# Patient Record
Sex: MALE | Race: WHITE | NOT HISPANIC OR LATINO | Employment: FULL TIME | URBAN - METROPOLITAN AREA
[De-identification: names, ages, dates, MRNs, and addresses within clinical notes are randomized per-mention and may not be internally consistent; named-entity substitution may affect disease eponyms.]

---

## 2017-01-20 ENCOUNTER — HOSPITAL ENCOUNTER (EMERGENCY)
Facility: HOSPITAL | Age: 52
Discharge: HOME | End: 2017-01-20
Attending: EMERGENCY MEDICINE
Payer: COMMERCIAL

## 2017-01-20 ENCOUNTER — NURSE TRIAGE (OUTPATIENT)
Dept: ADMINISTRATIVE | Facility: CLINIC | Age: 52
End: 2017-01-20

## 2017-01-20 VITALS
OXYGEN SATURATION: 98 % | BODY MASS INDEX: 48.78 KG/M2 | RESPIRATION RATE: 16 BRPM | SYSTOLIC BLOOD PRESSURE: 177 MMHG | HEART RATE: 88 BPM | WEIGHT: 315 LBS | TEMPERATURE: 97 F | DIASTOLIC BLOOD PRESSURE: 88 MMHG

## 2017-01-20 DIAGNOSIS — R22.0 SWELLING OF UPPER LIP: Primary | ICD-10-CM

## 2017-01-20 PROCEDURE — 99283 EMERGENCY DEPT VISIT LOW MDM: CPT | Mod: ,,, | Performed by: PHYSICIAN ASSISTANT

## 2017-01-20 PROCEDURE — 99283 EMERGENCY DEPT VISIT LOW MDM: CPT

## 2017-01-20 RX ORDER — AMLODIPINE BESYLATE 5 MG/1
5 TABLET ORAL DAILY
Qty: 30 TABLET | Refills: 0 | Status: SHIPPED | OUTPATIENT
Start: 2017-01-20 | End: 2017-02-08 | Stop reason: SDUPTHER

## 2017-01-20 NOTE — TELEPHONE ENCOUNTER
Spoke to pt and he had not gone to er yet .  I told him that Dr Weinstein said it is best he go to er

## 2017-01-20 NOTE — ED AVS SNAPSHOT
[facility]  [address] [address]               García Tang   2017 10:52 AM   ED    Description:  Male : 1965   Department:  [facility]           Your Care was Coordinated By:     Provider Role From To    John Urban MD Attending Provider 17 1053 --    Divine Bryson PA-C Physician Assistant 17 0469 --      Reason for Visit     Oral Swelling           Diagnoses this Visit        Comments    Swelling of upper lip    -  Primary       ED Disposition     ED Disposition Condition Comment    Discharge             To Do List           Follow-up Information     Follow up with Zaina Weinstein MD. Schedule an appointment as soon as possible for a visit in 2 weeks.    Specialty:  Internal Medicine    Contact information:     [facility] [place] [address]  [phone]         These Medications        Disp Refills Start End    amlodipine (NORVASC) 5 MG tablet 30 tablet 0 2017    Take 1 tablet (5 mg total) by mouth once daily. - Oral    Pharmacy: [id number] [place] [facility] [address] [facility] #: [phone]         [facility]     [facility] -  Assistance  Registered nurses in the [facility] provide clinical advisement, health education, appointment booking, and other advisory services.  Call for this free service at [phone].             Medications           Message regarding Medications     Verify the changes and/or additions to your medication regime listed below are the same as discussed with your clinician today.  If any of these changes or additions are incorrect, please notify your healthcare provider.        START taking these NEW medications        Refills    amlodipine (NORVASC) 5 MG tablet 0    Sig: Take 1 tablet (5 mg total) by mouth once daily.    Class: Print    Route: Oral           Verify that the below list of  medications is an accurate representation of the medications you are currently taking.  If none reported, the list may be blank. If incorrect, please contact your healthcare provider. Carry this list with you in case of emergency.           Current Medications     amlodipine (NORVASC) 5 MG tablet Take 1 tablet (5 mg total) by mouth once daily.    aspirin (ECOTRIN) 81 MG EC tablet Take 81 mg by mouth once daily.    DOCOSAHEXANOIC ACID/EPA (FISH OIL ORAL) Take by mouth.    duloxetine (CYMBALTA) 30 MG capsule Take 1 capsule (30 mg total) by mouth once daily.    ERGOCALCIFEROL, VITAMIN D2, (VITAMIN D ORAL) Take by mouth.    hydrochlorothiazide (MICROZIDE) 12.5 mg capsule Take 2 capsules (25 mg total) by mouth once daily.    insulin NPH-insulin regular, 70/30, (NOVOLIN 70/30) 100 unit/mL (70-30) injection Inject into the skin 2 (two) times daily.    lisinopril (PRINIVIL,ZESTRIL) 40 MG tablet Take 1 tablet (40 mg total) by mouth once daily.    metoprolol succinate (TOPROL XL) 50 MG 24 hr tablet Take 1 tablet (50 mg total) by mouth once daily. 1 Tablet Sustained Release 24 hr Oral Every day    multivitamin capsule Take by mouth. 1 Capsule Oral Every day    omeprazole (PRILOSEC) 20 MG capsule Take 1 capsule (20 mg total) by mouth once daily.           Clinical Reference Information           Your Vitals Were     BP Pulse Temp Resp Weight SpO2    177/88 (BP Location: Right arm, Patient Position: Sitting) 88 97.3 °F (36.3 °C) (Oral) 16 154.2 kg (340 lb) 98%    BMI                48.78 kg/m2          Allergies as of 1/20/2017        Reactions    Bactrim  [Sulfamethoxazole-trimethoprim]     Other reaction(s): Unknown    Hydroxyzine Hcl     Other reaction(s): elevated bp    Kenalog [Triamcinolone Acetonide] Other (See Comments)    Heart flutters all day    Phenergan [Promethazine] Anxiety    Zofran [Ondansetron Hcl (Pf)] Anxiety      Immunizations Administered on Date of Encounter - 1/20/2017     None      ED Micro, Lab, POCT      None      ED Imaging Orders     None        Discharge Instructions       Rest.  Drink plenty of fluids.  You can continue using Benadryl 50 mg every 6 hours as needed for itching or swelling.  Stop taking the lisinopril.  Tylenol or ibuprofen as directed as needed for fever/pain.  Return to the ER for any new or worsening symptoms.    Discharge References/Attachments     ANGIOEDEMA (ENGLISH)      Smoking Cessation     If you would like to quit smoking:   You may be eligible for free services if you are a [place] resident and started smoking cigarettes before September 1, 1988.  Call the Smoking Cessation Trust (SCT) toll free at (122) 528-3231 or (259) 372-0272.   Call 7-193-QUIT-NOW if you do not meet the above criteria.             [facility] complies with applicable Federal civil rights laws and does not discriminate on the basis of race, color, national origin, age, disability, or sex.        Language Assistance Services     ATTENTION: Language assistance services are available, free of charge. Please call [phone].      ATENCIÓN: Si habla topher, tiene a olivera disposición servicios gratuitos de asistencia lingüística. Llame al [phone].     WATSON Ý: N?u b?n nói Ti?ng Vi?t, có các d?ch v? h? tr? ngôn ng? mi?n phí dành cho b?n. G?i s? [phone].

## 2017-01-20 NOTE — DISCHARGE INSTRUCTIONS
Rest.  Drink plenty of fluids.  You can continue using Benadryl 50 mg every 6 hours as needed for itching or swelling.  Stop taking the lisinopril.  Tylenol or ibuprofen as directed as needed for fever/pain.  Return to the ER for any new or worsening symptoms.

## 2017-01-20 NOTE — ED PROVIDER NOTES
"Encounter Date: 1/20/2017    SCRIBE #1 NOTE: I, Carmen Ellison, am scribing for, and in the presence of,  Dr. Urban. I have scribed the following portions of the note - the APC attestation.       History     Chief Complaint   Patient presents with    Oral Swelling     facial and oral tingling and swelling; took Benadryl today 0700. denies difficulty swallowing; dyspnea with exertion. no new medication.  on lisinopril     Review of patient's allergies indicates:   Allergen Reactions    Bactrim  [sulfamethoxazole-trimethoprim]      Other reaction(s): Unknown    Hydroxyzine hcl      Other reaction(s): elevated bp    Kenalog [triamcinolone acetonide] Other (See Comments)     Heart flutters all day    Phenergan [promethazine] Anxiety    Zofran [ondansetron hcl (pf)] Anxiety     HPI Comments: Time seen by provider: 10:54 AM    Disclaimer: This note has been generated using voice-recognition software. There may be typographical errors that have been missed during proof-reading.    This is a 51-year-old white male with past medical history of hypertension and diabetes who presents to the ER with a chief complaint of upper lip swelling.  Patient states when he awoke this morning he felt as if his upper lip was swollen.  He took Benadryl at approximately 7:00 this morning and feels as if the swelling has slightly improved.  Patient states he is suffering with sinus congestion for the past 3 days.  He says yesterday he felt "spasm" in his upper lip.  He denies fever, chills, tongue swelling, difficulty swallowing or breathing.  He does currently take lisinopril for his blood pressure.    The history is provided by the patient.     Past Medical History   Diagnosis Date    Allergy     Arthritis     Diabetes mellitus     Hypertension     Inflammatory bowel disease      diverticulitis    Kidney stone     Prostatitis     Rectal bleeding      diverticulitis    Renal disorder     Urinary tract infection      Past " Medical History Pertinent Negatives   Diagnosis Date Noted    Anticoagulant long-term use 5/29/2014    Asthma 5/28/2014    Cancer 5/28/2014    CHF (congestive heart failure) 5/29/2014    COPD (chronic obstructive pulmonary disease) 5/29/2014    Coronary artery disease 5/29/2014    Encounter for blood transfusion 5/29/2014    Seizures 5/29/2014    Stroke 5/29/2014    Thyroid disease 5/29/2014     Past Surgical History   Procedure Laterality Date    Kidney surgery      Nose surgery      Kidney stent      Kidney surgery  1996     Family History   Problem Relation Age of Onset    Cancer Father     Liver disease Mother      d.     Social History   Substance Use Topics    Smoking status: Former Smoker     Types: Cigarettes    Smokeless tobacco: Never Used      Comment: social    Alcohol use Yes      Comment: social     Review of Systems   Constitutional: Negative for chills and fever.   HENT: Positive for congestion, facial swelling (upper lip) and sinus pressure. Negative for sore throat, trouble swallowing and voice change.    Respiratory: Negative for shortness of breath.    Cardiovascular: Negative for chest pain.   Musculoskeletal: Negative for back pain, neck pain and neck stiffness.   Skin: Negative for rash and wound.   Neurological: Negative for weakness and numbness.   Psychiatric/Behavioral: Negative for confusion.       Physical Exam   Initial Vitals   BP Pulse Resp Temp SpO2   01/20/17 0932 01/20/17 0932 01/20/17 0932 01/20/17 0932 01/20/17 0932   177/88 88 16 97.3 °F (36.3 °C) 98 %     Physical Exam    Nursing note and vitals reviewed.  Constitutional: He appears well-developed and well-nourished. No distress.   HENT:   Head: Normocephalic and atraumatic.   Right Ear: Tympanic membrane, external ear and ear canal normal.   Left Ear: Tympanic membrane, external ear and ear canal normal.   Nose: Mucosal edema and rhinorrhea present. Right sinus exhibits no maxillary sinus tenderness and no  frontal sinus tenderness. Left sinus exhibits no maxillary sinus tenderness and no frontal sinus tenderness.   Mouth/Throat: Posterior oropharyngeal erythema present. No oropharyngeal exudate.   There may be some slight swelling of the upper lip.     Eyes: Conjunctivae are normal.   Neck: Normal range of motion. Neck supple.   Cardiovascular: Normal rate and regular rhythm. Exam reveals no gallop and no friction rub.    No murmur heard.  Pulmonary/Chest: Breath sounds normal. He has no wheezes. He has no rhonchi. He has no rales.   Musculoskeletal: Normal range of motion.   Lymphadenopathy:     He has no cervical adenopathy.   Neurological: He is alert and oriented to person, place, and time.   Skin: Skin is warm and dry. No rash noted. No erythema.   Psychiatric: He has a normal mood and affect. His behavior is normal. Judgment and thought content normal.         ED Course   Procedures  Labs Reviewed - No data to display          Medical Decision Making:   History:   Old Medical Records: I decided to obtain old medical records.  Differential Diagnosis:   Angioedema, ALLERGIC reaction, sinusitis, URI, cellulitis, dental abscess  ED Management:  Patient presented to the ER with complaint of upper lip swelling that he noticed this morning.  He took some Benadryl and states swelling may have improved.  Patient does take lisinopril.  He is also suffering with sinus congestion and rhinorrhea.  On exam, there is minimal swelling noted to the upper lip.  There is no erythema or tenderness.  There is no other oral swelling.  This may be early or mild angioedema.  The patient has been instructed to stop taking his lisinopril.  He can continue to take Benadryl every 6 hours as needed for itching or swelling.  He was given a prescription for Norvasc.  He should follow-up with his PCP within the next week for reevaluation of blood pressure.  He was given strict return precautions.  He has been instructed to followup with his  PCP within the next week if symptoms not improving.  He should return to the ER for any new or worsening symptoms.  This case was discussed with my supervising physician.            Scribe Attestation:   Scribe #1: I performed the above scribed service and the documentation accurately describes the services I performed. I attest to the accuracy of the note.    Attending Attestation:     Physician Attestation Statement for NP/PA:   I have conducted a face to face encounter with this patient in addition to the NP/PA, due to Medical Complexity    Other NP/PA Attestation Additions:    History of Present Illness: Pt complains of mild swelling to the upper lip.   Physical Exam: Pt with subtle swelling to the right upper lip. No sign of dental infection, airway obstruction, or cold sore.   Medical Decision Making: This may represent a subtle angioedema reaction. Will discontinue lisinopril and add norvasc.       Physician Attestation for Scribe:  Physician Attestation Statement for Scribe #1: I, Dr. Urban, reviewed documentation, as scribed by Carmen Ellison in my presence, and it is both accurate and complete.                 ED Course     Clinical Impression:   The encounter diagnosis was Swelling of upper lip.          Divine Bryson PA-C  01/20/17 6363

## 2017-01-20 NOTE — TELEPHONE ENCOUNTER
Reason for Disposition   Lip swelling   Taking an ACE Inhibitor medication  (e.g., benazepril/LOTENSIN, captopril/CAPOTEN, enalapril/VASOTEC, lisinopril/ZESTRIL)    Protocols used: ST MOUTH SYMPTOMS-A-AH, ST LIP SWELLING-A-AH    Patient reporting swelling to his bottom lip and reports no changes to diet or otherwise that he is aware of. He denies tongue swelling, facial swelling, difficulty swallowing or difficulty breathing. Recommended according to protocol to be seen in ED. He seems to be unsure if he will go to ED. If MD feels it would be appropriate for him to come into office would you please contact patient. He has already taken a dosage of benadryl today. Please contact caller with any further care advice.

## 2017-01-23 ENCOUNTER — OFFICE VISIT (OUTPATIENT)
Dept: INTERNAL MEDICINE | Facility: CLINIC | Age: 52
End: 2017-01-23
Payer: COMMERCIAL

## 2017-01-23 DIAGNOSIS — J06.9 UPPER RESPIRATORY TRACT INFECTION, UNSPECIFIED TYPE: Primary | ICD-10-CM

## 2017-01-23 PROCEDURE — 3074F SYST BP LT 130 MM HG: CPT | Mod: S$GLB,,, | Performed by: INTERNAL MEDICINE

## 2017-01-23 PROCEDURE — 3079F DIAST BP 80-89 MM HG: CPT | Mod: S$GLB,,, | Performed by: INTERNAL MEDICINE

## 2017-01-23 PROCEDURE — 99999 PR PBB SHADOW E&M-EST. PATIENT-LVL III: CPT | Mod: PBBFAC,,, | Performed by: INTERNAL MEDICINE

## 2017-01-23 PROCEDURE — 1159F MED LIST DOCD IN RCRD: CPT | Mod: S$GLB,,, | Performed by: INTERNAL MEDICINE

## 2017-01-23 PROCEDURE — 99213 OFFICE O/P EST LOW 20 MIN: CPT | Mod: S$GLB,,, | Performed by: INTERNAL MEDICINE

## 2017-01-23 RX ORDER — AMOXICILLIN 875 MG/1
875 TABLET, FILM COATED ORAL 2 TIMES DAILY
Qty: 14 TABLET | Refills: 0 | Status: SHIPPED | OUTPATIENT
Start: 2017-01-23 | End: 2017-01-30

## 2017-01-24 ENCOUNTER — PATIENT MESSAGE (OUTPATIENT)
Dept: INTERNAL MEDICINE | Facility: CLINIC | Age: 52
End: 2017-01-24

## 2017-01-28 VITALS
HEIGHT: 70 IN | HEART RATE: 69 BPM | TEMPERATURE: 99 F | DIASTOLIC BLOOD PRESSURE: 88 MMHG | SYSTOLIC BLOOD PRESSURE: 118 MMHG | WEIGHT: 315 LBS | BODY MASS INDEX: 45.1 KG/M2 | OXYGEN SATURATION: 97 %

## 2017-01-29 NOTE — PROGRESS NOTES
Subjective:       Patient ID: García Tang is a 51 y.o. male.    Chief Complaint: URI    HPI: He complains of sinus congestion and green nasal discharge.  No fever, chills, night sweats  Review of Systems   Constitutional: Negative for chills, fatigue, fever and unexpected weight change.   Respiratory: Negative for chest tightness and shortness of breath.    Cardiovascular: Negative for chest pain and palpitations.   Gastrointestinal: Negative for abdominal pain and blood in stool.   Neurological: Negative for dizziness, syncope, numbness and headaches.       Objective:      Physical Exam   HENT:   Right Ear: External ear normal.   Left Ear: External ear normal.   Nose: Nose normal.   Mouth/Throat: Oropharynx is clear and moist.   Eyes: Pupils are equal, round, and reactive to light.   Neck: Normal range of motion.   Cardiovascular: Normal rate and regular rhythm.    No murmur heard.  Pulmonary/Chest: Breath sounds normal.   Abdominal: He exhibits no distension. There is no hepatosplenomegaly. There is no tenderness.   Lymphadenopathy:     He has no cervical adenopathy.     He has no axillary adenopathy.   Neurological: He has normal strength and normal reflexes. No cranial nerve deficit or sensory deficit.     no lip swelling  Assessment:     assessment and plan: Upper respiratory infection: Amoxil 875 mg by mouth twice a day.  Call if no relief.  He was here for an urgent care only appointment.  He will follow-up with his primary care physician for a physical      Plan:       As above

## 2017-02-08 ENCOUNTER — HOSPITAL ENCOUNTER (OUTPATIENT)
Dept: RADIOLOGY | Facility: HOSPITAL | Age: 52
Discharge: HOME | End: 2017-02-08
Attending: INTERNAL MEDICINE
Payer: COMMERCIAL

## 2017-02-08 ENCOUNTER — OFFICE VISIT (OUTPATIENT)
Dept: INTERNAL MEDICINE | Facility: CLINIC | Age: 52
End: 2017-02-08
Payer: COMMERCIAL

## 2017-02-08 DIAGNOSIS — R06.2 WHEEZING: Primary | ICD-10-CM

## 2017-02-08 DIAGNOSIS — R06.2 WHEEZING: ICD-10-CM

## 2017-02-08 PROCEDURE — 99999 PR PBB SHADOW E&M-EST. PATIENT-LVL III: CPT | Mod: PBBFAC,,, | Performed by: INTERNAL MEDICINE

## 2017-02-08 PROCEDURE — 99213 OFFICE O/P EST LOW 20 MIN: CPT | Mod: S$GLB,,, | Performed by: INTERNAL MEDICINE

## 2017-02-08 PROCEDURE — 71020 XR CHEST PA AND LATERAL: CPT | Mod: TC,PO

## 2017-02-08 PROCEDURE — 71020 XR CHEST PA AND LATERAL: CPT | Mod: 26,,, | Performed by: RADIOLOGY

## 2017-02-08 RX ORDER — METOPROLOL SUCCINATE 50 MG/1
50 TABLET, EXTENDED RELEASE ORAL DAILY
Qty: 90 TABLET | Refills: 3 | Status: SHIPPED | OUTPATIENT
Start: 2017-02-08 | End: 2017-02-23 | Stop reason: SDUPTHER

## 2017-02-08 RX ORDER — AMLODIPINE BESYLATE 5 MG/1
5 TABLET ORAL DAILY
Qty: 90 TABLET | Refills: 3 | Status: SHIPPED | OUTPATIENT
Start: 2017-02-08 | End: 2017-10-09 | Stop reason: SDUPTHER

## 2017-02-08 RX ORDER — AMLODIPINE BESYLATE 5 MG/1
5 TABLET ORAL DAILY
Qty: 90 TABLET | Refills: 3 | Status: SHIPPED | OUTPATIENT
Start: 2017-02-08 | End: 2017-02-08 | Stop reason: SDUPTHER

## 2017-02-08 RX ORDER — HYDROCHLOROTHIAZIDE 12.5 MG/1
25 CAPSULE ORAL DAILY
Qty: 90 CAPSULE | Refills: 3 | Status: SHIPPED | OUTPATIENT
Start: 2017-02-08 | End: 2017-02-23 | Stop reason: DRUGHIGH

## 2017-02-09 VITALS
RESPIRATION RATE: 12 BRPM | DIASTOLIC BLOOD PRESSURE: 100 MMHG | HEIGHT: 70 IN | SYSTOLIC BLOOD PRESSURE: 140 MMHG | HEART RATE: 76 BPM | WEIGHT: 315 LBS | BODY MASS INDEX: 45.1 KG/M2

## 2017-02-09 NOTE — PROGRESS NOTES
Subjective:       Patient ID: García Tang is a 51 y.o. male.    Chief Complaint: Wheezing    HPI     Patient is a 51 year old male here today with wheezing. He says this is intermittent. He says he has gained a lot of weight since our last visit, he says this is from life stressors/dietary indescretion. He says he feels SOB with walking up flight of stairs but he tries to stay active and push himself but he feels winded. He says his wife has commented that she hears him wheezing intermittently and was concerned. He has no orthopnea, no new LE edema. Normal stress echo 2/2016, normal EF. No history of reactive airway disease. He was a former smoker.    Review of Systems   Constitutional: Positive for fatigue. Negative for chills and fever.   HENT: Negative for congestion, ear pain, postnasal drip, rhinorrhea, sinus pressure and sore throat.    Eyes: Negative for itching and visual disturbance.   Respiratory: Positive for wheezing. Negative for cough and shortness of breath.    Cardiovascular: Negative for chest pain, palpitations and leg swelling.   Gastrointestinal: Negative for abdominal pain and nausea.   Genitourinary: Negative for dysuria.   Musculoskeletal: Negative for arthralgias and myalgias.   Skin: Negative for rash.   Neurological: Negative for weakness, light-headedness and headaches.       Objective:      Physical Exam   Constitutional: He is oriented to person, place, and time. He appears well-developed and well-nourished. No distress.   HENT:   Head: Normocephalic and atraumatic.   Mouth/Throat: Oropharynx is clear and moist. No oropharyngeal exudate.   Eyes: Conjunctivae and EOM are normal. Pupils are equal, round, and reactive to light. Right eye exhibits no discharge. Left eye exhibits no discharge.   Neck: Normal range of motion. Neck supple. No thyromegaly present.   Cardiovascular: Normal rate, regular rhythm and normal heart sounds.    No murmur heard.  Pulmonary/Chest: Effort normal and  breath sounds normal. No respiratory distress. He has no wheezes. He has no rales.   Musculoskeletal: He exhibits no edema.   Lymphadenopathy:     He has no cervical adenopathy.   Neurological: He is alert and oriented to person, place, and time.   Skin: Skin is warm and dry. He is not diaphoretic.   Nursing note and vitals reviewed.      Assessment:       1. Wheezing        Plan:       Lung exam is CTAB  No orthopnea, he has had normal stress test last year, echo without any evidence of heart failure  Suspect this is secondary to deconditioning and weight gain however will examine for any evidence of reactive airway disease.  CXR   PFT with bronchodilator  Work on weight loss with diet + exercise

## 2017-02-10 ENCOUNTER — HOSPITAL ENCOUNTER (OUTPATIENT)
Dept: PULMONOLOGY | Facility: CLINIC | Age: 52
Discharge: HOME | End: 2017-02-10
Payer: COMMERCIAL

## 2017-02-10 DIAGNOSIS — R06.2 WHEEZING: ICD-10-CM

## 2017-02-10 LAB
PRE FEV1 FVC: 78
PRE FEV1: 2.93
PRE FVC: 3.74
PREDICTED FEV1 FVC: 81
PREDICTED FEV1: 3.69
PREDICTED FVC: 4.51

## 2017-02-10 PROCEDURE — 94729 DIFFUSING CAPACITY: CPT | Mod: S$GLB,,, | Performed by: INTERNAL MEDICINE

## 2017-02-10 PROCEDURE — 94010 BREATHING CAPACITY TEST: CPT | Mod: S$GLB,,, | Performed by: INTERNAL MEDICINE

## 2017-02-11 ENCOUNTER — PATIENT MESSAGE (OUTPATIENT)
Dept: INTERNAL MEDICINE | Facility: CLINIC | Age: 52
End: 2017-02-11

## 2017-02-14 ENCOUNTER — PATIENT MESSAGE (OUTPATIENT)
Dept: INTERNAL MEDICINE | Facility: CLINIC | Age: 52
End: 2017-02-14

## 2017-02-15 ENCOUNTER — PATIENT MESSAGE (OUTPATIENT)
Dept: INTERNAL MEDICINE | Facility: CLINIC | Age: 52
End: 2017-02-15

## 2017-02-22 ENCOUNTER — NURSE TRIAGE (OUTPATIENT)
Dept: ADMINISTRATIVE | Facility: CLINIC | Age: 52
End: 2017-02-22

## 2017-02-23 ENCOUNTER — OFFICE VISIT (OUTPATIENT)
Dept: INTERNAL MEDICINE | Facility: CLINIC | Age: 52
End: 2017-02-23
Payer: COMMERCIAL

## 2017-02-23 ENCOUNTER — LAB VISIT (OUTPATIENT)
Dept: LAB | Facility: HOSPITAL | Age: 52
End: 2017-02-23
Attending: INTERNAL MEDICINE
Payer: COMMERCIAL

## 2017-02-23 VITALS
BODY MASS INDEX: 45.1 KG/M2 | WEIGHT: 315 LBS | TEMPERATURE: 99 F | HEIGHT: 70 IN | SYSTOLIC BLOOD PRESSURE: 148 MMHG | DIASTOLIC BLOOD PRESSURE: 96 MMHG | HEART RATE: 90 BPM

## 2017-02-23 DIAGNOSIS — R21 RASH: ICD-10-CM

## 2017-02-23 DIAGNOSIS — E66.01 MORBID OBESITY DUE TO EXCESS CALORIES: Chronic | ICD-10-CM

## 2017-02-23 DIAGNOSIS — I10 ESSENTIAL HYPERTENSION: ICD-10-CM

## 2017-02-23 LAB
CREAT UR-MCNC: 235 MG/DL
MICROALBUMIN UR DL<=1MG/L-MCNC: 15 UG/ML
MICROALBUMIN/CREATININE RATIO: 6.4 UG/MG

## 2017-02-23 PROCEDURE — 82570 ASSAY OF URINE CREATININE: CPT

## 2017-02-23 PROCEDURE — 99213 OFFICE O/P EST LOW 20 MIN: CPT | Mod: S$GLB,,, | Performed by: INTERNAL MEDICINE

## 2017-02-23 PROCEDURE — 2022F DILAT RTA XM EVC RTNOPTHY: CPT | Mod: S$GLB,,, | Performed by: INTERNAL MEDICINE

## 2017-02-23 PROCEDURE — 99999 PR PBB SHADOW E&M-EST. PATIENT-LVL III: CPT | Mod: PBBFAC,,, | Performed by: INTERNAL MEDICINE

## 2017-02-23 PROCEDURE — 1160F RVW MEDS BY RX/DR IN RCRD: CPT | Mod: S$GLB,,, | Performed by: INTERNAL MEDICINE

## 2017-02-23 PROCEDURE — 3080F DIAST BP >= 90 MM HG: CPT | Mod: S$GLB,,, | Performed by: INTERNAL MEDICINE

## 2017-02-23 PROCEDURE — 3060F POS MICROALBUMINURIA REV: CPT | Mod: S$GLB,,, | Performed by: INTERNAL MEDICINE

## 2017-02-23 PROCEDURE — 3045F PR MOST RECENT HEMOGLOBIN A1C LEVEL 7.0-9.0%: CPT | Mod: S$GLB,,, | Performed by: INTERNAL MEDICINE

## 2017-02-23 PROCEDURE — 3077F SYST BP >= 140 MM HG: CPT | Mod: S$GLB,,, | Performed by: INTERNAL MEDICINE

## 2017-02-23 RX ORDER — HYDROCHLOROTHIAZIDE 25 MG/1
25 TABLET ORAL DAILY
Qty: 30 TABLET | Refills: 11
Start: 2017-02-23 | End: 2017-05-18

## 2017-02-23 RX ORDER — METOPROLOL SUCCINATE 50 MG/1
50 TABLET, EXTENDED RELEASE ORAL 2 TIMES DAILY
Qty: 60 TABLET | Refills: 6 | Status: SHIPPED | OUTPATIENT
Start: 2017-02-23 | End: 2017-10-09 | Stop reason: SDUPTHER

## 2017-02-23 NOTE — TELEPHONE ENCOUNTER
Reason for Disposition   Caller has NON-URGENT medication question about med that PCP prescribed and triager unable to answer question    Answer Assessment - Initial Assessment Questions  Pt reports concerns about his recent feeling of internal shakiness. Has been ongoing for a few days but was worse last pm. He checked his cbg at 2300 and noted reading of 335. He takes novolin 70/30 27 units am 24 hs but his dr told him he could use it with SS. He took 30 units last pm with 335. FBS this am was 159 which is about average for him. Has continued to have these internal shaky feelings today- at 1820 was 227. He is getting ready to eat dinner about 1730. Denied any recent illnesses but did report onset of rash last week for which he took benadryl which helped some. Noted the rash again Sunday - concentrated around neck and upper chest. Red, raised small bumps in clusters. No itching. Unsure of cause.    Protocols used:  DIABETES - HIGH BLOOD SUGAR-A-    Advised on low carbs tonight. Increased water intake. Insulin as directed by his dr. Call if any other changes.

## 2017-02-23 NOTE — PROGRESS NOTES
Subjective:       Patient ID: García Tang is a 51 y.o. male.    Chief Complaint: Diabetes (DM II follow up) and Rash    HPI     Patient is a 51-year-old male here today for a follow-up appointment.  He did call nurse on call for high blood sugar last night. Patient is not compliant with medication regimen, diet. He says that he was concerned because he felt shaky, checked BG which he normally does not do and numbers were in the 300s. He says he was concerned and called on call nurse. Instructed to drink water, take insulin as prescribed. Rash also located to anterior chest x 2 weeks off/on, not itchy, red, raised.     Review of Systems   Constitutional: Positive for fatigue. Negative for chills and fever.   HENT: Negative for congestion, ear pain, postnasal drip, rhinorrhea, sinus pressure and sore throat.    Eyes: Negative for itching and visual disturbance.   Respiratory: Negative for cough, shortness of breath and wheezing.    Cardiovascular: Negative for chest pain, palpitations and leg swelling.   Gastrointestinal: Negative for abdominal pain and nausea.   Genitourinary: Negative for dysuria.   Musculoskeletal: Negative for arthralgias and myalgias.   Skin: Positive for rash.   Neurological: Positive for tremors and weakness. Negative for light-headedness and headaches.       Objective:      Physical Exam   Constitutional: He is oriented to person, place, and time. He appears well-developed and well-nourished. No distress.   HENT:   Head: Normocephalic and atraumatic.   Neurological: He is alert and oriented to person, place, and time.   Skin: Skin is warm and dry. He is not diaphoretic.   Erythematous raised papular lesion on anterior chest wall  Not itchy  No scaling  No swelling noted   Nursing note and vitals reviewed.      Assessment:       1. Uncontrolled type 2 diabetes mellitus with peripheral neuropathy    2. Essential hypertension    3. Morbid obesity due to excess calories    4. Rash         Plan:       Patient very noncompliant with BG monitor and medication. This has been a recurring problem making it very difficult to have any control. At some point there was another endocrine MD he was seeing however he says he does not follow up with him now?     BP uncontrolled still:  Continue HCTZ 25 mg daily  Continue Norvasc 5 mg daily  Increase Toprol XL 50 mg to BID now (patient prefers BID dosing from a 100 mg XL daily) - Rx sent in    BG uncontrolled:  NEED BG LOG  He is now on Novolin 70/30 as 30 units BID  No sliding scale insulin given  Fasting BG this Am was 156  Diabetic Education  Provide log after one week and we can adjust insulin  Endocrine referral needed ASAP  Labs today including CMP, Lipid, A1c, urine microalbumin levels    Contact Dermatitis:  Trial of steroid cream BID to affected area  If no improvement, dermatology evaluation    8 week follow up

## 2017-02-24 ENCOUNTER — DOCUMENTATION ONLY (OUTPATIENT)
Dept: TRANSPLANT | Facility: CLINIC | Age: 52
End: 2017-02-24

## 2017-02-24 DIAGNOSIS — R79.89 ELEVATED LFTS: Primary | ICD-10-CM

## 2017-02-24 DIAGNOSIS — E78.5 HYPERLIPIDEMIA, UNSPECIFIED HYPERLIPIDEMIA TYPE: Primary | ICD-10-CM

## 2017-02-24 RX ORDER — FENOFIBRATE 54 MG/1
54 TABLET ORAL DAILY
Qty: 90 TABLET | Refills: 3 | Status: SHIPPED | OUTPATIENT
Start: 2017-02-24 | End: 2017-05-01

## 2017-02-24 NOTE — PROGRESS NOTES
Labs reviewed with patient today.  Elevated liver function testing, and the past this was also present with positive anti-smooth muscle antibody.  I do think this is likely fatty liver disease secondary to his poor diet and lifestyle choices.  I will check an ultrasound of the abdomen now, also will refer to hepatology given the positive anti-smooth muscle antibody testing done previously.    Regarding his lipid panel, elevation of the triglycerides are significant.  I do recommend starting fenofibrate 54 mg daily.  Despite his liver function at this time, this medication is still not contraindicated.

## 2017-02-24 NOTE — LETTER
February 24, 2017    Brad Tang  [address] [address]      Dear Brad Tang:    Your doctor has referred you to the [facility]. You will be contacted by our office and an initial appointment will then be scheduled for you.    We look forward to seeing you soon. If you have any further questions, please contact us at [phone].       Sincerely,        [facility]   [address] [place] [address]  [phone]

## 2017-02-24 NOTE — NURSING
Pt records reviewed.   Pt will be referred to Hepatology clinic  Initial referral received  from the [facility]que.   Referring Provider/diagnosis  Zaina Weinstein MD    Diagnosis: Elevated LFTs         Referral letter sent to provider and patient.

## 2017-02-24 NOTE — LETTER
February 24, 2017    Zaina Weinstein MD  [address] [facility] [address]      Dear Dr. Weinstein    Patient: García Tang   MR Number: [id number]   YOB: 1965     Thank you for the referral of García Tang to the [facility]. An initial appointment will be scheduled for your patient with one of our Hepatologists.      Thank you again for your trust in our program.  If there is anything we can do for you or your staff, please feel free to contact us.        Sincerely,        [facility]  [address] [place] [address]  [phone]

## 2017-03-01 ENCOUNTER — HOSPITAL ENCOUNTER (OUTPATIENT)
Dept: RADIOLOGY | Facility: HOSPITAL | Age: 52
Discharge: HOME | End: 2017-03-01
Attending: INTERNAL MEDICINE
Payer: COMMERCIAL

## 2017-03-01 ENCOUNTER — TELEPHONE (OUTPATIENT)
Dept: INTERNAL MEDICINE | Facility: CLINIC | Age: 52
End: 2017-03-01

## 2017-03-01 DIAGNOSIS — R79.89 ELEVATED LFTS: ICD-10-CM

## 2017-03-01 PROCEDURE — 76705 ECHO EXAM OF ABDOMEN: CPT | Mod: TC

## 2017-03-01 PROCEDURE — 76705 ECHO EXAM OF ABDOMEN: CPT | Mod: 26,,, | Performed by: RADIOLOGY

## 2017-03-01 NOTE — TELEPHONE ENCOUNTER
----- Message from Jazlyn Rosales sent at 3/1/2017 12:14 PM CST -----  Contact: self- [phone]  Patient would like to speak with  Only No nurse at all about ultrasound and the question's he has.

## 2017-03-01 NOTE — TELEPHONE ENCOUNTER
Pt said that you just sent him his results of his US like 20 mins ago,and all due respect I'm not a professional,nor a Dr,and he only wants to speak with his Dr. When I asked if I could relay certain questions so she could know what he wanted he said no- he wanted only to discuss with his Dr.

## 2017-03-02 ENCOUNTER — PATIENT MESSAGE (OUTPATIENT)
Dept: INTERNAL MEDICINE | Facility: CLINIC | Age: 52
End: 2017-03-02

## 2017-03-07 ENCOUNTER — OFFICE VISIT (OUTPATIENT)
Dept: HEPATOLOGY | Facility: CLINIC | Age: 52
End: 2017-03-07
Payer: COMMERCIAL

## 2017-03-07 ENCOUNTER — LAB VISIT (OUTPATIENT)
Dept: LAB | Facility: HOSPITAL | Age: 52
End: 2017-03-07
Payer: COMMERCIAL

## 2017-03-07 VITALS
RESPIRATION RATE: 18 BRPM | WEIGHT: 315 LBS | TEMPERATURE: 99 F | OXYGEN SATURATION: 95 % | HEART RATE: 79 BPM | BODY MASS INDEX: 45.1 KG/M2 | DIASTOLIC BLOOD PRESSURE: 59 MMHG | SYSTOLIC BLOOD PRESSURE: 112 MMHG | HEIGHT: 70 IN

## 2017-03-07 DIAGNOSIS — R74.8 ELEVATED LIVER ENZYMES: ICD-10-CM

## 2017-03-07 DIAGNOSIS — K76.0 FATTY LIVER: ICD-10-CM

## 2017-03-07 DIAGNOSIS — E66.01 MORBID OBESITY DUE TO EXCESS CALORIES: Chronic | ICD-10-CM

## 2017-03-07 DIAGNOSIS — E78.1 HYPERTRIGLYCERIDEMIA: ICD-10-CM

## 2017-03-07 DIAGNOSIS — R76.8 AUTOANTIBODY TITER ELEVATED: ICD-10-CM

## 2017-03-07 DIAGNOSIS — K76.0 FATTY LIVER: Primary | ICD-10-CM

## 2017-03-07 DIAGNOSIS — I10 ESSENTIAL HYPERTENSION: ICD-10-CM

## 2017-03-07 LAB
ALBUMIN SERPL BCP-MCNC: 3.6 G/DL
ALP SERPL-CCNC: 103 U/L
ALT SERPL W/O P-5'-P-CCNC: 45 U/L
ANION GAP SERPL CALC-SCNC: 10 MMOL/L
AST SERPL-CCNC: 31 U/L
BASOPHILS # BLD AUTO: 0.03 K/UL
BASOPHILS NFR BLD: 0.6 %
BILIRUB SERPL-MCNC: 0.4 MG/DL
BUN SERPL-MCNC: 12 MG/DL
CALCIUM SERPL-MCNC: 9.2 MG/DL
CHLORIDE SERPL-SCNC: 99 MMOL/L
CO2 SERPL-SCNC: 29 MMOL/L
CREAT SERPL-MCNC: 1.2 MG/DL
DIFFERENTIAL METHOD: NORMAL
EOSINOPHIL # BLD AUTO: 0.1 K/UL
EOSINOPHIL NFR BLD: 2.4 %
ERYTHROCYTE [DISTWIDTH] IN BLOOD BY AUTOMATED COUNT: 12.5 %
EST. GFR  (AFRICAN AMERICAN): >60 ML/MIN/1.73 M^2
EST. GFR  (NON AFRICAN AMERICAN): >60 ML/MIN/1.73 M^2
FERRITIN SERPL-MCNC: 179 NG/ML
GLUCOSE SERPL-MCNC: 255 MG/DL
HCT VFR BLD AUTO: 40 %
HGB BLD-MCNC: 14 G/DL
IGG SERPL-MCNC: 1256 MG/DL
INR PPP: 1
IRON SERPL-MCNC: 72 UG/DL
LYMPHOCYTES # BLD AUTO: 1.8 K/UL
LYMPHOCYTES NFR BLD: 32.1 %
MCH RBC QN AUTO: 29.6 PG
MCHC RBC AUTO-ENTMCNC: 35 %
MCV RBC AUTO: 85 FL
MONOCYTES # BLD AUTO: 0.6 K/UL
MONOCYTES NFR BLD: 11.4 %
NEUTROPHILS # BLD AUTO: 2.9 K/UL
NEUTROPHILS NFR BLD: 53.3 %
PLATELET # BLD AUTO: 232 K/UL
PMV BLD AUTO: 9.9 FL
POTASSIUM SERPL-SCNC: 4 MMOL/L
PROT SERPL-MCNC: 7.5 G/DL
PROTHROMBIN TIME: 10.3 SEC
RBC # BLD AUTO: 4.73 M/UL
SATURATED IRON: 23 %
SODIUM SERPL-SCNC: 138 MMOL/L
TOTAL IRON BINDING CAPACITY: 318 UG/DL
TRANSFERRIN SERPL-MCNC: 215 MG/DL
WBC # BLD AUTO: 5.45 K/UL

## 2017-03-07 PROCEDURE — 99999 PR PBB SHADOW E&M-EST. PATIENT-LVL IV: CPT | Mod: PBBFAC,,, | Performed by: NURSE PRACTITIONER

## 2017-03-07 PROCEDURE — 86704 HEP B CORE ANTIBODY TOTAL: CPT

## 2017-03-07 PROCEDURE — 85025 COMPLETE CBC W/AUTO DIFF WBC: CPT

## 2017-03-07 PROCEDURE — 3045F PR MOST RECENT HEMOGLOBIN A1C LEVEL 7.0-9.0%: CPT | Mod: S$GLB,,, | Performed by: NURSE PRACTITIONER

## 2017-03-07 PROCEDURE — 80053 COMPREHEN METABOLIC PANEL: CPT

## 2017-03-07 PROCEDURE — 3074F SYST BP LT 130 MM HG: CPT | Mod: S$GLB,,, | Performed by: NURSE PRACTITIONER

## 2017-03-07 PROCEDURE — 82247 BILIRUBIN TOTAL: CPT

## 2017-03-07 PROCEDURE — 83540 ASSAY OF IRON: CPT

## 2017-03-07 PROCEDURE — 3078F DIAST BP <80 MM HG: CPT | Mod: S$GLB,,, | Performed by: NURSE PRACTITIONER

## 2017-03-07 PROCEDURE — 3060F POS MICROALBUMINURIA REV: CPT | Mod: S$GLB,,, | Performed by: NURSE PRACTITIONER

## 2017-03-07 PROCEDURE — 1160F RVW MEDS BY RX/DR IN RCRD: CPT | Mod: S$GLB,,, | Performed by: NURSE PRACTITIONER

## 2017-03-07 PROCEDURE — 84466 ASSAY OF TRANSFERRIN: CPT

## 2017-03-07 PROCEDURE — 82728 ASSAY OF FERRITIN: CPT

## 2017-03-07 PROCEDURE — 99244 OFF/OP CNSLTJ NEW/EST MOD 40: CPT | Mod: S$GLB,,, | Performed by: NURSE PRACTITIONER

## 2017-03-07 PROCEDURE — 86790 VIRUS ANTIBODY NOS: CPT

## 2017-03-07 PROCEDURE — 2022F DILAT RTA XM EVC RTNOPTHY: CPT | Mod: S$GLB,,, | Performed by: NURSE PRACTITIONER

## 2017-03-07 PROCEDURE — 82784 ASSAY IGA/IGD/IGG/IGM EACH: CPT

## 2017-03-07 PROCEDURE — 85610 PROTHROMBIN TIME: CPT

## 2017-03-07 PROCEDURE — 36415 COLL VENOUS BLD VENIPUNCTURE: CPT

## 2017-03-07 NOTE — LETTER
March 7, 2017      Zaina Weinstein MD  [address]  [place] [address]           [facility] - Hepatology  [address]  [place] [address]  Phone: [phone]  Fax: [phone]          Patient: García Tang   MR Number: [id number]   YOB: 1965   Date of Visit: 3/7/2017       Dear Dr. Zaina Weinstein:    Thank you for referring García Tang to me for evaluation. Attached you will find relevant portions of my assessment and plan of care.    If you have questions, please do not hesitate to call me. I look forward to following García Tang along with you.    Sincerely,    Ema Johnson, NP    Enclosure  CC:  No Recipients    If you would like to receive this communication electronically, please contact [email or web address] or [phone] to request more information on [employer] Link access.    For providers and/or their staff who would like to refer a patient to [facility], please contact us through our one-stop-shop provider referral line, [facility], at [phone].    If you feel you have received this communication in error or would no longer like to receive these types of communications, please e-mail [email or web address]

## 2017-03-07 NOTE — MR AVS SNAPSHOT
[facility] - Hepatology  [address]  [facility] [address]  Phone: [phone]  Fax: [phone]                  García Tang   3/7/2017 2:20 PM   Office Visit    Description:  Male : 1965   Provider:  Ema Johnson NP   Department:  [facility]at[facility]           Reason for Visit     Elevated Hepatic Enzymes     Fatty Liver           Diagnoses this Visit        Comments    Fatty liver    -  Primary            To Do List           Future Appointments        Provider Department Dept Phone    2017 11:00 AM [facility] - Diabetes Mgmt [phone]    2017 2:40 PM MD Miguelina Jonese - Internal Medicine [phone]    2017 7:30 AM [facility] - Laboratory [phone]    5/10/2017 8:40 AM [facility] [phone]      Goals (5 Years of Data)     None      [facility]     [facility] Care Line -  Assistance  Registered nurses in the [facility] Center provide clinical advisement, health education, appointment booking, and other advisory services.  Call for this free service at [phone].             Medications           Message regarding Medications     Verify the changes and/or additions to your medication regime listed below are the same as discussed with your clinician today.  If any of these changes or additions are incorrect, please notify your healthcare provider.             Verify that the below list of medications is an accurate representation of the medications you are currently taking.  If none reported, the list may be blank. If incorrect, please contact your healthcare provider. Carry this list with you in case of emergency.           Current Medications     amlodipine (NORVASC) 5 MG tablet Take 1 tablet (5 mg total) by mouth once daily.    aspirin (ECOTRIN) 81 MG EC tablet Take 81 mg by mouth once daily.    DOCOSAHEXANOIC ACID/EPA (FISH OIL ORAL) Take by mouth.  "   ERGOCALCIFEROL, VITAMIN D2, (VITAMIN D ORAL) Take by mouth.    fenofibrate (TRICOR) 54 MG tablet Take 1 tablet (54 mg total) by mouth once daily.    hydrochlorothiazide (HYDRODIURIL) 25 MG tablet Take 1 tablet (25 mg total) by mouth once daily.    insulin NPH-insulin regular, 70/30, (NOVOLIN 70/30) 100 unit/mL (70-30) injection Inject into the skin 2 (two) times daily.    metoprolol succinate (TOPROL XL) 50 MG 24 hr tablet Take 1 tablet (50 mg total) by mouth 2 (two) times daily.    multivitamin capsule Take by mouth. 1 Capsule Oral Every day           Clinical Reference Information           Your Vitals Were     BP Pulse Temp Resp Height Weight    112/59 (BP Location: Left arm, Patient Position: Sitting) 79 98.9 °F (37.2 °C) (Oral) 18 5' 10" (1.778 m) 159.9 kg (352 lb 8.3 oz)    SpO2 BMI             95% 50.58 kg/m2         Blood Pressure          Most Recent Value    BP  (!)  112/59      Allergies as of 3/7/2017     Bactrim  [Sulfamethoxazole-trimethoprim]    Hydroxyzine Hcl    Kenalog [Triamcinolone Acetonide]    Lisinopril    Phenergan [Promethazine]    Zofran [Ondansetron Hcl (Pf)]      Immunizations Administered on Date of Encounter - 3/7/2017     None      Orders Placed During Today's Visit     Future Labs/Procedures Expected by Expires    Hepatitis A antibody, IgG  3/7/2017 5/6/2018    MRI Abdomen Without Contrast  3/7/2017 3/7/2018    CBC auto differential  As directed 3/7/2018    Comprehensive metabolic panel  As directed 3/7/2018    Ferritin  As directed 3/7/2018    HCV FibroSURE  As directed 3/7/2018    Hepatitis B core antibody, total  As directed 3/7/2018    IgG  As directed 3/7/2018    Iron and TIBC  As directed 3/7/2018    Protime-INR  As directed 3/7/2018      Language Assistance Services     ATTENTION: Language assistance services are available, free of charge. Please call [phone].      ATENCIÓN: Si habla español, tiene a olivera disposición servicios gratuitos de asistencia lingüística. Llame " al [phone].     [facility] Ý: N?u b?n nói Ti?ng Vi?t, có các d?ch v? h? tr? ngôn ng? mi?n phí dành cho b?n. G?i s? [phone].         [facility] complies with applicable Federal civil rights laws and does not discriminate on the basis of race, color, national origin, age, disability, or sex.

## 2017-03-07 NOTE — PROGRESS NOTES
[facility] VISIT NEW PT NOTE    REFERRING PROVIDER: Zaina Weinstein MD     CHIEF COMPLAINT:  Elevated liver enzymes, (+) ASMA    HPI: This is a 51 y.o. White male with PMH as below referred for elevated liver enzymes. He has had some workup in the past for this with serological workup that was negative for Augie's, autoimmune etiology, and viral hepatitis. His RANI is negative and ASMA was only mildly (+) at 1:40 which is not significant. He has not been tested for HH or A1AT. His u/s shows fatty liver with hepatomegaly at 20.2 cm. Spleen nl at 12.4 cm. He has multiple risk factors for this with morbid obesity, HTN, HLD, and DM. His weight has been increasing recently. He reports he has lost a significant amount of weight in the past and was down to 230 lbs but regained it all back. He has mildly elevated transaminases, ALT > AST for the most part. Alk phos and Tbili nl. He has normal plts at 251. No recent INR. He denies any significant alcohol use currently, used to drink some in the past but no longer drinks. His mother had cirrhosis likely from hep C from a blood transfusion with child birth. He denies any symptoms of advanced chronic liver disease including jaundice, dark urine, abdominal distention, hematemesis, melena, slowed mentation.         Review of patient's allergies indicates:   Allergen Reactions    Bactrim  [sulfamethoxazole-trimethoprim]      Other reaction(s): Unknown    Hydroxyzine hcl      Other reaction(s): elevated bp    Kenalog [triamcinolone acetonide] Other (See Comments)     Heart flutters all day    Lisinopril Swelling    Phenergan [promethazine] Anxiety    Zofran [ondansetron hcl (pf)] Anxiety       Medications reviewed in Epic    PMHX:  has a past medical history of Allergy; Arthritis; Diabetes mellitus; HLD (hyperlipidemia); Hypertension; Inflammatory bowel disease; Kidney stone; NAFLD (nonalcoholic fatty liver disease); Prostatitis; Rectal bleeding; Renal disorder;  "and Urinary tract infection.    PSHX:  has a past surgical history that includes Kidney surgery; Nose surgery; kidney stent; Kidney surgery (1996); and Colonoscopy.    FAMILY HISTORY: Negative for liver disease, reviewed in EPIC    SOCIAL HISTORY:   History   Smoking Status    Former Smoker    Types: Cigarettes   Smokeless Tobacco    Never Used     Comment: social       History   Alcohol Use    Yes     Comment: social       History   Drug Use No     He is . Works in real estate.     ROS:   GENERAL: Denies fever, chills, (+) weight gain, (+) fatigue  HEENT: Denies headaches, dizziness, vision/hearing changes  CARDIOVASCULAR: Denies chest pain, palpitations, (+) edema  RESPIRATORY: (+) dyspnea, no cough  GI: (+) abdominal pain, no rectal bleeding, nausea, vomiting. No change in bowel pattern or color  : Denies dysuria, hematuria   SKIN: Denies rash, itching   NEURO: Denies confusion, memory loss, or mood changes, (+) leg pains  PSYCH: Denies depression or anxiety  HEME/LYMPH: Denies easy bruising or bleeding        PHYSICAL EXAM:   Friendly White male, in no acute distress; alert and oriented to person, place and time  VITALS: BP (!) 112/59 (BP Location: Left arm, Patient Position: Sitting)  Pulse 79  Temp 98.9 °F (37.2 °C) (Oral)   Resp 18  Ht 5' 10" (1.778 m)  Wt (!) 159.9 kg (352 lb 8.3 oz)  SpO2 95%  BMI 50.58 kg/m2  HENT: Normocephalic, without obvious abnormality. Oral mucosa pink and moist. Dentition good.  EYES: Sclerae anicteric. No conjunctival pallor.   NECK: Supple. No masses or cervical adenopathy.  CARDIOVASCULAR: Regular rate and rhythm. No murmurs.  RESPIRATORY: Normal respiratory effort. BBS CTA. No wheezes or crackles.  GI: Obese, soft, non-tender, non-distended. No palpable hepatosplenomegaly but exam limited by body habitus. No masses palpable. No ascites.  EXTREMITIES:  No clubbing, cyanosis, (+) BLE 1+ edema.  SKIN: Warm and dry. No jaundice. No rashes noted to exposed skin. " No telangectasias noted. No palmar erythema.  NEURO:  Normal gate. No asterixis.  PSYCH:  Memory intact. Thought and speech pattern appropriate. Behavior normal. No depression or anxiety noted.      RECENT LABS:    Labs:  Lab Results   Component Value Date    WBC 6.59 07/18/2016    HGB 13.8 (L) 07/18/2016    HCT 39.7 (L) 07/18/2016     07/18/2016    CHOL 182 02/23/2017    TRIG 326 (H) 02/23/2017    HDL 26 (L) 02/23/2017     02/23/2017    K 3.9 02/23/2017    CREATININE 1.3 02/23/2017    ALT 48 (H) 02/23/2017    AST 55 (H) 02/23/2017    ALKPHOS 111 02/23/2017    BILITOT 0.2 02/23/2017    ALBUMIN 3.7 02/23/2017    INR 1.1 07/25/2010       DIAGNOSTIC STUDIES:  ABD. U/S- 3/1/17  Findings: The liver measures 20.2 cm and is diffusely hyperechoic suggesting steatosis.  No focal hepatic lesions.  There is no intra or extrahepatic bile duct dilatation.  The common duct measures 5 mm.    The gallbladder is unremarkable with no evidence of wall thickening, pericholecystic fluid, sonographic Almazan's sign, or cholelithiasis.    The visualized portions of the pancreas are unremarkable.      The spleen measures 12.4 cm and is unremarkable.    There is no free fluid within the visualized abdomen.   Impression     #1.  Enlarged liver with steatosis.         ASSESSMENT:  51 y.o. White male with:  1.  Elevated liver enzymes, likely due to NAFLD  -- u/s shows hepatomegaly with steatosis and has multiple risk factors for this  -- (+) ASMA at 1:40 is not significant. Will check IgG but RANI is negative and with low titer do not suspect autoimmune liver disease  -- serological workup was negative for Augie's, autoimmune etiology, and viral hepatitis  -- will check ferritin since this has not been tested in past  2. NAFLD  -- transaminases mildly elevated  -- US shows hepatomegaly with steatosis  -- synthetic liver function nl, no recent INR  -- risk factors for fatty liver include morbid obesity, BMI 51, DM, HLD, HTN  -- no  liver staging yet  3. Elevated TGL  -- PCP Rx'ed fenofibrate but pt has not started this. Encouraged him to start taking it      EDUCATION:   We discussed the manifestations of NAFLD. At this time there is no FDA approved therapy for NAFLD.   The patient and I discussed the importance of diet, exercise, and weight loss for management of NAFLD. We discussed a low fat, low carb/low sugar, high fiber diet, and a goal of 30 minutes of exercise 5 times per week.   Pt is aware that fatty liver can progress to steatohepatitis and possibly to cirrhosis, putting one at increased risk for liver cancer, liver failure, and death.        PLAN:  1. Labs today. Fibrosure done to assess fibrosis  2. Can try to do MR elastography to assess fibrosis but unsure if this will be possible. Called and spoke with MRI and weight limit is 350 lbs so will have to defer for now. If he is able to lose some weight, may be able to do this  3. Will check immunity markers for HBV/HAV and arrange for vaccination if needed.   4. Weight loss goal of 15-20 lbs over next 3-6 months  5. Low fat, low cholesterol, low carb, high fiber diet  6. Exercise, 5 days a week, 30 min a day  7. Recommend good control of cholesterol, blood pressure, blood sugar levels  8. Discussed referral to bariatric medicine but pt declines at this time  9. Follow up in 6 months if all results ok         Thank you for allowing me to participate in the care of García ALISTAIR Hernandez

## 2017-03-08 LAB
HBV CORE AB SERPL QL IA: NEGATIVE
HEPATITIS A ANTIBODY, IGG: NEGATIVE

## 2017-03-10 ENCOUNTER — NURSE TRIAGE (OUTPATIENT)
Dept: ADMINISTRATIVE | Facility: CLINIC | Age: 52
End: 2017-03-10

## 2017-03-10 ENCOUNTER — OFFICE VISIT (OUTPATIENT)
Dept: INTERNAL MEDICINE | Facility: CLINIC | Age: 52
End: 2017-03-10
Payer: COMMERCIAL

## 2017-03-10 VITALS
OXYGEN SATURATION: 97 % | TEMPERATURE: 98 F | BODY MASS INDEX: 45.1 KG/M2 | WEIGHT: 315 LBS | HEIGHT: 70 IN | DIASTOLIC BLOOD PRESSURE: 84 MMHG | HEART RATE: 94 BPM | SYSTOLIC BLOOD PRESSURE: 128 MMHG

## 2017-03-10 DIAGNOSIS — E66.01 MORBID OBESITY WITH BMI OF 50.0-59.9, ADULT: ICD-10-CM

## 2017-03-10 DIAGNOSIS — K12.0 APHTHOUS ULCER OF MOUTH: Primary | ICD-10-CM

## 2017-03-10 DIAGNOSIS — B34.9 VIRAL ILLNESS: ICD-10-CM

## 2017-03-10 LAB
A2 MACROGLOB SERPL-MCNC: 117 MG/DL (ref 106–279)
ALT SERPL W P-5'-P-CCNC: 40 U/L (ref 9–46)
APO A-I SERPL-MCNC: 89 MG/DL (ref 94–176)
BILIRUB SERPL-MCNC: 0.3 MG/DL (ref 0.2–1.2)
FIBROSIS STAGE SERPL QL: ABNORMAL
FIBROTEST INTERPRETATION: ABNORMAL
FOOTNOTE: ABNORMAL
GGT SERPL-CCNC: 31 U/L (ref 3–95)
HAPTOGLOB SERPL-MCNC: 185 MG/DL (ref 43–212)
LIVER FIBR SCORE SERPL CALC.FIBROSURE: 0.1
NECROINFLAMMAT INTERP: ABNORMAL
NECROINFLAMMATORY ACT GRADE SERPL QL: ABNORMAL
NECROINFLAMMATORY ACT SCORE SERPL: 0.17
REFERENCE ID: ABNORMAL

## 2017-03-10 PROCEDURE — 99999 PR PBB SHADOW E&M-EST. PATIENT-LVL III: CPT | Mod: PBBFAC,,, | Performed by: INTERNAL MEDICINE

## 2017-03-10 PROCEDURE — 1160F RVW MEDS BY RX/DR IN RCRD: CPT | Mod: S$GLB,,, | Performed by: INTERNAL MEDICINE

## 2017-03-10 PROCEDURE — 99213 OFFICE O/P EST LOW 20 MIN: CPT | Mod: S$GLB,,, | Performed by: INTERNAL MEDICINE

## 2017-03-10 PROCEDURE — 3074F SYST BP LT 130 MM HG: CPT | Mod: S$GLB,,, | Performed by: INTERNAL MEDICINE

## 2017-03-10 PROCEDURE — 3079F DIAST BP 80-89 MM HG: CPT | Mod: S$GLB,,, | Performed by: INTERNAL MEDICINE

## 2017-03-10 NOTE — MR AVS SNAPSHOT
[facility] - Internal Medicine  [address]  [facility] [address]  Phone: [phone]  Fax: [phone]                  García Tang   3/10/2017 7:20 PM   Office Visit    Description:  Male : 1965   Provider:  Tracy Negrete MD   Department:  [facility] - Internal Medicine           Reason for Visit     Sore Throat           Diagnoses this Visit        Comments    Aphthous ulcer of mouth    -  Primary     Viral illness                To Do List           Future Appointments        Provider Department Dept Phone    3/10/2017 7:20 PM Tracy Negrete MD [facility] Internal Medicine [phone]    3/22/2017 8:15 AM [facility] [phone]    2017 11:00 AM [facility] - Diabetes Mgmt [phone]    2017 2:40 PM Zaina Weinstein MD [facility] Internal Medicine [phone]    2017 7:30 AM [facility] - Laboratory [phone]      Goals (5 Years of Data)     None      [facility]     [facility] Nurse Care Line -  Assistance  Registered nurses in the [facility] provide clinical advisement, health education, appointment booking, and other advisory services.  Call for this free service at [phone].             Medications           Message regarding Medications     Verify the changes and/or additions to your medication regime listed below are the same as discussed with your clinician today.  If any of these changes or additions are incorrect, please notify your healthcare provider.             Verify that the below list of medications is an accurate representation of the medications you are currently taking.  If none reported, the list may be blank. If incorrect, please contact your healthcare provider. Carry this list with you in case of emergency.           Current Medications     amlodipine (NORVASC) 5 MG tablet Take 1 tablet (5 mg total) by mouth once daily.    aspirin (ECOTRIN) 81  "MG EC tablet Take 81 mg by mouth once daily.    DOCOSAHEXANOIC ACID/EPA (FISH OIL ORAL) Take by mouth.    ERGOCALCIFEROL, VITAMIN D2, (VITAMIN D ORAL) Take by mouth.    fenofibrate (TRICOR) 54 MG tablet Take 1 tablet (54 mg total) by mouth once daily.    hydrochlorothiazide (HYDRODIURIL) 25 MG tablet Take 1 tablet (25 mg total) by mouth once daily.    insulin NPH-insulin regular, 70/30, (NOVOLIN 70/30) 100 unit/mL (70-30) injection Inject into the skin 2 (two) times daily.    metoprolol succinate (TOPROL XL) 50 MG 24 hr tablet Take 1 tablet (50 mg total) by mouth 2 (two) times daily.    multivitamin capsule Take by mouth. 1 Capsule Oral Every day           Clinical Reference Information           Your Vitals Were     BP Pulse Temp Height Weight SpO2    128/84 (BP Location: Left arm, Patient Position: Sitting, BP Method: Manual) 94 98 °F (36.7 °C) 5' 9.5" (1.765 m) 159 kg (350 lb 8.5 oz) 97%    BMI                51.02 kg/m2          Blood Pressure          Most Recent Value    BP  128/84      Allergies as of 3/10/2017     Bactrim  [Sulfamethoxazole-trimethoprim]    Hydroxyzine Hcl    Kenalog [Triamcinolone Acetonide]    Lisinopril    Phenergan [Promethazine]    Zofran [Ondansetron Hcl (Pf)]      Immunizations Administered on Date of Encounter - 3/10/2017     None      Language Assistance Services     ATTENTION: Language assistance services are available, free of charge. Please call [phone].      ATENCIÓN: Si alexysla topher, tiene a olivera disposición servicios gratuitos de asistencia lingüística. Llame al [phone].     [facility] Ý: N?u b?n nói Ti?ng Vi?t, có các d?ch v? h? tr? ngôn ng? mi?n phí dành cho b?n. G?i s? [phone].         [facility] complies with applicable Federal civil rights laws and does not discriminate on the basis of race, color, national origin, age, disability, or sex.        "

## 2017-03-10 NOTE — TELEPHONE ENCOUNTER
Reason for Disposition   Pus on tonsils (back of throat) and swollen neck lymph nodes ('glands')    Protocols used: ST SORE THROAT-A-OH    Patient states the glands in his neck have been swollen for several days and his tonsils are enlarged. appt made with urgent care this evening. Patient verbalized understanding.

## 2017-03-11 NOTE — PROGRESS NOTES
Subjective:       Patient ID: García Tang is a 51 y.o. male.    Chief Complaint: Sore Throat    Sore Throat    This is a new problem. The current episode started yesterday. The problem has been unchanged. The pain is worse on the right side. There has been no fever. The pain is at a severity of 3/10. The pain is mild. Associated symptoms include congestion. Pertinent negatives include no abdominal pain, coughing, diarrhea, drooling, ear discharge, ear pain, headaches, hoarse voice, plugged ear sensation, neck pain, shortness of breath, swollen glands, trouble swallowing or vomiting. He has had no exposure to strep or mono. He has tried nothing for the symptoms. The treatment provided no relief.     Review of Systems   Constitutional: Negative for activity change, appetite change and fever.   HENT: Positive for congestion and sore throat. Negative for drooling, ear discharge, ear pain, hoarse voice, postnasal drip and trouble swallowing.    Respiratory: Negative for cough, shortness of breath and wheezing.    Cardiovascular: Negative for chest pain and palpitations.   Gastrointestinal: Negative for abdominal pain, blood in stool, constipation, diarrhea, nausea and vomiting.   Genitourinary: Negative for decreased urine volume, difficulty urinating, flank pain and frequency.   Musculoskeletal: Negative for arthralgias and neck pain.   Neurological: Negative for dizziness, weakness and headaches.       Objective:      Physical Exam   Constitutional: He is oriented to person, place, and time. He appears well-developed and well-nourished. No distress.   HENT:   Head: Normocephalic and atraumatic.   Right Ear: External ear normal.   Left Ear: External ear normal.   Mouth/Throat: Oropharynx is clear and moist and mucous membranes are normal.       Eyes: Conjunctivae and EOM are normal. Pupils are equal, round, and reactive to light.   Neck: Normal range of motion. Neck supple. No thyromegaly present.    Cardiovascular: Normal rate and regular rhythm.    Pulmonary/Chest: Effort normal and breath sounds normal.   Abdominal: Soft. Bowel sounds are normal. He exhibits no mass. There is no tenderness. There is no rebound and no guarding.   Musculoskeletal: Normal range of motion.   Lymphadenopathy:     He has no cervical adenopathy.   Neurological: He is alert and oriented to person, place, and time. He has normal reflexes. He displays normal reflexes. No cranial nerve deficit. He exhibits normal muscle tone. Coordination normal.   Skin: Skin is warm and dry.       Assessment:       1. Aphthous ulcer of mouth    2. Viral illness    3. Morbid obesity with BMI of 50.0-59.9, adult        Plan:   García was seen today for sore throat.    Diagnoses and all orders for this visit:    Aphthous ulcer of mouth    Viral illness    Morbid obesity with BMI of 50.0-59.9, adult

## 2017-03-13 ENCOUNTER — PATIENT MESSAGE (OUTPATIENT)
Dept: HEPATOLOGY | Facility: CLINIC | Age: 52
End: 2017-03-13

## 2017-03-13 ENCOUNTER — TELEPHONE (OUTPATIENT)
Dept: HEPATOLOGY | Facility: CLINIC | Age: 52
End: 2017-03-13

## 2017-03-13 DIAGNOSIS — K76.0 FATTY LIVER: Primary | ICD-10-CM

## 2017-03-13 NOTE — TELEPHONE ENCOUNTER
----- Message from Cara Glasgow, Den sent at 3/13/2017 12:59 PM CDT -----  Regarding: twinrix  Patient's immunization is covered at no charge in the pharmacy. The patient did not answer the phone, and we will continue to try to contact the patient.

## 2017-03-29 ENCOUNTER — DOCUMENTATION ONLY (OUTPATIENT)
Dept: INTERNAL MEDICINE | Facility: CLINIC | Age: 52
End: 2017-03-29

## 2017-04-06 ENCOUNTER — PATIENT MESSAGE (OUTPATIENT)
Dept: INTERNAL MEDICINE | Facility: CLINIC | Age: 52
End: 2017-04-06

## 2017-04-11 ENCOUNTER — CLINICAL SUPPORT (OUTPATIENT)
Dept: DIABETES | Facility: CLINIC | Age: 52
End: 2017-04-11
Payer: COMMERCIAL

## 2017-04-11 PROCEDURE — 99999 PR PBB SHADOW E&M-EST. PATIENT-LVL II: CPT | Mod: PBBFAC,,,

## 2017-04-11 PROCEDURE — G0108 DIAB MANAGE TRN  PER INDIV: HCPCS | Mod: S$GLB,,, | Performed by: INTERNAL MEDICINE

## 2017-04-11 NOTE — PROGRESS NOTES
Diabetes Education  Author: Caro Osman RD, CDE  Date: 4/11/2017    Diabetes Education Visit  Diabetes Education Record Assessment/Progress: Initial (Had Appt with ENDO 3/9, but cancelled due to financial issues, would like to reschedule for May)    Diabetes Type  Diabetes Type : Type II    Diabetes History  Diabetes Diagnosis: 3-5 years    Nutrition  Meal Planning: 3 meals per day, water, drinks regular soda, eats out seldom (Iced tea w/ lemon - sweet and unsweet; may eat out 1-2 times a week)    Monitoring   Monitoring: Other ([employer])  Self Monitoring :  (Checks 1-2 times a day, fasting and 2-3 hours after dinner)  Blood Glucose Logs: No    Exercise   Exercise Type: walking (10-15 miles a week)    Current Diabetes Treatment   Current Treatment: Insulin (70/30 - 24-30 units BID - sometimes takes 2-3 hours after meals - told he was prescribed 24 units AM and 27 units PM)    Social History  Preferred Learning Method: Face to Face, Demonstration, Hands On, Reading Materials  Primary Support: Self  Smoking Status: Ex Smoker  Alcohol Use: Never                             Barriers to Change  Barriers to Change: Financial  Learning Challenges : None    Readiness to Learn   Readiness to Learn : Acceptance    Cultural Influences  Cultural Influences: No    Diabetes Education Assessment/Progress  Acute Complications (preventing, detecting, and treating acute complications): Instructed, Discussion, Individual Session, Written Materials Provided (Reviewed s/s and treatment of hypoglycemia)  Chronic Complications (preventing, detecting, and treating chronic complications): Instructed, Discussion, Individual Session, Written Materials Provided (Reviewed standards of care)  Diabetes Disease Process (diabetes disease process and treatment options): Instructed, Discussion, Individual Session, Written Materials Provided  Nutrition (Incorporating nutritional management into one's lifestyle): Instructed, Discussion, Individual  Session, Written Materials Provided (Instructed patient on CHO counting, label reading and addt'l resources to assist w/ CHO counting)  Physical Activity (incorporating physical activity into one's lifestyle): Instructed, Discussion, Individual Session, Written Materials Provided (Reviewed goals and benefits)  Medications (states correct name, dose, onset, peak, duration, side effects & timing of meds): Instructed, Discussion, Individual Session, Written Materials Provided (Reviewed medication regimen - patient instructed to take 70/30 30 minutes before bfast (24 units) and dinner (27 units))  Monitoring (monitoring blood glucose/other parameters & using results): Instructed, Discussion, Individual Session, Written Materials Provided (Reviewed SMBG schedule and BG goals)  Goal Setting and Problem Solving (verbalizes behavior change strategies & sets realistic goals): Instructed, Discussion, Individual Session  Behavior Change (developing personal strategies to health & behavior change): Instructed, Discussion, Individual Session  Psychosocial Issues (developing personal srategies to address psychosocial concerns): Instructed, Discussion, Individual Session    Goals  Healthy Eating: Set (Eliminate sugary beverages)  Start Date: 04/11/17  Target Date: 05/18/17  Medications: Set (Take 70/30 before meals)  Start Date: 04/11/17  Target Date: 05/18/17         Diabetes Care Plan/Intervention  Education Plan/Intervention: Diabetes Empowerment Program (Labs, PCP and Empowerment in May)    Diabetes Meal Plan  Carbohydrate Per Meal: 45-60g  Carbohydrate Per Snack : 15-20g    Education Units of Time   Time Spent: 60 min      Health Maintenance Topics with due status: Not Due       Topic Last Completion Date    TETANUS VACCINE 05/15/2009    Lipid Panel 02/23/2017    Hemoglobin A1c 02/23/2017    Urine Microalbumin 02/23/2017    Eye Exam 03/28/2017     Health Maintenance Due   Topic Date Due    Pneumococcal PPSV23 (Medium Risk)  (1) 10/11/1984    Colonoscopy  10/11/2016    Foot Exam  04/04/2017

## 2017-04-19 ENCOUNTER — LAB VISIT (OUTPATIENT)
Dept: LAB | Facility: HOSPITAL | Age: 52
End: 2017-04-19
Attending: INTERNAL MEDICINE
Payer: COMMERCIAL

## 2017-04-19 ENCOUNTER — OFFICE VISIT (OUTPATIENT)
Dept: INTERNAL MEDICINE | Facility: CLINIC | Age: 52
End: 2017-04-19
Payer: COMMERCIAL

## 2017-04-19 VITALS
HEIGHT: 70 IN | SYSTOLIC BLOOD PRESSURE: 102 MMHG | HEART RATE: 74 BPM | RESPIRATION RATE: 16 BRPM | BODY MASS INDEX: 45.1 KG/M2 | DIASTOLIC BLOOD PRESSURE: 70 MMHG | WEIGHT: 315 LBS | TEMPERATURE: 98 F

## 2017-04-19 DIAGNOSIS — R35.0 URINARY FREQUENCY: Primary | ICD-10-CM

## 2017-04-19 DIAGNOSIS — R00.2 HEART PALPITATIONS: ICD-10-CM

## 2017-04-19 DIAGNOSIS — R35.0 URINARY FREQUENCY: ICD-10-CM

## 2017-04-19 LAB
BILIRUB UR QL STRIP: NEGATIVE
CLARITY UR REFRACT.AUTO: CLEAR
COLOR UR AUTO: YELLOW
GLUCOSE UR QL STRIP: ABNORMAL
HGB UR QL STRIP: NEGATIVE
KETONES UR QL STRIP: NEGATIVE
LEUKOCYTE ESTERASE UR QL STRIP: NEGATIVE
NITRITE UR QL STRIP: NEGATIVE
PH UR STRIP: 5 [PH] (ref 5–8)
PROT UR QL STRIP: NEGATIVE
SP GR UR STRIP: 1.01 (ref 1–1.03)
URN SPEC COLLECT METH UR: ABNORMAL
UROBILINOGEN UR STRIP-ACNC: NEGATIVE EU/DL

## 2017-04-19 PROCEDURE — 87086 URINE CULTURE/COLONY COUNT: CPT

## 2017-04-19 PROCEDURE — 3078F DIAST BP <80 MM HG: CPT | Mod: S$GLB,,, | Performed by: INTERNAL MEDICINE

## 2017-04-19 PROCEDURE — 81003 URINALYSIS AUTO W/O SCOPE: CPT

## 2017-04-19 PROCEDURE — 1160F RVW MEDS BY RX/DR IN RCRD: CPT | Mod: S$GLB,,, | Performed by: INTERNAL MEDICINE

## 2017-04-19 PROCEDURE — 99999 PR PBB SHADOW E&M-EST. PATIENT-LVL III: CPT | Mod: PBBFAC,,, | Performed by: INTERNAL MEDICINE

## 2017-04-19 PROCEDURE — 3074F SYST BP LT 130 MM HG: CPT | Mod: S$GLB,,, | Performed by: INTERNAL MEDICINE

## 2017-04-19 PROCEDURE — 99214 OFFICE O/P EST MOD 30 MIN: CPT | Mod: S$GLB,,, | Performed by: INTERNAL MEDICINE

## 2017-04-19 RX ORDER — HYDROCHLOROTHIAZIDE 12.5 MG/1
12.5 CAPSULE ORAL 2 TIMES DAILY
COMMUNITY
Start: 2017-04-18 | End: 2018-02-05 | Stop reason: SDUPTHER

## 2017-04-19 NOTE — LETTER
April 19, 2017    García Tang  [address] [address]             [place] - Internal [facility]  [address] [facility] [address]  Phone: [phone]  Fax: [phone] To Whom It May Concern,    Please excuse Mr. García Tang from his work duties from 04/20/2017-04/22/2017 as he was seen in my clinic for an acute health issue and is undergoing testing. I appreciate your help with this important matter.    If you have any questions or concerns, please don't hesitate to call.    Sincerely,        Zaina Weinstein MD

## 2017-04-20 ENCOUNTER — CLINICAL SUPPORT (OUTPATIENT)
Dept: CARDIOLOGY | Facility: CLINIC | Age: 52
End: 2017-04-20
Payer: COMMERCIAL

## 2017-04-20 DIAGNOSIS — R00.2 HEART PALPITATIONS: ICD-10-CM

## 2017-04-20 LAB — BACTERIA UR CULT: NORMAL

## 2017-04-20 PROCEDURE — 93224 XTRNL ECG REC UP TO 48 HRS: CPT | Mod: S$GLB,,, | Performed by: INTERNAL MEDICINE

## 2017-04-20 NOTE — PROGRESS NOTES
Subjective:       Patient ID: García Tang is a 51 y.o. male.    Chief Complaint: Urinary Frequency    HPI     Patient is a 51 year old male here today to discuss several issues.  1. Urinary frequency:  Says that this occurs more frequently, also associated with urgency, incomplete bladder emptying. Wants to know if something is wrong. Odor is worse than normal for him.   2. Fluttering in his chest, daily regardless of activity.      Review of Systems   Constitutional: Negative for chills, fatigue and fever.   HENT: Negative for congestion, ear pain, postnasal drip, rhinorrhea, sinus pressure and sore throat.    Eyes: Negative for itching and visual disturbance.   Respiratory: Negative for cough, shortness of breath and wheezing.    Cardiovascular: Negative for chest pain, palpitations and leg swelling.   Gastrointestinal: Negative for abdominal pain and nausea.   Genitourinary: Positive for difficulty urinating, frequency and urgency. Negative for dysuria.   Musculoskeletal: Negative for arthralgias and myalgias.   Skin: Negative for rash.   Neurological: Negative for weakness, light-headedness and headaches.       Objective:      Physical Exam   Constitutional: He is oriented to person, place, and time. He appears well-developed and well-nourished. No distress.   HENT:   Head: Normocephalic and atraumatic.   Mouth/Throat: Oropharynx is clear and moist. No oropharyngeal exudate.   Eyes: Conjunctivae and EOM are normal. Pupils are equal, round, and reactive to light. Right eye exhibits no discharge. Left eye exhibits no discharge.   Neck: Normal range of motion. Neck supple. No thyromegaly present.   Cardiovascular: Normal rate, regular rhythm and normal heart sounds.    No murmur heard.  Pulmonary/Chest: Effort normal and breath sounds normal. No respiratory distress. He has no wheezes. He has no rales.   Abdominal: Soft. He exhibits no distension. There is no tenderness.   Musculoskeletal: He exhibits no  edema.   Lymphadenopathy:     He has no cervical adenopathy.   Neurological: He is alert and oriented to person, place, and time.   Skin: Skin is warm and dry. He is not diaphoretic.   Nursing note and vitals reviewed.      Assessment:       1. Urinary frequency    2. Heart palpitations        Plan:       Check UA and Urine culture now  PSA check  If unrevealing, referral to urology for additional testing    He describes fluttering in his chest daily associated with SOB at times  Check holter monitor

## 2017-04-21 ENCOUNTER — PATIENT MESSAGE (OUTPATIENT)
Dept: INTERNAL MEDICINE | Facility: CLINIC | Age: 52
End: 2017-04-21

## 2017-04-23 ENCOUNTER — NURSE TRIAGE (OUTPATIENT)
Dept: ADMINISTRATIVE | Facility: CLINIC | Age: 52
End: 2017-04-23

## 2017-04-23 ENCOUNTER — OFFICE VISIT (OUTPATIENT)
Dept: INTERNAL MEDICINE | Facility: CLINIC | Age: 52
End: 2017-04-23
Payer: COMMERCIAL

## 2017-04-23 VITALS
SYSTOLIC BLOOD PRESSURE: 114 MMHG | BODY MASS INDEX: 45.1 KG/M2 | HEIGHT: 70 IN | HEART RATE: 77 BPM | WEIGHT: 315 LBS | OXYGEN SATURATION: 97 % | DIASTOLIC BLOOD PRESSURE: 82 MMHG

## 2017-04-23 DIAGNOSIS — M75.52 BURSITIS OF LEFT SHOULDER: ICD-10-CM

## 2017-04-23 DIAGNOSIS — N40.0 BENIGN PROSTATIC HYPERPLASIA, PRESENCE OF LOWER URINARY TRACT SYMPTOMS UNSPECIFIED, UNSPECIFIED MORPHOLOGY: Primary | ICD-10-CM

## 2017-04-23 LAB
BILIRUB SERPL-MCNC: NORMAL MG/DL
BLOOD URINE, POC: NORMAL
COLOR, POC UA: YELLOW
GLUCOSE UR QL STRIP: 250
KETONES UR QL STRIP: NORMAL
LEUKOCYTE ESTERASE URINE, POC: NORMAL
NITRITE, POC UA: NORMAL
PH, POC UA: 5
PROTEIN, POC: NORMAL
SPECIFIC GRAVITY, POC UA: 1.01
UROBILINOGEN, POC UA: NORMAL

## 2017-04-23 PROCEDURE — 81002 URINALYSIS NONAUTO W/O SCOPE: CPT | Mod: S$GLB,,, | Performed by: INTERNAL MEDICINE

## 2017-04-23 PROCEDURE — 3079F DIAST BP 80-89 MM HG: CPT | Mod: S$GLB,,, | Performed by: INTERNAL MEDICINE

## 2017-04-23 PROCEDURE — 99213 OFFICE O/P EST LOW 20 MIN: CPT | Mod: 25,S$GLB,, | Performed by: INTERNAL MEDICINE

## 2017-04-23 PROCEDURE — 99999 PR PBB SHADOW E&M-EST. PATIENT-LVL III: CPT | Mod: PBBFAC,,, | Performed by: INTERNAL MEDICINE

## 2017-04-23 PROCEDURE — 1160F RVW MEDS BY RX/DR IN RCRD: CPT | Mod: S$GLB,,, | Performed by: INTERNAL MEDICINE

## 2017-04-23 PROCEDURE — 3074F SYST BP LT 130 MM HG: CPT | Mod: S$GLB,,, | Performed by: INTERNAL MEDICINE

## 2017-04-23 RX ORDER — TAMSULOSIN HYDROCHLORIDE 0.4 MG/1
0.4 CAPSULE ORAL DAILY
Qty: 90 CAPSULE | Refills: 3 | Status: SHIPPED | OUTPATIENT
Start: 2017-04-23 | End: 2017-05-01

## 2017-04-23 RX ORDER — DICLOFENAC SODIUM 75 MG/1
75 TABLET, DELAYED RELEASE ORAL 2 TIMES DAILY
Qty: 60 TABLET | Refills: 3 | Status: SHIPPED | OUTPATIENT
Start: 2017-04-23 | End: 2017-05-01

## 2017-04-23 NOTE — PROGRESS NOTES
Subjective:       Patient ID: García Tang is a 51 y.o. male.    Chief Complaint: Dysuria and Shoulder Pain    HPI  Pt with 4 weeks of worsening frequency and dysuria, nor response to antibiotics in past.  Had urgency almost all night last night.  No F/C, + nausea.  FBSs 130-150.  Also c/o 2 weeks of shoulder pain with movement.    Review of Systems    Objective:      Physical Exam   Constitutional: He appears well-developed. No distress.   obese   HENT:   Head: Normocephalic.   Eyes: EOM are normal. No scleral icterus.   Neck: Normal range of motion. No tracheal deviation present.   Cardiovascular: Normal rate, regular rhythm and intact distal pulses.    Pulmonary/Chest: Effort normal. No respiratory distress.   Abdominal: He exhibits no distension.   Genitourinary: Rectum normal and prostate normal. Rectal exam shows guaiac negative stool.   Musculoskeletal: He exhibits no edema.   Painful elevation, ext rotation L shoulder with + impingement sign   Neurological: He is alert.   Skin: Skin is warm and dry. No rash noted. He is not diaphoretic. No erythema.   Psychiatric: He has a normal mood and affect. His behavior is normal.   Vitals reviewed.      Assessment:       1. Benign prostatic hyperplasia, presence of lower urinary tract symptoms unspecified, unspecified morphology    2. Bursitis of left shoulder        Plan:       García was seen today for dysuria and shoulder pain.    Diagnoses and all orders for this visit:    Benign prostatic hyperplasia, presence of lower urinary tract symptoms unspecified, unspecified morphology  -     POCT urine dipstick without microscope  -     tamsulosin (FLOMAX) 0.4 mg Cp24; Take 1 capsule (0.4 mg total) by mouth once daily.    Bursitis of left shoulder  -     diclofenac (VOLTAREN) 75 MG EC tablet; Take 1 tablet (75 mg total) by mouth 2 (two) times daily.      No Follow-up on file.

## 2017-04-23 NOTE — MR AVS SNAPSHOT
[facility]  [address]  [facility] [address]  Phone: [phone]  Fax: [phone]                  García Tang   2017 2:45 PM   Office Visit    Description:  Male : 1965   Provider:  Shaquille Galvez MD   [facility]           Reason for Visit     Dysuria     Shoulder Pain           Diagnoses this Visit        Comments    Benign prostatic hyperplasia, presence of lower urinary tract symptoms unspecified, unspecified morphology    -  Primary     Bursitis of left shoulder                To Do List           Future Appointments        Provider Department Dept Phone    2017 7:30 AM LAB, [facility] [phone]    5/10/2017 8:40 AM Zaina Weinstein MD [facility] Internal Medicine [phone]    2017 1:30 PM DIABETES EMPOWERMENT PROGRAM [facility] [phone]      Goals (5 Years of Data)     None       These Medications        Disp Refills Start End    tamsulosin (FLOMAX) 0.4 mg Cp24 90 capsule 3 2017    Take 1 capsule (0.4 mg total) by mouth once daily. - Oral    Ph[facility]: [id number] [place] - [address] Ph #: [phone]       diclofenac (VOLTAREN) 75 MG EC tablet 60 tablet 3 2017    Take 1 tablet (75 mg total) by mouth 2 (two) times daily. - Oral    Ph[facility]: [id number] - [place] - [address] Ph #: [phone]         [facility]     [facility] -  Assistance  Unless otherwise directed by your provider, please contact [facility], our nurse care line that is available for  assistance.     Registered nurses in the [facility] provide: appointment scheduling, clinical advisement, health education, and other advisory services.  Call: [phone] (toll free)               Medications           Message regarding Medications     Verify the changes and/or additions to  your medication regime listed below are the same as discussed with your clinician today.  If any of these changes or additions are incorrect, please notify your healthcare provider.        START taking these NEW medications        Refills    tamsulosin (FLOMAX) 0.4 mg Cp24 3    Sig: Take 1 capsule (0.4 mg total) by mouth once daily.    Class: Normal    Route: Oral    diclofenac (VOLTAREN) 75 MG EC tablet 3    Sig: Take 1 tablet (75 mg total) by mouth 2 (two) times daily.    Class: Normal    Route: Oral           Verify that the below list of medications is an accurate representation of the medications you are currently taking.  If none reported, the list may be blank. If incorrect, please contact your healthcare provider. Carry this list with you in case of emergency.           Current Medications     amlodipine (NORVASC) 5 MG tablet Take 1 tablet (5 mg total) by mouth once daily.    aspirin (ECOTRIN) 81 MG EC tablet Take 81 mg by mouth once daily.    diclofenac (VOLTAREN) 75 MG EC tablet Take 1 tablet (75 mg total) by mouth 2 (two) times daily.    DOCOSAHEXANOIC ACID/EPA (FISH OIL ORAL) Take by mouth.    ERGOCALCIFEROL, VITAMIN D2, (VITAMIN D ORAL) Take by mouth.    fenofibrate (TRICOR) 54 MG tablet Take 1 tablet (54 mg total) by mouth once daily.    hydrochlorothiazide (HYDRODIURIL) 25 MG tablet Take 1 tablet (25 mg total) by mouth once daily.    hydrochlorothiazide (MICROZIDE) 12.5 mg capsule 12.5 mg 2 (two) times daily.     insulin NPH-insulin regular, 70/30, (NOVOLIN 70/30) 100 unit/mL (70-30) injection Inject into the skin 2 (two) times daily.    metoprolol succinate (TOPROL XL) 50 MG 24 hr tablet Take 1 tablet (50 mg total) by mouth 2 (two) times daily.    multivitamin capsule Take by mouth. 1 Capsule Oral Every day    tamsulosin (FLOMAX) 0.4 mg Cp24 Take 1 capsule (0.4 mg total) by mouth once daily.           Clinical Reference Information           Your Vitals Were     BP Pulse Height Weight SpO2 BMI     "114/82 (BP Location: Left arm, Patient Position: Sitting, BP Method: Manual) 77 5' 10" (1.778 m) 158.2 kg (348 lb 12.3 oz) 97% 50.04 kg/m2      Blood Pressure          Most Recent Value    BP  114/82      Allergies as of 4/23/2017     Bactrim  [Sulfamethoxazole-trimethoprim]    Hydroxyzine Hcl    Kenalog [Triamcinolone Acetonide]    Lisinopril    Phenergan [Promethazine]    Zofran [Ondansetron Hcl (Pf)]      Immunizations Administered on Date of Encounter - 4/23/2017     None      Orders Placed During Today's Visit      Normal Orders This Visit    POCT urine dipstick without microscope       Language Assistance Services     ATTENTION: Language assistance services are available, free of charge. Please call [phone].      ATENCIÓN: Si habla topher, tiene a olivera disposición servicios gratuitos de asistencia lingüística. Llame al [phone].     CHÚ Ý: N?u b?n nói Ti?ng Vi?t, có các d?ch v? h? tr? ngôn ng? mi?n phí dành cho b?n. G?i s? [phone].         [facility] complies with applicable Federal civil rights laws and does not discriminate on the basis of race, color, national origin, age, disability, or sex.        "

## 2017-04-23 NOTE — TELEPHONE ENCOUNTER
Reason for Disposition   Requesting regular office appointment    Protocols used: ST INFORMATION ONLY CALL-A-AH    Patient saw Dr. Weinstein last week with regard to urinary symptoms. Today patient is requesting an appointment for shoulder pain and burning AFTER urination. appt sched with [facility].

## 2017-04-25 ENCOUNTER — PATIENT MESSAGE (OUTPATIENT)
Dept: HEPATOLOGY | Facility: CLINIC | Age: 52
End: 2017-04-25

## 2017-04-29 ENCOUNTER — HOSPITAL ENCOUNTER (EMERGENCY)
Facility: HOSPITAL | Age: 52
Discharge: HOME | End: 2017-04-29
Attending: FAMILY MEDICINE | Admitting: EMERGENCY MEDICINE
Payer: COMMERCIAL

## 2017-04-29 VITALS
HEIGHT: 70 IN | WEIGHT: 315 LBS | RESPIRATION RATE: 18 BRPM | DIASTOLIC BLOOD PRESSURE: 89 MMHG | BODY MASS INDEX: 45.1 KG/M2 | SYSTOLIC BLOOD PRESSURE: 139 MMHG | HEART RATE: 79 BPM | OXYGEN SATURATION: 97 % | TEMPERATURE: 98 F

## 2017-04-29 DIAGNOSIS — R00.2 PALPITATIONS: ICD-10-CM

## 2017-04-29 DIAGNOSIS — R07.9 CHEST PAIN: ICD-10-CM

## 2017-04-29 DIAGNOSIS — R00.2 HEART PALPITATIONS: Primary | ICD-10-CM

## 2017-04-29 LAB
ALBUMIN SERPL BCP-MCNC: 3.7 G/DL
ALP SERPL-CCNC: 100 U/L
ALT SERPL W/O P-5'-P-CCNC: 50 U/L
ANION GAP SERPL CALC-SCNC: 11 MMOL/L
AST SERPL-CCNC: 38 U/L
BASOPHILS # BLD AUTO: 0.03 K/UL
BASOPHILS NFR BLD: 0.4 %
BILIRUB SERPL-MCNC: 0.5 MG/DL
BNP SERPL-MCNC: <10 PG/ML
BUN SERPL-MCNC: 13 MG/DL
CALCIUM SERPL-MCNC: 9.9 MG/DL
CHLORIDE SERPL-SCNC: 101 MMOL/L
CO2 SERPL-SCNC: 25 MMOL/L
CREAT SERPL-MCNC: 1.3 MG/DL
DIFFERENTIAL METHOD: NORMAL
EOSINOPHIL # BLD AUTO: 0.1 K/UL
EOSINOPHIL NFR BLD: 0.8 %
ERYTHROCYTE [DISTWIDTH] IN BLOOD BY AUTOMATED COUNT: 12.4 %
EST. GFR  (AFRICAN AMERICAN): >60 ML/MIN/1.73 M^2
EST. GFR  (NON AFRICAN AMERICAN): >60 ML/MIN/1.73 M^2
GLUCOSE SERPL-MCNC: 215 MG/DL
HCT VFR BLD AUTO: 40.6 %
HGB BLD-MCNC: 14.6 G/DL
LYMPHOCYTES # BLD AUTO: 1.8 K/UL
LYMPHOCYTES NFR BLD: 23.4 %
MCH RBC QN AUTO: 29.9 PG
MCHC RBC AUTO-ENTMCNC: 36 %
MCV RBC AUTO: 83 FL
MONOCYTES # BLD AUTO: 0.7 K/UL
MONOCYTES NFR BLD: 9.3 %
NEUTROPHILS # BLD AUTO: 5 K/UL
NEUTROPHILS NFR BLD: 65.8 %
PLATELET # BLD AUTO: 258 K/UL
PMV BLD AUTO: 9.9 FL
POTASSIUM SERPL-SCNC: 3.6 MMOL/L
PROT SERPL-MCNC: 7.7 G/DL
RBC # BLD AUTO: 4.89 M/UL
SODIUM SERPL-SCNC: 137 MMOL/L
TROPONIN I SERPL DL<=0.01 NG/ML-MCNC: <0.006 NG/ML
WBC # BLD AUTO: 7.64 K/UL

## 2017-04-29 PROCEDURE — 80053 COMPREHEN METABOLIC PANEL: CPT

## 2017-04-29 PROCEDURE — 93010 ELECTROCARDIOGRAM REPORT: CPT | Mod: ,,, | Performed by: INTERNAL MEDICINE

## 2017-04-29 PROCEDURE — 83880 ASSAY OF NATRIURETIC PEPTIDE: CPT

## 2017-04-29 PROCEDURE — 84484 ASSAY OF TROPONIN QUANT: CPT

## 2017-04-29 PROCEDURE — 99284 EMERGENCY DEPT VISIT MOD MDM: CPT

## 2017-04-29 PROCEDURE — 99284 EMERGENCY DEPT VISIT MOD MDM: CPT | Mod: ,,, | Performed by: PHYSICIAN ASSISTANT

## 2017-04-29 PROCEDURE — 85025 COMPLETE CBC W/AUTO DIFF WBC: CPT

## 2017-04-29 RX ORDER — METOPROLOL TARTRATE 50 MG/1
50 TABLET ORAL
Status: DISCONTINUED | OUTPATIENT
Start: 2017-04-29 | End: 2017-04-29

## 2017-04-29 NOTE — ED TRIAGE NOTES
Onset palpitations suddenly within the last hour.  [place] flushed. HR was 126 . Usually 89. Having abdominal pain . Denies n/v/d/constipation/dysuria.  Feels like he is having hot flashes. Pt was driving when episode began.  Having , regular in room. States it is normally in the 60's.

## 2017-04-29 NOTE — PROVIDER PROGRESS NOTES - EMERGENCY DEPT.
Encounter Date: 4/29/2017    ED Physician Progress Notes       SCRIBE NOTE: I, Jeffry Son, am scribing for, and in the presence of,  Alex Beck MD.  Physician Statement: I, Alex Beck MD, personally performed the services described in this documentation as scribed by Jeffry Son in my presence, and it is both accurate and complete.     Physician Note:   Time seen by provider: 2:52 PM    This is a 51 y.o. Male with PMH of HLD, HTN and DM, On Metoprolol who presents for evaluation of palpitations. Reports HR increased up to 126 about 1 hour PTA while casually walking at the [facility] associated with some chest tightness at the time, none currently. Currently can feel his heart racing but no pain. Denies drinking any caffeine today. Denies Hx of MI. HR usually runs between 60 and 70. Has had an irregular heart beat in the past. Saw doctor about 1 week ago for fluttering, but not as severe as today and had Holter monitor test which showed nothing acute. Pt states this is the first time he recorded his heart beat to be fast. When he had the fluttering he never recorded his rate. Also complaining of mild epigastric abdominal discomfort since yesterday which he states feels like GERD. Denies any diaphoresis, fever, chills, leg swelling. Denies Hx of Tobacco use. On Aspirin qd, took extra baby Aspirin PTA.     MDM: Pt not tachycardic on exam currently. No evidence of Cardiac decompensation or peripheral vascular insufficiency, nevertheless pt did have a vague exertional sx while [place] earlier today coupled with documented episode of regular tachycardia with HR in 120s early this afternoon. Has multiple risk factors including HTN, HLD, DM and obesity. I believe a cardiac workup is indicated. Will start this, put him on monitor and final disposition per ED pods.      I initially evaluated this patient and ordered workup while in intake.  The patient will receive a full evaluation in an ED pod  when space is available.  All results from tests ordered in intake will not be followed by the intake team, including myself. All results will be followed by the ED Pod team.

## 2017-04-29 NOTE — ED NOTES
LOC: The patient is awake and alert; oriented x 3 and speaking appropriately.  APPEARANCE: Patient resting comfortably, patient is clean and well groomed, anxious  SKIN: warm and dry, normal skin turgor & moist mucus membranes, skin intact, no breakdown noted.  MUSCULOSKELETAL: Patient moving all extremities well, no obvious swelling or deformities noted  RESPIRATORY: Airway is open and patent, breath sounds clear throughout all lung fields; respirations are spontaneous, normal effort and rate  CARDIAC: Patient has a normal rate, no peripheral edema noted, capillary refill < 3 seconds; No complaints of chest pain , c/o palpitations  And increased heart rate  ABDOMEN: Soft and non tender to palpation, no distention noted. Bowel sounds present x 4, c/o abdominal pain .

## 2017-04-29 NOTE — ED PROVIDER NOTES
Encounter Date: 4/29/2017    SCRIBE #1 NOTE: I, Carly Langley, am scribing for, and in the presence of,  Dr. Urban. I have scribed the following portions of the note - the APC attestation and the EKG reading. Other sections scribed: CXR interpretation.       History     Chief Complaint   Patient presents with    Palpitations     Review of patient's allergies indicates:   Allergen Reactions    Bactrim  [sulfamethoxazole-trimethoprim]      Other reaction(s): Unknown    Hydroxyzine hcl      Other reaction(s): elevated bp    Kenalog [triamcinolone acetonide] Other (See Comments)     Heart flutters all day    Lisinopril Swelling    Phenergan [promethazine] Anxiety    Zofran [ondansetron hcl (pf)] Anxiety     HPI Comments: 52 y/o male with history of HTN, DM, IBD, NAFLD presents to the ER with chief complaint of palpitations.  He reports heart racing sensation beginning at 2 pm today. He says he felt hot but denies associated chest pain or SOB.  He says his heart rate increased from 85 to 126 this afternoon.  He is currently taking metoprolol 50 mg BID (dose increased to twice daily in February due to heart palpitations).  He was placed on beta blocker in 2006 due to symptomatic resting tachycardia.  The patient had a 48 holter monitor on 4/20 due to heart fluttering ongoing for weeks.  Results showed sinus rhythm.  The patient reports soreness and tenderness in the left upper chest near his shoulder lasting for 3 hours at 9 am this morning.  He reports that he has bursitis in his shoulder and has pain here intermittently.    He also reports epigastric abdominal pain since last night.  This pain is also intermittent and has occurred many times in the past.  He has cramping, intermittent, moderate pain.  He denies vomiting, fever, chills, diarrhea, changes in BM, dysuria or additional complaints at this time.          Past Medical History:   Diagnosis Date    Allergy     Arthritis     Diabetes mellitus     HLD  (hyperlipidemia)     Hypertension     Inflammatory bowel disease     diverticulitis    Kidney stone     NAFLD (nonalcoholic fatty liver disease)     Prostatitis     Rectal bleeding     diverticulitis    Renal disorder     Urinary tract infection      Past Surgical History:   Procedure Laterality Date    COLONOSCOPY      kidney stent      KIDNEY SURGERY      KIDNEY SURGERY  1996    NOSE SURGERY       Family History   Problem Relation Age of Onset    Cancer Father     Liver disease Mother      d.    Cirrhosis Mother      Social History   Substance Use Topics    Smoking status: Former Smoker     Types: Cigarettes    Smokeless tobacco: Never Used      Comment: social    Alcohol use No      Comment: social     Review of Systems   Constitutional: Negative for chills and fever.   HENT: Negative for sore throat.    Respiratory: Negative for shortness of breath.    Cardiovascular: Positive for palpitations. Negative for chest pain.   Gastrointestinal: Positive for abdominal pain and nausea. Negative for vomiting.   Genitourinary: Negative for dysuria.   Musculoskeletal: Negative for back pain.   Skin: Negative for rash and wound.   Neurological: Negative for dizziness, syncope, weakness and light-headedness.   Hematological: Does not bruise/bleed easily.       Physical Exam   Initial Vitals   BP Pulse Resp Temp SpO2   04/29/17 1423 04/29/17 1423 04/29/17 1423 04/29/17 1423 04/29/17 1423   158/83 106 20 98.1 °F (36.7 °C) 96 %     Physical Exam    Constitutional: He appears well-developed and well-nourished.   HENT:   Head: Atraumatic.   Mouth/Throat: Oropharynx is clear and moist.   Eyes: Conjunctivae and EOM are normal. Pupils are equal, round, and reactive to light.   Neck: Normal range of motion. Neck supple.   Cardiovascular: Normal rate, regular rhythm and intact distal pulses.   Pulmonary/Chest: Breath sounds normal. No respiratory distress. He has no wheezes. He has no rhonchi. He has no rales. He  "exhibits tenderness.   Abdominal: Soft. Bowel sounds are normal. He exhibits no distension. There is no tenderness. There is no rebound and no guarding.   Neurological: He is alert and oriented to person, place, and time. He has normal strength. No cranial nerve deficit or sensory deficit.   Skin: No rash noted.   Psychiatric: He has a normal mood and affect.         ED Course   Procedures  Labs Reviewed   COMPREHENSIVE METABOLIC PANEL - Abnormal; Notable for the following:        Result Value    Glucose 215 (*)     ALT 50 (*)     All other components within normal limits   B-TYPE NATRIURETIC PEPTIDE   CBC W/ AUTO DIFFERENTIAL   TROPONIN I     EKG Readings: (Independently Interpreted)   Sinus tachycardia       X-Rays:   Independently Interpreted Readings:   Chest X-Ray: Normal heart. Normal lungs.      Medical Decision Making:   History:   Old Medical Records: I decided to obtain old medical records.  Independently Interpreted Test(s):   I have ordered and independently interpreted X-rays - see prior notes.  I have ordered and independently interpreted EKG Reading(s) - see prior notes  Clinical Tests:   Lab Tests: Ordered and Reviewed  Radiological Study: Ordered and Reviewed  Medical Tests: Ordered and Reviewed       APC / Resident Notes:   Patient presents to the ER with chief complaint of palpitations.    EKG shows sinus tachycardia (rate 102) , no evidence of ischemia, arrhythmia, or change from previous EKG.    The patient was initially seen in the intake area and labs were ordered due to reported palptations.    His BNP and Troponin are negative and basic labs are also WNL.  Chest xray is negative for acute cardiopulmonary process.  No electrolyte abnormalities to explain palpitations.     Results from recent cardiac holter monitor from 4/20/17 showed "Sinus rhythm with heart rates varying between 54 and 105 bpm" occasional PVC and PAC.  The patient is on metoprolol since 2006 due to symptomatic resting " tachycardia. His PCP recently increased dosing to 50 mg BID.    We will advise that he take 100 mg am and 50 mg in the evening over the weekend until he can f/u with his PCP and cardiology clinic next week for further management.    The patient is comfortable with this plan.  He declines dose of metoprolol in the ER because his palpitations have resolved, also because he is driving and does not want to feel drowsy.   Patient is given specific return precautions.I discussed the care of this patient with my supervising MD, and the patient was also seen by him.                    Scribe Attestation:   Scribe #1: I performed the above scribed service and the documentation accurately describes the services I performed. I attest to the accuracy of the note.    Attending Attestation:     Physician Attestation Statement for NP/PA:   I have conducted a face to face encounter with this patient in addition to the NP/PA, due to Medical Complexity    Other NP/PA Attestation Additions:    History of Present Illness: Pt presents with feeling his heart racing. This is an ongoing problem.   Physical Exam: On exam appears comfortable. Heart: regular rhythm. Lungs: CTAB. No sign of DVT.    Medical Decision Making: Reviewed EKG: sinus tachycardia. Heart rate trended down to 79. Doubt ACS and PE. Will discharge home.        Physician Attestation for Scribe:  Physician Attestation Statement for Scribe #1: I, Dr. Urban, reviewed documentation, as scribed by Carly Langley in my presence, and it is both accurate and complete.                 ED Course     Clinical Impression:   The primary encounter diagnosis was Heart palpitations. Diagnoses of Palpitations and Chest pain were also pertinent to this visit.    Disposition:   Disposition: Discharged  Condition: Stable       ANNABEL Cabrales  04/30/17 0125

## 2017-04-29 NOTE — DISCHARGE INSTRUCTIONS
"Return to the ER for any change or worsening of symptoms, including those listed below.          Heart Palpitations    Palpitations are the feeling that your heart is beating hard, fast, or irregular. Some describe it as "pounding" or "skipped beats." Palpitations may occur in someone with heart disease, but can also occur in a healthy person.  Heart-related causes:  · Arrhythmia (a change from the heart's normal rhythm)  · Heart valve disease  · Disease of the heart muscle  · Coronary artery disease  · High blood pressure  Non-heart-related causes:  · Certain medicines (such as asthma inhalers and decongestants)  · Some herbal supplements, energy drinks and pills, and weight loss pills  · Illegal stimulant drugs (such as cocaine, crank, methamphetamine, PCP, bath salts, ecstasy)  · Caffeine, alcohol, and tobacco  · Medical conditions such as thyroid disease, anemia, anxiety, and panic disorder  Sometimes the cause can't be found.  Home care  Follow these home care tips:  · Avoid excess caffeine, alcohol, tobacco, and any stimulant drugs.  · Tell your doctor about any prescription or over-the-counter or herbal medicines you take.  Follow-up care  · Follow up with your doctor, or as advised.  Call 911  This is the fastest and safest way to get to the emergency department. The paramedics can also begin treatment on the way to the hospital, if needed.  Don't wait until your symptoms are severe to call 911. These are reasons to call 911:  · Chest pain  · Shortness of breath  · Feeling lightheaded, faint, or dizzy  · Fainting or loss of consciousness  · Very irregular heartbeat  · Rapid heartbeat that makes you uncomfortable  · Slower than usual heart rate associated with symptoms  · Slower than usual heart rate  · Chest pain with weakness, dizziness, heavy sweating, nausea, or vomiting  · Extreme drowsiness or confusion  · Weakness of an arm or leg, or on 1 side of the face  · Difficulty with speech or vision  When to " seek medical advice  Get prompt medical attention if you have palpitations and any of the following:  · Weakness  · Dizziness  · Lightheadedness  · Fainting  Date Last Reviewed: 4/27/2016 © 2000-2016 [employer]. [address] [place] [address], [place] [address]. All rights reserved. This information is not intended as a substitute for professional medical care. Always follow your healthcare professional's instructions.

## 2017-04-29 NOTE — PROVIDER PROGRESS NOTES - EMERGENCY DEPT.
Encounter Date: 4/29/2017    ED Physician Progress Notes       SCRIBE NOTE: I, Sincere Meléndez, am scribing for, and in the presence of,  Dr. Reno.  Physician Statement: I, Dr. Reno, personally performed the services described in this documentation as scribed by Sincere Meléndez in my presence, and it is both accurate and complete.      EKG - STEMI Decision  Initial Reading: No STEMI present.

## 2017-04-29 NOTE — ED AVS SNAPSHOT
[facility]  [address] [facility] [address]               García Tang   2017  2:38 PM   ED    Description:  Male : 1965   Department:  [facility]           Your Care was Coordinated By:     Provider Role From To    Alex Beck MD Attending Provider 17 1444 17 1624    John Urban MD Attending Provider 17 1624 --    ANNABEL Cabrales Physician Assistant 17 1624 --      Reason for Visit     Palpitations           Diagnoses this Visit        Comments    Heart palpitations    -  Primary     Palpitations         Chest pain           ED Disposition     None           To Do List           Follow-up Information     Follow up with Zaina Weinstein MD. Call in 1 day.    Specialty:  Internal Medicine    Why:  To discuss ER visit and schedule follow up appointment within 1 week    Contact information:     [facility] [address]  [phone]          Follow up with [facility] - Cardiology. Call in 2 days.    Specialty:  Cardiology    Why:  To discuss ER visit and schedule follow up appointment within 1 week    Contact information:    [address] [facility] [address] [phone]    Additional information:    3rd floor      [facility] On Call     [facility] On Call Nurse Care Line - 24/ Assistance  Unless otherwise directed by your provider, please contact [facility] On-Call, our nurse care line that is available for 24/7 assistance.     Registered nurses in the [facility] provide: appointment scheduling, clinical advisement, health education, and other advisory services.  Call: [phone] (toll free)               Medications           Message regarding Medications     Verify the changes and/or additions to your medication regime listed below are the same as discussed with your clinician today.  If any of these changes or additions are incorrect, please notify  "your healthcare provider.        These medications were administered today        Dose Freq    metoprolol tartrate (LOPRESSOR) tablet 50 mg 50 mg ED 1 Time    Sig: Take 1 tablet (50 mg total) by mouth ED 1 Time.    Class: Normal    Route: Oral    Cosign for Ordering: Required by John Urban MD           Verify that the below list of medications is an accurate representation of the medications you are currently taking.  If none reported, the list may be blank. If incorrect, please contact your healthcare provider. Carry this list with you in case of emergency.           Current Medications     amlodipine (NORVASC) 5 MG tablet Take 1 tablet (5 mg total) by mouth once daily.    aspirin (ECOTRIN) 81 MG EC tablet Take 81 mg by mouth once daily.    diclofenac (VOLTAREN) 75 MG EC tablet Take 1 tablet (75 mg total) by mouth 2 (two) times daily.    DOCOSAHEXANOIC ACID/EPA (FISH OIL ORAL) Take by mouth.    ERGOCALCIFEROL, VITAMIN D2, (VITAMIN D ORAL) Take by mouth.    fenofibrate (TRICOR) 54 MG tablet Take 1 tablet (54 mg total) by mouth once daily.    hydrochlorothiazide (HYDRODIURIL) 25 MG tablet Take 1 tablet (25 mg total) by mouth once daily.    hydrochlorothiazide (MICROZIDE) 12.5 mg capsule 12.5 mg 2 (two) times daily.     insulin NPH-insulin regular, 70/30, (NOVOLIN 70/30) 100 unit/mL (70-30) injection Inject into the skin 2 (two) times daily.    metoprolol succinate (TOPROL XL) 50 MG 24 hr tablet Take 1 tablet (50 mg total) by mouth 2 (two) times daily.    metoprolol tartrate (LOPRESSOR) tablet 50 mg Take 1 tablet (50 mg total) by mouth ED 1 Time.    multivitamin capsule Take by mouth. 1 Capsule Oral Every day    tamsulosin (FLOMAX) 0.4 mg Cp24 Take 1 capsule (0.4 mg total) by mouth once daily.           Clinical Reference Information           Your Vitals Were     BP Pulse Temp Resp Height Weight    135/82 91 98.4 °F (36.9 °C) 20 5' 10" (1.778 m) 154.2 kg (340 lb)    SpO2 BMI             98% 48.78 kg/m2       " "  Allergies as of 4/29/2017        Reactions    Bactrim  [Sulfamethoxazole-trimethoprim]     Other reaction(s): Unknown    Hydroxyzine Hcl     Other reaction(s): elevated bp    Kenalog [Triamcinolone Acetonide] Other (See Comments)    Heart flutters all day    Lisinopril Swelling    Phenergan [Promethazine] Anxiety    Zofran [Ondansetron Hcl (Pf)] Anxiety      Immunizations Administered on Date of Encounter - 4/29/2017     None      ED Micro, Lab, POCT     Start Ordered       Status Ordering Provider    04/29/17 1521 04/29/17 1521  Brain natriuretic peptide  STAT      Final result     04/29/17 1521 04/29/17 1521  CBC auto differential  STAT      Final result     04/29/17 1521 04/29/17 1521  Troponin I  STAT      Final result     04/29/17 1521 04/29/17 1521  Comprehensive metabolic panel  STAT      Final result       ED Imaging Orders     Start Ordered       Status Ordering Provider    04/29/17 1522 04/29/17 1521  X-Ray Chest PA And Lateral  1 time imaging      Final result         Discharge Instructions       Return to the ER for any change or worsening of symptoms, including those listed below.          Heart Palpitations    Palpitations are the feeling that your heart is beating hard, fast, or irregular. Some describe it as "pounding" or "skipped beats." Palpitations may occur in someone with heart disease, but can also occur in a healthy person.  Heart-related causes:  · Arrhythmia (a change from the heart's normal rhythm)  · Heart valve disease  · Disease of the heart muscle  · Coronary artery disease  · High blood pressure  Non-heart-related causes:  · Certain medicines (such as asthma inhalers and decongestants)  · Some herbal supplements, energy drinks and pills, and weight loss pills  · Illegal stimulant drugs (such as cocaine, crank, methamphetamine, PCP, bath salts, ecstasy)  · Caffeine, alcohol, and tobacco  · Medical conditions such as thyroid disease, anemia, anxiety, and panic disorder  Sometimes the " cause can't be found.  Home care  Follow these home care tips:  · Avoid excess caffeine, alcohol, tobacco, and any stimulant drugs.  · Tell your doctor about any prescription or over-the-counter or herbal medicines you take.  Follow-up care  · Follow up with your doctor, or as advised.  Call 911  This is the fastest and safest way to get to the emergency department. The paramedics can also begin treatment on the way to the hospital, if needed.  Don't wait until your symptoms are severe to call 911. These are reasons to call 911:  · Chest pain  · Shortness of breath  · Feeling lightheaded, faint, or dizzy  · Fainting or loss of consciousness  · Very irregular heartbeat  · Rapid heartbeat that makes you uncomfortable  · Slower than usual heart rate associated with symptoms  · Slower than usual heart rate  · Chest pain with weakness, dizziness, heavy sweating, nausea, or vomiting  · Extreme drowsiness or confusion  · Weakness of an arm or leg, or on 1 side of the face  · Difficulty with speech or vision  When to seek medical advice  Get prompt medical attention if you have palpitations and any of the following:  · Weakness  · Dizziness  · Lightheadedness  · Fainting  Date Last Reviewed: 4/27/2016  © 8458-4992 [employer]. [address] [place] [address], [place] [address]. All rights reserved. This information is not intended as a substitute for professional medical care. Always follow your healthcare professional's instructions.          Your Scheduled Appointments     May 08, 2017  7:30 AM CDT   Non-Fasting Lab with LAB, [facility] - Laboratory ([facility])    [address] [facility] [place] [address]   [phone]            May 10, 2017  8:40 AM CDT   Established Patient Visit with MD Tone Jones - Internal Medicine ([facility])    [address] [facility] [address]   [phone]            May 18, 2017  1:30 PM CDT   Consult with DIABETES EMPOWERMENT  MARYELLEN Weiss -  Diabetes Program ([facility])    [address]  [facility] [address] [phone]              Smoking Cessation     If you would like to quit smoking:   You may be eligible for free services if you are a [place] resident and started smoking cigarettes before September 1, 1988.  Call the Smoking Cessation Trust (SCT) toll free at (441) 748-2086 or (925) 681-8250.   Call 1-800-QUIT-NOW if you do not meet the above criteria.   Contact us via email: [email or web address]   View our website for more information: [email or web address]         [facility] complies with applicable Federal civil rights laws and does not discriminate on the basis of race, color, national origin, age, disability, or sex.        Language Assistance Services     ATTENTION: Language assistance services are available, free of charge. Please call [phone].      ATENCIÓN: Si habla español, tiene a olivera disposición servicios gratuitos de asistencia lingüística. Llame al [phone].     CHÚ Ý: N?u b?n nói Ti?ng Vi?t, có các d?ch v? h? tr? ngôn ng? mi?n phí dành cho b?n. G?i s? [phone].

## 2017-05-01 ENCOUNTER — PATIENT MESSAGE (OUTPATIENT)
Dept: INTERNAL MEDICINE | Facility: CLINIC | Age: 52
End: 2017-05-01

## 2017-05-05 ENCOUNTER — LAB VISIT (OUTPATIENT)
Dept: LAB | Facility: HOSPITAL | Age: 52
End: 2017-05-05
Attending: INTERNAL MEDICINE
Payer: COMMERCIAL

## 2017-05-05 DIAGNOSIS — E78.5 HYPERLIPIDEMIA, UNSPECIFIED HYPERLIPIDEMIA TYPE: ICD-10-CM

## 2017-05-05 LAB
ALBUMIN SERPL BCP-MCNC: 3.7 G/DL
ALP SERPL-CCNC: 90 U/L
ALT SERPL W/O P-5'-P-CCNC: 41 U/L
ANION GAP SERPL CALC-SCNC: 8 MMOL/L
AST SERPL-CCNC: 33 U/L
BILIRUB SERPL-MCNC: 0.4 MG/DL
BUN SERPL-MCNC: 14 MG/DL
CALCIUM SERPL-MCNC: 9.8 MG/DL
CHLORIDE SERPL-SCNC: 101 MMOL/L
CHOLEST/HDLC SERPL: 6.4 {RATIO}
CO2 SERPL-SCNC: 29 MMOL/L
CREAT SERPL-MCNC: 1.2 MG/DL
EST. GFR  (AFRICAN AMERICAN): >60 ML/MIN/1.73 M^2
EST. GFR  (NON AFRICAN AMERICAN): >60 ML/MIN/1.73 M^2
GLUCOSE SERPL-MCNC: 127 MG/DL
HDL/CHOLESTEROL RATIO: 15.6 %
HDLC SERPL-MCNC: 180 MG/DL
HDLC SERPL-MCNC: 28 MG/DL
LDLC SERPL CALC-MCNC: 101.8 MG/DL
NONHDLC SERPL-MCNC: 152 MG/DL
POTASSIUM SERPL-SCNC: 4.1 MMOL/L
PROT SERPL-MCNC: 7.8 G/DL
SODIUM SERPL-SCNC: 138 MMOL/L
TRIGL SERPL-MCNC: 251 MG/DL

## 2017-05-05 PROCEDURE — 83036 HEMOGLOBIN GLYCOSYLATED A1C: CPT

## 2017-05-05 PROCEDURE — 80061 LIPID PANEL: CPT

## 2017-05-05 PROCEDURE — 36415 COLL VENOUS BLD VENIPUNCTURE: CPT | Mod: PO

## 2017-05-05 PROCEDURE — 80053 COMPREHEN METABOLIC PANEL: CPT

## 2017-05-06 LAB
ESTIMATED AVG GLUCOSE: 180 MG/DL
HBA1C MFR BLD HPLC: 7.9 %

## 2017-05-09 ENCOUNTER — PATIENT MESSAGE (OUTPATIENT)
Dept: DIABETES | Facility: CLINIC | Age: 52
End: 2017-05-09

## 2017-05-18 ENCOUNTER — CLINICAL SUPPORT (OUTPATIENT)
Dept: DIABETES | Facility: CLINIC | Age: 52
End: 2017-05-18
Payer: COMMERCIAL

## 2017-05-18 VITALS
BODY MASS INDEX: 45.1 KG/M2 | SYSTOLIC BLOOD PRESSURE: 124 MMHG | HEART RATE: 59 BPM | HEIGHT: 70 IN | DIASTOLIC BLOOD PRESSURE: 82 MMHG | WEIGHT: 315 LBS

## 2017-05-18 DIAGNOSIS — K76.0 FATTY LIVER DISEASE, NONALCOHOLIC: ICD-10-CM

## 2017-05-18 DIAGNOSIS — Z79.4 TYPE 2 DIABETES MELLITUS WITH HYPERGLYCEMIA, WITH LONG-TERM CURRENT USE OF INSULIN: Chronic | ICD-10-CM

## 2017-05-18 DIAGNOSIS — E11.65 TYPE 2 DIABETES MELLITUS WITH HYPERGLYCEMIA, WITH LONG-TERM CURRENT USE OF INSULIN: Chronic | ICD-10-CM

## 2017-05-18 DIAGNOSIS — R42 DIZZINESS: ICD-10-CM

## 2017-05-18 DIAGNOSIS — E11.22 CKD STAGE 2 DUE TO TYPE 2 DIABETES MELLITUS: Chronic | ICD-10-CM

## 2017-05-18 DIAGNOSIS — E66.01 MORBID OBESITY WITH BMI OF 50.0-59.9, ADULT: Chronic | ICD-10-CM

## 2017-05-18 DIAGNOSIS — I10 ESSENTIAL HYPERTENSION: Chronic | ICD-10-CM

## 2017-05-18 DIAGNOSIS — N18.2 CKD STAGE 2 DUE TO TYPE 2 DIABETES MELLITUS: Chronic | ICD-10-CM

## 2017-05-18 PROCEDURE — 99999 PR PBB SHADOW E&M-EST. PATIENT-LVL III: CPT | Mod: PBBFAC,,,

## 2017-05-18 PROCEDURE — 99214 OFFICE O/P EST MOD 30 MIN: CPT | Mod: S$GLB,,, | Performed by: NURSE PRACTITIONER

## 2017-05-18 RX ORDER — METFORMIN HYDROCHLORIDE 500 MG/1
500 TABLET, EXTENDED RELEASE ORAL 2 TIMES DAILY WITH MEALS
Qty: 60 TABLET | Refills: 3 | Status: SHIPPED | OUTPATIENT
Start: 2017-05-18 | End: 2017-12-07

## 2017-05-18 NOTE — PATIENT INSTRUCTIONS
Start Metformin 500 mg once daily WITH food  x1 week  Then increase to 500 mg twice daily and stay on this dose     If you choose NOT to start Trulicity, stay on your current 70/30 doses and check your blood sugar before every meal and bring your readings to your next visit in 6 weeks    If you decide to start the Trulicity    Start trulicity 0.75 mg weekly.  Discussed MOA, side effects, including n/v/pancreatitis/medullary thyroid cancer. Instructed patient to notify me of any n/v/abdominal pain. Discussed proper dosing and administration.     If start Trulicity, ok to stop 70/30 insulin and check blood sugar once daily and notify me via [facility] if readings >160

## 2017-05-18 NOTE — PROGRESS NOTES
CC:  Diabetes.     HPI: García Tang is a 51 y.o. male presents for visit in [facility]. The patient has had diabetes along with associated comorbidities indicated in the Visit Diagnosis section of this encounter. The patient was diagnosed with Type 2 diabetes in ~2014.     VISIT #: 1    1st visit - Presents alone, not checking BG regularly. Reports vague s/s of hypoglycemia midday regularly if he skips lunch. Not administering generic 70/30 properly (giving AM dose 1 hour before eating, giving PM dose 2 hours after a meal). Also vague s/s of likely hypoglycemia overnight (nightmares, poor sleeping pattern, hot sweats and shakiness overnight). Does not have regimented lifestyle.    Also, recently established care with hepatology for NAFLD    DIABETES COMPLICATIONS: nephropathy and peripheral neuropathy     STANDARDS of CARE:  Statin:  No - not interested at this time   ACEI or ARB:  No - allergy to ace   ASA:  Yes - 81 mg   Last eye exam  - need to assess at next visit   Microalbumin/Creatinine ratio:  Lab Results   Component Value Date    MICALBCREAT 6.4 02/23/2017       CURRENT A1C:    Hemoglobin A1C   Date Value Ref Range Status   05/05/2017 7.9 (H) 4.5 - 6.2 % Final     Comment:     According to ADA guidelines, hemoglobin A1C <7.0% represents  optimal control in non-pregnant diabetic patients.  Different  metrics may apply to specific populations.   Standards of Medical Care in Diabetes - 2016.  For the purpose of screening for the presence of diabetes:  <5.7%     Consistent with the absence of diabetes  5.7-6.4%  Consistent with increasing risk for diabetes   (prediabetes)  >or=6.5%  Consistent with diabetes  Currently no consensus exists for use of hemoglobin A1C  for diagnosis of diabetes for children.     02/23/2017 8.6 (H) 4.5 - 6.2 % Final     Comment:     According to ADA guidelines, hemoglobin A1C <7.0% represents  optimal control in non-pregnant diabetic patients.   Different  metrics may apply to specific populations.   Standards of Medical Care in Diabetes - 2016.  For the purpose of screening for the presence of diabetes:  <5.7%     Consistent with the absence of diabetes  5.7-6.4%  Consistent with increasing risk for diabetes   (prediabetes)  >or=6.5%  Consistent with diabetes  Currently no consensus exists for use of hemoglobin A1C  for diagnosis of diabetes for children.     09/20/2016 8.3 (H) 4.5 - 6.2 % Final     Comment:     According to ADA guidelines, hemoglobin A1C <7.0% represents  optimal control in non-pregnant diabetic patients.  Different  metrics may apply to specific populations.   Standards of Medical Care in Diabetes - 2016.  For the purpose of screening for the presence of diabetes:  <5.7%     Consistent with the absence of diabetes  5.7-6.4%  Consistent with increasing risk for diabetes   (prediabetes)  >or=6.5%  Consistent with diabetes  Currently no consensus exists for use of hemoglobin A1C  for diagnosis of diabetes for children.         GOAL A1C: <6.5-7%    DM MEDICATIONS USED IN THE PAST:  Novolin 70/30    MEDICATIONS UPON START OF PROGRAM: Novalin 70/30  27 before breakfast and 24 units before dinner   6am, eats 1 hour later  Dinner takes 70/30 2 hours eating   Vague about compliance with BID insulin dosing     CURRENT DIABETES MEDICATIONS: Novalin 70/30 27 before breakfast and 24 units before dinner   6am, eats 1 hour later  Dinner takes 70/30 2 hours eating   Vague about compliance with BID insulin dosing     DO YOU USE THE [facility] EMAIL SYSTEM? Yes    BLOOD GLUCOSE MONITORING:  Checking BG 2-3 times per week, 180-250    HYPOGLYCEMIA:  No    MEALS:   Eating 2-3 meals     Denies snacking in between meals    Drinks coke life sometimes, tea with stevia   Occasionally tea wit sugar     CURRENT EXERCISE:  No    OCCUPATION:     MEDICATIONS, ALLERGIES, LABS, VS's, MEDICAL, SURGICAL, SOCIAL, AND FAMILY HISTORY reviewed and updated in the appropriate  "sections during this encounter    ROS:     Constitutional: Negative for weight change  Endocrine:  intermittent polyuria, polydipsia, +1-4 nocturia.  HENT: Denies neck swelling, lumps, horseness or trouble swallowing. Denies any personal or family history of thyroid cancer.    Eyes:+ blurry vision   Respiratory: Negative for cough or shortness or breath.  Cardiovascular: + intermittent chest pain (denies chest pain currently or in past week), no  claudication.  + dizziness, "feeling like he will pass out", none currently   Gastrointestinal: +intermittent nausea, denies diarrhea, vomiting, bloating.  No history of pancreatitis or gastroparesis.  Genitourinary: Negative for urgency, frequency, frequent urinary tract infections.  Skin: Denies prolonged wound healing.  Neurological: Negative for syncope, + numbness/burning of extremities.  Psychiatric/Behavioral: Negative for depression or anxiety      All other systems reviewed and are negative.    PE:  Constitutional: /82 (BP Location: Right arm, Patient Position: Sitting, BP Method: Manual)  Pulse (!) 59  Ht 5' 10" (1.778 m)  Wt (!) 158.4 kg (349 lb 3.3 oz)  BMI 50.11 kg/m2   Well developed, well nourished, NAD.    HENT:   External ears, nose: no masses. No significant findings.   Hearing: normal     Neck:  No trachial deviation present, No neck masses noticed   Thyroid:  No thyromegaly present.  No thyroid tenderness.      Cardiovascular:    Auscultation:  No murmurs or abnormal sounds   Lower extremities:  +1 pedal edema, denies cyanosis.     Respiratory:    Effort:  Normal, no use of accessory muscles.   Auscultation: breath sounds normal, no adventitious sounds.  Abdomen:     Exam:  Soft, non-tender, no masses.      No hernia noted.  Skin:    Inspection: no rashes, lesion or ulcers, no acanthosis nigracans.   Palpation: no induration or nodules.    Insulin injection sites: no lipoatrophy or lipohypertrophy.  Psychiatric:  Judgement and insight good with " normal mood and affect.  Musculoskeletal:  Gait steady. No gross abnormalities.        FOOT EXAMINATION:   Protective Sensation (w/ 10 gram monofilament):  Right: Intact  Left: Intact    Visual Inspection:  Callus -  Bilateral    Pedal Pulses:   Right: Present  Left: Present    Posterior tibialis:   Right:Present  Left: Present    Decreased vibratory response to 128 Hz tuning fork bilaterally.       ______________________________________________________________________     ASSESSMENT and PLAN:    1- Type 2 diabetes with neuropathy, CKD 2 and hyperglycemia - A1c elevated, do not think that 70/30 is best option for patient. Reviewed A1c and BG goals.  Discussed diagnosis of DM, progression of disease, long term complications and tx options, including Metformin, GLP-1 medications, analog insulins.  VERY long conversation with patient, patient resistant to change, although I do not recommend 70/30 as good regimen for him (lack of consistency with daily regimen, significant weight gain, need for weight loss, back of BG monitoring, frequent vague s/s of hypoglycemia midday and overnight).     Recommend starting low dose Metformin 500 mg BID and Trulicity 0.75 mg weekly. If change to this regimen, can d/c Novolin 70/30 (unsure of current compliance with BID dosing) and notify me of BG >150 so that basal analog insulin can be started     Trulicity injection training completed today per myself, pt returned demonstration     Instructed to monitor BG 2-4 times daily if remains on 70/30, 1-2 time daily if changes to GLP-1 + Metformin, document on BG logs, and bring complete BG logs to all visits.     RTC in 6 weeks for further medication discussion   Need to assess eye exam at next visit     2- CKD stage 2 - discussed with patient,  GFR 70 on labs, needs improved BG control     3 - Peripheral neuropathy - Educated patient to check feet daily for any foreign objects and/or wounds. Discussed with patient the importance of wearing  appropriate footwear at all times, not to walk barefoot ever, and to check shoes before putting them on feet. Instructed patient to keep feet dry by regularly changing shoes and socks and drying feet after baths and exercises. Also, instructed patient to report any new lesions, discolorations, or swelling to a healthcare professional.    4- Hypertension - goal < 140/90 mmHg -  At goal, allergy to ACE-i  BP Readings from Last 3 Encounters:   05/18/17 124/82   04/29/17 139/89   04/23/17 114/82     5- Hyperlipidemia -  LFT's WNL. Recommend Lipitor 20 mg daily based on ADA risk factors, fatty liver disease. Pt does not wish to start at this time. Will reinforce recommendation at next visit     Lab Results   Component Value Date    LDLCALC 101.8 05/05/2017     6- Body mass index is 50.11 kg/(m^2). = Morbid obesity. Modest weight loss (5-10%) may greatly improve BG control. May benefit from weight loss properties of GLP-1.  highly recommended referral to [facility], pt not interested, Will reinforce at f/u visits     7 - Intermittent nausea - intermittently occurring. Stressed possibility of increased nausea with GLP-1, instructed pt to notify me if nausea worsens or does not improve after start of Trulicity     8 - Dizziness/ intermittent chest pain - message sent to PCP for eval. Stressed to patient that he needs to seek emergency medical attention for any further chest pain due to risk factors     9 - NAFLD - highly recommended referral to [facility], pt not interested, Will reinforce at f/u visits

## 2017-05-19 ENCOUNTER — TELEPHONE (OUTPATIENT)
Dept: INTERNAL MEDICINE | Facility: CLINIC | Age: 52
End: 2017-05-19

## 2017-05-19 NOTE — TELEPHONE ENCOUNTER
"----- Message from Zaina Weinstein MD sent at 5/19/2017  8:31 AM CDT -----  Regarding: RE: c/o dizziness, lightheaded  Please let patient know that His Endocrine MD sent us a message regarding his symptoms of passing out. I would like him to see in clinic to evaluate; please ask him to schedule a follow up. Thanks   ----- Message -----     From: Krysta Way NP     Sent: 5/18/2017   5:52 PM       To: Zaina Weinstein MD  Subject: c/o dizziness, lightheaded                       Dr. Weinstein,    I saw Mr. Tang today. He reported that he is having frequent dizziness, intermittent "feeling of passing out", + intermittent chest pain (does not worsen with activity). No chest pain or dizziness currently. Reports 3-4 episodes of dizziness in past week but no chest pain in past week. Denies any syncope. I wanted to let you know in case you would like to evaluate him     Thanks  KINDRA Lau  Endocrinology Nurse Practitioner       "

## 2017-05-19 NOTE — TELEPHONE ENCOUNTER
Patient offered a 1:40 and a 2:40 appointment but refused both. Patient states that he will call us back to schedule an appointment.

## 2017-05-23 ENCOUNTER — OFFICE VISIT (OUTPATIENT)
Dept: CARDIOLOGY | Facility: CLINIC | Age: 52
End: 2017-05-23
Payer: COMMERCIAL

## 2017-05-23 ENCOUNTER — OFFICE VISIT (OUTPATIENT)
Dept: INTERNAL MEDICINE | Facility: CLINIC | Age: 52
End: 2017-05-23
Payer: COMMERCIAL

## 2017-05-23 VITALS
SYSTOLIC BLOOD PRESSURE: 140 MMHG | BODY MASS INDEX: 45.1 KG/M2 | HEART RATE: 80 BPM | DIASTOLIC BLOOD PRESSURE: 86 MMHG | WEIGHT: 315 LBS | HEIGHT: 70 IN

## 2017-05-23 VITALS
HEART RATE: 78 BPM | WEIGHT: 315 LBS | TEMPERATURE: 98 F | SYSTOLIC BLOOD PRESSURE: 132 MMHG | BODY MASS INDEX: 45.1 KG/M2 | HEIGHT: 70 IN | DIASTOLIC BLOOD PRESSURE: 90 MMHG

## 2017-05-23 DIAGNOSIS — R42 LIGHTHEADED: ICD-10-CM

## 2017-05-23 DIAGNOSIS — R53.1 WEAKNESS: ICD-10-CM

## 2017-05-23 DIAGNOSIS — R55 NEAR SYNCOPE: Primary | ICD-10-CM

## 2017-05-23 DIAGNOSIS — R42 DIZZINESS: ICD-10-CM

## 2017-05-23 DIAGNOSIS — R00.2 PALPITATIONS: Chronic | ICD-10-CM

## 2017-05-23 DIAGNOSIS — R55 SYNCOPE, UNSPECIFIED SYNCOPE TYPE: ICD-10-CM

## 2017-05-23 DIAGNOSIS — R00.0 TACHYCARDIA: Primary | ICD-10-CM

## 2017-05-23 PROCEDURE — 1160F RVW MEDS BY RX/DR IN RCRD: CPT | Mod: S$GLB,,, | Performed by: INTERNAL MEDICINE

## 2017-05-23 PROCEDURE — 99999 PR PBB SHADOW E&M-EST. PATIENT-LVL IV: CPT | Mod: PBBFAC,,,

## 2017-05-23 PROCEDURE — 99213 OFFICE O/P EST LOW 20 MIN: CPT | Mod: S$GLB,,, | Performed by: INTERNAL MEDICINE

## 2017-05-23 PROCEDURE — 3074F SYST BP LT 130 MM HG: CPT | Mod: S$GLB,,, | Performed by: INTERNAL MEDICINE

## 2017-05-23 PROCEDURE — 3075F SYST BP GE 130 - 139MM HG: CPT | Mod: S$GLB,,, | Performed by: INTERNAL MEDICINE

## 2017-05-23 PROCEDURE — 99203 OFFICE O/P NEW LOW 30 MIN: CPT | Mod: S$GLB,,, | Performed by: INTERNAL MEDICINE

## 2017-05-23 PROCEDURE — 93000 ELECTROCARDIOGRAM COMPLETE: CPT | Mod: S$GLB,,, | Performed by: INTERNAL MEDICINE

## 2017-05-23 PROCEDURE — 3079F DIAST BP 80-89 MM HG: CPT | Mod: S$GLB,,, | Performed by: INTERNAL MEDICINE

## 2017-05-23 PROCEDURE — 3078F DIAST BP <80 MM HG: CPT | Mod: S$GLB,,, | Performed by: INTERNAL MEDICINE

## 2017-05-23 PROCEDURE — 99999 PR PBB SHADOW E&M-EST. PATIENT-LVL III: CPT | Mod: PBBFAC,,, | Performed by: INTERNAL MEDICINE

## 2017-05-23 NOTE — PROGRESS NOTES
Subjective:    Patient ID:  García Tang is a 51 y.o. male who presents for evaluation of Dizziness; Shortness of Breath; Chest Pain; and Palpitations    HPI  Patient is a 51 year old male with history of morbid obesity, diabetes with neuropathy, hypertension, hyperlipidemia, NAFLD, and palpitations who presents for evaluation of dizziness and near syncope. Patient has a long history of palpitations treated successfully with metoprolol. Previous evaluations have showed sinus rhythm. In April he developed worsening palpitations and a 24-hour Holter monitor was done, which showed rare PVC, no tachyarrhythmia or bradyarrhythmia, and a number of symptoms corresponding to sinus rhythm with normal rate. He also presented to the ER due to sinus tachycardia with normal evaluation. Since then he has subsequently developed episodes of near syncope and dizziness. They occur roughly every other day, last ~10 minutes, and resolve spontaneously. He does not notice any association with his glucose level, heart rate, or blood pressure. Patient is otherwise well and denies chest pain, dyspnea, orthopnea, or PND.     Review of Systems   Constitution: Positive for weakness. Negative for chills, decreased appetite, diaphoresis, fever, malaise/fatigue, night sweats, weight gain and weight loss.   HENT: Negative for headaches, nosebleeds and stridor.    Eyes: Negative for blurred vision, vision loss in left eye, vision loss in right eye and visual disturbance.   Cardiovascular: Positive for near-syncope and palpitations. Negative for chest pain, claudication, cyanosis, dyspnea on exertion, irregular heartbeat, leg swelling, orthopnea, paroxysmal nocturnal dyspnea and syncope.   Respiratory: Negative for cough, hemoptysis, shortness of breath, sputum production and wheezing.    Hematologic/Lymphatic: Negative for bleeding problem. Does not bruise/bleed easily.   Skin: Negative for color change, dry skin, flushing and rash.  "  Musculoskeletal: Positive for muscle weakness. Negative for arthritis, muscle cramps and myalgias.   Gastrointestinal: Negative for abdominal pain, hematochezia, melena, nausea and vomiting.   Genitourinary: Negative for hematuria.   Neurological: Positive for dizziness, loss of balance, numbness and paresthesias.   Psychiatric/Behavioral: Negative for altered mental status.   Allergic/Immunologic: Negative for hives and persistent infections.        Objective:    Physical Exam   Constitutional: He is oriented to person, place, and time. He appears well-developed and well-nourished. No distress.   HENT:   Head: Normocephalic and atraumatic.   Eyes: No scleral icterus.   Neck: No JVD present. Carotid bruit is not present.   Cardiovascular: Normal rate, regular rhythm, S1 normal, S2 normal and intact distal pulses.  Exam reveals no gallop and no friction rub.    No murmur heard.  Pulses:       Carotid pulses are 2+ on the right side, and 2+ on the left side.       Radial pulses are 2+ on the right side, and 2+ on the left side.        Dorsalis pedis pulses are 2+ on the right side, and 2+ on the left side.        Posterior tibial pulses are 2+ on the right side, and 2+ on the left side.   Pulmonary/Chest: Effort normal and breath sounds normal. No respiratory distress. He has no wheezes. He has no rales.   Abdominal: Soft. Normal aorta. He exhibits no distension and no abdominal bruit. There is no tenderness.   Musculoskeletal: He exhibits no edema.   Neurological: He is alert and oriented to person, place, and time.   Skin: Skin is warm and dry. No bruising, no ecchymosis and no rash noted. He is not diaphoretic. No cyanosis or erythema. No pallor. Nails show no clubbing.   Psychiatric: He has a normal mood and affect.   BP (!) 140/86   Pulse 80   Ht 5' 10" (1.778 m)   Wt (!) 157.9 kg (348 lb 1.7 oz)   BMI 49.95 kg/m²     Past Medical History:   Diagnosis Date    Allergy     Arthritis     CKD stage 2 due to " type 2 diabetes mellitus 5/18/2017    HLD (hyperlipidemia)     Hypertension     Inflammatory bowel disease     diverticulitis    Kidney stone     Morbid obesity with BMI of 50.0-59.9, adult 5/29/2014    NAFLD (nonalcoholic fatty liver disease)     Prostatitis     Rectal bleeding     diverticulitis    Renal disorder     Type 2 diabetes mellitus with hyperglycemia, with long-term current use of insulin 5/18/2017    Uncontrolled type 2 diabetes mellitus with peripheral neuropathy 5/29/2014    Urinary tract infection      Past Surgical History:   Procedure Laterality Date    COLONOSCOPY      kidney stent      KIDNEY SURGERY      KIDNEY SURGERY  1996    NOSE SURGERY       Social History   Substance Use Topics    Smoking status: Former Smoker     Types: Cigarettes    Smokeless tobacco: Never Used      Comment: social    Alcohol use No     Family History   Problem Relation Age of Onset    Cancer Father     Diabetes Father     Liver disease Mother      d.    Cirrhosis Mother      Current Outpatient Prescriptions   Medication Sig    amlodipine (NORVASC) 5 MG tablet Take 1 tablet (5 mg total) by mouth once daily.    aspirin (ECOTRIN) 81 MG EC tablet Take 81 mg by mouth once daily.    DOCOSAHEXANOIC ACID/EPA (FISH OIL ORAL) Take 1 capsule by mouth once daily at 6am.     dulaglutide (TRULICITY) 0.75 mg/0.5 mL PnIj Inject 0.5 mLs (0.75 mg total) into the skin every 7 days.    ERGOCALCIFEROL, VITAMIN D2, (VITAMIN D ORAL) Take 1 capsule by mouth once daily at 6am.     hydrochlorothiazide (MICROZIDE) 12.5 mg capsule 12.5 mg 2 (two) times daily.     insulin NPH-insulin regular, 70/30, (NOVOLIN 70/30) 100 unit/mL (70-30) injection Inject into the skin 2 (two) times daily. 27 in am and 24 in pm    metformin (GLUCOPHAGE-XR) 500 MG 24 hr tablet Take 1 tablet (500 mg total) by mouth 2 (two) times daily with meals.    metoprolol succinate (TOPROL XL) 50 MG 24 hr tablet Take 1 tablet (50 mg total) by mouth  2 (two) times daily.    multivitamin capsule Take by mouth. 1 Capsule Oral Every day     No current facility-administered medications for this visit.      Review of patient's allergies indicates:   Allergen Reactions    Bactrim  [sulfamethoxazole-trimethoprim]      Other reaction(s): Unknown    Hydroxyzine hcl      Other reaction(s): elevated bp    Kenalog [triamcinolone acetonide] Other (See Comments)     Heart flutters all day    Lisinopril Swelling    Phenergan [promethazine] Anxiety    Zofran [ondansetron hcl (pf)] Anxiety     Investigations: orthostatic vital signs negative in clinic.   ECG in clinic: sinus rhythm @77 bpm, normal axis, normal intervals, no ischemic changes        Assessment/Plan:       1. Near syncope    2. Dizziness    3. Palpitations      It is unlikely his symptoms are due to cardiac dysfunction, particularly given his recent unremarkable Holter and exercise stress echocardiogram 2016 that showed structurally normal heart with normal stress response. However, as his symptoms are different then when he had his Holter, is is reasonable to evaluate with another 48-hour Holter monitor. Will also refer patient to sleep medicine, as untreated CHANELL can affect his blood pressure and heart rate. RTC in one month. If investigations unremarkable would recommend neurology evaluation.

## 2017-05-23 NOTE — PROGRESS NOTES
Subjective:       Patient ID: García Tang is a 51 y.o. male.    Chief Complaint: Urinary Frequency (follow up)    HPI     Patient is a 51 year old male here today for examination. I received a message from his endocrine MD that says that patient complained of presyncope daily. Did not check BG or HR during attacks. Says that he checks HR other times and it ranges from 37 (associated with sx of fatigue, lethargy) to 130's while on a beta blocker. Normal stress test last year. Normal holter monitor last month. No obvious electrolyte change. Does not check his blood sugar during this episodes.      Review of Systems   Constitutional: Positive for activity change. Negative for unexpected weight change.   HENT: Positive for hearing loss. Negative for rhinorrhea and trouble swallowing.    Eyes: Positive for visual disturbance. Negative for discharge.   Respiratory: Positive for chest tightness. Negative for wheezing.    Cardiovascular: Positive for chest pain. Negative for palpitations.   Gastrointestinal: Positive for blood in stool. Negative for constipation, diarrhea and vomiting.   Endocrine: Positive for polyuria. Negative for polydipsia.   Genitourinary: Positive for urgency. Negative for difficulty urinating and hematuria.   Musculoskeletal: Positive for arthralgias and joint swelling.   Neurological: Positive for weakness and headaches.   Psychiatric/Behavioral: Positive for dysphoric mood. Negative for confusion.       Objective:      Physical Exam   Constitutional: He is oriented to person, place, and time. He appears well-developed and well-nourished. No distress.   HENT:   Head: Normocephalic and atraumatic.   Neurological: He is alert and oriented to person, place, and time.   Skin: Skin is warm and dry. He is not diaphoretic.   Nursing note and vitals reviewed.      Assessment:       1. Tachycardia    2. Lightheaded        Plan:       I am unsure as to the cause of his range of bradycardia to  tachycardia. Along with his sx of light-headedness occurring daily. Need to rule out cardiac versus neurologic cause.  Referral to Cardiology ordered today

## 2017-05-24 ENCOUNTER — CLINICAL SUPPORT (OUTPATIENT)
Dept: ELECTROPHYSIOLOGY | Facility: CLINIC | Age: 52
End: 2017-05-24
Payer: COMMERCIAL

## 2017-05-24 DIAGNOSIS — R42 DIZZINESS: ICD-10-CM

## 2017-05-24 DIAGNOSIS — R00.2 PALPITATIONS: Chronic | ICD-10-CM

## 2017-05-24 DIAGNOSIS — R55 NEAR SYNCOPE: ICD-10-CM

## 2017-05-24 DIAGNOSIS — R55 SYNCOPE, UNSPECIFIED SYNCOPE TYPE: Primary | ICD-10-CM

## 2017-05-24 PROCEDURE — 93224 XTRNL ECG REC UP TO 48 HRS: CPT | Mod: S$GLB,,, | Performed by: INTERNAL MEDICINE

## 2017-05-25 ENCOUNTER — PATIENT MESSAGE (OUTPATIENT)
Dept: DIABETES | Facility: CLINIC | Age: 52
End: 2017-05-25

## 2017-05-28 ENCOUNTER — OFFICE VISIT (OUTPATIENT)
Dept: INTERNAL MEDICINE | Facility: CLINIC | Age: 52
End: 2017-05-28
Payer: COMMERCIAL

## 2017-05-28 ENCOUNTER — NURSE TRIAGE (OUTPATIENT)
Dept: ADMINISTRATIVE | Facility: CLINIC | Age: 52
End: 2017-05-28

## 2017-05-28 VITALS
DIASTOLIC BLOOD PRESSURE: 82 MMHG | SYSTOLIC BLOOD PRESSURE: 160 MMHG | RESPIRATION RATE: 16 BRPM | HEART RATE: 69 BPM | WEIGHT: 315 LBS | HEIGHT: 70 IN | BODY MASS INDEX: 45.1 KG/M2

## 2017-05-28 DIAGNOSIS — R10.9 ABDOMINAL PAIN, ACUTE: Primary | ICD-10-CM

## 2017-05-28 LAB
BILIRUB SERPL-MCNC: NORMAL MG/DL
BILIRUB UR QL STRIP: NEGATIVE
BLOOD URINE, POC: 50
CLARITY UR REFRACT.AUTO: CLEAR
COLOR UR AUTO: ABNORMAL
COLOR, POC UA: YELLOW
GLUCOSE UR QL STRIP: 50
GLUCOSE UR QL STRIP: ABNORMAL
HGB UR QL STRIP: ABNORMAL
KETONES UR QL STRIP: NEGATIVE
KETONES UR QL STRIP: NORMAL
LEUKOCYTE ESTERASE UR QL STRIP: NEGATIVE
LEUKOCYTE ESTERASE URINE, POC: NORMAL
MICROSCOPIC COMMENT: NORMAL
NITRITE UR QL STRIP: NEGATIVE
NITRITE, POC UA: NORMAL
PH UR STRIP: 5 [PH] (ref 5–8)
PH, POC UA: 5
PROT UR QL STRIP: NEGATIVE
PROTEIN, POC: NORMAL
RBC #/AREA URNS AUTO: 1 /HPF (ref 0–4)
SP GR UR STRIP: 1.01 (ref 1–1.03)
SPECIFIC GRAVITY, POC UA: 1.02
URN SPEC COLLECT METH UR: ABNORMAL
UROBILINOGEN UR STRIP-ACNC: NEGATIVE EU/DL
UROBILINOGEN, POC UA: NORMAL

## 2017-05-28 PROCEDURE — 81001 URINALYSIS AUTO W/SCOPE: CPT

## 2017-05-28 PROCEDURE — 81001 URINALYSIS AUTO W/SCOPE: CPT | Mod: S$GLB,,, | Performed by: INTERNAL MEDICINE

## 2017-05-28 PROCEDURE — 99999 PR PBB SHADOW E&M-EST. PATIENT-LVL IV: CPT | Mod: PBBFAC,,, | Performed by: INTERNAL MEDICINE

## 2017-05-28 PROCEDURE — 99214 OFFICE O/P EST MOD 30 MIN: CPT | Mod: 25,S$GLB,, | Performed by: INTERNAL MEDICINE

## 2017-05-28 NOTE — TELEPHONE ENCOUNTER
Reason for Disposition   [1] MILD-MODERATE pain AND [2] constant AND [3] present > 2 hours    Protocols used:  ABDOMINAL PAIN - UPPER-A-    Appointment scheduled with MD at [facility]  today.

## 2017-05-28 NOTE — PROGRESS NOTES
Subjective:       Patient ID: García Tang is a 51 y.o. male.    Chief Complaint: Abdominal Pain    HPI:  Urgent Care Visit.    Reports apx 2 week worsening RUQ ABD pain. Has progressed to include LUQ ABD pain.  Pain does not radiate.   Associated nausea, no vomiting. Nausea worse after meals.   Has noted darker urine; no change in color or consistency of stool.   No jaundice noted.   Denies fever, chills.     Has gall bladder.  Recent ABD US reviewed (3/17); has fatty liver disease. No stones noted at that time.     Review of Systems  General ROS: negative for chills, fever or weight loss  Psychological ROS: negative for hallucination, depression or suicidal ideation  Ophthalmic ROS: negative for blurry vision, photophobia or eye pain  ENT ROS: negative for epistaxis, sore throat or rhinorrhea  Respiratory ROS: no cough, shortness of breath, or wheezing  Cardiovascular ROS: no chest pain or dyspnea on exertion  Gastrointestinal ROS: no abdominal pain, change in bowel habits, or black/ bloody stools  + ABD pain RUQ, LUQ, + nausea  Genito-Urinary ROS: no dysuria, trouble voiding, or hematuria  + dark urine  Musculoskeletal ROS: negative for gait disturbance or muscular weakness  Neurological ROS: no syncope or seizures; no ataxia  Dermatological ROS: negative for pruritis, rash and jaundice        Objective:      Vitals:    05/28/17 1214   BP: (!) 160/82   Pulse: 69   Resp:      Physical Exam:  General appearance: alert, cooperative, no distress  Constitutional:Oriented to person, place, and time +obese   HEENT: Normocephalic, atraumatic, neck symmetrical, no nasal discharge   Eyes: conjunctivae/corneas clear, PERRL, EOM's intact  Lungs: clear to auscultation bilaterally, no dullness to percussion bilaterally  Heart: regular rate and rhythm without rub; no displacement of the PMI   Abdomen: soft, bowel sounds normoactive; no organomegaly  + tenderness to palpation RUQ, epigastric, LUQ; no rebound, no  guarding  No costovertebral angle tenderness   Extremities: extremities symmetric; no clubbing, cyanosis, or edema  Integument: Skin color, texture, turgor normal; no rashes; hair distrubution normal  Neurologic: Alert and oriented X 3, normal strength, normal coordination and gait  Psychiatric: no pressured speech; normal affect; no evidence of impaired cognition    Assessment:       1. Abdominal pain, acute        Plan:       García was seen today for abdominal pain.    Diagnoses and all orders for this visit:    Abdominal pain, acute  - RUQ, LUQ pain with associated nausea, worse after meals  - suspect gallstones, possibly gallstone pancreatitis   - will obtain labs, US and decide on further management upon review of these results  -     CBC auto differential; Future  -     Comprehensive metabolic panel; Future  -     Lipase; Future  -     POCT URINE DIPSTICK WITH MICROSCOPE, AUTOMATED  -     URINALYSIS  -     US Abdomen Limited; Future    I will call patient with results of above labs and US.

## 2017-05-29 ENCOUNTER — TELEPHONE (OUTPATIENT)
Dept: CARDIOLOGY | Facility: CLINIC | Age: 52
End: 2017-05-29

## 2017-05-29 ENCOUNTER — HOSPITAL ENCOUNTER (OUTPATIENT)
Dept: RADIOLOGY | Facility: HOSPITAL | Age: 52
Discharge: HOME | End: 2017-05-29
Attending: INTERNAL MEDICINE
Payer: COMMERCIAL

## 2017-05-29 DIAGNOSIS — R10.9 ABDOMINAL PAIN, ACUTE: ICD-10-CM

## 2017-05-29 PROCEDURE — 76705 ECHO EXAM OF ABDOMEN: CPT | Mod: 26,,, | Performed by: RADIOLOGY

## 2017-05-29 PROCEDURE — 76705 ECHO EXAM OF ABDOMEN: CPT | Mod: TC

## 2017-05-29 NOTE — TELEPHONE ENCOUNTER
Reviewed result of Holter monitor with patient, which shows sinus rhythm with normal range of rate, some infrequent PVC, and multiple symptomatic episodes correlating with sinus rhythm. Reassured of normal heart structure and function. Discussed beta-blocker use for his symptoms, which he has been on for years. Follow-up PRN.

## 2017-05-30 ENCOUNTER — DOCUMENTATION ONLY (OUTPATIENT)
Dept: PRIMARY CARE CLINIC | Facility: CLINIC | Age: 52
End: 2017-05-30

## 2017-05-30 ENCOUNTER — TELEPHONE (OUTPATIENT)
Dept: PRIMARY CARE CLINIC | Facility: CLINIC | Age: 52
End: 2017-05-30

## 2017-05-30 NOTE — TELEPHONE ENCOUNTER
Called patient to discuss results of labs and ABD US. Etiology of his acute abdominal pain is unclear at this time. Patient advised to have follow up with his PCP for further investigation if pain persists. Also discussed finding of painless hematuria on UA. Alerted patient that this needs to be followed up also. Patient voiced understanding.

## 2017-05-31 ENCOUNTER — OFFICE VISIT (OUTPATIENT)
Dept: INTERNAL MEDICINE | Facility: CLINIC | Age: 52
End: 2017-05-31
Payer: COMMERCIAL

## 2017-05-31 ENCOUNTER — LAB VISIT (OUTPATIENT)
Dept: LAB | Facility: HOSPITAL | Age: 52
End: 2017-05-31
Attending: INTERNAL MEDICINE
Payer: COMMERCIAL

## 2017-05-31 VITALS
TEMPERATURE: 98 F | SYSTOLIC BLOOD PRESSURE: 116 MMHG | HEART RATE: 54 BPM | WEIGHT: 315 LBS | BODY MASS INDEX: 45.1 KG/M2 | HEIGHT: 70 IN | RESPIRATION RATE: 16 BRPM | DIASTOLIC BLOOD PRESSURE: 80 MMHG

## 2017-05-31 DIAGNOSIS — R11.0 NAUSEA: ICD-10-CM

## 2017-05-31 DIAGNOSIS — R31.9 HEMATURIA: ICD-10-CM

## 2017-05-31 DIAGNOSIS — R10.84 GENERALIZED ABDOMINAL PAIN: ICD-10-CM

## 2017-05-31 DIAGNOSIS — R31.9 HEMATURIA: Primary | ICD-10-CM

## 2017-05-31 LAB
BILIRUB UR QL STRIP: NEGATIVE
CLARITY UR REFRACT.AUTO: CLEAR
COLOR UR AUTO: YELLOW
GLUCOSE UR QL STRIP: NEGATIVE
HGB UR QL STRIP: NEGATIVE
KETONES UR QL STRIP: NEGATIVE
LEUKOCYTE ESTERASE UR QL STRIP: NEGATIVE
NITRITE UR QL STRIP: NEGATIVE
PH UR STRIP: 6 [PH] (ref 5–8)
PROT UR QL STRIP: NEGATIVE
SP GR UR STRIP: 1.02 (ref 1–1.03)
URN SPEC COLLECT METH UR: NORMAL
UROBILINOGEN UR STRIP-ACNC: NEGATIVE EU/DL

## 2017-05-31 PROCEDURE — 99999 PR PBB SHADOW E&M-EST. PATIENT-LVL IV: CPT | Mod: PBBFAC,,, | Performed by: INTERNAL MEDICINE

## 2017-05-31 PROCEDURE — 81003 URINALYSIS AUTO W/O SCOPE: CPT

## 2017-05-31 PROCEDURE — 99213 OFFICE O/P EST LOW 20 MIN: CPT | Mod: S$GLB,,, | Performed by: INTERNAL MEDICINE

## 2017-05-31 NOTE — PROGRESS NOTES
Subjective:       Patient ID: García Tang is a 51 y.o. male.    Chief Complaint: Follow-up; Abdominal Pain (RUQ and LUQ); and Urinary Frequency    HPI     Patient is a 51 year old male here today for follow up visit.  Went to Urgent Care on the weekend for abdominal pain and nausea.  This occurs after he eats meals, located to bilateral upper quadrants as a sharp pain, feel nauseated all the time, says that this happens regardless of what or how much he eats. No weight loss. US abdomen shows fatty liver and mildly enlarged liver.  Also was told his urine had blood in it.     Review of Systems   Constitutional: Positive for activity change. Negative for unexpected weight change.   HENT: Positive for hearing loss. Negative for rhinorrhea and trouble swallowing.    Eyes: Positive for visual disturbance. Negative for discharge.   Respiratory: Negative for chest tightness and wheezing.    Cardiovascular: Negative for chest pain and palpitations.   Gastrointestinal: Negative for blood in stool, constipation, diarrhea and vomiting.   Endocrine: Positive for polyuria. Negative for polydipsia.   Genitourinary: Positive for hematuria and urgency. Negative for difficulty urinating.   Musculoskeletal: Positive for arthralgias and joint swelling. Negative for neck pain.   Neurological: Positive for weakness and headaches.   Psychiatric/Behavioral: Positive for dysphoric mood. Negative for confusion.       Objective:      Physical Exam   Constitutional: He is oriented to person, place, and time. He appears well-developed and well-nourished. No distress.   HENT:   Head: Normocephalic and atraumatic.   Neurological: He is alert and oriented to person, place, and time.   Skin: Skin is warm and dry. He is not diaphoretic.   Nursing note and vitals reviewed.      Assessment:       1. Hematuria    2. Generalized abdominal pain    3. Nausea        Plan:       Repeat UA now to evaluate hematuria; pending results will discuss next  step in treatment  Regarding abdominal pain and nausea, GI referral to work up

## 2017-06-01 ENCOUNTER — PATIENT MESSAGE (OUTPATIENT)
Dept: INTERNAL MEDICINE | Facility: CLINIC | Age: 52
End: 2017-06-01

## 2017-06-01 ENCOUNTER — TELEPHONE (OUTPATIENT)
Dept: INTERNAL MEDICINE | Facility: CLINIC | Age: 52
End: 2017-06-01

## 2017-06-01 NOTE — TELEPHONE ENCOUNTER
----- Message from Zaina Weinstein MD sent at 6/1/2017  8:06 AM CDT -----  Please let patient know that there was no blood in his urine sample  No additional work up needed at this time. Thanks

## 2017-06-05 ENCOUNTER — PATIENT MESSAGE (OUTPATIENT)
Dept: HEPATOLOGY | Facility: CLINIC | Age: 52
End: 2017-06-05

## 2017-06-06 ENCOUNTER — OFFICE VISIT (OUTPATIENT)
Dept: HEPATOLOGY | Facility: CLINIC | Age: 52
End: 2017-06-06
Payer: COMMERCIAL

## 2017-06-06 VITALS
RESPIRATION RATE: 18 BRPM | WEIGHT: 315 LBS | BODY MASS INDEX: 45.1 KG/M2 | DIASTOLIC BLOOD PRESSURE: 70 MMHG | TEMPERATURE: 96 F | HEART RATE: 67 BPM | OXYGEN SATURATION: 99 % | SYSTOLIC BLOOD PRESSURE: 119 MMHG | HEIGHT: 70 IN

## 2017-06-06 DIAGNOSIS — E78.5 HYPERLIPIDEMIA, UNSPECIFIED HYPERLIPIDEMIA TYPE: ICD-10-CM

## 2017-06-06 DIAGNOSIS — R10.10 UPPER ABDOMINAL PAIN: ICD-10-CM

## 2017-06-06 DIAGNOSIS — E66.01 MORBID OBESITY WITH BMI OF 50.0-59.9, ADULT: Chronic | ICD-10-CM

## 2017-06-06 DIAGNOSIS — R11.0 NAUSEA ALONE: ICD-10-CM

## 2017-06-06 DIAGNOSIS — K76.0 FATTY LIVER DISEASE, NONALCOHOLIC: Primary | ICD-10-CM

## 2017-06-06 DIAGNOSIS — E11.65 TYPE 2 DIABETES MELLITUS WITH HYPERGLYCEMIA, WITH LONG-TERM CURRENT USE OF INSULIN: Chronic | ICD-10-CM

## 2017-06-06 DIAGNOSIS — I10 ESSENTIAL HYPERTENSION: Chronic | ICD-10-CM

## 2017-06-06 DIAGNOSIS — R74.8 ELEVATED LIVER ENZYMES: ICD-10-CM

## 2017-06-06 DIAGNOSIS — Z79.4 TYPE 2 DIABETES MELLITUS WITH HYPERGLYCEMIA, WITH LONG-TERM CURRENT USE OF INSULIN: Chronic | ICD-10-CM

## 2017-06-06 PROCEDURE — 99214 OFFICE O/P EST MOD 30 MIN: CPT | Mod: S$GLB,,, | Performed by: NURSE PRACTITIONER

## 2017-06-06 PROCEDURE — 3045F PR MOST RECENT HEMOGLOBIN A1C LEVEL 7.0-9.0%: CPT | Mod: S$GLB,,, | Performed by: NURSE PRACTITIONER

## 2017-06-06 PROCEDURE — 99999 PR PBB SHADOW E&M-EST. PATIENT-LVL V: CPT | Mod: PBBFAC,,, | Performed by: NURSE PRACTITIONER

## 2017-06-06 NOTE — PROGRESS NOTES
[facility] Established Patient Visit    Reason for Visit:  F/u fatty liver    PCP: Zaina Weinstein    HPI:  This is a 51 y.o. male here for f/u of NAFLD. He has had elevated transaminases and had serological workup was negative for Augie's, alpha-1 antitrypsin deficiency, hemochromatosis, autoimmune etiology, and viral hepatitis. He has multiple risk factors for this with morbid obesity, HTN, HLD, and DM. We were only able to do a Fibrosure on him due to his BMI and weight of 350 lbs. Fibrosure = F0. He has normal synthetic liver functioning. Plts nl at 237. His spleen is at UNL on u/s. He is not immune to hepatitis A and B and has not started vaccines. He is unsure if he wants to get these due to his concern over potential side effects. He has not lost any weight since initial visit. Liver enzymes improved some on last labs. He denies any symptoms of advanced chronic liver disease including jaundice, dark urine, abdominal distention, hematemesis, melena, slowed mentation. He has been having RUQ and LUQ abd pain and nausea. He has not had any evaluation with GI yet for this. PCP has recommended he see GI but he has not made appt.          ROS:   GENERAL: Denies fever, chills, (+) weight gain, (+) fatigue  HEENT: Denies headaches, dizziness, vision/hearing changes  CARDIOVASCULAR: Denies chest pain, palpitations, (+) edema  RESPIRATORY: (+) dyspnea, no cough  GI: (+) abdominal pain, no rectal bleeding, (+)nausea, no vomiting. No change in bowel pattern or color  : Denies dysuria, hematuria   SKIN: Denies rash, itching   NEURO: Denies confusion, memory loss, or mood changes  PSYCH: Denies depression or anxiety  HEME/LYMPH: Denies easy bruising or bleeding      PMHX:  has a past medical history of Allergy; Arthritis; CKD stage 2 due to type 2 diabetes mellitus (5/18/2017); HLD (hyperlipidemia); Hypertension; Inflammatory bowel disease; Kidney stone; Morbid obesity with BMI of 50.0-59.9, adult  "(5/29/2014); NAFLD (nonalcoholic fatty liver disease); Prostatitis; Rectal bleeding; Renal disorder; Type 2 diabetes mellitus with hyperglycemia, with long-term current use of insulin (5/18/2017); Uncontrolled type 2 diabetes mellitus with peripheral neuropathy (5/29/2014); and Urinary tract infection.    PSHX:  has a past surgical history that includes Kidney surgery; Nose surgery; kidney stent; Kidney surgery (1996); and Colonoscopy.    The patient's social and family histories were reviewed by me and updated in the appropriate section of the electronic medical record.    Review of patient's allergies indicates:   Allergen Reactions    Bactrim  [sulfamethoxazole-trimethoprim]      Other reaction(s): Unknown    Hydroxyzine hcl      Other reaction(s): elevated bp    Kenalog [triamcinolone acetonide] Other (See Comments)     Heart flutters all day    Lisinopril Swelling    Phenergan [promethazine] Anxiety    Zofran [ondansetron hcl (pf)] Anxiety       Medications reviewed in Epic      Objective Findings:    PHYSICAL EXAM:   Friendly White male, in no acute distress; alert and oriented to person, place and time  VITALS: /70 (BP Location: Left arm, Patient Position: Sitting)   Pulse 67   Temp 96.2 °F (35.7 °C) (Oral)   Resp 18   Ht 5' 10" (1.778 m)   Wt (!) 157.8 kg (347 lb 14.2 oz)   SpO2 99%   BMI 49.92 kg/m²   HENT: Normocephalic, without obvious abnormality. Oral mucosa pink and moist. Dentition good.  EYES: Sclerae anicteric.   NECK: Supple.   CARDIOVASCULAR: Regular rate and rhythm. No murmurs.  RESPIRATORY: Normal respiratory effort. BBS CTA. No wheezes or crackles.  GI: Obese, soft, non-tender, non-distended. No palpable hepatosplenomegaly but exam limited by body habitus. No masses palpable. No ascites.   EXTREMITIES:  No clubbing, cyanosis, (+) BLE 1+ edema.  SKIN: Warm and dry. No jaundice. No rashes noted to exposed skin. No telangectasias noted. No palmar erythema.  NEURO:  Normal gate. " No asterixis.  PSYCH:  Memory intact. Thought and speech pattern appropriate. Behavior normal. No depression or anxiety noted.      Labs:  Lab Results   Component Value Date    WBC 6.59 05/28/2017    HGB 13.9 (L) 05/28/2017    HCT 40.4 05/28/2017     05/28/2017    CHOL 180 05/05/2017    TRIG 251 (H) 05/05/2017    HDL 28 (L) 05/05/2017     05/28/2017    K 4.2 05/28/2017    CREATININE 1.1 05/28/2017    ALT 36 05/28/2017    AST 31 05/28/2017    ALKPHOS 97 05/28/2017    BILITOT 0.5 05/28/2017    ALBUMIN 3.7 05/28/2017    INR 1.0 03/07/2017       ASSESSMENT:  51 y.o. White male with:  1.   NAFLD  -- transaminases mildly elevated  -- US shows hepatomegaly with steatosis  -- synthetic liver function nl, no recent INR  -- risk factors for fatty liver include morbid obesity, BMI 51, DM, HLD, HTN  -- Fibrosure = F0  -- not immune to hep A or B, pt does not want to start vaccines at this time  2. Abd pain, nausea  -- recommend referral to GI for evaluation. Discussed the nausea is not due to his fatty liver but his RUQ abd pain could possibly be from his hepatomegaly but still needs full evaluation with GI to ensure no GI pathology present since he also has LUQ abd pain and spleen is not significantly enlarged to cause this      EDUCATION:   We discussed the manifestations of NAFLD. At this time there is no FDA approved therapy for NAFLD.   The patient and I discussed the importance of diet, exercise, and weight loss for management of NAFLD. We discussed a low fat, low carb/low sugar, high fiber diet, and a goal of 30 minutes of exercise 5 times per week.   Pt is aware that fatty liver can progress to steatohepatitis and possibly to cirrhosis, putting one at increased risk for liver cancer, liver failure, and death.      DISCUSSION:  Discussed that his Fibrosure suggests no fibrosis but would like further liver staging done with MR elastography. However, his weight is UNL for this so he may not be able to get this  done. If he does need EGD, we could potentially have this done with one of our advanced therapeutic endoscopist and do EUS with liver biopsy at same time to stage his liver and determine if he has RIZVI vs simple steatosis.     PLAN:  1. Referral to GI  2. Pt will call MRI to inquire if he will fit in machine and if his head will be out since he is concerned about claustrophobia. Pt will msg me if he wants to proceed.  3. Proposed doing EGD with EUS with liver biopsy to evaluate his abd pain and nausea and to stage his fibrosis and evaluate for RIZVI. Pt will let me know if he wants to proceed with this so I can arrange  4. Discussed Twinrix vaccines again, pt defers for now  5. Recommended weight loss with diet and exercise for NAFLD. Pt declines bariatric referral at this time.  6. Recommend good control of cholesterol, blood pressure, blood sugar levels  7. F/u in 6 months    Thank you for allowing me to participate in the care of García Johnson, NP-C     Total duration of visit = 30 min, with > 50% spent counseling

## 2017-06-07 ENCOUNTER — PATIENT MESSAGE (OUTPATIENT)
Dept: HEPATOLOGY | Facility: CLINIC | Age: 52
End: 2017-06-07

## 2017-06-14 ENCOUNTER — LAB VISIT (OUTPATIENT)
Dept: LAB | Facility: HOSPITAL | Age: 52
End: 2017-06-14
Attending: INTERNAL MEDICINE
Payer: COMMERCIAL

## 2017-06-14 LAB
ALBUMIN SERPL BCP-MCNC: 3.7 G/DL
ALP SERPL-CCNC: 90 U/L
ALT SERPL W/O P-5'-P-CCNC: 32 U/L
ANION GAP SERPL CALC-SCNC: 10 MMOL/L
AST SERPL-CCNC: 28 U/L
BILIRUB SERPL-MCNC: 0.5 MG/DL
BUN SERPL-MCNC: 12 MG/DL
CALCIUM SERPL-MCNC: 9 MG/DL
CHLORIDE SERPL-SCNC: 103 MMOL/L
CO2 SERPL-SCNC: 25 MMOL/L
CREAT SERPL-MCNC: 1 MG/DL
EST. GFR  (AFRICAN AMERICAN): >60 ML/MIN/1.73 M^2
EST. GFR  (NON AFRICAN AMERICAN): >60 ML/MIN/1.73 M^2
GLUCOSE SERPL-MCNC: 151 MG/DL
POTASSIUM SERPL-SCNC: 4.4 MMOL/L
PROT SERPL-MCNC: 7.5 G/DL
SODIUM SERPL-SCNC: 138 MMOL/L

## 2017-06-14 PROCEDURE — 36415 COLL VENOUS BLD VENIPUNCTURE: CPT | Mod: PO

## 2017-06-14 PROCEDURE — 80053 COMPREHEN METABOLIC PANEL: CPT

## 2017-06-15 ENCOUNTER — PATIENT MESSAGE (OUTPATIENT)
Dept: DIABETES | Facility: CLINIC | Age: 52
End: 2017-06-15

## 2017-07-06 ENCOUNTER — OFFICE VISIT (OUTPATIENT)
Dept: INTERNAL MEDICINE | Facility: CLINIC | Age: 52
End: 2017-07-06
Payer: COMMERCIAL

## 2017-07-06 VITALS
DIASTOLIC BLOOD PRESSURE: 77 MMHG | HEIGHT: 70 IN | SYSTOLIC BLOOD PRESSURE: 122 MMHG | HEART RATE: 61 BPM | BODY MASS INDEX: 45.1 KG/M2 | TEMPERATURE: 98 F | WEIGHT: 315 LBS

## 2017-07-06 DIAGNOSIS — B37.2 CANDIDAL SKIN INFECTION: Primary | ICD-10-CM

## 2017-07-06 DIAGNOSIS — R23.3 PETECHIAE: ICD-10-CM

## 2017-07-06 PROCEDURE — 99213 OFFICE O/P EST LOW 20 MIN: CPT | Mod: S$GLB,,, | Performed by: INTERNAL MEDICINE

## 2017-07-06 PROCEDURE — 99999 PR PBB SHADOW E&M-EST. PATIENT-LVL III: CPT | Mod: PBBFAC,,, | Performed by: INTERNAL MEDICINE

## 2017-07-06 RX ORDER — CLOTRIMAZOLE 1 %
CREAM (GRAM) TOPICAL 2 TIMES DAILY
Qty: 30 G | Refills: 0 | Status: SHIPPED | OUTPATIENT
Start: 2017-07-06 | End: 2018-06-07

## 2017-07-06 NOTE — PROGRESS NOTES
Clinic Note  7/6/2017      Subjective:       Patient ID:  Brad is a 51 y.o. male being seen for an urgent care visit.    Chief Complaint: Oral Swelling (bumps and bleeding); Rash; and Bleeding/Bruising    Also had black tongue a week ago. Better now but with some soreness on back of tongue      Rash   This is a new problem. The current episode started in the past 7 days. The problem is unchanged. The affected locations include the left buttock. The rash is characterized by itchiness and pain. He was exposed to nothing. Past treatments include nothing.       Review of Systems   Skin: Positive for rash.       Medication List with Changes/Refills   New Medications    CLOTRIMAZOLE (LOTRIMIN) 1 % CREAM    Apply topically 2 (two) times daily. Apply bid to affected areas x 1 wk   Current Medications    AMLODIPINE (NORVASC) 5 MG TABLET    Take 1 tablet (5 mg total) by mouth once daily.    ASPIRIN (ECOTRIN) 81 MG EC TABLET    Take 81 mg by mouth once daily.    DOCOSAHEXANOIC ACID/EPA (FISH OIL ORAL)    Take 1 capsule by mouth once daily at 6am.     DULAGLUTIDE (TRULICITY) 0.75 MG/0.5 ML PNIJ    Inject 0.5 mLs (0.75 mg total) into the skin every 7 days.    ERGOCALCIFEROL, VITAMIN D2, (VITAMIN D ORAL)    Take 1 capsule by mouth once daily at 6am.     HYDROCHLOROTHIAZIDE (MICROZIDE) 12.5 MG CAPSULE    12.5 mg 2 (two) times daily.     INSULIN NPH-INSULIN REGULAR, 70/30, (NOVOLIN 70/30) 100 UNIT/ML (70-30) INJECTION    Inject into the skin 2 (two) times daily. 27 in am and 24 in pm    METFORMIN (GLUCOPHAGE-XR) 500 MG 24 HR TABLET    Take 1 tablet (500 mg total) by mouth 2 (two) times daily with meals.    METOPROLOL SUCCINATE (TOPROL XL) 50 MG 24 HR TABLET    Take 1 tablet (50 mg total) by mouth 2 (two) times daily.    MULTIVITAMIN CAPSULE    Take by mouth. 1 Capsule Oral Every day       Patient Active Problem List   Diagnosis    Morbid obesity with BMI of 50.0-59.9, adult    Physical deconditioning    Insomnia    Uncontrolled  "type 2 diabetes mellitus with peripheral neuropathy    Left leg pain    Nausea    Generalized weakness    Weakness    Obesity hypoventilation syndrome    Fatty liver disease, nonalcoholic    Essential hypertension    Wheezing    CKD stage 2 due to type 2 diabetes mellitus    Type 2 diabetes mellitus with hyperglycemia, with long-term current use of insulin    Dizziness    Near syncope    Palpitations           Objective:      /77   Pulse 61   Temp 98 °F (36.7 °C)   Ht 5' 10" (1.778 m)   Wt (!) 159 kg (350 lb 8.5 oz)   BMI 50.30 kg/m²   Estimated body mass index is 50.3 kg/m² as calculated from the following:    Height as of this encounter: 5' 10" (1.778 m).    Weight as of this encounter: 159 kg (350 lb 8.5 oz).  Physical Exam   Constitutional: He is well-developed, well-nourished, and in no distress.   HENT:   Mouth/Throat: No oropharyngeal exudate, posterior oropharyngeal edema, posterior oropharyngeal erythema or tonsillar abscesses.       Skin:              Assessment and Plan:         Problem List Items Addressed This Visit     None      Visit Diagnoses     Candidal skin infection    -  Primary - given petechiae will check cbc. On asa and has been scratching that area.      Relevant Medications    clotrimazole (LOTRIMIN) 1 % cream    Petechiae        Relevant Orders    CBC auto differential    Toungue - suspect may have had little thrush but resolved now.  No signs of strep.  Use mouth washes x 1 wk and if returns let us know.          Follow Up:   Return if symptoms worsen or fail to improve.        Anthony Rockwell      "

## 2017-08-22 ENCOUNTER — OFFICE VISIT (OUTPATIENT)
Dept: INTERNAL MEDICINE | Facility: CLINIC | Age: 52
End: 2017-08-22
Payer: COMMERCIAL

## 2017-08-22 ENCOUNTER — LAB VISIT (OUTPATIENT)
Dept: LAB | Facility: HOSPITAL | Age: 52
End: 2017-08-22
Payer: COMMERCIAL

## 2017-08-22 VITALS
OXYGEN SATURATION: 98 % | BODY MASS INDEX: 45.1 KG/M2 | WEIGHT: 315 LBS | HEIGHT: 70 IN | HEART RATE: 64 BPM | DIASTOLIC BLOOD PRESSURE: 72 MMHG | SYSTOLIC BLOOD PRESSURE: 108 MMHG

## 2017-08-22 DIAGNOSIS — R39.11 URINARY HESITANCY: ICD-10-CM

## 2017-08-22 DIAGNOSIS — K14.3 BROWN HAIRY TONGUE: ICD-10-CM

## 2017-08-22 DIAGNOSIS — B37.0 ORAL CANDIDA: ICD-10-CM

## 2017-08-22 DIAGNOSIS — J02.9 PHARYNGITIS, UNSPECIFIED ETIOLOGY: Primary | ICD-10-CM

## 2017-08-22 DIAGNOSIS — R30.0 DYSURIA: ICD-10-CM

## 2017-08-22 DIAGNOSIS — J02.9 PHARYNGITIS, UNSPECIFIED ETIOLOGY: ICD-10-CM

## 2017-08-22 LAB
BASOPHILS # BLD AUTO: 0.06 K/UL
BASOPHILS NFR BLD: 0.9 %
BILIRUB UR QL STRIP: NEGATIVE
CLARITY UR REFRACT.AUTO: CLEAR
COLOR UR AUTO: NORMAL
COMPLEXED PSA SERPL-MCNC: 0.47 NG/ML
DIFFERENTIAL METHOD: NORMAL
EOSINOPHIL # BLD AUTO: 0.3 K/UL
EOSINOPHIL NFR BLD: 4 %
ERYTHROCYTE [DISTWIDTH] IN BLOOD BY AUTOMATED COUNT: 12.6 %
GLUCOSE UR QL STRIP: NEGATIVE
HCT VFR BLD AUTO: 42.6 %
HGB BLD-MCNC: 14.4 G/DL
HGB UR QL STRIP: NEGATIVE
KETONES UR QL STRIP: NEGATIVE
LEUKOCYTE ESTERASE UR QL STRIP: NEGATIVE
LYMPHOCYTES # BLD AUTO: 2.4 K/UL
LYMPHOCYTES NFR BLD: 35.9 %
MCH RBC QN AUTO: 28.4 PG
MCHC RBC AUTO-ENTMCNC: 33.8 G/DL
MCV RBC AUTO: 84 FL
MONOCYTES # BLD AUTO: 0.7 K/UL
MONOCYTES NFR BLD: 10.8 %
NEUTROPHILS # BLD AUTO: 3.2 K/UL
NEUTROPHILS NFR BLD: 48.2 %
NITRITE UR QL STRIP: NEGATIVE
PH UR STRIP: 7 [PH] (ref 5–8)
PLATELET # BLD AUTO: 239 K/UL
PMV BLD AUTO: 9.7 FL
PROT UR QL STRIP: NEGATIVE
RBC # BLD AUTO: 5.07 M/UL
SP GR UR STRIP: 1 (ref 1–1.03)
URN SPEC COLLECT METH UR: NORMAL
UROBILINOGEN UR STRIP-ACNC: NEGATIVE EU/DL
WBC # BLD AUTO: 6.57 K/UL

## 2017-08-22 PROCEDURE — 87077 CULTURE AEROBIC IDENTIFY: CPT

## 2017-08-22 PROCEDURE — 99999 PR PBB SHADOW E&M-EST. PATIENT-LVL IV: CPT | Mod: PBBFAC,,, | Performed by: NURSE PRACTITIONER

## 2017-08-22 PROCEDURE — 87088 URINE BACTERIA CULTURE: CPT

## 2017-08-22 PROCEDURE — 99214 OFFICE O/P EST MOD 30 MIN: CPT | Mod: S$GLB,,, | Performed by: NURSE PRACTITIONER

## 2017-08-22 PROCEDURE — 3078F DIAST BP <80 MM HG: CPT | Mod: S$GLB,,, | Performed by: NURSE PRACTITIONER

## 2017-08-22 PROCEDURE — 84153 ASSAY OF PSA TOTAL: CPT

## 2017-08-22 PROCEDURE — 87186 SC STD MICRODIL/AGAR DIL: CPT

## 2017-08-22 PROCEDURE — 3074F SYST BP LT 130 MM HG: CPT | Mod: S$GLB,,, | Performed by: NURSE PRACTITIONER

## 2017-08-22 PROCEDURE — 36415 COLL VENOUS BLD VENIPUNCTURE: CPT

## 2017-08-22 PROCEDURE — 85025 COMPLETE CBC W/AUTO DIFF WBC: CPT

## 2017-08-22 PROCEDURE — 87086 URINE CULTURE/COLONY COUNT: CPT

## 2017-08-22 PROCEDURE — 3008F BODY MASS INDEX DOCD: CPT | Mod: S$GLB,,, | Performed by: NURSE PRACTITIONER

## 2017-08-22 PROCEDURE — 81003 URINALYSIS AUTO W/O SCOPE: CPT

## 2017-08-22 RX ORDER — NYSTATIN 100000 [USP'U]/ML
4 SUSPENSION ORAL 4 TIMES DAILY
Qty: 160 ML | Refills: 0 | Status: SHIPPED | OUTPATIENT
Start: 2017-08-22 | End: 2017-09-01

## 2017-08-22 NOTE — PATIENT INSTRUCTIONS
Brush tongue three times a day, rinse with Listerine. Start nystatin swish and swallow three times per day.

## 2017-08-22 NOTE — PROGRESS NOTES
"INTERNAL MEDICINE URGENT CARE NOTE    CHIEF COMPLAINT     Chief Complaint   Patient presents with    Neck Pain     x2 weeks     Sore Throat    Nocturia     x4 days, sometimes "slow stream"       HPI     García Tang is a 51 y.o. male with ckd, ibs, htn, hld, obesity, DM, and NAFLD who presents for an urgent visit today with c/o sore throat x 2 weeks and nocturia x 4 days.     Nocturia/slow stream- A1c 7.9 5/5/2017. PSA 4/19/2017 0.6 WNL. Reports 4 day h/o urinary frequency, weak stream, and urinary pressure. +Discomfort after urination. +urinary hesitancy and post-voiding residuals.     Sore throat- x 2 weeks. Started as sore throat 2 weeks ago, progressed to at cough and congestion last week with fever. Reports fever, cough have resolved but still with sore throat and today woke up with neck pain anterior near throat.     Past Medical History:  Past Medical History:   Diagnosis Date    Allergy     Arthritis     CKD stage 2 due to type 2 diabetes mellitus 5/18/2017    HLD (hyperlipidemia)     Hypertension     Inflammatory bowel disease     diverticulitis    Kidney stone     Morbid obesity with BMI of 50.0-59.9, adult 5/29/2014    NAFLD (nonalcoholic fatty liver disease)     Prostatitis     Rectal bleeding     diverticulitis    Renal disorder     Type 2 diabetes mellitus with hyperglycemia, with long-term current use of insulin 5/18/2017    Uncontrolled type 2 diabetes mellitus with peripheral neuropathy 5/29/2014    Urinary tract infection        Home Medications:  Prior to Admission medications    Medication Sig Start Date End Date Taking? Authorizing Provider   amlodipine (NORVASC) 5 MG tablet Take 1 tablet (5 mg total) by mouth once daily. 2/8/17 5/18/19 Yes Zaina Weinstein MD   aspirin (ECOTRIN) 81 MG EC tablet Take 81 mg by mouth once daily.   Yes Historical Provider, MD   DOCOSAHEXANOIC ACID/EPA (FISH OIL ORAL) Take 1 capsule by mouth once daily at 6am.    Yes Historical Provider, MD "   ERGOCALCIFEROL, VITAMIN D2, (VITAMIN D ORAL) Take 1 capsule by mouth once daily at 6am.    Yes Historical Provider, MD   hydrochlorothiazide (MICROZIDE) 12.5 mg capsule 12.5 mg 2 (two) times daily.  4/18/17  Yes Historical Provider, MD   insulin NPH-insulin regular, 70/30, (NOVOLIN 70/30) 100 unit/mL (70-30) injection Inject into the skin 2 (two) times daily. 27 in am and 24 in pm   Yes Historical Provider, MD   metformin (GLUCOPHAGE-XR) 500 MG 24 hr tablet Take 1 tablet (500 mg total) by mouth 2 (two) times daily with meals. 5/18/17  Yes Krysta Way NP   metoprolol succinate (TOPROL XL) 50 MG 24 hr tablet Take 1 tablet (50 mg total) by mouth 2 (two) times daily. 2/23/17  Yes Zaina Weinstein MD   multivitamin capsule Take by mouth. 1 Capsule Oral Every day   Yes Historical Provider, MD   clotrimazole (LOTRIMIN) 1 % cream Apply topically 2 (two) times daily. Apply bid to affected areas x 1 wk 7/6/17   Anthony Rockwell MD   dulaglutide (TRULICITY) 0.75 mg/0.5 mL PnIj Inject 0.5 mLs (0.75 mg total) into the skin every 7 days. 5/18/17   Krysta Way NP       Review of Systems:  Review of Systems   Constitutional: Negative for chills, fatigue and fever.   HENT: Positive for ear pain (left), sore throat and tinnitus. Negative for congestion, ear discharge, postnasal drip, rhinorrhea and sinus pressure.    Respiratory: Negative for cough, shortness of breath and wheezing.    Cardiovascular: Negative for chest pain and palpitations.   Genitourinary: Positive for decreased urine volume, difficulty urinating, dysuria, frequency and urgency.   Musculoskeletal: Positive for neck pain.   Skin: Negative for rash.   Neurological: Negative for dizziness, light-headedness and headaches.       Health Maintainence:   Immunizations:  Health Maintenance       Date Due Completion Date    Pneumococcal PPSV23 (Medium Risk) (1) 10/11/1984 ---    Colonoscopy 10/11/2016 ---    Influenza Vaccine 08/01/2017 10/12/2016 (Declined)     "Override on 10/12/2016: Declined    Hemoglobin A1c 11/05/2017 5/5/2017    Urine Microalbumin 02/23/2018 2/23/2017    Eye Exam 03/28/2018 3/28/2017 (Done)    Override on 3/28/2017: Done (2 weeks ago - outside of [facility] - new glasses)    Override on 2/15/2016: Done (outside eye doc)    Lipid Panel 05/05/2018 5/5/2017    Foot Exam 05/18/2018 5/18/2017    TETANUS VACCINE 05/15/2019 5/15/2009           PHYSICAL EXAM     /72 (BP Location: Right arm, Patient Position: Sitting, BP Method: Large (Manual))   Pulse 64   Ht 5' 10" (1.778 m)   Wt (!) 156.4 kg (344 lb 12.8 oz)   SpO2 98%   BMI 49.47 kg/m²     Physical Exam   Constitutional: He appears well-developed and well-nourished.   HENT:   Head: Normocephalic and atraumatic.   Right Ear: External ear normal.   Left Ear: External ear normal.   Eyes: Pupils are equal, round, and reactive to light.   Cardiovascular: Normal rate, regular rhythm, normal heart sounds and intact distal pulses.  Exam reveals no gallop and no friction rub.    No murmur heard.  Pulmonary/Chest: Effort normal and breath sounds normal. No respiratory distress. He has no wheezes. He has no rales. He exhibits no tenderness.   Lymphadenopathy:     He has cervical adenopathy (anterior ).   Vitals reviewed.      LABS     Lab Results   Component Value Date    HGBA1C 7.9 (H) 05/05/2017     CMP  Sodium   Date Value Ref Range Status   06/14/2017 138 136 - 145 mmol/L Final     Potassium   Date Value Ref Range Status   06/14/2017 4.4 3.5 - 5.1 mmol/L Final     Chloride   Date Value Ref Range Status   06/14/2017 103 95 - 110 mmol/L Final     CO2   Date Value Ref Range Status   06/14/2017 25 23 - 29 mmol/L Final     Glucose   Date Value Ref Range Status   06/14/2017 151 (H) 70 - 110 mg/dL Final     BUN, Bld   Date Value Ref Range Status   06/14/2017 12 6 - 20 mg/dL Final     Creatinine   Date Value Ref Range Status   06/14/2017 1.0 0.5 - 1.4 mg/dL Final     Calcium   Date Value Ref Range Status "   06/14/2017 9.0 8.7 - 10.5 mg/dL Final     Total Protein   Date Value Ref Range Status   06/14/2017 7.5 6.0 - 8.4 g/dL Final     Albumin   Date Value Ref Range Status   06/14/2017 3.7 3.5 - 5.2 g/dL Final     Total Bilirubin   Date Value Ref Range Status   06/14/2017 0.5 0.1 - 1.0 mg/dL Final     Comment:     For infants and newborns, interpretation of results should be based  on gestational age, weight and in agreement with clinical  observations.  Premature Infant recommended reference ranges:  Up to 24 hours.............<8.0 mg/dL  Up to 48 hours............<12.0 mg/dL  3-5 days..................<15.0 mg/dL  6-29 days.................<15.0 mg/dL       Alkaline Phosphatase   Date Value Ref Range Status   06/14/2017 90 55 - 135 U/L Final     AST   Date Value Ref Range Status   06/14/2017 28 10 - 40 U/L Final     ALT   Date Value Ref Range Status   06/14/2017 32 10 - 44 U/L Final     Anion Gap   Date Value Ref Range Status   06/14/2017 10 8 - 16 mmol/L Final     eGFR if    Date Value Ref Range Status   06/14/2017 >60.0 >60 mL/min/1.73 m^2 Final     eGFR if non    Date Value Ref Range Status   06/14/2017 >60.0 >60 mL/min/1.73 m^2 Final     Comment:     Calculation used to obtain the estimated glomerular filtration  rate (eGFR) is the CKD-EPI equation. Since race is unknown   in our information system, the eGFR values for   -American and Non--American patients are given   for each creatinine result.       Lab Results   Component Value Date    WBC 5.39 07/06/2017    HGB 14.0 07/06/2017    HCT 40.9 07/06/2017    MCV 85 07/06/2017     07/06/2017     Lab Results   Component Value Date    CHOL 180 05/05/2017    CHOL 182 02/23/2017    CHOL 157 05/23/2016     Lab Results   Component Value Date    HDL 28 (L) 05/05/2017    HDL 26 (L) 02/23/2017    HDL 29 (L) 05/23/2016     Lab Results   Component Value Date    LDLCALC 101.8 05/05/2017    LDLCALC 90.8 02/23/2017    LDLCALC  103.6 05/23/2016     Lab Results   Component Value Date    TRIG 251 (H) 05/05/2017    TRIG 326 (H) 02/23/2017    TRIG 122 05/23/2016     Lab Results   Component Value Date    CHOLHDL 15.6 (L) 05/05/2017    CHOLHDL 14.3 (L) 02/23/2017    CHOLHDL 18.5 (L) 05/23/2016     Lab Results   Component Value Date    TSH 1.700 04/04/2016       ASSESSMENT/PLAN     García Tang is a 51 y.o. male with  Past Medical History:   Diagnosis Date    Allergy     Arthritis     CKD stage 2 due to type 2 diabetes mellitus 5/18/2017    HLD (hyperlipidemia)     Hypertension     Inflammatory bowel disease     diverticulitis    Kidney stone     Morbid obesity with BMI of 50.0-59.9, adult 5/29/2014    NAFLD (nonalcoholic fatty liver disease)     Prostatitis     Rectal bleeding     diverticulitis    Renal disorder     Type 2 diabetes mellitus with hyperglycemia, with long-term current use of insulin 5/18/2017    Uncontrolled type 2 diabetes mellitus with peripheral neuropathy 5/29/2014    Urinary tract infection      Pharyngitis, unspecified etiology  Brown hairy tongue  Oral candida- likely cause of pharyngitis. Will start nystatin swish and swallow four times daily. Brush tongue TID. Needs better CBG control.   -     CBC auto differential; Future; Expected date: 08/22/2017  -     nystatin (MYCOSTATIN) 100,000 unit/mL suspension; Take 4 mLs (400,000 Units total) by mouth 4 (four) times daily.  Dispense: 160 mL; Refill: 0    Urinary hesitancy- luo end for psa and urine cultures. ?BPH vs prostatitis.   -     PSA, Screening; Future; Expected date: 08/22/2017  -     Urine culture  -     Urinalysis    Dysuria  -     CBC auto differential; Future; Expected date: 08/22/2017  -     PSA, Screening; Future; Expected date: 08/22/2017  -     Urine culture  -     Urinalysis              Follow up as needed     Patient education provided from [place]. Patient was counseled on when and how to seek emergent care.       Freya Anne  MN, KINDRA, FNP-c   Department of Internal Medicine - [facility]  11:35 AM

## 2017-08-24 ENCOUNTER — PATIENT MESSAGE (OUTPATIENT)
Dept: INTERNAL MEDICINE | Facility: CLINIC | Age: 52
End: 2017-08-24

## 2017-08-24 DIAGNOSIS — N41.9 PROSTATITIS, UNSPECIFIED PROSTATITIS TYPE: Primary | ICD-10-CM

## 2017-08-24 RX ORDER — DOXYCYCLINE HYCLATE 100 MG
100 TABLET ORAL EVERY 12 HOURS
Qty: 20 TABLET | Refills: 0 | Status: SHIPPED | OUTPATIENT
Start: 2017-08-24 | End: 2017-09-03

## 2017-08-25 LAB — BACTERIA UR CULT: NORMAL

## 2017-08-27 ENCOUNTER — NURSE TRIAGE (OUTPATIENT)
Dept: ADMINISTRATIVE | Facility: CLINIC | Age: 52
End: 2017-08-27

## 2017-08-27 NOTE — TELEPHONE ENCOUNTER
Reason for Disposition   [1] Taking antibiotic > 72 hours (3 days) for UTI AND [2] painful urination or frequency not improved    Protocols used: ST URINARY TRACT INFECTION ON ANTIBIOTIC FOLLOW-UP CALL - MALE-A-MAUREEN    Pt calling in regards to still feeling some symptoms of the UTI.  Pt states he has some burning sensation after urinating.  Pt also states he is having abdominal spasm and abdominal symptoms.  Care advice given.  Will message Provider.

## 2017-08-28 ENCOUNTER — PATIENT MESSAGE (OUTPATIENT)
Dept: INTERNAL MEDICINE | Facility: CLINIC | Age: 52
End: 2017-08-28

## 2017-08-28 DIAGNOSIS — N41.0 ACUTE PROSTATITIS: Primary | ICD-10-CM

## 2017-08-28 RX ORDER — CIPROFLOXACIN 500 MG/1
500 TABLET ORAL 2 TIMES DAILY
Qty: 28 TABLET | Refills: 0 | Status: SHIPPED | OUTPATIENT
Start: 2017-08-28 | End: 2017-09-19 | Stop reason: SDUPTHER

## 2017-08-28 NOTE — TELEPHONE ENCOUNTER
I sent him a message    I gave him an additional 2 weeks of antibiotics, and also have recommended a urology consultation, please assist with appointment.  Thank you

## 2017-08-30 ENCOUNTER — PATIENT MESSAGE (OUTPATIENT)
Dept: INTERNAL MEDICINE | Facility: CLINIC | Age: 52
End: 2017-08-30

## 2017-09-06 ENCOUNTER — OFFICE VISIT (OUTPATIENT)
Dept: INTERNAL MEDICINE | Facility: CLINIC | Age: 52
End: 2017-09-06
Payer: COMMERCIAL

## 2017-09-06 VITALS
WEIGHT: 315 LBS | SYSTOLIC BLOOD PRESSURE: 120 MMHG | HEART RATE: 65 BPM | BODY MASS INDEX: 45.1 KG/M2 | DIASTOLIC BLOOD PRESSURE: 88 MMHG | HEIGHT: 70 IN

## 2017-09-06 DIAGNOSIS — Z79.4 TYPE 2 DIABETES MELLITUS WITH HYPERGLYCEMIA, WITH LONG-TERM CURRENT USE OF INSULIN: Chronic | ICD-10-CM

## 2017-09-06 DIAGNOSIS — E11.65 TYPE 2 DIABETES MELLITUS WITH HYPERGLYCEMIA, WITH LONG-TERM CURRENT USE OF INSULIN: Chronic | ICD-10-CM

## 2017-09-06 DIAGNOSIS — H91.92 HEARING LOSS OF LEFT EAR, UNSPECIFIED HEARING LOSS TYPE: Primary | ICD-10-CM

## 2017-09-06 DIAGNOSIS — H92.03 EAR PAIN, BILATERAL: ICD-10-CM

## 2017-09-06 PROCEDURE — 99999 PR PBB SHADOW E&M-EST. PATIENT-LVL V: CPT | Mod: PBBFAC,,, | Performed by: INTERNAL MEDICINE

## 2017-09-06 PROCEDURE — 3074F SYST BP LT 130 MM HG: CPT | Mod: S$GLB,,, | Performed by: INTERNAL MEDICINE

## 2017-09-06 PROCEDURE — 3079F DIAST BP 80-89 MM HG: CPT | Mod: S$GLB,,, | Performed by: INTERNAL MEDICINE

## 2017-09-06 PROCEDURE — 99213 OFFICE O/P EST LOW 20 MIN: CPT | Mod: S$GLB,,, | Performed by: INTERNAL MEDICINE

## 2017-09-06 PROCEDURE — 3008F BODY MASS INDEX DOCD: CPT | Mod: S$GLB,,, | Performed by: INTERNAL MEDICINE

## 2017-09-06 PROCEDURE — 3045F PR MOST RECENT HEMOGLOBIN A1C LEVEL 7.0-9.0%: CPT | Mod: S$GLB,,, | Performed by: INTERNAL MEDICINE

## 2017-09-06 NOTE — PROGRESS NOTES
Subjective:       Patient ID: García Tang is a 51 y.o. male.    Chief Complaint: Otalgia    History of present illness: Overweight male with history of diabetes presently being treated for UTI complains of mild ear discomfort and left ear hearing loss yesterday.  He said he thought he had fluid in his ear or needed to pop his ear then back.  He has decreased compared to the right side but wanted to see if it was wax or something else.  He has not taken any antihistamines or medicine but is on Cipro for a urinary infection.  He says his diabetes has not been particularly well controlled.  He does have a history of neuropathy  He saw ENT last year for some changes in hearing and some dizziness.  He says this is different.      Otalgia    Associated symptoms include hearing loss. Pertinent negatives include no abdominal pain, coughing, diarrhea, headaches, neck pain, rash or vomiting.     Review of Systems   Constitutional: Negative for chills, fatigue, fever and unexpected weight change.   HENT: Positive for congestion, ear pain and hearing loss. Negative for trouble swallowing.    Eyes: Negative for visual disturbance.   Respiratory: Negative for cough, shortness of breath and wheezing.    Cardiovascular: Negative for chest pain and palpitations.   Gastrointestinal: Negative for abdominal pain, constipation, diarrhea, nausea and vomiting.   Genitourinary: Positive for frequency. Negative for difficulty urinating.   Musculoskeletal: Negative for neck pain.   Skin: Negative for rash.   Neurological: Negative for dizziness and headaches.       Objective:      Physical Exam   Constitutional: He is oriented to person, place, and time. He appears well-developed and well-nourished. No distress.   Significantly overweight male in no acute distress   HENT:   Head: Normocephalic and atraumatic.   Nose: Nose normal.   Mouth/Throat: Oropharynx is clear and moist. No oropharyngeal exudate.   TM's clear, pharynx clear.  No  wax in either ear no obvious erythema bulging or drainage.  He was able to hear a tuning fork with both the ears but says it was louder with the right.  He said was notable in both the ears but louder on the right    Eyes: Conjunctivae and EOM are normal. Pupils are equal, round, and reactive to light. No scleral icterus.   Neck: Normal range of motion. Neck supple. No thyromegaly present.   No supraclavicular nodes palpated   Cardiovascular: Normal rate, regular rhythm and normal heart sounds.    No murmur heard.  Pulmonary/Chest: Effort normal and breath sounds normal. He has no wheezes.   Musculoskeletal: He exhibits no edema.   Lymphadenopathy:     He has no cervical adenopathy.   Neurological: He is alert and oriented to person, place, and time.   Skin: No rash noted. No pallor.   Psychiatric: He has a normal mood and affect.       Assessment:       1. Hearing loss of left ear, unspecified hearing loss type    2. Ear pain, bilateral    3. Type 2 diabetes mellitus with hyperglycemia, with long-term current use of insulin        Plan:       García was seen today for otalgia.    Diagnoses and all orders for this visit:    Hearing loss of left ear, unspecified hearing loss type  -     Ambulatory referral to ENT    Ear pain, bilateral  -     Ambulatory referral to ENT    Type 2 diabetes mellitus with hyperglycemia, with long-term current use of insulin        Hearing loss has been intermittent and has improved since yesterday.  This may represent fluid and I would like to see him try an antihistamine decongestant for a day or 2 but we will work on an urgent ENT evaluation.  Last year and I showed him that both the ears had mild hearing loss

## 2017-09-19 ENCOUNTER — OFFICE VISIT (OUTPATIENT)
Dept: INTERNAL MEDICINE | Facility: CLINIC | Age: 52
End: 2017-09-19
Payer: COMMERCIAL

## 2017-09-19 ENCOUNTER — HOSPITAL ENCOUNTER (OUTPATIENT)
Dept: RADIOLOGY | Facility: HOSPITAL | Age: 52
Discharge: HOME | End: 2017-09-19
Attending: INTERNAL MEDICINE
Payer: COMMERCIAL

## 2017-09-19 VITALS
OXYGEN SATURATION: 97 % | WEIGHT: 315 LBS | SYSTOLIC BLOOD PRESSURE: 148 MMHG | BODY MASS INDEX: 45.1 KG/M2 | HEIGHT: 70 IN | DIASTOLIC BLOOD PRESSURE: 98 MMHG | HEART RATE: 60 BPM | TEMPERATURE: 98 F

## 2017-09-19 DIAGNOSIS — N20.0 KIDNEY STONE: ICD-10-CM

## 2017-09-19 DIAGNOSIS — N41.0 ACUTE PROSTATITIS: Primary | ICD-10-CM

## 2017-09-19 DIAGNOSIS — R10.13 EPIGASTRIC PAIN: ICD-10-CM

## 2017-09-19 PROBLEM — R55 NEAR SYNCOPE: Status: RESOLVED | Noted: 2017-05-23 | Resolved: 2017-09-19

## 2017-09-19 PROBLEM — R42 DIZZINESS: Status: RESOLVED | Noted: 2017-05-18 | Resolved: 2017-09-19

## 2017-09-19 PROBLEM — R00.2 PALPITATIONS: Chronic | Status: RESOLVED | Noted: 2017-05-23 | Resolved: 2017-09-19

## 2017-09-19 PROCEDURE — 3077F SYST BP >= 140 MM HG: CPT | Mod: S$GLB,,, | Performed by: INTERNAL MEDICINE

## 2017-09-19 PROCEDURE — 76770 US EXAM ABDO BACK WALL COMP: CPT | Mod: TC

## 2017-09-19 PROCEDURE — 76770 US EXAM ABDO BACK WALL COMP: CPT | Mod: 26,,, | Performed by: RADIOLOGY

## 2017-09-19 PROCEDURE — 3080F DIAST BP >= 90 MM HG: CPT | Mod: S$GLB,,, | Performed by: INTERNAL MEDICINE

## 2017-09-19 PROCEDURE — 3008F BODY MASS INDEX DOCD: CPT | Mod: S$GLB,,, | Performed by: INTERNAL MEDICINE

## 2017-09-19 PROCEDURE — 99999 PR PBB SHADOW E&M-EST. PATIENT-LVL IV: CPT | Mod: PBBFAC,,, | Performed by: INTERNAL MEDICINE

## 2017-09-19 PROCEDURE — 99214 OFFICE O/P EST MOD 30 MIN: CPT | Mod: S$GLB,,, | Performed by: INTERNAL MEDICINE

## 2017-09-19 RX ORDER — TAMSULOSIN HYDROCHLORIDE 0.4 MG/1
0.4 CAPSULE ORAL DAILY
Qty: 90 CAPSULE | Refills: 3 | Status: SHIPPED | OUTPATIENT
Start: 2017-09-19 | End: 2017-12-07

## 2017-09-19 RX ORDER — CIPROFLOXACIN 500 MG/1
500 TABLET ORAL 2 TIMES DAILY
Qty: 60 TABLET | Refills: 0 | Status: SHIPPED | OUTPATIENT
Start: 2017-09-19 | End: 2017-10-19

## 2017-09-19 NOTE — PROGRESS NOTES
[facility]ARE [facility]  Progress Note    PRESENTING HISTORY     PCP: Zaina Weinstein MD  Chief Complaint/Reason for Visit:     Chief Complaint   Patient presents with    Abdominal Pain     1+ day , upper stomach pain = 10 onset , pressing on it hurts     Nocturia     2+ weeks patient is unable to go to the bathroom      History of Present Illness & ROS : Mr. García Tang is a 51 y.o. male.    He was on Doxycycline for two weeks then cipro for 2 weeks (has 1 day left).      He has been having problem urinating.   He has fatigue for two weeks.  History of prostatitis.    No temperature at home.     Blood sugar 200s.    He also has epigastric pain.  Occasional heart burn.      PAST HISTORY:     Past Medical History:   Diagnosis Date    CKD stage 2 due to type 2 diabetes mellitus 5/18/2017    Essential hypertension 7/18/2016    Fatty liver disease, nonalcoholic 4/7/2016    [place], 2006     HLD (hyperlipidemia)     Inflammatory bowel disease     diverticulitis    Kidney stone     requiring stent    Morbid obesity 5/29/2014    NAFLD (nonalcoholic fatty liver disease)     Near syncope 5/23/2017    Obesity hypoventilation syndrome 2/22/2016    Prostatitis     Rectal bleeding     diverticulitis    Type 2 diabetes mellitus with hyperglycemia, with long-term current use of insulin 5/18/2017    Uncontrolled type 2 diabetes mellitus with peripheral neuropathy 5/29/2014       Past Surgical History:   Procedure Laterality Date    COLONOSCOPY      kidney stent      KIDNEY SURGERY  1996    right kidney obstruction requiring stent    NOSE SURGERY         Family History   Problem Relation Age of Onset    Cancer Father     Diabetes Father     Liver disease Mother      d.    Cirrhosis Mother        Social History     Social History    Marital status:      Spouse name: N/A    Number of children: N/A    Years of education: N/A     Social History Main Topics    Smoking status: Former Smoker     Types:  Cigarettes    Smokeless tobacco: Never Used      Comment: social    Alcohol use No    Drug use: No    Sexual activity: Yes     Partners: Female     Other Topics Concern    None     Social History Narrative    None       MEDICATIONS & ALLERGIES:     Current Outpatient Prescriptions on File Prior to Visit   Medication Sig Dispense Refill    amlodipine (NORVASC) 5 MG tablet Take 1 tablet (5 mg total) by mouth once daily. 90 tablet 3    aspirin (ECOTRIN) 81 MG EC tablet Take 81 mg by mouth once daily.      clotrimazole (LOTRIMIN) 1 % cream Apply topically 2 (two) times daily. Apply bid to affected areas x 1 wk 30 g 0    DOCOSAHEXANOIC ACID/EPA (FISH OIL ORAL) Take 1 capsule by mouth once daily at 6am.       dulaglutide (TRULICITY) 0.75 mg/0.5 mL PnIj Inject 0.5 mLs (0.75 mg total) into the skin every 7 days. 4 Syringe 3    ERGOCALCIFEROL, VITAMIN D2, (VITAMIN D ORAL) Take 1 capsule by mouth once daily at 6am.       hydrochlorothiazide (MICROZIDE) 12.5 mg capsule 12.5 mg 2 (two) times daily.       insulin NPH-insulin regular, 70/30, (NOVOLIN 70/30) 100 unit/mL (70-30) injection Inject into the skin 2 (two) times daily. 27 in am and 24 in pm      metformin (GLUCOPHAGE-XR) 500 MG 24 hr tablet Take 1 tablet (500 mg total) by mouth 2 (two) times daily with meals. 60 tablet 3    metoprolol succinate (TOPROL XL) 50 MG 24 hr tablet Take 1 tablet (50 mg total) by mouth 2 (two) times daily. 60 tablet 6    multivitamin capsule Take by mouth. 1 Capsule Oral Every day      ciprofloxacin HCl (CIPRO) 500 MG tablet Take 1 tablet (500 mg total) by mouth 2 (two) times daily. 28 tablet 0     No current facility-administered medications on file prior to visit.         Review of patient's allergies indicates:   Allergen Reactions    Bactrim  [sulfamethoxazole-trimethoprim]      Other reaction(s): Unknown    Hydroxyzine hcl      Other reaction(s): elevated bp    Kenalog [triamcinolone acetonide] Other (See Comments)      "Heart flutters all day    Lisinopril Swelling    Phenergan [promethazine] Anxiety    Zofran [ondansetron hcl (pf)] Anxiety       Medications Reconciliation:   I have reconciled the patient's home medications with the patient/family. I have updated all changes.  Refer to After-Visit Medication List.    OBJECTIVE:     Vital Signs:  Vitals:    09/19/17 1127   BP: (!) 148/98   Pulse: 60   Temp: 97.9 °F (36.6 °C)     Wt Readings from Last 1 Encounters:   09/19/17 1127 (!) 157.7 kg (347 lb 10.7 oz)     Body mass index is 49.88 kg/m².     Physical Exam:  General: Well developed, well nourished. No distress.  HEENT: Head is normocephalic, atraumatic.  Eyes: Clear conjunctiva.  Neck: Supple, symmetrical neck; trachea midline.  Lungs: Clear to auscultation bilaterally and normal respiratory effort.  Cardiovascular: Heart with regular rate and rhythm.    Extremities: No LE edema.    Abdomen: Abdomen is soft, non-tender non-distended with normal bowel sounds.  Epigastric tenderness on palpation in one spot only. No CVA tenderness.  Skin: Skin color, texture, turgor normal. No rashes.  Musculoskeletal: Normal gait.   Neurologic: Normal strength and tone. No focal numbness or weakness.   Psychiatric: Not depressed.    Laboratory  Lab Results   Component Value Date    WBC 6.57 08/22/2017    HGB 14.4 08/22/2017    HCT 42.6 08/22/2017     08/22/2017    CHOL 180 05/05/2017    TRIG 251 (H) 05/05/2017    HDL 28 (L) 05/05/2017    ALT 32 06/14/2017    AST 28 06/14/2017     06/14/2017    K 4.4 06/14/2017     06/14/2017    CREATININE 1.0 06/14/2017    BUN 12 06/14/2017    CO2 25 06/14/2017    TSH 1.700 04/04/2016    PSA 0.47 08/22/2017    INR 1.0 03/07/2017    HGBA1C 7.9 (H) 05/05/2017       Diagnostic Results:  Urine Culture, Routine ESCHERICHIA COLI 10,000 - 49,999 cfu/ml No other significant isolate    Resulting Agency [facility]   Susceptibility      Escherichia coli     CULTURE, URINE     Amox/K Clav'ate <=8/4 "><=8/4  " "Sensitive     Amp/Sulbactam >16/8  Resistant     Ampicillin >16  Resistant     Cefazolin <=8 "><=8  Sensitive     Cefepime <=8 "><=8  Sensitive     Ceftriaxone <=8 "><=8  Sensitive     Ciprofloxacin <=1 "><=1  Sensitive     Ertapenem <=2 "><=2  Sensitive     Gentamicin <=4 "><=4  Sensitive     Meropenem <=4 "><=4  Sensitive     Nitrofurantoin <=32 "><=32  Sensitive     Piperacillin/Tazo <=16 "><=16  Sensitive     Tetracycline <=4 "><=4  Sensitive     Tobramycin <=4 "><=4  Sensitive     Trimeth/Sulfa <=2/38 "><=2/38  Sensitive              Specimen Collected: 08/22/17 12:55 Last Resulted: 08/25/17 09:04            ASSESSMENT & PLAN:     Acute prostatitis  - Will treat for additional 4 weeks (6 weeks of cipro) and add Flomax.  Rx:  -     tamsulosin (FLOMAX) 0.4 mg Cp24; Take 1 capsule (0.4 mg total) by mouth once daily.  Dispense: 90 capsule; Refill: 3  -     ciprofloxacin HCl (CIPRO) 500 MG tablet; Take 1 tablet (500 mg total) by mouth 2 (two) times daily.  Dispense: 60 tablet; Refill: 0    Kidney stone history  Epigastric Pain  - Rule out kidney stone/obstruction    Rule out pancreatitis and silent angina.    -     US Doppler Abd/Retroperitoneal Limited; Future; Expected date: 09/19/2017  -     Comprehensive metabolic panel; Future; Expected date: 09/19/2017  -     Lipase; Future; Expected date: 09/19/2017  -     Troponin I; Future; Expected date: 09/19/2017        - Trial of antacids:    Scheduled Follow-up :  Future Appointments  Date Time Provider Department Center   9/19/2017 12:10 PM LAB, SAME DAY [facility] INTMED [facility] LAB IM Marcus Weiss PCW   9/19/2017 4:15 PM [facility] US 11 ALL [facility] ULSOUND Marcus Weiss       After Visit Medication List :     Medication List          Accurate as of 9/19/17 12:00 PM. If you have any questions, ask your nurse or doctor.               START taking these medications    tamsulosin 0.4 mg Cp24  Commonly known as:  FLOMAX  Take 1 capsule (0.4 mg total) by mouth once daily.  Started by:  Gaetano BERGER" MD [place]        CONTINUE taking these medications    amlodipine 5 MG tablet  Commonly known as:  NORVASC  Take 1 tablet (5 mg total) by mouth once daily.     aspirin 81 MG EC tablet  Commonly known as:  ECOTRIN     ciprofloxacin HCl 500 MG tablet  Commonly known as:  CIPRO  Take 1 tablet (500 mg total) by mouth 2 (two) times daily.     clotrimazole 1 % cream  Commonly known as:  LOTRIMIN  Apply topically 2 (two) times daily. Apply bid to affected areas x 1 wk     dulaglutide 0.75 mg/0.5 mL Pnij  Commonly known as:  TRULICITY  Inject 0.5 mLs (0.75 mg total) into the skin every 7 days.     FISH OIL ORAL     hydrochlorothiazide 12.5 mg capsule  Commonly known as:  MICROZIDE     insulin NPH-insulin regular (70/30) 100 unit/mL (70-30) injection  Commonly known as:  NOVOLIN 70/30     metformin 500 MG 24 hr tablet  Commonly known as:  GLUCOPHAGE-XR  Take 1 tablet (500 mg total) by mouth 2 (two) times daily with meals.     metoprolol succinate 50 MG 24 hr tablet  Commonly known as:  TOPROL XL  Take 1 tablet (50 mg total) by mouth 2 (two) times daily.     multivitamin capsule     VITAMIN D ORAL           Where to Get Your Medications      These medications were sent to [id number] - [place] - [address] [facility]  [address] [place] [address]    Phone:  [phone]   · ciprofloxacin HCl 500 MG tablet  · tamsulosin 0.4 mg Cp24         Signing Physician:  Gaetano Villareal MD

## 2017-09-20 ENCOUNTER — PATIENT MESSAGE (OUTPATIENT)
Dept: INTERNAL MEDICINE | Facility: CLINIC | Age: 52
End: 2017-09-20

## 2017-09-20 ENCOUNTER — TELEPHONE (OUTPATIENT)
Dept: INTERNAL MEDICINE | Facility: CLINIC | Age: 52
End: 2017-09-20

## 2017-09-20 DIAGNOSIS — N41.0 ACUTE PROSTATITIS: ICD-10-CM

## 2017-09-20 DIAGNOSIS — Q64.79 STRUCTURAL ABNORMALITY OF BLADDER: Primary | ICD-10-CM

## 2017-09-20 NOTE — TELEPHONE ENCOUNTER
Spoke with patient.  In light of E coli infection and abnormal bladder finding, will refer to Urology.  He will continue to take cipro as instructed.

## 2017-09-21 ENCOUNTER — OFFICE VISIT (OUTPATIENT)
Dept: UROLOGY | Facility: CLINIC | Age: 52
End: 2017-09-21
Payer: COMMERCIAL

## 2017-09-21 VITALS
WEIGHT: 315 LBS | HEART RATE: 77 BPM | SYSTOLIC BLOOD PRESSURE: 154 MMHG | DIASTOLIC BLOOD PRESSURE: 88 MMHG | BODY MASS INDEX: 49.35 KG/M2

## 2017-09-21 DIAGNOSIS — R35.0 URINARY FREQUENCY: ICD-10-CM

## 2017-09-21 DIAGNOSIS — R39.89 BLADDER PAIN: ICD-10-CM

## 2017-09-21 DIAGNOSIS — R93.429 ABNORMAL RENAL ULTRASOUND: Primary | ICD-10-CM

## 2017-09-21 DIAGNOSIS — R82.71 BACTERIA IN URINE: ICD-10-CM

## 2017-09-21 LAB
BILIRUB SERPL-MCNC: NORMAL MG/DL
BLOOD URINE, POC: NORMAL
COLOR, POC UA: YELLOW
GLUCOSE UR QL STRIP: NORMAL
KETONES UR QL STRIP: NORMAL
LEUKOCYTE ESTERASE URINE, POC: 8
NITRITE, POC UA: NORMAL
PH, POC UA: 5
POC RESIDUAL URINE VOLUME: NORMAL ML (ref 0–100)
PROTEIN, POC: NORMAL
SPECIFIC GRAVITY, POC UA: 1.02
UROBILINOGEN, POC UA: NORMAL

## 2017-09-21 PROCEDURE — 81002 URINALYSIS NONAUTO W/O SCOPE: CPT | Mod: S$GLB,,, | Performed by: NURSE PRACTITIONER

## 2017-09-21 PROCEDURE — 99999 PR PBB SHADOW E&M-EST. PATIENT-LVL IV: CPT | Mod: PBBFAC,,, | Performed by: NURSE PRACTITIONER

## 2017-09-21 PROCEDURE — 51798 US URINE CAPACITY MEASURE: CPT | Mod: S$GLB,,, | Performed by: NURSE PRACTITIONER

## 2017-09-21 PROCEDURE — 88112 CYTOPATH CELL ENHANCE TECH: CPT | Performed by: PATHOLOGY

## 2017-09-21 PROCEDURE — 99214 OFFICE O/P EST MOD 30 MIN: CPT | Mod: 25,S$GLB,, | Performed by: NURSE PRACTITIONER

## 2017-09-21 PROCEDURE — 3079F DIAST BP 80-89 MM HG: CPT | Mod: S$GLB,,, | Performed by: NURSE PRACTITIONER

## 2017-09-21 PROCEDURE — 3008F BODY MASS INDEX DOCD: CPT | Mod: S$GLB,,, | Performed by: NURSE PRACTITIONER

## 2017-09-21 PROCEDURE — 3077F SYST BP >= 140 MM HG: CPT | Mod: S$GLB,,, | Performed by: NURSE PRACTITIONER

## 2017-09-21 PROCEDURE — 87086 URINE CULTURE/COLONY COUNT: CPT

## 2017-09-21 PROCEDURE — 88112 CYTOPATH CELL ENHANCE TECH: CPT | Mod: 26,,, | Performed by: PATHOLOGY

## 2017-09-21 RX ORDER — LIDOCAINE HYDROCHLORIDE 20 MG/ML
JELLY TOPICAL ONCE
Status: CANCELLED | OUTPATIENT
Start: 2017-09-21 | End: 2017-09-21

## 2017-09-21 RX ORDER — DOXYCYCLINE HYCLATE 100 MG
100 TABLET ORAL ONCE
Status: CANCELLED | OUTPATIENT
Start: 2017-09-21 | End: 2017-09-21

## 2017-09-21 RX ORDER — NAPROXEN 500 MG/1
500 TABLET ORAL 2 TIMES DAILY WITH MEALS
Qty: 30 TABLET | Refills: 1 | Status: SHIPPED | OUTPATIENT
Start: 2017-09-21 | End: 2017-10-06

## 2017-09-21 NOTE — PATIENT INSTRUCTIONS
Cystoscopy    Cystoscopy is a procedure that lets your doctor look directly inside your urethra and bladder. It can be used to:  · Help diagnose a problem with your urethra, bladder, or kidneys.  · Take a sample (biopsy) of bladder or urethral tissue.  · Treat certain problems (such as removing kidney stones).  · Place a stent to bypass an obstruction.  · Take special X-rays of the kidneys.  Based on the findings, your doctor may recommend other tests or treatments.  What is a cystoscope?  A cystoscope is a telescope-like instrument that contains lenses and fiberoptics (small glass wires that make bright light). The cystoscope may be straight and rigid, or flexible to bend around curves in the urethra. The doctor may look directly into the cystoscope, or project the image onto a monitor.  Getting ready  · Ask your doctor if you should stop taking any medicines before the procedure.  · Ask whether you should avoid eating or drinking anything after midnight before the procedure.  · Follow any other instructions your doctor gives you.  Tell your doctor before the exam if you:  · Take any medicines, such as aspirin or blood thinners  · Have allergies to any medicines  · Are pregnant   The procedure  Cystoscopy is done in the doctors office, surgery center, or hospital. The doctor and a nurse are present during the procedure. It takes only a few minutes, longer if a biopsy, X-ray, or treatment needs to be done.  During the procedure:  · You lie on an exam table on your back, knees bent and legs apart. You are covered with a drape.  · Your urethra and the area around it are washed. Anesthetic jelly may be applied to numb the urethra. Other pain medicine is usually not needed. In some cases, you may be offered a mild sedative to help you relax. If a more extensive procedure is to be done, such as a biopsy or kidney stone removal, general anesthesia may be needed.  · The cystoscope is inserted. A sterile fluid is put  into the bladder to expand it. You may feel pressure from this fluid.  · When the procedure is done, the cystoscope is removed.  After the procedure  If you had a sedative, general anesthesia, or spinal anesthesia, you must have someone drive you home. Once youre home:  · Drink plenty of fluids.  · You may have burning or light bleeding when you urinate--this is normal.  · Medicines may be prescribed to ease any discomfort or prevent infection. Take these as directed.  · Call your doctor if you have heavy bleeding or blood clots, burning that lasts more than a day, a fever over 100°F  (38° C), or trouble urinating.  Date Last Reviewed: 1/1/2017  © 1109-3759 [employer]. [address] [place] [address], [place] [address]. All rights reserved. This information is not intended as a substitute for professional medical care. Always follow your healthcare professional's instructions.

## 2017-09-21 NOTE — LETTER
September 21, 2017      Gaetano Villareal MD  [address]  [place] [address]           [facility]  [address]  [place] [address]  Phone: [phone]          Patient: García Tang   MR Number: [id number]   YOB: 1965   Date of Visit: 9/21/2017       Dear Dr. Gaetano Villareal:    Thank you for referring García Tang to me for evaluation. Attached you will find relevant portions of my assessment and plan of care.    If you have questions, please do not hesitate to call me. I look forward to following García Tang along with you.    Sincerely,    Bailey Long, LISA    Enclosure  CC:  No Recipients    If you would like to receive this communication electronically, please contact [email or web address] or [phone] to request more information on [employer] access.    For providers and/or their staff who would like to refer a patient to [facility], please contact us through our one-stop-shop provider referral line, [facility], at [phone].    If you feel you have received this communication in error or would no longer like to receive these types of communications, please e-mail [email or web address]

## 2017-09-21 NOTE — PROGRESS NOTES
Subjective:       Patient ID: García Tang is a 51 y.o. male.    Chief Complaint: Groin Pain and Prostatitis    García Tang is a 51 y.o. Diabetic Male with a history of a R UPJ repair by Dr. Johnson in 1996.   He was last seen in clinic with Dr. Leslie 06/22/2015 for (R) flank.  06/23/2015 renal ultrasound showed no obstruction; no urological cause of pain.    He is here today on advice of PCP.  He reports several weeks ago he reported some bladder pain, weak FOS, urgency; dysuria.  He was initially treated with Doxy, the 2 rounds of Cipro x 10 days but symptoms never really improved.  08/22/2017 Urine Culture (+) ESCHERICHIA COLI 10,000 - 49,999 cfu/ml sensitive to doxy and Cipro   He currently on a 30 day course of cipro  He was given Rx for Flomax but has not yet started it.    He states FOS still weak; discomfort only after urination.  Last night he got up 3-4x.    He normally had good FOS; nocturia 2-3x depending on fluid intake as well as DM control.  He understands frequency depending on DM plus he taking fluid pill now.    05/05/2017 Hgb A1c was 7.9    He more concerned over findings on renal sono done 09/19/2017:  -small septation is seen along the posterior aspect of the bladder;   -this could represent a small ureterocele but we were not able to identify ureteral jets, which would have identified the ureteral orifice..                           PSA                      0.47                08/22/2017                 PSA                      0.36                04/19/2017                      Past Medical History:  5/18/2017: CKD stage 2 due to type 2 diabetes mellitus  7/18/2016: Essential hypertension  4/7/2016: Fatty liver disease, nonalcoholic      Comment: Us, 2006   No date: HLD (hyperlipidemia)  No date: Inflammatory bowel disease      Comment: diverticulitis  No date: Kidney stone      Comment: requiring stent  5/29/2014: Morbid obesity  No date: NAFLD (nonalcoholic fatty liver  disease)  5/23/2017: Near syncope  2/22/2016: Obesity hypoventilation syndrome  No date: Prostatitis  No date: Rectal bleeding      Comment: diverticulitis  5/18/2017: Type 2 diabetes mellitus with hyperglycemia, w*  5/29/2014: Uncontrolled type 2 diabetes mellitus with per*    Past Surgical History:  No date: COLONOSCOPY  No date: kidney stent  1996: KIDNEY SURGERY      Comment: right kidney obstruction requiring stent  No date: NOSE SURGERY    Review of patient's family history indicates:    Cancer                         Father                    Diabetes                       Father                    Liver disease                  Mother                      Comment: d.    Cirrhosis                      Mother                      Social History    Marital status:              Spouse name:                       Years of education:                 Number of children:               Occupational History    None on file    Social History Main Topics    Smoking status: Former Smoker                                                                Packs/day: 0.00      Years: 0.00           Types: Cigarettes    Smokeless tobacco: Never Used                        Comment: social    Alcohol use: No              Drug use: No              Sexual activity: Yes               Partners with: Female    Other Topics            Concern    None on file    Social History Narrative    None on file        Allergies:  Bactrim  (sulfamethoxazole-trimethoprim); Hydroxyzine hcl; Kenalog (triamcinolone acetonide); Lisinopril; Phenergan (promethazine); and Zofran (ondansetron hcl (pf))    Medications:  Current Outpatient Prescriptions:   amlodipine (NORVASC) 5 MG tablet, Take 1 tablet (5 mg total) by mouth once daily., Disp: 90 tablet, Rfl: 3  aspirin (ECOTRIN) 81 MG EC tablet, Take 81 mg by mouth once daily., Disp: , Rfl:   ciprofloxacin HCl (CIPRO) 500 MG tablet, Take 1 tablet (500 mg total) by mouth 2 (two) times daily., Disp:  60 tablet, Rfl: 0  clotrimazole (LOTRIMIN) 1 % cream, Apply topically 2 (two) times daily. Apply bid to affected areas x 1 wk, Disp: 30 g, Rfl: 0  DOCOSAHEXANOIC ACID/EPA (FISH OIL ORAL), Take 1 capsule by mouth once daily at 6am. , Disp: , Rfl:   dulaglutide (TRULICITY) 0.75 mg/0.5 mL PnIj, Inject 0.5 mLs (0.75 mg total) into the skin every 7 days., Disp: 4 Syringe, Rfl: 3  ERGOCALCIFEROL, VITAMIN D2, (VITAMIN D ORAL), Take 1 capsule by mouth once daily at 6am. , Disp: , Rfl:   hydrochlorothiazide (MICROZIDE) 12.5 mg capsule, 12.5 mg 2 (two) times daily. , Disp: , Rfl:   insulin NPH-insulin regular, 70/30, (NOVOLIN 70/30) 100 unit/mL (70-30) injection, Inject into the skin 2 (two) times daily. 27 in am and 24 in pm, Disp: , Rfl:   metformin (GLUCOPHAGE-XR) 500 MG 24 hr tablet, Take 1 tablet (500 mg total) by mouth 2 (two) times daily with meals., Disp: 60 tablet, Rfl: 3  metoprolol succinate (TOPROL XL) 50 MG 24 hr tablet, Take 1 tablet (50 mg total) by mouth 2 (two) times daily., Disp: 60 tablet, Rfl: 6  multivitamin capsule, Take by mouth. 1 Capsule Oral Every day, Disp: , Rfl:   tamsulosin (FLOMAX) 0.4 mg Cp24, Take 1 capsule (0.4 mg total) by mouth once daily., Disp: 90 capsule, Rfl: 3          Review of Systems   Constitutional: Negative.  Negative for activity change, appetite change and fever.   HENT: Negative.  Negative for facial swelling and trouble swallowing.    Eyes: Negative.    Respiratory: Negative.  Negative for shortness of breath.    Cardiovascular: Negative.  Negative for chest pain and palpitations.   Gastrointestinal: Negative for abdominal pain, constipation, diarrhea, nausea and vomiting.        Lower abdominal pain.     Genitourinary: Positive for dysuria, frequency, nocturia and urgency. Negative for difficulty urinating, discharge, enuresis, flank pain, genital sores, hematuria, penile pain, penile swelling, scrotal swelling and testicular pain.        FOS weaker;  Bladder  pain/dsyuria after urination.     Musculoskeletal: Negative for back pain, gait problem and neck stiffness.   Skin: Negative.  Negative for wound.   Neurological: Negative for dizziness, tremors, seizures, syncope, speech difficulty, light-headedness and headaches.   Hematological: Does not bruise/bleed easily.   Psychiatric/Behavioral: Negative for confusion and hallucinations. The patient is not nervous/anxious.        Objective:      Physical Exam   Nursing note and vitals reviewed.  Constitutional: He is oriented to person, place, and time. He appears well-developed and well-nourished.  Non-toxic appearance. He does not have a sickly appearance.   Urine dipped (+); possible trace nitrites.  PVR in the office today by the nurse was 100.     HENT:   Head: Normocephalic and atraumatic.   Right Ear: External ear normal.   Left Ear: External ear normal.   Nose: Nose normal.   Mouth/Throat: Mucous membranes are normal.   Eyes: Conjunctivae and lids are normal. No scleral icterus.   Neck: Trachea normal, normal range of motion and full passive range of motion without pain. Neck supple. No JVD present. No tracheal deviation present.   Cardiovascular: Normal rate, regular rhythm, S1 normal and S2 normal.    Pulmonary/Chest: Effort normal and breath sounds normal. No respiratory distress. He exhibits no tenderness.   Abdominal: Soft. Normal appearance and bowel sounds are normal. There is no hepatosplenomegaly. There is no tenderness. There is no rebound, no guarding and no CVA tenderness.   Genitourinary: Rectum normal, prostate normal, testes normal and penis normal. Rectal exam shows no external hemorrhoid, no mass and no tenderness. Prostate is not tender. Right testis shows no mass, no swelling and no tenderness. Left testis shows no mass, no swelling and no tenderness. Circumcised. No hypospadias, penile erythema or penile tenderness. No discharge found.       Musculoskeletal: Normal range of motion.    Lymphadenopathy: No inguinal adenopathy noted on the right or left side.   Neurological: He is alert and oriented to person, place, and time. He has normal strength.   Skin: Skin is warm, dry and intact.     Psychiatric: He has a normal mood and affect. His behavior is normal. Judgment and thought content normal.       Assessment:       1. Abnormal renal ultrasound    2. Bacteria in urine    3. Urinary frequency    4. Bladder pain        Plan:         I spent 25 minutes with the patient of which more than half was spent in direct consultation with the patient in regards to our treatment and plan.    Education and recommendations of today's plan of care including home remedies.  We discussed the findings in the office;  Urine collected and sent to lab for cx/cytology.  He needs to complete his cipro and start the Flomax Rx  We discussed benefits, risks; flomax is med that can stop whenever; does not have to wean.  Naprosyn 500mg BID for pain.  Diet modifications; avoiding bladder irritants; improve DM control; DM can worsen the LUTS.  Will have Dr. Johnson do cysto and evaluate the bladder; discussed the procedure; reassurance given.  Educational materials given  Will notify of the results of urine tests.  Any problems let me know  Voiced understanding

## 2017-09-23 LAB — BACTERIA UR CULT: NORMAL

## 2017-09-25 ENCOUNTER — PATIENT MESSAGE (OUTPATIENT)
Dept: UROLOGY | Facility: CLINIC | Age: 52
End: 2017-09-25

## 2017-10-03 ENCOUNTER — PATIENT MESSAGE (OUTPATIENT)
Dept: UROLOGY | Facility: CLINIC | Age: 52
End: 2017-10-03

## 2017-10-04 ENCOUNTER — PATIENT MESSAGE (OUTPATIENT)
Dept: INTERNAL MEDICINE | Facility: CLINIC | Age: 52
End: 2017-10-04

## 2017-10-04 RX ORDER — METOPROLOL SUCCINATE 50 MG/1
50 TABLET, EXTENDED RELEASE ORAL 2 TIMES DAILY
Qty: 60 TABLET | Refills: 6 | OUTPATIENT
Start: 2017-10-04

## 2017-10-04 RX ORDER — AMLODIPINE BESYLATE 5 MG/1
5 TABLET ORAL DAILY
Qty: 90 TABLET | Refills: 3 | OUTPATIENT
Start: 2017-10-04 | End: 2017-11-03

## 2017-10-09 ENCOUNTER — HOSPITAL ENCOUNTER (EMERGENCY)
Facility: HOSPITAL | Age: 52
Discharge: HOME | End: 2017-10-09
Attending: EMERGENCY MEDICINE
Payer: COMMERCIAL

## 2017-10-09 ENCOUNTER — OFFICE VISIT (OUTPATIENT)
Dept: INTERNAL MEDICINE | Facility: CLINIC | Age: 52
End: 2017-10-09
Payer: COMMERCIAL

## 2017-10-09 VITALS
WEIGHT: 315 LBS | BODY MASS INDEX: 45.1 KG/M2 | DIASTOLIC BLOOD PRESSURE: 100 MMHG | HEIGHT: 70 IN | SYSTOLIC BLOOD PRESSURE: 150 MMHG | TEMPERATURE: 98 F | HEART RATE: 84 BPM | OXYGEN SATURATION: 96 %

## 2017-10-09 VITALS
TEMPERATURE: 99 F | OXYGEN SATURATION: 100 % | DIASTOLIC BLOOD PRESSURE: 76 MMHG | HEIGHT: 70 IN | HEART RATE: 67 BPM | RESPIRATION RATE: 21 BRPM | SYSTOLIC BLOOD PRESSURE: 128 MMHG | BODY MASS INDEX: 45.1 KG/M2 | WEIGHT: 315 LBS

## 2017-10-09 DIAGNOSIS — R00.0 TACHYCARDIA: ICD-10-CM

## 2017-10-09 DIAGNOSIS — R61 DIAPHORESIS: ICD-10-CM

## 2017-10-09 DIAGNOSIS — Z76.0 MEDICATION REFILL: ICD-10-CM

## 2017-10-09 DIAGNOSIS — R53.83 FATIGUE, UNSPECIFIED TYPE: Primary | ICD-10-CM

## 2017-10-09 LAB
ALBUMIN SERPL BCP-MCNC: 3.8 G/DL
ALP SERPL-CCNC: 105 U/L
ALT SERPL W/O P-5'-P-CCNC: 47 U/L
ANION GAP SERPL CALC-SCNC: 11 MMOL/L
AST SERPL-CCNC: 35 U/L
BASOPHILS # BLD AUTO: 0.05 K/UL
BASOPHILS NFR BLD: 0.6 %
BILIRUB SERPL-MCNC: 0.4 MG/DL
BILIRUB SERPL-MCNC: ABNORMAL MG/DL
BLOOD URINE, POC: ABNORMAL
BUN SERPL-MCNC: 13 MG/DL
CALCIUM SERPL-MCNC: 9.7 MG/DL
CHLORIDE SERPL-SCNC: 101 MMOL/L
CO2 SERPL-SCNC: 27 MMOL/L
COLOR, POC UA: YELLOW
CREAT SERPL-MCNC: 1.1 MG/DL
DIFFERENTIAL METHOD: NORMAL
EOSINOPHIL # BLD AUTO: 0.2 K/UL
EOSINOPHIL NFR BLD: 2.1 %
ERYTHROCYTE [DISTWIDTH] IN BLOOD BY AUTOMATED COUNT: 12.7 %
EST. GFR  (AFRICAN AMERICAN): >60 ML/MIN/1.73 M^2
EST. GFR  (NON AFRICAN AMERICAN): >60 ML/MIN/1.73 M^2
GLUCOSE SERPL-MCNC: 124 MG/DL
GLUCOSE UR QL STRIP: NORMAL
HCT VFR BLD AUTO: 43.2 %
HGB BLD-MCNC: 15.3 G/DL
KETONES UR QL STRIP: ABNORMAL
LEUKOCYTE ESTERASE URINE, POC: ABNORMAL
LYMPHOCYTES # BLD AUTO: 2.9 K/UL
LYMPHOCYTES NFR BLD: 36.8 %
MAGNESIUM SERPL-MCNC: 2.1 MG/DL
MCH RBC QN AUTO: 29.4 PG
MCHC RBC AUTO-ENTMCNC: 35.4 G/DL
MCV RBC AUTO: 83 FL
MONOCYTES # BLD AUTO: 0.7 K/UL
MONOCYTES NFR BLD: 9.5 %
NEUTROPHILS # BLD AUTO: 3.9 K/UL
NEUTROPHILS NFR BLD: 50.7 %
NITRITE, POC UA: ABNORMAL
PH, POC UA: 6
PLATELET # BLD AUTO: 230 K/UL
PMV BLD AUTO: 9.6 FL
POTASSIUM SERPL-SCNC: 4 MMOL/L
PROT SERPL-MCNC: 8.1 G/DL
PROTEIN, POC: ABNORMAL
RBC # BLD AUTO: 5.2 M/UL
SODIUM SERPL-SCNC: 139 MMOL/L
SPECIFIC GRAVITY, POC UA: 1.01
T4 FREE SERPL-MCNC: 1 NG/DL
TROPONIN I SERPL DL<=0.01 NG/ML-MCNC: <0.006 NG/ML
TSH SERPL DL<=0.005 MIU/L-ACNC: 3.16 UIU/ML
UROBILINOGEN, POC UA: NORMAL
WBC # BLD AUTO: 7.75 K/UL

## 2017-10-09 PROCEDURE — 93010 ELECTROCARDIOGRAM REPORT: CPT | Mod: S$GLB,,, | Performed by: INTERNAL MEDICINE

## 2017-10-09 PROCEDURE — 84484 ASSAY OF TROPONIN QUANT: CPT

## 2017-10-09 PROCEDURE — 84439 ASSAY OF FREE THYROXINE: CPT

## 2017-10-09 PROCEDURE — 83735 ASSAY OF MAGNESIUM: CPT

## 2017-10-09 PROCEDURE — 85025 COMPLETE CBC W/AUTO DIFF WBC: CPT

## 2017-10-09 PROCEDURE — 81002 URINALYSIS NONAUTO W/O SCOPE: CPT | Mod: S$GLB,,, | Performed by: NURSE PRACTITIONER

## 2017-10-09 PROCEDURE — 99214 OFFICE O/P EST MOD 30 MIN: CPT | Mod: 25,S$GLB,, | Performed by: NURSE PRACTITIONER

## 2017-10-09 PROCEDURE — 99284 EMERGENCY DEPT VISIT MOD MDM: CPT

## 2017-10-09 PROCEDURE — 84443 ASSAY THYROID STIM HORMONE: CPT

## 2017-10-09 PROCEDURE — 99285 EMERGENCY DEPT VISIT HI MDM: CPT | Mod: ,,, | Performed by: EMERGENCY MEDICINE

## 2017-10-09 PROCEDURE — 80053 COMPREHEN METABOLIC PANEL: CPT

## 2017-10-09 PROCEDURE — 93005 ELECTROCARDIOGRAM TRACING: CPT | Mod: S$GLB,,, | Performed by: NURSE PRACTITIONER

## 2017-10-09 PROCEDURE — 99999 PR PBB SHADOW E&M-EST. PATIENT-LVL IV: CPT | Mod: PBBFAC,,, | Performed by: NURSE PRACTITIONER

## 2017-10-09 RX ORDER — AMLODIPINE BESYLATE 5 MG/1
5 TABLET ORAL DAILY
Qty: 90 TABLET | Refills: 3 | Status: SHIPPED | OUTPATIENT
Start: 2017-10-09 | End: 2018-05-07 | Stop reason: SDUPTHER

## 2017-10-09 RX ORDER — METOPROLOL SUCCINATE 50 MG/1
50 TABLET, EXTENDED RELEASE ORAL 2 TIMES DAILY
Qty: 60 TABLET | Refills: 6 | Status: SHIPPED | OUTPATIENT
Start: 2017-10-09 | End: 2018-05-07 | Stop reason: SDUPTHER

## 2017-10-09 NOTE — PROGRESS NOTES
Pt transferred to ED in wheel chair via  services for tachycardia along with diaphoresis. Pt is alone and has belongings with him. Pt states that his daughter is on the way to meet him in the ED.  /100  P84  O2 96  Fall risk band applied by CRISTY Domingo.

## 2017-10-10 ENCOUNTER — PATIENT MESSAGE (OUTPATIENT)
Dept: INTERNAL MEDICINE | Facility: CLINIC | Age: 52
End: 2017-10-10

## 2017-10-10 DIAGNOSIS — R53.83 FATIGUE, UNSPECIFIED TYPE: Primary | ICD-10-CM

## 2017-10-10 NOTE — ED PROVIDER NOTES
"Encounter Date: 10/9/2017    SCRIBE #1 NOTE: I, Elsa Faulkner, am scribing for, and in the presence of,  Dr. Boykin. I have scribed the following portions of the note - the Resident attestation.       History     Chief Complaint   Patient presents with    Tachycardia     sent from  [facility], sweats, tachycardic when walking short dist. denies heart hx, been on antibiotics for 2 months for uti, dr FRANK harper saw ekg from clinic no need to repead     Mr. Tang is a 50 yo man with DM, HTN, HLD who was sent to the ED from clinic for tachycardia, diaphoresis, and hot flashes. Patient reports have episodes of tachycardia in the past that was investigated with holter monitoring.  His workup was unrevealing, and he was placed on a BB in 2006.  Since then, he has not had many issues with tachycardia except for this past April when his HR was in the 130s at rest, which resolved spontaneously.  Over the past few days, patient reports having fatigue along with tachycardia with minimal exertion associated with hot flashes and sweating.  Yesterday, he was going for a walk and noticed his HR was in the 150s-160s before completing a 1/2 lap.  His HR came down to 90-100s with rest over the next couple of hours.  Today, he developed palpitations with significant hot flashes and sweating with just walking from his car to his pcp.  He complains of intermittent pressure behind b/l eyes over the past week.  Denies any weakness, SOB, CP, fevers, chills, nausea. He has mild dizziness at baseline that he attributes to inner ear problems and denies any worsening dizziness or lightheadedness. Of note, patient does report having anxiety that he doesn't take anything for and reports feeling "nervous" while in the ED.           Review of patient's allergies indicates:   Allergen Reactions    Bactrim  [sulfamethoxazole-trimethoprim]      Other reaction(s): Unknown    Hydroxyzine hcl      Other reaction(s): elevated bp    Kenalog " [triamcinolone acetonide] Other (See Comments)     Heart flutters all day    Lisinopril Swelling    Phenergan [promethazine] Anxiety    Zofran [ondansetron hcl (pf)] Anxiety     Past Medical History:   Diagnosis Date    CKD stage 2 due to type 2 diabetes mellitus 5/18/2017    Essential hypertension 7/18/2016    Fatty liver disease, nonalcoholic 4/7/2016    Us, 2006     HLD (hyperlipidemia)     Inflammatory bowel disease     diverticulitis    Kidney stone     requiring stent    Morbid obesity 5/29/2014    NAFLD (nonalcoholic fatty liver disease)     Near syncope 5/23/2017    Obesity hypoventilation syndrome 2/22/2016    Prostatitis     Rectal bleeding     diverticulitis    Type 2 diabetes mellitus with hyperglycemia, with long-term current use of insulin 5/18/2017    Uncontrolled type 2 diabetes mellitus with peripheral neuropathy 5/29/2014     Past Surgical History:   Procedure Laterality Date    COLONOSCOPY      kidney stent      KIDNEY SURGERY  1996    right kidney obstruction requiring stent    NOSE SURGERY       Family History   Problem Relation Age of Onset    Cancer Father     Diabetes Father     Liver disease Mother      d.    Cirrhosis Mother      Social History   Substance Use Topics    Smoking status: Former Smoker     Types: Cigarettes    Smokeless tobacco: Never Used      Comment: social    Alcohol use No     Review of Systems   Constitutional: Positive for activity change and fatigue. Negative for appetite change, chills and fever.   HENT: Negative for congestion, sore throat and trouble swallowing.    Eyes: Positive for pain (pressure behind b/l eyes) and visual disturbance (intermittent left eye blurriness ).   Respiratory: Negative for cough and shortness of breath.    Cardiovascular: Negative for chest pain and leg swelling.   Gastrointestinal: Negative for abdominal distention, abdominal pain, constipation, diarrhea, nausea and vomiting.   Genitourinary: Positive for  difficulty urinating and dysuria (on Abx for prostatitis). Negative for hematuria.   Skin: Negative for rash.   Neurological: Negative for dizziness, weakness, light-headedness and headaches.   Psychiatric/Behavioral: Negative for confusion. The patient is nervous/anxious.        Physical Exam     Initial Vitals [10/09/17 1630]   BP Pulse Resp Temp SpO2   138/80 75 18 98.6 °F (37 °C) 97 %      MAP       99.33         Physical Exam    Constitutional: He appears well-developed and well-nourished. He is not diaphoretic.   Mildly anxious     HENT:   Head: Normocephalic and atraumatic.   Mouth/Throat: Oropharynx is clear and moist.   Eyes: Conjunctivae and EOM are normal. Pupils are equal, round, and reactive to light.   Neck: No thyromegaly present.   Cardiovascular: Normal rate, regular rhythm, normal heart sounds and intact distal pulses.   Pulmonary/Chest: Breath sounds normal. No respiratory distress.   Abdominal: Soft. Bowel sounds are normal. He exhibits no distension. There is no tenderness.   Musculoskeletal: Normal range of motion. He exhibits no edema.   Neurological: He is alert and oriented to person, place, and time.   Skin: Skin is warm and dry. No rash noted. No erythema.         ED Course   Procedures  Labs Reviewed   COMPREHENSIVE METABOLIC PANEL - Abnormal; Notable for the following:        Result Value    Glucose 124 (*)     ALT 47 (*)     All other components within normal limits   CBC W/ AUTO DIFFERENTIAL   TSH   T4, FREE   TROPONIN I   MAGNESIUM    Narrative:     ADD-ON TROP #[id number] PER FROILAN FISCHER MD 21:25  10/09/2017    TROPONIN I    Narrative:     ADD-ON TROP #[id number] PER FROILAN FISCHER MD 21:25  10/09/2017      EKG Readings: (Independently Interpreted)   EKG: NS at 83, nl intervals, nl axis, no ischemic changes       X-Rays:   Independently Interpreted Readings:   Other Readings:  CXR: no acute process    Medical Decision Making:   History:   Old Medical Records: I decided to obtain old  medical records.  Clinical Tests:   Lab Tests: Ordered and Reviewed  Medical Tests: Ordered and Reviewed       APC / Resident Notes:   Mr. Tang is a 50 yo man with DM, HTN, HLD who was sent to the ED from clinic for tachycardia, diaphoresis, and hot flashes.     VSS with HR in 50s-60s. EKG shows normal sinus rhythm and no STEMI. Will obtain basic labs, troponin, CXR, & TSH.  CBC & CMP benign.  TSH and free T4 wnl. Pending CXR and troponin.          Scribe Attestation:   Scribe #1: I performed the above scribed service and the documentation accurately describes the services I performed. I attest to the accuracy of the note.    Attending Attestation:   Physician Attestation Statement for Resident:  As the supervising MD   Physician Attestation Statement: I have personally seen and examined this patient.   I agree with the above history. -: Emergent evaluation of 51 y.o. male with tachycardia. Similar symptoms in past that required holter monitor. Pt states he is more diaphoretic than usual which was concerning. Will check labs and closely monitor. TSH and T4 within normal limits labs otherwise are normal.     Troponin is negative.  Chest x-ray shows no acute process.  Patient's symptoms and been ongoing for the past 3 days.  I feel that one troponin is sufficient.  Pt stable for discharge to follow up with Cardiology and PCP.    As the supervising MD I agree with the above PE.    As the supervising MD I agree with the above treatment, course, plan, and disposition.  I have reviewed and agree with the residents interpretation of the following: lab data, EKG and x-rays.                    ED Course      Clinical Impression:   The encounter diagnosis was Tachycardia.    Disposition:   Disposition: Discharged  Condition: Stable                        Yulisa Boykin MD  10/10/17 [id number]

## 2017-10-10 NOTE — ED TRIAGE NOTES
García Tang, a 51 y.o. male presents to the ED room 17.       Chief Complaint   Patient presents with    Tachycardia     sent from  [facility] clinic, sweats, tachycardic when walking short dist. denies heart hx, been on antibiotics for 2 months for uti, dr FRANK harper saw ekg from clinic no need to repead     Review of patient's allergies indicates:   Allergen Reactions    Bactrim  [sulfamethoxazole-trimethoprim]      Other reaction(s): Unknown    Hydroxyzine hcl      Other reaction(s): elevated bp    Kenalog [triamcinolone acetonide] Other (See Comments)     Heart flutters all day    Lisinopril Swelling    Phenergan [promethazine] Anxiety    Zofran [ondansetron hcl (pf)] Anxiety     Past Medical History:   Diagnosis Date    CKD stage 2 due to type 2 diabetes mellitus 5/18/2017    Essential hypertension 7/18/2016    Fatty liver disease, nonalcoholic 4/7/2016    Us, 2006     HLD (hyperlipidemia)     Inflammatory bowel disease     diverticulitis    Kidney stone     requiring stent    Morbid obesity 5/29/2014    NAFLD (nonalcoholic fatty liver disease)     Near syncope 5/23/2017    Obesity hypoventilation syndrome 2/22/2016    Prostatitis     Rectal bleeding     diverticulitis    Type 2 diabetes mellitus with hyperglycemia, with long-term current use of insulin 5/18/2017    Uncontrolled type 2 diabetes mellitus with peripheral neuropathy 5/29/2014

## 2017-10-10 NOTE — DISCHARGE INSTRUCTIONS
Return to ED immediately if symptoms become concerning, otherwise, follow up with PCP/cardiology for further evaluation.

## 2017-10-10 NOTE — ED NOTES
Patient on stretcher, Bed placed in low/locked position, side rails up x 2, call light is within reach of patient and family. Patient in NAD and offers no complaints at this time. Will continue to monitor.

## 2017-10-11 ENCOUNTER — LAB VISIT (OUTPATIENT)
Dept: LAB | Facility: HOSPITAL | Age: 52
End: 2017-10-11
Attending: NURSE PRACTITIONER
Payer: COMMERCIAL

## 2017-10-11 DIAGNOSIS — R53.83 FATIGUE, UNSPECIFIED TYPE: ICD-10-CM

## 2017-10-11 DIAGNOSIS — R61 DIAPHORESIS: ICD-10-CM

## 2017-10-11 LAB — TSH SERPL DL<=0.005 MIU/L-ACNC: 2.67 UIU/ML

## 2017-10-11 PROCEDURE — 36415 COLL VENOUS BLD VENIPUNCTURE: CPT

## 2017-10-11 PROCEDURE — 84443 ASSAY THYROID STIM HORMONE: CPT

## 2017-10-12 ENCOUNTER — PATIENT MESSAGE (OUTPATIENT)
Dept: INTERNAL MEDICINE | Facility: CLINIC | Age: 52
End: 2017-10-12

## 2017-10-24 ENCOUNTER — PATIENT MESSAGE (OUTPATIENT)
Dept: UROLOGY | Facility: CLINIC | Age: 52
End: 2017-10-24

## 2017-10-25 ENCOUNTER — TELEPHONE (OUTPATIENT)
Dept: UROLOGY | Facility: CLINIC | Age: 52
End: 2017-10-25

## 2017-11-03 ENCOUNTER — LAB VISIT (OUTPATIENT)
Dept: LAB | Facility: HOSPITAL | Age: 52
End: 2017-11-03
Attending: INTERNAL MEDICINE
Payer: COMMERCIAL

## 2017-11-03 ENCOUNTER — OFFICE VISIT (OUTPATIENT)
Dept: INTERNAL MEDICINE | Facility: CLINIC | Age: 52
End: 2017-11-03
Payer: COMMERCIAL

## 2017-11-03 ENCOUNTER — HOSPITAL ENCOUNTER (OUTPATIENT)
Dept: RADIOLOGY | Facility: HOSPITAL | Age: 52
Discharge: HOME | End: 2017-11-03
Attending: INTERNAL MEDICINE
Payer: COMMERCIAL

## 2017-11-03 VITALS
OXYGEN SATURATION: 99 % | HEIGHT: 70 IN | DIASTOLIC BLOOD PRESSURE: 81 MMHG | SYSTOLIC BLOOD PRESSURE: 117 MMHG | WEIGHT: 315 LBS | TEMPERATURE: 98 F | HEART RATE: 66 BPM | BODY MASS INDEX: 45.1 KG/M2

## 2017-11-03 DIAGNOSIS — R10.9 ABDOMINAL PAIN, UNSPECIFIED ABDOMINAL LOCATION: ICD-10-CM

## 2017-11-03 DIAGNOSIS — R10.9 ABDOMINAL PAIN, UNSPECIFIED ABDOMINAL LOCATION: Primary | ICD-10-CM

## 2017-11-03 DIAGNOSIS — R42 DIZZINESS: ICD-10-CM

## 2017-11-03 DIAGNOSIS — K92.1 BLOOD IN STOOL: ICD-10-CM

## 2017-11-03 LAB
ALBUMIN SERPL BCP-MCNC: 3.7 G/DL
ALP SERPL-CCNC: 85 U/L
ALT SERPL W/O P-5'-P-CCNC: 41 U/L
ANION GAP SERPL CALC-SCNC: 8 MMOL/L
AST SERPL-CCNC: 30 U/L
BILIRUB SERPL-MCNC: 0.5 MG/DL
BUN SERPL-MCNC: 14 MG/DL
CALCIUM SERPL-MCNC: 9.3 MG/DL
CHLORIDE SERPL-SCNC: 103 MMOL/L
CO2 SERPL-SCNC: 26 MMOL/L
CREAT SERPL-MCNC: 1 MG/DL
ERYTHROCYTE [DISTWIDTH] IN BLOOD BY AUTOMATED COUNT: 12.6 %
EST. GFR  (AFRICAN AMERICAN): >60 ML/MIN/1.73 M^2
EST. GFR  (NON AFRICAN AMERICAN): >60 ML/MIN/1.73 M^2
ESTIMATED AVG GLUCOSE: 180 MG/DL
GLUCOSE SERPL-MCNC: 186 MG/DL
GLUCOSE SERPL-MCNC: 195 MG/DL (ref 70–110)
HBA1C MFR BLD HPLC: 7.9 %
HCT VFR BLD AUTO: 40.8 %
HGB BLD-MCNC: 13.9 G/DL
LIPASE SERPL-CCNC: 27 U/L
MCH RBC QN AUTO: 28.5 PG
MCHC RBC AUTO-ENTMCNC: 34.1 G/DL
MCV RBC AUTO: 84 FL
PLATELET # BLD AUTO: 234 K/UL
PMV BLD AUTO: 9.9 FL
POTASSIUM SERPL-SCNC: 4.5 MMOL/L
PROT SERPL-MCNC: 7.3 G/DL
RBC # BLD AUTO: 4.88 M/UL
SODIUM SERPL-SCNC: 137 MMOL/L
WBC # BLD AUTO: 5.63 K/UL

## 2017-11-03 PROCEDURE — 99214 OFFICE O/P EST MOD 30 MIN: CPT | Mod: S$GLB,,, | Performed by: INTERNAL MEDICINE

## 2017-11-03 PROCEDURE — 82948 REAGENT STRIP/BLOOD GLUCOSE: CPT | Mod: S$GLB,,, | Performed by: INTERNAL MEDICINE

## 2017-11-03 PROCEDURE — 99999 PR PBB SHADOW E&M-EST. PATIENT-LVL V: CPT | Mod: PBBFAC,,, | Performed by: INTERNAL MEDICINE

## 2017-11-03 PROCEDURE — 83690 ASSAY OF LIPASE: CPT

## 2017-11-03 PROCEDURE — 85027 COMPLETE CBC AUTOMATED: CPT

## 2017-11-03 PROCEDURE — 83036 HEMOGLOBIN GLYCOSYLATED A1C: CPT

## 2017-11-03 PROCEDURE — 36415 COLL VENOUS BLD VENIPUNCTURE: CPT

## 2017-11-03 PROCEDURE — 80053 COMPREHEN METABOLIC PANEL: CPT

## 2017-11-03 NOTE — PATIENT INSTRUCTIONS
Abdominal Pain    Abdominal pain is pain in the stomach or belly area. Everyone has this pain from time to time. In many cases it goes away on its own. But abdominal pain can sometimes be due to a serious problem, such as appendicitis. So its important to know when to seek help.  Causes of abdominal pain  There are many possible causes of abdominal pain. Common causes in adults include:  · Constipation, diarrhea, or gas  · Stomach acid flowing back up into the esophagus (acid reflux or heartburn)  · Severe acid reflux, called GERD (gastroesophageal reflux disease)  · A sore in the lining of the stomach or small intestine (peptic ulcer)  · Inflammation of the gallbladder, liver, or pancreas  · Gallstones or kidney stones  · Appendicitis   · Intestinal blockage   · An internal organ pushing through a muscle or other tissue (hernia)  · Urinary tract infections  · In women, menstrual cramps, fibroids, or endometriosis  · Inflammation or infection of the intestines  Diagnosing the cause of abdominal pain  Your healthcare provider will do a physical exam help find the cause of your pain. If needed, tests will be ordered. Belly pain has many possible causes. So it can be hard to find the reason for your pain. Giving details about your pain can help. Tell your provider where and when you feel the pain, and what makes it better or worse. Also let your provider know if you have other symptoms such as:  · Fever  · Tiredness  · Upset stomach (nausea)  · Vomiting  · Changes in bathroom habits  Treating abdominal pain  Some causes of pain need emergency medical treatment right away. These include appendicitis or a bowel blockage. Other problems can be treated with rest, fluids, or medicines. Your healthcare provider can give you specific instructions for treatment or self-care based on what is causing your pain.  If you have vomiting or diarrhea, sip water or other clear fluids. When you are ready to eat solid foods again,  start with small amounts of easy-to-digest, low-fat foods. These include apple sauce, toast, or crackers.   When to seek medical care  Call 911 or go to the hospital right away if you:  · Cant pass stool and are vomiting  · Are vomiting blood or have bloody diarrhea or black, tarry diarrhea  · Have chest, neck, or shoulder pain  · Feel like you might pass out  · Have pain in your shoulder blades with nausea  · Have sudden, severe belly pain  · Have new, severe pain unlike any you have felt before  · Have a belly that is rigid, hard, and tender to touch  Call your healthcare provider if you have:  · Pain for more than 5 days  · Bloating for more than 2 days  · Diarrhea for more than 5 days  · A fever of 100.4°F (38.0°C) or higher, or as directed by your provider  · Pain that gets worse  · Weight loss for no reason  · Continued lack of appetite  · Blood in your stool  How to prevent abdominal pain  Here are some tips to help prevent abdominal pain:  · Eat smaller amounts of food at one time.  · Avoid greasy, fried, or other high-fat foods.  · Avoid foods that give you gas.  · Exercise regularly.  · Drink plenty of fluids.  To help prevent GERD symptoms:  · Quit smoking.  · Reduce alcohol and certain foods that increase stomach acid.  · Avoid aspirin and over-the-counter pain and fever medicines (NSAIDS or nonsteroidal anti-inflammatory drugs), if possible  · Lose extra weight.  · Finish eating at least 2 hours before you go to bed or lie down.  · Raise the head of your bed.  Date Last Reviewed: 7/1/2016  © 4639-5454 [employer]. [address] [place] [address], [place] [address]. All rights reserved. This information is not intended as a substitute for professional medical care. Always follow your healthcare professional's instructions.

## 2017-11-03 NOTE — PROGRESS NOTES
"Subjective:       Patient ID: García Tang is a 52 y.o. male.    Chief Complaint: Abdominal Pain and Dizziness    HPI  53 y/o man with DM2,  NAFLD recent ER visit for tachycardia here for urgent visit for abdominal pain and dizziness. Somewhat vague historian, many different symptoms/concerns. Recent treatment with cipro for prostatitis. Multiple urgent visits in past months for a variety of problems. Has not re-established with new PCP.     Abdominal pain - Soreness for past 4 days around navel, not sharp or severe pain, "feels like something is stuck in there". Hasn't noted any redness or irritation. Worse in the morning, gets better through the day. Improving in past 2 days. Also notes blood (bright red) in stool 2 days this week, reports has had this "off and on for awhile." Reports having had colonoscopy around 2009, thinks diverticulitis but not polyps. Pain in rectum with BM sometimes.   No nausea or vomiting. No fevers. Some loose stool 1 week ago, none since then.   Has probiotics at home, hasn't been taking.   Has been referred to GI in the past, hasn't gone yet.     Dizziness also for past 4 days, worst when laying down on left side, also sometimes when standing / moving around. Feels he has trouble focusing his eyes especially in the morning. Reports having balance problems chronically, worse this week. No falls, no syncope or presyncope.   Doesn't check blood sugars regularly. 3+ glucose in UA earlier this month.  Doesn't usually drink more than 20oz water during the day. Endorses frequent urination.   Also says he feels that his heart rate goes up sometimes (to ), hot flashes. Takes metoprolol XL 50mg BID (confirms BID).   Takes HCTZ and amlodipine in addition to metoprolol -- hasn't taken any of these medications this AM. Did take metoprolol around 11pm last night.    Saw ENT 10/2016 for similar symptoms. Had MRI ordered but hasn't had this done. Was recommended to return for further " "testing for vertigo, but also hasn't returned for this.    DM2 - Takes insulin 70/30 25-30u, more at nighttime - vague on dose, changes it without checking his BG, wife administers this, sometimes only takes this once a day. Not taking trulicity.     Review of Systems   Constitutional: Negative for appetite change and fever.   HENT: Positive for congestion. Negative for ear pain, sinus pain and sore throat.    Eyes: Positive for visual disturbance (vision more blurry in past few months).   Respiratory: Negative for cough and shortness of breath.    Cardiovascular: Positive for palpitations (tachycardia with activity sometimes) and leg swelling (chronic). Negative for chest pain.   Gastrointestinal: Positive for abdominal pain, blood in stool, diarrhea and rectal pain. Negative for abdominal distention, nausea and vomiting.   Endocrine: Positive for polyuria. Negative for polydipsia.   Genitourinary: Positive for frequency. Negative for difficulty urinating and dysuria.   Musculoskeletal: Negative for gait problem.   Skin: Negative for rash.   Neurological: Positive for dizziness and light-headedness. Negative for syncope, speech difficulty and weakness.         Past medical history, surgical history, and family medical history reviewed and updated as appropriate.    Medications and allergies reviewed.     Objective:          Vitals:    11/03/17 0808   BP: 117/81   BP Location: Right arm   Patient Position: Sitting   Pulse: 66   Temp: 98.1 °F (36.7 °C)   TempSrc: Oral   SpO2: 99%   Weight: (!) 159.4 kg (351 lb 6.6 oz)   Height: 5' 10" (1.778 m)     Body mass index is 50.42 kg/m².  Physical Exam   Constitutional: He is oriented to person, place, and time. No distress.   HENT:   Head: Normocephalic and atraumatic.   Right Ear: External ear and ear canal normal. Tympanic membrane is not erythematous.   Left Ear: External ear and ear canal normal. Tympanic membrane is not erythematous.   Nose: Mucosal edema present. " Right sinus exhibits no maxillary sinus tenderness and no frontal sinus tenderness. Left sinus exhibits no maxillary sinus tenderness and no frontal sinus tenderness.   Mouth/Throat: Oropharynx is clear and moist.   TM hazy bilaterally  Crowded oral airway, difficulty fully visualizing posterior oropharynx   Eyes: Conjunctivae and EOM are normal. Pupils are equal, round, and reactive to light. No scleral icterus.   +5 beat horizontal nystagmus bilaterally, no vertical nystagmus, endorses dizziness with leftward gaze   Neck: Neck supple.   Cardiovascular: Normal rate, regular rhythm and normal heart sounds.    No murmur heard.  Pulmonary/Chest: Effort normal and breath sounds normal. No respiratory distress.   Abdominal: Soft. Bowel sounds are normal. He exhibits no distension and no mass. There is tenderness (diffusely around umbilicus, mild). There is no rebound and no guarding. No hernia (no clear hernia noted).   Musculoskeletal: He exhibits edema (nonpitting edema b/l LE). He exhibits no tenderness.   Lymphadenopathy:     He has cervical adenopathy (difficult exam due to habitus, mild enlarged AC LN bilaterally, mild tenderness on L).   Neurological: He is alert and oriented to person, place, and time. He has normal strength. No cranial nerve deficit. He displays a negative Romberg sign. Gait normal.   Skin: Skin is warm and dry. He is not diaphoretic.   Psychiatric:   Somewhat blunted affect. Speech normal.    Vitals reviewed.      Lab Results   Component Value Date    WBC 7.75 10/09/2017    HGB 15.3 10/09/2017    HCT 43.2 10/09/2017     10/09/2017    CHOL 180 05/05/2017    TRIG 251 (H) 05/05/2017    HDL 28 (L) 05/05/2017    ALT 47 (H) 10/09/2017    AST 35 10/09/2017     10/09/2017    K 4.0 10/09/2017     10/09/2017    CREATININE 1.1 10/09/2017    BUN 13 10/09/2017    CO2 27 10/09/2017    TSH 2.675 10/11/2017    PSA 0.47 08/22/2017    INR 1.0 03/07/2017    HGBA1C 7.9 (H) 05/05/2017        Assessment:       1. Abdominal pain, unspecified abdominal location    2. Blood in stool    3. Dizziness    4. Uncontrolled type 2 diabetes mellitus with peripheral neuropathy        Plan:   García was seen today for abdominal pain and dizziness.    Diagnoses and all orders for this visit:    Abdominal pain, unspecified abdominal location - no sign of acute abdomen today. Has h/o chronic abdominal pain. Will check labs to evaluate further, but especially given his rectal bleeding with abdominal pain needs to f/u with GI. Symptoms not characteristic for diverticulitis but recent flare could be a mild diverticulitis; will get CT to further evaluate for this as well.   Return precautions given   -     CBC Without Differential; Future  -     Comprehensive metabolic panel; Future  -     POCT glucose  -     Lipase; Future  -     Ambulatory Referral to Gastroenterology  -     CT Abdomen Without Contrast; Future    Blood in stool  -     Ambulatory Referral to Gastroenterology    Dizziness  Comments:  mild orthostatic symptoms / vertigo with going from laying down to standing, but no change in HR. Encouraged much better hydration. Needs to follow up with ENT    Uncontrolled type 2 diabetes mellitus with peripheral neuropathy  Comments:  glucose today high but not severely elevated. concern for hypoglycemic episodes as well as hyperglycemia given that he is not checking his BG at home and is taking 70/30 insulin.   Orders:  -     Comprehensive metabolic panel; Future  -     POCT glucose  -     Hemoglobin A1c; Future    Recommended he schedule appt with GI, cardiology, ENT, and [facility] - patient agrees to schedule these with check-out today, also given clinic numbers.  Emphasized importance of establishing care with new PCP.    Return if symptoms worsen or fail to improve.    Lai Mcgee MD  Internal Medicine  [facility]  11/3/2017

## 2017-11-04 NOTE — PROGRESS NOTES
Labs reviewed, released to patient. No clear cause for his abdominal pain. DM uncontrolled, needs to f/u with [facility] - will forward to Krysta Scroggs to help expedite follow up.

## 2017-11-06 ENCOUNTER — TELEPHONE (OUTPATIENT)
Dept: ENDOCRINOLOGY | Facility: CLINIC | Age: 52
End: 2017-11-06

## 2017-11-06 ENCOUNTER — PATIENT MESSAGE (OUTPATIENT)
Dept: INTERNAL MEDICINE | Facility: CLINIC | Age: 52
End: 2017-11-06

## 2017-11-07 ENCOUNTER — PATIENT MESSAGE (OUTPATIENT)
Dept: INTERNAL MEDICINE | Facility: CLINIC | Age: 52
End: 2017-11-07

## 2017-11-07 ENCOUNTER — TELEPHONE (OUTPATIENT)
Dept: ENDOCRINOLOGY | Facility: CLINIC | Age: 52
End: 2017-11-07

## 2017-11-07 NOTE — TELEPHONE ENCOUNTER
Called patient to schedule appointment with Krysta Way NP as per Dr. Mcgee referral- patient explains that, he will need to call us back because, he is not sure of what date and time he will be able to come see [facility].  I offered various days and time, but patient just wants to call us when he is ready.

## 2017-11-09 ENCOUNTER — HOSPITAL ENCOUNTER (OUTPATIENT)
Dept: RADIOLOGY | Facility: HOSPITAL | Age: 52
Discharge: HOME | End: 2017-11-09
Attending: INTERNAL MEDICINE
Payer: COMMERCIAL

## 2017-11-09 ENCOUNTER — TELEPHONE (OUTPATIENT)
Dept: INTERNAL MEDICINE | Facility: CLINIC | Age: 52
End: 2017-11-09

## 2017-11-09 DIAGNOSIS — R10.9 ABDOMINAL PAIN, UNSPECIFIED ABDOMINAL LOCATION: Primary | ICD-10-CM

## 2017-11-09 DIAGNOSIS — R10.9 ABDOMINAL PAIN, UNSPECIFIED ABDOMINAL LOCATION: ICD-10-CM

## 2017-11-09 PROCEDURE — 76705 ECHO EXAM OF ABDOMEN: CPT | Mod: 26,,, | Performed by: RADIOLOGY

## 2017-11-09 PROCEDURE — 76705 ECHO EXAM OF ABDOMEN: CPT | Mod: TC

## 2017-11-09 NOTE — TELEPHONE ENCOUNTER
Pt's message regarding CT. Please advise:       I attempted to do the Ct scan, however because I am overweight and very claustrophobic I could not do the test. Could you order the ultrasound instead, as I am still having pain from middle stomach to my bladder area.

## 2017-11-09 NOTE — TELEPHONE ENCOUNTER
Called patient re: his recent messages. Unable to reach patient, vm full.  Message sent via patient portal - that while ultrasound would evaluate for hernia, it would not give a good view of his large or small intestine. Ultrasound ordered as patient not able to do CT currently; also gave return precautions to come in for re-eval if pain is worsening, and to follow up with GI as we discussed. In addition, patient hasn't scheduled any of the other follow up appts we discussed - reminded him to at least schedule with [facility] and ENT.

## 2017-11-09 NOTE — TELEPHONE ENCOUNTER
----- Message from Abbey Albert sent at 11/9/2017  8:53 AM CST -----  Contact: Pt  Pt stated he left a previous message regarding his CT scan appointment, and requesting to speak with the nurse.        Please call pt at [phone].        Thanks!

## 2017-11-10 ENCOUNTER — PATIENT MESSAGE (OUTPATIENT)
Dept: INTERNAL MEDICINE | Facility: CLINIC | Age: 52
End: 2017-11-10

## 2017-11-10 RX ORDER — LIDOCAINE HYDROCHLORIDE 20 MG/ML
JELLY TOPICAL ONCE
Status: CANCELLED | OUTPATIENT
Start: 2017-11-10 | End: 2017-11-10

## 2017-11-10 RX ORDER — CIPROFLOXACIN 500 MG/1
500 TABLET ORAL ONCE
Status: CANCELLED | OUTPATIENT
Start: 2017-11-10 | End: 2017-11-10

## 2017-11-11 NOTE — TELEPHONE ENCOUNTER
Patient requesting general work note about his chronic medical conditions. Not clear as to why he is requesting this.  Message sent to patient recommending he clarify with his employer and school what they would need, as a medical release of information form may need to be signed.   Also, I have only seen this patient for an urgent care visit; I also let him know he needs to establish ongoing care with a primary care provider and that his PCP would then be an appropriate MD to ask about this kind of note.    Message sent to patient

## 2017-11-15 ENCOUNTER — PATIENT MESSAGE (OUTPATIENT)
Dept: INTERNAL MEDICINE | Facility: CLINIC | Age: 52
End: 2017-11-15

## 2017-11-20 ENCOUNTER — HOSPITAL ENCOUNTER (OUTPATIENT)
Dept: UROLOGY | Facility: HOSPITAL | Age: 52
Discharge: HOME | End: 2017-11-20
Payer: COMMERCIAL

## 2017-12-07 ENCOUNTER — HOSPITAL ENCOUNTER (OUTPATIENT)
Dept: RADIOLOGY | Facility: HOSPITAL | Age: 52
Discharge: HOME | End: 2017-12-07
Attending: INTERNAL MEDICINE
Payer: COMMERCIAL

## 2017-12-07 ENCOUNTER — OFFICE VISIT (OUTPATIENT)
Dept: INTERNAL MEDICINE | Facility: CLINIC | Age: 52
End: 2017-12-07
Payer: COMMERCIAL

## 2017-12-07 VITALS
SYSTOLIC BLOOD PRESSURE: 122 MMHG | HEART RATE: 68 BPM | BODY MASS INDEX: 45.1 KG/M2 | DIASTOLIC BLOOD PRESSURE: 76 MMHG | HEIGHT: 70 IN | OXYGEN SATURATION: 95 % | WEIGHT: 315 LBS | TEMPERATURE: 98 F

## 2017-12-07 DIAGNOSIS — R35.0 URINARY FREQUENCY: ICD-10-CM

## 2017-12-07 DIAGNOSIS — L85.3 DRY SKIN DERMATITIS: ICD-10-CM

## 2017-12-07 DIAGNOSIS — E11.65 TYPE 2 DIABETES MELLITUS WITH HYPERGLYCEMIA, WITH LONG-TERM CURRENT USE OF INSULIN: Chronic | ICD-10-CM

## 2017-12-07 DIAGNOSIS — J98.01 BRONCHOSPASM: Primary | ICD-10-CM

## 2017-12-07 DIAGNOSIS — E66.01 MORBID OBESITY: ICD-10-CM

## 2017-12-07 DIAGNOSIS — Z79.4 TYPE 2 DIABETES MELLITUS WITH HYPERGLYCEMIA, WITH LONG-TERM CURRENT USE OF INSULIN: Chronic | ICD-10-CM

## 2017-12-07 DIAGNOSIS — R60.9 EDEMA, UNSPECIFIED TYPE: ICD-10-CM

## 2017-12-07 DIAGNOSIS — J98.01 BRONCHOSPASM: ICD-10-CM

## 2017-12-07 LAB
BILIRUB UR QL STRIP: NEGATIVE
CLARITY UR REFRACT.AUTO: ABNORMAL
COLOR UR AUTO: YELLOW
GLUCOSE UR QL STRIP: ABNORMAL
HGB UR QL STRIP: NEGATIVE
KETONES UR QL STRIP: NEGATIVE
LEUKOCYTE ESTERASE UR QL STRIP: NEGATIVE
NITRITE UR QL STRIP: NEGATIVE
PH UR STRIP: 7 [PH] (ref 5–8)
PROT UR QL STRIP: NEGATIVE
SP GR UR STRIP: 1.02 (ref 1–1.03)
URN SPEC COLLECT METH UR: ABNORMAL
UROBILINOGEN UR STRIP-ACNC: NEGATIVE EU/DL

## 2017-12-07 PROCEDURE — 71020 XR CHEST PA AND LATERAL: CPT | Mod: 26,,, | Performed by: RADIOLOGY

## 2017-12-07 PROCEDURE — 87086 URINE CULTURE/COLONY COUNT: CPT

## 2017-12-07 PROCEDURE — 71020 XR CHEST PA AND LATERAL: CPT | Mod: TC

## 2017-12-07 PROCEDURE — 99214 OFFICE O/P EST MOD 30 MIN: CPT | Mod: S$GLB,,, | Performed by: INTERNAL MEDICINE

## 2017-12-07 PROCEDURE — 81003 URINALYSIS AUTO W/O SCOPE: CPT

## 2017-12-07 PROCEDURE — 99999 PR PBB SHADOW E&M-EST. PATIENT-LVL IV: CPT | Mod: PBBFAC,,, | Performed by: INTERNAL MEDICINE

## 2017-12-07 RX ORDER — ALBUTEROL SULFATE 90 UG/1
2 AEROSOL, METERED RESPIRATORY (INHALATION) EVERY 6 HOURS PRN
Qty: 1 EACH | Refills: 0 | Status: SHIPPED | OUTPATIENT
Start: 2017-12-07 | End: 2018-05-30

## 2017-12-07 RX ORDER — ALBUTEROL SULFATE 0.63 MG/3ML
0.63 SOLUTION RESPIRATORY (INHALATION) EVERY 6 HOURS PRN
Qty: 1 BOX | Refills: 0 | Status: SHIPPED | OUTPATIENT
Start: 2017-12-07 | End: 2017-12-07

## 2017-12-07 RX ORDER — TRIAMCINOLONE ACETONIDE 1 MG/G
CREAM TOPICAL 2 TIMES DAILY
Qty: 45 G | Refills: 0 | Status: SHIPPED | OUTPATIENT
Start: 2017-12-07 | End: 2018-01-24

## 2017-12-07 NOTE — PROGRESS NOTES
Subjective:       Patient ID: García Tang is a 52 y.o. male.    Chief Complaint: Shortness of Breath (rattling feeling in chest); Urinary Frequency; and Rash (back of thighs and legs)    HPI   C/o rattling in chest x 2 weeks, intermittently.  Not coughing.  Aware of a whistle when he talks.  Assoc with sob.  No h/o asthma.  No cold symptoms.  occas gerd.  Had normal cxr in October, 2017.  Some pain below R breast, and somewhat L lower breath, not pleuritic.  Not tender.  Sharp pain.  Not positional.  Noticed it with awakening yesterday; improved with tylenol.    Taking abx for uti from august to october.  C/o incr frequency of urination.  He has h/o prostatitis.  Saw urology late September.  Some hesitancy.  Urine odor.  More pressure than usual.  Intermittent groin pain after urination.  He has uncontrolled DM.      Calfs and back of legs itching x 3 weeks.  No rash.  Bothersome at night.   Moisturizer mildly helpful.  Chronic edema of legs.  Nl echo 2/16.    He has not established with new PCP.  Review of Systems   Constitutional: Negative for activity change and unexpected weight change.   Respiratory: Negative for chest tightness and shortness of breath.    Cardiovascular: Negative for chest pain and leg swelling.   Gastrointestinal: Negative for abdominal pain.   Neurological: Negative for headaches.   Psychiatric/Behavioral: Negative for dysphoric mood.       Objective:      Physical Exam   Constitutional: He is oriented to person, place, and time. He appears well-developed and well-nourished.   HENT:   Mouth/Throat: Oropharynx is clear and moist. No oropharyngeal exudate.   Tm's clear   Eyes: Right eye exhibits no discharge. Left eye exhibits no discharge.   Neck: Normal range of motion. Neck supple.   Cardiovascular: Normal rate and regular rhythm.    Pulmonary/Chest: Effort normal and breath sounds normal. No respiratory distress. He has no wheezes. He has no rales.   Mild rhonchi   Lymphadenopathy:      He has no cervical adenopathy.   Neurological: He is alert and oriented to person, place, and time.   Skin:   Dry skin post thigh.  Excoriations post thighs.     Psychiatric: He has a normal mood and affect. His behavior is normal.       Assessment:       1. Bronchospasm    2. Urinary frequency    3. Edema, unspecified type    4. Morbid obesity    5. Type 2 diabetes mellitus with hyperglycemia, with long-term current use of insulin    6. Dry skin dermatitis        Plan:       García was seen today for shortness of breath, urinary frequency and rash.    Diagnoses and all orders for this visit:    Bronchospasm  -     X-Ray Chest PA And Lateral; Future    Urinary frequency  -     Urinalysis  -     Urine culture    Edema, unspecified type    Morbid obesity    Type 2 diabetes mellitus with hyperglycemia, with long-term current use of insulin    Dry skin dermatitis    Other orders  -     Discontinue: albuterol (ACCUNEB) 0.63 mg/3 mL Nebu; Take 3 mLs (0.63 mg total) by nebulization every 6 (six) hours as needed. Rescue  -     albuterol 90 mcg/actuation inhaler; Inhale 2 puffs into the lungs every 6 (six) hours as needed for Wheezing.  -     triamcinolone acetonide 0.1% (KENALOG) 0.1 % cream; Apply topically 2 (two) times daily.       compression stockings      Wt loss    Addendum - cxr negative.

## 2017-12-08 LAB — BACTERIA UR CULT: NORMAL

## 2018-01-03 ENCOUNTER — HOSPITAL ENCOUNTER (OUTPATIENT)
Dept: RADIOLOGY | Facility: HOSPITAL | Age: 53
Discharge: HOME | End: 2018-01-03
Attending: NURSE PRACTITIONER
Payer: COMMERCIAL

## 2018-01-03 ENCOUNTER — OFFICE VISIT (OUTPATIENT)
Dept: INTERNAL MEDICINE | Facility: CLINIC | Age: 53
End: 2018-01-03
Payer: COMMERCIAL

## 2018-01-03 ENCOUNTER — TELEPHONE (OUTPATIENT)
Dept: INTERNAL MEDICINE | Facility: CLINIC | Age: 53
End: 2018-01-03

## 2018-01-03 VITALS
WEIGHT: 315 LBS | BODY MASS INDEX: 45.1 KG/M2 | SYSTOLIC BLOOD PRESSURE: 156 MMHG | HEART RATE: 66 BPM | HEIGHT: 70 IN | OXYGEN SATURATION: 98 % | DIASTOLIC BLOOD PRESSURE: 84 MMHG

## 2018-01-03 DIAGNOSIS — S99.922A INJURY OF TOE ON LEFT FOOT, INITIAL ENCOUNTER: ICD-10-CM

## 2018-01-03 DIAGNOSIS — E11.22 CKD STAGE 2 DUE TO TYPE 2 DIABETES MELLITUS: Chronic | ICD-10-CM

## 2018-01-03 DIAGNOSIS — R42 EPISODIC LIGHTHEADEDNESS: ICD-10-CM

## 2018-01-03 DIAGNOSIS — M79.675 TOE PAIN, LEFT: Primary | ICD-10-CM

## 2018-01-03 DIAGNOSIS — K76.0 FATTY LIVER DISEASE, NONALCOHOLIC: ICD-10-CM

## 2018-01-03 DIAGNOSIS — E11.65 TYPE 2 DIABETES MELLITUS WITH HYPERGLYCEMIA, WITH LONG-TERM CURRENT USE OF INSULIN: Chronic | ICD-10-CM

## 2018-01-03 DIAGNOSIS — M79.675 TOE PAIN, LEFT: ICD-10-CM

## 2018-01-03 DIAGNOSIS — I10 ESSENTIAL HYPERTENSION: Chronic | ICD-10-CM

## 2018-01-03 DIAGNOSIS — Z79.4 TYPE 2 DIABETES MELLITUS WITH HYPERGLYCEMIA, WITH LONG-TERM CURRENT USE OF INSULIN: Chronic | ICD-10-CM

## 2018-01-03 DIAGNOSIS — S92.513A: Primary | ICD-10-CM

## 2018-01-03 DIAGNOSIS — N18.2 CKD STAGE 2 DUE TO TYPE 2 DIABETES MELLITUS: Chronic | ICD-10-CM

## 2018-01-03 PROCEDURE — 73660 X-RAY EXAM OF TOE(S): CPT | Mod: TC

## 2018-01-03 PROCEDURE — 99214 OFFICE O/P EST MOD 30 MIN: CPT | Mod: S$GLB,,, | Performed by: NURSE PRACTITIONER

## 2018-01-03 PROCEDURE — 99999 PR PBB SHADOW E&M-EST. PATIENT-LVL V: CPT | Mod: PBBFAC,,, | Performed by: NURSE PRACTITIONER

## 2018-01-03 PROCEDURE — 73660 X-RAY EXAM OF TOE(S): CPT | Mod: 26,LT,, | Performed by: RADIOLOGY

## 2018-01-03 NOTE — TELEPHONE ENCOUNTER
Xray of the toe- shows displaced fracture in the distal phalanx of the little toe - will refer to Dr. Alex Chawla

## 2018-01-03 NOTE — PROGRESS NOTES
"INTERNAL MEDICINE URGENT CARE NOTE    CHIEF COMPLAINT     Chief Complaint   Patient presents with    Toe Pain     L foot, sunday hit foot discoloration/ hurts when applied pressure     Dizziness     "dizzy spells for last couple of weeks"       HPI     García Tang is a 52 y.o. male with HTN, DM, NAFLD, kidney stones, and obesity who presents for an urgent visit today.    Toe pain- pain and discoloration to the left 5th and great toes since Sunday. 5th toe with more pain and swelling and discoloration- describes as black and blue Struck foot on bed.  Pain when wearing shoes and when walking. Not improving.      Dizzy spells- describes as pre-syncopal feeling when changing positions and when at rest. Associated with heart palpations. Checks fitbit and heart rate is 60-70's.   One episode this week when driving. Denies chest pain. +SOB- and wheezing occasionally for weeks.   Does not check blood sugar or BPs during event.   Cardiology work up for this 5/2017. Reviewed notes       DM- running 180-220's. Compliant with 70/30. A1c 7.9 11/13/2017      Past Medical History:  Past Medical History:   Diagnosis Date    CKD stage 2 due to type 2 diabetes mellitus 5/18/2017    Essential hypertension 7/18/2016    Fatty liver disease, nonalcoholic 4/7/2016    Us, 2006     HLD (hyperlipidemia)     Inflammatory bowel disease     diverticulitis    Kidney stone     requiring stent    Morbid obesity 5/29/2014    NAFLD (nonalcoholic fatty liver disease)     Near syncope 5/23/2017    Obesity hypoventilation syndrome 2/22/2016    Prostatitis     Rectal bleeding     diverticulitis    Type 2 diabetes mellitus with hyperglycemia, with long-term current use of insulin 5/18/2017    Uncontrolled type 2 diabetes mellitus with peripheral neuropathy 5/29/2014       Home Medications:  Prior to Admission medications    Medication Sig Start Date End Date Taking? Authorizing Provider   albuterol 90 mcg/actuation inhaler Inhale 2 " puffs into the lungs every 6 (six) hours as needed for Wheezing. 12/7/17  Yes Katya Carreon MD   aspirin (ECOTRIN) 81 MG EC tablet Take 81 mg by mouth once daily.   Yes Historical Provider, MD   DOCOSAHEXANOIC ACID/EPA (FISH OIL ORAL) Take 1 capsule by mouth once daily at 6am.    Yes Historical Provider, MD   ERGOCALCIFEROL, VITAMIN D2, (VITAMIN D ORAL) Take 1 capsule by mouth once daily at 6am.    Yes Historical Provider, MD   hydrochlorothiazide (MICROZIDE) 12.5 mg capsule 12.5 mg 2 (two) times daily.  4/18/17  Yes Historical Provider, MD   insulin NPH-insulin regular, 70/30, (NOVOLIN 70/30) 100 unit/mL (70-30) injection Inject into the skin 2 (two) times daily. 27 in am and 24 in pm   Yes Historical Provider, MD   metoprolol succinate (TOPROL XL) 50 MG 24 hr tablet Take 1 tablet (50 mg total) by mouth 2 (two) times daily. 10/9/17  Yes KUSUM Cervantes   multivitamin capsule Take by mouth. 1 Capsule Oral Every day   Yes Historical Provider, MD   amlodipine (NORVASC) 5 MG tablet Take 1 tablet (5 mg total) by mouth once daily. 10/9/17 12/7/17  KUSUM Cervantes   clotrimazole (LOTRIMIN) 1 % cream Apply topically 2 (two) times daily. Apply bid to affected areas x 1 wk 7/6/17   Anthony Rockwell MD   triamcinolone acetonide 0.1% (KENALOG) 0.1 % cream Apply topically 2 (two) times daily. 12/7/17 12/17/17  Katya Carreon MD       Review of Systems:  Review of Systems   Constitutional: Negative for chills and fever.   HENT: Negative for congestion, rhinorrhea, sinus pressure and sore throat.    Eyes: Negative for visual disturbance.   Respiratory: Positive for shortness of breath and wheezing. Negative for cough.    Cardiovascular: Negative for chest pain, palpitations and leg swelling.   Gastrointestinal: Negative for abdominal pain, constipation, diarrhea, nausea and vomiting.   Genitourinary: Negative for dysuria, frequency and urgency.   Musculoskeletal: Positive for arthralgias (toe pain).  "Negative for myalgias.   Skin: Negative for rash.   Neurological: Positive for light-headedness. Negative for dizziness and headaches.       Health Maintainence:   Immunizations:  Health Maintenance       Date Due Completion Date    Pneumococcal PPSV23 (Medium Risk) (1) 10/11/1984 ---    Colonoscopy 10/11/2016 ---    Influenza Vaccine 08/01/2017 10/12/2016 (Declined)    Override on 10/12/2016: Declined    Urine Microalbumin 02/23/2018 2/23/2017    Eye Exam 03/28/2018 3/28/2017 (Done)    Override on 3/28/2017: Done (2 weeks ago - outside of [facility] - new glasses)    Override on 2/15/2016: Done (outside eye doc)    Hemoglobin A1c 05/03/2018 11/3/2017    Lipid Panel 05/05/2018 5/5/2017    Foot Exam 05/18/2018 5/18/2017    TETANUS VACCINE 05/15/2019 5/15/2009           PHYSICAL EXAM     BP (!) 130/98 (BP Location: Left arm, Patient Position: Sitting, BP Method: Large (Manual))   Pulse 73   Ht 5' 10" (1.778 m)   Wt (!) 159.7 kg (352 lb 1.2 oz)   SpO2 (!) 91%   BMI 50.52 kg/m²     Physical Exam   Constitutional: He is oriented to person, place, and time. He appears well-developed and well-nourished.   HENT:   Head: Normocephalic.   Right Ear: External ear normal.   Left Ear: External ear normal.   Nose: Nose normal.   Mouth/Throat: Oropharynx is clear and moist. No oropharyngeal exudate.   Eyes: Pupils are equal, round, and reactive to light.   Neck: No JVD present. No tracheal deviation present. No thyromegaly present.   Cardiovascular: Normal rate, regular rhythm and intact distal pulses.  Exam reveals no gallop and no friction rub.    No murmur heard.  Pulmonary/Chest: Breath sounds normal. No respiratory distress. He has no wheezes. He has no rales. He exhibits no tenderness.   Abdominal: Soft. Bowel sounds are normal. He exhibits no distension. There is no tenderness.   Musculoskeletal: Normal range of motion. He exhibits no edema.        Left foot: There is tenderness and bony tenderness. There is normal range " of motion, normal capillary refill, no crepitus, no deformity and no laceration.        Feet:    Lymphadenopathy:     He has no cervical adenopathy.   Neurological: He is alert and oriented to person, place, and time.   Skin: Skin is warm and dry. No rash noted.   Psychiatric: He has a normal mood and affect. His behavior is normal.   Vitals reviewed.      LABS     Lab Results   Component Value Date    HGBA1C 7.9 (H) 11/03/2017     CMP  Sodium   Date Value Ref Range Status   11/03/2017 137 136 - 145 mmol/L Final     Potassium   Date Value Ref Range Status   11/03/2017 4.5 3.5 - 5.1 mmol/L Final     Chloride   Date Value Ref Range Status   11/03/2017 103 95 - 110 mmol/L Final     CO2   Date Value Ref Range Status   11/03/2017 26 23 - 29 mmol/L Final     Glucose   Date Value Ref Range Status   11/03/2017 186 (H) 70 - 110 mg/dL Final     BUN, Bld   Date Value Ref Range Status   11/03/2017 14 6 - 20 mg/dL Final     Creatinine   Date Value Ref Range Status   11/03/2017 1.0 0.5 - 1.4 mg/dL Final     Calcium   Date Value Ref Range Status   11/03/2017 9.3 8.7 - 10.5 mg/dL Final     Total Protein   Date Value Ref Range Status   11/03/2017 7.3 6.0 - 8.4 g/dL Final     Albumin   Date Value Ref Range Status   11/03/2017 3.7 3.5 - 5.2 g/dL Final     Total Bilirubin   Date Value Ref Range Status   11/03/2017 0.5 0.1 - 1.0 mg/dL Final     Comment:     For infants and newborns, interpretation of results should be based  on gestational age, weight and in agreement with clinical  observations.  Premature Infant recommended reference ranges:  Up to 24 hours.............<8.0 mg/dL  Up to 48 hours............<12.0 mg/dL  3-5 days..................<15.0 mg/dL  6-29 days.................<15.0 mg/dL       Alkaline Phosphatase   Date Value Ref Range Status   11/03/2017 85 55 - 135 U/L Final     AST   Date Value Ref Range Status   11/03/2017 30 10 - 40 U/L Final     ALT   Date Value Ref Range Status   11/03/2017 41 10 - 44 U/L Final      Anion Gap   Date Value Ref Range Status   11/03/2017 8 8 - 16 mmol/L Final     eGFR if    Date Value Ref Range Status   11/03/2017 >60 >60 mL/min/1.73 m^2 Final     eGFR if non    Date Value Ref Range Status   11/03/2017 >60 >60 mL/min/1.73 m^2 Final     Comment:     Calculation used to obtain the estimated glomerular filtration  rate (eGFR) is the CKD-EPI equation.        Lab Results   Component Value Date    WBC 5.63 11/03/2017    HGB 13.9 (L) 11/03/2017    HCT 40.8 11/03/2017    MCV 84 11/03/2017     11/03/2017     Lab Results   Component Value Date    CHOL 180 05/05/2017    CHOL 182 02/23/2017    CHOL 157 05/23/2016     Lab Results   Component Value Date    HDL 28 (L) 05/05/2017    HDL 26 (L) 02/23/2017    HDL 29 (L) 05/23/2016     Lab Results   Component Value Date    LDLCALC 101.8 05/05/2017    LDLCALC 90.8 02/23/2017    LDLCALC 103.6 05/23/2016     Lab Results   Component Value Date    TRIG 251 (H) 05/05/2017    TRIG 326 (H) 02/23/2017    TRIG 122 05/23/2016     Lab Results   Component Value Date    CHOLHDL 15.6 (L) 05/05/2017    CHOLHDL 14.3 (L) 02/23/2017    CHOLHDL 18.5 (L) 05/23/2016     Lab Results   Component Value Date    TSH 2.675 10/11/2017       ASSESSMENT/PLAN     García Tang is a 52 y.o. male with  Past Medical History:   Diagnosis Date    CKD stage 2 due to type 2 diabetes mellitus 5/18/2017    Essential hypertension 7/18/2016    Fatty liver disease, nonalcoholic 4/7/2016    Us, 2006     HLD (hyperlipidemia)     Inflammatory bowel disease     diverticulitis    Kidney stone     requiring stent    Morbid obesity 5/29/2014    NAFLD (nonalcoholic fatty liver disease)     Near syncope 5/23/2017    Obesity hypoventilation syndrome 2/22/2016    Prostatitis     Rectal bleeding     diverticulitis    Type 2 diabetes mellitus with hyperglycemia, with long-term current use of insulin 5/18/2017    Uncontrolled type 2 diabetes mellitus with  peripheral neuropathy 5/29/2014     Toe pain, left- placed in walking boot. Xray with minimally displaced fracture, will refer to Dr. House. Elevated, ice and nsaids as needed   -     X-Ray Toe 2 or More Views Left; Future; Expected date: 01/03/2018    Injury of toe on left foot, initial encounter  -     X-Ray Toe 2 or More Views Left; Future; Expected date: 01/03/2018    Episodic lightheadedness- reviewed cardiac work up negative. Orthostatic BPs normal today. Will discuss possible neurology referral with Dr. Mcgee.     Essential hypertension- elevated today, ?2/2 pain. Will recheck at f/u     Uncontrolled type 2 diabetes mellitus with peripheral neuropathy- ?source of lightheadness    Type 2 diabetes mellitus with hyperglycemia, with long-term current use of insulin-stable. A1c near goal. Discussed medication compliance and low carb diet.     CKD stage 2 due to type 2 diabetes mellitus    Fatty liver disease, nonalcoholic          Follow up as needed and with PCP for routine f/u     Patient education provided from [place]. Patient was counseled on when and how to seek emergent care.       Freya HARVEY, APRN, FNP-c   Department of Internal Medicine - [facility]  11:01 AM

## 2018-01-04 ENCOUNTER — TELEPHONE (OUTPATIENT)
Dept: INTERNAL MEDICINE | Facility: CLINIC | Age: 53
End: 2018-01-04

## 2018-01-04 NOTE — TELEPHONE ENCOUNTER
Spoke with pt, transferred call to have him schedule with Dr. House.   Discussed staying hydrated. If lightheadedness continues will refer to neurology.

## 2018-01-04 NOTE — TELEPHONE ENCOUNTER
----- Message from Lai Mcgee MD sent at 1/3/2018  4:38 PM CST -----  Freya - I only saw him once for an urgent care visit; from my notes then, I was concerned that he wasn't keeping well-hydrated and had uncontrolled diabetes, so more orthostatic symptoms (especially given that he's taking multiple meds for HTN) than neuropathic, though certainly a neuropathy wouldn't help.   I don't recall seeing signs that would point to a clear neurologic cause of dizziness, so I'd defer to your more recent evaluation.  I did tell him that he needs to establish consistent follow up with one primary care MD, as he comes in frequently and sees a different provider each time.    Thanks,  Lai Mcgee MD        ----- Message -----  From: Freya Rasmussen-LISA Jama  Sent: 1/3/2018   4:09 PM  To: MD Dr. Arely Simmons,   Mr. Tang was seen today with a minimally displaced 5th toe fracture. I placed him  In a boot and referred him to ortho (Dr. House).   He also complained of lightheadness and pre-syncopal feelings. I saw that he had an extensive cardiology work up for this earlier this year which was negative. He has also been seen by ENT for vertigo with an initially negative work up (did not have MRI, though). Do you think a neurology consult would be beneficial?   Could this be 2/2 neuropathy?  Thanks,   Freya

## 2018-01-05 ENCOUNTER — TELEPHONE (OUTPATIENT)
Dept: ORTHOPEDICS | Facility: CLINIC | Age: 53
End: 2018-01-05

## 2018-01-05 NOTE — TELEPHONE ENCOUNTER
Spoke with pt's wife.  States pt is diabetic.  Fx of 5th toe.   Advised wife that I had resident MD look at x-rays.  No reduction needed.  Advised wife that I will show x-ray to Dr House Monday am for next step in treatment

## 2018-01-05 NOTE — TELEPHONE ENCOUNTER
"----- Message from Ismael Andres sent at 1/5/2018  2:04 PM CST -----  Contact: Pearl (spouse)/[phone]  Pt's spouse, Pearl, called in requesting pt to be seen by Dr. House for a consult per Nurse Practitioner Freya Anne for, "S92.513A (ICD-10-CM) - Displaced fracture of proximal phalanx of unspecified lesser toe(s), initial encounter for closed fracture."  The injury was sustained this past week, but Pearl states that pt was informed the pt needed to be seen as soon as possible by Dr. House.  Pearl can be reached at [phone].  # I scheduled pt for 1/25 and added pt to the wait list.   Pt sustained this injury by stubbing toe on the bed at home.      "

## 2018-01-08 ENCOUNTER — PATIENT MESSAGE (OUTPATIENT)
Dept: INTERNAL MEDICINE | Facility: CLINIC | Age: 53
End: 2018-01-08

## 2018-01-08 ENCOUNTER — OFFICE VISIT (OUTPATIENT)
Dept: ORTHOPEDICS | Facility: CLINIC | Age: 53
End: 2018-01-08
Payer: COMMERCIAL

## 2018-01-08 ENCOUNTER — TELEPHONE (OUTPATIENT)
Dept: INTERNAL MEDICINE | Facility: CLINIC | Age: 53
End: 2018-01-08

## 2018-01-08 ENCOUNTER — TELEPHONE (OUTPATIENT)
Dept: ORTHOPEDICS | Facility: CLINIC | Age: 53
End: 2018-01-08

## 2018-01-08 VITALS — HEIGHT: 70 IN | WEIGHT: 315 LBS | BODY MASS INDEX: 45.1 KG/M2

## 2018-01-08 DIAGNOSIS — R42 LIGHTHEADEDNESS: Primary | ICD-10-CM

## 2018-01-08 DIAGNOSIS — S92.535A CLOSED NONDISPLACED FRACTURE OF DISTAL PHALANX OF LESSER TOE OF LEFT FOOT, INITIAL ENCOUNTER: Primary | ICD-10-CM

## 2018-01-08 PROCEDURE — 28510 TREATMENT OF TOE FRACTURE: CPT | Mod: T4,S$GLB,, | Performed by: ORTHOPAEDIC SURGERY

## 2018-01-08 PROCEDURE — 99202 OFFICE O/P NEW SF 15 MIN: CPT | Mod: 57,S$GLB,, | Performed by: ORTHOPAEDIC SURGERY

## 2018-01-08 PROCEDURE — 99999 PR PBB SHADOW E&M-EST. PATIENT-LVL II: CPT | Mod: PBBFAC,,, | Performed by: ORTHOPAEDIC SURGERY

## 2018-01-08 NOTE — TELEPHONE ENCOUNTER
----- Message from Erik Love sent at 1/8/2018 11:19 AM CST -----  Contact: Self [phone]  Pt states he was seen last week and was told if the lightheadedness continues he should get set up with Neurology and he would like to get a referral ,please call

## 2018-01-08 NOTE — PROGRESS NOTES
This is a new patient.    CHIEF COMPLAINT:  Left broken toe.    SUMMARY:  This is a 52-year-old gentleman with diabetic neuropathy who injured   his left fifth toe about 10 days ago now.  He states initially his toe got real   swollen and black and blue and comes in mainly because of his concern for his   diabetic neuropathy.  He is currently wearing a short fracture boot.  He does   not report that there were any breaks in the skin.  Then report any fevers.    PAST MEDICAL HISTORY:  Also remarkable for liver disease, inflammatory bowel   disease as well as others listed in the electronic medical record.    SOCIAL HISTORY:  Tobacco use, not reported.  Alcohol use, not reported.    MEDICATIONS:  Listed in the electronic medical record.    ALLERGIES:  Include BACTRIM and PHENERGAN and others listed in electronic   medical record.    REVIEW OF SYSTEMS:  Negative for any chest pain, shortness of breath, fevers or   weight loss.    PHYSICAL EXAMINATION:  GENERAL:  This is a well-developed, well-nourished, 5 feet 10 inches, weight 347   pounds male, who walks in with an antalgic gait with a boot on.  I removed his   boot.  There is a moderate amount of swelling of his left fifth toe.  There is   some mild erythema, but no ecchymosis at this time.  He does have some   tenderness in the distal part of the toe.  He has palpable pulses and he does   have protective sensation in his foot.    I reviewed the x-ray of his foot that was done previously and there is a   nondisplaced fracture of the distal phalanx of his fifth toe.    IMPRESSION:  1.  Left fifth toe distal phalanx fracture.  2.  Diabetes with peripheral neuropathy.    RECOMMENDATIONS:  Treatment options were discussed with  Kitty.  His   neuropathy is more painful at this point as opposed to a loss of sensation.  His   toe is in good alignment and does not require any surgical intervention, so I   would recommend closed treatment.  He can progress out of the  boot into shoes as   the swelling and pain allows.  I am going to have him return to see me for a   final check in about six weeks if necessary.      IRVIN/IN  dd: 01/08/2018 13:46:27 (CST)  td: 01/09/2018 08:08:11 (CST)  Doc ID   #[id number]  Job ID #[id number]    CC:     This office note has been dictated.

## 2018-01-09 ENCOUNTER — OFFICE VISIT (OUTPATIENT)
Dept: NEUROLOGY | Facility: CLINIC | Age: 53
End: 2018-01-09
Payer: COMMERCIAL

## 2018-01-09 ENCOUNTER — OFFICE VISIT (OUTPATIENT)
Dept: CARDIOLOGY | Facility: CLINIC | Age: 53
End: 2018-01-09
Payer: COMMERCIAL

## 2018-01-09 VITALS
HEART RATE: 79 BPM | HEIGHT: 70 IN | OXYGEN SATURATION: 96 % | BODY MASS INDEX: 45.1 KG/M2 | WEIGHT: 315 LBS | SYSTOLIC BLOOD PRESSURE: 134 MMHG | DIASTOLIC BLOOD PRESSURE: 74 MMHG

## 2018-01-09 VITALS
SYSTOLIC BLOOD PRESSURE: 144 MMHG | BODY MASS INDEX: 45.1 KG/M2 | WEIGHT: 315 LBS | DIASTOLIC BLOOD PRESSURE: 89 MMHG | HEIGHT: 70 IN | HEART RATE: 87 BPM

## 2018-01-09 DIAGNOSIS — R42 DIZZINESS: ICD-10-CM

## 2018-01-09 DIAGNOSIS — R27.8 SENSORY ATAXIA: ICD-10-CM

## 2018-01-09 DIAGNOSIS — Z79.4 TYPE 2 DIABETES MELLITUS WITH DIABETIC POLYNEUROPATHY, WITH LONG-TERM CURRENT USE OF INSULIN: ICD-10-CM

## 2018-01-09 DIAGNOSIS — R26.81 GAIT INSTABILITY: Primary | ICD-10-CM

## 2018-01-09 DIAGNOSIS — H81.02 MENIERE'S DISEASE OF LEFT EAR: ICD-10-CM

## 2018-01-09 DIAGNOSIS — R55 SYNCOPE, UNSPECIFIED SYNCOPE TYPE: ICD-10-CM

## 2018-01-09 DIAGNOSIS — E66.01 MORBID OBESITY: ICD-10-CM

## 2018-01-09 DIAGNOSIS — E11.42 TYPE 2 DIABETES MELLITUS WITH DIABETIC POLYNEUROPATHY, WITH LONG-TERM CURRENT USE OF INSULIN: ICD-10-CM

## 2018-01-09 PROBLEM — H81.09 MENIERE'S DISEASE: Status: ACTIVE | Noted: 2018-01-09

## 2018-01-09 PROCEDURE — 99213 OFFICE O/P EST LOW 20 MIN: CPT | Mod: S$GLB,,, | Performed by: INTERNAL MEDICINE

## 2018-01-09 PROCEDURE — 99214 OFFICE O/P EST MOD 30 MIN: CPT | Mod: S$GLB,,, | Performed by: PSYCHIATRY & NEUROLOGY

## 2018-01-09 PROCEDURE — 99999 PR PBB SHADOW E&M-EST. PATIENT-LVL IV: CPT | Mod: PBBFAC,,, | Performed by: INTERNAL MEDICINE

## 2018-01-09 PROCEDURE — 99999 PR PBB SHADOW E&M-EST. PATIENT-LVL IV: CPT | Mod: PBBFAC,,, | Performed by: PSYCHIATRY & NEUROLOGY

## 2018-01-09 NOTE — LETTER
January 9, 2018      Lai Son MD  [address]  [address]  [facility] [address]           [facility] - Cardiology  [address]  [place] [address]  Phone: [phone]          Patient: García Tang   MR Number: [id number]   YOB: 1965   Date of Visit: 1/9/2018       Dear Dr. Lai Son:    Thank you for referring García Tang to me for evaluation. Attached you will find relevant portions of my assessment and plan of care.    If you have questions, please do not hesitate to call me. I look forward to following García Tang along with you.    Sincerely,    Armando Marti MD    Enclosure  CC:  No Recipients    If you would like to receive this communication electronically, please contact [email or web address] or [phone] to request more information on [employer] Mobile Link access.    For providers and/or their staff who would like to refer a patient to [facility], please contact us through our one-stop-shop provider referral line, [facility], at [phone].    If you feel you have received this communication in error or would no longer like to receive these types of communications, please e-mail [email or web address]

## 2018-01-09 NOTE — ASSESSMENT & PLAN NOTE
Work-up for this problem has already been performed and has been negative for heart and thyroid disease. Specifically, there is no objective evidence of arrhythmia when symptoms are present. Presently symptoms are mild and appear to be fairly well controlled by metoprolol.    I have asked the pt to keep a log book of the episodes of palpitations and report both the heart rate and blood pressure when dizziness is present. He will contact me prn.

## 2018-01-09 NOTE — PROGRESS NOTES
Patient ID:  García Tang is a 52 y.o. male who presents for evaluation of dizziness.     Pt has been referred for the evalution of dizziness. He has had similar episodes since 2014. Presently he complains of dizziness that lasts for a few minutes; episodes can occur at rest, are not associated with chest pain and resolve spontaneously.  Occasionally they associate with shortness of breath but not with chest pain. No near-syncope/syncope. Such episodes occur 1-2 times per week.    Pt has been previously evaluated for tachycardia and dizziness. The work-up has been extensive with two Holter monitor studies for 24 and 48 hours (in April and May 2017) that showed no significant ventricular or supraventricular arrhythmias. Specifically, episodes of dizziness reported in the diary were associated with normal sinus rhythm at a normal rate. A treadmill stress echo (2/17/2016) was a normal study and showed no evidence of ischemia nor the occurrence of arrhythmias. Thyroid studies have been performed numerous times and have always been normal. He is presently on metoprolol succinate 50 mg bid and symptoms have improved with the beta blocker.     No family hx of arrhythmia and sudden death. He does not smoke nor drink alcohol.    PMH includes: BMI 50.90; hypertension; type 2 diabetes; CKD stage 2; nonalcoholic fatty liver.       Lab Results   Component Value Date     11/03/2017    K 4.5 11/03/2017     11/03/2017    CO2 26 11/03/2017    BUN 14 11/03/2017    CREATININE 1.0 11/03/2017     (H) 11/03/2017    HGBA1C 7.9 (H) 11/03/2017    MG 2.1 10/09/2017    AST 30 11/03/2017    ALT 41 11/03/2017    ALBUMIN 3.7 11/03/2017    PROT 7.3 11/03/2017    BILITOT 0.5 11/03/2017    WBC 5.63 11/03/2017    HGB 13.9 (L) 11/03/2017    HCT 40.8 11/03/2017    MCV 84 11/03/2017     11/03/2017    INR 1.0 03/07/2017    PSA 0.47 08/22/2017    TSH 2.675 10/11/2017       Past Medical History:   Diagnosis Date    CKD  stage 2 due to type 2 diabetes mellitus 5/18/2017    Essential hypertension 7/18/2016    Fatty liver disease, nonalcoholic 4/7/2016    Us, 2006     HLD (hyperlipidemia)     Inflammatory bowel disease     diverticulitis    Kidney stone     requiring stent    Morbid obesity 5/29/2014    NAFLD (nonalcoholic fatty liver disease)     Near syncope 5/23/2017    Obesity hypoventilation syndrome 2/22/2016    Prostatitis     Rectal bleeding     diverticulitis    Type 2 diabetes mellitus with hyperglycemia, with long-term current use of insulin 5/18/2017    Uncontrolled type 2 diabetes mellitus with peripheral neuropathy 5/29/2014     Family History   Problem Relation Age of Onset    Cancer Father     Diabetes Father     Hypertension Father     Liver disease Mother      d.    Cirrhosis Mother     Hypertension Mother      Social History     Social History    Marital status:      Spouse name: N/A    Number of children: N/A    Years of education: N/A     Occupational History    Not on file.     Social History Main Topics    Smoking status: Former Smoker     Types: Cigarettes    Smokeless tobacco: Never Used      Comment: social    Alcohol use No    Drug use: No    Sexual activity: Yes     Partners: Female     Other Topics Concern    Not on file     Social History Narrative    No narrative on file       Lab Results   Component Value Date    CHOL 180 05/05/2017    HDL 28 (L) 05/05/2017    TRIG 251 (H) 05/05/2017       Lab Results   Component Value Date    LDLCALC 101.8 05/05/2017       Past Medical History:   Diagnosis Date    CKD stage 2 due to type 2 diabetes mellitus 5/18/2017    Essential hypertension 7/18/2016    Fatty liver disease, nonalcoholic 4/7/2016    Us, 2006     HLD (hyperlipidemia)     Inflammatory bowel disease     diverticulitis    Kidney stone     requiring stent    Morbid obesity 5/29/2014    NAFLD (nonalcoholic fatty liver disease)     Near syncope 5/23/2017     Obesity hypoventilation syndrome 2/22/2016    Prostatitis     Rectal bleeding     diverticulitis    Type 2 diabetes mellitus with hyperglycemia, with long-term current use of insulin 5/18/2017    Uncontrolled type 2 diabetes mellitus with peripheral neuropathy 5/29/2014     Hypertension Medications             amlodipine (NORVASC) 5 MG tablet Take 1 tablet (5 mg total) by mouth once daily.    hydrochlorothiazide (MICROZIDE) 12.5 mg capsule 12.5 mg 2 (two) times daily.     metoprolol succinate (TOPROL XL) 50 MG 24 hr tablet Take 1 tablet (50 mg total) by mouth 2 (two) times daily.            Review of Systems   Constitution: Positive for weakness. Negative for chills, decreased appetite, diaphoresis, fever, malaise/fatigue, night sweats, weight gain and weight loss.   HENT: Negative for congestion, ear discharge, ear pain, nosebleeds and stridor.    Eyes: Negative for blurred vision, double vision, vision loss in left eye, vision loss in right eye and visual disturbance.   Cardiovascular: Positive for palpitations. Negative for chest pain, claudication, cyanosis, dyspnea on exertion, irregular heartbeat, leg swelling, near-syncope, orthopnea, paroxysmal nocturnal dyspnea and syncope.   Respiratory: Negative for cough, hemoptysis, shortness of breath, sleep disturbances due to breathing, snoring, sputum production and wheezing.    Endocrine: Negative for polydipsia, polyphagia and polyuria.   Hematologic/Lymphatic: Negative for adenopathy and bleeding problem. Does not bruise/bleed easily.   Skin: Negative for color change, dry skin, flushing, nail changes, poor wound healing and rash.   Musculoskeletal: Positive for muscle weakness. Negative for arthritis, muscle cramps and myalgias.   Gastrointestinal: Negative for abdominal pain, anorexia, change in bowel habit, hematochezia, melena, nausea and vomiting.   Genitourinary: Negative for dysuria, frequency and hematuria.   Neurological: Positive for dizziness, loss  "of balance, numbness and paresthesias. Negative for brief paralysis, difficulty with concentration, excessive daytime sleepiness, focal weakness, headaches, light-headedness, seizures and vertigo.   Psychiatric/Behavioral: Negative for altered mental status and depression.   Allergic/Immunologic: Negative for hives and persistent infections.                Objective:         /74 (BP Location: Left arm, Patient Position: Sitting, BP Method: Large (Automatic))   Pulse 79   Ht 5' 10" (1.778 m)   Wt (!) 160.9 kg (354 lb 11.5 oz)   SpO2 96%   BMI 50.90 kg/m²    Physical Exam   Constitutional: He is oriented to person, place, and time. He appears well-developed and well-nourished.   HENT:   Head: Normocephalic.   Right Ear: External ear normal.   Left Ear: External ear normal.   Nose: Nose normal.   Inspection of lips, teeth and gums normal   Eyes: EOM are normal. Pupils are equal, round, and reactive to light. No scleral icterus.   Neck: Normal range of motion. Neck supple. No JVD present. No tracheal deviation present. No thyromegaly present.   Cardiovascular: Normal rate, regular rhythm, S1 normal, S2 normal and intact distal pulses.  Exam reveals no gallop and no friction rub.    No murmur heard.  Pulses:       Carotid pulses are 2+ on the right side, and 2+ on the left side.       Radial pulses are 2+ on the right side, and 2+ on the left side.        Posterior tibial pulses are 2+ on the right side, and 2+ on the left side.   Pulmonary/Chest: Effort normal and breath sounds normal.   Abdominal: Bowel sounds are normal. He exhibits no distension. There is no hepatosplenomegaly. There is no tenderness. There is no guarding.   Musculoskeletal: Normal range of motion. He exhibits no edema or tenderness.   Lymphadenopathy:   Palpation of neck and groin lymph nodes normal   Neurological: He is alert and oriented to person, place, and time. No cranial nerve deficit. He exhibits normal muscle tone. Coordination " normal.   Skin: Skin is dry.   Bilateral trace pretibial edema.   Psychiatric: His behavior is normal. Judgment and thought content normal.           ECG (09-OCT-2017)  Normal sinus rhythm  Normal ECG  When compared with ECG of 23-MAY-2017 11:18,  No significant change was found        I have reviewed the following:     Details / Date    []   Labs     []   Imaging     []   Cardiology Procedures     []   Other      Assessment and Plan:       1. Dizziness         Dizziness  Work-up for this problem has already been performed and has been negative for heart and thyroid disease. Specifically, there is no objective evidence of arrhythmia when symptoms are present. Presently symptoms are mild and appear to be fairly well controlled by metoprolol.    I have asked the pt to keep a log book of the episodes of palpitations and report both the heart rate and blood pressure when dizziness is present. He will contact me prn.

## 2018-01-09 NOTE — PATIENT INSTRUCTIONS
Fall Prevention  Falls often occur due to slipping, tripping or losing your balance. Millions of people fall every year and injure themselves. Here are ways to reduce your risk of falling again.  · Think about your fall, was there anything that caused your fall that can be fixed, removed, or replaced?  · Make your home safe by keeping walkways clear of objects you may trip over.  · Use non-slip pads under rugs. Do not use area rugs or small throw rugs.  · Use non-slip mats in bathtubs and showers.  · Install handrails and lights on staircases.  · Do not walk in poorly lit areas.  · Do not stand on chairs or wobbly ladders.  · Use caution when reaching overhead or looking upward. This position can cause a loss of balance.  · Be sure your shoes fit properly, have non-slip bottoms and are in good condition.   · Wear shoes both inside and out. Avoid going barefoot or wearing slippers.  · Be cautious when going up and down stairs, curbs, and when walking on uneven sidewalks.  · If your balance is poor, consider using a cane or walker.  · If your fall was related to alcohol use, stop or limit alcohol intake.   · If your fall was related to use of sleeping medicines, talk to your doctor about this. You may need to reduce your dosage at bedtime if you awaken during the night to go to the bathroom.    · To reduce the need for nighttime bathroom trips:  ¨ Avoid drinking fluids for several hours before going to bed  ¨ Empty your bladder before going to bed  ¨ Men can keep a urinal at the bedside  · Stay as active as you can. Balance, flexibility, strength, and endurance all come from exercise. They all play a role in preventing falls. Ask your healthcare provider which types of activity are right for you.  · Get your vision checked on a regular basis.  · If you have pets, know where they are before you stand up or walk so you don't trip over them.  · Use night lights.  Date Last Reviewed: 11/5/2015  © 5497-3602 [employer]. [address] [place] [address], [place] [address]. All rights reserved. This information is not intended as a substitute for professional medical care. Always follow your healthcare professional's instructions.

## 2018-01-09 NOTE — PROGRESS NOTES
[facility] - NEUROLOGY EPILEPSY  [facility] [place]    Date: 1/9/18  Patient Name: García Tang   MRN: [id number]   PCP: Primary Doctor No  Referring Provider: Self, Aaareferral    Assessment:   García Tang is a 52 y.o. male presenting with recurrent presyncope, vertigo, and gait unsteadiness.  The patient's gait unsteadiness is most attributable to a sensory ataxia owing to his known peripheral polyneuropathy due to his diabetes.  Provide referral for patient to reestablish care with a primary care physician for diabetic management.  Additionally, have referred patient to PT/OT for falls prevention as well as vestibular evaluation.  Given patient's history of Ménière's.  With regard to patient's recurrent presyncope, I provided referral to cardiology, given recurrent description of possible underlying arrhythmia.  Patient may benefit from long-term cardiac monitoring. Presyncopal events may also be attributable to hypoglycemia- have urged patient to check HR,blood sugar, and blood pressure when episodes occur.     Plan:     Problem List Items Addressed This Visit        Neuro    Sensory ataxia    Current Assessment & Plan     -- referred to PT/OT for falls prevention            ENT    Meniere's disease    Current Assessment & Plan     -- referred to PT/OT            Endocrine    Uncontrolled type 2 diabetes mellitus with peripheral neuropathy (Chronic)    Overview     A1C 7.9 in 11/17         Current Assessment & Plan     -- referred to Internal Medicine to establish PCP  -- lifestyle modification  counseling provided         Morbid obesity    Current Assessment & Plan     -- counseled on lifestyle modification           Other Visit Diagnoses     Gait instability    -  Primary    Relevant Orders    Ambulatory Referral to Physical/Occupational Therapy    Type 2 diabetes mellitus with diabetic polyneuropathy, with long-term current use of insulin        Relevant Orders     Ambulatory Referral to Internal Medicine    Syncope, unspecified syncope type        Relevant Orders    Ambulatory consult to Cardiology          Lai Son MD  [facility]   Department of Neurology    Patient note was created using Dragon Dictation.  Any errors in syntax or even information may not have been identified and edited on initial review prior to signing this note.  Subjective:        HPI:   Mr. García Tang is a 52 y.o. male who presents with a chief complaint of repeated falls, presyncope.  The patient reports that over the past year, he has had multiple episodes of presyncope, occasionally associated by sensation of his heart racing.  He states that he very distinctly feels as though he is going to lose consciousness and occasionally feels hot and sweaty before the episode begins.  He states that in the past, he has undergone a Holter monitor which did not show anything, but does state that he was placed on a beta blocker for an unknown arrhythmia.  He does state that he experienced syncope in 2014.  Unfortunately, the patient has not followed up with PCP in several months as his PCP is no longer covered by his insurance.  He has yet to reestablish care.  He has never been evaluated formally by cardiology for syncope.  He also endorses a history of vertigo, Ménière's which he classified as separate and distinct from his symptoms of presyncope.  He states that he has been told that he has been years and the left in the past, but notes occasional episodic vertigo.  He is interested in working with vestibular rehabilitation for long-term management.  He does state that he had a CT completed approximately 3 years ago when his symptoms began.  This was normal.  He is highly claustrophobic (MRI.  Lastly, he complains of gait unsteadiness.  He states that he admits that he often does not feel the floor well underneath and loses his balance, though he is often able to regain his composure  and denies any recent falls.  Complicated by him, recently needing to wear a orthotic boot.  He admits that he has trouble feeling temperature in his hands and feet bilaterally.  He admits that his diabetes is currently not well controlled, but states that he is slowly making  lifestyle changes in an attempt to improve this.               PAST MEDICAL HISTORY:  Past Medical History:   Diagnosis Date    CKD stage 2 due to type 2 diabetes mellitus 5/18/2017    Essential hypertension 7/18/2016    Fatty liver disease, nonalcoholic 4/7/2016    Us, 2006     HLD (hyperlipidemia)     Inflammatory bowel disease     diverticulitis    Kidney stone     requiring stent    Morbid obesity 5/29/2014    NAFLD (nonalcoholic fatty liver disease)     Near syncope 5/23/2017    Obesity hypoventilation syndrome 2/22/2016    Prostatitis     Rectal bleeding     diverticulitis    Type 2 diabetes mellitus with hyperglycemia, with long-term current use of insulin 5/18/2017    Uncontrolled type 2 diabetes mellitus with peripheral neuropathy 5/29/2014       PAST SURGICAL HISTORY:  Past Surgical History:   Procedure Laterality Date    COLONOSCOPY      kidney stent      KIDNEY SURGERY  1996    right kidney obstruction requiring stent    NOSE SURGERY         CURRENT MEDS:  Current Outpatient Prescriptions   Medication Sig Dispense Refill    amlodipine (NORVASC) 5 MG tablet Take 1 tablet (5 mg total) by mouth once daily. 90 tablet 3    aspirin (ECOTRIN) 81 MG EC tablet Take 81 mg by mouth once daily.      clotrimazole (LOTRIMIN) 1 % cream Apply topically 2 (two) times daily. Apply bid to affected areas x 1 wk 30 g 0    DOCOSAHEXANOIC ACID/EPA (FISH OIL ORAL) Take 1 capsule by mouth once daily at 6am.       ERGOCALCIFEROL, VITAMIN D2, (VITAMIN D ORAL) Take 1 capsule by mouth once daily at 6am.       hydrochlorothiazide (MICROZIDE) 12.5 mg capsule 12.5 mg 2 (two) times daily.       insulin NPH-insulin regular, 70/30, (NOVOLIN  "70/30) 100 unit/mL (70-30) injection Inject into the skin 2 (two) times daily. 27 in am and 24 in pm      metoprolol succinate (TOPROL XL) 50 MG 24 hr tablet Take 1 tablet (50 mg total) by mouth 2 (two) times daily. 60 tablet 6    multivitamin capsule Take by mouth. 1 Capsule Oral Every day      albuterol 90 mcg/actuation inhaler Inhale 2 puffs into the lungs every 6 (six) hours as needed for Wheezing. 1 each 0    triamcinolone acetonide 0.1% (KENALOG) 0.1 % cream Apply topically 2 (two) times daily. 45 g 0     No current facility-administered medications for this visit.        ALLERGIES:  Review of patient's allergies indicates:   Allergen Reactions    Bactrim  [sulfamethoxazole-trimethoprim]      Other reaction(s): Unknown    Hydroxyzine hcl      Other reaction(s): elevated bp    Kenalog [triamcinolone acetonide] Other (See Comments)     Heart flutters all day    Lisinopril Swelling    Phenergan [promethazine] Anxiety    Zofran [ondansetron hcl (pf)] Anxiety       FAMILY HISTORY:  Family History   Problem Relation Age of Onset    Cancer Father     Diabetes Father     Hypertension Father     Liver disease Mother      d.    Cirrhosis Mother     Hypertension Mother        SOCIAL HISTORY:  Social History   Substance Use Topics    Smoking status: Former Smoker     Types: Cigarettes    Smokeless tobacco: Never Used      Comment: social    Alcohol use No       Review of Systems:  12 review of systems is negative except for the symptoms mentioned in HPI.      Objective:     Vitals:    01/09/18 0922   BP: (!) 144/89   Pulse: 87   Weight: (!) 160.8 kg (354 lb 8 oz)   Height: 5' 10" (1.778 m)     General: NAD, well nourished   Eyes: no tearing, discharge, no erythema   ENT: moist mucous membranes of the oral cavity, nares patent    Neck: Supple, full range of motion  Cardiovascular: Warm and well perfused, pulses equal and symmetrical  Lungs: Normal work of breathing, normal chest wall excursions  Skin: No " rash, lesions, or breakdown on exposed skin  Psychiatry: Mood and affect are appropriate   Abdomen: soft, non tender, non distended  Extremeties: No cyanosis, clubbing or edema.    Neurological   MENTAL STATUS: Alert and oriented to person, place, and time. Attention and concentration within normal limits. Speech without dysarthria, able to name and repeat without difficulty. Recent and remote memory within normal limits   CRANIAL NERVES: Visual fields intact. PERRL. EOMI. Facial sensation intact. Face symmetrical. Hearing grossly intact. Full shoulder shrug bilaterally. Tongue protrudes midline   SENSORY: Sensation is reduced to LT, ST, temp/vibration to shins and wrists bilaterally.  Joint position perception reduced. Positive Romberg.   MOTOR: Normal bulk and tone. No pronator drift.  5/5 deltoid, biceps, triceps, interosseous, hand  bilaterally. 5/5 iliopsoas, knee extension/flexion, foot dorsi/plantarflexion bilaterally.    REFLEXES: Symmetric and 1 throughout.   CEREBELLAR/COORDINATION/GAIT: Gait steady with normal arm swing and stride length. Tandem gait unsteady. Heel to shin intact. Finger to nose intact. Normal rapid alternating movements.

## 2018-01-10 ENCOUNTER — PATIENT MESSAGE (OUTPATIENT)
Dept: NEUROLOGY | Facility: CLINIC | Age: 53
End: 2018-01-10

## 2018-01-12 ENCOUNTER — CLINICAL SUPPORT (OUTPATIENT)
Dept: REHABILITATION | Facility: HOSPITAL | Age: 53
End: 2018-01-12
Attending: PSYCHIATRY & NEUROLOGY
Payer: COMMERCIAL

## 2018-01-12 ENCOUNTER — TELEPHONE (OUTPATIENT)
Dept: ORTHOPEDICS | Facility: CLINIC | Age: 53
End: 2018-01-12

## 2018-01-12 DIAGNOSIS — R26.81 GAIT INSTABILITY: ICD-10-CM

## 2018-01-12 PROCEDURE — 97163 PT EVAL HIGH COMPLEX 45 MIN: CPT | Mod: PO

## 2018-01-12 PROCEDURE — 97112 NEUROMUSCULAR REEDUCATION: CPT | Mod: PO

## 2018-01-12 NOTE — TELEPHONE ENCOUNTER
----- Message from Ismael Andres sent at 1/12/2018  9:18 AM CST -----  Contact: Self/[phone]  Pt called in requesting a post-op shoe for L foot (size 13). Pt reports swelling and 6/10 pain.  Pt can be reached at [phone].

## 2018-01-12 NOTE — TELEPHONE ENCOUNTER
----- Message from Ismael Andres sent at 1/12/2018  9:53 AM CST -----  Contact: Self/[phone]  Pt returning a call.  Pt can be reached at [phone].

## 2018-01-12 NOTE — PROGRESS NOTES
[facility]    PHYSICAL THERAPY INITIAL EVALUATION     Date: 01/12/2018  Name: García Tang  Clinic Number: [id number]    Visit #: 1   Start Time:  807  Stop Time:  900  Time in treatment: 53 minutes    Orders:   NEUROLOGY EPILEPSY   Priority Start Date Expiration Date Referral Entered By   Routine 01/11/2018 12/31/2018 Lai Son MD   Visits Requested Visits Authorized Visits Completed Visits Scheduled   1 60 1 0   Procedure Information     Procedure Modifiers Provider Requested Approved   [id number] - Ambulatory Referral to Physical/Occupational Therapy   1 1   Diagnosis Information     Diagnosis   R26.81 (ICD-10-CM) - Gait instability         History   History of Present Illness: Pt with uncontrolled diabetes and severe peripheral neuropathy stubbed is foot and broke a toe several weeks ago.    He had been having episodes of 'vertigo and presyncope'  He has been cleared by cardiology and is still experiencing these episodes a few times a month.    He has a past history of Meuniers disease    Treatment Diagnosis:   1. Gait instability         Past Medical History      Past Medical History:   Diagnosis Date    CKD stage 2 due to type 2 diabetes mellitus 5/18/2017    Essential hypertension 7/18/2016    Fatty liver disease, nonalcoholic 4/7/2016    [place], 2006     HLD (hyperlipidemia)     Inflammatory bowel disease     diverticulitis    Kidney stone     requiring stent    Morbid obesity 5/29/2014    NAFLD (nonalcoholic fatty liver disease)     Near syncope 5/23/2017    Obesity hypoventilation syndrome 2/22/2016    Prostatitis     Rectal bleeding     diverticulitis    Type 2 diabetes mellitus with hyperglycemia, with long-term current use of insulin 5/18/2017    Uncontrolled type 2 diabetes mellitus with peripheral neuropathy 5/29/2014       Allergies  Review of patient's allergies indicates:   Allergen Reactions    Bactrim  [sulfamethoxazole-trimethoprim]      Other reaction(s): Unknown    Hydroxyzine hcl       Other reaction(s): elevated bp    Kenalog [triamcinolone acetonide] Other (See Comments)     Heart flutters all day    Lisinopril Swelling    Phenergan [promethazine] Anxiety    Zofran [ondansetron hcl (pf)] Anxiety       Current Medication list:   Current Outpatient Prescriptions on File Prior to Visit   Medication Sig Dispense Refill    albuterol 90 mcg/actuation inhaler Inhale 2 puffs into the lungs every 6 (six) hours as needed for Wheezing. 1 each 0    amlodipine (NORVASC) 5 MG tablet Take 1 tablet (5 mg total) by mouth once daily. 90 tablet 3    aspirin (ECOTRIN) 81 MG EC tablet Take 81 mg by mouth once daily.      clotrimazole (LOTRIMIN) 1 % cream Apply topically 2 (two) times daily. Apply bid to affected areas x 1 wk 30 g 0    DOCOSAHEXANOIC ACID/EPA (FISH OIL ORAL) Take 1 capsule by mouth once daily at 6am.       ERGOCALCIFEROL, VITAMIN D2, (VITAMIN D ORAL) Take 1 capsule by mouth once daily at 6am.       hydrochlorothiazide (MICROZIDE) 12.5 mg capsule 12.5 mg 2 (two) times daily.       insulin NPH-insulin regular, 70/30, (NOVOLIN 70/30) 100 unit/mL (70-30) injection Inject into the skin 2 (two) times daily. 27 in am and 24 in pm      metoprolol succinate (TOPROL XL) 50 MG 24 hr tablet Take 1 tablet (50 mg total) by mouth 2 (two) times daily. 60 tablet 6    multivitamin capsule Take by mouth. 1 Capsule Oral Every day      triamcinolone acetonide 0.1% (KENALOG) 0.1 % cream Apply topically 2 (two) times daily. 45 g 0     No current facility-administered medications on file prior to visit.        Precautions: Standard, Fall      Prior Therapy: no    Diagnostic Tests: no  Work status: desk  Sports/Recreational Activities: no  Assistive devices/equipment no    Cultural, Spiritual, Developmental and Educational concerns: none  Abuse/Neglect, Nutritional concerns: none     Patient Therapy Goals: not sure    Subjective   García JAN Nielszulma states he is not really experiencing vertigo and thinks that  his imbalance is due to numbness in his feet which is getting worse.  He is more unstable lately sice he fractured right small toe  How long has vertigo/dizziness been present ?  Many years  What provokes it?  Not sure  What relieves it?  Not sure  How long to episodes last?  Not sure  Do you have headaches?  no              Are they associated with the dizziness?  no  Any visual problems other than wearing glasses?  no  Any loss of hearing:  yes       Any tinnitus?  no  FALLS: How many in the last year?       Multiple falls and near falls with fainting sensation                        How many in the last month?    ?    He thinks these episodes might be related to poor glucose control as he doesn't monitor his intake  Exercise routine prior to onset :no    PAIN  Location:minimal in right small toe but increases  with WB  More pressure than pain  Does the patient have any concerns of abuse  denies    Objective   APPEARANCE UPON ARRIVAL:  52 y.o. White  male a bit flustered upon arrival    MENTATION:  [facility]    POSTURE examination in standing:  Sloppy slouchy posture with forward head, rounded shoulders, collapsed trunk in sitting    GAIT:  Somewhat of a shuffle with decreased WB on painful right foot/toe occasional self corrected LOB  AROM HIPS:  WFL  AROM KNEES WFL  AROM  ANKLES WFL     FOOT structural abnormalities:  swollen distal right foot with ecchymosis from fracture  LE Sensation:   No protective sensation below both knees    CEREBELLAR SCREENS/ COORDINATION:   Heel to shin  RLE: mildly impaired   Heel to shin  LLE: mildly impaired   Rapid alternating movement RLE:normal  Rapid alternating movement LLE: normal   Rapid alternating movemen LUE: normal   Rapid alternating movement RUE: normal     SENSORY ORGANIZATION (Greater than 30 seconds)   Firm Surface Eyes Closed:no  Firm Surface Eyes Open: Yes  Romberg FTEO: unable to hold >10 sec}  Romberg FTEC: No  Tandem Romberg EO: No  Tandem Romberg EC: nt  Single Limb  Support EO: No  Pt is able to tolerate minimal perturbations: Yes       Treatment   EVALUATION COMPLETED  NEUROMUSCULAR RE-EDUCATION AND BALANCE COORDINATION TRAINING for  15 minutes to include:In Parallel Bars:                              Tandem Stand                              Tandem Walk                              High march                              EO and EC Rhomberg                              Side step lunge    Education   Patient was provided with a written copy of the above home exercises indicated in bold to perform as tolerated within limits of pain.  Exercises were reviewed and patient was able to demonstrate them prior to the end of the session.  Pt was instructed in ways to improve safety of home environment to prevent falls  Also discussed need to manage BS to manage peaks and valleys to minimize  symptoms of spikes in glucose  Advised pt request nutritionist consult  All of the patients questions were answered  Goals of therapy, the roles of PT and PTA, and the need for compliance of appointments, attendance policy and HEP was discussed with patient .  Patient expressed understanding and agreement with above education.      No barriers to learning or social/cultural issues were identified that could hinder therapy.    Assessment   Pt with poor proprioception and severe peripheral neuropathy causing gait instability.  It does not seem that dizziness is related to positional change thus not vestibular in nature.  He  felt no change in symptoms post therapy and suffered no adverse effects with treatment.   .  Pt presents with  evolving clinical presentation with unstable and unpredictable characteristics which include: mm weakness, poor proprioception, poor balance, poor blood glucose management    Medical necessity is demonstrated by the following IMPAIRMENTS/PROBLEM LIST:  1. Fall Risk - impaired balance    Requires skilled supervision to complete and progress HEP   generalized  weakness  Decreased CV endurance    Decreased standing tolerance   Decreased community ambulation   9. Impaired muscle tone    Anticipated barriers to physical therapy: copay of $100  Pt's spiritual, cultural and educational needs considered and pt agreeable to plan of care and goals as stated below:       Goals     8 weeks, pt agrees to goals set.  Pt and caregivers will evaluate home for safety, including checking lighting and hazards on the floor such as rugs or cords and remove them.  Pt will tolerate 55 minutes of physical therapy treatment with 1 rest break to demonstrate overall improvement in CV endurance  I            Pt will be able to demonstrate 3 methods of self corrected LOB strategies               Pt will demonstrate independence with HEP    Rehab Potential: good provided patient is compliant with HEP and PT appointments.    Pt is unsure if he will be able to attend future therapy due to co pay    If he does not return to clinic he has received a very basic HEP to begin  Self rehab  Plan   Pt will be seen ideally 2x month based on high co-pay. Recommended Treatment Plan will include:Therapeutic Exercise  Neuromuscular re-education          Individualized Home Exercise Program         Pt education    PTA may be involved in patient's care as part of the Rehab Team.      History  Co-morbidities and personal factors that may impact the plan of care Examination  Body Structures and Functions, activity limitations and participation restrictions that may impact the plan of care Clinical Presentation   Co-morbidities: obesity, Menieures dis, DM PN, LE  fracture        Personal Factors:   social background  character  obesity, co pay is high Body Regions: LEs    Body Systems:  Strength, ambulation    Participation Restrictions:   Co pay     Activity limitations/ impairments:   Learning and applying knowledge  no deficits  General Tasks and Commands  no deficits  Communicationno deficits  Mobility:  Safe  transitional movement/ ambulation impaired  Self care  no deficits  Domestic Life  Unable to participate fully due to fear of falling/ high fall potential  Interactions/Relationships:  No deficits   Community and Social Life:     Unable to participate fully due to    fear of falling/ high fall potential           unstable clinical presentation with unpredictable characteristics                      high   high high Decision Making/ Complexity Score:  high     Jazlyn Degroot, PT

## 2018-01-15 ENCOUNTER — PATIENT MESSAGE (OUTPATIENT)
Dept: NEUROLOGY | Facility: CLINIC | Age: 53
End: 2018-01-15

## 2018-01-15 ENCOUNTER — TELEPHONE (OUTPATIENT)
Dept: ORTHOPEDICS | Facility: CLINIC | Age: 53
End: 2018-01-15

## 2018-01-15 DIAGNOSIS — R55 SYNCOPE, UNSPECIFIED SYNCOPE TYPE: Primary | ICD-10-CM

## 2018-01-15 NOTE — TELEPHONE ENCOUNTER
Spoke with pt.  States he did a little extra walking on Friday but rested on Saturday and Sunday.   Pt reports increased pain and swelling of his fx toe.   Pt advised this is expected, per Dr House.

## 2018-01-16 ENCOUNTER — HOSPITAL ENCOUNTER (OUTPATIENT)
Dept: NEUROLOGY | Facility: CLINIC | Age: 53
Discharge: HOME | End: 2018-01-16
Payer: COMMERCIAL

## 2018-01-16 DIAGNOSIS — R55 SYNCOPE, UNSPECIFIED SYNCOPE TYPE: ICD-10-CM

## 2018-01-16 PROCEDURE — 95816 EEG AWAKE AND DROWSY: CPT | Mod: S$GLB,,, | Performed by: PSYCHIATRY & NEUROLOGY

## 2018-01-16 PROCEDURE — 95816 PR EEG,W/AWAKE & DROWSY RECORD: ICD-10-PCS | Mod: S$GLB,,, | Performed by: PSYCHIATRY & NEUROLOGY

## 2018-01-17 NOTE — PROCEDURES
DATE OF SERVICE:  01/16/2018    Duration is 30 minutes and 23 seconds.    ELECTROENCEPHALOGRAM REPORT    METHODOLOGY:  Electroencephalographic (EEG) recording is recorded with   electrodes placed according to the International 10-20 placement system.  Thirty   two (32) channels of digital signal (sampling rate of 512/sec), including T1   and T2, were simultaneously recorded from the scalp and may include EKG, EMG,   and/or eye monitors.  Recording band pass was 0.1 to 512 Hz.  Digital video   recording of the patient is simultaneously recorded with the EEG.  The patient   is instructed to report clinical symptoms which may occur during the recording   session.  EEG and video recording are stored and archived in digital format.    Activation procedures, which include photic stimulation, hyperventilation and   instructing patients to perform simple tasks, are done in selected patients.    The EEG is displayed on a monitor screen and can be reviewed using different   montages.  Computer assisted-analysis is employed to detect spike and   electrographic seizure activity.  The entire record is submitted for computer   analysis.  The entire recording is visually reviewed, and the times identified   by computer analysis as being spikes or seizures are reviewed again.    Compressed spectral analysis (CSA) is also performed on the activity recorded   from each individual channel.  This is displayed as a power display of   frequencies from 0 to 30 Hz over time.  The CSA is reviewed looking for   asymmetries in power between homologous areas of the scalp, then compared with   the original EEG recording.    [employer] software was also utilized in the review of this study.  This software   suite analyzes the EEG recording in multiple domains.  Coherence and rhythmicity   are computed to identify EEG sections which may contain organized seizures.    Each channel undergoes analysis to detect the presence of spike and sharp waves    which have special and morphological characteristics of epileptic activity.  The   routine EEG recording is converted from special into frequency domain.  This is   then displayed comparing homologous areas to identify areas of significant   asymmetry.  Algorithm to identify non-cortically generated artifact is used to   separate artifact from the EEG.    EEG FINDINGS:  The background of this study reveals a well-formed,   well-modulated 10 Hz posterior dominant rhythm, which is best seen in the   occipital channels.  The anterior background consists of a mix of alpha and beta   frequencies seen diffusely.  As the study progresses, the alpha rhythm   attenuates and slow roving eye movement artifacts are seen.  A few sleep   spindles are seen signifying brief stage II sleep.  The patient then arouses   with return to the prior baseline and answers questions.  Photic stimulation   revealed bilateral symmetrical driving.  Hyperventilation revealed no change to   the background.    The overall record was symmetrical without epileptiform discharges.  There was   no focal slowing.  There were no events or seizures.    INTERPRETATION:  Normal awake and drowsy EEG.      ZO/SHANTI  dd: 01/16/2018 16:25:28 (CST)  td: 01/16/2018 23:10:12 (CST)  Doc ID   #[id number]  Job ID #[id number]    CC:

## 2018-01-23 ENCOUNTER — TELEPHONE (OUTPATIENT)
Dept: INTERNAL MEDICINE | Facility: CLINIC | Age: 53
End: 2018-01-23

## 2018-01-23 ENCOUNTER — OFFICE VISIT (OUTPATIENT)
Dept: INTERNAL MEDICINE | Facility: CLINIC | Age: 53
End: 2018-01-23
Payer: COMMERCIAL

## 2018-01-23 ENCOUNTER — OFFICE VISIT (OUTPATIENT)
Dept: HEPATOLOGY | Facility: CLINIC | Age: 53
End: 2018-01-23
Payer: COMMERCIAL

## 2018-01-23 VITALS
OXYGEN SATURATION: 96 % | SYSTOLIC BLOOD PRESSURE: 152 MMHG | TEMPERATURE: 98 F | WEIGHT: 315 LBS | RESPIRATION RATE: 18 BRPM | HEIGHT: 70 IN | DIASTOLIC BLOOD PRESSURE: 72 MMHG | BODY MASS INDEX: 45.1 KG/M2 | HEART RATE: 70 BPM

## 2018-01-23 VITALS
HEART RATE: 81 BPM | SYSTOLIC BLOOD PRESSURE: 167 MMHG | HEIGHT: 70 IN | TEMPERATURE: 98 F | BODY MASS INDEX: 45.1 KG/M2 | DIASTOLIC BLOOD PRESSURE: 85 MMHG | WEIGHT: 315 LBS

## 2018-01-23 DIAGNOSIS — R10.10 UPPER ABDOMINAL PAIN: ICD-10-CM

## 2018-01-23 DIAGNOSIS — E11.65 TYPE 2 DIABETES MELLITUS WITH HYPERGLYCEMIA, WITH LONG-TERM CURRENT USE OF INSULIN: Chronic | ICD-10-CM

## 2018-01-23 DIAGNOSIS — K62.5 RECTAL BLEEDING: ICD-10-CM

## 2018-01-23 DIAGNOSIS — E66.01 MORBID OBESITY: ICD-10-CM

## 2018-01-23 DIAGNOSIS — K76.0 FATTY LIVER: Primary | ICD-10-CM

## 2018-01-23 DIAGNOSIS — I10 ESSENTIAL HYPERTENSION: Chronic | ICD-10-CM

## 2018-01-23 DIAGNOSIS — K76.0 FATTY LIVER DISEASE, NONALCOHOLIC: ICD-10-CM

## 2018-01-23 DIAGNOSIS — E78.5 HYPERLIPIDEMIA, UNSPECIFIED HYPERLIPIDEMIA TYPE: ICD-10-CM

## 2018-01-23 DIAGNOSIS — Z79.4 TYPE 2 DIABETES MELLITUS WITH HYPERGLYCEMIA, WITH LONG-TERM CURRENT USE OF INSULIN: Chronic | ICD-10-CM

## 2018-01-23 PROCEDURE — 99999 PR PBB SHADOW E&M-EST. PATIENT-LVL V: CPT | Mod: PBBFAC,,, | Performed by: NURSE PRACTITIONER

## 2018-01-23 PROCEDURE — 3008F BODY MASS INDEX DOCD: CPT | Mod: S$GLB,,, | Performed by: INTERNAL MEDICINE

## 2018-01-23 PROCEDURE — 99214 OFFICE O/P EST MOD 30 MIN: CPT | Mod: S$GLB,,, | Performed by: NURSE PRACTITIONER

## 2018-01-23 PROCEDURE — 99999 PR PBB SHADOW E&M-EST. PATIENT-LVL IV: CPT | Mod: PBBFAC,,, | Performed by: INTERNAL MEDICINE

## 2018-01-23 PROCEDURE — 99214 OFFICE O/P EST MOD 30 MIN: CPT | Mod: S$GLB,,, | Performed by: INTERNAL MEDICINE

## 2018-01-23 RX ORDER — GABAPENTIN 100 MG/1
100 CAPSULE ORAL 3 TIMES DAILY
Qty: 90 CAPSULE | Refills: 11 | Status: SHIPPED | OUTPATIENT
Start: 2018-01-23 | End: 2018-05-30 | Stop reason: SDUPTHER

## 2018-01-23 NOTE — TELEPHONE ENCOUNTER
Pt. is scheduled today with the resident clinic for c/o lt leg swelling and numbness. He was originally scheduled by [facility] and told to come today. B/p 166/94, P.72 Pulse OX 98%. Denies sob or chest pain. Shabana Buchanan RN triaged pt.  Appointment agreed upon for today.

## 2018-01-23 NOTE — PATIENT INSTRUCTIONS
Endoscopic Ultrasound (EUS)    An endoscopic ultrasound (EUS) is a test to look at the inside of your gastrointestinal (GI) tract. It's commonly used to look for cancers or growths in the esophagus, stomach, pancreas, liver, and rectum. It can help to stage cancer (see how advanced a cancer is). EUS may also be used to help diagnose certain diseases or to drain cysts or abscesses.  What is EUS?  EUS shows both ultrasound images and live video of the GI tract. During the test, a flexible tube called an endoscope (scope) is used. At the end of the scope is a tiny video camera and light. The video camera sends live images to a monitor. The scope also contains a very small ultrasound device. This uses sound waves to create images and send them to a monitor.  A needle is passed through the scope. The needle can be used take a small sample of tissue for testing. This is called a biopsy. The needle can be used to take a sample of fluid. This is called fine-needle aspiration (FNA).  Risks and possible complications of EUS  Risks and possible complications include the following:  · Bleeding  · Infection  · A perforation (hole) in the digestive tract   · Risks of sedation or anesthesia   Before the test  Be prepared prior to the test:  · Tell your healthcare provider what medicine you take. This includes vitamins, herbs, and over-the-counter medicine. It also includes any blood thinners, such as warfarin, clopidogrel, ibuprofen, or daily aspirin. Ask your healthcare provider if you need to stop taking some or all of them before the test.  · You may be prescribed antibiotics to take before or after the test. This depends on the area being studied and what is done during the test. These medicines help prevent infection.  · Carefully follow the instructions for preparing for the test to make sure results are accurate. Instructions may include:  ¨ If youre having an EUS of the upper GI tract (esophagus, stomach, duodenum,  pancreas, liver):  § Do not eat or drink for 6 hours before the test.  ¨ If youre having an EUS of the lower GI tract (rectum):  § Before the test, do bowel prep as instructed to clean your rectum of stool. This may involve a clear liquid diet and using a laxative (liquid or pills) the night before the test. Or it may mean doing one or more enemas the morning of the test.  § Do not eat or drink for 6 hours before the test.  · Be sure to arrive on time at the facility. Bring your identification and health insurance card. Leave valuables at home. If you have them, bring X-rays or other test results with you.  Let the healthcare provider know  For your safety, tell the healthcare provider if you:  · Take insulin. Your dose may need to be changed on the day of your test.  · Are allergic to latex.  · Have any other allergies.  · Are taking blood thinners.   During the test  An endoscopic ultrasound usually takes place in a hospital. The procedure itself may take 1 to 2 hours. You will likely go home soon afterward. During the test:  · You lie on your left side on an exam table.  · An intravenous (IV) line will be put into a vein in your arm or hand. This line supplies fluids and medicines. To keep you comfortable during the test, you will be given a sedative medicine. This medicine prevents discomfort and will make you sleepy.  · If you are having an EUS of the upper GI tract, local anesthetic may be sprayed in your throat. This will help you be more comfortable as the healthcare provider inserts the scope. The healthcare provider then gently puts the flexible scope into your mouth or nose and down your throat.  · If youre having an EUS of the lower GI tract, the healthcare provider gently puts the flexible scope into your anus.  · During the test, the scope sends live video and ultrasound images from inside your body to nearby monitors. These are used to examine your GI tract. Specialized procedures, such as drainage,  are done as needed.  · The healthcare provider may discuss the results with you soon after the test. Biopsy results take several  days.  · In most cases, you can go home within a few hours of the test. When you leave the facility, have an adult family member or friend drive you, even if you don't feel that sleepy.  After the test  Here is what to expect after the test:  · You may feel tired from the sedative. This should wear off by the end of the day.  · If you had an upper digestive endoscopy, your throat may feel sore for a day or two. Over-the-counter sore throat lozenges and spray should help.  · You can eat and drink normally as soon as the test is done.  When to call the healthcare provider  Call your healthcare provider if you notice any of the following:  · Fever of 100.4°F (38.0°C) or higher, or as advised by your healthcare provider  · Shortness of breath  · Vomiting blood, blood in stool, or black stools  · Coughing or hoarse voice that wont go away   Date Last Reviewed: 7/1/2016  © 4161-1450 [employer]. [address] [place] [address]. All rights reserved. This information is not intended as a substitute for professional medical care. Always follow your healthcare professional's instructions.

## 2018-01-23 NOTE — PATIENT INSTRUCTIONS
- Start taking gabapentin 100mg by mouth up to three times a day for your diabetic neuropathy.  - Consider using capsaicin ointment on your feet for discomfort as well; read the label carefully! This same substance is in peppers and can cause unpleasant burning for your mouth or eyes.  - Return to clinic in 1 week to establish care with a primary care physician; fasting labs beforehand.

## 2018-01-23 NOTE — PROGRESS NOTES
[facility] Established Patient Visit     Reason for Visit:  F/u NAFLD     PCP: Primary Doctor No     HPI:  This is a 52 y.o. male here for f/u NAFLD. He has had elevated transaminases and had serological workup was negative for Augie's, alpha-1 antitrypsin deficiency, hemochromatosis, autoimmune etiology, and viral hepatitis. He has multiple risk factors for NAFLD with morbid obesity, HTN, HLD, and DM. We were only able to do a Fibrosure on him due to his BMI and weight of 350 lbs. Fibrosure = F0. He has normal synthetic liver functioning. Plts nl at 234. Spleen is at [facility] and hepatomegaly and hepatic steatosis noted on abd u/s on 05/29/2017. He is not immune to hepatitis A and B and does not wish to get these vaccines. He has not lost any weight since initial visit. Liver enzymes improved some on last labs (AST 30, ALT 41). He denies any symptoms of advanced chronic liver disease including jaundice, dark urine, abdominal distention, hematemesis, melena, slowed mentation. Calculated NAFLD Fibrosis Score 3.45 points -- Correlated Fibrosis Severity F3-F4. RUQ and LUQ abd pain and nausea from last visit has resolved, he did not meet with GI.      He reports bright red blood in the toilet for a few years now. He states the bleeding comes and goes. Reports last colonoscopy was in 2008. No record to review in chart.     Reports having a UTI for the last 3 months, seeing urologist, has improved and resolved.     ROS:   GENERAL: Denies fever, chills, weight loss/gain, (+) fatigue  HEENT: Denies headaches, dizziness, vision/hearing changes  CARDIOVASCULAR: Denies chest pain, palpitations, (+) edema  RESPIRATORY: Denies dyspnea, cough  GI: Denies abdominal pain, nausea, vomiting. Reports (+) rectal bleeding. No change in bowel pattern or color  : Denies dysuria, hematuria   SKIN: Denies rash, itching   NEURO: Denies confusion, memory loss, or mood changes  PSYCH: Denies depression or anxiety  HEME/LYMPH:  Denies easy bruising or bleeding        PMHX:  has a past medical history of CKD stage 2 due to type 2 diabetes mellitus (5/18/2017); Essential hypertension (7/18/2016); Fatty liver disease, nonalcoholic (4/7/2016); HLD (hyperlipidemia); Inflammatory bowel disease; Kidney stone; Morbid obesity (5/29/2014); NAFLD (nonalcoholic fatty liver disease); Near syncope (5/23/2017); Obesity hypoventilation syndrome (2/22/2016); Prostatitis; Rectal bleeding; Type 2 diabetes mellitus with hyperglycemia, with long-term current use of insulin (5/18/2017); and Uncontrolled type 2 diabetes mellitus with peripheral neuropathy (5/29/2014).     PSHX:  has a past surgical history that includes Nose surgery; kidney stent; Kidney surgery (1996); and Colonoscopy.     The patient's social and family histories were reviewed by me and updated in the appropriate section of the electronic medical record.           Review of patient's allergies indicates:   Allergen Reactions    Bactrim  [sulfamethoxazole-trimethoprim]         Other reaction(s): Unknown    Hydroxyzine hcl         Other reaction(s): elevated bp    Kenalog [triamcinolone acetonide] Other (See Comments)       Heart flutters all day    Lisinopril Swelling    Phenergan [promethazine] Anxiety    Zofran [ondansetron hcl (pf)] Anxiety                Current Outpatient Prescriptions on File Prior to Visit   Medication Sig Dispense Refill    albuterol 90 mcg/actuation inhaler Inhale 2 puffs into the lungs every 6 (six) hours as needed for Wheezing. 1 each 0    amlodipine (NORVASC) 5 MG tablet Take 1 tablet (5 mg total) by mouth once daily. 90 tablet 3    aspirin (ECOTRIN) 81 MG EC tablet Take 81 mg by mouth once daily.        clotrimazole (LOTRIMIN) 1 % cream Apply topically 2 (two) times daily. Apply bid to affected areas x 1 wk 30 g 0    DOCOSAHEXANOIC ACID/EPA (FISH OIL ORAL) Take 1 capsule by mouth once daily at 6am.         ERGOCALCIFEROL, VITAMIN D2, (VITAMIN D ORAL)  "Take 1 capsule by mouth once daily at 6am.         hydrochlorothiazide (MICROZIDE) 12.5 mg capsule 12.5 mg 2 (two) times daily.         insulin NPH-insulin regular, 70/30, (NOVOLIN 70/30) 100 unit/mL (70-30) injection Inject into the skin 2 (two) times daily. 27 in am and 24 in pm        metoprolol succinate (TOPROL XL) 50 MG 24 hr tablet Take 1 tablet (50 mg total) by mouth 2 (two) times daily. 60 tablet 6    multivitamin capsule Take by mouth. 1 Capsule Oral Every day        triamcinolone acetonide 0.1% (KENALOG) 0.1 % cream Apply topically 2 (two) times daily. 45 g 0      No current facility-administered medications on file prior to visit.             Objective Findings:     PHYSICAL EXAM:   Friendly White male, in no acute distress; alert and oriented to person, place and time  VITALS: BP (!) 152/72 (BP Location: Left arm, Patient Position: Sitting)   Pulse 70   Temp 97.8 °F (36.6 °C) (Oral)   Resp 18   Ht 5' 10" (1.778 m)   Wt (!) 159.2 kg (350 lb 15.6 oz)   SpO2 96%   BMI 50.36 kg/m²   HENT: Normocephalic, without obvious abnormality. Oral mucosa pink and moist. Dentition good.  EYES: Sclerae anicteric.   NECK: Supple.   CARDIOVASCULAR: Regular rate and rhythm. No murmurs.  RESPIRATORY: Normal respiratory effort. BBS CTA. No wheezes or crackles.  GI: Obese, non-tender, non-distended. No hepatosplenomegaly. No masses palpable. No ascites.  EXTREMITIES:  No clubbing, cyanosis. (+) BLE 1+ edema.  SKIN: Warm and dry. No jaundice. No rashes noted to exposed skin. No telangectasias noted. No palmar erythema.  NEURO:  Normal gate. No asterixis.  PSYCH:  Memory intact. Thought and speech pattern appropriate. Behavior normal. No depression or anxiety noted.        Labs:        Lab Results   Component Value Date     WBC 5.63 11/03/2017     HGB 13.9 (L) 11/03/2017     HCT 40.8 11/03/2017      11/03/2017     CHOL 180 05/05/2017     TRIG 251 (H) 05/05/2017     HDL 28 (L) 05/05/2017      11/03/2017 "     K 4.5 11/03/2017     CREATININE 1.0 11/03/2017     ALT 41 11/03/2017     AST 30 11/03/2017     ALKPHOS 85 11/03/2017     BILITOT 0.5 11/03/2017     ALBUMIN 3.7 11/03/2017     INR 1.0 03/07/2017              ASSESSMENT:  52 y.o. White male with:  1. NAFLD  -- transaminases nl AST 30, ALT 41  -- US 05/2017 shows hepatomegaly with steatosis and spleen enlarged ULN  -- synthetic liver function nl, no recent INR  -- risk factors for fatty liver include morbid obesity, BMI 50, DM, HLD, HTN  -- Fibrosure = F0  -- not immune to hep A or B, pt does not want to start vaccines at this time  -- NAFLD Fibrosis Score 3.45 points -- Correlated Fibrosis Severity F3-F4     EDUCATION:   We discussed the manifestations of NAFLD. At this time there is no FDA approved therapy for NAFLD.   The patient and I discussed the importance of diet, exercise, and weight loss for management of NAFLD. We discussed a low fat, low carb/low sugar, high fiber diet, and a goal of 30 minutes of exercise 5 times per week.   Pt is aware that fatty liver can progress to steatohepatitis and possibly to cirrhosis, putting one at increased risk for liver cancer, liver failure, and death.       DISCUSSION:  Discussed that his Fibrosure suggests no fibrosis but would like further liver staging done with EUS guided liver biopsy. Unable to do MR elastography d/t his weight is UNL. Discussed with patient NAFLD Fibrosis Score 3.45 points which is a correlated fibrosis severity of F3-F4 indicates possibly of significant scarring in then liver. We discussed the consequences of scarring in the liver and patient agrees to have EUS guided liver biospy done. Discussed implications of a cirrhosis diagnosis with HCC screenings and EGD and why it is important for us to determine if pt's have cirrhosis so they can be properly monitored for potential complications.          PLAN:  1. Schedule EUS guided liver biopsy  2. Schedule colonoscopy  3. Recommended weight loss with  diet and exercise for NAFLD. Pt declines bariatric referral at this time.  4. Recommend good control of cholesterol, blood pressure, blood sugar levels  5. F/u after biopsy results        Thank you for allowing me to participate in the care of García JAN Tang    I agree with the JAMES student's assessment and plan as outlined above.     Ema Johnson, LISA-C

## 2018-01-23 NOTE — PROGRESS NOTES
"Subjective:      Patient ID: García Tang is a 52 y.o. male.    Chief Complaint: Numbness (left foot)    Mr. 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 He reports having had intermittent swelling of his lower extremities bilaterally, with significant pitting of his LLE anteriorly below his knee which he describes as "like someone had took a chunk out of me." He was mostly unconcerned but his daughter recommended he monitor this more closely. This morning he noted worsening tingling and dull sensation "like I'd been given anesthesia" across his toes and sole of his L foot, and with concern for vascular issues with his diabetes he presented to clinic for further evaluation. He denies any recent trauma to his foot; he did visit Orthopedics ~3 weeks prior for having broken his L 5th toe, but reports no other recent injuries since. His swelling of his legs he reports as being symmetrical. He states that he has had issues with diabetic neuropathy in the past for which he has not been taking any medications, but with associated gait instability he has been following with neurology. He reports reluctance to adhere to medical therapy overall, and states that he has had difficulty continuing with his antihypertensives (currently states that he needs to  a refill of his amlodipine and has not been taking his HCTZ given frequent urination). He says that when he does check his glucose he will see levels between 160-210 for the most part. He states he has mostly been adherent with his 70/30 insulin, using 25-27 U subQ BID "for the most part."      Review of Systems   Constitutional: Negative for chills and fever.   HENT: Negative for sore throat and trouble swallowing.    Eyes: Negative for pain and visual " "disturbance.   Respiratory: Negative for cough and shortness of breath.    Cardiovascular: Negative for chest pain and palpitations.   Gastrointestinal: Negative for abdominal pain, nausea and vomiting.   Genitourinary: Negative for difficulty urinating and dysuria.   Musculoskeletal: Negative for arthralgias and joint swelling.   Skin: Negative for rash and wound.   Neurological: Positive for numbness. Negative for weakness.     Objective:     Vitals:    01/23/18 1504   BP: (!) 167/85   Pulse: 81   Temp: 98.3 °F (36.8 °C)     Physical Exam   Constitutional: He is oriented to person, place, and time. He appears well-developed and well-nourished. No distress.   HENT:   Head: Normocephalic and atraumatic.   Eyes: Conjunctivae are normal. Pupils are equal, round, and reactive to light.   Cardiovascular: Normal rate, regular rhythm, S1 normal and S2 normal.    Pulses:       Radial pulses are 2+ on the right side, and 2+ on the left side.        Dorsalis pedis pulses are 1+ on the right side, and 1+ on the left side.        Posterior tibial pulses are 1+ on the right side, and 1+ on the left side.   Pulmonary/Chest: Effort normal and breath sounds normal.   Abdominal: Soft. He exhibits no distension. There is no tenderness.   Musculoskeletal: Normal range of motion. He exhibits edema (2+ pitting BLE).   No significant tenderness, erythema, or induration. Onychomycosis noted bilaterally. Small area of maceration between 4th-5th toes of L foot. No ulcers or wounds noted. Some callus bilaterally. Sensation appears intact, patient reports "shock-like" sensation upon monofilament testing of R foot, "dull" sensation on L foot.   Feet:   Right Foot:   Protective Sensation: 5 sites tested. 5 sites sensed.   Skin Integrity: Negative for ulcer or erythema.   Left Foot:   Protective Sensation: 5 sites tested. 5 sites sensed.   Skin Integrity: Negative for ulcer or erythema.   Neurological: He is alert and oriented to person, place, " and time.   Skin: Skin is warm and dry. Capillary refill takes less than 2 seconds.   Nursing note and vitals reviewed.    Assessment:      1. Uncontrolled type 2 diabetes mellitus with peripheral neuropathy    2. Type 2 diabetes mellitus with hyperglycemia, with long-term current use of insulin    3. Morbid obesity    4. Essential hypertension      Plan:     García was seen today for numbness.    Diagnoses and all orders for this visit:    DMII with diabetic peripheral neuropathy  -     Discussed the necessity for close monitoring of his diabetes with the patient. He voices his understanding of the severe consequences that can go along with persistently uncontrolled diabetes, but continues to express some reluctance to initiate new medications. He was given a paper prescription for gabapentin 100mg PO TID with instructions to attempt first dose qHS to see how drowsy it may make him; he will decide whether or not he will fill the medication and report in at his establish care visit.  - We also discussed how critical it was for him to have a primary care physician to help coordinate his care and address concerns regarding new medications to ensure his health remains stable.  gabapentin (NEURONTIN) 100 MG capsule; Take 1 capsule (100 mg total) by mouth 3 (three) times daily.  -     Will obtain HgbA1c prior to next visit to re-assess his current diabetes regimen; he will likely require diabetic education referral since it seems his understanding of the risks and consequences of persistently uncontrolled diabetes is minimal at best, and this will be crucial to improving his diabetes control especially in the setting of his hesitance to start new medications.    Essential hypertension, morbid obesity, peripheral edema  -     Discussed with the patient regarding keeping up with his antihypertensives. In the setting of concurrent DMII, HTN, and morbid obesity, his antihypertensive regimen could definitely be streamlined,  however given his reticence to initiate medication for the condition that brought him to this urgent care visit, will defer until his primary care visit. With his peripheral edema, recommend that he continue with HCTZ despite his concerns about urinary frequency; we discussed how peripheral edema occurs and how HCTZ would help reduce this, which seems to have him more willing to initiate treatment. An ACE would likely be a beneficial antihypertensive for him in the setting of his DMII, and with his pre-existing peripheral edema amlodipine could likely be switched for another medication, but these changes will likely take long discussions with the patient before they can be moved toward.  - Requested that he bring a log of his blood pressures to his next visit to ensure adequate blood pressure control with his current medications, although the likely effect this will have will be to emphasize to him how high his pressures are running and the risk that this will pose to maintaining his health.  - Will obtain fasting CMP, lipid panel in addition to HgbA1c as above to provide further information for next visit.    Return to clinic in 1 week to establish care.    Discussed with staff, Dr. Mcgee.    JANIE Cope MD   PGY-3  [phone]

## 2018-01-23 NOTE — MEDICAL/APP STUDENT
[facility] Established Patient Visit    Reason for Visit:  F/u NAFLD    PCP: Primary Doctor No    HPI:  This is a 52 y.o. male here for f/u NAFLD. He has had elevated transaminases and had serological workup was negative for Augie's, alpha-1 antitrypsin deficiency, hemochromatosis, autoimmune etiology, and viral hepatitis. He has multiple risk factors for this with morbid obesity, HTN, HLD, and DM. We were only able to do a Fibrosure on him due to his BMI and weight of 350 lbs. Fibrosure = F0. He has normal synthetic liver functioning. Plts nl at 234. Spleen is at [facility] and hepatomegaly and hepatic steatosis noted on abd u/s on 05/29/2017. He is not immune to hepatitis A and B and does not wish to get these vaccines. He has not lost any weight since initial visit. Liver enzymes improved some on last labs (AST 30, ALT 41). He denies any symptoms of advanced chronic liver disease including jaundice, dark urine, abdominal distention, hematemesis, melena, slowed mentation. Calculated NAFLD Fibrosis Score 3.45 points -- Correlated Fibrosis Severity F3-F4. RUQ and LUQ abd pain and nausea from last visit has resolved, he did not meet with GI.     He reports bright red blood in the toilet for a few years now. He states the bleeding comes and goes. Reports last colonoscopy was in 2008. No record to review in chart.    Reports having a UTI for the last 3 months, seeing urologist, has improved and resolved.    ROS:   GENERAL: Denies fever, chills, weight loss/gain, (+) fatigue  HEENT: Denies headaches, dizziness, vision/hearing changes  CARDIOVASCULAR: Denies chest pain, palpitations, (+) edema  RESPIRATORY: Denies dyspnea, cough  GI: Denies abdominal pain, nausea, vomiting. Reports (+) rectal bleeding. No change in bowel pattern or color  : Denies dysuria, hematuria   SKIN: Denies rash, itching   NEURO: Denies confusion, memory loss, or mood changes  PSYCH: Denies depression or anxiety  HEME/LYMPH: Denies easy  bruising or bleeding      PMHX:  has a past medical history of CKD stage 2 due to type 2 diabetes mellitus (5/18/2017); Essential hypertension (7/18/2016); Fatty liver disease, nonalcoholic (4/7/2016); HLD (hyperlipidemia); Inflammatory bowel disease; Kidney stone; Morbid obesity (5/29/2014); NAFLD (nonalcoholic fatty liver disease); Near syncope (5/23/2017); Obesity hypoventilation syndrome (2/22/2016); Prostatitis; Rectal bleeding; Type 2 diabetes mellitus with hyperglycemia, with long-term current use of insulin (5/18/2017); and Uncontrolled type 2 diabetes mellitus with peripheral neuropathy (5/29/2014).    PSHX:  has a past surgical history that includes Nose surgery; kidney stent; Kidney surgery (1996); and Colonoscopy.    The patient's social and family histories were reviewed by me and updated in the appropriate section of the electronic medical record.    Review of patient's allergies indicates:   Allergen Reactions    Bactrim  [sulfamethoxazole-trimethoprim]      Other reaction(s): Unknown    Hydroxyzine hcl      Other reaction(s): elevated bp    Kenalog [triamcinolone acetonide] Other (See Comments)     Heart flutters all day    Lisinopril Swelling    Phenergan [promethazine] Anxiety    Zofran [ondansetron hcl (pf)] Anxiety       Current Outpatient Prescriptions on File Prior to Visit   Medication Sig Dispense Refill    albuterol 90 mcg/actuation inhaler Inhale 2 puffs into the lungs every 6 (six) hours as needed for Wheezing. 1 each 0    amlodipine (NORVASC) 5 MG tablet Take 1 tablet (5 mg total) by mouth once daily. 90 tablet 3    aspirin (ECOTRIN) 81 MG EC tablet Take 81 mg by mouth once daily.      clotrimazole (LOTRIMIN) 1 % cream Apply topically 2 (two) times daily. Apply bid to affected areas x 1 wk 30 g 0    DOCOSAHEXANOIC ACID/EPA (FISH OIL ORAL) Take 1 capsule by mouth once daily at 6am.       ERGOCALCIFEROL, VITAMIN D2, (VITAMIN D ORAL) Take 1 capsule by mouth once daily at 6am.     "   hydrochlorothiazide (MICROZIDE) 12.5 mg capsule 12.5 mg 2 (two) times daily.       insulin NPH-insulin regular, 70/30, (NOVOLIN 70/30) 100 unit/mL (70-30) injection Inject into the skin 2 (two) times daily. 27 in am and 24 in pm      metoprolol succinate (TOPROL XL) 50 MG 24 hr tablet Take 1 tablet (50 mg total) by mouth 2 (two) times daily. 60 tablet 6    multivitamin capsule Take by mouth. 1 Capsule Oral Every day      triamcinolone acetonide 0.1% (KENALOG) 0.1 % cream Apply topically 2 (two) times daily. 45 g 0     No current facility-administered medications on file prior to visit.          Objective Findings:    PHYSICAL EXAM:   Friendly White male, in no acute distress; alert and oriented to person, place and time  VITALS: BP (!) 152/72 (BP Location: Left arm, Patient Position: Sitting)   Pulse 70   Temp 97.8 °F (36.6 °C) (Oral)   Resp 18   Ht 5' 10" (1.778 m)   Wt (!) 159.2 kg (350 lb 15.6 oz)   SpO2 96%   BMI 50.36 kg/m²   HENT: Normocephalic, without obvious abnormality. Oral mucosa pink and moist. Dentition good.  EYES: Sclerae anicteric. No conjunctival pallor.   NECK: Supple. No masses or cervical adenopathy.  CARDIOVASCULAR: Regular rate and rhythm. No murmurs.  RESPIRATORY: Normal respiratory effort. BBS CTA. No wheezes or crackles.  GI: Obese, non-tender, non-distended. No hepatosplenomegaly. No masses palpable. No ascites.  EXTREMITIES:  No clubbing, cyanosis. (+) BLE 1+ edema.  SKIN: Warm and dry. No jaundice. No rashes noted to exposed skin. No telangectasias noted. No palmar erythema.  NEURO:  Normal gate. No asterixis.  PSYCH:  Memory intact. Thought and speech pattern appropriate. Behavior normal. No depression or anxiety noted.      Labs:  Lab Results   Component Value Date    WBC 5.63 11/03/2017    HGB 13.9 (L) 11/03/2017    HCT 40.8 11/03/2017     11/03/2017    CHOL 180 05/05/2017    TRIG 251 (H) 05/05/2017    HDL 28 (L) 05/05/2017     11/03/2017    K 4.5 " 11/03/2017    CREATININE 1.0 11/03/2017    ALT 41 11/03/2017    AST 30 11/03/2017    ALKPHOS 85 11/03/2017    BILITOT 0.5 11/03/2017    ALBUMIN 3.7 11/03/2017    INR 1.0 03/07/2017             ASSESSMENT:  52 y.o. White male with:  1. NAFLD  -- transaminases nl AST 30, ALT 41  -- US 05/2017 shows hepatomegaly with steatosis and spleen enlarged ULN  -- synthetic liver function nl, no recent INR  -- risk factors for fatty liver include morbid obesity, BMI 50, DM, HLD, HTN  -- Fibrosure = F0  -- not immune to hep A or B, pt does not want to start vaccines at this time  -- NAFLD Fibrosis Score 3.45 points -- Correlated Fibrosis Severity F3-F4    EDUCATION:   We discussed the manifestations of NAFLD. At this time there is no FDA approved therapy for NAFLD.   The patient and I discussed the importance of diet, exercise, and weight loss for management of NAFLD. We discussed a low fat, low carb/low sugar, high fiber diet, and a goal of 30 minutes of exercise 5 times per week.   Pt is aware that fatty liver can progress to steatohepatitis and possibly to cirrhosis, putting one at increased risk for liver cancer, liver failure, and death.      DISCUSSION:  Discussed that his Fibrosure suggests no fibrosis but would like further liver staging done with EUS guided liver biopsy. Unable to do MR elastography d/t his weight is UNL. Discussed with patient NAFLD Fibrosis Score 3.45 points which is a correlated fibrosis severity of F3-F4 indicates possibly of significant scarring in then liver. We discussed the consequences of scarring in the liver and patient agrees to have EUS guided liver biospy done.      PLAN:  1. Schedule EUS guided liver biopsy  2. Schedule colonoscopy  3. Recommended weight loss with diet and exercise for NAFLD. Pt declines bariatric referral at this time.  4. Recommend good control of cholesterol, blood pressure, blood sugar levels  5. F/u after biopsy results       Thank you for allowing me to participate  in the care of ALISTAIR Nunez

## 2018-01-24 ENCOUNTER — PATIENT MESSAGE (OUTPATIENT)
Dept: HEPATOLOGY | Facility: CLINIC | Age: 53
End: 2018-01-24

## 2018-01-24 ENCOUNTER — TELEPHONE (OUTPATIENT)
Dept: PULMONOLOGY | Facility: CLINIC | Age: 53
End: 2018-01-24

## 2018-01-24 PROBLEM — R55 SYNCOPE, NEAR: Status: RESOLVED | Noted: 2017-05-23 | Resolved: 2018-01-24

## 2018-01-24 PROBLEM — N41.0 ACUTE PROSTATITIS: Status: RESOLVED | Noted: 2017-09-19 | Resolved: 2018-01-24

## 2018-01-25 ENCOUNTER — LAB VISIT (OUTPATIENT)
Dept: LAB | Facility: HOSPITAL | Age: 53
End: 2018-01-25
Payer: COMMERCIAL

## 2018-01-25 DIAGNOSIS — Z79.4 TYPE 2 DIABETES MELLITUS WITH HYPERGLYCEMIA, WITH LONG-TERM CURRENT USE OF INSULIN: Chronic | ICD-10-CM

## 2018-01-25 DIAGNOSIS — E11.65 TYPE 2 DIABETES MELLITUS WITH HYPERGLYCEMIA, WITH LONG-TERM CURRENT USE OF INSULIN: Chronic | ICD-10-CM

## 2018-01-25 LAB
ALBUMIN SERPL BCP-MCNC: 4.1 G/DL
ALP SERPL-CCNC: 99 U/L
ALT SERPL W/O P-5'-P-CCNC: 59 U/L
ANION GAP SERPL CALC-SCNC: 10 MMOL/L
AST SERPL-CCNC: 42 U/L
BILIRUB SERPL-MCNC: 0.7 MG/DL
BUN SERPL-MCNC: 13 MG/DL
CALCIUM SERPL-MCNC: 9.9 MG/DL
CHLORIDE SERPL-SCNC: 101 MMOL/L
CHOLEST SERPL-MCNC: 202 MG/DL
CHOLEST/HDLC SERPL: 6.3 {RATIO}
CO2 SERPL-SCNC: 27 MMOL/L
CREAT SERPL-MCNC: 1 MG/DL
EST. GFR  (AFRICAN AMERICAN): >60 ML/MIN/1.73 M^2
EST. GFR  (NON AFRICAN AMERICAN): >60 ML/MIN/1.73 M^2
ESTIMATED AVG GLUCOSE: 186 MG/DL
GLUCOSE SERPL-MCNC: 116 MG/DL
HBA1C MFR BLD HPLC: 8.1 %
HDLC SERPL-MCNC: 32 MG/DL
HDLC SERPL: 15.8 %
LDLC SERPL CALC-MCNC: 141.2 MG/DL
NONHDLC SERPL-MCNC: 170 MG/DL
POTASSIUM SERPL-SCNC: 4.3 MMOL/L
PROT SERPL-MCNC: 7.8 G/DL
SODIUM SERPL-SCNC: 138 MMOL/L
TRIGL SERPL-MCNC: 144 MG/DL

## 2018-01-25 PROCEDURE — 83036 HEMOGLOBIN GLYCOSYLATED A1C: CPT

## 2018-01-25 PROCEDURE — 80053 COMPREHEN METABOLIC PANEL: CPT

## 2018-01-25 PROCEDURE — 80061 LIPID PANEL: CPT

## 2018-01-25 PROCEDURE — 36415 COLL VENOUS BLD VENIPUNCTURE: CPT

## 2018-01-29 ENCOUNTER — TELEPHONE (OUTPATIENT)
Dept: INTERNAL MEDICINE | Facility: CLINIC | Age: 53
End: 2018-01-29

## 2018-01-29 NOTE — TELEPHONE ENCOUNTER
..Patient referred by Shabana Buchanan RN for Health coaching (HTN, lifestyle changes).  Called patient and gave an explanation about Health Coaching program and invited participation.   Patient states he would like to participate.  Patient states he is at work and would like to call back.  Gave direct contact number to call if he/ she has any questions [phone].   Zenobia Turner RN  Health

## 2018-01-30 ENCOUNTER — TELEPHONE (OUTPATIENT)
Dept: GASTROENTEROLOGY | Facility: CLINIC | Age: 53
End: 2018-01-30

## 2018-01-31 ENCOUNTER — TELEPHONE (OUTPATIENT)
Dept: CRITICAL CARE MEDICINE | Facility: HOSPITAL | Age: 53
End: 2018-01-31

## 2018-01-31 ENCOUNTER — PATIENT MESSAGE (OUTPATIENT)
Dept: INTERNAL MEDICINE | Facility: CLINIC | Age: 53
End: 2018-01-31

## 2018-01-31 NOTE — TELEPHONE ENCOUNTER
Ms. Ortega,    Can we check in and see if Mr. Tang is still planning to come into clinic to establish with a PCP? I don't see that he has an appointment listed currently and had planned to let whoever he established with discuss the interventions we'd likely need to do with his labs as they are. He has a 20.5% (!) 10-year risk of stroke or heart attack, so he should definitely plan to come in if he can.    Thank you!

## 2018-02-05 ENCOUNTER — OFFICE VISIT (OUTPATIENT)
Dept: INTERNAL MEDICINE | Facility: CLINIC | Age: 53
End: 2018-02-05
Payer: COMMERCIAL

## 2018-02-05 VITALS
SYSTOLIC BLOOD PRESSURE: 140 MMHG | HEART RATE: 83 BPM | DIASTOLIC BLOOD PRESSURE: 80 MMHG | WEIGHT: 315 LBS | HEIGHT: 70 IN | BODY MASS INDEX: 45.1 KG/M2

## 2018-02-05 DIAGNOSIS — I25.10 ASCVD (ARTERIOSCLEROTIC CARDIOVASCULAR DISEASE): ICD-10-CM

## 2018-02-05 DIAGNOSIS — I10 ESSENTIAL HYPERTENSION: Chronic | ICD-10-CM

## 2018-02-05 DIAGNOSIS — E11.65 TYPE 2 DIABETES MELLITUS WITH HYPERGLYCEMIA, WITH LONG-TERM CURRENT USE OF INSULIN: Chronic | ICD-10-CM

## 2018-02-05 DIAGNOSIS — Z76.89 ENCOUNTER TO ESTABLISH CARE WITH NEW DOCTOR: Primary | ICD-10-CM

## 2018-02-05 DIAGNOSIS — Z79.4 TYPE 2 DIABETES MELLITUS WITH HYPERGLYCEMIA, WITH LONG-TERM CURRENT USE OF INSULIN: Chronic | ICD-10-CM

## 2018-02-05 PROCEDURE — 99214 OFFICE O/P EST MOD 30 MIN: CPT | Mod: S$GLB,,,

## 2018-02-05 PROCEDURE — 3008F BODY MASS INDEX DOCD: CPT | Mod: S$GLB,,,

## 2018-02-05 PROCEDURE — 99999 PR PBB SHADOW E&M-EST. PATIENT-LVL III: CPT | Mod: PBBFAC,,,

## 2018-02-05 RX ORDER — HYDROCHLOROTHIAZIDE 12.5 MG/1
12.5 CAPSULE ORAL 2 TIMES DAILY
Qty: 60 CAPSULE | Refills: 11 | Status: SHIPPED | OUTPATIENT
Start: 2018-02-05 | End: 2018-05-30

## 2018-02-05 RX ORDER — ATORVASTATIN CALCIUM 40 MG/1
40 TABLET, FILM COATED ORAL DAILY
Qty: 90 TABLET | Refills: 3 | Status: SHIPPED | OUTPATIENT
Start: 2018-02-05 | End: 2018-06-07

## 2018-02-05 NOTE — PROGRESS NOTES
Subjective:       Patient ID: García Tang is a 52 y.o. male.    Chief Complaint: Establish Care    HPI     García Tang is a 52 y.o. gentleman with a history of morbid obesity, IDDM-2, HTN, and NAFLD who presents to [facility] for establishment of care.  He had previously seen Dr. Avalos [facility] regarding lower extremity edema and numbness.  He has thus noted that he is doing well.  He has inquiries today regarding his liver disease and further course of disease.  Inquires if there is any specific treatment or medication that can be provided to help further prevent his disease process.  Additionally, he inquires about a liver biopsy, as he has had been scheduled to do so, but feels that he may not want it due to its invasive nature.  Has not yet scheduled the procedure.  Also has colonoscopy scheduled as well after procedure.      Regarding his T2DM, he notes he is currently not eating consistent meals, and thus not consistently taking his insulin, where he is missing his AM dosing significantly during the week.  When he does take his insulin and not eat, he notes that he becomes very lethargic, light headed, and would require to eat in order to improve his situation.  Overall, notes that his symptoms are mostly related to his own dietary habits and not taking insulin regularly.  Notes to had previously seen an endocrinologist not in [facility] system, where he was started on 70/30.  Patient is against to taking metformin as his endocrinologist at the time told him not to due to his kidney function; otherwise, his kidney function with our labs have all been well with no significant evidence of significant disease.  Patient feels that he can get under control of his diabetes as long as he takes his insulin regimen, and notes that he is not particularly interested in switching insulin now.      Additionally, patient with recent lab work with calculated ASCVD risk with Pooled Cohort Equation of 20 percent  risk of event in 10 years.  Patient informed about Lipitor, where he notes that he is hesitant to initiate medication due to the potential for worsening liver damage.  Patient had been previously prescribed medication from his previous PCP, held off from taking.      Denies any fevers, chills, N/V, chest pain, shortness of breath, chest or abdominal pains.  Does note to waking up feeling short of breath, but takes O2 sat at home with saturations above 92%.  Notes to feel tired during daytime at times, and wife notes that he sometimes snores.      Past Medical History:   Diagnosis Date    CKD stage 2 due to type 2 diabetes mellitus 5/18/2017    Essential hypertension 7/18/2016    Fatty liver disease, nonalcoholic 4/7/2016    Us, 2006     HLD (hyperlipidemia)     Inflammatory bowel disease     diverticulitis    Kidney stone     requiring stent    Morbid obesity 5/29/2014    NAFLD (nonalcoholic fatty liver disease)     Near syncope 5/23/2017    Obesity hypoventilation syndrome 2/22/2016    Prostatitis     Rectal bleeding     diverticulitis    Type 2 diabetes mellitus with hyperglycemia, with long-term current use of insulin 5/18/2017    Uncontrolled type 2 diabetes mellitus with peripheral neuropathy 5/29/2014       Past Surgical History:   Procedure Laterality Date    COLONOSCOPY      kidney stent      KIDNEY SURGERY  1996    right kidney obstruction requiring stent    NOSE SURGERY         Social History     Social History    Marital status:      Spouse name: N/A    Number of children: N/A    Years of education: N/A     Social History Main Topics    Smoking status: Former Smoker     Types: Cigarettes    Smokeless tobacco: Never Used      Comment: social    Alcohol use No    Drug use: No    Sexual activity: Yes     Partners: Female     Other Topics Concern    None     Social History Narrative    None       Family History   Problem Relation Age of Onset    Cancer Father     Diabetes  "Father     Hypertension Father     Liver disease Mother      d.    Cirrhosis Mother     Hypertension Mother     Diabetes Brother        Review of patient's allergies indicates:   Allergen Reactions    Lisinopril Swelling    Bactrim  [sulfamethoxazole-trimethoprim]      Other reaction(s): Unknown    Hydroxyzine hcl      Other reaction(s): elevated bp    Kenalog [triamcinolone acetonide] Other (See Comments)     Heart flutters all day    Phenergan [promethazine] Anxiety    Zofran [ondansetron hcl (pf)] Anxiety       Review of Systems   Constitutional: Negative for chills, fatigue and fever.   HENT: Negative for mouth sores and rhinorrhea.    Respiratory: Negative for cough, shortness of breath and wheezing.    Cardiovascular: Negative for chest pain and palpitations.   Gastrointestinal: Negative for abdominal distention, abdominal pain, constipation, diarrhea, nausea and vomiting.   Endocrine: Negative for polydipsia and polyphagia.   Genitourinary: Negative for dysuria and frequency.   Musculoskeletal: Negative for arthralgias, back pain and myalgias.   Skin: Negative for pallor and rash.   Neurological: Positive for numbness. Negative for syncope, weakness, light-headedness and headaches.   Hematological: Negative for adenopathy.   Psychiatric/Behavioral: Negative for behavioral problems and confusion.       Objective:       Vitals:    02/05/18 1312   BP: (!) 140/80   BP Location: Left arm   Patient Position: Sitting   BP Method: Large (Manual)   Pulse: 83   Weight: (!) 159.4 kg (351 lb 6.6 oz)   Height: 5' 10" (1.778 m)       Physical Exam   Constitutional: He is oriented to person, place, and time. He appears well-developed and well-nourished. No distress.   HENT:   Head: Normocephalic and atraumatic.   Mouth/Throat: No oropharyngeal exudate.   Eyes: Pupils are equal, round, and reactive to light.   Neck: Normal range of motion.   Cardiovascular: Normal rate, regular rhythm and normal heart sounds.  "   No murmur heard.  Pulmonary/Chest: Effort normal and breath sounds normal. No respiratory distress.   Abdominal: Soft. Bowel sounds are normal. He exhibits no distension. There is no tenderness.   Musculoskeletal: Normal range of motion. He exhibits no edema.   Neurological: He is alert and oriented to person, place, and time. No cranial nerve deficit.   Skin: Skin is warm and dry. He is not diaphoretic. No erythema.   Psychiatric: He has a normal mood and affect. His behavior is normal.   Vitals reviewed.      Assessment:       1. Encounter to establish care with new doctor    2. Essential hypertension    3. Type 2 diabetes mellitus with hyperglycemia, with long-term current use of insulin    4. ASCVD (arteriosclerotic cardiovascular disease)        Plan:       García was seen today for establish care.    Diagnoses and all orders for this visit:    Encounter to establish care with new doctor  -     Hemoglobin A1c; Future  -     Comprehensive metabolic panel; Future  - Had labs previously taken, no significant issues, reviewed with patient.  Instructed with patient to continue with diet and exercise, as this would help significantly with most of his issues.  Unlikely cardiac given negative workup, along with primary pulmonary and hematologic causes of dyspnea.  Patient will overall benefit for sleep study for evaluation of CHANELL, will further discuss with patient in next visit     Essential hypertension  -     hydroCHLOROthiazide (MICROZIDE) 12.5 mg capsule; Take 1 capsule (12.5 mg total) by mouth 2 (two) times daily.  - Continue home norvasc and HCTZ, BP well controlled at today's visit     Type 2 diabetes mellitus with hyperglycemia, with long-term current use of insulin  -     Hemoglobin A1c; Future  -     Comprehensive metabolic panel; Future  -     Ambulatory Referral to Optometry  -     atorvastatin (LIPITOR) 40 MG tablet; Take 1 tablet (40 mg total) by mouth once daily.  - Elevated A1c today, patient will  need to take medication as previously prescribed.  Will consider switching medication to basal/bolus regimen if patient still not able to have adequate PO intake.  Overall, will re-evaluate in 3 months with labs to assess if patient has improvement prior to any medication changes     ASCVD (arteriosclerotic cardiovascular disease)  -     atorvastatin (LIPITOR) 40 MG tablet; Take 1 tablet (40 mg total) by mouth once daily.  - Prescribed lipitor as above.  Will repeat CMP in 3 month follow up to assess if there is any significant change in LFTs    RTC in 3 months for follow up with labs prior.    Discussed with Staff, Dr. Gale.    Dillon Villareal MD  Internal Medicine PGY-2  [phone]

## 2018-02-07 ENCOUNTER — TELEPHONE (OUTPATIENT)
Dept: HEPATOLOGY | Facility: CLINIC | Age: 53
End: 2018-02-07

## 2018-02-07 ENCOUNTER — PATIENT MESSAGE (OUTPATIENT)
Dept: HEPATOLOGY | Facility: CLINIC | Age: 53
End: 2018-02-07

## 2018-02-07 DIAGNOSIS — K76.0 FATTY LIVER: Primary | ICD-10-CM

## 2018-02-07 NOTE — TELEPHONE ENCOUNTER
Please call pt to schedule u/s. He wants to defer liver biopsy for now. Does not need labs. Enter recall for f/u in 6 months.

## 2018-02-08 ENCOUNTER — HOSPITAL ENCOUNTER (OUTPATIENT)
Dept: RADIOLOGY | Facility: HOSPITAL | Age: 53
Discharge: HOME | End: 2018-02-08
Attending: NURSE PRACTITIONER
Payer: COMMERCIAL

## 2018-02-08 DIAGNOSIS — K76.0 FATTY LIVER: ICD-10-CM

## 2018-02-08 PROCEDURE — 76700 US EXAM ABDOM COMPLETE: CPT | Mod: TC

## 2018-02-08 PROCEDURE — 76700 US EXAM ABDOM COMPLETE: CPT | Mod: 26,,, | Performed by: RADIOLOGY

## 2018-02-09 ENCOUNTER — TELEPHONE (OUTPATIENT)
Dept: GASTROENTEROLOGY | Facility: CLINIC | Age: 53
End: 2018-02-09

## 2018-02-10 ENCOUNTER — PATIENT MESSAGE (OUTPATIENT)
Dept: HEPATOLOGY | Facility: CLINIC | Age: 53
End: 2018-02-10

## 2018-02-11 NOTE — PROGRESS NOTES
I have personally taken the history and examined this patient and agree with the resident's note as stated above with the following thoughts:    Will check labs and follow up in 3 months - discussed diet and exercise.

## 2018-02-14 ENCOUNTER — PATIENT MESSAGE (OUTPATIENT)
Dept: INTERNAL MEDICINE | Facility: CLINIC | Age: 53
End: 2018-02-14

## 2018-02-20 ENCOUNTER — OFFICE VISIT (OUTPATIENT)
Dept: OPTOMETRY | Facility: CLINIC | Age: 53
End: 2018-02-20
Payer: COMMERCIAL

## 2018-02-20 DIAGNOSIS — H52.13 MYOPIA, BILATERAL: Primary | ICD-10-CM

## 2018-02-20 DIAGNOSIS — E11.65 TYPE 2 DIABETES MELLITUS WITH HYPERGLYCEMIA, WITH LONG-TERM CURRENT USE OF INSULIN: Chronic | ICD-10-CM

## 2018-02-20 DIAGNOSIS — I10 ESSENTIAL HYPERTENSION: Chronic | ICD-10-CM

## 2018-02-20 DIAGNOSIS — Z83.511 FAMILY HISTORY OF GLAUCOMA IN FATHER: ICD-10-CM

## 2018-02-20 DIAGNOSIS — H40.023 OPEN ANGLE WITH BORDERLINE FINDINGS AND HIGH GLAUCOMA RISK IN BOTH EYES: ICD-10-CM

## 2018-02-20 DIAGNOSIS — H16.103 SUPERFICIAL KERATITIS OF BOTH EYES: ICD-10-CM

## 2018-02-20 DIAGNOSIS — H04.123 DRY EYE SYNDROME, BILATERAL: ICD-10-CM

## 2018-02-20 DIAGNOSIS — N18.2 CKD STAGE 2 DUE TO TYPE 2 DIABETES MELLITUS: Chronic | ICD-10-CM

## 2018-02-20 DIAGNOSIS — E11.22 CKD STAGE 2 DUE TO TYPE 2 DIABETES MELLITUS: Chronic | ICD-10-CM

## 2018-02-20 DIAGNOSIS — Z79.4 TYPE 2 DIABETES MELLITUS WITH HYPERGLYCEMIA, WITH LONG-TERM CURRENT USE OF INSULIN: Chronic | ICD-10-CM

## 2018-02-20 PROCEDURE — 92133 CPTRZD OPH DX IMG PST SGM ON: CPT | Mod: S$GLB,,, | Performed by: OPTOMETRIST

## 2018-02-20 PROCEDURE — 99999 PR PBB SHADOW E&M-EST. PATIENT-LVL I: CPT | Mod: PBBFAC,,, | Performed by: OPTOMETRIST

## 2018-02-20 PROCEDURE — 92015 DETERMINE REFRACTIVE STATE: CPT | Mod: S$GLB,,, | Performed by: OPTOMETRIST

## 2018-02-20 PROCEDURE — 92004 COMPRE OPH EXAM NEW PT 1/>: CPT | Mod: S$GLB,ICN,, | Performed by: OPTOMETRIST

## 2018-02-20 RX ORDER — FLUOROMETHOLONE 1 MG/ML
1 SUSPENSION/ DROPS OPHTHALMIC 2 TIMES DAILY
Qty: 5 ML | Refills: 0 | Status: SHIPPED | OUTPATIENT
Start: 2018-02-20 | End: 2018-03-02

## 2018-02-21 ENCOUNTER — PATIENT MESSAGE (OUTPATIENT)
Dept: OPTOMETRY | Facility: CLINIC | Age: 53
End: 2018-02-21

## 2018-02-22 ENCOUNTER — PATIENT MESSAGE (OUTPATIENT)
Dept: NEUROLOGY | Facility: CLINIC | Age: 53
End: 2018-02-22

## 2018-02-27 ENCOUNTER — LAB VISIT (OUTPATIENT)
Dept: LAB | Facility: HOSPITAL | Age: 53
End: 2018-02-27
Payer: COMMERCIAL

## 2018-02-27 DIAGNOSIS — Z79.4 TYPE 2 DIABETES MELLITUS WITH HYPERGLYCEMIA, WITH LONG-TERM CURRENT USE OF INSULIN: Chronic | ICD-10-CM

## 2018-02-27 DIAGNOSIS — E11.65 TYPE 2 DIABETES MELLITUS WITH HYPERGLYCEMIA, WITH LONG-TERM CURRENT USE OF INSULIN: Chronic | ICD-10-CM

## 2018-02-27 DIAGNOSIS — Z76.89 ENCOUNTER TO ESTABLISH CARE WITH NEW DOCTOR: ICD-10-CM

## 2018-02-27 LAB
ALBUMIN SERPL BCP-MCNC: 4 G/DL
ALP SERPL-CCNC: 89 U/L
ALT SERPL W/O P-5'-P-CCNC: 57 U/L
ANION GAP SERPL CALC-SCNC: 9 MMOL/L
AST SERPL-CCNC: 41 U/L
BILIRUB SERPL-MCNC: 0.7 MG/DL
BUN SERPL-MCNC: 11 MG/DL
CALCIUM SERPL-MCNC: 9.5 MG/DL
CHLORIDE SERPL-SCNC: 102 MMOL/L
CO2 SERPL-SCNC: 28 MMOL/L
CREAT SERPL-MCNC: 1 MG/DL
EST. GFR  (AFRICAN AMERICAN): >60 ML/MIN/1.73 M^2
EST. GFR  (NON AFRICAN AMERICAN): >60 ML/MIN/1.73 M^2
ESTIMATED AVG GLUCOSE: 183 MG/DL
GLUCOSE SERPL-MCNC: 129 MG/DL
HBA1C MFR BLD HPLC: 8 %
POTASSIUM SERPL-SCNC: 4.4 MMOL/L
PROT SERPL-MCNC: 7.4 G/DL
SODIUM SERPL-SCNC: 139 MMOL/L

## 2018-02-27 PROCEDURE — 83036 HEMOGLOBIN GLYCOSYLATED A1C: CPT

## 2018-02-27 PROCEDURE — 36415 COLL VENOUS BLD VENIPUNCTURE: CPT

## 2018-02-27 PROCEDURE — 80053 COMPREHEN METABOLIC PANEL: CPT

## 2018-02-28 ENCOUNTER — PATIENT MESSAGE (OUTPATIENT)
Dept: INTERNAL MEDICINE | Facility: CLINIC | Age: 53
End: 2018-02-28

## 2018-03-01 NOTE — PROGRESS NOTES
HPI     Patient's age: 52 y.o.    Approximate date of last eye examination:   February 2017  Name of last eye doctor seen: [facility]    Pt states that he is here for diabetic eye exam, been having a lot of   spasm in eyes,  they hurt, x most frequently in the last month or two.  It   affects vision    Wears glasses? yes      Wears CLs?:  no            Headaches?  yes  Eye pain/discomfort?  yes                                                                                    Flashes?  no  Floaters?  yes  Diplopia/Double vision?  no    Patient's Ocular History:         Any eye surgeries? no         Any eye injury?  no         Any treatment for eye disease?  no  Family history of eye disease?  Glaucoma - Father    Significant patient medical history:         1. Diabetes?  yes       If yes, IDDM or NIDDM? IDDM   2. HBP?  yes                 ! OTC eyedrops currently using:  none   ! Prescription eye meds currently using:  None    Hemoglobin A1C       Date                     Value               Ref Range             Status                01/25/2018               8.1 (H)             4.0 - 5.6 %           Final                     11/03/2017               7.9 (H)             4.0 - 5.6 %           Final                     05/05/2017               7.9 (H)             4.5 - 6.2 %           Final                    Last edited by Estephanie Ceja MA on 2/20/2018  2:52 PM. (History)            Assessment /Plan     For exam results, see Encounter Report.    Myopia, bilateral   Needs bifocal   Hold Rx until next visit     Open angle with borderline findings and high glaucoma risk in both eyes  Family history of glaucoma in father  -     Posterior Segment OCT Optic Nerve- Both eyes WNL OU   HVF and IOP check at next visit    CKD stage 2 due to type 2 diabetes mellitus  Type 2 diabetes mellitus with hyperglycemia, with long-term current use of insulin  Essential hypertension   No retinopathy, monitor    Dry eye syndrome,  bilateral  Superficial keratitis of both eyes  -     fluorometholone 0.1% (FML) 0.1 % DrpS; Place 1 drop into both eyes 2 (two) times daily.  Dispense: 5 mL; Refill: 0    RTC 1 Month for Dry eye follow up and glasses recheck

## 2018-03-02 ENCOUNTER — TELEPHONE (OUTPATIENT)
Dept: OPTOMETRY | Facility: CLINIC | Age: 53
End: 2018-03-02

## 2018-03-02 NOTE — TELEPHONE ENCOUNTER
Spoke with pt who stated he was unable to schedule at the moment have to check schedules and give us a call back.

## 2018-03-02 NOTE — TELEPHONE ENCOUNTER
----- Message from Christina Hager, OD sent at 3/1/2018  4:59 PM CST -----  Please call pt to schedule dry eye follow up and [facility] 24-2  Will also need glasses recheck before IOP check/ pachy

## 2018-03-05 ENCOUNTER — PATIENT MESSAGE (OUTPATIENT)
Dept: INTERNAL MEDICINE | Facility: CLINIC | Age: 53
End: 2018-03-05

## 2018-03-16 ENCOUNTER — OFFICE VISIT (OUTPATIENT)
Dept: INTERNAL MEDICINE | Facility: CLINIC | Age: 53
End: 2018-03-16
Payer: COMMERCIAL

## 2018-03-16 ENCOUNTER — TELEPHONE (OUTPATIENT)
Dept: INTERNAL MEDICINE | Facility: CLINIC | Age: 53
End: 2018-03-16

## 2018-03-16 VITALS
OXYGEN SATURATION: 94 % | SYSTOLIC BLOOD PRESSURE: 124 MMHG | HEIGHT: 70 IN | HEART RATE: 68 BPM | BODY MASS INDEX: 45.1 KG/M2 | WEIGHT: 315 LBS | DIASTOLIC BLOOD PRESSURE: 90 MMHG

## 2018-03-16 DIAGNOSIS — S09.90XA INJURY OF HEAD, INITIAL ENCOUNTER: Primary | ICD-10-CM

## 2018-03-16 DIAGNOSIS — R39.198 DECREASED URINE STREAM: ICD-10-CM

## 2018-03-16 DIAGNOSIS — R51.9 ACUTE NONINTRACTABLE HEADACHE, UNSPECIFIED HEADACHE TYPE: ICD-10-CM

## 2018-03-16 DIAGNOSIS — R35.0 URINARY FREQUENCY: ICD-10-CM

## 2018-03-16 LAB
BILIRUB SERPL-MCNC: NORMAL MG/DL
BLOOD URINE, POC: NORMAL
COLOR, POC UA: NORMAL
GLUCOSE UR QL STRIP: NORMAL
KETONES UR QL STRIP: NORMAL
LEUKOCYTE ESTERASE URINE, POC: NORMAL
NITRITE, POC UA: NORMAL
PH, POC UA: 5
PROTEIN, POC: NORMAL
SPECIFIC GRAVITY, POC UA: 1.01
UROBILINOGEN, POC UA: NORMAL

## 2018-03-16 PROCEDURE — 99213 OFFICE O/P EST LOW 20 MIN: CPT | Mod: 25,S$GLB,, | Performed by: NURSE PRACTITIONER

## 2018-03-16 PROCEDURE — 99999 PR PBB SHADOW E&M-EST. PATIENT-LVL IV: CPT | Mod: PBBFAC,,, | Performed by: NURSE PRACTITIONER

## 2018-03-16 PROCEDURE — 81002 URINALYSIS NONAUTO W/O SCOPE: CPT | Mod: S$GLB,,, | Performed by: NURSE PRACTITIONER

## 2018-03-16 PROCEDURE — 3074F SYST BP LT 130 MM HG: CPT | Mod: CPTII,S$GLB,, | Performed by: NURSE PRACTITIONER

## 2018-03-16 PROCEDURE — 3080F DIAST BP >= 90 MM HG: CPT | Mod: CPTII,S$GLB,, | Performed by: NURSE PRACTITIONER

## 2018-03-16 NOTE — TELEPHONE ENCOUNTER
----- Message from Gila Neal sent at 3/16/2018  3:42 PM CDT -----  Contact: Self Call him at  [phone]  Patient had a problem taking his CAT Scan today and it was not completed. He want to speak with you about it.

## 2018-03-16 NOTE — PROGRESS NOTES
"INTERNAL MEDICINE URGENT CARE NOTE    CHIEF COMPLAINT     Chief Complaint   Patient presents with    Head Injury     y[place] eveing at park on metal post hit the middle area of head unsure if he had a concussion     Dizziness    Nocturia     x10 days reports urine is slighty cloudy        HPI     García Tang is a 52 y.o. male who presents for an urgent visit today.    Head injury- yesterday evening around 6pm. Hit top of head on metal post. No LOC but felt dazed and funny   Pt is concerned about concussion. Today reports feeling pain to the top of head where he hit his head and feels "off and whoozy".   +headache  +dizziness and balance problems,  +nausea  +feeling dazed   +confusion   +fatigue  +difficulty concentrating and remembering     Urinary frequency and cloudy urine x 10 days. Trouble starting the stream. Stream is slow. +urgency. Feels like he completely empties bladder       Past Medical History:  Past Medical History:   Diagnosis Date    CKD stage 2 due to type 2 diabetes mellitus 5/18/2017    Essential hypertension 7/18/2016    Fatty liver disease, nonalcoholic 4/7/2016    Us, 2006     HLD (hyperlipidemia)     Inflammatory bowel disease     diverticulitis    Kidney stone     requiring stent    Morbid obesity 5/29/2014    NAFLD (nonalcoholic fatty liver disease)     Near syncope 5/23/2017    Obesity hypoventilation syndrome 2/22/2016    Prostatitis     Rectal bleeding     diverticulitis    Type 2 diabetes mellitus with hyperglycemia, with long-term current use of insulin 5/18/2017    Uncontrolled type 2 diabetes mellitus with peripheral neuropathy 5/29/2014       Home Medications:  Prior to Admission medications    Medication Sig Start Date End Date Taking? Authorizing Provider   albuterol 90 mcg/actuation inhaler Inhale 2 puffs into the lungs every 6 (six) hours as needed for Wheezing. 12/7/17  Yes Katya Carreon MD   aspirin (ECOTRIN) 81 MG EC tablet Take 81 mg by mouth once " daily.   Yes Historical Provider, MD   atorvastatin (LIPITOR) 40 MG tablet Take 1 tablet (40 mg total) by mouth once daily. 2/5/18 2/5/19 Yes Dillon Villareal MD   clotrimazole (LOTRIMIN) 1 % cream Apply topically 2 (two) times daily. Apply bid to affected areas x 1 wk 7/6/17  Yes Anthony Rockwell MD   DOCOSAHEXANOIC ACID/EPA (FISH OIL ORAL) Take 1 capsule by mouth once daily at 6am.    Yes Historical Provider, MD   ERGOCALCIFEROL, VITAMIN D2, (VITAMIN D ORAL) Take 1 capsule by mouth once daily at 6am.    Yes Historical Provider, MD   gabapentin (NEURONTIN) 100 MG capsule Take 1 capsule (100 mg total) by mouth 3 (three) times daily. 1/23/18  Yes JANIE Avalos MD   hydroCHLOROthiazide (MICROZIDE) 12.5 mg capsule Take 1 capsule (12.5 mg total) by mouth 2 (two) times daily. 2/5/18  Yes Dillon Villareal MD   insulin NPH-insulin regular, 70/30, (NOVOLIN 70/30) 100 unit/mL (70-30) injection Inject into the skin 2 (two) times daily. 27 in am and 24 in pm   Yes Historical Provider, MD   metoprolol succinate (TOPROL XL) 50 MG 24 hr tablet Take 1 tablet (50 mg total) by mouth 2 (two) times daily. 10/9/17  Yes KUSUM Cervantes   multivitamin capsule Take by mouth. 1 Capsule Oral Every day   Yes Historical Provider, MD   amlodipine (NORVASC) 5 MG tablet Take 1 tablet (5 mg total) by mouth once daily. 10/9/17 1/23/18  KUSUM Cervantes       Review of Systems:  Review of Systems   Constitutional: Positive for fatigue. Negative for chills and fever.   Eyes: Negative for visual disturbance.   Respiratory: Negative for cough, chest tightness and shortness of breath.    Cardiovascular: Negative for chest pain and palpitations.   Gastrointestinal: Negative for abdominal pain, constipation, diarrhea, nausea and vomiting.   Genitourinary: Positive for difficulty urinating, frequency and urgency.   Musculoskeletal: Positive for gait problem.   Skin: Negative for rash.   Neurological: Positive for dizziness,  "light-headedness and headaches. Negative for seizures, syncope, speech difficulty and numbness.   Psychiatric/Behavioral: Positive for confusion and decreased concentration. Negative for agitation, sleep disturbance and suicidal ideas. The patient is nervous/anxious.        Health Maintainence:   Immunizations:  Health Maintenance       Date Due Completion Date    Pneumococcal PPSV23 (Medium Risk) (1) 10/11/1984 ---    Colonoscopy 10/11/2016 ---    Influenza Vaccine 08/01/2017 10/12/2016 (Declined)    Override on 10/12/2016: Declined    Urine Microalbumin 02/23/2018 2/23/2017    Hemoglobin A1c 08/27/2018 2/27/2018    Foot Exam 01/23/2019 1/23/2018    Lipid Panel 01/25/2019 1/25/2018    Eye Exam 03/01/2019 3/1/2018 (Done)    Override on 3/1/2018: Done    Override on 3/28/2017: Done (2 weeks ago - outside of [facility] - new glasses)    Override on 2/15/2016: Done (outside eye doc)    High Dose Statin 03/16/2019 3/16/2018    TETANUS VACCINE 05/15/2019 5/15/2009           PHYSICAL EXAM     BP (!) 124/90 (BP Location: Right arm, Patient Position: Sitting, BP Method: Large (Manual))   Pulse 68   Ht 5' 10" (1.778 m)   Wt (!) 157.1 kg (346 lb 5.5 oz)   SpO2 (!) 94%   BMI 49.69 kg/m²     Physical Exam   Constitutional: He is oriented to person, place, and time. He appears well-developed and well-nourished.   HENT:   Head: Normocephalic.       Right Ear: External ear normal.   Left Ear: External ear normal.   Nose: Nose normal.   Mouth/Throat: Oropharynx is clear and moist. No oropharyngeal exudate.   Eyes: Conjunctivae and lids are normal. Pupils are equal, round, and reactive to light.   Neck: No JVD present. No tracheal deviation present. No thyromegaly present.   Cardiovascular: Normal rate, regular rhythm and intact distal pulses.  Exam reveals no gallop and no friction rub.    No murmur heard.  Pulmonary/Chest: Breath sounds normal. No respiratory distress. He has no wheezes. He has no rales. He exhibits no " tenderness.   Abdominal: Soft. Bowel sounds are normal. He exhibits no distension. There is no tenderness.   Musculoskeletal: Normal range of motion. He exhibits no edema or tenderness.   Lymphadenopathy:     He has no cervical adenopathy.   Neurological: He is alert and oriented to person, place, and time. He displays normal reflexes. No cranial nerve deficit. Coordination normal.   Skin: Skin is warm and dry. Capillary refill takes less than 2 seconds. No rash noted.   Psychiatric: He has a normal mood and affect. His behavior is normal.   Vitals reviewed.      LABS     Lab Results   Component Value Date    HGBA1C 8.0 (H) 02/27/2018     CMP  Sodium   Date Value Ref Range Status   02/27/2018 139 136 - 145 mmol/L Final     Potassium   Date Value Ref Range Status   02/27/2018 4.4 3.5 - 5.1 mmol/L Final     Chloride   Date Value Ref Range Status   02/27/2018 102 95 - 110 mmol/L Final     CO2   Date Value Ref Range Status   02/27/2018 28 23 - 29 mmol/L Final     Glucose   Date Value Ref Range Status   02/27/2018 129 (H) 70 - 110 mg/dL Final     BUN, Bld   Date Value Ref Range Status   02/27/2018 11 6 - 20 mg/dL Final     Creatinine   Date Value Ref Range Status   02/27/2018 1.0 0.5 - 1.4 mg/dL Final     Calcium   Date Value Ref Range Status   02/27/2018 9.5 8.7 - 10.5 mg/dL Final     Total Protein   Date Value Ref Range Status   02/27/2018 7.4 6.0 - 8.4 g/dL Final     Albumin   Date Value Ref Range Status   02/27/2018 4.0 3.5 - 5.2 g/dL Final     Total Bilirubin   Date Value Ref Range Status   02/27/2018 0.7 0.1 - 1.0 mg/dL Final     Comment:     For infants and newborns, interpretation of results should be based  on gestational age, weight and in agreement with clinical  observations.  Premature Infant recommended reference ranges:  Up to 24 hours.............<8.0 mg/dL  Up to 48 hours............<12.0 mg/dL  3-5 days..................<15.0 mg/dL  6-29 days.................<15.0 mg/dL       Alkaline Phosphatase   Date  Value Ref Range Status   02/27/2018 89 55 - 135 U/L Final     AST   Date Value Ref Range Status   02/27/2018 41 (H) 10 - 40 U/L Final     ALT   Date Value Ref Range Status   02/27/2018 57 (H) 10 - 44 U/L Final     Anion Gap   Date Value Ref Range Status   02/27/2018 9 8 - 16 mmol/L Final     eGFR if    Date Value Ref Range Status   02/27/2018 >60 >60 mL/min/1.73 m^2 Final     eGFR if non    Date Value Ref Range Status   02/27/2018 >60 >60 mL/min/1.73 m^2 Final     Comment:     Calculation used to obtain the estimated glomerular filtration  rate (eGFR) is the CKD-EPI equation.        Lab Results   Component Value Date    WBC 5.63 11/03/2017    HGB 13.9 (L) 11/03/2017    HCT 40.8 11/03/2017    MCV 84 11/03/2017     11/03/2017     Lab Results   Component Value Date    CHOL 202 (H) 01/25/2018    CHOL 180 05/05/2017    CHOL 182 02/23/2017     Lab Results   Component Value Date    HDL 32 (L) 01/25/2018    HDL 28 (L) 05/05/2017    HDL 26 (L) 02/23/2017     Lab Results   Component Value Date    LDLCALC 141.2 01/25/2018    LDLCALC 101.8 05/05/2017    LDLCALC 90.8 02/23/2017     Lab Results   Component Value Date    TRIG 144 01/25/2018    TRIG 251 (H) 05/05/2017    TRIG 326 (H) 02/23/2017     Lab Results   Component Value Date    CHOLHDL 15.8 (L) 01/25/2018    CHOLHDL 15.6 (L) 05/05/2017    CHOLHDL 14.3 (L) 02/23/2017     Lab Results   Component Value Date    TSH 2.675 10/11/2017       ASSESSMENT/PLAN     García Tang is a 52 y.o. male with  Past Medical History:   Diagnosis Date    CKD stage 2 due to type 2 diabetes mellitus 5/18/2017    Essential hypertension 7/18/2016    Fatty liver disease, nonalcoholic 4/7/2016    Us, 2006     HLD (hyperlipidemia)     Inflammatory bowel disease     diverticulitis    Kidney stone     requiring stent    Morbid obesity 5/29/2014    NAFLD (nonalcoholic fatty liver disease)     Near syncope 5/23/2017    Obesity hypoventilation syndrome  2/22/2016    Prostatitis     Rectal bleeding     diverticulitis    Type 2 diabetes mellitus with hyperglycemia, with long-term current use of insulin 5/18/2017    Uncontrolled type 2 diabetes mellitus with peripheral neuropathy 5/29/2014     Injury of head, initial encounter- with headache and concussive s/s. Will send for ct of the head. Will likely need f/u with neurology   -     CT Head Without Contrast; Future; Expected date: 03/16/2018    Acute nonintractable headache, unspecified headache type  -     CT Head Without Contrast; Future; Expected date: 03/16/2018    Decreased urine stream and Urinary frequency- poct u/a negative. Likely 2/2 BPH pt not willing start flomax. Will monitor s/s.          Follow up with PCP in 4-8 weeks     Patient education provided from [facility]. Patient was counseled on when and how to seek emergent care.       Freya HARVEY, APRN, FNP-c   Department of Internal Medicine - [facility]  2:12 PM

## 2018-03-16 NOTE — TELEPHONE ENCOUNTER
Pt states he is unable to lay down for the  Ct scan bc it makes him feel like he is choking. Pt is alone therefore we cannot give him medication. I advised pt to return for study when able or with someone to drive him home. He does not want to do it now. I advised him to go to the ER if s/s change

## 2018-03-21 ENCOUNTER — CLINICAL SUPPORT (OUTPATIENT)
Dept: OPHTHALMOLOGY | Facility: CLINIC | Age: 53
End: 2018-03-21
Payer: COMMERCIAL

## 2018-03-21 ENCOUNTER — OFFICE VISIT (OUTPATIENT)
Dept: OPTOMETRY | Facility: CLINIC | Age: 53
End: 2018-03-21
Payer: COMMERCIAL

## 2018-03-21 DIAGNOSIS — H52.203 ASTIGMATISM OF BOTH EYES, UNSPECIFIED TYPE: ICD-10-CM

## 2018-03-21 DIAGNOSIS — H40.023 OPEN ANGLE WITH BORDERLINE FINDINGS AND HIGH GLAUCOMA RISK IN BOTH EYES: ICD-10-CM

## 2018-03-21 DIAGNOSIS — H04.123 DRY EYE SYNDROME, BILATERAL: ICD-10-CM

## 2018-03-21 DIAGNOSIS — H40.009 GLAUCOMA SUSPECT, UNSPECIFIED LATERALITY: Primary | ICD-10-CM

## 2018-03-21 DIAGNOSIS — H40.009 GLAUCOMA SUSPECT, UNSPECIFIED LATERALITY: ICD-10-CM

## 2018-03-21 DIAGNOSIS — H16.229 KERATOCONJUNCT SICCA, NOT SPECIFIED AS SJOGREN'S, UNSP EYE: Primary | ICD-10-CM

## 2018-03-21 DIAGNOSIS — E11.65 TYPE 2 DIABETES MELLITUS WITH HYPERGLYCEMIA, WITH LONG-TERM CURRENT USE OF INSULIN: Chronic | ICD-10-CM

## 2018-03-21 DIAGNOSIS — Z79.4 TYPE 2 DIABETES MELLITUS WITH HYPERGLYCEMIA, WITH LONG-TERM CURRENT USE OF INSULIN: Chronic | ICD-10-CM

## 2018-03-21 DIAGNOSIS — H52.4 PRESBYOPIA: ICD-10-CM

## 2018-03-21 PROCEDURE — 92015 DETERMINE REFRACTIVE STATE: CPT | Mod: S$GLB,,, | Performed by: OPTOMETRIST

## 2018-03-21 PROCEDURE — 92083 EXTENDED VISUAL FIELD XM: CPT | Mod: S$GLB,,, | Performed by: OPTOMETRIST

## 2018-03-21 PROCEDURE — 92012 INTRM OPH EXAM EST PATIENT: CPT | Mod: S$GLB,,, | Performed by: OPTOMETRIST

## 2018-03-21 PROCEDURE — 99999 PR PBB SHADOW E&M-EST. PATIENT-LVL III: CPT | Mod: PBBFAC,,, | Performed by: OPTOMETRIST

## 2018-03-22 NOTE — PROGRESS NOTES
HPI     Patient in for progress check.  States that vision changes like the   weather sometimes, can see small print others can't left eye worse than   right.    Being followed for (diagnosis):   Dry eye syndrome    Date last seen:  2/20/18    Doctor last seen:  Dr. Hager    Prescribed eye medications(s) using:  none    OTC eye medication(s) using:  Refresh - OU PRN    Signs/symptoms of condition resolved/better/stable/worse?:  About the same    Allergies/Medications reviewed today?  Yes    Hemoglobin A1C       Date                     Value               Ref Range             Status                02/27/2018               8.0 (H)             4.0 - 5.6 %           Final                     01/25/2018               8.1 (H)             4.0 - 5.6 %           Final                     11/03/2017               7.9 (H)             4.0 - 5.6 %           Final                    Last edited by Estephanie Ceja MA on 3/21/2018 10:19 AM. (History)            Assessment /Plan     For exam results, see Encounter Report.    Keratoconjunct sicca, not specified as Sjogren's, unsp eye  Dry eye syndrome, bilateral  -Pt never used FML  Only used refresh a few times   Start   lifitegrast (XIIDRA) 5 % Dpet; Apply 1 drop to eye 2 (two) times daily.  Dispense: 60 each; Refill: 12    Open angle with borderline findings and high glaucoma risk in both eyes  (+) Family history of glaucoma in father  -     Posterior Segment OCT Optic Nerve- Both eyes WNL OU              HVF Scattered defects do not correlate with OCT   Repeat tests 1 year   IOP WNL       Type 2 diabetes mellitus with hyperglycemia, with long-term current use of insulin  Uncontrolled type 2 diabetes mellitus with peripheral neuropathy   Monitor yearly with DFE    Presbyopia  Astigmatism of both eyes, unspecified type   Rx specs    RTC 1 year, sooner PRN

## 2018-03-26 ENCOUNTER — NURSE TRIAGE (OUTPATIENT)
Dept: ADMINISTRATIVE | Facility: CLINIC | Age: 53
End: 2018-03-26

## 2018-03-26 NOTE — TELEPHONE ENCOUNTER
Reason for Disposition   General information question, no triage required and triager able to answer question    Protocols used: ST INFORMATION ONLY CALL-A-AH    Pt states he spoke with [facility] triage nurse yesterday who told him to see a MD in 24 hours for hand swelling. Pt denies any symptoms at this time. Wondering if he needs to make appt if no symptoms are present. Advised pt to call back if symptoms return.

## 2018-04-03 ENCOUNTER — NURSE TRIAGE (OUTPATIENT)
Dept: ADMINISTRATIVE | Facility: CLINIC | Age: 53
End: 2018-04-03

## 2018-04-04 NOTE — TELEPHONE ENCOUNTER
Pt calling with concerns. Wife gives him insulin injections. Tonight she forgot to draw up the insulin so injected him in back of arm subq with what should have been 27 units of insulin.   Reason for Disposition   Nursing judgment    Protocols used: ST NO GUIDELINE OR REFERENCE [id number]

## 2018-04-08 ENCOUNTER — NURSE TRIAGE (OUTPATIENT)
Dept: ADMINISTRATIVE | Facility: CLINIC | Age: 53
End: 2018-04-08

## 2018-04-08 NOTE — TELEPHONE ENCOUNTER
Reason for Disposition   [1] ACUTE NEURO SYMPTOM AND [2] now fine  (DEFINITION: difficult to awaken OR confused thinking and talking OR slurred speech OR weakness of arms OR unsteady walking)    Protocols used: ST HEAD INJURY-A-AH

## 2018-04-11 ENCOUNTER — OFFICE VISIT (OUTPATIENT)
Dept: INTERNAL MEDICINE | Facility: CLINIC | Age: 53
End: 2018-04-11
Payer: COMMERCIAL

## 2018-04-11 VITALS
BODY MASS INDEX: 49.38 KG/M2 | SYSTOLIC BLOOD PRESSURE: 140 MMHG | OXYGEN SATURATION: 99 % | TEMPERATURE: 98 F | WEIGHT: 315 LBS | DIASTOLIC BLOOD PRESSURE: 76 MMHG | HEART RATE: 76 BPM

## 2018-04-11 DIAGNOSIS — I10 HYPERTENSION, ESSENTIAL: Primary | ICD-10-CM

## 2018-04-11 DIAGNOSIS — R11.0 NAUSEA: ICD-10-CM

## 2018-04-11 PROCEDURE — 99999 PR PBB SHADOW E&M-EST. PATIENT-LVL III: CPT | Mod: PBBFAC,,, | Performed by: NURSE PRACTITIONER

## 2018-04-11 PROCEDURE — 3078F DIAST BP <80 MM HG: CPT | Mod: CPTII,S$GLB,, | Performed by: NURSE PRACTITIONER

## 2018-04-11 PROCEDURE — 3077F SYST BP >= 140 MM HG: CPT | Mod: CPTII,S$GLB,, | Performed by: NURSE PRACTITIONER

## 2018-04-11 PROCEDURE — 99213 OFFICE O/P EST LOW 20 MIN: CPT | Mod: S$GLB,,, | Performed by: NURSE PRACTITIONER

## 2018-04-11 RX ORDER — CYCLOBENZAPRINE HCL 5 MG
5 TABLET ORAL 3 TIMES DAILY PRN
Qty: 15 TABLET | Refills: 0 | Status: SHIPPED | OUTPATIENT
Start: 2018-04-11 | End: 2018-04-11 | Stop reason: CLARIF

## 2018-04-11 NOTE — LETTER
April 11, 2018      [facility] - Internal Medicine  [address]  [facility] [address]  Phone: [phone]  Fax: [phone]       Patient: García Tang   YOB: 1965  Date of Visit: 04/11/2018    To Whom It May Concern:    Mak Tang  was at [facility] on 04/11/2018. He  may return to work/school on 04/12/18 with no restrictions. If you have any questions or concerns, or if I can be of further assistance, please do not hesitate to contact me.    Sincerely,        Alex Christina NP

## 2018-04-11 NOTE — PROGRESS NOTES
"Subjective:       Patient ID: García Tang is a 52 y.o. male.    Chief Complaint: Hypertension    HPI:  51 yo male that presents to clinic today for blood pressure check.    Patient is currently taking metorprolol succinate 50mg po bid and amlodipine 5mg q day everyday.  States that he is occasionally taking is HCTZ 12.5mg bid but is not consistent with it as it "makes him go to the bathroom a lot."    States that he has been having intermittent episodes of nausea over the past couple of days.  Denies any fever, vomiting, chest pain or shortness of breath.  States that his appetite is still okay. States that he has not tried taking anything for it.      Review of Systems   Constitutional: Negative for activity change, appetite change, fatigue and fever.   HENT: Negative for congestion, ear pain, postnasal drip, rhinorrhea, sinus pain, sinus pressure and sore throat.    Respiratory: Negative for apnea, cough, shortness of breath and wheezing.    Cardiovascular: Negative for chest pain, palpitations and leg swelling.   Gastrointestinal: Positive for nausea. Negative for abdominal distention, abdominal pain, constipation, diarrhea and vomiting.   Musculoskeletal: Negative for arthralgias, back pain, myalgias, neck pain and neck stiffness.   Neurological: Negative for dizziness, light-headedness, numbness and headaches.   Psychiatric/Behavioral: Negative for behavioral problems.       Objective:      Physical Exam   Constitutional: He is oriented to person, place, and time. He appears well-developed and well-nourished. No distress.   Neck: Normal range of motion. Neck supple. No thyromegaly present.   Cardiovascular: Normal rate, regular rhythm, normal heart sounds and intact distal pulses.    No murmur heard.  Pulmonary/Chest: Effort normal and breath sounds normal. No respiratory distress. He has no wheezes. He has no rales.   Abdominal: Soft. Bowel sounds are normal. He exhibits no distension and no mass. There " is no tenderness.   Lymphadenopathy:     He has no cervical adenopathy.   Neurological: He is alert and oriented to person, place, and time. No sensory deficit.   Psychiatric: His behavior is normal.       Assessment:       1. Hypertension, essential    2. Nausea        Plan:     1.  Blood pressure  -Blood pressure is essentially well controlled today in clinic.  Slight elevation in systolic pressure.  -Continue current medication regimen but advised to restart HCTZ 12.5mg po bid for optimal blood pressure control.  -Encouraged to follow up with PCP.  -Monitor salt intake in diet and work on becoming more physically active and losing weight.    2  Nausea  -Encouraged to try a more bland diet for the next few days to help ease nausea.  -Can try some over the counter zantac twice a day as nausea mostly comes on after eating meals.

## 2018-05-07 ENCOUNTER — OFFICE VISIT (OUTPATIENT)
Dept: INTERNAL MEDICINE | Facility: CLINIC | Age: 53
End: 2018-05-07
Payer: COMMERCIAL

## 2018-05-07 ENCOUNTER — HOSPITAL ENCOUNTER (OUTPATIENT)
Dept: RADIOLOGY | Facility: HOSPITAL | Age: 53
Discharge: HOME | End: 2018-05-07
Attending: INTERNAL MEDICINE
Payer: COMMERCIAL

## 2018-05-07 VITALS
SYSTOLIC BLOOD PRESSURE: 130 MMHG | BODY MASS INDEX: 45.1 KG/M2 | WEIGHT: 315 LBS | HEART RATE: 66 BPM | DIASTOLIC BLOOD PRESSURE: 83 MMHG | HEIGHT: 70 IN | OXYGEN SATURATION: 95 % | TEMPERATURE: 98 F

## 2018-05-07 DIAGNOSIS — R11.0 NAUSEA: ICD-10-CM

## 2018-05-07 DIAGNOSIS — Z76.0 MEDICATION REFILL: ICD-10-CM

## 2018-05-07 DIAGNOSIS — R10.11 RUQ PAIN: ICD-10-CM

## 2018-05-07 DIAGNOSIS — R10.13 EPIGASTRIC PAIN: ICD-10-CM

## 2018-05-07 DIAGNOSIS — R10.11 RUQ PAIN: Primary | ICD-10-CM

## 2018-05-07 PROCEDURE — 76700 US EXAM ABDOM COMPLETE: CPT | Mod: TC

## 2018-05-07 PROCEDURE — 99999 PR PBB SHADOW E&M-EST. PATIENT-LVL III: CPT | Mod: PBBFAC,,, | Performed by: INTERNAL MEDICINE

## 2018-05-07 PROCEDURE — 99214 OFFICE O/P EST MOD 30 MIN: CPT | Mod: S$GLB,,, | Performed by: INTERNAL MEDICINE

## 2018-05-07 PROCEDURE — 3008F BODY MASS INDEX DOCD: CPT | Mod: CPTII,S$GLB,, | Performed by: INTERNAL MEDICINE

## 2018-05-07 PROCEDURE — 3079F DIAST BP 80-89 MM HG: CPT | Mod: CPTII,S$GLB,, | Performed by: INTERNAL MEDICINE

## 2018-05-07 PROCEDURE — 3075F SYST BP GE 130 - 139MM HG: CPT | Mod: CPTII,S$GLB,, | Performed by: INTERNAL MEDICINE

## 2018-05-07 PROCEDURE — 76700 US EXAM ABDOM COMPLETE: CPT | Mod: 26,,, | Performed by: RADIOLOGY

## 2018-05-07 RX ORDER — METOPROLOL SUCCINATE 50 MG/1
50 TABLET, EXTENDED RELEASE ORAL 2 TIMES DAILY
Qty: 60 TABLET | Refills: 6 | Status: SHIPPED | OUTPATIENT
Start: 2018-05-07 | End: 2019-07-02 | Stop reason: SDUPTHER

## 2018-05-07 RX ORDER — AMLODIPINE BESYLATE 5 MG/1
5 TABLET ORAL DAILY
Qty: 30 TABLET | Refills: 6 | Status: SHIPPED | OUTPATIENT
Start: 2018-05-07 | End: 2018-10-01

## 2018-05-07 NOTE — PROGRESS NOTES
"Subjective:       Patient ID: García Tang is a 52 y.o. male.    Chief Complaint: URI (started a couple of weeks ago. pt complains of symptoms like excessive itching in inner L ear, dry excessive cough, continous headaches.) and Abdominal Pain (pt complains of nausea/vomiting when or after eating, also constant, shooting pains in upper R side of abdomen.)    HPI   51 yo M pt of Dr. Villareal in resident clinic presents for evaluation of following complainits.  He has IDDM,  HTN, HLD.    URI sx for 2 weeks with left ear itching, dry cough, headaches but main concern is the abdominal pain.    Abdominal pain: n/v after eating, shooting ruq pains.   abd US with hepatic steatosis, gallbladder normal in 2/2018. It has happened before. Gets better then comes back again. Getting nausea and "rotten feeling."   Eating makes pain worse.   Feels better not eating.  Going on 2 months.   Tried antacid, didn't see difference.   Review of Systems   Constitutional: Negative for fever.   HENT: Negative.    Eyes: Negative.    Respiratory: Negative for shortness of breath.    Cardiovascular: Negative for chest pain and leg swelling.   Gastrointestinal: Negative for abdominal pain, diarrhea, nausea and vomiting.   Genitourinary: Negative.    Musculoskeletal: Negative for arthralgias.   Skin: Negative for rash.   Psychiatric/Behavioral: Negative.        Objective:   /83 (BP Location: Right arm, Patient Position: Sitting, BP Method: Large (Manual))   Pulse 66   Temp 97.9 °F (36.6 °C) (Oral)   Ht 5' 10" (1.778 m)   Wt (!) 157.3 kg (346 lb 12.5 oz)   SpO2 95%   BMI 49.76 kg/m²      Physical Exam   Constitutional: He is oriented to person, place, and time. He appears well-developed and well-nourished. No distress.   HENT:   Head: Normocephalic and atraumatic.   Cardiovascular: Normal rate and regular rhythm.    No murmur heard.  Pulmonary/Chest: Effort normal. No respiratory distress. He has no wheezes. He has no rales. "   Neurological: He is alert and oriented to person, place, and time.   Skin: Skin is warm and dry. He is not diaphoretic.   Psychiatric: He has a normal mood and affect. His behavior is normal.       Assessment:       1. RUQ pain    2. Nausea    3. Epigastric pain    4. Medication refill        Plan:       García was seen today for uri and abdominal pain.    Diagnoses and all orders for this visit:    RUQ pain and epigastric pain, nausea. Suspect biliary colic vs PUD  -     H. pylori antigen, stool; Future  -     US Abdomen Complete; Future  egd if unrevealing:  -     Case request GI: ESOPHAGOGASTRODUODENOSCOPY (EGD)  -     Comprehensive metabolic panel; Future  -     CBC auto differential; Future    Medication refill  -     metoprolol succinate (TOPROL XL) 50 MG 24 hr tablet; Take 1 tablet (50 mg total) by mouth 2 (two) times daily.  -     amLODIPine (NORVASC) 5 MG tablet; Take 1 tablet (5 mg total) by mouth once daily.

## 2018-05-30 ENCOUNTER — OFFICE VISIT (OUTPATIENT)
Dept: INTERNAL MEDICINE | Facility: CLINIC | Age: 53
End: 2018-05-30
Payer: COMMERCIAL

## 2018-05-30 ENCOUNTER — NURSE TRIAGE (OUTPATIENT)
Dept: ADMINISTRATIVE | Facility: CLINIC | Age: 53
End: 2018-05-30

## 2018-05-30 VITALS
HEIGHT: 70 IN | OXYGEN SATURATION: 93 % | HEART RATE: 73 BPM | SYSTOLIC BLOOD PRESSURE: 123 MMHG | WEIGHT: 315 LBS | DIASTOLIC BLOOD PRESSURE: 82 MMHG | BODY MASS INDEX: 45.1 KG/M2

## 2018-05-30 DIAGNOSIS — E11.42 DIABETIC POLYNEUROPATHY ASSOCIATED WITH TYPE 2 DIABETES MELLITUS: ICD-10-CM

## 2018-05-30 DIAGNOSIS — L03.011 CELLULITIS OF FINGER OF RIGHT HAND: Primary | ICD-10-CM

## 2018-05-30 DIAGNOSIS — Z79.4 TYPE 2 DIABETES MELLITUS WITH HYPERGLYCEMIA, WITH LONG-TERM CURRENT USE OF INSULIN: Chronic | ICD-10-CM

## 2018-05-30 DIAGNOSIS — E11.65 TYPE 2 DIABETES MELLITUS WITH HYPERGLYCEMIA, WITH LONG-TERM CURRENT USE OF INSULIN: Chronic | ICD-10-CM

## 2018-05-30 PROCEDURE — 99214 OFFICE O/P EST MOD 30 MIN: CPT | Mod: S$GLB,,, | Performed by: INTERNAL MEDICINE

## 2018-05-30 PROCEDURE — 3079F DIAST BP 80-89 MM HG: CPT | Mod: CPTII,S$GLB,, | Performed by: INTERNAL MEDICINE

## 2018-05-30 PROCEDURE — 3008F BODY MASS INDEX DOCD: CPT | Mod: CPTII,S$GLB,, | Performed by: INTERNAL MEDICINE

## 2018-05-30 PROCEDURE — 99999 PR PBB SHADOW E&M-EST. PATIENT-LVL III: CPT | Mod: PBBFAC,,, | Performed by: INTERNAL MEDICINE

## 2018-05-30 PROCEDURE — 3074F SYST BP LT 130 MM HG: CPT | Mod: CPTII,S$GLB,, | Performed by: INTERNAL MEDICINE

## 2018-05-30 PROCEDURE — 3045F PR MOST RECENT HEMOGLOBIN A1C LEVEL 7.0-9.0%: CPT | Mod: CPTII,S$GLB,, | Performed by: INTERNAL MEDICINE

## 2018-05-30 RX ORDER — CLINDAMYCIN HYDROCHLORIDE 300 MG/1
300 CAPSULE ORAL 3 TIMES DAILY
Qty: 30 CAPSULE | Refills: 0 | Status: SHIPPED | OUTPATIENT
Start: 2018-05-30 | End: 2018-06-07 | Stop reason: SDUPTHER

## 2018-05-30 RX ORDER — GABAPENTIN 100 MG/1
100 CAPSULE ORAL NIGHTLY
Qty: 90 CAPSULE | Refills: 11 | Status: SHIPPED | OUTPATIENT
Start: 2018-05-30 | End: 2018-12-17

## 2018-05-30 NOTE — TELEPHONE ENCOUNTER
Reason for Disposition   Looks infected (spreading redness, pus)  (Exception: localized redness and swelling of skin around nail)    Protocols used: ST FINGER PAIN-A-AH    Patient called to report the following     -righht pinky finger corner puffy and red, painful  -its been hurting for a couple of day   -bottom of both feet numb   -I haven't tested my blood sugars   -usually around 200      Education completed per [facility] Advice including follow up with provider.within 24 hours. Patient verbalized understanding.

## 2018-05-30 NOTE — PROGRESS NOTES
"Subjective:       Patient ID: García Tang is a 52 y.o. male.    Chief Complaint: Recurrent Skin Infections (pt complains of possible infection on the R hand, pinky finger. ) and Numbness (pt complains of numbness/neuopathy in bottom of LUANA feet. pt takes Tylenol to relieve.)    HPI   53 yo M with DM (8.0% in February) presents for evaluation of right 5th finger pain and swelling. Over few days worse now.    Night time with foot pain and numbness.     Admits he isn't great at taking medications. Hasn't been taking atorvastatin.    Takes amlodipine.   Review of Systems   Constitutional: Negative for fever.   Respiratory: Negative for shortness of breath.    Cardiovascular: Negative for chest pain.   Musculoskeletal: Negative.    Skin: Negative.         Right 5th finger swelling and pain       Objective:   /82 (BP Location: Right arm, Patient Position: Sitting, BP Method: X-Large (Manual))   Pulse 73   Ht 5' 10" (1.778 m)   Wt (!) 156.4 kg (344 lb 12.8 oz)   SpO2 (!) 93%   BMI 49.47 kg/m²      Physical Exam   Constitutional: He is oriented to person, place, and time. He appears well-developed and well-nourished. No distress.   HENT:   Head: Normocephalic and atraumatic.   Cardiovascular: Normal rate and regular rhythm.    No murmur heard.  Pulmonary/Chest: Effort normal. No respiratory distress. He has no wheezes. He has no rales.   Neurological: He is alert and oriented to person, place, and time.   Skin: Skin is warm and dry. He is not diaphoretic.   Right 5th finger erythematous and swollen, no drainage appreciated.   Psychiatric: He has a normal mood and affect. His behavior is normal.       Protective Sensation (w/ 10 gram monofilament):  Right: Decreased  Left: Decreased    Visual Inspection:  Onychomycosis -  Bilateral    Pedal Pulses:   Right: Present  Left: Present    Posterior tibialis:   Right:Present  Left: Present      Assessment:       1. Cellulitis of finger of right hand    2. Type 2 " diabetes mellitus with hyperglycemia, with long-term current use of insulin    3. Diabetic polyneuropathy associated with type 2 diabetes mellitus    4. Uncontrolled type 2 diabetes mellitus with peripheral neuropathy        Plan:       García was seen today for recurrent skin infections and numbness.    Diagnoses and all orders for this visit:    Cellulitis of finger of right hand  -     clindamycin (CLEOCIN) 300 MG capsule; Take 1 capsule (300 mg total) by mouth 3 (three) times daily.    Type 2 diabetes mellitus with hyperglycemia, with long-term current use of insulin  -     gabapentin (NEURONTIN) 100 MG capsule; Take 1 capsule (100 mg total) by mouth every evening. May increase to 300mg if needed. Diabetic neuropathy.    Diabetic polyneuropathy associated with type 2 diabetes mellitus  -     Ambulatory referral to Podiatry    Uncontrolled type 2 diabetes mellitus with peripheral neuropathy  -     gabapentin (NEURONTIN) 100 MG capsule; Take 1 capsule (100 mg total) by mouth every evening. May increase to 300mg if needed. Diabetic neuropathy.  -     Hemoglobin A1c; Future  -     Comprehensive metabolic panel; Future  -     Lipid panel; Future

## 2018-06-03 ENCOUNTER — NURSE TRIAGE (OUTPATIENT)
Dept: ADMINISTRATIVE | Facility: CLINIC | Age: 53
End: 2018-06-03

## 2018-06-03 ENCOUNTER — HOSPITAL ENCOUNTER (EMERGENCY)
Facility: HOSPITAL | Age: 53
Discharge: HOME | End: 2018-06-03
Attending: EMERGENCY MEDICINE
Payer: COMMERCIAL

## 2018-06-03 VITALS
WEIGHT: 315 LBS | BODY MASS INDEX: 45.1 KG/M2 | OXYGEN SATURATION: 96 % | HEIGHT: 70 IN | DIASTOLIC BLOOD PRESSURE: 70 MMHG | RESPIRATION RATE: 18 BRPM | TEMPERATURE: 98 F | HEART RATE: 86 BPM | SYSTOLIC BLOOD PRESSURE: 136 MMHG

## 2018-06-03 DIAGNOSIS — L03.011 PARONYCHIA OF RIGHT LITTLE FINGER: Primary | ICD-10-CM

## 2018-06-03 PROCEDURE — 10060 I&D ABSCESS SIMPLE/SINGLE: CPT | Mod: F9

## 2018-06-03 PROCEDURE — 99283 EMERGENCY DEPT VISIT LOW MDM: CPT | Mod: 25

## 2018-06-03 PROCEDURE — 25000003 PHARM REV CODE 250: Performed by: EMERGENCY MEDICINE

## 2018-06-03 PROCEDURE — 99283 EMERGENCY DEPT VISIT LOW MDM: CPT | Mod: ,,, | Performed by: EMERGENCY MEDICINE

## 2018-06-03 RX ORDER — LIDOCAINE HYDROCHLORIDE 10 MG/ML
10 INJECTION INFILTRATION; PERINEURAL ONCE
Status: COMPLETED | OUTPATIENT
Start: 2018-06-03 | End: 2018-06-03

## 2018-06-03 RX ORDER — LIDOCAINE HYDROCHLORIDE AND EPINEPHRINE 10; 10 MG/ML; UG/ML
1 INJECTION, SOLUTION INFILTRATION; PERINEURAL ONCE
Status: DISCONTINUED | OUTPATIENT
Start: 2018-06-03 | End: 2018-06-03

## 2018-06-03 RX ADMIN — LIDOCAINE HYDROCHLORIDE 10 ML: 10 INJECTION, SOLUTION INFILTRATION; PERINEURAL at 04:06

## 2018-06-03 NOTE — TELEPHONE ENCOUNTER
Reason for Disposition   [1] SEVERE pain with bending of finger (or toe) AND [2] cellulitis on hand (or foot)    Protocols used:  CELLULITIS FOLLOW-UP CALL-A-AH    Patient called to state the right pinky finger is more swollen and has pus underneath the skin. He has been on clindamycin since Wednesday evening. Patient advised to go to the ED and he verbalized understanding.

## 2018-06-03 NOTE — ED TRIAGE NOTES
Presents to ER with Paronychia right pinky for 10 days.  Patient's name and date of birth checked and is correct.  LOC: The patient is awake, alert and aware of environment with an appropriate affect, the patient is oriented x 3 and speaking appropriately.  APPEARANCE: Patient resting comfortably and in no acute distress, patient is clean and well groomed, patient's clothing is properly fastened.  CARDIOVASCULAR:  Heart rate regular and even with no peripheral edema noted.  SKIN: The skin is warm and dry, patient has normal skin turgor and moist mucus membranes, skin intact, no breakdown or brusing noted.   MUSKULOSKELETAL: Patient moving all extremities well, no obvious swelling or deformities noted.  RESPIRATORY: Airway is open and patent, respirations are spontaneous, patient has a normal effort and rate.

## 2018-06-03 NOTE — ED PROVIDER NOTES
Encounter Date: 6/3/2018    SCRIBE #1 NOTE: I, Neri Crawford, am scribing for, and in the presence of,  Dr. Cheng. I have scribed the entire note.       History     Chief Complaint   Patient presents with    Finger Pain     on antibiotics for r little finger infection since tues     Time patient was seen by the provider: 4:48 PM      The patient is a 52 y.o. male with co-morbidities including: T2DM, HTN, HLD, morbid obesity, CKD, and NAFLD, who presents to the ED with a complaint of right fifth finger pain and swelling. His finger pain initially started over a week ago. He saw his PCP, Dr. Patel, 4 days ago for right fifth finger redness and pain and was prescribed Clindamycin, which he has been compliant with. His finger swelling and pain has worsened, prompting him to come to the ED. He denies previous problems with local anesthesia.      The history is provided by the patient.     Review of patient's allergies indicates:   Allergen Reactions    Lisinopril Swelling    Bactrim  [sulfamethoxazole-trimethoprim]      Other reaction(s): Unknown    Hydroxyzine hcl      Other reaction(s): elevated bp    Kenalog [triamcinolone acetonide] Other (See Comments)     Heart flutters all day    Phenergan [promethazine] Anxiety    Zofran [ondansetron hcl (pf)] Anxiety     Past Medical History:   Diagnosis Date    CKD stage 2 due to type 2 diabetes mellitus 5/18/2017    Essential hypertension 7/18/2016    Fatty liver disease, nonalcoholic 4/7/2016    [place], 2006     HLD (hyperlipidemia)     Inflammatory bowel disease     diverticulitis    Kidney stone     requiring stent    Morbid obesity 5/29/2014    NAFLD (nonalcoholic fatty liver disease)     Near syncope 5/23/2017    Obesity hypoventilation syndrome 2/22/2016    Prostatitis     Rectal bleeding     diverticulitis    Type 2 diabetes mellitus with hyperglycemia, with long-term current use of insulin 5/18/2017    Uncontrolled type 2 diabetes mellitus with  peripheral neuropathy 5/29/2014     Past Surgical History:   Procedure Laterality Date    COLONOSCOPY      kidney stent      KIDNEY SURGERY  1996    right kidney obstruction requiring stent    NOSE SURGERY       Family History   Problem Relation Age of Onset    Cancer Father     Diabetes Father     Hypertension Father     Glaucoma Father     Liver disease Mother         d.    Cirrhosis Mother     Hypertension Mother     Diabetes Brother      Social History   Substance Use Topics    Smoking status: Former Smoker     Types: Cigarettes    Smokeless tobacco: Never Used      Comment: social    Alcohol use No     Review of Systems   Musculoskeletal:        Positive for right fifth finger pain, swelling, and redness.       Physical Exam     Initial Vitals [06/03/18 1630]   BP Pulse Resp Temp SpO2   136/70 86 18 98 °F (36.7 °C) 96 %      MAP       92         Physical Exam    Vitals reviewed.  Constitutional:   52-year-old  man, no acute distress noted   Musculoskeletal:   An area of mildly erythematous, fluctuant, increased tenderness is noted to the dorsal medial aspect of the right 5th finger without proximal erythema or palmar tenderness   Neurological: He is alert and oriented to person, place, and time.   Skin: Skin is warm and dry.   Psychiatric: His behavior is normal. Thought content normal.         ED Course   I & D - Incision and Drainage  Date/Time: 6/3/2018 5:31 PM  Location procedure was performed: [facility] EMERGENCY DEPARTMENT  Performed by: TITI IVERSON  Authorized by: TITI IVERSON   Pre-operative diagnosis: paronychia right fifth finger  Consent Done: Yes  Consent: Verbal consent obtained.  Risks and benefits: risks, benefits and alternatives were discussed  Consent given by: patient  Required items: required blood products, implants, devices, and special equipment available  Patient identity confirmed: name and verbally with patient  Time out: Immediately prior to  "procedure a "time out" was called to verify the correct patient, procedure, equipment, support staff and site/side marked as required.  Indications for incision and drainage: paronychia.  Body area: upper extremity  Location details: right small finger  Anesthesia: digital block    Anesthesia:  Local Anesthetic: lidocaine 1% without epinephrine  Anesthetic total: 6 mL  Patient sedated: no  Scalpel size: 11  Incision type: single straight  Complexity: simple  Drainage: pus and  bloody  Drainage amount: moderate  Wound treatment: wound left open (pressure dressing applied)  Packing material: none  Specimens: No  Patient tolerance: Patient tolerated the procedure well with no immediate complications        Labs Reviewed - No data to display          Medical Decision Making:   History:   Old Medical Records: I decided to obtain old medical records.            Scribe Attestation:   Scribe #1: I performed the above scribed service and the documentation accurately describes the services I performed. I attest to the accuracy of the note.    Attending Attestation:             Attending ED Notes:   Patient tolerated the I and D of paronychia without complication.  A compression dressing has been placed.  The patient will be discharged home in improved condition with instructions to continue his outpatient antibiotics as previously prescribed.  Return precautions have been discussed with the patient and accompanying spouse including worsening pain, worsening redness or red streaks, palmar tenderness, or fever.             Clinical Impression:   The encounter diagnosis was Paronychia of right little finger.    No orders to display       Disposition:   Disposition: Discharged  Condition: Stable                        Jonathan Cheng MD  06/03/18 [id number]    "

## 2018-06-07 ENCOUNTER — OFFICE VISIT (OUTPATIENT)
Dept: INTERNAL MEDICINE | Facility: CLINIC | Age: 53
End: 2018-06-07
Payer: COMMERCIAL

## 2018-06-07 VITALS
HEART RATE: 72 BPM | SYSTOLIC BLOOD PRESSURE: 128 MMHG | HEIGHT: 70 IN | OXYGEN SATURATION: 96 % | WEIGHT: 315 LBS | BODY MASS INDEX: 45.1 KG/M2 | DIASTOLIC BLOOD PRESSURE: 90 MMHG

## 2018-06-07 DIAGNOSIS — L03.011 CELLULITIS OF FINGER OF RIGHT HAND: ICD-10-CM

## 2018-06-07 DIAGNOSIS — L03.019 PARONYCHIA OF FINGER, UNSPECIFIED LATERALITY: Primary | ICD-10-CM

## 2018-06-07 PROCEDURE — 99999 PR PBB SHADOW E&M-EST. PATIENT-LVL IV: CPT | Mod: PBBFAC,,, | Performed by: INTERNAL MEDICINE

## 2018-06-07 PROCEDURE — 3080F DIAST BP >= 90 MM HG: CPT | Mod: CPTII,S$GLB,, | Performed by: INTERNAL MEDICINE

## 2018-06-07 PROCEDURE — 3074F SYST BP LT 130 MM HG: CPT | Mod: CPTII,S$GLB,, | Performed by: INTERNAL MEDICINE

## 2018-06-07 PROCEDURE — 3045F PR MOST RECENT HEMOGLOBIN A1C LEVEL 7.0-9.0%: CPT | Mod: CPTII,S$GLB,, | Performed by: INTERNAL MEDICINE

## 2018-06-07 PROCEDURE — 3008F BODY MASS INDEX DOCD: CPT | Mod: CPTII,S$GLB,, | Performed by: INTERNAL MEDICINE

## 2018-06-07 PROCEDURE — 99214 OFFICE O/P EST MOD 30 MIN: CPT | Mod: S$GLB,,, | Performed by: INTERNAL MEDICINE

## 2018-06-07 RX ORDER — VALSARTAN 160 MG/1
160 TABLET ORAL DAILY
Qty: 30 TABLET | Refills: 1 | Status: SHIPPED | OUTPATIENT
Start: 2018-06-07 | End: 2018-10-01

## 2018-06-07 RX ORDER — CLINDAMYCIN HYDROCHLORIDE 300 MG/1
300 CAPSULE ORAL 3 TIMES DAILY
Qty: 15 CAPSULE | Refills: 0 | Status: SHIPPED | OUTPATIENT
Start: 2018-06-07 | End: 2018-06-17

## 2018-06-07 NOTE — PROGRESS NOTES
Subjective:       Patient ID: García Tang is a 52 y.o. male.    Chief Complaint: Follow-up    HPI   F/u ED paronychia  Process began several days before ED visit 6/3.    He continued clindamycin, which was prescribed by Dr Patel May 30..  Had progressive swelling and throbbing so went to ED.  Abscess was I and D'd in Ed. Culture not sent.  Swelling has since improved, but still painful.    No fever.      bp elevated today.  ACEI allergy.  Borderline bp in recent past.  Review of Systems   Constitutional: Negative for activity change and unexpected weight change.   Respiratory: Negative for chest tightness and shortness of breath.    Cardiovascular: Negative for chest pain and leg swelling.   Gastrointestinal: Negative for abdominal pain.   Neurological: Negative for headaches.   Psychiatric/Behavioral: Negative for dysphoric mood.       Objective:      Physical Exam   Constitutional: He appears well-developed and well-nourished. No distress.   Skin:   Mild erythema r distal 5th finger.                Assessment:       1. Paronychia of finger, unspecified laterality    2. Cellulitis of finger of right hand    3. Uncontrolled type 2 diabetes mellitus with peripheral neuropathy        Plan:       García was seen today for follow-up.    Diagnoses and all orders for this visit:    Paronychia of finger, unspecified laterality    Cellulitis of finger of right hand  -     clindamycin (CLEOCIN) 300 MG capsule; Take 1 capsule (300 mg total) by mouth 3 (three) times daily.    Uncontrolled type 2 diabetes mellitus with peripheral neuropathy  -     Hemoglobin A1c; Future    Other orders  -    START valsartan (DIOVAN) 160 MG tablet; Take 1 tablet (160 mg total) by mouth once daily.       Continue clindamycin for another 5-7 days/  Call if incomplete resolution of pain, erythema     Warm soaks.      F/u Dr Patel

## 2018-06-13 ENCOUNTER — OFFICE VISIT (OUTPATIENT)
Dept: INTERNAL MEDICINE | Facility: CLINIC | Age: 53
End: 2018-06-13
Payer: COMMERCIAL

## 2018-06-13 ENCOUNTER — HOSPITAL ENCOUNTER (OUTPATIENT)
Dept: RADIOLOGY | Facility: HOSPITAL | Age: 53
Discharge: HOME | End: 2018-06-13
Attending: INTERNAL MEDICINE
Payer: COMMERCIAL

## 2018-06-13 ENCOUNTER — TELEPHONE (OUTPATIENT)
Dept: INTERNAL MEDICINE | Facility: CLINIC | Age: 53
End: 2018-06-13

## 2018-06-13 VITALS
BODY MASS INDEX: 45.1 KG/M2 | WEIGHT: 315 LBS | TEMPERATURE: 98 F | HEIGHT: 70 IN | SYSTOLIC BLOOD PRESSURE: 124 MMHG | OXYGEN SATURATION: 95 % | HEART RATE: 83 BPM | DIASTOLIC BLOOD PRESSURE: 98 MMHG

## 2018-06-13 DIAGNOSIS — L03.011 PARONYCHIA OF FINGER, RIGHT: Primary | ICD-10-CM

## 2018-06-13 DIAGNOSIS — M79.644 PAIN OF FINGER OF RIGHT HAND: ICD-10-CM

## 2018-06-13 DIAGNOSIS — M70.62 TROCHANTERIC BURSITIS, LEFT HIP: ICD-10-CM

## 2018-06-13 DIAGNOSIS — R11.2 NAUSEA AND VOMITING, INTRACTABILITY OF VOMITING NOT SPECIFIED, UNSPECIFIED VOMITING TYPE: ICD-10-CM

## 2018-06-13 PROCEDURE — 99214 OFFICE O/P EST MOD 30 MIN: CPT | Mod: S$GLB,,, | Performed by: INTERNAL MEDICINE

## 2018-06-13 PROCEDURE — 73140 X-RAY EXAM OF FINGER(S): CPT | Mod: TC,RT

## 2018-06-13 PROCEDURE — 73140 X-RAY EXAM OF FINGER(S): CPT | Mod: 26,RT,, | Performed by: RADIOLOGY

## 2018-06-13 PROCEDURE — 3008F BODY MASS INDEX DOCD: CPT | Mod: CPTII,S$GLB,, | Performed by: INTERNAL MEDICINE

## 2018-06-13 PROCEDURE — 3080F DIAST BP >= 90 MM HG: CPT | Mod: CPTII,S$GLB,, | Performed by: INTERNAL MEDICINE

## 2018-06-13 PROCEDURE — 99999 PR PBB SHADOW E&M-EST. PATIENT-LVL V: CPT | Mod: PBBFAC,,, | Performed by: INTERNAL MEDICINE

## 2018-06-13 PROCEDURE — 3074F SYST BP LT 130 MM HG: CPT | Mod: CPTII,S$GLB,, | Performed by: INTERNAL MEDICINE

## 2018-06-13 RX ORDER — OMEPRAZOLE 40 MG/1
40 CAPSULE, DELAYED RELEASE ORAL DAILY
Qty: 30 CAPSULE | Refills: 1 | Status: SHIPPED | OUTPATIENT
Start: 2018-06-13 | End: 2018-12-17

## 2018-06-13 NOTE — PROGRESS NOTES
Subjective:       Patient ID: García Tang is a 52 y.o. male.    Chief Complaint: Nausea; Emesis; other (finger infection); and Leg Pain (left)    HPI   Finger looks better, but still throbbing.      He has a couple more days of clindamycin.    He has chronic nausea, beginning months ago, some days worse than ever.  Vomited several times last night and this am.      Every time he eats he feels like he will throw up.  He doesn't relate nausea to clindamycin.  abd u/s fatty liver 2/18.  Vague pain upper abd.    He had a sensation in throat after eating.   He has tried antacids, but pepto-bismol more effective.  He has taken prilosec in past for heartburn.    Also c/o L hip pain, both in sleep and walking.         Review of Systems   Constitutional: Negative for fatigue, fever and unexpected weight change.   Gastrointestinal: Positive for nausea. Negative for abdominal pain.       Objective:      Physical Exam   Constitutional: He is oriented to person, place, and time. He appears well-developed and well-nourished. No distress.   Musculoskeletal: Normal range of motion. He exhibits no edema.        Right shoulder: He exhibits tenderness. He exhibits normal range of motion, no bony tenderness and no swelling.   Neurological: He is alert and oriented to person, place, and time.   Skin:   Distal 5th finger on r much improved - no erythema, but  , mildly, laterally. No drainage.         Assessment:       1. Paronychia of finger, right - looks resolved.  can't explain persistent pain   2. Pain of finger of right hand    3. Nausea and vomiting, intractability of vomiting not specified, unspecified vomiting type - chronic   4. Trochanteric bursitis, left hip     5.     htn - hasn't started diovan  Plan:       García was seen today for nausea, emesis, other and leg pain.    Diagnoses and all orders for this visit:    Paronychia of finger, right  -     Ambulatory consult to Orthopedics    Pain of finger of  right hand  -     Sedimentation rate; Future  -     C-reactive protein; Future  -     X-Ray Finger 2 or More Views Right; Future    Nausea and vomiting, intractability of vomiting not specified, unspecified vomiting type  -     Case request GI: ESOPHAGOGASTRODUODENOSCOPY (EGD)  -     H. PYLORI ANTIBODY, IGG; Future    Trochanteric bursitis, left hip    Other orders  -     omeprazole (PRILOSEC) 40 MG capsule; Take 1 capsule (40 mg total) by mouth once daily.       Start valsartan.       F/U Dr Patel

## 2018-06-13 NOTE — TELEPHONE ENCOUNTER
Called and spoke to pt and he was asking if he should come in for apt today to go over back pain and not better from last visit. Advised pt that he should come in to review, pt will come for apt today.

## 2018-06-13 NOTE — TELEPHONE ENCOUNTER
----- Message from Cesar Santos sent at 6/13/2018  8:08 AM CDT -----  Contact: self  Pt called in about wanting to speak with Dr. Pt has appt for 11:30 am. Pt wanted to speak with office before appt      Pt can be reached at [phone]      TY

## 2018-06-15 ENCOUNTER — PATIENT MESSAGE (OUTPATIENT)
Dept: INTERNAL MEDICINE | Facility: CLINIC | Age: 53
End: 2018-06-15

## 2018-06-18 ENCOUNTER — NURSE TRIAGE (OUTPATIENT)
Dept: ADMINISTRATIVE | Facility: CLINIC | Age: 53
End: 2018-06-18

## 2018-06-18 ENCOUNTER — OFFICE VISIT (OUTPATIENT)
Dept: INTERNAL MEDICINE | Facility: CLINIC | Age: 53
End: 2018-06-18
Payer: COMMERCIAL

## 2018-06-18 VITALS — SYSTOLIC BLOOD PRESSURE: 146 MMHG | DIASTOLIC BLOOD PRESSURE: 92 MMHG | HEART RATE: 82 BPM | TEMPERATURE: 98 F

## 2018-06-18 DIAGNOSIS — E11.65 TYPE 2 DIABETES MELLITUS WITH HYPERGLYCEMIA, WITH LONG-TERM CURRENT USE OF INSULIN: Chronic | ICD-10-CM

## 2018-06-18 DIAGNOSIS — G89.29 HIP PAIN, CHRONIC, LEFT: ICD-10-CM

## 2018-06-18 DIAGNOSIS — S91.331A PUNCTURE WOUND OF PLANTAR ASPECT OF RIGHT FOOT, INITIAL ENCOUNTER: Primary | ICD-10-CM

## 2018-06-18 DIAGNOSIS — Z79.4 TYPE 2 DIABETES MELLITUS WITH HYPERGLYCEMIA, WITH LONG-TERM CURRENT USE OF INSULIN: Chronic | ICD-10-CM

## 2018-06-18 DIAGNOSIS — I10 ESSENTIAL HYPERTENSION: Chronic | ICD-10-CM

## 2018-06-18 DIAGNOSIS — M25.552 HIP PAIN, CHRONIC, LEFT: ICD-10-CM

## 2018-06-18 PROCEDURE — 3045F PR MOST RECENT HEMOGLOBIN A1C LEVEL 7.0-9.0%: CPT | Mod: CPTII,S$GLB,, | Performed by: INTERNAL MEDICINE

## 2018-06-18 PROCEDURE — 99999 PR PBB SHADOW E&M-EST. PATIENT-LVL II: CPT | Mod: PBBFAC,,, | Performed by: INTERNAL MEDICINE

## 2018-06-18 PROCEDURE — 3077F SYST BP >= 140 MM HG: CPT | Mod: CPTII,S$GLB,, | Performed by: INTERNAL MEDICINE

## 2018-06-18 PROCEDURE — 99213 OFFICE O/P EST LOW 20 MIN: CPT | Mod: S$GLB,,, | Performed by: INTERNAL MEDICINE

## 2018-06-18 PROCEDURE — 3080F DIAST BP >= 90 MM HG: CPT | Mod: CPTII,S$GLB,, | Performed by: INTERNAL MEDICINE

## 2018-06-18 NOTE — PROGRESS NOTES
Subjective:       Patient ID: García Tang is a 52 y.o. male.    Chief Complaint: No chief complaint on file.    HPI   51 yo male with pmh of HTN, DM2, CKD2, morbid obesity who presents as urgent care visit for puncture wound. He states that he stepped on a ros nail on Saturday while working in the garden. Nail went through his boot and he felt it puncture his foot as well. There may have been a small break in his skin. He had a tetanus booster in 2016. In his lifetime, he has had 4 total. He denies any fevers/chills.    He also states that he has been having left hip pain for the past year. It is worse after ambulating for a long period of time and resolves with rest. There is some radiation of pain to the lateral mid thigh. He denies any trauma. No long term steroid use. Denies any recent immbolization or long car rides. No history of blood disorders. He uses tylenol with some relief.     Review of Systems   Constitutional: Negative for chills, fatigue and fever.   HENT: Negative for sore throat.    Eyes: Negative for visual disturbance.   Respiratory: Negative for cough and shortness of breath.    Cardiovascular: Positive for leg swelling.   Gastrointestinal: Positive for nausea and vomiting. Negative for abdominal pain.   Genitourinary: Negative for dysuria and hematuria.   Musculoskeletal: Positive for arthralgias, gait problem and myalgias.   Neurological: Negative for dizziness, weakness, light-headedness and headaches.       Objective:      Physical Exam   Constitutional: He appears well-developed and well-nourished.   HENT:   Head: Normocephalic and atraumatic.   Right Ear: External ear normal.   Left Ear: External ear normal.   Eyes: EOM are normal.   Neck: Neck supple.   Cardiovascular: Normal rate, regular rhythm, normal heart sounds and intact distal pulses.    Pulmonary/Chest: Effort normal and breath sounds normal.   Abdominal: Soft. Bowel sounds are normal.   Musculoskeletal:   Tenderness in  left lateral, proximal thigh    Skin: Skin is warm and dry.   No breaks in skin on right plantar surface. No erythema or pain with palpation        Assessment:       1. Puncture wound of plantar aspect of right foot, initial encounter    2. Essential hypertension    3. Type 2 diabetes mellitus with hyperglycemia, with long-term current use of insulin    4. Hip pain, chronic, left        Plan:       Diagnoses and all orders for this visit:    Puncture wound of plantar aspect of right foot, initial encounter  - no need for repeat tetanus shot as he received one in 2016 and has at least 4 total   - no evidence of cellutitis    Essential hypertension  - continue current anti-hypertensive regiment. If BP remains elevated, may need to increase dose.    Type 2 diabetes mellitus with hyperglycemia, with long-term current use of insulin  - continue current insulin regiment     Hip pain, chronic, left  - concern for osteoarthritis (given weight) vs bursitis  - currently patient declines any imaging   - Would use tylenol or heat pack for symptomatic relief  - if pain gets worse, would recommend imaging.    RTC as needed. Routine follow-up with PCP. Plan discussed with Dr. Gale.    Blaine Moise DO  PGY2 Internal Medicine  Pager: [phone]

## 2018-06-18 NOTE — TELEPHONE ENCOUNTER
Reason for Disposition   Puncture wound on foot and patient has diabetes mellitus    Protocols used: ST PUNCTURE WOUND-A-OH    Pt calling with concerns of stepping on a ros nail Saturday.  Pt states he cleansed the wound well and no signs of infection noted.  Care advice given.

## 2018-06-19 ENCOUNTER — PATIENT MESSAGE (OUTPATIENT)
Dept: INTERNAL MEDICINE | Facility: CLINIC | Age: 53
End: 2018-06-19

## 2018-06-21 ENCOUNTER — HOSPITAL ENCOUNTER (OUTPATIENT)
Dept: RADIOLOGY | Facility: HOSPITAL | Age: 53
Discharge: HOME | End: 2018-06-21
Attending: INTERNAL MEDICINE
Payer: COMMERCIAL

## 2018-06-21 DIAGNOSIS — M25.552 HIP PAIN, CHRONIC, LEFT: ICD-10-CM

## 2018-06-21 DIAGNOSIS — G89.29 HIP PAIN, CHRONIC, LEFT: ICD-10-CM

## 2018-06-21 PROCEDURE — 73502 X-RAY EXAM HIP UNI 2-3 VIEWS: CPT | Mod: TC,LT

## 2018-06-21 PROCEDURE — 73502 X-RAY EXAM HIP UNI 2-3 VIEWS: CPT | Mod: 26,LT,, | Performed by: RADIOLOGY

## 2018-06-22 NOTE — PROGRESS NOTES
I have personally taken the history and examined this patient and agree with the resident's note as stated above with the following thoughts:    Unremarkable punture wound-- discussed TDap stauts.

## 2018-07-05 ENCOUNTER — LAB VISIT (OUTPATIENT)
Dept: LAB | Facility: HOSPITAL | Age: 53
End: 2018-07-05
Attending: NURSE PRACTITIONER
Payer: COMMERCIAL

## 2018-07-05 ENCOUNTER — OFFICE VISIT (OUTPATIENT)
Dept: INTERNAL MEDICINE | Facility: CLINIC | Age: 53
End: 2018-07-05
Payer: COMMERCIAL

## 2018-07-05 VITALS
SYSTOLIC BLOOD PRESSURE: 120 MMHG | OXYGEN SATURATION: 98 % | BODY MASS INDEX: 45.1 KG/M2 | WEIGHT: 315 LBS | HEART RATE: 70 BPM | HEIGHT: 70 IN | DIASTOLIC BLOOD PRESSURE: 84 MMHG

## 2018-07-05 DIAGNOSIS — E11.42 TYPE 2 DIABETES MELLITUS WITH DIABETIC POLYNEUROPATHY, WITH LONG-TERM CURRENT USE OF INSULIN: ICD-10-CM

## 2018-07-05 DIAGNOSIS — Z79.4 TYPE 2 DIABETES MELLITUS WITH DIABETIC POLYNEUROPATHY, WITH LONG-TERM CURRENT USE OF INSULIN: ICD-10-CM

## 2018-07-05 DIAGNOSIS — I12.9 HYPERTENSION ASSOCIATED WITH CHRONIC KIDNEY DISEASE DUE TO TYPE 2 DIABETES MELLITUS: ICD-10-CM

## 2018-07-05 DIAGNOSIS — T14.8XXA ABRASION: ICD-10-CM

## 2018-07-05 DIAGNOSIS — E11.22 HYPERTENSION ASSOCIATED WITH CHRONIC KIDNEY DISEASE DUE TO TYPE 2 DIABETES MELLITUS: ICD-10-CM

## 2018-07-05 DIAGNOSIS — E66.01 MORBID OBESITY WITH BMI OF 45.0-49.9, ADULT: ICD-10-CM

## 2018-07-05 DIAGNOSIS — Z91.199 NON-COMPLIANCE: ICD-10-CM

## 2018-07-05 DIAGNOSIS — T14.8XXA ABRASION: Primary | ICD-10-CM

## 2018-07-05 LAB
BASOPHILS # BLD AUTO: 0.07 K/UL
BASOPHILS NFR BLD: 1.3 %
DIFFERENTIAL METHOD: NORMAL
EOSINOPHIL # BLD AUTO: 0.3 K/UL
EOSINOPHIL NFR BLD: 4.6 %
ERYTHROCYTE [DISTWIDTH] IN BLOOD BY AUTOMATED COUNT: 12.6 %
HCT VFR BLD AUTO: 40.9 %
HGB BLD-MCNC: 14.3 G/DL
LYMPHOCYTES # BLD AUTO: 1.9 K/UL
LYMPHOCYTES NFR BLD: 35.5 %
MCH RBC QN AUTO: 29.2 PG
MCHC RBC AUTO-ENTMCNC: 35 G/DL
MCV RBC AUTO: 84 FL
MONOCYTES # BLD AUTO: 0.7 K/UL
MONOCYTES NFR BLD: 12.8 %
NEUTROPHILS # BLD AUTO: 2.5 K/UL
NEUTROPHILS NFR BLD: 45.4 %
PLATELET # BLD AUTO: 221 K/UL
PMV BLD AUTO: 9.4 FL
RBC # BLD AUTO: 4.9 M/UL
WBC # BLD AUTO: 5.41 K/UL

## 2018-07-05 PROCEDURE — 85025 COMPLETE CBC W/AUTO DIFF WBC: CPT

## 2018-07-05 PROCEDURE — 3008F BODY MASS INDEX DOCD: CPT | Mod: CPTII,S$GLB,, | Performed by: NURSE PRACTITIONER

## 2018-07-05 PROCEDURE — 99214 OFFICE O/P EST MOD 30 MIN: CPT | Mod: S$GLB,,, | Performed by: NURSE PRACTITIONER

## 2018-07-05 PROCEDURE — 3074F SYST BP LT 130 MM HG: CPT | Mod: CPTII,S$GLB,, | Performed by: NURSE PRACTITIONER

## 2018-07-05 PROCEDURE — 3079F DIAST BP 80-89 MM HG: CPT | Mod: CPTII,S$GLB,, | Performed by: NURSE PRACTITIONER

## 2018-07-05 PROCEDURE — 99999 PR PBB SHADOW E&M-EST. PATIENT-LVL IV: CPT | Mod: PBBFAC,,, | Performed by: NURSE PRACTITIONER

## 2018-07-05 PROCEDURE — 36415 COLL VENOUS BLD VENIPUNCTURE: CPT

## 2018-07-05 PROCEDURE — 3045F PR MOST RECENT HEMOGLOBIN A1C LEVEL 7.0-9.0%: CPT | Mod: CPTII,S$GLB,, | Performed by: NURSE PRACTITIONER

## 2018-07-05 RX ORDER — MUPIROCIN 20 MG/G
OINTMENT TOPICAL 3 TIMES DAILY
Qty: 30 G | Refills: 0 | Status: SHIPPED | OUTPATIENT
Start: 2018-07-05 | End: 2018-12-17

## 2018-07-05 NOTE — PATIENT INSTRUCTIONS
Apply to Bactroban to cut to let three times a day with a Qtip    Call for any worsening    Blood test today to look at your blood count    Goal is fasting blood sugar in am 110 or less.  Blood sugar 2 HOURS AFTER A MEAL 140 or less  All diabetics should take the following unless allergic:  Baby aspirin 81 mg one daily, available over the counter, to help prevent a stroke or heart attack  A medication called an ACE inhibitor or an ARB to protect the kidneys ( Lisinopril, Losartan)  A statin ( Zocor, Mevacor, Crestor, Lipitor) even if your cholesterol is normal to help prevent a stroke or heart attack  Flu shot every year  Eye exam ever year  Dental exam every 6 months    You should be checking your blood sugar every morning before you eat or drink and then once during the day 2 HOURS AFTER A MEAL, it does not matter which meal.      Follow up with Dr Patel for any worsening

## 2018-07-05 NOTE — PROGRESS NOTES
Subjective:       Patient ID: García Tang is a 52 y.o. male.    Chief Complaint: Leg Pain    Pt here c/o an abrasion to left lower leg with some surrounding redness x 2 weeks.  Pt denies fever or chills, denies any red streaks or drainage.  No SOB or chest pain.  NO recent travel  Pt states last BG he checked a few days ago was 163        Leg Pain        Review of Systems   Constitutional: Negative for chills, diaphoresis, fatigue and fever.   HENT: Negative for trouble swallowing.    Respiratory: Negative for cough.    Cardiovascular: Negative for chest pain and palpitations.   Gastrointestinal: Negative for abdominal pain, constipation, diarrhea and vomiting.   Musculoskeletal: Negative for arthralgias and myalgias.   Skin: Positive for wound. Negative for rash.   Hematological: Negative for adenopathy.   Psychiatric/Behavioral: Negative for sleep disturbance.         Past Medical History:   Diagnosis Date    CKD stage 2 due to type 2 diabetes mellitus 5/18/2017    Essential hypertension 7/18/2016    Fatty liver disease, nonalcoholic 4/7/2016    [place], 2006     HLD (hyperlipidemia)     Inflammatory bowel disease     diverticulitis    Kidney stone     requiring stent    Morbid obesity 5/29/2014    NAFLD (nonalcoholic fatty liver disease)     Near syncope 5/23/2017    Obesity hypoventilation syndrome 2/22/2016    Prostatitis     Rectal bleeding     diverticulitis    Type 2 diabetes mellitus with hyperglycemia, with long-term current use of insulin 5/18/2017    Uncontrolled type 2 diabetes mellitus with peripheral neuropathy 5/29/2014     Past Surgical History:   Procedure Laterality Date    COLONOSCOPY      kidney stent      KIDNEY SURGERY  1996    right kidney obstruction requiring stent    NOSE SURGERY       Social History     Social History Narrative    No narrative on file     Family History   Problem Relation Age of Onset    Cancer Father     Diabetes Father     Hypertension Father      "Glaucoma Father     Liver disease Mother         d.    Cirrhosis Mother     Hypertension Mother     Diabetes Brother      Outpatient Encounter Prescriptions as of 7/5/2018   Medication Sig Dispense Refill    amLODIPine (NORVASC) 5 MG tablet Take 1 tablet (5 mg total) by mouth once daily. 30 tablet 6    aspirin (ECOTRIN) 81 MG EC tablet Take 81 mg by mouth once daily.      DOCOSAHEXANOIC ACID/EPA (FISH OIL ORAL) Take 1 capsule by mouth once daily at 6am.       ERGOCALCIFEROL, VITAMIN D2, (VITAMIN D ORAL) Take 1 capsule by mouth once daily at 6am.       insulin NPH-insulin regular, 70/30, (NOVOLIN 70/30) 100 unit/mL (70-30) injection Inject into the skin 2 (two) times daily. 27 in am and 24 in pm      lifitegrast (XIIDRA) 5 % Dpet Apply 1 drop to eye 2 (two) times daily. 60 each 12    metoprolol succinate (TOPROL XL) 50 MG 24 hr tablet Take 1 tablet (50 mg total) by mouth 2 (two) times daily. 60 tablet 6    multivitamin capsule Take by mouth. 1 Capsule Oral Every day      omeprazole (PRILOSEC) 40 MG capsule Take 1 capsule (40 mg total) by mouth once daily. 30 capsule 1    gabapentin (NEURONTIN) 100 MG capsule Take 1 capsule (100 mg total) by mouth every evening. May increase to 300mg if needed. Diabetic neuropathy. 90 capsule 11    mupirocin (BACTROBAN) 2 % ointment Apply topically 3 (three) times daily. Apply with a Qtip 30 g 0    valsartan (DIOVAN) 160 MG tablet Take 1 tablet (160 mg total) by mouth once daily. 30 tablet 1     No facility-administered encounter medications on file as of 7/5/2018.      Last 3 sets of Vitals  Vitals - 1 value per visit 6/13/2018 6/18/2018 7/5/2018   SYSTOLIC 124 146 120   DIASTOLIC 98 92 84   PULSE 83 82 70   TEMPERATURE 98.3 98.3 -   RESPIRATIONS - - -   SPO2 95 - 98   Weight (lb) 344.3 - 345.02   Weight (kg) 156.173 - 156.5   HEIGHT 5' 10" - 5' 10"   BODY MASS INDEX 49.4 - 49.51   VISIT REPORT - - -   Pain Score  7 - 5   Some recent data might be hidden "         Objective:      Physical Exam   Constitutional: He is oriented to person, place, and time. He appears well-developed and well-nourished. No distress.   HENT:   Head: Normocephalic and atraumatic.   Right Ear: External ear normal.   Left Ear: External ear normal.   Nose: Nose normal.   Mouth/Throat: Oropharynx is clear and moist. No oropharyngeal exudate.   Eyes: Conjunctivae are normal. Pupils are equal, round, and reactive to light. Right eye exhibits no discharge. Left eye exhibits no discharge.   Neck: Normal range of motion. Neck supple.   Cardiovascular: Normal rate, regular rhythm, normal heart sounds and intact distal pulses.    No murmur heard.  Pulmonary/Chest: Effort normal and breath sounds normal. No stridor. No respiratory distress. He has no wheezes. He has no rales.   Abdominal: Soft.   Musculoskeletal: He exhibits edema.   Right calf 53 cm  Left calf 53.25 cm   Lymphadenopathy:     He has no cervical adenopathy.   Neurological: He is alert and oriented to person, place, and time.   Skin: Skin is warm and dry. Abrasion noted. He is not diaphoretic.        2 cm in length and 0.5 cm in width abrasion with minimal surrounding erythema noted    Pretibial edema noted justin 1+ non pitting      Minimal erythema to lateral aspect of left great toe.  Nail extends beyond end of toe  No drainage noted    thickened dystrophic toenails noted     Nursing note and vitals reviewed.          Lab Results   Component Value Date    WBC 6.97 05/07/2018    RBC 4.85 05/07/2018    HGB 14.0 05/07/2018    HCT 40.4 05/07/2018    MCV 83 05/07/2018    MCH 28.9 05/07/2018    MCHC 34.7 05/07/2018    RDW 12.4 05/07/2018     05/07/2018    MPV 9.6 05/07/2018    GRAN 3.8 05/07/2018    GRAN 54.5 05/07/2018    LYMPH 2.0 05/07/2018    LYMPH 28.3 05/07/2018    MONO 0.9 05/07/2018    MONO 12.2 05/07/2018    EOS 0.3 05/07/2018    BASO 0.07 05/07/2018    EOSINOPHIL 3.6 05/07/2018    BASOPHIL 1.0 05/07/2018     Lab Results    Component Value Date    WBC 6.97 05/07/2018    HGB 14.0 05/07/2018    HCT 40.4 05/07/2018     05/07/2018    CHOL 202 (H) 01/25/2018    TRIG 144 01/25/2018    HDL 32 (L) 01/25/2018    ALT 39 05/07/2018    AST 31 05/07/2018     05/07/2018    K 4.3 05/07/2018     05/07/2018    CREATININE 1.0 05/07/2018    BUN 10 05/07/2018    CO2 29 05/07/2018    TSH 2.675 10/11/2017    PSA 0.47 08/22/2017    INR 1.0 03/07/2017    HGBA1C 7.4 (H) 06/07/2018       Assessment:       1. Abrasion    2. Morbid obesity with BMI of 45.0-49.9, adult    3. Type 2 diabetes mellitus with diabetic polyneuropathy, with long-term current use of insulin    4. Hypertension associated with chronic kidney disease due to type 2 diabetes mellitus    5. Non-compliance        Plan:            García was seen today for leg pain.    Diagnoses and all orders for this visit:    Abrasion  -     CBC auto differential; Future  -     mupirocin (BACTROBAN) 2 % ointment; Apply topically 3 (three) times daily. Apply with a Qtip    Morbid obesity with BMI of 45.0-49.9, adult  Comments:  weight increasing      Type 2 diabetes mellitus with diabetic polyneuropathy, with long-term current use of insulin  Comments:  pt not checking his BG daily      Hypertension associated with chronic kidney disease due to type 2 diabetes mellitus  Comments:  pt has not started Diovan yet      Non-compliance  Comments:  pt not checking feet daily, not checkign BG daily as previously instructed        Patient Instructions   Apply to Bactroban to cut to let three times a day with a Qtip    Call for any worsening    Blood test today to look at your blood count    Goal is fasting blood sugar in am 110 or less.  Blood sugar 2 HOURS AFTER A MEAL 140 or less  All diabetics should take the following unless allergic:  Baby aspirin 81 mg one daily, available over the counter, to help prevent a stroke or heart attack  A medication called an ACE inhibitor or an ARB to protect the  kidneys ( Lisinopril, Losartan)  A statin ( Zocor, Mevacor, Crestor, Lipitor) even if your cholesterol is normal to help prevent a stroke or heart attack  Flu shot every year  Eye exam ever year  Dental exam every 6 months    You should be checking your blood sugar every morning before you eat or drink and then once during the day 2 HOURS AFTER A MEAL, it does not matter which meal.      Follow up with Dr Patel for any worsening

## 2018-07-10 ENCOUNTER — HOSPITAL ENCOUNTER (OUTPATIENT)
Dept: RADIOLOGY | Facility: HOSPITAL | Age: 53
Discharge: HOME | End: 2018-07-10
Attending: NURSE PRACTITIONER
Payer: COMMERCIAL

## 2018-07-10 ENCOUNTER — OFFICE VISIT (OUTPATIENT)
Dept: INTERNAL MEDICINE | Facility: CLINIC | Age: 53
End: 2018-07-10
Payer: COMMERCIAL

## 2018-07-10 VITALS
DIASTOLIC BLOOD PRESSURE: 76 MMHG | OXYGEN SATURATION: 99 % | TEMPERATURE: 98 F | BODY MASS INDEX: 48.27 KG/M2 | WEIGHT: 315 LBS | HEART RATE: 79 BPM | SYSTOLIC BLOOD PRESSURE: 136 MMHG

## 2018-07-10 DIAGNOSIS — M25.512 ARTHRALGIA OF LEFT ACROMIOCLAVICULAR JOINT: ICD-10-CM
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
## 2018-07-10 DIAGNOSIS — M19.012 DJD OF LEFT AC (ACROMIOCLAVICULAR) JOINT: ICD-10-CM
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
## 2018-07-10 PROCEDURE — 73030 X-RAY EXAM OF SHOULDER: CPT | Mod: 26,LT,, | Performed by: RADIOLOGY

## 2018-07-10 PROCEDURE — 99214 OFFICE O/P EST MOD 30 MIN: CPT | Mod: S$GLB,,, | Performed by: NURSE PRACTITIONER

## 2018-07-10 PROCEDURE — 73030 X-RAY EXAM OF SHOULDER: CPT | Mod: TC,LT

## 2018-07-10 PROCEDURE — 3075F SYST BP GE 130 - 139MM HG: CPT | Mod: CPTII,S$GLB,, | Performed by: NURSE PRACTITIONER

## 2018-07-10 PROCEDURE — 3078F DIAST BP <80 MM HG: CPT | Mod: CPTII,S$GLB,, | Performed by: NURSE PRACTITIONER

## 2018-07-10 PROCEDURE — 99999 PR PBB SHADOW E&M-EST. PATIENT-LVL V: CPT | Mod: PBBFAC,,, | Performed by: NURSE PRACTITIONER

## 2018-07-10 PROCEDURE — 3008F BODY MASS INDEX DOCD: CPT | Mod: CPTII,S$GLB,, | Performed by: NURSE PRACTITIONER

## 2018-07-10 RX ORDER — HYDROCHLOROTHIAZIDE 12.5 MG/1
CAPSULE ORAL
COMMUNITY
Start: 2018-07-07 | End: 2018-09-24

## 2018-07-10 RX ORDER — CYCLOBENZAPRINE HCL 10 MG
10 TABLET ORAL NIGHTLY
Qty: 10 TABLET | Refills: 0 | Status: SHIPPED | OUTPATIENT
Start: 2018-07-10 | End: 2018-07-20

## 2018-07-10 RX ORDER — SULINDAC 150 MG/1
150 TABLET ORAL 2 TIMES DAILY
Qty: 14 TABLET | Refills: 0 | Status: SHIPPED | OUTPATIENT
Start: 2018-07-10 | End: 2018-12-17

## 2018-07-10 NOTE — PROGRESS NOTES
Subjective:       Patient ID: García Tang is a 52 y.o. male.    Chief Complaint: Laceration and Pain    Mr. Tang fell on Saturday morning.  He slipped, his feet went out, he feel backwards, hitting his right side on a stool and landing on his back.  Today he noticed pain in the left arm with any movement, including eating, and he has supraclavicular soft tissue swelling that he was worried about. He also asks that I assess an abrasion on his left shin, for which he was seen in clinic last week, that is not healing as well as he believes it should.   He has diabetes, last A1c 1 month ago 7.4.  He is obese, has HTN and ASCVD      Fall   The accident occurred 3 to 5 days ago. The fall occurred while walking. He landed on hard floor. The point of impact was the left shoulder. The pain is present in the left shoulder. The pain is severe. The symptoms are aggravated by pressure on injury, rotation, movement and use of injured limb. Pertinent negatives include no abdominal pain, bowel incontinence, fever, headaches, hearing loss, hematuria, loss of consciousness, nausea, numbness, tingling, visual change or vomiting. He has tried acetaminophen for the symptoms. The treatment provided mild relief.     Review of Systems   Constitutional: Negative for fever.   HENT: Negative for facial swelling.    Respiratory: Negative for shortness of breath.    Cardiovascular: Negative for chest pain.   Gastrointestinal: Negative for abdominal pain, bowel incontinence, nausea and vomiting.   Genitourinary: Negative for hematuria.   Musculoskeletal: Positive for arthralgias, joint swelling and myalgias.   Skin: Negative for rash.   Neurological: Negative for tingling, loss of consciousness, numbness and headaches.   Psychiatric/Behavioral: Negative for confusion.       Objective:      Physical Exam   Constitutional: He is oriented to person, place, and time. No distress.   Morbidly obese   HENT:   Head: Normocephalic.   Neck: Neck  supple. Decreased range of motion present.   Cardiovascular: Normal rate, regular rhythm and normal heart sounds.    Pulmonary/Chest: Effort normal and breath sounds normal. No respiratory distress. He has no wheezes. He has no rales.   Musculoskeletal:        Left shoulder: He exhibits decreased range of motion, tenderness, swelling, pain and decreased strength. He exhibits no effusion, no crepitus, no deformity, no laceration, no spasm and normal pulse.        Arms:  Neurological: He is alert and oriented to person, place, and time.   Skin: He is not diaphoretic.   Psychiatric: His behavior is normal.   Nursing note and vitals reviewed.      Assessment:       1. Fall, initial encounter    2. Arthralgia of left acromioclavicular joint    3. DJD of left AC (acromioclavicular) joint        Plan:   García was seen today for laceration and pain.    Diagnoses and all orders for this visit:    Fall, initial encounter  -     X-Ray Shoulder Trauma 3 view Left; Future  -     cyclobenzaprine (FLEXERIL) 10 MG tablet; Take 1 tablet (10 mg total) by mouth every evening. for 10 days    Arthralgia of left acromioclavicular joint  -     X-Ray Shoulder Trauma 3 view Left; Future  -     Ambulatory consult to Orthopedics  -     sulindac (CLINORIL) 150 MG tablet; Take 1 tablet (150 mg total) by mouth 2 (two) times daily.  -     cyclobenzaprine (FLEXERIL) 10 MG tablet; Take 1 tablet (10 mg total) by mouth every evening. for 10 days    DJD of left AC (acromioclavicular) joint  -     sulindac (CLINORIL) 150 MG tablet; Take 1 tablet (150 mg total) by mouth 2 (two) times daily.  -     cyclobenzaprine (FLEXERIL) 10 MG tablet; Take 1 tablet (10 mg total) by mouth every evening. for 10 days      Pt has been given instructions populated from [employer] database and has verbalized understanding of the after visit summary and information contained wherein.    Follow up with a primary care provider. May go to ER for acute shortness of breath,  lightheadedness, fever, or any other emergent complaints or changes in condition.

## 2018-07-12 ENCOUNTER — PATIENT MESSAGE (OUTPATIENT)
Dept: INTERNAL MEDICINE | Facility: CLINIC | Age: 53
End: 2018-07-12

## 2018-07-12 DIAGNOSIS — R60.9 SWELLING OF BODY REGION: Primary | ICD-10-CM

## 2018-07-13 ENCOUNTER — HOSPITAL ENCOUNTER (OUTPATIENT)
Dept: RADIOLOGY | Facility: HOSPITAL | Age: 53
Discharge: HOME | End: 2018-07-13
Attending: NURSE PRACTITIONER
Payer: COMMERCIAL

## 2018-07-13 DIAGNOSIS — R60.9 SWELLING OF BODY REGION: ICD-10-CM

## 2018-07-13 PROCEDURE — 76536 US EXAM OF HEAD AND NECK: CPT | Mod: 26,,, | Performed by: RADIOLOGY

## 2018-07-13 PROCEDURE — 76536 US EXAM OF HEAD AND NECK: CPT | Mod: TC

## 2018-07-23 ENCOUNTER — PROCEDURE VISIT (OUTPATIENT)
Dept: HEPATOLOGY | Facility: CLINIC | Age: 53
End: 2018-07-23
Attending: NURSE PRACTITIONER
Payer: COMMERCIAL

## 2018-07-23 ENCOUNTER — OFFICE VISIT (OUTPATIENT)
Dept: HEPATOLOGY | Facility: CLINIC | Age: 53
End: 2018-07-23
Payer: COMMERCIAL

## 2018-07-23 VITALS
RESPIRATION RATE: 18 BRPM | HEIGHT: 70 IN | DIASTOLIC BLOOD PRESSURE: 53 MMHG | WEIGHT: 315 LBS | TEMPERATURE: 96 F | HEART RATE: 81 BPM | BODY MASS INDEX: 45.1 KG/M2 | SYSTOLIC BLOOD PRESSURE: 116 MMHG | OXYGEN SATURATION: 97 %

## 2018-07-23 DIAGNOSIS — Z79.4 TYPE 2 DIABETES MELLITUS WITH HYPERGLYCEMIA, WITH LONG-TERM CURRENT USE OF INSULIN: Chronic | ICD-10-CM

## 2018-07-23 DIAGNOSIS — E66.01 MORBID OBESITY: Chronic | ICD-10-CM

## 2018-07-23 DIAGNOSIS — R74.8 ELEVATED LIVER ENZYMES: ICD-10-CM

## 2018-07-23 DIAGNOSIS — I10 ESSENTIAL HYPERTENSION: Chronic | ICD-10-CM

## 2018-07-23 DIAGNOSIS — K74.00 LIVER FIBROSIS: ICD-10-CM

## 2018-07-23 DIAGNOSIS — E11.65 TYPE 2 DIABETES MELLITUS WITH HYPERGLYCEMIA, WITH LONG-TERM CURRENT USE OF INSULIN: Chronic | ICD-10-CM

## 2018-07-23 DIAGNOSIS — K76.0 FATTY LIVER: ICD-10-CM

## 2018-07-23 DIAGNOSIS — K76.0 FATTY LIVER: Primary | ICD-10-CM

## 2018-07-23 PROCEDURE — 91200 LIVER ELASTOGRAPHY: CPT | Mod: S$GLB,,, | Performed by: NURSE PRACTITIONER

## 2018-07-23 PROCEDURE — 99214 OFFICE O/P EST MOD 30 MIN: CPT | Mod: S$GLB,,, | Performed by: NURSE PRACTITIONER

## 2018-07-23 PROCEDURE — 3008F BODY MASS INDEX DOCD: CPT | Mod: CPTII,S$GLB,, | Performed by: NURSE PRACTITIONER

## 2018-07-23 PROCEDURE — 3045F PR MOST RECENT HEMOGLOBIN A1C LEVEL 7.0-9.0%: CPT | Mod: CPTII,S$GLB,, | Performed by: NURSE PRACTITIONER

## 2018-07-23 PROCEDURE — 3074F SYST BP LT 130 MM HG: CPT | Mod: CPTII,S$GLB,, | Performed by: NURSE PRACTITIONER

## 2018-07-23 PROCEDURE — 99999 PR PBB SHADOW E&M-EST. PATIENT-LVL V: CPT | Mod: PBBFAC,,, | Performed by: NURSE PRACTITIONER

## 2018-07-23 PROCEDURE — 3078F DIAST BP <80 MM HG: CPT | Mod: CPTII,S$GLB,, | Performed by: NURSE PRACTITIONER

## 2018-07-23 NOTE — PROGRESS NOTES
[facility] Established Patient Visit     Reason for Visit:  F/u NAFLD     PCP: Vaishali Patel MD      HPI:  This is a 52 y.o. male here for f/u NAFLD. He has had elevated transaminases and had serological workup was negative for Augie's, alpha-1 antitrypsin deficiency, hemochromatosis, autoimmune etiology, and viral hepatitis. He has multiple risk factors for NAFLD with morbid obesity, HTN, HLD, and DM. We have only been able to do a Fibrosure on him due to his BMI and weight of 350 lbs. Fibrosure 3/2017 = F0. Calculated NAFLD Fibrosis Score 3.45 points -- Correlated Fibrosis Severity F3-F4. At his last visit, we had recommended EUS guided liver biopsy but pt decided not to proceed with this. He has normal synthetic liver functioning. Plts nl at 221. Last u/s 5/2018 w/o liver mass. Spleen is at [facility]. He is not immune to hepatitis A and B and does not wish to get these vaccines.     He has lost a little weight since last visit, now 337 lbs. Liver enzymes improved some on last labs (AST 31, ALT 39). He denies any symptoms of advanced chronic liver disease including jaundice, dark urine, abdominal distention, hematemesis, melena, slowed mentation.        ROS:   GENERAL: Denies fever, chills, weight loss/gain, (+) fatigue  HEENT: Denies headaches, dizziness, vision/hearing changes  CARDIOVASCULAR: Denies chest pain, palpitations, (+) edema  RESPIRATORY: Denies dyspnea, cough  GI: (+) RUQ abdominal pain, no nausea, vomiting. (+) rectal bleeding. No change in bowel pattern or color  : Denies dysuria, hematuria   SKIN: Denies rash, itching   NEURO: Denies confusion, memory loss, or mood changes  PSYCH: Denies depression or anxiety  HEME/LYMPH: Denies easy bruising or bleeding        PMHX:  has a past medical history of CKD stage 2 due to type 2 diabetes mellitus (5/18/2017); Essential hypertension (7/18/2016); Fatty liver disease, nonalcoholic (4/7/2016); HLD (hyperlipidemia); Inflammatory bowel  disease; Kidney stone; Morbid obesity (5/29/2014); NAFLD (nonalcoholic fatty liver disease); Near syncope (5/23/2017); Obesity hypoventilation syndrome (2/22/2016); Prostatitis; Rectal bleeding; Type 2 diabetes mellitus with hyperglycemia, with long-term current use of insulin (5/18/2017); and Uncontrolled type 2 diabetes mellitus with peripheral neuropathy (5/29/2014).     PSHX:  has a past surgical history that includes Nose surgery; kidney stent; Kidney surgery (1996); and Colonoscopy.     The patient's social and family histories were reviewed by me and updated in the appropriate section of the electronic medical record.     Review of patient's allergies indicates:   Allergen Reactions    Lisinopril Swelling    Bactrim  [sulfamethoxazole-trimethoprim]      Other reaction(s): Unknown    Hydroxyzine hcl      Other reaction(s): elevated bp    Kenalog [triamcinolone acetonide] Other (See Comments)     Heart flutters all day    Phenergan [promethazine] Anxiety    Zofran [ondansetron hcl (pf)] Anxiety        Current Outpatient Prescriptions on File Prior to Visit   Medication Sig Dispense Refill    amLODIPine (NORVASC) 5 MG tablet Take 1 tablet (5 mg total) by mouth once daily. 30 tablet 6    aspirin (ECOTRIN) 81 MG EC tablet Take 81 mg by mouth once daily.      DOCOSAHEXANOIC ACID/EPA (FISH OIL ORAL) Take 1 capsule by mouth once daily at 6am.       ERGOCALCIFEROL, VITAMIN D2, (VITAMIN D ORAL) Take 1 capsule by mouth once daily at 6am.       hydroCHLOROthiazide (MICROZIDE) 12.5 mg capsule       insulin NPH-insulin regular, 70/30, (NOVOLIN 70/30) 100 unit/mL (70-30) injection Inject into the skin 2 (two) times daily. 27 in am and 24 in pm      lifitegrast (XIIDRA) 5 % Dpet Apply 1 drop to eye 2 (two) times daily. 60 each 12    metoprolol succinate (TOPROL XL) 50 MG 24 hr tablet Take 1 tablet (50 mg total) by mouth 2 (two) times daily. 60 tablet 6    multivitamin capsule Take by mouth. 1 Capsule Oral  "Every day      gabapentin (NEURONTIN) 100 MG capsule Take 1 capsule (100 mg total) by mouth every evening. May increase to 300mg if needed. Diabetic neuropathy. 90 capsule 11    mupirocin (BACTROBAN) 2 % ointment Apply topically 3 (three) times daily. Apply with a Qtip 30 g 0    omeprazole (PRILOSEC) 40 MG capsule Take 1 capsule (40 mg total) by mouth once daily. 30 capsule 1    sulindac (CLINORIL) 150 MG tablet Take 1 tablet (150 mg total) by mouth 2 (two) times daily. 14 tablet 0    valsartan (DIOVAN) 160 MG tablet Take 1 tablet (160 mg total) by mouth once daily. 30 tablet 1     No current facility-administered medications on file prior to visit.              Objective Findings:     PHYSICAL EXAM:   Friendly White male, in no acute distress; alert and oriented to person, place and time  BP (!) 116/53 (BP Location: Left arm, Patient Position: Sitting)   Pulse 81   Temp 96.3 °F (35.7 °C) (Oral)   Resp 18   Ht 5' 10" (1.778 m)   Wt (!) 153.1 kg (337 lb 8.4 oz)   SpO2 97%   BMI 48.43 kg/m²     HENT: Normocephalic, without obvious abnormality. Oral mucosa pink and moist. Dentition good.  EYES: Sclerae anicteric.   NECK: Supple.   CARDIOVASCULAR: Regular rate and rhythm. No murmurs.  RESPIRATORY: Normal respiratory effort. BBS CTA. No wheezes or crackles.  GI: Obese, non-tender, non-distended. No hepatosplenomegaly. No masses palpable. No ascites.  EXTREMITIES:  No clubbing, cyanosis. (+) BLE 1+ edema.  SKIN: Warm and dry. No jaundice. No rashes noted to exposed skin. No telangectasias noted. No palmar erythema.  NEURO:  Normal gate. No asterixis.  PSYCH:  Memory intact. Thought and speech pattern appropriate. Behavior normal. No depression or anxiety noted.        ASSESSMENT:  52 y.o. White male with:  1. NAFLD  -- transaminases minimally elevated  -- US 05/2017 shows hepatomegaly with steatosis and spleen enlarged ULN  -- synthetic liver function nl  -- risk factors for fatty liver include morbid obesity, " BMI 48, DM, HLD, HTN  -- Fibrosure 3/2017 = F0  -- not immune to hep A or B, pt does not want to start vaccines at this time  -- NAFLD Fibrosis Score 3.45 points -- Correlated Fibrosis Severity F3-F4       EDUCATION:   We discussed the manifestations of NAFLD. At this time there is no FDA approved therapy for NAFLD.   The patient and I discussed the importance of diet, exercise, and weight loss for management of NAFLD. We discussed a low fat, low carb/low sugar, high fiber diet, and a goal of 30 minutes of exercise 5 times per week.   Pt is aware that fatty liver can progress to steatohepatitis and possibly to cirrhosis, putting one at increased risk for liver cancer, liver failure, and death.      Discussed implications of a cirrhosis diagnosis with HCC screenings and EGD and why it is important for us to determine if pt's have cirrhosis so they can be properly monitored for potential complications.         PLAN:  1. Will try to do Fibroscan to assess fibrosis since he has lost weight. Would prefer MRI elastography since he is now < 350 lbs but pt is hesitant to proceed with this d/t claustrophobia and he can't breathe laying flat on his back  2. Pt declined EUS with liver biopsy  3. Recommend to continue weight loss with diet and exercise for NAFLD. Pt declines bariatric referral at this time.  4. Recommend good control of cholesterol, blood pressure, blood sugar levels  5. F/u in 6 months        Thank you for allowing me to participate in the care of García JAN Johnson, NP-C     Addendum: Fibroscan today = kPa 27.2, suggestive of F4 cirrhosis. CAP = 400, S3 (>65%) steatosis. Discussed results with pt. Advised EUS guided liver biopsy again but pt declines. Will think about MRI elastography. He will try to lose more weight before proceeding with this. Will have him f/u with me in 11/2018 with u/s and labs again since his Fibroscan does suggest cirrhosis.     Total duration of visit = 35 min,  with > 50% spent counseling

## 2018-07-23 NOTE — PROCEDURES
Procedures Fibroscan Procedure     Name: García Tang  Date of Procedure : 2018   :: Ema Johnson NP  Diagnosis: NAFLD    Probe: XL    Fibroscan readin.2 KPa    Fibrosis:F4     CAP readin dB/m    Steatosis: :S3

## 2018-08-06 ENCOUNTER — TELEPHONE (OUTPATIENT)
Dept: HEPATOLOGY | Facility: CLINIC | Age: 53
End: 2018-08-06

## 2018-08-06 ENCOUNTER — HOSPITAL ENCOUNTER (OUTPATIENT)
Dept: RADIOLOGY | Facility: HOSPITAL | Age: 53
Discharge: HOME | End: 2018-08-06
Attending: NURSE PRACTITIONER
Payer: COMMERCIAL

## 2018-08-06 DIAGNOSIS — K76.0 FATTY LIVER: ICD-10-CM

## 2018-08-06 DIAGNOSIS — K74.00 LIVER FIBROSIS: ICD-10-CM

## 2018-08-06 DIAGNOSIS — K76.0 FATTY LIVER: Primary | ICD-10-CM

## 2018-08-06 PROCEDURE — 76700 US EXAM ABDOM COMPLETE: CPT | Mod: TC

## 2018-08-06 PROCEDURE — 76700 US EXAM ABDOM COMPLETE: CPT | Mod: 26,,, | Performed by: RADIOLOGY

## 2018-08-06 NOTE — TELEPHONE ENCOUNTER
"----- Message from Leeanna Thompson sent at 8/3/2018  4:04 PM CDT -----  Contact: Leeanna Thompson - Radiology.  Dear Dr. Johnson and all staff,    Good afternoon, I am writing in regards to our patient Mr. García Tang (MRN [id number]).  I just spoke with him in regards to our new MRI [employer]. I did tell him how wide it would be and how long his test would be if he were to decide to have the MRI. He does feel more comfortable knowing that he can go into the machine head first.  I also told him that since he is 5'11" he is tall enough that at least his neck and head will be at the edge and/or out of the machine.    He is requesting if he could speak with you and your staff in regards to the Ultrasound order and possible the MRI order.  I did tell him to feel free and call me back if he has any further questions he may think of after our conversation.    Thank you in advance for your help.  I wish you all a wonderful weekend.    Sincerely,  Leeanna Thmopson  Radiology  [id number]  [id number]    "

## 2018-08-06 NOTE — TELEPHONE ENCOUNTER
Message from Ema Johnson NP.  Pt will proceed with MRI elastography. Please call him to schedule.     Patient called and message relayed to the patient from Ema. Voiced understanding and agreed.  MRI Elastography is scheduled for Friday 8/10/18 at 12 pm.  Appt letter placed in the mail.

## 2018-08-10 ENCOUNTER — HOSPITAL ENCOUNTER (OUTPATIENT)
Dept: RADIOLOGY | Facility: HOSPITAL | Age: 53
Discharge: HOME | End: 2018-08-10
Attending: NURSE PRACTITIONER
Payer: COMMERCIAL

## 2018-08-10 DIAGNOSIS — K76.0 FATTY LIVER: ICD-10-CM

## 2018-08-10 DIAGNOSIS — K74.00 LIVER FIBROSIS: ICD-10-CM

## 2018-08-13 ENCOUNTER — PATIENT MESSAGE (OUTPATIENT)
Dept: HEPATOLOGY | Facility: CLINIC | Age: 53
End: 2018-08-13

## 2018-08-29 ENCOUNTER — TELEPHONE (OUTPATIENT)
Dept: INTERNAL MEDICINE | Facility: CLINIC | Age: 53
End: 2018-08-29

## 2018-08-29 ENCOUNTER — OFFICE VISIT (OUTPATIENT)
Dept: INTERNAL MEDICINE | Facility: CLINIC | Age: 53
End: 2018-08-29
Payer: COMMERCIAL

## 2018-08-29 ENCOUNTER — HOSPITAL ENCOUNTER (OUTPATIENT)
Dept: RADIOLOGY | Facility: HOSPITAL | Age: 53
Discharge: HOME | End: 2018-08-29
Attending: NURSE PRACTITIONER
Payer: COMMERCIAL

## 2018-08-29 VITALS
BODY MASS INDEX: 45.1 KG/M2 | HEIGHT: 70 IN | TEMPERATURE: 98 F | SYSTOLIC BLOOD PRESSURE: 118 MMHG | OXYGEN SATURATION: 98 % | WEIGHT: 315 LBS | HEART RATE: 80 BPM | DIASTOLIC BLOOD PRESSURE: 82 MMHG

## 2018-08-29 DIAGNOSIS — J98.01 BRONCHOSPASM: Primary | ICD-10-CM

## 2018-08-29 DIAGNOSIS — R10.11 RUQ ABDOMINAL PAIN: ICD-10-CM

## 2018-08-29 DIAGNOSIS — R11.0 NAUSEA: ICD-10-CM

## 2018-08-29 DIAGNOSIS — R06.89 ABNORMAL BREATH SOUNDS: ICD-10-CM

## 2018-08-29 DIAGNOSIS — K76.0 FATTY LIVER DISEASE, NONALCOHOLIC: ICD-10-CM

## 2018-08-29 PROCEDURE — 3008F BODY MASS INDEX DOCD: CPT | Mod: CPTII,S$GLB,, | Performed by: NURSE PRACTITIONER

## 2018-08-29 PROCEDURE — 99999 PR PBB SHADOW E&M-EST. PATIENT-LVL V: CPT | Mod: PBBFAC,,, | Performed by: NURSE PRACTITIONER

## 2018-08-29 PROCEDURE — 3074F SYST BP LT 130 MM HG: CPT | Mod: CPTII,S$GLB,, | Performed by: NURSE PRACTITIONER

## 2018-08-29 PROCEDURE — 71046 X-RAY EXAM CHEST 2 VIEWS: CPT | Mod: TC

## 2018-08-29 PROCEDURE — 99214 OFFICE O/P EST MOD 30 MIN: CPT | Mod: S$GLB,,, | Performed by: NURSE PRACTITIONER

## 2018-08-29 PROCEDURE — 3079F DIAST BP 80-89 MM HG: CPT | Mod: CPTII,S$GLB,, | Performed by: NURSE PRACTITIONER

## 2018-08-29 PROCEDURE — 71046 X-RAY EXAM CHEST 2 VIEWS: CPT | Mod: 26,,, | Performed by: RADIOLOGY

## 2018-08-29 RX ORDER — MONTELUKAST SODIUM 10 MG/1
10 TABLET ORAL DAILY
Qty: 30 TABLET | Refills: 12 | Status: SHIPPED | OUTPATIENT
Start: 2018-08-29 | End: 2018-09-28

## 2018-08-29 NOTE — TELEPHONE ENCOUNTER
----- Message from Jaylin Braga sent at 8/29/2018  4:10 PM CDT -----  Contact:  Isabel [phone]  [facility] is calling for calcification on medication ipratropium (ATROVENT HFA) 17 mcg/actuation inhaler , please call and advise, Thanks

## 2018-08-29 NOTE — PATIENT INSTRUCTIONS
Your peak flow should be _______490_______ or better    Take Claritin/ Allegra or Zyrtec daily to help block histamine release.  You will need to alternate every few months .    Start Singulair ( montelukast) 10 mg once a day.  This medicine blocks leukotriene release which is an inflammatory substance your body produces in response to allergies    Use your inhaler,  2 puffs every 4-6 hours with the spacer if you are coughing, wheezing or if your peak flow is less than ______490____________________________    You can get over the counter Dramamine for nausea or sip on gingerale    I will send you a message with your labs    Liquid diet for the next few days as discussed    You can also try some over the counter Prilosec/ Nexium for the nausea    To ER for nay worsening    Check your peak flow twice a day and  send me the readings via [facility] , you can do this every week don't have to send daily unless you want to

## 2018-08-29 NOTE — TELEPHONE ENCOUNTER
Spoke w [facility] pharmacy; states that they need a Rx for the xopenex.    Please advise.

## 2018-08-29 NOTE — PROGRESS NOTES
Subjective:       Patient ID: García Tang is a 52 y.o. male.    Chief Complaint: Abdominal Pain (right side pain*5 itching ); Nausea (lastnight (tylenol) ); and Dizziness    Pt here c/o all over itching at night for the past few nights.  Denies any new exposures.  No rash.  Vomited last night 3-4 times.  Denies diarrhea.  States that he has had RUQ abd pain started last night.  Pt states last night arms and hands felt tingly and heavy , better today.  Has hx hepatomegaly and follows with Hepatology.  Tried to do MRI but was claustrophobic and couldn't complete.    Pt states that the abd pain is sharp then gets dull, worse with movement.  States for past few weeks he finds it hard to take a deep breath but denies SOB.  NO cough          Abdominal Pain   Associated symptoms include nausea and vomiting. Pertinent negatives include no constipation, diarrhea or fever.   Nausea   Associated symptoms include abdominal pain, nausea and vomiting. Pertinent negatives include no chest pain, coughing, fatigue, fever or rash.   Dizziness:    Associated symptoms: nausea and vomiting.no fever, no palpitations and no chest pain.    Review of Systems   Constitutional: Negative for fatigue and fever.   HENT: Negative for trouble swallowing.    Respiratory: Positive for chest tightness. Negative for cough and shortness of breath.    Cardiovascular: Negative for chest pain, palpitations and leg swelling.   Gastrointestinal: Positive for abdominal pain, nausea and vomiting. Negative for abdominal distention, constipation and diarrhea.   Skin: Negative for rash.   Neurological: Positive for dizziness.   Hematological: Negative for adenopathy.   Psychiatric/Behavioral: Negative for sleep disturbance.         Past Medical History:   Diagnosis Date    CKD stage 2 due to type 2 diabetes mellitus 5/18/2017    Essential hypertension 7/18/2016    Fatty liver disease, nonalcoholic 4/7/2016    Us, 2006     HLD (hyperlipidemia)      Inflammatory bowel disease     diverticulitis    Kidney stone     requiring stent    Morbid obesity 5/29/2014    NAFLD (nonalcoholic fatty liver disease)     Near syncope 5/23/2017    Obesity hypoventilation syndrome 2/22/2016    Prostatitis     Rectal bleeding     diverticulitis    Type 2 diabetes mellitus with hyperglycemia, with long-term current use of insulin 5/18/2017    Uncontrolled type 2 diabetes mellitus with peripheral neuropathy 5/29/2014     Past Surgical History:   Procedure Laterality Date    COLONOSCOPY      kidney stent      KIDNEY SURGERY  1996    right kidney obstruction requiring stent    NOSE SURGERY       Social History     Social History Narrative    Not on file     Family History   Problem Relation Age of Onset    Cancer Father     Diabetes Father     Hypertension Father     Glaucoma Father     Liver disease Mother         d.    Cirrhosis Mother     Hypertension Mother     Diabetes Brother      Outpatient Encounter Medications as of 8/29/2018   Medication Sig Dispense Refill    amLODIPine (NORVASC) 5 MG tablet Take 1 tablet (5 mg total) by mouth once daily. 30 tablet 6    aspirin (ECOTRIN) 81 MG EC tablet Take 81 mg by mouth once daily.      DOCOSAHEXANOIC ACID/EPA (FISH OIL ORAL) Take 1 capsule by mouth once daily at 6am.       ERGOCALCIFEROL, VITAMIN D2, (VITAMIN D ORAL) Take 1 capsule by mouth once daily at 6am.       gabapentin (NEURONTIN) 100 MG capsule Take 1 capsule (100 mg total) by mouth every evening. May increase to 300mg if needed. Diabetic neuropathy. 90 capsule 11    hydroCHLOROthiazide (MICROZIDE) 12.5 mg capsule       insulin NPH-insulin regular, 70/30, (NOVOLIN 70/30) 100 unit/mL (70-30) injection Inject into the skin 2 (two) times daily. 27 in am and 24 in pm      lifitegrast (XIIDRA) 5 % Dpet Apply 1 drop to eye 2 (two) times daily. 60 each 12    metoprolol succinate (TOPROL XL) 50 MG 24 hr tablet Take 1 tablet (50 mg total) by mouth 2 (two)  "times daily. 60 tablet 6    multivitamin capsule Take by mouth. 1 Capsule Oral Every day      mupirocin (BACTROBAN) 2 % ointment Apply topically 3 (three) times daily. Apply with a Qtip 30 g 0    sulindac (CLINORIL) 150 MG tablet Take 1 tablet (150 mg total) by mouth 2 (two) times daily. 14 tablet 0    inhalation spacing device Use with inhaler dispense with mouthpiece 1 Device 1    ipratropium (ATROVENT HFA) 17 mcg/actuation inhaler Inhale 2 puffs into the lungs every 6 (six) hours. Rescue 12.9 g 1    montelukast (SINGULAIR) 10 mg tablet Take 1 tablet (10 mg total) by mouth once daily. 30 tablet 12    omeprazole (PRILOSEC) 40 MG capsule Take 1 capsule (40 mg total) by mouth once daily. 30 capsule 1    valsartan (DIOVAN) 160 MG tablet Take 1 tablet (160 mg total) by mouth once daily. 30 tablet 1     No facility-administered encounter medications on file as of 8/29/2018.      Last 3 sets of Vitals  Vitals - 1 value per visit 7/10/2018 7/23/2018 8/29/2018   SYSTOLIC 136 116 118   DIASTOLIC 76 53 82   PULSE 79 81 80   TEMPERATURE 98 96.3 97.6   RESPIRATIONS - 18 -   SPO2 99 97 98   Weight (lb) 336.42 337.52 334.66   Weight (kg) 152.6 153.1 151.8   HEIGHT - 5' 10" 5' 10"   BODY MASS INDEX 48.27 48.43 48.02   VISIT REPORT - - -   Pain Score  9 0 7   Some recent data might be hidden         Objective:      Physical Exam   Constitutional: He is oriented to person, place, and time. He appears well-developed and well-nourished. No distress.   53 yo male non toxic appearing      HENT:   Head: Normocephalic and atraumatic.   Right Ear: External ear normal.   Left Ear: External ear normal.   Nose: Nose normal.   Mouth/Throat: Oropharynx is clear and moist. No oropharyngeal exudate.   Eyes: Conjunctivae and EOM are normal. Pupils are equal, round, and reactive to light. No scleral icterus.   Neck: Normal range of motion. Neck supple.   Cardiovascular: Normal rate, regular rhythm and normal heart sounds. "   Pulmonary/Chest: Effort normal and breath sounds normal. No stridor. No respiratory distress.   Abdominal: Soft. He exhibits no distension and no mass. Bowel sounds are decreased. There is hepatomegaly. There is tenderness in the right upper quadrant. There is no rebound, no guarding, no tenderness at McBurney's point and negative Almazan's sign. No hernia.   Neurological: He is alert and oriented to person, place, and time.   Skin: Skin is warm and dry. Capillary refill takes less than 2 seconds. No rash noted. He is not diaphoretic. No erythema. No pallor.   Psychiatric: He has a normal mood and affect. His behavior is normal. Judgment and thought content normal.   Nursing note and vitals reviewed.          Lab Results   Component Value Date    WBC 6.23 08/29/2018    RBC 5.00 08/29/2018    HGB 14.6 08/29/2018    HCT 42.0 08/29/2018    MCV 84 08/29/2018    MCH 29.2 08/29/2018    MCHC 34.8 08/29/2018    RDW 12.6 08/29/2018     08/29/2018    MPV 9.7 08/29/2018    GRAN 2.8 08/29/2018    GRAN 45.2 08/29/2018    LYMPH 2.3 08/29/2018    LYMPH 36.3 08/29/2018    MONO 0.7 08/29/2018    MONO 10.8 08/29/2018    EOS 0.4 08/29/2018    BASO 0.11 08/29/2018    EOSINOPHIL 5.6 08/29/2018    BASOPHIL 1.8 08/29/2018     Lab Results   Component Value Date    WBC 6.23 08/29/2018    HGB 14.6 08/29/2018    HCT 42.0 08/29/2018     08/29/2018    CHOL 202 (H) 01/25/2018    TRIG 144 01/25/2018    HDL 32 (L) 01/25/2018    ALT 52 (H) 08/29/2018    AST 39 08/29/2018     08/29/2018    K 4.2 08/29/2018     08/29/2018    CREATININE 1.1 08/29/2018    BUN 9 08/29/2018    CO2 28 08/29/2018    TSH 2.675 10/11/2017    PSA 0.47 08/22/2017    INR 1.0 03/07/2017    HGBA1C 7.4 (H) 06/07/2018     Peak flwo 350, 300 , 500  pred peak flow 619    Assessment:       1. Bronchospasm    2. Nausea    3. RUQ abdominal pain    4. Fatty liver disease, nonalcoholic    5. Abnormal breath sounds        Plan:           García was seen today  for abdominal pain, nausea and dizziness.    Diagnoses and all orders for this visit:    Bronchospasm  -     Peak flow; Future  -     ipratropium (ATROVENT HFA) 17 mcg/actuation inhaler; Inhale 2 puffs into the lungs every 6 (six) hours. Rescue  -     montelukast (SINGULAIR) 10 mg tablet; Take 1 tablet (10 mg total) by mouth once daily.  -     inhalation spacing device; Use with inhaler dispense with mouthpiece    Nausea    RUQ abdominal pain    Fatty liver disease, nonalcoholic  -     CBC auto differential; Future  -     Comprehensive metabolic panel; Future  -     Amylase; Future  -     Lipase; Future    Abnormal breath sounds  -     inhalation spacing device; Use with inhaler dispense with mouthpiece  -     X-Ray Chest PA And Lateral; Future      Patient Instructions   Your peak flow should be _______490_______ or better    Take Claritin/ Allegra or Zyrtec daily to help block histamine release.  You will need to alternate every few months .    Start Singulair ( montelukast) 10 mg once a day.  This medicine blocks leukotriene release which is an inflammatory substance your body produces in response to allergies    Use your inhaler,  2 puffs every 4-6 hours with the spacer if you are coughing, wheezing or if your peak flow is less than ______490____________________________    You can get over the counter Dramamine for nausea or sip on gingerale    I will send you a message with your labs    Liquid diet for the next few days as discussed    You can also try some over the counter Prilosec/ Nexium for the nausea    To ER for nay worsening    Check your peak flow twice a day and  send me the readings via [facility] , you can do this every week don't have to send daily unless you want to

## 2018-08-30 RX ORDER — LEVALBUTEROL TARTRATE 45 UG/1
1-2 AEROSOL, METERED ORAL EVERY 4 HOURS PRN
Qty: 1 INHALER | Refills: 0 | Status: SHIPPED | OUTPATIENT
Start: 2018-08-30 | End: 2018-12-17

## 2018-09-05 ENCOUNTER — OFFICE VISIT (OUTPATIENT)
Dept: INTERNAL MEDICINE | Facility: CLINIC | Age: 53
End: 2018-09-05
Payer: COMMERCIAL

## 2018-09-05 ENCOUNTER — PATIENT MESSAGE (OUTPATIENT)
Dept: INTERNAL MEDICINE | Facility: CLINIC | Age: 53
End: 2018-09-05

## 2018-09-05 ENCOUNTER — HOSPITAL ENCOUNTER (OUTPATIENT)
Dept: RADIOLOGY | Facility: HOSPITAL | Age: 53
Discharge: HOME | End: 2018-09-05
Attending: NURSE PRACTITIONER
Payer: COMMERCIAL

## 2018-09-05 VITALS
SYSTOLIC BLOOD PRESSURE: 114 MMHG | HEIGHT: 70 IN | OXYGEN SATURATION: 95 % | HEART RATE: 72 BPM | BODY MASS INDEX: 45.1 KG/M2 | DIASTOLIC BLOOD PRESSURE: 88 MMHG | WEIGHT: 315 LBS

## 2018-09-05 DIAGNOSIS — S60.222A CONTUSION OF LEFT HAND, INITIAL ENCOUNTER: Primary | ICD-10-CM

## 2018-09-05 DIAGNOSIS — S60.222A CONTUSION OF LEFT HAND, INITIAL ENCOUNTER: ICD-10-CM

## 2018-09-05 PROCEDURE — 99214 OFFICE O/P EST MOD 30 MIN: CPT | Mod: S$GLB,,, | Performed by: NURSE PRACTITIONER

## 2018-09-05 PROCEDURE — 73130 X-RAY EXAM OF HAND: CPT | Mod: TC,LT

## 2018-09-05 PROCEDURE — 73130 X-RAY EXAM OF HAND: CPT | Mod: 26,LT,, | Performed by: RADIOLOGY

## 2018-09-05 PROCEDURE — 3074F SYST BP LT 130 MM HG: CPT | Mod: CPTII,S$GLB,, | Performed by: NURSE PRACTITIONER

## 2018-09-05 PROCEDURE — 3008F BODY MASS INDEX DOCD: CPT | Mod: CPTII,S$GLB,, | Performed by: NURSE PRACTITIONER

## 2018-09-05 PROCEDURE — 3079F DIAST BP 80-89 MM HG: CPT | Mod: CPTII,S$GLB,, | Performed by: NURSE PRACTITIONER

## 2018-09-05 PROCEDURE — 99999 PR PBB SHADOW E&M-EST. PATIENT-LVL V: CPT | Mod: PBBFAC,,, | Performed by: NURSE PRACTITIONER

## 2018-09-05 NOTE — PROGRESS NOTES
Subjective:       Patient ID: García Tang is a 52 y.o. male.    Chief Complaint: Numbness (Of left hand and forearm pain patient states he has Neuropathy )    Pt here c/o neuropathy of left hand on and off ( not taking Gabapentin) and bruising to left palm.  Denies injury        Review of Systems   Constitutional: Positive for unexpected weight change. Negative for activity change.        Started jogging has lost some weight     HENT: Positive for hearing loss. Negative for rhinorrhea and trouble swallowing.    Eyes: Positive for visual disturbance. Negative for discharge.        Vision worse at night than in the morning     Respiratory: Positive for wheezing. Negative for chest tightness.         Not this week     Cardiovascular: Positive for palpitations. Negative for chest pain.   Gastrointestinal: Positive for blood in stool and vomiting. Negative for constipation and diarrhea.   Endocrine: Negative for polydipsia and polyuria.   Genitourinary: Negative for difficulty urinating, hematuria and urgency.   Musculoskeletal: Positive for arthralgias and joint swelling. Negative for neck pain.   Neurological: Positive for weakness and headaches.        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 Negative for confusion.         Past Medical History:   Diagnosis Date    CKD stage 2 due to type 2 diabetes mellitus 5/18/2017    Essential hypertension 7/18/2016    Fatty liver disease, nonalcoholic 4/7/2016    [place], 2006     HLD (hyperlipidemia)     Inflammatory bowel disease     diverticulitis    Kidney stone     requiring stent    Morbid obesity 5/29/2014    NAFLD (nonalcoholic fatty liver disease)     Near syncope 5/23/2017    Obesity hypoventilation syndrome 2/22/2016    Prostatitis     Rectal bleeding     diverticulitis    Type 2 diabetes mellitus with hyperglycemia, with long-term current use of insulin 5/18/2017    Uncontrolled type  2 diabetes mellitus with peripheral neuropathy 5/29/2014     Past Surgical History:   Procedure Laterality Date    COLONOSCOPY      kidney stent      KIDNEY SURGERY  1996    right kidney obstruction requiring stent    NOSE SURGERY       Social History     Social History Narrative    Not on file     Family History   Problem Relation Age of Onset    Cancer Father     Diabetes Father     Hypertension Father     Glaucoma Father     Liver disease Mother         d.    Cirrhosis Mother     Hypertension Mother     Diabetes Brother      Outpatient Encounter Medications as of 9/5/2018   Medication Sig Dispense Refill    amLODIPine (NORVASC) 5 MG tablet Take 1 tablet (5 mg total) by mouth once daily. 30 tablet 6    aspirin (ECOTRIN) 81 MG EC tablet Take 81 mg by mouth once daily.      DOCOSAHEXANOIC ACID/EPA (FISH OIL ORAL) Take 1 capsule by mouth once daily at 6am.       ERGOCALCIFEROL, VITAMIN D2, (VITAMIN D ORAL) Take 1 capsule by mouth once daily at 6am.       gabapentin (NEURONTIN) 100 MG capsule Take 1 capsule (100 mg total) by mouth every evening. May increase to 300mg if needed. Diabetic neuropathy. 90 capsule 11    hydroCHLOROthiazide (MICROZIDE) 12.5 mg capsule       inhalation spacing device Use with inhaler dispense with mouthpiece 1 Device 1    insulin NPH-insulin regular, 70/30, (NOVOLIN 70/30) 100 unit/mL (70-30) injection Inject into the skin 2 (two) times daily. 27 in am and 24 in pm      levalbuterol (XOPENEX HFA) 45 mcg/actuation inhaler Inhale 1-2 puffs into the lungs every 4 (four) hours as needed for Wheezing. Rescue 1 Inhaler 0    lifitegrast (XIIDRA) 5 % Dpet Apply 1 drop to eye 2 (two) times daily. 60 each 12    metoprolol succinate (TOPROL XL) 50 MG 24 hr tablet Take 1 tablet (50 mg total) by mouth 2 (two) times daily. 60 tablet 6    montelukast (SINGULAIR) 10 mg tablet Take 1 tablet (10 mg total) by mouth once daily. 30 tablet 12    multivitamin capsule Take by mouth. 1  "Capsule Oral Every day      mupirocin (BACTROBAN) 2 % ointment Apply topically 3 (three) times daily. Apply with a Qtip 30 g 0    sulindac (CLINORIL) 150 MG tablet Take 1 tablet (150 mg total) by mouth 2 (two) times daily. 14 tablet 0    valsartan (DIOVAN) 160 MG tablet Take 1 tablet (160 mg total) by mouth once daily. 30 tablet 1    omeprazole (PRILOSEC) 40 MG capsule Take 1 capsule (40 mg total) by mouth once daily. 30 capsule 1     No facility-administered encounter medications on file as of 9/5/2018.      Last 3 sets of Vitals  Vitals - 1 value per visit 7/23/2018 8/29/2018 9/5/2018   SYSTOLIC 116 118 114   DIASTOLIC 53 82 88   PULSE 81 80 72   TEMPERATURE 96.3 97.6 -   RESPIRATIONS 18 - -   SPO2 97 98 95   Weight (lb) 337.52 334.66 333.12   Weight (kg) 153.1 151.8 151.1   HEIGHT 5' 10" 5' 10" 5' 10"   BODY MASS INDEX 48.43 48.02 47.8   VISIT REPORT - - -   Pain Score  0 7 7   Some recent data might be hidden         Objective:      Physical Exam   Constitutional: He is oriented to person, place, and time. He appears well-developed and well-nourished. No distress.   HENT:   Head: Normocephalic and atraumatic.   Eyes: Conjunctivae and EOM are normal. Pupils are equal, round, and reactive to light.   Neck: Normal range of motion. Neck supple.   Cardiovascular: Normal rate, regular rhythm, normal heart sounds and intact distal pulses.   Pulmonary/Chest: Effort normal and breath sounds normal. No stridor. No respiratory distress. He has no wheezes.   Musculoskeletal:        Hands:  Bruising and swelling noted to thenar area of left hand  Cap Rf brisk  Motor mvmt and sensation intact distally  Radial and ulnar pulses palpable bilaterally     Neurological: He is alert and oriented to person, place, and time. He displays normal reflexes. No cranial nerve deficit or sensory deficit. He exhibits normal muscle tone. Coordination normal.   Skin: Skin is warm and dry. Capillary refill takes less than 2 seconds. He is " not diaphoretic. No erythema. No pallor.   Psychiatric: He has a normal mood and affect. His behavior is normal. Judgment and thought content normal.   Nursing note and vitals reviewed.          Lab Results   Component Value Date    WBC 6.23 08/29/2018    RBC 5.00 08/29/2018    HGB 14.6 08/29/2018    HCT 42.0 08/29/2018    MCV 84 08/29/2018    MCH 29.2 08/29/2018    MCHC 34.8 08/29/2018    RDW 12.6 08/29/2018     08/29/2018    MPV 9.7 08/29/2018    GRAN 2.8 08/29/2018    GRAN 45.2 08/29/2018    LYMPH 2.3 08/29/2018    LYMPH 36.3 08/29/2018    MONO 0.7 08/29/2018    MONO 10.8 08/29/2018    EOS 0.4 08/29/2018    BASO 0.11 08/29/2018    EOSINOPHIL 5.6 08/29/2018    BASOPHIL 1.8 08/29/2018     Lab Results   Component Value Date    WBC 6.23 08/29/2018    HGB 14.6 08/29/2018    HCT 42.0 08/29/2018     08/29/2018    CHOL 202 (H) 01/25/2018    TRIG 144 01/25/2018    HDL 32 (L) 01/25/2018    ALT 52 (H) 08/29/2018    AST 39 08/29/2018     08/29/2018    K 4.2 08/29/2018     08/29/2018    CREATININE 1.1 08/29/2018    BUN 9 08/29/2018    CO2 28 08/29/2018    TSH 2.675 10/11/2017    PSA 0.47 08/22/2017    INR 1.0 03/07/2017    HGBA1C 7.4 (H) 06/07/2018       Assessment:       1. Contusion of left hand, initial encounter        Plan:         Neuro exam normal today    García was seen today for numbness.    Diagnoses and all orders for this visit:    Contusion of left hand, initial encounter  -     X-Ray Hand 3 view Left; Future      Patient Instructions   Xray today    I will send you a message with your results

## 2018-09-24 ENCOUNTER — OFFICE VISIT (OUTPATIENT)
Dept: INTERNAL MEDICINE | Facility: CLINIC | Age: 53
End: 2018-09-24
Payer: COMMERCIAL

## 2018-09-24 ENCOUNTER — LAB VISIT (OUTPATIENT)
Dept: LAB | Facility: HOSPITAL | Age: 53
End: 2018-09-24
Payer: COMMERCIAL

## 2018-09-24 VITALS
DIASTOLIC BLOOD PRESSURE: 90 MMHG | TEMPERATURE: 98 F | HEART RATE: 70 BPM | HEIGHT: 71 IN | OXYGEN SATURATION: 98 % | SYSTOLIC BLOOD PRESSURE: 128 MMHG | WEIGHT: 315 LBS | BODY MASS INDEX: 44.1 KG/M2

## 2018-09-24 DIAGNOSIS — R60.9 EDEMA, UNSPECIFIED TYPE: ICD-10-CM

## 2018-09-24 DIAGNOSIS — R35.0 URINARY FREQUENCY: Primary | ICD-10-CM

## 2018-09-24 DIAGNOSIS — I73.9 INTERMITTENT CLAUDICATION: ICD-10-CM

## 2018-09-24 DIAGNOSIS — I10 HYPERTENSION, UNSPECIFIED TYPE: ICD-10-CM

## 2018-09-24 LAB
ALBUMIN SERPL BCP-MCNC: 3.9 G/DL
ALP SERPL-CCNC: 101 U/L
ALT SERPL W/O P-5'-P-CCNC: 40 U/L
ANION GAP SERPL CALC-SCNC: 9 MMOL/L
AST SERPL-CCNC: 31 U/L
BACTERIA #/AREA URNS AUTO: NORMAL /HPF
BASOPHILS # BLD AUTO: 0.07 K/UL
BASOPHILS # BLD AUTO: 0.07 K/UL
BASOPHILS NFR BLD: 1.1 %
BASOPHILS NFR BLD: 1.1 %
BILIRUB SERPL-MCNC: 0.4 MG/DL
BILIRUB SERPL-MCNC: ABNORMAL MG/DL
BILIRUB UR QL STRIP: NEGATIVE
BLOOD URINE, POC: ABNORMAL
BUN SERPL-MCNC: 13 MG/DL
CALCIUM SERPL-MCNC: 9.6 MG/DL
CHLORIDE SERPL-SCNC: 101 MMOL/L
CLARITY UR REFRACT.AUTO: ABNORMAL
CO2 SERPL-SCNC: 27 MMOL/L
COLOR UR AUTO: YELLOW
COLOR, POC UA: YELLOW
CREAT SERPL-MCNC: 1.1 MG/DL
DIFFERENTIAL METHOD: NORMAL
DIFFERENTIAL METHOD: NORMAL
EOSINOPHIL # BLD AUTO: 0.3 K/UL
EOSINOPHIL # BLD AUTO: 0.3 K/UL
EOSINOPHIL NFR BLD: 4.6 %
EOSINOPHIL NFR BLD: 4.6 %
ERYTHROCYTE [DISTWIDTH] IN BLOOD BY AUTOMATED COUNT: 12 %
ERYTHROCYTE [DISTWIDTH] IN BLOOD BY AUTOMATED COUNT: 12 %
EST. GFR  (AFRICAN AMERICAN): >60 ML/MIN/1.73 M^2
EST. GFR  (NON AFRICAN AMERICAN): >60 ML/MIN/1.73 M^2
GLUCOSE SERPL-MCNC: 163 MG/DL
GLUCOSE UR QL STRIP: NEGATIVE
GLUCOSE UR QL STRIP: NORMAL
HCT VFR BLD AUTO: 43 %
HCT VFR BLD AUTO: 43 %
HGB BLD-MCNC: 14.7 G/DL
HGB BLD-MCNC: 14.7 G/DL
HGB UR QL STRIP: NEGATIVE
KETONES UR QL STRIP: ABNORMAL
KETONES UR QL STRIP: NEGATIVE
LEUKOCYTE ESTERASE UR QL STRIP: NEGATIVE
LEUKOCYTE ESTERASE URINE, POC: ABNORMAL
LYMPHOCYTES # BLD AUTO: 2.5 K/UL
LYMPHOCYTES # BLD AUTO: 2.5 K/UL
LYMPHOCYTES NFR BLD: 38.2 %
LYMPHOCYTES NFR BLD: 38.2 %
MCH RBC QN AUTO: 29.5 PG
MCH RBC QN AUTO: 29.5 PG
MCHC RBC AUTO-ENTMCNC: 34.2 G/DL
MCHC RBC AUTO-ENTMCNC: 34.2 G/DL
MCV RBC AUTO: 86 FL
MCV RBC AUTO: 86 FL
MICROSCOPIC COMMENT: NORMAL
MONOCYTES # BLD AUTO: 0.7 K/UL
MONOCYTES # BLD AUTO: 0.7 K/UL
MONOCYTES NFR BLD: 10.4 %
MONOCYTES NFR BLD: 10.4 %
NEUTROPHILS # BLD AUTO: 3 K/UL
NEUTROPHILS # BLD AUTO: 3 K/UL
NEUTROPHILS NFR BLD: 45.5 %
NEUTROPHILS NFR BLD: 45.5 %
NITRITE UR QL STRIP: NEGATIVE
NITRITE, POC UA: ABNORMAL
NRBC BLD-RTO: 0 /100 WBC
NRBC BLD-RTO: 0 /100 WBC
PH UR STRIP: 5 [PH] (ref 5–8)
PH, POC UA: 5
PLATELET # BLD AUTO: 263 K/UL
PLATELET # BLD AUTO: 263 K/UL
PMV BLD AUTO: 10.6 FL
PMV BLD AUTO: 10.6 FL
POTASSIUM SERPL-SCNC: 3.9 MMOL/L
PROT SERPL-MCNC: 7.7 G/DL
PROT UR QL STRIP: NEGATIVE
PROTEIN, POC: ABNORMAL
RBC # BLD AUTO: 4.99 M/UL
RBC # BLD AUTO: 4.99 M/UL
RBC #/AREA URNS AUTO: 1 /HPF (ref 0–4)
SODIUM SERPL-SCNC: 137 MMOL/L
SP GR UR STRIP: 1.02 (ref 1–1.03)
SPECIFIC GRAVITY, POC UA: 1.02
SQUAMOUS #/AREA URNS AUTO: 0 /HPF
URATE CRY UR QL COMP ASSIST: NORMAL
URN SPEC COLLECT METH UR: ABNORMAL
UROBILINOGEN UR STRIP-ACNC: NEGATIVE EU/DL
UROBILINOGEN, POC UA: NORMAL
WBC # BLD AUTO: 6.55 K/UL
WBC # BLD AUTO: 6.55 K/UL
WBC #/AREA URNS AUTO: 1 /HPF (ref 0–5)

## 2018-09-24 PROCEDURE — 81002 URINALYSIS NONAUTO W/O SCOPE: CPT | Mod: S$GLB,,, | Performed by: NURSE PRACTITIONER

## 2018-09-24 PROCEDURE — 3074F SYST BP LT 130 MM HG: CPT | Mod: CPTII,S$GLB,, | Performed by: NURSE PRACTITIONER

## 2018-09-24 PROCEDURE — 36415 COLL VENOUS BLD VENIPUNCTURE: CPT

## 2018-09-24 PROCEDURE — 99999 PR PBB SHADOW E&M-EST. PATIENT-LVL V: CPT | Mod: PBBFAC,,, | Performed by: NURSE PRACTITIONER

## 2018-09-24 PROCEDURE — 99214 OFFICE O/P EST MOD 30 MIN: CPT | Mod: 25,S$GLB,, | Performed by: NURSE PRACTITIONER

## 2018-09-24 PROCEDURE — 81001 URINALYSIS AUTO W/SCOPE: CPT

## 2018-09-24 PROCEDURE — 80053 COMPREHEN METABOLIC PANEL: CPT

## 2018-09-24 PROCEDURE — 3080F DIAST BP >= 90 MM HG: CPT | Mod: CPTII,S$GLB,, | Performed by: NURSE PRACTITIONER

## 2018-09-24 PROCEDURE — 85025 COMPLETE CBC W/AUTO DIFF WBC: CPT

## 2018-09-24 PROCEDURE — 3008F BODY MASS INDEX DOCD: CPT | Mod: CPTII,S$GLB,, | Performed by: NURSE PRACTITIONER

## 2018-09-24 PROCEDURE — 87086 URINE CULTURE/COLONY COUNT: CPT

## 2018-09-24 RX ORDER — HYDROCHLOROTHIAZIDE 25 MG/1
25 TABLET ORAL DAILY
Qty: 30 TABLET | Refills: 3 | Status: SHIPPED | OUTPATIENT
Start: 2018-09-24 | End: 2018-12-17

## 2018-09-24 NOTE — PATIENT INSTRUCTIONS
Increase hydrochlorothiazide to 25mg daily.  (2 pills at current 12.5mg dose, when you run out, please fill the 25mg prescription)

## 2018-09-24 NOTE — PROGRESS NOTES
I attest that this patient was seen and evaluated by Hima Coleman, CHRISTIN, FNP-S, then presented to me. I then examined the patient independently and together we agreed on a diagnosis and treatment plan which was then presented to the patient. See his note dated today for full visit information.    Subjective:       Patient ID: García Tang is a 52 y.o. male.     Chief Complaint: Urinary Frequency (Leg swelling)      Patient is a 52-year-old male has 2 separate complaints.  Complaint 1.:  Leg swelling on the right more than left.  Began 2 weeks ago.  States aching pain is related.  He has taken no medications or treatments for this problem.  States that he stop taking Tylenol due to the probability of cirrhosis.  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  States that his diet includes high sodium content at times.  Reports nothing makes the problem worse.  Problem 2.  Urinary frequency without dysuria, hematuria, urgency.  Darkening of the urine color.  Denies back pain or fever.        Review of Systems   Constitutional: Negative for chills and fever.   HENT: Negative for congestion, ear discharge, ear pain, rhinorrhea, sore throat and voice change.    Eyes: Negative for discharge and itching.   Respiratory: Positive for shortness of breath. Negative for choking and wheezing.         2.5 pillow orthopnea   Cardiovascular: Positive for leg swelling. Negative for chest pain and palpitations.   Gastrointestinal: Negative for abdominal distention, constipation, diarrhea, nausea and vomiting.   Endocrine: Negative for polydipsia, polyphagia and polyuria.   Genitourinary: Negative for dysuria, frequency, hematuria and urgency.        Urine discoloration   Musculoskeletal: Negative for arthralgias and myalgias.   Skin: Negative for color change, rash and wound.   Neurological: Negative for dizziness, weakness and headaches.   Hematological: Negative for adenopathy.  Does not bruise/bleed easily.       Objective:   Physical Exam   Constitutional: He is oriented to person, place, and time. He appears well-developed and well-nourished. No distress.   HENT:   Head: Normocephalic and atraumatic.   Right Ear: External ear normal.   Left Ear: External ear normal.   Nose: Nose normal.   Eyes: Conjunctivae and EOM are normal. Pupils are equal, round, and reactive to light. Right eye exhibits no discharge. Left eye exhibits no discharge. No scleral icterus.   Neck: Normal range of motion. Neck supple.   Cardiovascular: Normal rate, regular rhythm, normal heart sounds and intact distal pulses. Exam reveals no gallop and no friction rub.   No murmur heard.  Pulmonary/Chest: Effort normal. No stridor. No respiratory distress. He has decreased breath sounds. He has no wheezes. He has no rales.   Abdominal: He exhibits no distension.   Musculoskeletal: Normal range of motion. He exhibits no tenderness or deformity.        Right lower leg: He exhibits edema (2+).        Left lower leg: He exhibits edema (2+).   Neurological: He is alert and oriented to person, place, and time. He displays normal reflexes. No cranial nerve deficit or sensory deficit. He exhibits normal muscle tone. Coordination normal.   Skin: Skin is warm and dry. Capillary refill takes less than 2 seconds. He is not diaphoretic.   Nursing note and vitals reviewed.      Assessment:       1. Urinary frequency    2. Edema, unspecified type    3. Intermittent claudication    4. Hypertension, unspecified type        Plan:       García was seen today for urinary frequency.    Diagnoses and all orders for this visit:    Urinary frequency  -     POCT urine dipstick without microscope  -     Urine culture  -     URINALYSIS    Edema, unspecified type  -  Likely multifactorial, discussed impact of liver disease, diet, exercise.   -     CBC auto differential; Future  -     Comprehensive metabolic panel; Future  -     CBC auto  differential; Future  -     Brain natriuretic peptide; Future  -     hydroCHLOROthiazide (HYDRODIURIL) 25 MG tablet; Take 1 tablet (25 mg total) by mouth once daily.    Intermittent claudication  -     Cardiology Lab Ankle Brachial Indices W Stress; Future    Hypertension, unspecified type  -     hydroCHLOROthiazide (HYDRODIURIL) 25 MG tablet; Take 1 tablet (25 mg total) by mouth once daily.

## 2018-09-24 NOTE — MEDICAL/APP STUDENT
Subjective:       Patient ID: García Tang is a 52 y.o. male.    Chief Complaint: Urinary Frequency (Leg swelling)     Patient is a 52-year-old male has 2 separate complaints.  Complaint 1.:  Leg swelling on the right more than left.  Began 2 weeks ago.  States aching pain is related.  He has taken no medications or treatments for this problem.  States that he stop taking Tylenol due to the probability of cirrhosis.  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  States that his diet includes high sodium content at times.  Reports nothing makes the problem worse.  Problem 2.  Urinary frequency without dysuria, hematuria, urgency.  Darkening of the urine color.  Denies back pain or fever.      Review of Systems   Constitutional: Negative for chills and fever.   HENT: Negative for congestion, ear discharge, ear pain, rhinorrhea, sore throat and voice change.    Eyes: Negative for discharge and itching.   Respiratory: Positive for shortness of breath. Negative for choking and wheezing.         2.5 pillow orthopnea   Cardiovascular: Positive for leg swelling. Negative for chest pain and palpitations.   Gastrointestinal: Negative for abdominal distention, constipation, diarrhea, nausea and vomiting.   Endocrine: Negative for polydipsia, polyphagia and polyuria.   Genitourinary: Negative for dysuria, frequency, hematuria and urgency.        Urine discoloration   Musculoskeletal: Negative for arthralgias and myalgias.   Skin: Negative for color change, rash and wound.   Neurological: Negative for dizziness, weakness and headaches.   Hematological: Negative for adenopathy. Does not bruise/bleed easily.       Objective:      Physical Exam   Constitutional: He is oriented to person, place, and time. He appears well-developed and well-nourished. No distress.   HENT:   Head: Normocephalic and atraumatic.   Right Ear: External ear normal.   Left Ear: External ear normal.    Nose: Nose normal.   Eyes: Conjunctivae and EOM are normal. Pupils are equal, round, and reactive to light. Right eye exhibits no discharge. Left eye exhibits no discharge. No scleral icterus.   Neck: Normal range of motion. Neck supple.   Cardiovascular: Normal rate, regular rhythm, normal heart sounds and intact distal pulses. Exam reveals no gallop and no friction rub.   No murmur heard.  Pulmonary/Chest: Effort normal. No stridor. No respiratory distress. He has decreased breath sounds. He has no wheezes. He has no rales.   Abdominal: He exhibits no distension.   Musculoskeletal: Normal range of motion. He exhibits no tenderness or deformity.        Right lower leg: He exhibits edema (2+).        Left lower leg: He exhibits edema (1+).   Neurological: He is alert and oriented to person, place, and time. He displays normal reflexes. No cranial nerve deficit or sensory deficit. He exhibits normal muscle tone. Coordination normal.   Skin: Skin is warm and dry. Capillary refill takes less than 2 seconds. He is not diaphoretic.   Nursing note and vitals reviewed.      Assessment:       1. Urinary frequency    2. Edema, unspecified type        Plan:         Recommend cbc, cmp, bnp, ua due to presence of blood on dip ua.

## 2018-09-25 ENCOUNTER — CLINICAL SUPPORT (OUTPATIENT)
Dept: CARDIOLOGY | Facility: CLINIC | Age: 53
End: 2018-09-25
Attending: NURSE PRACTITIONER
Payer: COMMERCIAL

## 2018-09-25 ENCOUNTER — LAB VISIT (OUTPATIENT)
Dept: LAB | Facility: HOSPITAL | Age: 53
End: 2018-09-25
Payer: COMMERCIAL

## 2018-09-25 DIAGNOSIS — R60.9 EDEMA, UNSPECIFIED TYPE: ICD-10-CM

## 2018-09-25 DIAGNOSIS — I73.9 INTERMITTENT CLAUDICATION: ICD-10-CM

## 2018-09-25 LAB
BNP SERPL-MCNC: <10 PG/ML
VASCULAR ANKLE BRACHIAL INDEX (ABI) LEFT: 1.27 (ref 0.9–1.2)

## 2018-09-25 PROCEDURE — 36415 COLL VENOUS BLD VENIPUNCTURE: CPT

## 2018-09-25 PROCEDURE — 93924 LWR XTR VASC STDY BILAT: CPT | Mod: S$GLB,,, | Performed by: INTERNAL MEDICINE

## 2018-09-25 PROCEDURE — 83880 ASSAY OF NATRIURETIC PEPTIDE: CPT

## 2018-09-26 ENCOUNTER — PATIENT MESSAGE (OUTPATIENT)
Dept: INTERNAL MEDICINE | Facility: CLINIC | Age: 53
End: 2018-09-26

## 2018-09-26 LAB — BACTERIA UR CULT: NORMAL

## 2018-10-01 ENCOUNTER — OFFICE VISIT (OUTPATIENT)
Dept: INTERNAL MEDICINE | Facility: CLINIC | Age: 53
End: 2018-10-01
Payer: COMMERCIAL

## 2018-10-01 VITALS
SYSTOLIC BLOOD PRESSURE: 118 MMHG | HEIGHT: 70 IN | OXYGEN SATURATION: 95 % | BODY MASS INDEX: 45.1 KG/M2 | HEART RATE: 74 BPM | WEIGHT: 315 LBS | DIASTOLIC BLOOD PRESSURE: 82 MMHG

## 2018-10-01 DIAGNOSIS — E11.65 TYPE 2 DIABETES MELLITUS WITH HYPERGLYCEMIA, WITH LONG-TERM CURRENT USE OF INSULIN: Chronic | ICD-10-CM

## 2018-10-01 DIAGNOSIS — D22.9 ATYPICAL MOLE: ICD-10-CM

## 2018-10-01 DIAGNOSIS — R60.0 BILATERAL LOWER EXTREMITY EDEMA: Primary | ICD-10-CM

## 2018-10-01 DIAGNOSIS — Z79.4 TYPE 2 DIABETES MELLITUS WITH HYPERGLYCEMIA, WITH LONG-TERM CURRENT USE OF INSULIN: Chronic | ICD-10-CM

## 2018-10-01 PROCEDURE — 99214 OFFICE O/P EST MOD 30 MIN: CPT | Mod: S$GLB,,, | Performed by: INTERNAL MEDICINE

## 2018-10-01 PROCEDURE — 3074F SYST BP LT 130 MM HG: CPT | Mod: CPTII,S$GLB,, | Performed by: INTERNAL MEDICINE

## 2018-10-01 PROCEDURE — 3045F PR MOST RECENT HEMOGLOBIN A1C LEVEL 7.0-9.0%: CPT | Mod: CPTII,S$GLB,, | Performed by: INTERNAL MEDICINE

## 2018-10-01 PROCEDURE — 3079F DIAST BP 80-89 MM HG: CPT | Mod: CPTII,S$GLB,, | Performed by: INTERNAL MEDICINE

## 2018-10-01 PROCEDURE — 99999 PR PBB SHADOW E&M-EST. PATIENT-LVL V: CPT | Mod: PBBFAC,,, | Performed by: INTERNAL MEDICINE

## 2018-10-01 PROCEDURE — 3008F BODY MASS INDEX DOCD: CPT | Mod: CPTII,S$GLB,, | Performed by: INTERNAL MEDICINE

## 2018-10-01 RX ORDER — IRBESARTAN 75 MG/1
75 TABLET ORAL NIGHTLY
Qty: 90 TABLET | Refills: 3 | Status: SHIPPED | OUTPATIENT
Start: 2018-10-01 | End: 2018-12-17

## 2018-10-01 NOTE — PROGRESS NOTES
"Subjective:       Patient ID: García Tang is a 52 y.o. male.    Chief Complaint: Leg Swelling (following up on leg swelling, mainly on the R leg (from R knee on down).); Medication Problem (pt had some concerns on the HCTZ medication (adjusting dosage), take 2 tablets instead of 1.); and Laceration (pt complains on R hand, some redness and minor itching and very painful.)    HPI   53 yo M seen on 9/24/18 for lower extremity edema by Carmen Duong and treated with hctz 25mg.  Labs with nl renal function, bnp, and no proteinuria.     Still with lots of swelling, worse with walking. Right worse.   Working on low salt diet.  hctz increased to 25mg.  Amlodipine 5mg  Not taking valsartan 160mg. Never started it. Was worried about fatigue.     Was seeing Ema in hepatology for elevated liver enzymes. fibroscan with scaring, possible cirrhosis. Supposed to do MRI. Worried he won't be able to tolerate it.    Review of Systems   Constitutional: Negative for fever.   Respiratory: Negative for shortness of breath.    Cardiovascular: Negative for chest pain.   Musculoskeletal: Negative.    Skin: Negative.        Objective:   /82   Pulse 74   Ht 5' 10" (1.778 m)   Wt (!) 151 kg (333 lb)   SpO2 95%   BMI 47.78 kg/m²      Physical Exam   Constitutional: He is oriented to person, place, and time. He appears well-developed and well-nourished. No distress.   HENT:   Head: Normocephalic and atraumatic.   Cardiovascular: Normal rate and regular rhythm.   2+ edema bilateral lower extremities   Pulmonary/Chest: Effort normal. No respiratory distress.   Neurological: He is alert and oriented to person, place, and time.   Skin: Skin is warm and dry. He is not diaphoretic.   3mm hyperpigmented mole without clear irregularity to border on left temple - states has been growing over last year   Psychiatric: He has a normal mood and affect. His behavior is normal.       Assessment:       1. Bilateral lower extremity edema    2. " Type 2 diabetes mellitus with hyperglycemia, with long-term current use of insulin    3. Atypical mole        Plan:       García was seen today for leg swelling, medication problem and laceration.    Diagnoses and all orders for this visit:    Bilateral lower extremity edema  -     COMPRESSION STOCKINGS   Will stop amlodipine as may be exacerbating   Discussed venous insufficiency likely contributing   Low salt diet, weight loss, diabetes control    Type 2 diabetes mellitus with hyperglycemia, with long-term current use of insulin and HTN  -    start irbesartan (AVAPRO) 75 MG tablet; Take 1 tablet (75 mg total) by mouth every evening.   Stop amlodipine due to lower extremity edema  -     Comprehensive metabolic panel; Future  -     Hemoglobin A1c; Future  -     Lipid panel; Future    Atypical mole  -     Ambulatory Referral to Dermatology

## 2018-10-16 ENCOUNTER — TELEPHONE (OUTPATIENT)
Dept: INTERNAL MEDICINE | Facility: CLINIC | Age: 53
End: 2018-10-16

## 2018-10-16 ENCOUNTER — PATIENT MESSAGE (OUTPATIENT)
Dept: HEPATOLOGY | Facility: CLINIC | Age: 53
End: 2018-10-16

## 2018-10-22 ENCOUNTER — OFFICE VISIT (OUTPATIENT)
Dept: INTERNAL MEDICINE | Facility: CLINIC | Age: 53
End: 2018-10-22
Payer: COMMERCIAL

## 2018-10-22 ENCOUNTER — HOSPITAL ENCOUNTER (OUTPATIENT)
Dept: RADIOLOGY | Facility: HOSPITAL | Age: 53
Discharge: HOME | End: 2018-10-22
Attending: INTERNAL MEDICINE
Payer: COMMERCIAL

## 2018-10-22 VITALS
HEART RATE: 77 BPM | HEIGHT: 70 IN | SYSTOLIC BLOOD PRESSURE: 134 MMHG | WEIGHT: 315 LBS | BODY MASS INDEX: 45.1 KG/M2 | TEMPERATURE: 98 F | DIASTOLIC BLOOD PRESSURE: 76 MMHG | OXYGEN SATURATION: 98 %

## 2018-10-22 DIAGNOSIS — R10.11 RIGHT UPPER QUADRANT PAIN: Primary | ICD-10-CM

## 2018-10-22 DIAGNOSIS — R10.11 RIGHT UPPER QUADRANT PAIN: ICD-10-CM

## 2018-10-22 DIAGNOSIS — E11.65 TYPE 2 DIABETES MELLITUS WITH HYPERGLYCEMIA, WITH LONG-TERM CURRENT USE OF INSULIN: Chronic | ICD-10-CM

## 2018-10-22 DIAGNOSIS — Z79.4 TYPE 2 DIABETES MELLITUS WITH HYPERGLYCEMIA, WITH LONG-TERM CURRENT USE OF INSULIN: Chronic | ICD-10-CM

## 2018-10-22 DIAGNOSIS — K76.0 FATTY LIVER DISEASE, NONALCOHOLIC: ICD-10-CM

## 2018-10-22 DIAGNOSIS — Z12.11 COLON CANCER SCREENING: ICD-10-CM

## 2018-10-22 PROCEDURE — 99999 PR PBB SHADOW E&M-EST. PATIENT-LVL IV: CPT | Mod: PBBFAC,,, | Performed by: INTERNAL MEDICINE

## 2018-10-22 PROCEDURE — 3075F SYST BP GE 130 - 139MM HG: CPT | Mod: CPTII,S$GLB,, | Performed by: INTERNAL MEDICINE

## 2018-10-22 PROCEDURE — 76700 US EXAM ABDOM COMPLETE: CPT | Mod: TC

## 2018-10-22 PROCEDURE — 3078F DIAST BP <80 MM HG: CPT | Mod: CPTII,S$GLB,, | Performed by: INTERNAL MEDICINE

## 2018-10-22 PROCEDURE — 3008F BODY MASS INDEX DOCD: CPT | Mod: CPTII,S$GLB,, | Performed by: INTERNAL MEDICINE

## 2018-10-22 PROCEDURE — 76700 US EXAM ABDOM COMPLETE: CPT | Mod: 26,,, | Performed by: RADIOLOGY

## 2018-10-22 PROCEDURE — 99214 OFFICE O/P EST MOD 30 MIN: CPT | Mod: S$GLB,,, | Performed by: INTERNAL MEDICINE

## 2018-10-22 PROCEDURE — 3045F PR MOST RECENT HEMOGLOBIN A1C LEVEL 7.0-9.0%: CPT | Mod: CPTII,S$GLB,, | Performed by: INTERNAL MEDICINE

## 2018-10-22 RX ORDER — HYDROCHLOROTHIAZIDE 12.5 MG/1
12.5 CAPSULE ORAL DAILY
COMMUNITY
Start: 2018-10-10 | End: 2019-05-21 | Stop reason: SDUPTHER

## 2018-10-22 NOTE — PROGRESS NOTES
"Subjective:       Patient ID: García Tang is a 53 y.o. male.    Chief Complaint: Dizziness (reoccuring for awhile, patient was seeing double vision in a parking lot that lasted for about 5 secs along with "feeling of sycncope".); URI (patient complains of symptoms like nasal/chest congestion, sore throat (hoarse voice), frequent headaches.); and Follow-up (shooting pain on upper R side. )    HPI     54 yo M here for follow up of HTN, DM.   Started irbesartan 75mg on 10/1.      Was seeing Ema in hepatology for elevated liver enzymes. fibroscan with scaring, possible cirrhosis. Supposed to do MRI elastography. Worried he won't be able to tolerate it.    Had fever x 2 days which now resolved. Throat sore.   Granddaughter also sick with fever. Tylenol OTC.     RUQ is painful. Had hepatomegaly and steatosis on US in August. Intermittently shooting pains.   Worse in last 2 weeks.     Review of Systems   Constitutional: Negative for fever.   Respiratory: Negative for shortness of breath.    Cardiovascular: Negative for chest pain.   Musculoskeletal: Negative.    Skin: Negative.        Objective:   /76 (BP Location: Left arm, Patient Position: Sitting, BP Method: Large (Manual))   Pulse 77   Temp 97.9 °F (36.6 °C) (Oral)   Ht 5' 10" (1.778 m)   Wt (!) 149.7 kg (330 lb)   SpO2 98%   BMI 47.35 kg/m²      Physical Exam   Constitutional: He is oriented to person, place, and time. He appears well-developed and well-nourished. No distress.   HENT:   Head: Normocephalic and atraumatic.   Cardiovascular: Normal rate and regular rhythm.   No murmur heard.  Pulmonary/Chest: Effort normal. No respiratory distress. He has no wheezes. He has no rales.   Abdominal:   Enlarged liver, mild ttp without kajal guarding nor rebound.    Neurological: He is alert and oriented to person, place, and time.   Skin: Skin is warm and dry. He is not diaphoretic.   Psychiatric: He has a normal mood and affect. His behavior is " normal.       Assessment:       1. Right upper quadrant pain    2. Fatty liver disease, nonalcoholic    3. Type 2 diabetes mellitus with hyperglycemia, with long-term current use of insulin    4. Colon cancer screening        Plan:       García was seen today for dizziness, uri and follow-up.    Diagnoses and all orders for this visit:    Right upper quadrant pain  Fatty liver disease, nonalcoholic  -     US Abdomen Complete; Future    Type 2 diabetes mellitus with hyperglycemia, with long-term current use of insulin   a1c 7.7%, work on diet    Colon cancer screening  -     Case request GI: COLONOSCOPY

## 2018-10-25 ENCOUNTER — NURSE TRIAGE (OUTPATIENT)
Dept: ADMINISTRATIVE | Facility: CLINIC | Age: 53
End: 2018-10-25

## 2018-10-25 NOTE — TELEPHONE ENCOUNTER
Patient called to report the following:     -my whole nose has turned red   -2 days ago my neck was itching like crazy and it turned into a bunch of bumps and scab, now is burning   -neck looks infected   -denies trouble breathing, fever   -patient advised on home care, when to call back and f/u with provider within 24 hours.   -appointment scheduled with provider         Reason for Disposition   [1] Looks infected (spreading redness, pus) AND [2] no fever    Protocols used: ST RASH OR REDNESS - [id number]

## 2018-10-26 ENCOUNTER — OFFICE VISIT (OUTPATIENT)
Dept: INTERNAL MEDICINE | Facility: CLINIC | Age: 53
End: 2018-10-26
Payer: COMMERCIAL

## 2018-10-26 VITALS
DIASTOLIC BLOOD PRESSURE: 90 MMHG | HEIGHT: 70 IN | BODY MASS INDEX: 45.1 KG/M2 | TEMPERATURE: 98 F | OXYGEN SATURATION: 98 % | SYSTOLIC BLOOD PRESSURE: 122 MMHG | WEIGHT: 315 LBS | HEART RATE: 70 BPM

## 2018-10-26 DIAGNOSIS — L30.9 DERMATITIS: Primary | ICD-10-CM

## 2018-10-26 DIAGNOSIS — L04.9 ACUTE LYMPHADENITIS: ICD-10-CM

## 2018-10-26 PROCEDURE — 99214 OFFICE O/P EST MOD 30 MIN: CPT | Mod: S$GLB,,, | Performed by: NURSE PRACTITIONER

## 2018-10-26 PROCEDURE — 3008F BODY MASS INDEX DOCD: CPT | Mod: CPTII,S$GLB,, | Performed by: NURSE PRACTITIONER

## 2018-10-26 PROCEDURE — 99999 PR PBB SHADOW E&M-EST. PATIENT-LVL V: CPT | Mod: PBBFAC,,, | Performed by: NURSE PRACTITIONER

## 2018-10-26 PROCEDURE — 3080F DIAST BP >= 90 MM HG: CPT | Mod: CPTII,S$GLB,, | Performed by: NURSE PRACTITIONER

## 2018-10-26 PROCEDURE — 3074F SYST BP LT 130 MM HG: CPT | Mod: CPTII,S$GLB,, | Performed by: NURSE PRACTITIONER

## 2018-10-26 RX ORDER — KETOCONAZOLE 20 MG/G
CREAM TOPICAL DAILY
Qty: 30 G | Refills: 1 | Status: SHIPPED | OUTPATIENT
Start: 2018-10-26 | End: 2019-04-16

## 2018-10-26 RX ORDER — AMOXICILLIN AND CLAVULANATE POTASSIUM 875; 125 MG/1; MG/1
1 TABLET, FILM COATED ORAL EVERY 12 HOURS
Qty: 20 TABLET | Refills: 0 | Status: SHIPPED | OUTPATIENT
Start: 2018-10-26 | End: 2018-11-05

## 2018-10-26 NOTE — PROGRESS NOTES
Subjective:       Patient ID: García Tang is a 53 y.o. male.    Chief Complaint: Neck Pain (since 10/23; using bactroban)    Disclaimer: This note has been generated using voice-recognition software. There may be typographical errors that have been missed during proof-reading    Pt of Dr Patel here for same-day appointment.  Patient complained of a rash to the left side of his neck at the angle of his jaw for about a week.  Patient states he did use in Bactroban ointment on it and it is not helping.  Patient states this started Monday with some rash and itching to the side of his neck.  The past few days patient has noticed some pain in his neck and a runny nose.  Patient is not sure if the pain in his neck in the ranges are connected.  Patient denies any fever, chills, nausea, vomiting, or diarrhea.      Neck Pain    Pertinent negatives include no chest pain, fever, headaches or trouble swallowing.     Review of Systems   Constitutional: Negative for chills, diaphoresis, fatigue and fever.   HENT: Positive for rhinorrhea. Negative for congestion, postnasal drip, sinus pressure, sinus pain, sneezing, sore throat, tinnitus, trouble swallowing and voice change.    Eyes: Negative for discharge.   Respiratory: Negative for cough, shortness of breath, wheezing and stridor.    Cardiovascular: Negative for chest pain, palpitations and leg swelling.   Gastrointestinal: Negative for abdominal pain, diarrhea, nausea and vomiting.   Musculoskeletal: Positive for neck pain.   Skin: Positive for rash.   Neurological: Negative for dizziness, facial asymmetry and headaches.   Hematological: Positive for adenopathy.         Past Medical History:   Diagnosis Date    CKD stage 2 due to type 2 diabetes mellitus 5/18/2017    Essential hypertension 7/18/2016    Fatty liver disease, nonalcoholic 4/7/2016    [place], 2006     HLD (hyperlipidemia)     Inflammatory bowel disease     diverticulitis    Kidney stone     requiring  stent    Morbid obesity 5/29/2014    NAFLD (nonalcoholic fatty liver disease)     Near syncope 5/23/2017    Obesity hypoventilation syndrome 2/22/2016    Prostatitis     Rectal bleeding     diverticulitis    Type 2 diabetes mellitus with hyperglycemia, with long-term current use of insulin 5/18/2017    Uncontrolled type 2 diabetes mellitus with peripheral neuropathy 5/29/2014     Past Surgical History:   Procedure Laterality Date    COLONOSCOPY      kidney stent      KIDNEY SURGERY  1996    right kidney obstruction requiring stent    NOSE SURGERY       Social History     Social History Narrative    Not on file     Family History   Problem Relation Age of Onset    Cancer Father     Diabetes Father     Hypertension Father     Glaucoma Father     Liver disease Mother         d.    Cirrhosis Mother     Hypertension Mother     Diabetes Brother      Outpatient Encounter Medications as of 10/26/2018   Medication Sig Dispense Refill    aspirin (ECOTRIN) 81 MG EC tablet Take 81 mg by mouth once daily.      DOCOSAHEXANOIC ACID/EPA (FISH OIL ORAL) Take 1 capsule by mouth once daily at 6am.       ERGOCALCIFEROL, VITAMIN D2, (VITAMIN D ORAL) Take 1 capsule by mouth once daily at 6am.       gabapentin (NEURONTIN) 100 MG capsule Take 1 capsule (100 mg total) by mouth every evening. May increase to 300mg if needed. Diabetic neuropathy. 90 capsule 11    hydroCHLOROthiazide (HYDRODIURIL) 25 MG tablet Take 1 tablet (25 mg total) by mouth once daily. 30 tablet 3    hydroCHLOROthiazide (MICROZIDE) 12.5 mg capsule       inhalation spacing device Use with inhaler dispense with mouthpiece 1 Device 1    insulin NPH-insulin regular, 70/30, (NOVOLIN 70/30) 100 unit/mL (70-30) injection Inject into the skin 2 (two) times daily. 27 in am and 24 in pm      irbesartan (AVAPRO) 75 MG tablet Take 1 tablet (75 mg total) by mouth every evening. 90 tablet 3    levalbuterol (XOPENEX HFA) 45 mcg/actuation inhaler Inhale  "1-2 puffs into the lungs every 4 (four) hours as needed for Wheezing. Rescue 1 Inhaler 0    lifitegrast (XIIDRA) 5 % Dpet Apply 1 drop to eye 2 (two) times daily. 60 each 12    metoprolol succinate (TOPROL XL) 50 MG 24 hr tablet Take 1 tablet (50 mg total) by mouth 2 (two) times daily. 60 tablet 6    multivitamin capsule Take by mouth. 1 Capsule Oral Every day      mupirocin (BACTROBAN) 2 % ointment Apply topically 3 (three) times daily. Apply with a Qtip 30 g 0    sulindac (CLINORIL) 150 MG tablet Take 1 tablet (150 mg total) by mouth 2 (two) times daily. 14 tablet 0    amoxicillin-clavulanate 875-125mg (AUGMENTIN) 875-125 mg per tablet Take 1 tablet by mouth every 12 (twelve) hours. for 10 days 20 tablet 0    ketoconazole (NIZORAL) 2 % cream Apply topically once daily. Mix in palm with Bactroban ointment and apply to rash on face 30 g 1    omeprazole (PRILOSEC) 40 MG capsule Take 1 capsule (40 mg total) by mouth once daily. 30 capsule 1     No facility-administered encounter medications on file as of 10/26/2018.      Last 3 sets of Vitals  Vitals - 1 value per visit 10/1/2018 10/22/2018 10/26/2018   SYSTOLIC 118 134 122   DIASTOLIC 82 76 90   PULSE 74 77 70   TEMPERATURE - 97.9 98   RESPIRATIONS - - -   SPO2 95 98 98   Weight (lb) 333 330 332.45   Weight (kg) 151.048 149.687 150.8   HEIGHT 5' 10" 5' 10" 5' 10"   BODY MASS INDEX 47.78 47.35 47.7   VISIT REPORT - - -   Pain Score  5 7 7   Some recent data might be hidden         Objective:      Physical Exam   Constitutional: He is oriented to person, place, and time. He appears well-developed and well-nourished. No distress.   HENT:   Head: Normocephalic and atraumatic.   Right Ear: External ear normal.   Left Ear: External ear normal.   Nose: Nose normal.   Mouth/Throat: Oropharynx is clear and moist. No oropharyngeal exudate.   Eyes: EOM are normal. Pupils are equal, round, and reactive to light. No scleral icterus.   Neck: Normal range of motion. Neck " supple.   Cardiovascular: Normal rate, regular rhythm, normal heart sounds and intact distal pulses.   Pulmonary/Chest: Effort normal and breath sounds normal. No stridor. No respiratory distress. He has no wheezes. He has no rales.   Abdominal: Soft.   Lymphadenopathy:        Head (left side): Submandibular adenopathy present.     He has cervical adenopathy.   Submandibular node x 1 mildly enlarged and tender in region of rash on jaw.     Neurological: He is alert and oriented to person, place, and time.   Skin: Skin is warm and dry. Capillary refill takes less than 2 seconds. Rash noted. He is not diaphoretic. No erythema. No pallor.   Area to left side of jaw at angle of jaw with 1-2mm red, inflammed follicles.    No areas of fluctuance noted     Psychiatric: He has a normal mood and affect. His behavior is normal. Judgment and thought content normal.   Nursing note and vitals reviewed.          Lab Results   Component Value Date    WBC 6.55 09/24/2018    WBC 6.55 09/24/2018    RBC 4.99 09/24/2018    RBC 4.99 09/24/2018    HGB 14.7 09/24/2018    HGB 14.7 09/24/2018    HCT 43.0 09/24/2018    HCT 43.0 09/24/2018    MCV 86 09/24/2018    MCV 86 09/24/2018    MCH 29.5 09/24/2018    MCH 29.5 09/24/2018    MCHC 34.2 09/24/2018    MCHC 34.2 09/24/2018    RDW 12.0 09/24/2018    RDW 12.0 09/24/2018     09/24/2018     09/24/2018    MPV 10.6 09/24/2018    MPV 10.6 09/24/2018    GRAN 3.0 09/24/2018    GRAN 45.5 09/24/2018    GRAN 3.0 09/24/2018    GRAN 45.5 09/24/2018    LYMPH 2.5 09/24/2018    LYMPH 38.2 09/24/2018    LYMPH 2.5 09/24/2018    LYMPH 38.2 09/24/2018    MONO 0.7 09/24/2018    MONO 10.4 09/24/2018    MONO 0.7 09/24/2018    MONO 10.4 09/24/2018    EOS 0.3 09/24/2018    EOS 0.3 09/24/2018    BASO 0.07 09/24/2018    BASO 0.07 09/24/2018    EOSINOPHIL 4.6 09/24/2018    EOSINOPHIL 4.6 09/24/2018    BASOPHIL 1.1 09/24/2018    BASOPHIL 1.1 09/24/2018     Lab Results   Component Value Date    WBC 6.55  09/24/2018    WBC 6.55 09/24/2018    HGB 14.7 09/24/2018    HGB 14.7 09/24/2018    HCT 43.0 09/24/2018    HCT 43.0 09/24/2018     09/24/2018     09/24/2018    CHOL 162 10/22/2018    TRIG 105 10/22/2018    HDL 28 (L) 10/22/2018    ALT 39 10/22/2018    AST 31 10/22/2018     10/22/2018    K 4.1 10/22/2018     10/22/2018    CREATININE 1.0 10/22/2018    BUN 12 10/22/2018    CO2 28 10/22/2018    TSH 2.675 10/11/2017    PSA 0.47 08/22/2017    INR 1.0 03/07/2017    HGBA1C 7.7 (H) 10/22/2018       Assessment:       1. Dermatitis    2. Acute lymphadenitis        Plan:       Discussed with patient at the rash may be a combination of a mild fungal rash visible secondary bacterial inflammation since patient admits that he also lays on his side at night.  Patient is probably get a little sweat in the area and is not able to drive due to the fact he is laying on it.  Script given for ketoconazole to use once a day mixed with the Bactroban ointment.  Patient also advised to use the Bactroban ointment 2 additional times throughout the day.  Discussed with patient his lymph node is reactive in nature and is doing what the body was designed to do paper script for Augmentin given to patient in case lymph node keeps enlarging or keeps being tender or  does not resolve within 3 6 weeks.  Patient verbalized understanding of all instructions and intent to comply.  García was seen today for neck pain.    Diagnoses and all orders for this visit:    Dermatitis    Acute lymphadenitis  -     amoxicillin-clavulanate 875-125mg (AUGMENTIN) 875-125 mg per tablet; Take 1 tablet by mouth every 12 (twelve) hours. for 10 days    Other orders  -     ketoconazole (NIZORAL) 2 % cream; Apply topically once daily. Mix in palm with Bactroban ointment and apply to rash on face      Patient Instructions   Warm compresses to neck several times a day    Apply Bactroban three times a day to rash.  One of those times during the day mix  the Bactroban in your palm with the Ketoconazole cream and apply to the rash on your face    Start the Augmentin if the lymph node in your neck gets bigger or if it does nto resolve in a few weeks.  IT is normal for the lymph node to be enlarged in an area of broken skin and it can stay enlarged for up to 6 weeks.    Call for any concerns or problems

## 2018-10-26 NOTE — PATIENT INSTRUCTIONS
Warm compresses to neck several times a day    Apply Bactroban three times a day to rash.  One of those times during the day mix the Bactroban in your palm with the Ketoconazole cream and apply to the rash on your face    Start the Augmentin if the lymph node in your neck gets bigger or if it does nto resolve in a few weeks.  IT is normal for the lymph node to be enlarged in an area of broken skin and it can stay enlarged for up to 6 weeks.    Call for any concerns or problems

## 2018-10-29 ENCOUNTER — OFFICE VISIT (OUTPATIENT)
Dept: INTERNAL MEDICINE | Facility: CLINIC | Age: 53
End: 2018-10-29
Payer: COMMERCIAL

## 2018-10-29 ENCOUNTER — TELEPHONE (OUTPATIENT)
Dept: INTERNAL MEDICINE | Facility: CLINIC | Age: 53
End: 2018-10-29

## 2018-10-29 VITALS
BODY MASS INDEX: 45.1 KG/M2 | DIASTOLIC BLOOD PRESSURE: 92 MMHG | HEART RATE: 85 BPM | HEIGHT: 70 IN | OXYGEN SATURATION: 95 % | WEIGHT: 315 LBS | SYSTOLIC BLOOD PRESSURE: 130 MMHG

## 2018-10-29 DIAGNOSIS — R21 RASH: Primary | ICD-10-CM

## 2018-10-29 PROCEDURE — 3008F BODY MASS INDEX DOCD: CPT | Mod: CPTII,S$GLB,, | Performed by: INTERNAL MEDICINE

## 2018-10-29 PROCEDURE — 99999 PR PBB SHADOW E&M-EST. PATIENT-LVL IV: CPT | Mod: PBBFAC,,, | Performed by: INTERNAL MEDICINE

## 2018-10-29 PROCEDURE — 3075F SYST BP GE 130 - 139MM HG: CPT | Mod: CPTII,S$GLB,, | Performed by: INTERNAL MEDICINE

## 2018-10-29 PROCEDURE — 99214 OFFICE O/P EST MOD 30 MIN: CPT | Mod: S$GLB,,, | Performed by: INTERNAL MEDICINE

## 2018-10-29 PROCEDURE — 3080F DIAST BP >= 90 MM HG: CPT | Mod: CPTII,S$GLB,, | Performed by: INTERNAL MEDICINE

## 2018-10-29 NOTE — TELEPHONE ENCOUNTER
Spoke with patient. Patient is OB at 1:30 today stating that yes, he wanted to come in to follow up with you.

## 2018-10-29 NOTE — TELEPHONE ENCOUNTER
----- Message from Audrey Swift sent at 10/29/2018  8:19 AM CDT -----  Contact: self/[phone]  Patient called in regards needing to talk with Dr Patel about his past visit with np. Please call and advise. Thank you

## 2018-10-29 NOTE — TELEPHONE ENCOUNTER
Continue medication prescribed. Can continue tylenol for swelling. Has an appt at 1:30? Keep this if needed.

## 2018-10-29 NOTE — TELEPHONE ENCOUNTER
Spoke to patient. Patient just wanted to follow up again with you on visit with NP. Patient was complaining of a rash on neck along with moderate edema, site was possibly infected. He was given antibiotics to help clear. Pt also mentioned that on last night, he had excruciating pain on bilateral sides of jaw (mainly left side of ear down to left shoulder which is swollen. Pt stated that he had taken Tylenol (some relief but pain is still persistent). Any medical advice?     Please adv.

## 2018-10-29 NOTE — PROGRESS NOTES
"Subjective:       Patient ID: García Tang is a 53 y.o. male.    Chief Complaint: Edema (patient complains of pain/minor edema on L side on collarbone area (little discoloration). ) and Facial Pain (started on last night, pain on LUANA sides of face, lasted for about a half hour. patient currently takes Tylenol to relieve.)    HPI   52 yo M here for evaluation of swelling left neck, facial pain, rash. Seen in  on 10/26. He was rx bactroban and Augmentin started on 10/26.  His neck and upper shoulder started hurting bad last night     Review of Systems   Constitutional: Negative for fever.   Respiratory: Negative for shortness of breath.    Cardiovascular: Negative for chest pain.   Musculoskeletal: Negative.    Skin: Negative.        Objective:   BP (!) 130/92 (BP Location: Right arm, Patient Position: Sitting, BP Method: Large (Manual))   Pulse 85   Ht 5' 10" (1.778 m)   Wt (!) 151 kg (333 lb)   SpO2 95%   BMI 47.78 kg/m²      Physical Exam   Constitutional: He is oriented to person, place, and time. He appears well-developed and well-nourished. No distress.   HENT:   Head: Normocephalic and atraumatic.   Cardiovascular: Normal rate and regular rhythm.   No murmur heard.  Pulmonary/Chest: Effort normal. No respiratory distress. He has no wheezes. He has no rales.   Musculoskeletal:   Mild swelling over left clavicle without lymph node enlargement   Neurological: He is alert and oriented to person, place, and time.   Skin: Skin is warm and dry. He is not diaphoretic.   Rash over left neck resolved   Psychiatric: He has a normal mood and affect. His behavior is normal.       Assessment:       1. Rash        Plan:       García was seen today for edema and facial pain.    Diagnoses and all orders for this visit:    Rash    continue Augmentin  Exam benign   US or CT neck if sx return/fail to improve  "

## 2018-10-31 ENCOUNTER — PATIENT MESSAGE (OUTPATIENT)
Dept: INTERNAL MEDICINE | Facility: CLINIC | Age: 53
End: 2018-10-31

## 2018-11-06 ENCOUNTER — PATIENT MESSAGE (OUTPATIENT)
Dept: INTERNAL MEDICINE | Facility: CLINIC | Age: 53
End: 2018-11-06

## 2018-11-06 DIAGNOSIS — R22.1 LOCALIZED SWELLING, MASS AND LUMP, NECK: ICD-10-CM

## 2018-11-06 DIAGNOSIS — M54.2 NECK PAIN: Primary | ICD-10-CM

## 2018-11-09 ENCOUNTER — HOSPITAL ENCOUNTER (OUTPATIENT)
Dept: RADIOLOGY | Facility: HOSPITAL | Age: 53
Discharge: HOME | End: 2018-11-09
Attending: INTERNAL MEDICINE
Payer: COMMERCIAL

## 2018-11-09 ENCOUNTER — PATIENT MESSAGE (OUTPATIENT)
Dept: INTERNAL MEDICINE | Facility: CLINIC | Age: 53
End: 2018-11-09

## 2018-11-09 DIAGNOSIS — M54.2 NECK PAIN: ICD-10-CM

## 2018-11-09 DIAGNOSIS — R22.1 LOCALIZED SWELLING, MASS AND LUMP, NECK: ICD-10-CM

## 2018-11-09 PROCEDURE — 76536 US EXAM OF HEAD AND NECK: CPT | Mod: TC

## 2018-11-09 PROCEDURE — 76536 US EXAM OF HEAD AND NECK: CPT | Mod: 26,,, | Performed by: RADIOLOGY

## 2018-11-13 ENCOUNTER — LAB VISIT (OUTPATIENT)
Dept: LAB | Facility: HOSPITAL | Age: 53
End: 2018-11-13
Payer: COMMERCIAL

## 2018-11-13 ENCOUNTER — OFFICE VISIT (OUTPATIENT)
Dept: INTERNAL MEDICINE | Facility: CLINIC | Age: 53
End: 2018-11-13
Payer: COMMERCIAL

## 2018-11-13 VITALS
BODY MASS INDEX: 45.1 KG/M2 | HEART RATE: 76 BPM | DIASTOLIC BLOOD PRESSURE: 100 MMHG | OXYGEN SATURATION: 98 % | WEIGHT: 315 LBS | HEIGHT: 70 IN | SYSTOLIC BLOOD PRESSURE: 134 MMHG

## 2018-11-13 DIAGNOSIS — R42 DIZZINESS: ICD-10-CM

## 2018-11-13 DIAGNOSIS — R59.0 CERVICAL LYMPHADENOPATHY: ICD-10-CM

## 2018-11-13 DIAGNOSIS — R07.0 THROAT DISCOMFORT: ICD-10-CM

## 2018-11-13 DIAGNOSIS — M54.2 NECK PAIN: ICD-10-CM

## 2018-11-13 DIAGNOSIS — R09.82 POST-NASAL DRAINAGE: ICD-10-CM

## 2018-11-13 DIAGNOSIS — R07.0 THROAT DISCOMFORT: Primary | ICD-10-CM

## 2018-11-13 DIAGNOSIS — E66.01 MORBID OBESITY: ICD-10-CM

## 2018-11-13 DIAGNOSIS — I10 HYPERTENSION, UNSPECIFIED TYPE: ICD-10-CM

## 2018-11-13 LAB
BASOPHILS # BLD AUTO: 0.08 K/UL
BASOPHILS NFR BLD: 1.4 %
DIFFERENTIAL METHOD: NORMAL
EOSINOPHIL # BLD AUTO: 0.3 K/UL
EOSINOPHIL NFR BLD: 5.1 %
ERYTHROCYTE [DISTWIDTH] IN BLOOD BY AUTOMATED COUNT: 12.4 %
HCT VFR BLD AUTO: 42.7 %
HGB BLD-MCNC: 14.1 G/DL
LYMPHOCYTES # BLD AUTO: 2 K/UL
LYMPHOCYTES NFR BLD: 35.7 %
MCH RBC QN AUTO: 28.3 PG
MCHC RBC AUTO-ENTMCNC: 33 G/DL
MCV RBC AUTO: 86 FL
MONOCYTES # BLD AUTO: 0.7 K/UL
MONOCYTES NFR BLD: 11.9 %
NEUTROPHILS # BLD AUTO: 2.6 K/UL
NEUTROPHILS NFR BLD: 45.7 %
PLATELET # BLD AUTO: 233 K/UL
PMV BLD AUTO: 9.5 FL
RBC # BLD AUTO: 4.99 M/UL
WBC # BLD AUTO: 5.71 K/UL

## 2018-11-13 PROCEDURE — 3008F BODY MASS INDEX DOCD: CPT | Mod: CPTII,S$GLB,, | Performed by: NURSE PRACTITIONER

## 2018-11-13 PROCEDURE — 99999 PR PBB SHADOW E&M-EST. PATIENT-LVL V: CPT | Mod: PBBFAC,,, | Performed by: NURSE PRACTITIONER

## 2018-11-13 PROCEDURE — 85025 COMPLETE CBC W/AUTO DIFF WBC: CPT

## 2018-11-13 PROCEDURE — 87070 CULTURE OTHR SPECIMN AEROBIC: CPT

## 2018-11-13 PROCEDURE — 3080F DIAST BP >= 90 MM HG: CPT | Mod: CPTII,S$GLB,, | Performed by: NURSE PRACTITIONER

## 2018-11-13 PROCEDURE — 3075F SYST BP GE 130 - 139MM HG: CPT | Mod: CPTII,S$GLB,, | Performed by: NURSE PRACTITIONER

## 2018-11-13 PROCEDURE — 99214 OFFICE O/P EST MOD 30 MIN: CPT | Mod: S$GLB,,, | Performed by: NURSE PRACTITIONER

## 2018-11-13 PROCEDURE — 36415 COLL VENOUS BLD VENIPUNCTURE: CPT

## 2018-11-13 RX ORDER — CETIRIZINE HYDROCHLORIDE 10 MG/1
10 TABLET ORAL DAILY
Refills: 0 | COMMUNITY
Start: 2018-11-13 | End: 2019-03-19

## 2018-11-13 NOTE — PROGRESS NOTES
Subjective:       Patient ID: García Tang is a 53 y.o. male.    Chief Complaint: Rash (neck pain )    Mr. Tang presents today for left sided neck and throat pain. This is his 3rd visit in 3 weeks for the same complaint. Initially there was a rash associated with the neck pain. He took a course of antibiotics and the rash resolved. However he is still having sore throat and left sided lymphadenopathy. He is frustrated today because he does not think it is normal to wake up in pain every day. He has felt very fatigued through this illness. He also has occasional dizziness that come and goes spontaneously.       Review of Systems   Constitutional: Positive for fatigue. Negative for fever.   HENT: Positive for sore throat and trouble swallowing.    Eyes: Negative for visual disturbance.   Respiratory: Negative for shortness of breath.    Cardiovascular: Negative for chest pain.   Gastrointestinal: Negative for nausea and vomiting.   Genitourinary: Negative for dysuria.   Musculoskeletal: Negative for gait problem.   Skin: Positive for rash.   Neurological: Positive for dizziness.   Hematological: Positive for adenopathy.   Psychiatric/Behavioral: Negative for confusion.       Objective:      Physical Exam   Constitutional: He is oriented to person, place, and time. He appears well-developed. No distress.   Morbidly obese   HENT:   Head: Normocephalic.   Right Ear: Tympanic membrane normal.   Left Ear: Tympanic membrane normal.   Nose: Mucosal edema and rhinorrhea present.   Mouth/Throat:       Mallampati IV+, very difficult to swab tonsils due to oral anatomy. Unable to visualize posterior oropharynx.  No tenderness of jaw.     Eyes: No scleral icterus.   Cardiovascular: Normal rate, regular rhythm and normal heart sounds. Exam reveals no gallop and no friction rub.   No murmur heard.  Lymphadenopathy:        Head (right side): Submandibular adenopathy present. No submental, no tonsillar, no preauricular, no  posterior auricular and no occipital adenopathy present.        Head (left side): Submental, submandibular, tonsillar and posterior auricular adenopathy present. No preauricular and no occipital adenopathy present.     He has cervical adenopathy.        Right cervical: No superficial cervical, no deep cervical and no posterior cervical adenopathy present.       Left cervical: Superficial cervical and deep cervical adenopathy present. No posterior cervical adenopathy present.   Neurological: He is alert and oriented to person, place, and time.   Skin: Skin is warm and dry. He is not diaphoretic.   Psychiatric: His behavior is normal.   Nursing note and vitals reviewed.      Assessment:       1. Throat discomfort    2. Cervical lymphadenopathy    3. Hypertension, unspecified type    4. Morbid obesity    5. Post-nasal drainage    6. Neck pain    7. Dizziness        Plan:   1. Throat discomfort  - Throat culture  - Ambulatory consult to Sleep Disorders  - Ambulatory consult to ENT  - CBC auto differential; Future    2. Cervical lymphadenopathy  - Ambulatory consult to ENT  - CBC auto differential; Future    3. Hypertension, unspecified type  - Ambulatory consult to Sleep Disorders    4. Morbid obesity  - Ambulatory consult to Sleep Disorders    5. Post-nasal drainage  - cetirizine (ZYRTEC) 10 MG tablet; Take 1 tablet (10 mg total) by mouth once daily.; Refill: 0    6. Neck pain  - Ultrasound doppler carotid (Cupid Only); Future    7. Dizziness  - Ultrasound doppler carotid (Cupid Only); Future      Pt has been given instructions populated from [employer] database and has verbalized understanding of the after visit summary and information contained wherein.    Follow up with a primary care provider. May go to ER for acute shortness of breath, lightheadedness, fever, or any other emergent complaints or changes in condition.

## 2018-11-14 ENCOUNTER — CLINICAL SUPPORT (OUTPATIENT)
Dept: CARDIOLOGY | Facility: CLINIC | Age: 53
End: 2018-11-14
Attending: NURSE PRACTITIONER
Payer: COMMERCIAL

## 2018-11-14 DIAGNOSIS — M54.2 NECK PAIN: ICD-10-CM

## 2018-11-14 DIAGNOSIS — R42 DIZZINESS: ICD-10-CM

## 2018-11-14 LAB
LEFT ARM DIASTOLIC BLOOD PRESSURE: 94 MMHG
LEFT ARM SYSTOLIC BLOOD PRESSURE: 134 MMHG
LEFT CBA DIAS: 19 CM/S
LEFT CBA SYS: 61 CM/S
LEFT CCA DIST DIAS: 28 CM/S
LEFT CCA DIST SYS: 79 CM/S
LEFT CCA MID DIAS: 26 CM/S
LEFT CCA MID SYS: 76 CM/S
LEFT CCA PROX DIAS: 22 CM/S
LEFT CCA PROX SYS: 88 CM/S
LEFT ECA DIAS: 23 CM/S
LEFT ECA SYS: 117 CM/S
LEFT ICA DIST DIAS: 22 CM/S
LEFT ICA DIST SYS: 55 CM/S
LEFT ICA MID DIAS: 27 CM/S
LEFT ICA MID SYS: 78 CM/S
LEFT ICA PROX DIAS: 18 CM/S
LEFT ICA PROX SYS: 71 CM/S
LEFT VERTEBRAL DIAS: 13 CM/S
LEFT VERTEBRAL SYS: 35 CM/S
OHS CV CAROTID ULTRASOUND LEFT ICA/CCA RATIO: 0.89
OHS CV CAROTID ULTRASOUND RIGHT ICA/CCA RATIO: 0.87
RIGHT ARM DIASTOLIC BLOOD PRESSURE: 90 MMHG
RIGHT ARM SYSTOLIC BLOOD PRESSURE: 130 MMHG
RIGHT CBA DIAS: 25 CM/S
RIGHT CBA SYS: 72 CM/S
RIGHT CCA DIST DIAS: 16 CM/S
RIGHT CCA DIST SYS: 61 CM/S
RIGHT CCA MID DIAS: 25 CM/S
RIGHT CCA MID SYS: 83 CM/S
RIGHT CCA PROX DIAS: 19 CM/S
RIGHT CCA PROX SYS: 87 CM/S
RIGHT ECA DIAS: 15 CM/S
RIGHT ECA SYS: 94 CM/S
RIGHT ICA DIST DIAS: 37 CM/S
RIGHT ICA DIST SYS: 76 CM/S
RIGHT ICA MID DIAS: 29 CM/S
RIGHT ICA MID SYS: 67 CM/S
RIGHT ICA PROX DIAS: 25 CM/S
RIGHT ICA PROX SYS: 72 CM/S
RIGHT VERTEBRAL DIAS: 13 CM/S
RIGHT VERTEBRAL SYS: 39 CM/S

## 2018-11-14 PROCEDURE — 93880 EXTRACRANIAL BILAT STUDY: CPT | Mod: S$GLB,,, | Performed by: INTERNAL MEDICINE

## 2018-11-15 LAB — BACTERIA THROAT CULT: NORMAL

## 2018-11-27 ENCOUNTER — TELEPHONE (OUTPATIENT)
Dept: INTERNAL MEDICINE | Facility: CLINIC | Age: 53
End: 2018-11-27

## 2018-11-27 NOTE — TELEPHONE ENCOUNTER
----- Message from Danna Pardo sent at 11/27/2018 10:47 AM CST -----  Contact: [facility] [phone]  Prescription Request:     Name of medication: amlodipine (NORVASC) 5 MG tablet     Reason for request: Refill    Pharmacy: [id number] [address]    Requesting 90 day supply.    Thank You

## 2018-12-05 RX ORDER — AMLODIPINE BESYLATE 5 MG/1
5 TABLET ORAL DAILY
COMMUNITY
End: 2018-12-05 | Stop reason: SDUPTHER

## 2018-12-05 RX ORDER — AMLODIPINE BESYLATE 5 MG/1
5 TABLET ORAL DAILY
Qty: 30 TABLET | Refills: 0 | Status: SHIPPED | OUTPATIENT
Start: 2018-12-05 | End: 2019-04-04

## 2018-12-05 NOTE — TELEPHONE ENCOUNTER
----- Message from Danna Pardo sent at 12/5/2018  7:15 AM CST -----  Contact: [facility] [phone]  2nd Request    Prescription Request:      Name of medication: amlodipine (NORVASC) 5 MG tablet      Reason for request: Refill     Pharmacy: [facility] [id number] - [address]     Requesting 90 day supply.     Thank You

## 2018-12-05 NOTE — TELEPHONE ENCOUNTER
This medication was not in the patient's chart. I added it as requested. Is it okay to fill this medication?

## 2018-12-11 ENCOUNTER — OFFICE VISIT (OUTPATIENT)
Dept: OTOLARYNGOLOGY | Facility: CLINIC | Age: 53
End: 2018-12-11
Payer: COMMERCIAL

## 2018-12-11 ENCOUNTER — OFFICE VISIT (OUTPATIENT)
Dept: INTERNAL MEDICINE | Facility: CLINIC | Age: 53
End: 2018-12-11
Payer: COMMERCIAL

## 2018-12-11 ENCOUNTER — HOSPITAL ENCOUNTER (OUTPATIENT)
Dept: RADIOLOGY | Facility: HOSPITAL | Age: 53
Discharge: HOME | End: 2018-12-11
Attending: NURSE PRACTITIONER
Payer: COMMERCIAL

## 2018-12-11 VITALS
WEIGHT: 315 LBS | OXYGEN SATURATION: 97 % | HEART RATE: 78 BPM | TEMPERATURE: 98 F | SYSTOLIC BLOOD PRESSURE: 124 MMHG | HEIGHT: 70 IN | DIASTOLIC BLOOD PRESSURE: 92 MMHG | BODY MASS INDEX: 45.1 KG/M2

## 2018-12-11 VITALS
HEART RATE: 72 BPM | WEIGHT: 315 LBS | BODY MASS INDEX: 47.35 KG/M2 | TEMPERATURE: 96 F | SYSTOLIC BLOOD PRESSURE: 125 MMHG | DIASTOLIC BLOOD PRESSURE: 88 MMHG

## 2018-12-11 DIAGNOSIS — K21.9 GASTROESOPHAGEAL REFLUX DISEASE WITHOUT ESOPHAGITIS: ICD-10-CM

## 2018-12-11 DIAGNOSIS — N50.812 PAIN IN LEFT TESTICLE: Primary | ICD-10-CM

## 2018-12-11 DIAGNOSIS — K11.23 CHRONIC SIALOADENITIS: Primary | ICD-10-CM

## 2018-12-11 DIAGNOSIS — N50.812 PAIN IN LEFT TESTICLE: ICD-10-CM

## 2018-12-11 LAB
BILIRUB SERPL-MCNC: NORMAL MG/DL
BILIRUB UR QL STRIP: NEGATIVE
BLOOD URINE, POC: NORMAL
CLARITY UR REFRACT.AUTO: CLEAR
COLOR UR AUTO: YELLOW
COLOR, POC UA: YELLOW
GLUCOSE UR QL STRIP: 250
GLUCOSE UR QL STRIP: ABNORMAL
HGB UR QL STRIP: NEGATIVE
KETONES UR QL STRIP: NEGATIVE
KETONES UR QL STRIP: NORMAL
LEUKOCYTE ESTERASE UR QL STRIP: NEGATIVE
LEUKOCYTE ESTERASE URINE, POC: NORMAL
NITRITE UR QL STRIP: NEGATIVE
NITRITE, POC UA: NORMAL
PH UR STRIP: 5 [PH] (ref 5–8)
PH, POC UA: 5
PROT UR QL STRIP: NEGATIVE
PROTEIN, POC: NORMAL
SP GR UR STRIP: 1.02 (ref 1–1.03)
SPECIFIC GRAVITY, POC UA: 1.02
URN SPEC COLLECT METH UR: ABNORMAL
UROBILINOGEN, POC UA: NORMAL

## 2018-12-11 PROCEDURE — 81003 URINALYSIS AUTO W/O SCOPE: CPT

## 2018-12-11 PROCEDURE — 3079F DIAST BP 80-89 MM HG: CPT | Mod: CPTII,S$GLB,, | Performed by: OTOLARYNGOLOGY

## 2018-12-11 PROCEDURE — 99999 PR PBB SHADOW E&M-EST. PATIENT-LVL III: CPT | Mod: PBBFAC,,, | Performed by: OTOLARYNGOLOGY

## 2018-12-11 PROCEDURE — 76870 US EXAM SCROTUM: CPT | Mod: 26,,, | Performed by: RADIOLOGY

## 2018-12-11 PROCEDURE — 99213 OFFICE O/P EST LOW 20 MIN: CPT | Mod: 25,S$GLB,, | Performed by: OTOLARYNGOLOGY

## 2018-12-11 PROCEDURE — 81002 URINALYSIS NONAUTO W/O SCOPE: CPT | Mod: S$GLB,,, | Performed by: NURSE PRACTITIONER

## 2018-12-11 PROCEDURE — 3074F SYST BP LT 130 MM HG: CPT | Mod: CPTII,S$GLB,, | Performed by: OTOLARYNGOLOGY

## 2018-12-11 PROCEDURE — 99214 OFFICE O/P EST MOD 30 MIN: CPT | Mod: 25,S$GLB,, | Performed by: NURSE PRACTITIONER

## 2018-12-11 PROCEDURE — 99999 PR PBB SHADOW E&M-EST. PATIENT-LVL V: CPT | Mod: PBBFAC,,, | Performed by: NURSE PRACTITIONER

## 2018-12-11 PROCEDURE — 3074F SYST BP LT 130 MM HG: CPT | Mod: CPTII,S$GLB,, | Performed by: NURSE PRACTITIONER

## 2018-12-11 PROCEDURE — 76870 US EXAM SCROTUM: CPT | Mod: TC

## 2018-12-11 PROCEDURE — 3008F BODY MASS INDEX DOCD: CPT | Mod: CPTII,S$GLB,, | Performed by: OTOLARYNGOLOGY

## 2018-12-11 PROCEDURE — 3078F DIAST BP <80 MM HG: CPT | Mod: CPTII,S$GLB,, | Performed by: NURSE PRACTITIONER

## 2018-12-11 PROCEDURE — 3008F BODY MASS INDEX DOCD: CPT | Mod: CPTII,S$GLB,, | Performed by: NURSE PRACTITIONER

## 2018-12-11 RX ORDER — OMEPRAZOLE 20 MG/1
20 TABLET, DELAYED RELEASE ORAL DAILY
COMMUNITY
End: 2019-04-04

## 2018-12-11 NOTE — ASSESSMENT & PLAN NOTE
He has a history and physical that are consistent with chronic sialoadenitis. We discussed the use of massage, warm compresses and sialogogues to manage this. I have ordered a contrasted CT scan of the neck that can potentially identify a stone if present. I explained to him that this may get worse with more frequent pain and redness or infection, but he could manage it as above. If he does not get better, or gets worse, then I encouraged him to return to see Dr. Ari Barger, who has advanced expertise in endoscopic management of salivary gland problems. He expressed understanding.

## 2018-12-11 NOTE — LETTER
December 11, 2018      Carmne Duong, NP  [address]  [facility] [address]           [facility]  [address]  [facility] [address]  Phone: [phone]  Fax: [phone]          Patient: García Tang   MR Number: [id number]   YOB: 1965   Date of Visit: 12/11/2018       Dear Carmen Duong:    Thank you for referring García Tang to me for evaluation. Attached you will find relevant portions of my assessment and plan of care.    If you have questions, please do not hesitate to call me. I look forward to following García Tang along with you.    Sincerely,    Edison Hurley MD    Enclosure  CC:  No Recipients    If you would like to receive this communication electronically, please contact [email or web address] or [phone] to request more information on [facility] access.    For providers and/or their staff who would like to refer a patient to [facility], please contact us through our one-stop-shop provider referral line, [facility], at [phone].    If you feel you have received this communication in error or would no longer like to receive these types of communications, please e-mail [email or web address]

## 2018-12-11 NOTE — PATIENT INSTRUCTIONS
Please get the CT scan.    Get some sugar free lemondrops and chew at least one per hour. This increases saliva flow in the affected gland.    Massage your left under jaw gland from the corner of the jaw to the front several times per day - this pushes the saliva forward.    Place a heating pad on the affected area three times a day for about 10-15 minutes.    If your symptoms do not get better, please return to see Dr. Barger

## 2018-12-11 NOTE — ASSESSMENT & PLAN NOTE
Chronic globus and symptoms consistent with reflux. Normal oropharyngeal exam and no adenopathy. He can take zantac twice a day to see if it helps. If it does not, he should speak with his PCM.

## 2018-12-11 NOTE — PROGRESS NOTES
Subjective:       Patient ID: García Tang is a 53 y.o. male.    Chief Complaint: Testicle Pain (left side) and Abdominal Pain    HPI:  52 yo male that presents to clinic today with complaint of left testicle pain.    States that he has been experiencing soreness in his left testicle for 2 weeks.  Denies any trauma to area.  Denies any recent sexual intercourse.  Denies any dysuria or penile discharge.  States that he has noticed intermittent episodes of left sided swelling.  Denies any fever, SOB, chest pain, n/v or dizziness.  States some relief with taking warm showers.  States that he has not tried taking anything for pain.  Denies any problems with constipation.    Review of Systems   Constitutional: Negative for activity change, appetite change, fatigue and fever.   Respiratory: Negative for apnea, cough, shortness of breath and wheezing.    Cardiovascular: Negative for chest pain, palpitations and leg swelling.   Gastrointestinal: Negative for abdominal distention, abdominal pain, constipation, diarrhea, nausea and vomiting.   Genitourinary: Positive for scrotal swelling and testicular pain. Negative for discharge, dysuria, frequency, genital sores, hematuria, penile pain, penile swelling and urgency.   Skin: Negative for color change and rash.   Neurological: Negative for dizziness, light-headedness, numbness and headaches.   Psychiatric/Behavioral: Negative for behavioral problems.       Objective:      Physical Exam   Constitutional: He is oriented to person, place, and time. He appears well-developed and well-nourished. He appears distressed.   Cardiovascular: Normal rate, regular rhythm, normal heart sounds and intact distal pulses.   No murmur heard.  Pulmonary/Chest: Effort normal and breath sounds normal. No stridor. No respiratory distress. He has no wheezes. He has no rales.   Abdominal: Soft. Bowel sounds are normal. He exhibits no distension and no mass. There is no tenderness. Hernia  confirmed negative in the right inguinal area and confirmed negative in the left inguinal area.   Genitourinary: Right testis shows no mass, no swelling and no tenderness. Right testis is descended. Cremasteric reflex is not absent on the right side. Left testis shows tenderness. Left testis shows no mass and no swelling. Left testis is descended. Cremasteric reflex is not absent on the left side. Uncircumcised. No penile tenderness. No discharge found.   Genitourinary Comments: There is moderate tenderness with palpation to left testicle.  No masses, lumps or bumps detected.  There is no selling noticed.   Lymphadenopathy: No inguinal adenopathy noted on the right or left side.   Neurological: He is alert and oriented to person, place, and time.   Skin: Skin is warm and dry. No rash noted.   Psychiatric: His behavior is normal.       Assessment:       1. Pain in left testicle        Plan:       1. Pain in left testicle    -Blood pressure slightly elevated in clinic but rest of vitals are stable.  -There is moderate pain with palpation to left testicle.  No masses or lumps noted.  No swelling noted.  -UA dipstick is unremarkable and not suggestive of UTI but there is some glucose noted.  Will send off for urinalysis.  -Will obtain ultrasound of scrotum and testicles today.  -Can take otc advil or ibuprofen to help with pain.  -Encouraged to refrain from wearing tight clothing.  -Will refer to urology for further evaluation and management given duration of testicular pain.

## 2018-12-11 NOTE — PROGRESS NOTES
"[facility]    Subjective:       Patient ID: García Tang is a 53 y.o. male.    Chief Complaint: lymphadeopathy      HPI    García Tang is a 53 y.o. male who presents with a several week history of left sided neck swelling and pain that comes and goes. Sometimes the neck is throbbing. He also reports some tightness in his neck from the ears down to his clavicle that come and go as well - sometimes it occurs when he is eating, other times, without such clear provocation. He took some antibiotics (? Augmentin) for 10 days, BID, a few weeks ago - he thinks that it did not really help his symptoms. He wakes up with pain - he locates this to his left submandibular gland that then radiates to the clavicle - this is the throbbing pain noted above. There is no local redness or warmth. He had a low grade fever with this a "couple of weeks ago". He is an insulin requiring type II diabetic.     He denies constant dysphagia, odynophagia, throat pain, and otalgia. He has occasional dysphagia where food will stick in the region of the cricoid. He has a globus sensation as well. He takes pepto bismol, and this sensation gets better - it also helped some chronic nausea. He had been on prilosec previously, but no longer takes it. His voice is normal. There is no hemoptysis or hematemesis. He is breathing well.     Past Medical History:   Diagnosis Date    Chronic sialoadenitis 12/11/2018    CKD stage 2 due to type 2 diabetes mellitus 5/18/2017    Essential hypertension 7/18/2016    Fatty liver disease, nonalcoholic 4/7/2016    [place], 2006     HLD (hyperlipidemia)     Inflammatory bowel disease     diverticulitis    Kidney stone     requiring stent    Morbid obesity 5/29/2014    NAFLD (nonalcoholic fatty liver disease)     Near syncope 5/23/2017    Obesity hypoventilation syndrome 2/22/2016    Prostatitis     Rectal bleeding     diverticulitis    Type 2 diabetes mellitus with " hyperglycemia, with long-term current use of insulin 5/18/2017    Uncontrolled type 2 diabetes mellitus with peripheral neuropathy 5/29/2014       Past Surgical History:   Procedure Laterality Date    COLONOSCOPY      kidney stent      KIDNEY SURGERY  1996    right kidney obstruction requiring stent    NOSE SURGERY           Current Outpatient Medications:     amLODIPine (NORVASC) 5 MG tablet, Take 1 tablet (5 mg total) by mouth once daily., Disp: 30 tablet, Rfl: 0    aspirin (ECOTRIN) 81 MG EC tablet, Take 81 mg by mouth once daily., Disp: , Rfl:     cetirizine (ZYRTEC) 10 MG tablet, Take 1 tablet (10 mg total) by mouth once daily., Disp: , Rfl: 0    DOCOSAHEXANOIC ACID/EPA (FISH OIL ORAL), Take 1 capsule by mouth once daily at 6am. , Disp: , Rfl:     ERGOCALCIFEROL, VITAMIN D2, (VITAMIN D ORAL), Take 1 capsule by mouth once daily at 6am. , Disp: , Rfl:     gabapentin (NEURONTIN) 100 MG capsule, Take 1 capsule (100 mg total) by mouth every evening. May increase to 300mg if needed. Diabetic neuropathy., Disp: 90 capsule, Rfl: 11    hydroCHLOROthiazide (HYDRODIURIL) 25 MG tablet, Take 1 tablet (25 mg total) by mouth once daily., Disp: 30 tablet, Rfl: 3    hydroCHLOROthiazide (MICROZIDE) 12.5 mg capsule, , Disp: , Rfl:     inhalation spacing device, Use with inhaler dispense with mouthpiece, Disp: 1 Device, Rfl: 1    insulin NPH-insulin regular, 70/30, (NOVOLIN 70/30) 100 unit/mL (70-30) injection, Inject into the skin 2 (two) times daily. 27 in am and 24 in pm, Disp: , Rfl:     irbesartan (AVAPRO) 75 MG tablet, Take 1 tablet (75 mg total) by mouth every evening., Disp: 90 tablet, Rfl: 3    ketoconazole (NIZORAL) 2 % cream, Apply topically once daily. Mix in palm with Bactroban ointment and apply to rash on face, Disp: 30 g, Rfl: 1    levalbuterol (XOPENEX HFA) 45 mcg/actuation inhaler, Inhale 1-2 puffs into the lungs every 4 (four) hours as needed for Wheezing. Rescue, Disp: 1 Inhaler, Rfl: 0     lifitegrast (XIIDRA) 5 % Dpet, Apply 1 drop to eye 2 (two) times daily., Disp: 60 each, Rfl: 12    metoprolol succinate (TOPROL XL) 50 MG 24 hr tablet, Take 1 tablet (50 mg total) by mouth 2 (two) times daily., Disp: 60 tablet, Rfl: 6    multivitamin capsule, Take by mouth. 1 Capsule Oral Every day, Disp: , Rfl:     mupirocin (BACTROBAN) 2 % ointment, Apply topically 3 (three) times daily. Apply with a Qtip, Disp: 30 g, Rfl: 0    sulindac (CLINORIL) 150 MG tablet, Take 1 tablet (150 mg total) by mouth 2 (two) times daily., Disp: 14 tablet, Rfl: 0    omeprazole (PRILOSEC) 40 MG capsule, Take 1 capsule (40 mg total) by mouth once daily., Disp: 30 capsule, Rfl: 1    Review of patient's allergies indicates:   Allergen Reactions    Lisinopril Swelling    Bactrim  [sulfamethoxazole-trimethoprim]      Other reaction(s): Unknown    Hydroxyzine hcl      Other reaction(s): elevated bp    Kenalog [triamcinolone acetonide] Other (See Comments)     Heart flutters all day    Phenergan [promethazine] Anxiety    Zofran [ondansetron hcl (pf)] Anxiety       Social History     Socioeconomic History    Marital status:      Spouse name: Not on file    Number of children: Not on file    Years of education: Not on file    Highest education level: Not on file   Social Needs    Financial resource strain: Not on file    Food insecurity - worry: Not on file    Food insecurity - inability: Not on file    Transportation needs - medical: Not on file    Transportation needs - non-medical: Not on file   Occupational History    Not on file   Tobacco Use    Smoking status: Former Smoker     Types: Cigarettes    Smokeless tobacco: Never Used    Tobacco comment: social   Substance and Sexual Activity    Alcohol use: No    Drug use: No    Sexual activity: Yes     Partners: Female   Other Topics Concern    Not on file   Social History Narrative    Not on file       Family History   Problem Relation Age of Onset     Cancer Father     Diabetes Father     Hypertension Father     Glaucoma Father     Liver disease Mother         d.    Cirrhosis Mother     Hypertension Mother     Diabetes Brother        Review of Systems   Constitutional: Positive for fatigue and fever. Negative for unexpected weight change.   HENT: Positive for facial swelling and trouble swallowing. Negative for ear discharge, hearing loss, mouth sores, rhinorrhea, sore throat, tinnitus and voice change.    Eyes: Negative for pain and visual disturbance.   Respiratory: Negative for cough and shortness of breath.    Cardiovascular: Negative for chest pain and palpitations.   Gastrointestinal: Negative for abdominal pain, constipation and diarrhea.   Genitourinary: Negative for difficulty urinating and dysuria.        Left testicular pain that is severe this morning   Musculoskeletal: Positive for arthralgias and neck pain. Negative for back pain.   Skin: Negative for color change and rash.   Neurological: Negative for dizziness, seizures and headaches.   Hematological: Negative for adenopathy. Does not bruise/bleed easily.   Psychiatric/Behavioral: Negative for agitation. The patient is not nervous/anxious.        Objective:     Physical Exam   Constitutional: He is oriented to person, place, and time. He appears well-developed and well-nourished. He is cooperative.   HENT:   Head: Normocephalic and atraumatic.   Right Ear: Hearing, tympanic membrane, external ear and ear canal normal.   Left Ear: Hearing, tympanic membrane, external ear and ear canal normal.   Nose: Nose normal. No rhinorrhea, nasal deformity or septal deviation. Right sinus exhibits no maxillary sinus tenderness and no frontal sinus tenderness. Left sinus exhibits no maxillary sinus tenderness and no frontal sinus tenderness.   Mouth/Throat: Uvula is midline, oropharynx is clear and moist and mucous membranes are normal. He does not have dentures. No oral lesions. No trismus in the jaw. No  oropharyngeal exudate or posterior oropharyngeal edema.   .   Eyes: Conjunctivae and EOM are normal. Pupils are equal, round, and reactive to light. Right eye exhibits no chemosis. Left eye exhibits no chemosis.   Neck: Trachea normal, normal range of motion and phonation normal. Neck supple. No JVD present. No tracheal tenderness present. No tracheal deviation and no edema present. No thyroid mass and no thyromegaly present.       Salivary glands - there are no lesions, and there is no asymmetry of the parotid and submandibular glands   Cardiovascular: Normal rate, regular rhythm and intact distal pulses.   Pulmonary/Chest: Effort normal. No accessory muscle usage or stridor. No tachypnea. No respiratory distress.   Abdominal: Normal appearance. He exhibits no distension. There is no guarding.   Lymphadenopathy:     He has no cervical adenopathy.        Right cervical: No deep cervical and no posterior cervical adenopathy present.       Left cervical: No deep cervical and no posterior cervical adenopathy present.        Right: No supraclavicular adenopathy present.        Left: No supraclavicular adenopathy present.   Neurological: He is alert and oriented to person, place, and time. No cranial nerve deficit. Gait normal.   Skin: Skin is warm and dry. No lesion noted.   Psychiatric: He has a normal mood and affect. His speech is normal.   Vitals reviewed.         US Neck 11/9/2018:  Impression       A few prominent lymph nodes with otherwise normal morphology in the left neck.  These are nonspecific but may represent reactive lymph nodes.       Assessment & Plan:       Problem List Items Addressed This Visit     Chronic sialoadenitis - Primary     He has a history and physical that are consistent with chronic sialoadenitis. We discussed the use of massage, warm compresses and sialogogues to manage this. I have ordered a contrasted CT scan of the neck that can potentially identify a stone if present. I explained to  him that this may get worse with more frequent pain and redness or infection, but he could manage it as above. If he does not get better, or gets worse, then I encouraged him to return to see Dr. Ari Barger, who has advanced expertise in endoscopic management of salivary gland problems. He expressed understanding.          Relevant Orders    CT Soft Tissue Neck With Contrast    Gastroesophageal reflux disease without esophagitis     Chronic globus and symptoms consistent with reflux. Normal oropharyngeal exam and no adenopathy. He can take zantac twice a day to see if it helps. If it does not, he should speak with his PCM.

## 2018-12-17 ENCOUNTER — LAB VISIT (OUTPATIENT)
Dept: LAB | Facility: HOSPITAL | Age: 53
End: 2018-12-17
Payer: COMMERCIAL

## 2018-12-17 ENCOUNTER — OFFICE VISIT (OUTPATIENT)
Dept: HEPATOLOGY | Facility: CLINIC | Age: 53
End: 2018-12-17
Payer: COMMERCIAL

## 2018-12-17 ENCOUNTER — PATIENT MESSAGE (OUTPATIENT)
Dept: HEPATOLOGY | Facility: CLINIC | Age: 53
End: 2018-12-17

## 2018-12-17 VITALS
HEART RATE: 61 BPM | DIASTOLIC BLOOD PRESSURE: 82 MMHG | RESPIRATION RATE: 18 BRPM | HEIGHT: 70 IN | BODY MASS INDEX: 45.1 KG/M2 | SYSTOLIC BLOOD PRESSURE: 158 MMHG | OXYGEN SATURATION: 99 % | WEIGHT: 315 LBS

## 2018-12-17 DIAGNOSIS — Z80.0 FAMILY HISTORY OF COLON CANCER: ICD-10-CM

## 2018-12-17 DIAGNOSIS — E78.5 HYPERLIPIDEMIA, UNSPECIFIED HYPERLIPIDEMIA TYPE: ICD-10-CM

## 2018-12-17 DIAGNOSIS — K74.00 LIVER FIBROSIS: ICD-10-CM

## 2018-12-17 DIAGNOSIS — E11.65 TYPE 2 DIABETES MELLITUS WITH HYPERGLYCEMIA, WITH LONG-TERM CURRENT USE OF INSULIN: Chronic | ICD-10-CM

## 2018-12-17 DIAGNOSIS — Z79.4 TYPE 2 DIABETES MELLITUS WITH HYPERGLYCEMIA, WITH LONG-TERM CURRENT USE OF INSULIN: Chronic | ICD-10-CM

## 2018-12-17 DIAGNOSIS — K76.0 FATTY LIVER: ICD-10-CM

## 2018-12-17 DIAGNOSIS — I10 ESSENTIAL HYPERTENSION: Chronic | ICD-10-CM

## 2018-12-17 DIAGNOSIS — K74.60 CIRRHOSIS OF LIVER WITHOUT ASCITES, UNSPECIFIED HEPATIC CIRRHOSIS TYPE: Primary | ICD-10-CM

## 2018-12-17 DIAGNOSIS — E66.01 MORBID OBESITY: Chronic | ICD-10-CM

## 2018-12-17 DIAGNOSIS — K76.0 FATTY LIVER DISEASE, NONALCOHOLIC: ICD-10-CM

## 2018-12-17 PROBLEM — K74.69 OTHER CIRRHOSIS OF LIVER: Status: ACTIVE | Noted: 2018-12-17

## 2018-12-17 LAB
AFP SERPL-MCNC: 3 NG/ML
ALBUMIN SERPL BCP-MCNC: 3.9 G/DL
ALP SERPL-CCNC: 89 U/L
ALT SERPL W/O P-5'-P-CCNC: 42 U/L
ANION GAP SERPL CALC-SCNC: 5 MMOL/L
AST SERPL-CCNC: 32 U/L
BASOPHILS # BLD AUTO: 0.07 K/UL
BASOPHILS NFR BLD: 1.3 %
BILIRUB SERPL-MCNC: 0.5 MG/DL
BUN SERPL-MCNC: 13 MG/DL
CALCIUM SERPL-MCNC: 9.5 MG/DL
CHLORIDE SERPL-SCNC: 99 MMOL/L
CO2 SERPL-SCNC: 32 MMOL/L
CREAT SERPL-MCNC: 1 MG/DL
DIFFERENTIAL METHOD: ABNORMAL
EOSINOPHIL # BLD AUTO: 0.2 K/UL
EOSINOPHIL NFR BLD: 2.7 %
ERYTHROCYTE [DISTWIDTH] IN BLOOD BY AUTOMATED COUNT: 12 %
EST. GFR  (AFRICAN AMERICAN): >60 ML/MIN/1.73 M^2
EST. GFR  (NON AFRICAN AMERICAN): >60 ML/MIN/1.73 M^2
GLUCOSE SERPL-MCNC: 200 MG/DL
HCT VFR BLD AUTO: 42 %
HGB BLD-MCNC: 13.9 G/DL
IMM GRANULOCYTES # BLD AUTO: 0.01 K/UL
IMM GRANULOCYTES NFR BLD AUTO: 0.2 %
INR PPP: 1
LYMPHOCYTES # BLD AUTO: 2.1 K/UL
LYMPHOCYTES NFR BLD: 37.2 %
MCH RBC QN AUTO: 28.3 PG
MCHC RBC AUTO-ENTMCNC: 33.1 G/DL
MCV RBC AUTO: 86 FL
MONOCYTES # BLD AUTO: 0.6 K/UL
MONOCYTES NFR BLD: 10.2 %
NEUTROPHILS # BLD AUTO: 2.7 K/UL
NEUTROPHILS NFR BLD: 48.4 %
NRBC BLD-RTO: 0 /100 WBC
PLATELET # BLD AUTO: 249 K/UL
PMV BLD AUTO: 10.1 FL
POTASSIUM SERPL-SCNC: 4.3 MMOL/L
PROT SERPL-MCNC: 7.6 G/DL
PROTHROMBIN TIME: 10.3 SEC
RBC # BLD AUTO: 4.91 M/UL
SODIUM SERPL-SCNC: 136 MMOL/L
WBC # BLD AUTO: 5.51 K/UL

## 2018-12-17 PROCEDURE — 85610 PROTHROMBIN TIME: CPT

## 2018-12-17 PROCEDURE — 99214 OFFICE O/P EST MOD 30 MIN: CPT | Mod: S$GLB,,, | Performed by: NURSE PRACTITIONER

## 2018-12-17 PROCEDURE — 82105 ALPHA-FETOPROTEIN SERUM: CPT

## 2018-12-17 PROCEDURE — 3008F BODY MASS INDEX DOCD: CPT | Mod: CPTII,S$GLB,, | Performed by: NURSE PRACTITIONER

## 2018-12-17 PROCEDURE — 3077F SYST BP >= 140 MM HG: CPT | Mod: CPTII,S$GLB,, | Performed by: NURSE PRACTITIONER

## 2018-12-17 PROCEDURE — 85025 COMPLETE CBC W/AUTO DIFF WBC: CPT

## 2018-12-17 PROCEDURE — 99999 PR PBB SHADOW E&M-EST. PATIENT-LVL V: CPT | Mod: PBBFAC,,, | Performed by: NURSE PRACTITIONER

## 2018-12-17 PROCEDURE — 3045F PR MOST RECENT HEMOGLOBIN A1C LEVEL 7.0-9.0%: CPT | Mod: CPTII,S$GLB,, | Performed by: NURSE PRACTITIONER

## 2018-12-17 PROCEDURE — 3079F DIAST BP 80-89 MM HG: CPT | Mod: CPTII,S$GLB,, | Performed by: NURSE PRACTITIONER

## 2018-12-17 PROCEDURE — 36415 COLL VENOUS BLD VENIPUNCTURE: CPT

## 2018-12-17 PROCEDURE — 80053 COMPREHEN METABOLIC PANEL: CPT

## 2018-12-17 NOTE — PATIENT INSTRUCTIONS
Endoscopic Ultrasound (EUS)    An endoscopic ultrasound (EUS) is a test to look at the inside of your gastrointestinal (GI) tract. It's commonly used to look for cancers or growths in the esophagus, stomach, pancreas, liver, and rectum. It can help to stage cancer (see how advanced a cancer is). EUS may also be used to help diagnose certain diseases or to drain cysts or abscesses.  What is EUS?  EUS shows both ultrasound images and live video of the GI tract. During the test, a flexible tube called an endoscope (scope) is used. At the end of the scope is a tiny video camera and light. The video camera sends live images to a monitor. The scope also contains a very small ultrasound device. This uses sound waves to create images and send them to a monitor.  A needle is passed through the scope. The needle can be used take a small sample of tissue for testing. This is called a biopsy. The needle can be used to take a sample of fluid. This is called fine-needle aspiration (FNA).  Risks and possible complications of EUS  Risks and possible complications include the following:  · Bleeding  · Infection  · A perforation (hole) in the digestive tract   · Risks of sedation or anesthesia   Before the test  Be prepared prior to the test:  · Tell your healthcare provider what medicine you take. This includes vitamins, herbs, and over-the-counter medicine. It also includes any blood thinners, such as warfarin, clopidogrel, ibuprofen, or daily aspirin. Ask your healthcare provider if you need to stop taking some or all of them before the test.  · You may be prescribed antibiotics to take before or after the test. This depends on the area being studied and what is done during the test. These medicines help prevent infection.  · Carefully follow the instructions for preparing for the test to make sure results are accurate. Instructions may include:  ¨ If youre having an EUS of the upper GI tract (esophagus, stomach, duodenum,  pancreas, liver):  § Do not eat or drink for 6 hours before the test.  ¨ If youre having an EUS of the lower GI tract (rectum):  § Before the test, do bowel prep as instructed to clean your rectum of stool. This may involve a clear liquid diet and using a laxative (liquid or pills) the night before the test. Or it may mean doing one or more enemas the morning of the test.  § Do not eat or drink for 6 hours before the test.  · Be sure to arrive on time at the facility. Bring your identification and health insurance card. Leave valuables at home. If you have them, bring X-rays or other test results with you.  Let the healthcare provider know  For your safety, tell the healthcare provider if you:  · Take insulin. Your dose may need to be changed on the day of your test.  · Are allergic to latex.  · Have any other allergies.  · Are taking blood thinners.   During the test  An endoscopic ultrasound usually takes place in a hospital. The procedure itself may take 1 to 2 hours. You will likely go home soon afterward. During the test:  · You lie on your left side on an exam table.  · An intravenous (IV) line will be put into a vein in your arm or hand. This line supplies fluids and medicines. To keep you comfortable during the test, you will be given a sedative medicine. This medicine prevents discomfort and will make you sleepy.  · If you are having an EUS of the upper GI tract, local anesthetic may be sprayed in your throat. This will help you be more comfortable as the healthcare provider inserts the scope. The healthcare provider then gently puts the flexible scope into your mouth or nose and down your throat.  · If youre having an EUS of the lower GI tract, the healthcare provider gently puts the flexible scope into your anus.  · During the test, the scope sends live video and ultrasound images from inside your body to nearby monitors. These are used to examine your GI tract. Specialized procedures, such as drainage,  are done as needed.  · The healthcare provider may discuss the results with you soon after the test. Biopsy results take several  days.  · In most cases, you can go home within a few hours of the test. When you leave the facility, have an adult family member or friend drive you, even if you don't feel that sleepy.  After the test  Here is what to expect after the test:  · You may feel tired from the sedative. This should wear off by the end of the day.  · If you had an upper digestive endoscopy, your throat may feel sore for a day or two. Over-the-counter sore throat lozenges and spray should help.  · You can eat and drink normally as soon as the test is done.  When to call the healthcare provider  Call your healthcare provider if you notice any of the following:  · Fever of 100.4°F (38.0°C) or higher, or as advised by your healthcare provider  · Shortness of breath  · Vomiting blood, blood in stool, or black stools  · Coughing or hoarse voice that wont go away   Date Last Reviewed: 7/1/2016  © [id number] [employer]. [address] [place] [id number]. All rights reserved. This information is not intended as a substitute for professional medical care. Always follow your healthcare professional's instructions.

## 2018-12-17 NOTE — PROGRESS NOTES
[facility] Established Patient Visit     Reason for Visit:  F/u ? Cirrhosis d/t NAFLD     PCP: Vaishali Patel MD      HPI:  This is a 52 y.o. male here for f/u NAFLD with possible cirrhosis. He has multiple risk factors for NAFLD with morbid obesity, HTN, HLD, and DM. Fibroscan 7/2018 = kPa 27.2, suggestive of F4 cirrhosis. CAP = 400, S3 (>65%) steatosis. Advised EUS guided liver biopsy again but pt declines. He attempted MRI elastography but was unable to complete exam d/t claustrophobia. We have enrolled him in HCC surveillance program since there are concerns for cirrhosis based on Fibroscan.      He has normal synthetic liver functioning and normal to mildly elevated transaminases. Plts nl at 233. No AFP to review. Last u/s 10/2018 w/o liver mass. Spleen 11.8 cm. He is not immune to hepatitis A and B and declines vaccines.     He denies any symptoms of advanced chronic liver disease including jaundice, dark urine, abdominal distention, hematemesis, melena, slowed mentation. PCP ordered EGD to evaluate his abd pain but pt has not done this either. He reports last colonoscopy was in 2008 and his father had colon cancer in his 80's. He is not ready to proceed with this either.        ROS:   GENERAL: Denies fever, chills, weight loss/gain, (+) fatigue  HEENT: Denies headaches, dizziness, vision/hearing changes  CARDIOVASCULAR: Denies chest pain, palpitations, (+) edema  RESPIRATORY: Denies dyspnea, cough  GI: (+) RUQ abdominal pain, no nausea, vomiting. (+) rectal bleeding. No change in bowel pattern or color  : Denies dysuria, hematuria   SKIN: Denies rash, itching   NEURO: Denies confusion, memory loss, or mood changes  PSYCH: Denies depression or anxiety  HEME/LYMPH: Denies easy bruising or bleeding        PMHX:  has a past medical history of CKD stage 2 due to type 2 diabetes mellitus (5/18/2017); Essential hypertension (7/18/2016); Fatty liver disease, nonalcoholic (4/7/2016); HLD  (hyperlipidemia); Inflammatory bowel disease; Kidney stone; Morbid obesity (5/29/2014); NAFLD (nonalcoholic fatty liver disease); Near syncope (5/23/2017); Obesity hypoventilation syndrome (2/22/2016); Prostatitis; Rectal bleeding; Type 2 diabetes mellitus with hyperglycemia, with long-term current use of insulin (5/18/2017); and Uncontrolled type 2 diabetes mellitus with peripheral neuropathy (5/29/2014).     PSHX:  has a past surgical history that includes Nose surgery; kidney stent; Kidney surgery (1996); and Colonoscopy.     The patient's social and family histories were reviewed by me and updated in the appropriate section of the electronic medical record.     Review of patient's allergies indicates:   Allergen Reactions    Lisinopril Swelling    Bactrim  [sulfamethoxazole-trimethoprim]      Other reaction(s): Unknown    Hydroxyzine hcl      Other reaction(s): elevated bp    Kenalog [triamcinolone acetonide] Other (See Comments)     Heart flutters all day    Phenergan [promethazine] Anxiety    Zofran [ondansetron hcl (pf)] Anxiety        Current Outpatient Medications on File Prior to Visit   Medication Sig Dispense Refill    amLODIPine (NORVASC) 5 MG tablet Take 1 tablet (5 mg total) by mouth once daily. 30 tablet 0    aspirin (ECOTRIN) 81 MG EC tablet Take 81 mg by mouth once daily.      cetirizine (ZYRTEC) 10 MG tablet Take 1 tablet (10 mg total) by mouth once daily.  0    DOCOSAHEXANOIC ACID/EPA (FISH OIL ORAL) Take 1 capsule by mouth once daily at 6am.       ERGOCALCIFEROL, VITAMIN D2, (VITAMIN D ORAL) Take 1 capsule by mouth once daily at 6am.       hydroCHLOROthiazide (MICROZIDE) 12.5 mg capsule Take 12.5 mg by mouth once daily.       inhalation spacing device Use with inhaler dispense with mouthpiece 1 Device 1    insulin NPH-insulin regular, 70/30, (NOVOLIN 70/30) 100 unit/mL (70-30) injection Inject into the skin 2 (two) times daily. 27 in am and 24 in pm      ketoconazole (NIZORAL) 2 %  "cream Apply topically once daily. Mix in palm with Bactroban ointment and apply to rash on face 30 g 1    lifitegrast (XIIDRA) 5 % Dpet Apply 1 drop to eye 2 (two) times daily. 60 each 12    metoprolol succinate (TOPROL XL) 50 MG 24 hr tablet Take 1 tablet (50 mg total) by mouth 2 (two) times daily. 60 tablet 6    multivitamin capsule Take by mouth. 1 Capsule Oral Every day      omeprazole (PRILOSEC OTC) 20 MG tablet Take 20 mg by mouth once daily.      [DISCONTINUED] gabapentin (NEURONTIN) 100 MG capsule Take 1 capsule (100 mg total) by mouth every evening. May increase to 300mg if needed. Diabetic neuropathy. 90 capsule 11    [DISCONTINUED] hydroCHLOROthiazide (HYDRODIURIL) 25 MG tablet Take 1 tablet (25 mg total) by mouth once daily. 30 tablet 3    [DISCONTINUED] irbesartan (AVAPRO) 75 MG tablet Take 1 tablet (75 mg total) by mouth every evening. 90 tablet 3    [DISCONTINUED] levalbuterol (XOPENEX HFA) 45 mcg/actuation inhaler Inhale 1-2 puffs into the lungs every 4 (four) hours as needed for Wheezing. Rescue 1 Inhaler 0    [DISCONTINUED] mupirocin (BACTROBAN) 2 % ointment Apply topically 3 (three) times daily. Apply with a Qtip 30 g 0    [DISCONTINUED] omeprazole (PRILOSEC) 40 MG capsule Take 1 capsule (40 mg total) by mouth once daily. 30 capsule 1    [DISCONTINUED] sulindac (CLINORIL) 150 MG tablet Take 1 tablet (150 mg total) by mouth 2 (two) times daily. 14 tablet 0     No current facility-administered medications on file prior to visit.              Objective Findings:     PHYSICAL EXAM:   Friendly White male, in no acute distress; alert and oriented to person, place and time  BP (!) 158/82 (BP Location: Left arm, Patient Position: Sitting)   Pulse 61   Resp 18   Ht 5' 10" (1.778 m)   Wt (!) 151.4 kg (333 lb 12.4 oz)   SpO2 99%   BMI 47.89 kg/m²     HENT: Normocephalic, without obvious abnormality. Oral mucosa pink and moist. Dentition good.  EYES: Sclerae anicteric.   NECK: Supple. "   CARDIOVASCULAR: Regular rate and rhythm. No murmurs.  RESPIRATORY: Normal respiratory effort. BBS CTA. No wheezes or crackles.  GI: Obese, non-tender, non-distended. No hepatosplenomegaly. No masses palpable. No ascites.  EXTREMITIES:  No clubbing, cyanosis. (+) BLE 1+ edema.  SKIN: Warm and dry. No jaundice. No rashes noted to exposed skin. No telangectasias noted. No palmar erythema.  NEURO:  Normal gate. No asterixis.  PSYCH:  Memory intact. Thought and speech pattern appropriate. Behavior normal. No depression or anxiety noted.        ASSESSMENT:  52 y.o. White male with:  1. NAFLD  -- transaminases normal to minimally elevated  -- US 10/2018 shows hepatomegaly with steatosis   -- synthetic liver function nl  -- risk factors for fatty liver include morbid obesity, BMI 48, DM, HLD, HTN  -- Fibrosure 3/2017 = F0  -- Fibroscan 5/2018 = F4  -- NAFLD Fibrosis Score 3.45 points -- Correlated Fibrosis Severity F3-F4  2. ? Cirrhosis based on Fibroscan = F4 5/2018  -- MELD - no recent INR  -- HCC screening- AFP and abd. U/S - u/s ok, no AFP to review  -- Immunity to Hep A and B -none, pt declines vaccines  -- EGD - none   3. Family h/o colon cancer - father in 80's  -- last colonoscopy was 2008  -- pt declines repeat at this time       EDUCATION:   We discussed the manifestations of NAFLD. At this time there is no FDA approved therapy for NAFLD.   The patient and I discussed the importance of diet, exercise, and weight loss for management of NAFLD. We discussed a low fat, low carb/low sugar, high fiber diet, and a goal of 30 minutes of exercise 5 times per week.   Pt is aware that fatty liver can progress to steatohepatitis and possibly to cirrhosis, putting one at increased risk for liver cancer, liver failure, and death.      Discussed implications of a cirrhosis diagnosis with HCC screenings and EGD and why it is important for us to determine if pt's have cirrhosis so they can be properly monitored for potential  complications.         PLAN:  1. Re-discussed recommendation for EGD with EUS guided liver biopsy to evaluate his abd pain and stage fibrosis. Pt declines for now  2. Discussed need for repeat colonoscopy d/t family h/o colon cancer. Pt declines for now  3. Labs today  4. HCC screening Q 6 months with AFP and abd. U/S, next due 4/2019  5. Recommend to continue weight loss with diet and exercise for NAFLD. Pt declines bariatric referral at this time  6. Recommend good control of cholesterol, blood pressure, blood sugar levels  7. F/u in 4/2019 with u/s and labs        Thank you for allowing me to participate in the care of García Johnson, LISA-C

## 2018-12-26 ENCOUNTER — PATIENT MESSAGE (OUTPATIENT)
Dept: ENDOSCOPY | Facility: HOSPITAL | Age: 53
End: 2018-12-26

## 2018-12-28 DIAGNOSIS — E11.9 TYPE 2 DIABETES MELLITUS WITHOUT COMPLICATION: ICD-10-CM

## 2019-01-03 ENCOUNTER — HOSPITAL ENCOUNTER (EMERGENCY)
Facility: HOSPITAL | Age: 54
Discharge: HOME | End: 2019-01-03
Attending: EMERGENCY MEDICINE
Payer: COMMERCIAL

## 2019-01-03 ENCOUNTER — TELEPHONE (OUTPATIENT)
Dept: INTERNAL MEDICINE | Facility: CLINIC | Age: 54
End: 2019-01-03

## 2019-01-03 VITALS
WEIGHT: 315 LBS | TEMPERATURE: 99 F | HEART RATE: 74 BPM | BODY MASS INDEX: 45.92 KG/M2 | OXYGEN SATURATION: 97 % | DIASTOLIC BLOOD PRESSURE: 97 MMHG | SYSTOLIC BLOOD PRESSURE: 157 MMHG | RESPIRATION RATE: 16 BRPM

## 2019-01-03 DIAGNOSIS — R07.9 CHEST PAIN: Primary | ICD-10-CM

## 2019-01-03 LAB
ALBUMIN SERPL BCP-MCNC: 3.7 G/DL
ALP SERPL-CCNC: 91 U/L
ALT SERPL W/O P-5'-P-CCNC: 36 U/L
ANION GAP SERPL CALC-SCNC: 10 MMOL/L
AST SERPL-CCNC: 30 U/L
BACTERIA #/AREA URNS AUTO: NORMAL /HPF
BASOPHILS # BLD AUTO: 0.08 K/UL
BASOPHILS NFR BLD: 1.4 %
BILIRUB SERPL-MCNC: 0.6 MG/DL
BILIRUB UR QL STRIP: NEGATIVE
BNP SERPL-MCNC: <10 PG/ML
BUN SERPL-MCNC: 12 MG/DL
CALCIUM SERPL-MCNC: 9.6 MG/DL
CHLORIDE SERPL-SCNC: 98 MMOL/L
CLARITY UR REFRACT.AUTO: ABNORMAL
CO2 SERPL-SCNC: 25 MMOL/L
COLOR UR AUTO: YELLOW
CREAT SERPL-MCNC: 1.2 MG/DL
DIFFERENTIAL METHOD: NORMAL
EOSINOPHIL # BLD AUTO: 0.2 K/UL
EOSINOPHIL NFR BLD: 4 %
ERYTHROCYTE [DISTWIDTH] IN BLOOD BY AUTOMATED COUNT: 11.9 %
EST. GFR  (AFRICAN AMERICAN): >60 ML/MIN/1.73 M^2
EST. GFR  (NON AFRICAN AMERICAN): >60 ML/MIN/1.73 M^2
GLUCOSE SERPL-MCNC: 289 MG/DL
GLUCOSE UR QL STRIP: ABNORMAL
HCT VFR BLD AUTO: 41.5 %
HGB BLD-MCNC: 14.2 G/DL
HGB UR QL STRIP: NEGATIVE
IMM GRANULOCYTES # BLD AUTO: 0.02 K/UL
IMM GRANULOCYTES NFR BLD AUTO: 0.4 %
INR PPP: 1
KETONES UR QL STRIP: NEGATIVE
LEUKOCYTE ESTERASE UR QL STRIP: NEGATIVE
LIPASE SERPL-CCNC: 42 U/L
LYMPHOCYTES # BLD AUTO: 1.9 K/UL
LYMPHOCYTES NFR BLD: 32.6 %
MCH RBC QN AUTO: 28.9 PG
MCHC RBC AUTO-ENTMCNC: 34.2 G/DL
MCV RBC AUTO: 84 FL
MICROSCOPIC COMMENT: NORMAL
MONOCYTES # BLD AUTO: 0.6 K/UL
MONOCYTES NFR BLD: 9.9 %
NEUTROPHILS # BLD AUTO: 2.9 K/UL
NEUTROPHILS NFR BLD: 51.7 %
NITRITE UR QL STRIP: NEGATIVE
NRBC BLD-RTO: 0 /100 WBC
PH UR STRIP: 8 [PH] (ref 5–8)
PLATELET # BLD AUTO: 253 K/UL
PMV BLD AUTO: 10 FL
POTASSIUM SERPL-SCNC: 4.4 MMOL/L
PROT SERPL-MCNC: 7.7 G/DL
PROT UR QL STRIP: NEGATIVE
PROTHROMBIN TIME: 10.2 SEC
RBC # BLD AUTO: 4.92 M/UL
RBC #/AREA URNS AUTO: 1 /HPF (ref 0–4)
SODIUM SERPL-SCNC: 133 MMOL/L
SP GR UR STRIP: 1.01 (ref 1–1.03)
TROPONIN I SERPL DL<=0.01 NG/ML-MCNC: <0.006 NG/ML
URN SPEC COLLECT METH UR: ABNORMAL
WBC # BLD AUTO: 5.68 K/UL
YEAST UR QL AUTO: NORMAL

## 2019-01-03 PROCEDURE — 83880 ASSAY OF NATRIURETIC PEPTIDE: CPT

## 2019-01-03 PROCEDURE — 83690 ASSAY OF LIPASE: CPT

## 2019-01-03 PROCEDURE — 93010 ELECTROCARDIOGRAM REPORT: CPT | Mod: ,,, | Performed by: INTERNAL MEDICINE

## 2019-01-03 PROCEDURE — 99284 EMERGENCY DEPT VISIT MOD MDM: CPT | Mod: ,,,

## 2019-01-03 PROCEDURE — 99285 EMERGENCY DEPT VISIT HI MDM: CPT | Mod: 25

## 2019-01-03 PROCEDURE — 99284 PR EMERGENCY DEPT VISIT,LEVEL IV: ICD-10-PCS | Mod: ,,,

## 2019-01-03 PROCEDURE — 85025 COMPLETE CBC W/AUTO DIFF WBC: CPT

## 2019-01-03 PROCEDURE — 85610 PROTHROMBIN TIME: CPT

## 2019-01-03 PROCEDURE — 93005 ELECTROCARDIOGRAM TRACING: CPT

## 2019-01-03 PROCEDURE — 84484 ASSAY OF TROPONIN QUANT: CPT

## 2019-01-03 PROCEDURE — 93010 EKG 12-LEAD: ICD-10-PCS | Mod: ,,, | Performed by: INTERNAL MEDICINE

## 2019-01-03 PROCEDURE — 81001 URINALYSIS AUTO W/SCOPE: CPT

## 2019-01-03 PROCEDURE — 80053 COMPREHEN METABOLIC PANEL: CPT

## 2019-01-03 RX ORDER — OMEPRAZOLE 20 MG/1
20 CAPSULE, DELAYED RELEASE ORAL DAILY
Qty: 30 CAPSULE | Refills: 0 | Status: SHIPPED | OUTPATIENT
Start: 2019-01-03 | End: 2019-04-04

## 2019-01-03 NOTE — TELEPHONE ENCOUNTER
I called and spoke to the patient.  My side, is throbbing for the last couple of days, chest is hurting, heavy poking stabbing pain, but it keeps going away, States he feels winded when he walks too far, has been feeling poorly for a few days.  Is at [facility] now in [facility], was coming to try to get an appointment to be seen. Advised I was cancelling the appt with Sterling, to f'/u with Jorge but get to the ER right now. I called the ER and spoke to the charge nurse to warn her that he was on his way over, please be on the lookout for him

## 2019-01-03 NOTE — ED NOTES
"Patient identifiers verified and correct for Mr Tang  C/C:Chest pain, right flank pain   APPEARANCE: awake and alert in NAD.  SKIN: warm, dry and intact. No breakdown or bruising.  MUSCULOSKELETAL: Patient moving all extremities spontaneously, no obvious swelling or deformities noted. Ambulates independently.  RESPIRATORY: Denies shortness of breath.Respirations unlabored.   CARDIAC: Positive CP, 2+ distal pulses; no peripheral edema  ABDOMEN: Abdomen large, positive right flank pain , denies nausea, BM last night " normal"  : voids spontaneously, states frequency and  pain  Neurologic: AAO x 4; follows commands equal strength in all extremities; denies numbness/tingling. Denies dizziness Denies weakness, positive lightheaded    "

## 2019-01-03 NOTE — DISCHARGE INSTRUCTIONS
Your pain over the past week does not appear to be related acutely to your heart however we do recommend that you follow up with cardiology in an outpatient setting. Also please start to take prilosec 20mg daily and encourage follow up with GI within the next month. If here chest pain worsens, experience diaphoresis, shortness of breath please return to the ED.    I have discussed the treatment and management of this patient with my supervisory physician, and we agree on the plan of care.

## 2019-01-03 NOTE — ED PROVIDER NOTES
"Encounter Date: 1/3/2019       History     Chief Complaint   Patient presents with    Chest Pain     Reports has been having midsternal chest pain x few days, intermittent. Denies cardiac hx.     Abdominal Pain     "my liver is hurting me." possible cirrohsis     53-year-old male with medical history of hypertension, hyperlipidemia, diabetes mellitus type 2, morbid obesity and nonalcoholic fatty liver disease presents the ED with midsternal, non radiating chest pain for over 1 week.  Patient states that pain presents when he wakes up in morning then subsides over the next 2-3 hours.  He describes it a tightness and heaviness.  He denies it being exacerbated with exertion.  He also complains of intermittent right upper abdominal/lateral chest wall pain. No chest pain or abdominal pain at present time.  He has known liver disease and sees a hepatologist regularly.  Denies fever, chills, nausea, vomiting, back pain, numbness/change alone or extremities, dysuria, retention, bowel incontinence, constipation, diarrhea. Denies alcohol and IV drug use.          Review of patient's allergies indicates:   Allergen Reactions    Lisinopril Swelling    Bactrim  [sulfamethoxazole-trimethoprim]      Other reaction(s): Unknown    Hydroxyzine hcl      Other reaction(s): elevated bp    Kenalog [triamcinolone acetonide] Other (See Comments)     Heart flutters all day    Phenergan [promethazine] Anxiety    Zofran [ondansetron hcl (pf)] Anxiety     Past Medical History:   Diagnosis Date    Chronic sialoadenitis 12/11/2018    Cirrhosis     based on Fibroscan = F4 5/2018    CKD stage 2 due to type 2 diabetes mellitus 5/18/2017    Essential hypertension 7/18/2016    Fatty liver disease, nonalcoholic 4/7/2016    Us, 2006     GERD (gastroesophageal reflux disease)     HLD (hyperlipidemia)     Inflammatory bowel disease     diverticulitis    Kidney stone     requiring stent    Morbid obesity 5/29/2014    NAFLD (nonalcoholic " fatty liver disease)     Near syncope 5/23/2017    Obesity hypoventilation syndrome 2/22/2016    Prostatitis     Rectal bleeding     diverticulitis    Type 2 diabetes mellitus with hyperglycemia, with long-term current use of insulin 5/18/2017    Uncontrolled type 2 diabetes mellitus with peripheral neuropathy 5/29/2014     Past Surgical History:   Procedure Laterality Date    COLONOSCOPY      kidney stent      KIDNEY SURGERY  1996    right kidney obstruction requiring stent    NOSE SURGERY       Family History   Problem Relation Age of Onset    Cancer Father     Diabetes Father     Hypertension Father     Glaucoma Father     Liver disease Mother         d.    Cirrhosis Mother     Hypertension Mother     Diabetes Brother      Social History     Tobacco Use    Smoking status: Former Smoker     Types: Cigarettes    Smokeless tobacco: Never Used    Tobacco comment: social   Substance Use Topics    Alcohol use: No    Drug use: No     Review of Systems   Constitutional: Negative for chills, diaphoresis, fatigue and fever.   HENT: Negative for congestion.    Eyes: Negative for visual disturbance.   Respiratory: Negative for cough and shortness of breath.    Cardiovascular: Positive for chest pain. Negative for palpitations and leg swelling.   Gastrointestinal: Positive for abdominal pain. Negative for diarrhea, nausea and vomiting.   Genitourinary: Negative for dysuria and hematuria.   Musculoskeletal: Negative for back pain.   Skin: Negative for rash and wound.   Neurological: Negative for syncope, weakness, light-headedness and headaches.   Psychiatric/Behavioral: The patient is not nervous/anxious.        Physical Exam     Initial Vitals [01/03/19 0928]   BP Pulse Resp Temp SpO2   (!) 139/90 89 18 98.7 °F (37.1 °C) 99 %      MAP       --         Physical Exam    Vitals reviewed.  Constitutional: He appears well-developed and well-nourished. He is not diaphoretic.  Non-toxic appearance. He does  not appear ill. No distress.   HENT:   Head: Normocephalic and atraumatic.   Nose: Nose normal.   Eyes: Conjunctivae and EOM are normal. Pupils are equal, round, and reactive to light.   Neck: Normal range of motion. Neck supple. No thyromegaly present.   Cardiovascular: Normal rate and intact distal pulses.   Pulmonary/Chest: Breath sounds normal. No respiratory distress. He has no wheezes. He exhibits no tenderness.   Abdominal: Soft. Bowel sounds are normal. He exhibits no distension, no pulsatile liver, no ascites and no pulsatile midline mass. There is no tenderness. There is no rigidity, no rebound, no guarding, no CVA tenderness, no tenderness at McBurney's point and negative Almazan's sign. No hernia.   Musculoskeletal: Normal range of motion.   Lymphadenopathy:     He has no cervical adenopathy.   Neurological: He is alert.   Skin: Skin is warm and dry. Capillary refill takes less than 2 seconds. No rash noted. No erythema.   Psychiatric: He has a normal mood and affect.         ED Course   Procedures  Labs Reviewed   COMPREHENSIVE METABOLIC PANEL - Abnormal; Notable for the following components:       Result Value    Sodium 133 (*)     Glucose 289 (*)     All other components within normal limits   URINALYSIS, REFLEX TO URINE CULTURE - Abnormal; Notable for the following components:    Appearance, UA Hazy (*)     Glucose, UA 3+ (*)     All other components within normal limits    Narrative:     Preferred Collection Type->Urine, Clean Catch  YELLOW & GRAY TOPS    CBC W/ AUTO DIFFERENTIAL   B-TYPE NATRIURETIC PEPTIDE   LIPASE   TROPONIN I   PROTIME-INR   URINALYSIS MICROSCOPIC    Narrative:     Preferred Collection Type->Urine, Clean Catch  YELLOW & GRAY TOPS      EKG Readings: (Independently Interpreted)   NSR rate 87. Normal intervals. No ischemic changes. No STEMI.       Imaging Results          X-Ray Chest PA And Lateral (Final result)  Result time 01/03/19 10:54:07    Final result by Alonzo Kenyon MD  (01/03/19 10:54:07)                 Impression:      No significant changes      Electronically signed by: Alonzo Kenyon MD  Date:    01/03/2019  Time:    10:54             Narrative:    EXAMINATION:  XR CHEST PA AND LATERAL    CLINICAL HISTORY:  Chest pain, unspecified    TECHNIQUE:  PA and lateral views of the chest were performed.    COMPARISON:  Non 08/29/2018 e    FINDINGS:  Heart size normal.  The lungs are clear.  No pleural effusion.                                 Medical Decision Making:   History:   Old Medical Records: I decided to obtain old medical records.  Old Records Summarized: records from clinic visits.  Initial Assessment:   53-year-old male with medical history of hypertension, hyperlipidemia, diabetes mellitus type 2, morbid obesity and nonalcoholic fatty liver disease presents the ED with midsternal, non radiating chest pain for over 1 week. No rash noted. Chest wall non TTP. Abdomen soft, non tender. Skin intact. Distal pulses intact. Cardiac regular rate. Lungs clear to auscultation bilaterally. No chest pain at present time.   Differential Diagnosis:   DDX includes but is not limited to ACS, CHF, GERD, musculoskeletal, electrolyte abnormality, transaminitis. Considered but do not suspect cholecystitis, no pain at present time. Negative Almazan sign.  Independently Interpreted Test(s):   I have ordered and independently interpreted X-rays - see summary below.  I have ordered and independently interpreted EKG Reading(s) - see summary below  Clinical Tests:   Lab Tests: Ordered and Reviewed  Radiological Study: Ordered and Reviewed  Medical Tests: Ordered and Reviewed  ED Management:  Will get labs, CXR, EKG and reassess.     EKG shows NSR at rate of 87bpm. Troponin negative at <.006. BNP <10. Lipase 42. T bili and liver enzymes are negative.CXR shows no cardiomegaly, edema or consolidation. Do not suspect ischemic cause, most likely GERD. Will have patient start prilosec 20mg daily and f/u  with GI and cards for outpatient stress. Patient in agreement with plan. Discharged to home in stable condition, return to ED warnings given, follow up and patient care instructions given.      I have discussed the treatment and management of this patient with my supervisory physician, and we agree on the plan of care.                         Clinical Impression:   The encounter diagnosis was Chest pain.      Disposition:   Disposition: Discharged  Condition: Stable                        Lelia Coley PA-C  01/03/19 1803

## 2019-01-03 NOTE — PROVIDER PROGRESS NOTES - EMERGENCY DEPT.
Encounter Date: 1/3/2019    ED Physician Progress Notes         EKG - STEMI Decision  Initial Reading: No STEMI present.  Response: No Action Needed.

## 2019-01-06 ENCOUNTER — PATIENT MESSAGE (OUTPATIENT)
Dept: INTERNAL MEDICINE | Facility: CLINIC | Age: 54
End: 2019-01-06

## 2019-01-09 ENCOUNTER — OFFICE VISIT (OUTPATIENT)
Dept: INTERNAL MEDICINE | Facility: CLINIC | Age: 54
End: 2019-01-09
Payer: COMMERCIAL

## 2019-01-09 ENCOUNTER — OFFICE VISIT (OUTPATIENT)
Dept: OPTOMETRY | Facility: CLINIC | Age: 54
End: 2019-01-09
Payer: COMMERCIAL

## 2019-01-09 ENCOUNTER — NURSE TRIAGE (OUTPATIENT)
Dept: ADMINISTRATIVE | Facility: CLINIC | Age: 54
End: 2019-01-09

## 2019-01-09 VITALS
BODY MASS INDEX: 45.1 KG/M2 | WEIGHT: 315 LBS | HEART RATE: 74 BPM | DIASTOLIC BLOOD PRESSURE: 82 MMHG | HEIGHT: 70 IN | SYSTOLIC BLOOD PRESSURE: 126 MMHG

## 2019-01-09 DIAGNOSIS — H16.143 SPK (SUPERFICIAL PUNCTATE KERATITIS), BILATERAL: Primary | ICD-10-CM

## 2019-01-09 DIAGNOSIS — H04.123 BILATERAL DRY EYES: ICD-10-CM

## 2019-01-09 DIAGNOSIS — H57.11 ACUTE RIGHT EYE PAIN: Primary | ICD-10-CM

## 2019-01-09 PROCEDURE — 92012 PR EYE EXAM, EST PATIENT,INTERMED: ICD-10-PCS | Mod: S$GLB,,, | Performed by: OPTOMETRIST

## 2019-01-09 PROCEDURE — 99999 PR PBB SHADOW E&M-EST. PATIENT-LVL II: CPT | Mod: PBBFAC,,, | Performed by: OPTOMETRIST

## 2019-01-09 PROCEDURE — 99499 UNLISTED E&M SERVICE: CPT | Mod: S$GLB,,, | Performed by: PHYSICIAN ASSISTANT

## 2019-01-09 PROCEDURE — 99999 PR PBB SHADOW E&M-EST. PATIENT-LVL IV: ICD-10-PCS | Mod: PBBFAC,,, | Performed by: PHYSICIAN ASSISTANT

## 2019-01-09 PROCEDURE — 99499 NO LOS: ICD-10-PCS | Mod: S$GLB,,, | Performed by: PHYSICIAN ASSISTANT

## 2019-01-09 PROCEDURE — 99999 PR PBB SHADOW E&M-EST. PATIENT-LVL IV: CPT | Mod: PBBFAC,,, | Performed by: PHYSICIAN ASSISTANT

## 2019-01-09 PROCEDURE — 92012 INTRM OPH EXAM EST PATIENT: CPT | Mod: S$GLB,,, | Performed by: OPTOMETRIST

## 2019-01-09 PROCEDURE — 99999 PR PBB SHADOW E&M-EST. PATIENT-LVL II: ICD-10-PCS | Mod: PBBFAC,,, | Performed by: OPTOMETRIST

## 2019-01-09 RX ORDER — ERYTHROMYCIN 5 MG/G
OINTMENT OPHTHALMIC 2 TIMES DAILY
Qty: 3.5 G | Refills: 1 | Status: SHIPPED | OUTPATIENT
Start: 2019-01-09 | End: 2019-04-16

## 2019-01-09 NOTE — TELEPHONE ENCOUNTER
Reason for Disposition   [1] SEVERE eyelid swelling on one side AND [2] red and painful (or tender to touch)    Protocols used: ST EYE - SWELLING-A-AH    Right eye pain, swelling to inner canthus, near tear duct, red, tender to touch.  Present for 2 days, and worse today.  Brad has DM, on insulin.  Per [facility] triage protocol, recommended he be seen within 4 hours.  Care advice given and call back for worsening symptoms, new concerns encouraged.  No appts avail in optometry, so appt booked with Rhianna Katz in IM.  Message sent to Christina Hager OD, his optometrist.  Please contact caller directly with any additional care advice.

## 2019-01-09 NOTE — PROGRESS NOTES
Subjective:       Patient ID: García Tang is a 53 y.o. male.        Chief Complaint: Eye Pain (R pain=8)    García Tang is an established patient of Vaishali Patel MD here today for urgent care visit.    Right eye pain/swelling since yesterday.  Pain worsened last night.  No change in vision.  No eye redness.  No eye discharge.  Pain medial corner of eye.             Review of Systems   Constitutional: Negative for appetite change, chills, fatigue and fever.   HENT: Negative for congestion and sore throat.    Eyes: Positive for pain (right). Negative for visual disturbance.   Respiratory: Negative for cough, chest tightness and shortness of breath.    Cardiovascular: Negative for chest pain, palpitations and leg swelling.   Gastrointestinal: Negative for abdominal pain, blood in stool, constipation, diarrhea, nausea and vomiting.   Genitourinary: Negative for dysuria, frequency, hematuria and urgency.   Musculoskeletal: Negative for arthralgias and back pain.   Skin: Negative for rash.   Neurological: Negative for dizziness, syncope, weakness and headaches.   Psychiatric/Behavioral: Negative for dysphoric mood and sleep disturbance. The patient is not nervous/anxious.        Objective:      Physical Exam   Constitutional: He appears well-developed and well-nourished. No distress.   HENT:   Head: Normocephalic and atraumatic.   Right Ear: External ear normal.   Left Ear: External ear normal.   Eyes:       Neck: Neck supple.   Pulmonary/Chest: Effort normal.   Abdominal: Soft.   Musculoskeletal: He exhibits no edema.   Neurological: He is alert.   Skin: Skin is warm and dry. He is not diaphoretic.       Assessment:       1. Acute right eye pain        Plan:       García was seen today for eye pain.    Diagnoses and all orders for this visit:    Acute right eye pain  -     Ambulatory referral to Optometry    Appointment with optometry today.    Pt has been given instructions populated from [facility]  "database and has verbalized understanding of the after visit summary and information contained wherein.    Follow up with a primary care provider. May go to ER for acute shortness of breath, lightheadedness, fever, or any other emergent complaints or changes in condition.    "This note will be shared with the patient"    Future Appointments   Date Time Provider Department Center   4/17/2019  8:00 AM [facility] OIC-US1 MASTER [facility] ULTR [facility]   4/17/2019  9:15 AM LAB, TRANSPLANT [facility]TX [facility]   4/17/2019 10:00 AM Ema Johnson NP [facility] HEPAT [facility]               "

## 2019-01-09 NOTE — LETTER
January 9, 2019      [facility]  [address]  [facility] [address]  Phone: [phone]       Patient: García Tang   YOB: 1965  Date of Visit: 01/09/2019    To Whom It May Concern:    Mak Tang  was at [facility] on 01/09/2019 for an eye infection. Please excuse him from work 1/9/2019. He may return to work on 1/10/2019, but please allow him time to take frequent breaks. If you have any questions or concerns, or if I can be of further assistance, please do not hesitate to contact me.    Sincerely,

## 2019-01-09 NOTE — PATIENT INSTRUCTIONS
If you have any problems after hours or over the weekend, please call the [facility]  at [phone] and ask that the on-call Ophthalmologist be paged.      ==============================================     Please use warm compresses for 10 minutes, at least 3-4 times per day.   Heat a wash cloth by running it under hot water. Then hold this wash cloth over your closed eyelids and allow your eyelids to absorb the heat. After 20-30 seconds once the wash cloth cools down you'll need to heat it up again.  (An alternative heat source is a gel pack warmed in the mircrowave or in a dish of water,  wrapped with a warm wet washcloth).     If your symptoms get worse, the redness spreads across your entire eyelid, or you develop any vision changes, please let us know immediately.    ==============================================    DRY EYES     Dry eyes is a common condition. It can be caused by an insufficient volume of tears, or by tears that do not spread evenly over the cornea. An uneven tear film can also cause vision to blur intermittently.   Dry eyes are often associated with burning, stinging, and/or red eyes.As we age, the eyes usually produce fewer tears and result in decreased normal eye lubrication. Paradoxically, dry eye can make eyes water. When they water, that watery production actually makes the dry eye situation worse because the watery tears do not lubricate the eye well at all. Watery tears really serve to flush foreign material out of the eye, not to lubricate. Unfortunately, when the eyes get really dry they become irritated and stimulate the formation of watery tears.   Lubricants, in the form of drops or ointments, are the principal treatment for dry eyes. The following are recommendations for managing your dry eye condition:    1) Use over-the-counter artificial tears or lubricants (such as Systane Balance, Soothe XP, Refresh Optive, Blink, or Genteal), 1 drop in each eye 3 to 4 times a day.  "Please avoid drops that advertise redness relief since these can be unhealthy for your eyes and can cause permanent redness if used long-term.     It is best to take artificial tears on a schedule, like you would for a medication. Taking them routinely at breakfast, lunch, and dinner for example will provide better relief and better use of the tear drop than taking them whenever your eyes "feel" dry.    2) Optional use of over-the-counter lubricating gels (such as Genteal Gel, Systane Gel, or Refresh PM) applied to the inside of the lower lid of both eyes at bedtime. This gel is very thick and may cause blurred vision, so we recommend you use it before bedtime. In the morning you can gently wipe your eyes with a damp washcloth to remove any residual gel.    3) Warm compresses applied to the closed eyelids twice per day, followed with lid massage.    4) Always drink an adequate amount of water daily (4-6 cups a day).    5) 1000 mg of good quality fish oil (labeled DHA and/or EPA). Flaxseed oil can also be beneficial. Do not take fish oil if you are currently taking a medication called warfarin or coumadin.    6) Use a humidifier in your bedroom.    7) If the dryness continues there may be additional options of punctal plugs, or prescription dry eye drops such as Restasis or Xiidra. Punctal plugs are medical devices inserted into the puncta or the drain of the eye to block some of your natural tears from draining off the eye. Restasis and Xiidra are prescription eye drops that, over time, may help your body make more tears naturally.    It is important to maintain this treatment plan until your symptoms have improved. Once improved, you can reduce the frequency of lubricants and warm compresses. If the symptoms recur, then apply as needed for comfort.     ==============================================    Computer Use Tips     1.   Position your computer monitor five to nine inches below the horizontal line of sight so " "that when you look straight ahead, you look just over the top of the monitor. An upward gaze exposes 40% more of the cornea, which dries out the tear film and compounds the effects of the already dry environment in many office buildings.  2.   Sit far enough away that you can't touch the monitor without leaning forward. A good minimum working distance is about 24 to 26 inches.   3.   If you use a laptop frequently, get a separate monitor and/or keyboard.   4.   Set computer monitor contrast at high.   5.   Turn down overall room lighting and use a task lamp for reading papers.   6.   If you have a window, position the computer so that the window is off to the side, not directly in front of, or behind the desk.   7.   Put a note that says "Blink" next to your computer screen.  Studies show that we spontaneously blink 18 to 22 times per minute under normal conditions. But during computer use, most likely due to the increased visual attention required, the average blink rate drops to seven per minute, or about one-third the normal rate.  8.   Every 20 minutes, take a 20-second break and look at something at least 20 feet away.   "

## 2019-01-10 NOTE — PROGRESS NOTES
HPI     Mr. García Tang is here for an urgent visit.    He reports pain and swelling of right lower lid (especially inner corner)   since last night. Yesterday started with itchiness and burning throughout   the day; he was rubbing his right eye. Currently feels pressure/throbbing   behind his right eye that comes and goes, and also of inner corner of OD.   He denies recent eye injury. Has not tried anything for relief (not using   artificial tears or Xiidra). He also reports worsening vision OS.    Would patient like a refraction today? no    (-)drops  (-)flashes  (-)floaters  (-)diplopia    Diabetic: yes  Hemoglobin A1C       Date                     Value               Ref Range             Status           10/22/2018               7.7 (H)             4.0 - 5.6 %         Final  06/07/2018               7.4 (H)             4.0 - 5.6 %         Final  02/27/2018               8.0 (H)             4.0 - 5.6 %         Final    OCULAR HISTORY  Last Eye Exam: 03/21/18 with Dr Hager  (-)eye surgery   Dry eyes  Glaucoma suspect    FAMILY HISTORY  (+)Glaucoma: father        Last edited by Lise Cormier, OD on 1/10/2019  5:46 PM. (History)            Assessment /Plan     For exam results, see Encounter Report.    SPK (superficial punctate keratitis), bilateral  Bilateral dry eyes   Recommended Erythromycin ointment BID OD x 7-10 days and warm compresses for 10 minutes QID. Also recommended artificial tears prn.   Explained that dry eyes can cause blurred vision.    -     erythromycin (ROMYCIN) ophthalmic ointment; Place into the right eye 2 (two) times daily.  Dispense: 3.5 g; Refill: 1      RTC prn  Due for annual exam with Dr. Hager in March 2019

## 2019-01-16 ENCOUNTER — TELEPHONE (OUTPATIENT)
Dept: OPTOMETRY | Facility: CLINIC | Age: 54
End: 2019-01-16

## 2019-01-16 NOTE — TELEPHONE ENCOUNTER
----- Message from Lise Cormier, MIGUEL sent at 1/16/2019  8:35 AM CST -----  Regarding: phone f/u  Hello,     I saw this patient last week for eye pain OD. Please call to ask how he is feeling and schedule a follow-up visit with me or Dr. Hager if needed.     Thank you,   Lsie Cormier OD

## 2019-02-04 ENCOUNTER — HOSPITAL ENCOUNTER (OUTPATIENT)
Dept: RADIOLOGY | Facility: HOSPITAL | Age: 54
Discharge: HOME | End: 2019-02-04
Attending: NURSE PRACTITIONER
Payer: COMMERCIAL

## 2019-02-04 ENCOUNTER — OFFICE VISIT (OUTPATIENT)
Dept: INTERNAL MEDICINE | Facility: CLINIC | Age: 54
End: 2019-02-04
Payer: COMMERCIAL

## 2019-02-04 VITALS
DIASTOLIC BLOOD PRESSURE: 89 MMHG | SYSTOLIC BLOOD PRESSURE: 126 MMHG | TEMPERATURE: 98 F | WEIGHT: 315 LBS | HEIGHT: 70 IN | OXYGEN SATURATION: 98 % | BODY MASS INDEX: 45.1 KG/M2 | HEART RATE: 70 BPM

## 2019-02-04 DIAGNOSIS — M21.42 PES PLANUS OF BOTH FEET: ICD-10-CM

## 2019-02-04 DIAGNOSIS — M76.821 POSTERIOR TIBIAL TENDINITIS OF RIGHT LOWER EXTREMITY: ICD-10-CM

## 2019-02-04 DIAGNOSIS — N50.819 TESTICLE PAIN: Primary | ICD-10-CM

## 2019-02-04 DIAGNOSIS — N50.819 TESTICLE PAIN: ICD-10-CM

## 2019-02-04 DIAGNOSIS — M21.41 PES PLANUS OF BOTH FEET: ICD-10-CM

## 2019-02-04 LAB
BILIRUB SERPL-MCNC: NORMAL MG/DL
BLOOD URINE, POC: NORMAL
COLOR, POC UA: YELLOW
GLUCOSE UR QL STRIP: NORMAL
KETONES UR QL STRIP: NORMAL
LEUKOCYTE ESTERASE URINE, POC: NORMAL
NITRITE, POC UA: NORMAL
PH, POC UA: 7
PROTEIN, POC: NORMAL
SPECIFIC GRAVITY, POC UA: 1.01
UROBILINOGEN, POC UA: NORMAL

## 2019-02-04 PROCEDURE — 3079F PR MOST RECENT DIASTOLIC BLOOD PRESSURE 80-89 MM HG: ICD-10-PCS | Mod: CPTII,S$GLB,, | Performed by: NURSE PRACTITIONER

## 2019-02-04 PROCEDURE — 76870 US EXAM SCROTUM: CPT | Mod: 26,,, | Performed by: RADIOLOGY

## 2019-02-04 PROCEDURE — 99214 PR OFFICE/OUTPT VISIT, EST, LEVL IV, 30-39 MIN: ICD-10-PCS | Mod: 25,S$GLB,, | Performed by: NURSE PRACTITIONER

## 2019-02-04 PROCEDURE — 3074F SYST BP LT 130 MM HG: CPT | Mod: CPTII,S$GLB,, | Performed by: NURSE PRACTITIONER

## 2019-02-04 PROCEDURE — 3079F DIAST BP 80-89 MM HG: CPT | Mod: CPTII,S$GLB,, | Performed by: NURSE PRACTITIONER

## 2019-02-04 PROCEDURE — 99214 OFFICE O/P EST MOD 30 MIN: CPT | Mod: 25,S$GLB,, | Performed by: NURSE PRACTITIONER

## 2019-02-04 PROCEDURE — 76870 US EXAM SCROTUM: CPT | Mod: TC

## 2019-02-04 PROCEDURE — 81002 URINALYSIS NONAUTO W/O SCOPE: CPT | Mod: S$GLB,,, | Performed by: NURSE PRACTITIONER

## 2019-02-04 PROCEDURE — 3074F PR MOST RECENT SYSTOLIC BLOOD PRESSURE < 130 MM HG: ICD-10-PCS | Mod: CPTII,S$GLB,, | Performed by: NURSE PRACTITIONER

## 2019-02-04 PROCEDURE — 99999 PR PBB SHADOW E&M-EST. PATIENT-LVL V: ICD-10-PCS | Mod: PBBFAC,,, | Performed by: NURSE PRACTITIONER

## 2019-02-04 PROCEDURE — 3008F BODY MASS INDEX DOCD: CPT | Mod: CPTII,S$GLB,, | Performed by: NURSE PRACTITIONER

## 2019-02-04 PROCEDURE — 99999 PR PBB SHADOW E&M-EST. PATIENT-LVL V: CPT | Mod: PBBFAC,,, | Performed by: NURSE PRACTITIONER

## 2019-02-04 PROCEDURE — 76870 US SCROTUM AND TESTICLES: ICD-10-PCS | Mod: 26,,, | Performed by: RADIOLOGY

## 2019-02-04 PROCEDURE — 81002 POCT URINE DIPSTICK WITHOUT MICROSCOPE: ICD-10-PCS | Mod: S$GLB,,, | Performed by: NURSE PRACTITIONER

## 2019-02-04 PROCEDURE — 3008F PR BODY MASS INDEX (BMI) DOCUMENTED: ICD-10-PCS | Mod: CPTII,S$GLB,, | Performed by: NURSE PRACTITIONER

## 2019-02-04 RX ORDER — NAPROXEN 500 MG/1
500 TABLET ORAL 2 TIMES DAILY
Qty: 60 TABLET | Refills: 0 | Status: SHIPPED | OUTPATIENT
Start: 2019-02-04 | End: 2019-02-04

## 2019-02-04 NOTE — PROGRESS NOTES
Subjective:       Patient ID: García Tang is a 53 y.o. male.    Chief Complaint: Ankle Pain (left ankle) and Testicle Pain    Disclaimer: This note has been generated using voice-recognition software. There may be typographical errors that have been missed during proof-reading  Pt if Dr Patel here for same  Day appointment with 2 complaints.  Patient complained of right testicle pain started over the weekend.  Denies any dysuria or hematuria.  Patient also complaining of right ankle swelling. This is been going on for a while.  Patient denies any trauma.      Ankle Pain      Testicle Pain   The patient's primary symptoms include scrotal swelling and testicular pain. Pertinent negatives include no dysuria, fever, flank pain, frequency or rash.     Review of Systems   Constitutional: Negative for fever.   Genitourinary: Positive for scrotal swelling and testicular pain. Negative for difficulty urinating, dysuria, flank pain, frequency and penile swelling.   Musculoskeletal: Positive for arthralgias. Negative for back pain, gait problem, joint swelling, myalgias, neck pain and neck stiffness.   Skin: Negative for rash.   Psychiatric/Behavioral: Negative for sleep disturbance.         Past Medical History:   Diagnosis Date    Chronic sialoadenitis 12/11/2018    Cirrhosis     based on Fibroscan = F4 5/2018    CKD stage 2 due to type 2 diabetes mellitus 5/18/2017    Essential hypertension 7/18/2016    Fatty liver disease, nonalcoholic 4/7/2016    [place], 2006     GERD (gastroesophageal reflux disease)     HLD (hyperlipidemia)     Inflammatory bowel disease     diverticulitis    Kidney stone     requiring stent    Morbid obesity 5/29/2014    NAFLD (nonalcoholic fatty liver disease)     Near syncope 5/23/2017    Obesity hypoventilation syndrome 2/22/2016    Prostatitis     Rectal bleeding     diverticulitis    Type 2 diabetes mellitus with hyperglycemia, with long-term current use of insulin 5/18/2017     Uncontrolled type 2 diabetes mellitus with peripheral neuropathy 5/29/2014     Past Surgical History:   Procedure Laterality Date    COLONOSCOPY      kidney stent      KIDNEY SURGERY  1996    right kidney obstruction requiring stent    NOSE SURGERY       Social History     Social History Narrative    Not on file     Family History   Problem Relation Age of Onset    Cancer Father     Diabetes Father     Hypertension Father     Glaucoma Father     Liver disease Mother         d.    Cirrhosis Mother     Hypertension Mother     Diabetes Brother      Outpatient Encounter Medications as of 2/4/2019   Medication Sig Dispense Refill    amLODIPine (NORVASC) 5 MG tablet Take 1 tablet (5 mg total) by mouth once daily. 30 tablet 0    aspirin (ECOTRIN) 81 MG EC tablet Take 81 mg by mouth once daily.      cetirizine (ZYRTEC) 10 MG tablet Take 1 tablet (10 mg total) by mouth once daily.  0    DOCOSAHEXANOIC ACID/EPA (FISH OIL ORAL) Take 1 capsule by mouth once daily at 6am.       ERGOCALCIFEROL, VITAMIN D2, (VITAMIN D ORAL) Take 1 capsule by mouth once daily at 6am.       erythromycin (ROMYCIN) ophthalmic ointment Place into the right eye 2 (two) times daily. 3.5 g 1    hydroCHLOROthiazide (MICROZIDE) 12.5 mg capsule Take 12.5 mg by mouth once daily.       inhalation spacing device Use with inhaler dispense with mouthpiece 1 Device 1    insulin NPH-insulin regular, 70/30, (NOVOLIN 70/30) 100 unit/mL (70-30) injection Inject into the skin 2 (two) times daily. 27 in am and 24 in pm      ketoconazole (NIZORAL) 2 % cream Apply topically once daily. Mix in palm with Bactroban ointment and apply to rash on face 30 g 1    lifitegrast (XIIDRA) 5 % Dpet Apply 1 drop to eye 2 (two) times daily. 60 each 12    metoprolol succinate (TOPROL XL) 50 MG 24 hr tablet Take 1 tablet (50 mg total) by mouth 2 (two) times daily. 60 tablet 6    multivitamin capsule Take by mouth. 1 Capsule Oral Every day      omeprazole  "(PRILOSEC OTC) 20 MG tablet Take 20 mg by mouth once daily.      naproxen (EC NAPROSYN) 500 MG EC tablet Take 1 tablet (500 mg total) by mouth 2 (two) times daily. 60 tablet 0    omeprazole (PRILOSEC) 20 MG capsule Take 1 capsule (20 mg total) by mouth once daily. 30 capsule 0     No facility-administered encounter medications on file as of 2/4/2019.      Last 3 sets of Vitals  Vitals - 1 value per visit 1/9/2019 1/9/2019 2/4/2019   SYSTOLIC 126 - 126   DIASTOLIC 82 - 89   PULSE 74 - 70   TEMPERATURE - - 98.2   RESPIRATIONS - - -   SPO2 - - 98   Weight (lb) 333.78 - 335.98   Weight (kg) 151.4 - 152.4   HEIGHT 5' 10" - 5' 10"   BODY MASS INDEX 47.89 - 48.21   VISIT REPORT - - -   Pain Score  8 8 6   Some recent data might be hidden         Objective:      Physical Exam   Constitutional: He is oriented to person, place, and time. He appears well-developed and well-nourished. No distress.   HENT:   Head: Normocephalic and atraumatic.   Pulmonary/Chest: Effort normal.   Genitourinary: Penis normal. Right testis shows swelling and tenderness. Right testis shows no mass. Right testis is descended. Cremasteric reflex is absent on the right side. Left testis shows no mass, no swelling and no tenderness. Left testis is descended. Cremasteric reflex is not absent on the left side.         Genitourinary Comments: Taty in room for exam     Musculoskeletal:        Right ankle: He exhibits swelling. Achilles tendon normal.        Feet:    Swelling along posterior tibial tendon with tenderness    Loss of arch     Neurological: He is alert and oriented to person, place, and time.   Skin: He is not diaphoretic.   Nursing note and vitals reviewed.          Lab Results   Component Value Date    WBC 5.68 01/03/2019    RBC 4.92 01/03/2019    HGB 14.2 01/03/2019    HCT 41.5 01/03/2019    MCV 84 01/03/2019    MCH 28.9 01/03/2019    MCHC 34.2 01/03/2019    RDW 11.9 01/03/2019     01/03/2019    MPV 10.0 01/03/2019    GRAN 2.9 " 01/03/2019    GRAN 51.7 01/03/2019    LYMPH 1.9 01/03/2019    LYMPH 32.6 01/03/2019    MONO 0.6 01/03/2019    MONO 9.9 01/03/2019    EOS 0.2 01/03/2019    BASO 0.08 01/03/2019    EOSINOPHIL 4.0 01/03/2019    BASOPHIL 1.4 01/03/2019     Lab Results   Component Value Date    WBC 5.68 01/03/2019    HGB 14.2 01/03/2019    HCT 41.5 01/03/2019     01/03/2019    CHOL 162 10/22/2018    TRIG 105 10/22/2018    HDL 28 (L) 10/22/2018    ALT 36 01/03/2019    AST 30 01/03/2019     (L) 01/03/2019    K 4.4 01/03/2019    CL 98 01/03/2019    CREATININE 1.2 01/03/2019    BUN 12 01/03/2019    CO2 25 01/03/2019    TSH 2.675 10/11/2017    PSA 0.47 08/22/2017    INR 1.0 01/03/2019    HGBA1C 7.7 (H) 10/22/2018       Assessment:       1. Testicle pain    2. Posterior tibial tendinitis of right lower extremity    3. Pes planus of both feet        Plan:       D/W pt r/o testicular torsion      García was seen today for ankle pain and testicle pain.    Diagnoses and all orders for this visit:    Testicle pain  -     POCT urine dipstick without microscope  -     US Scrotum And Testicles; Future  -     Ambulatory consult to Urology  -     naproxen (EC NAPROSYN) 500 MG EC tablet; Take 1 tablet (500 mg total) by mouth 2 (two) times daily.    Posterior tibial tendinitis of right lower extremity  -     Ambulatory Referral to Podiatry  -     naproxen (EC NAPROSYN) 500 MG EC tablet; Take 1 tablet (500 mg total) by mouth 2 (two) times daily.    Pes planus of both feet  -     Ambulatory Referral to Podiatry      Patient Instructions   Elevate testicles, can use a jock strap    Follow up with Urology    Naproxen for pain

## 2019-02-05 ENCOUNTER — PATIENT MESSAGE (OUTPATIENT)
Dept: INTERNAL MEDICINE | Facility: CLINIC | Age: 54
End: 2019-02-05

## 2019-02-06 ENCOUNTER — OFFICE VISIT (OUTPATIENT)
Dept: UROLOGY | Facility: CLINIC | Age: 54
End: 2019-02-06
Payer: COMMERCIAL

## 2019-02-06 ENCOUNTER — LAB VISIT (OUTPATIENT)
Dept: LAB | Facility: HOSPITAL | Age: 54
End: 2019-02-06
Attending: INTERNAL MEDICINE
Payer: COMMERCIAL

## 2019-02-06 VITALS
HEIGHT: 70 IN | SYSTOLIC BLOOD PRESSURE: 142 MMHG | WEIGHT: 315 LBS | DIASTOLIC BLOOD PRESSURE: 72 MMHG | BODY MASS INDEX: 45.1 KG/M2 | HEART RATE: 73 BPM

## 2019-02-06 DIAGNOSIS — N13.8 BPH WITH URINARY OBSTRUCTION: ICD-10-CM

## 2019-02-06 DIAGNOSIS — N50.89 SCROTAL SWELLING: Primary | ICD-10-CM

## 2019-02-06 DIAGNOSIS — N43.3 BILATERAL HYDROCELE: ICD-10-CM

## 2019-02-06 DIAGNOSIS — N50.89 SCROTAL SWELLING: ICD-10-CM

## 2019-02-06 DIAGNOSIS — N40.1 BPH WITH URINARY OBSTRUCTION: ICD-10-CM

## 2019-02-06 LAB
BILIRUB SERPL-MCNC: ABNORMAL MG/DL
BLOOD URINE, POC: ABNORMAL
COLOR, POC UA: 1.01
COMPLEXED PSA SERPL-MCNC: 0.38 NG/ML
ESTIMATED AVG GLUCOSE: 192 MG/DL
GLUCOSE UR QL STRIP: 1000
HBA1C MFR BLD HPLC: 8.3 %
KETONES UR QL STRIP: ABNORMAL
LEUKOCYTE ESTERASE URINE, POC: ABNORMAL
NITRITE, POC UA: ABNORMAL
PH, POC UA: ABNORMAL
PROTEIN, POC: ABNORMAL
SPECIFIC GRAVITY, POC UA: 5
UROBILINOGEN, POC UA: ABNORMAL

## 2019-02-06 PROCEDURE — 84153 ASSAY OF PSA TOTAL: CPT

## 2019-02-06 PROCEDURE — 83036 HEMOGLOBIN GLYCOSYLATED A1C: CPT

## 2019-02-06 PROCEDURE — 3045F PR MOST RECENT HEMOGLOBIN A1C LEVEL 7.0-9.0%: CPT | Mod: CPTII,S$GLB,, | Performed by: NURSE PRACTITIONER

## 2019-02-06 PROCEDURE — 3045F PR MOST RECENT HEMOGLOBIN A1C LEVEL 7.0-9.0%: ICD-10-PCS | Mod: CPTII,S$GLB,, | Performed by: NURSE PRACTITIONER

## 2019-02-06 PROCEDURE — 99999 PR PBB SHADOW E&M-EST. PATIENT-LVL IV: ICD-10-PCS | Mod: PBBFAC,,, | Performed by: NURSE PRACTITIONER

## 2019-02-06 PROCEDURE — 99214 OFFICE O/P EST MOD 30 MIN: CPT | Mod: 25,S$GLB,, | Performed by: NURSE PRACTITIONER

## 2019-02-06 PROCEDURE — 81002 URINALYSIS NONAUTO W/O SCOPE: CPT | Mod: S$GLB,,, | Performed by: NURSE PRACTITIONER

## 2019-02-06 PROCEDURE — 3077F PR MOST RECENT SYSTOLIC BLOOD PRESSURE >= 140 MM HG: ICD-10-PCS | Mod: CPTII,S$GLB,, | Performed by: NURSE PRACTITIONER

## 2019-02-06 PROCEDURE — 3078F DIAST BP <80 MM HG: CPT | Mod: CPTII,S$GLB,, | Performed by: NURSE PRACTITIONER

## 2019-02-06 PROCEDURE — 99999 PR PBB SHADOW E&M-EST. PATIENT-LVL IV: CPT | Mod: PBBFAC,,, | Performed by: NURSE PRACTITIONER

## 2019-02-06 PROCEDURE — 3077F SYST BP >= 140 MM HG: CPT | Mod: CPTII,S$GLB,, | Performed by: NURSE PRACTITIONER

## 2019-02-06 PROCEDURE — 3008F PR BODY MASS INDEX (BMI) DOCUMENTED: ICD-10-PCS | Mod: CPTII,S$GLB,, | Performed by: NURSE PRACTITIONER

## 2019-02-06 PROCEDURE — 81002 POCT URINE DIPSTICK WITHOUT MICROSCOPE: ICD-10-PCS | Mod: S$GLB,,, | Performed by: NURSE PRACTITIONER

## 2019-02-06 PROCEDURE — 3078F PR MOST RECENT DIASTOLIC BLOOD PRESSURE < 80 MM HG: ICD-10-PCS | Mod: CPTII,S$GLB,, | Performed by: NURSE PRACTITIONER

## 2019-02-06 PROCEDURE — 3008F BODY MASS INDEX DOCD: CPT | Mod: CPTII,S$GLB,, | Performed by: NURSE PRACTITIONER

## 2019-02-06 PROCEDURE — 99214 PR OFFICE/OUTPT VISIT, EST, LEVL IV, 30-39 MIN: ICD-10-PCS | Mod: 25,S$GLB,, | Performed by: NURSE PRACTITIONER

## 2019-02-06 PROCEDURE — 36415 COLL VENOUS BLD VENIPUNCTURE: CPT

## 2019-02-06 NOTE — PROGRESS NOTES
"Subjective:       Patient ID: García Tang is a 53 y.o. male.    Chief Complaint: bilateral testicle swelling (R>L)    García Tang is a 53 y.o. Diabetic Male with a history of a R UPJ repair by Dr. Johnson in 1996.   He was seen in clinic with Dr. Leslie 06/22/2015 for (R) flank.  06/23/2015 renal ultrasound showed no obstruction; no urological cause of pain.    He was last seen in clinic 09/21/2017 following a bladder infection   08/22/2017 Urine Culture (+) ESCHERICHIA COLI 10,000 - 49,999 cfu/ml sensitive to doxy and Cipro   He more concerned over findings on renal sono done 09/19/2017:  -small septation is seen along the posterior aspect of the bladder;   -this could represent a small ureterocele but we were not able to identify ureteral jets, which would have identified the ureteral orifice..  He was given Rx for Flomax but has not yet started it.    He was scheduled for cysto but he cancelled it himself.     Here today for scrotal swelling and "abnormal scrotal u/s"  02/04/2019 showed (R) complex hydrocele with 3mm epididymal cyst; mild left hydrocele.  The right hydrocele slightly larger than previous u/s on 12/11/2018.    10/22/18 ab sono showed normal kidneys                          PSA                      0.47                08/22/2017                 PSA                      0.36                04/19/2017                      Past Medical History:  5/18/2017: CKD stage 2 due to type 2 diabetes mellitus  7/18/2016: Essential hypertension  4/7/2016: Fatty liver disease, nonalcoholic      Comment: Us, 2006   No date: HLD (hyperlipidemia)  No date: Inflammatory bowel disease      Comment: diverticulitis  No date: Kidney stone      Comment: requiring stent  5/29/2014: Morbid obesity  No date: NAFLD (nonalcoholic fatty liver disease)  5/23/2017: Near syncope  2/22/2016: Obesity hypoventilation syndrome  No date: Prostatitis  No date: Rectal bleeding      Comment: diverticulitis  5/18/2017: Type 2 " diabetes mellitus with hyperglycemia, w*  5/29/2014: Uncontrolled type 2 diabetes mellitus with per*    Past Surgical History:  No date: COLONOSCOPY  No date: kidney stent  1996: KIDNEY SURGERY      Comment: right kidney obstruction requiring stent  No date: NOSE SURGERY    Review of patient's family history indicates:    Cancer                         Father                    Diabetes                       Father                    Liver disease                  Mother                      Comment: d.    Cirrhosis                      Mother                      Social History    Marital status:              Spouse name:                       Years of education:                 Number of children:               Occupational History    None on file    Social History Main Topics    Smoking status: Former Smoker                                                                Packs/day: 0.00      Years: 0.00           Types: Cigarettes    Smokeless tobacco: Never Used                        Comment: social    Alcohol use: No              Drug use: No              Sexual activity: Yes               Partners with: Female    Other Topics            Concern    None on file    Social History Narrative    None on file        Allergies:  Bactrim  (sulfamethoxazole-trimethoprim); Hydroxyzine hcl; Kenalog (triamcinolone acetonide); Lisinopril; Phenergan (promethazine); and Zofran (ondansetron hcl (pf))    Medications:  Current Outpatient Prescriptions:   amlodipine (NORVASC) 5 MG tablet, Take 1 tablet (5 mg total) by mouth once daily., Disp: 90 tablet, Rfl: 3  aspirin (ECOTRIN) 81 MG EC tablet, Take 81 mg by mouth once daily., Disp: , Rfl:   ciprofloxacin HCl (CIPRO) 500 MG tablet, Take 1 tablet (500 mg total) by mouth 2 (two) times daily., Disp: 60 tablet, Rfl: 0  clotrimazole (LOTRIMIN) 1 % cream, Apply topically 2 (two) times daily. Apply bid to affected areas x 1 wk, Disp: 30 g, Rfl: 0  DOCOSAHEXANOIC ACID/EPA  (FISH OIL ORAL), Take 1 capsule by mouth once daily at 6am. , Disp: , Rfl:   dulaglutide (TRULICITY) 0.75 mg/0.5 mL PnIj, Inject 0.5 mLs (0.75 mg total) into the skin every 7 days., Disp: 4 Syringe, Rfl: 3  ERGOCALCIFEROL, VITAMIN D2, (VITAMIN D ORAL), Take 1 capsule by mouth once daily at 6am. , Disp: , Rfl:   hydrochlorothiazide (MICROZIDE) 12.5 mg capsule, 12.5 mg 2 (two) times daily. , Disp: , Rfl:   insulin NPH-insulin regular, 70/30, (NOVOLIN 70/30) 100 unit/mL (70-30) injection, Inject into the skin 2 (two) times daily. 27 in am and 24 in pm, Disp: , Rfl:   metformin (GLUCOPHAGE-XR) 500 MG 24 hr tablet, Take 1 tablet (500 mg total) by mouth 2 (two) times daily with meals., Disp: 60 tablet, Rfl: 3  metoprolol succinate (TOPROL XL) 50 MG 24 hr tablet, Take 1 tablet (50 mg total) by mouth 2 (two) times daily., Disp: 60 tablet, Rfl: 6  multivitamin capsule, Take by mouth. 1 Capsule Oral Every day, Disp: , Rfl:   tamsulosin (FLOMAX) 0.4 mg Cp24, Take 1 capsule (0.4 mg total) by mouth once daily., Disp: 90 capsule, Rfl: 3          Review of Systems   Constitutional: Negative for activity change, appetite change, chills and fever.   HENT: Negative for facial swelling and trouble swallowing.    Eyes: Negative for visual disturbance.   Respiratory: Negative for chest tightness and shortness of breath.    Cardiovascular: Negative for chest pain and palpitations.   Gastrointestinal: Negative.  Negative for abdominal pain, constipation, diarrhea, nausea and vomiting.   Genitourinary: Positive for frequency, scrotal swelling and testicular pain. Negative for difficulty urinating, dysuria, flank pain, hematuria, penile pain and penile swelling.   Musculoskeletal: Negative for back pain, gait problem, myalgias and neck stiffness.   Skin: Negative for rash.   Neurological: Negative for dizziness and speech difficulty.   Hematological: Does not bruise/bleed easily.   Psychiatric/Behavioral: Negative for behavioral  problems.       Objective:      Physical Exam   Nursing note and vitals reviewed.  Constitutional: He is oriented to person, place, and time. Vital signs are normal. He appears well-developed and well-nourished. He is active and cooperative.  Non-toxic appearance. He does not have a sickly appearance.   Urine was clear of infection     HENT:   Head: Normocephalic and atraumatic.   Right Ear: External ear normal.   Left Ear: External ear normal.   Nose: Nose normal.   Mouth/Throat: Mucous membranes are normal.   Eyes: Conjunctivae and lids are normal. No scleral icterus.   Neck: Trachea normal, normal range of motion and full passive range of motion without pain. Neck supple. No JVD present. No tracheal deviation present.   Cardiovascular: Normal rate, S1 normal and S2 normal.    Pulmonary/Chest: Effort normal. No respiratory distress. He exhibits no tenderness.   Abdominal: Soft. Normal appearance and bowel sounds are normal. There is no hepatosplenomegaly. There is no tenderness. There is no CVA tenderness.   Genitourinary: Penis normal. Right testis shows swelling and tenderness. Left testis shows no swelling and no tenderness. Circumcised. No hypospadias, penile erythema or penile tenderness. No discharge found.   Musculoskeletal: Normal range of motion.   Lymphadenopathy: No inguinal adenopathy noted on the right or left side.   Neurological: He is alert and oriented to person, place, and time. He has normal strength.   Skin: Skin is warm, dry and intact.     Psychiatric: He has a normal mood and affect. His behavior is normal. Judgment and thought content normal.       Assessment:       1. Scrotal swelling    2. Bilateral hydrocele    3. BPH with urinary obstruction    4. Uncontrolled type 2 diabetes mellitus with peripheral neuropathy        Plan:         I spent 25 minutes with the patient of which more than half was spent in direct consultation with the patient in regards to our treatment and plan.     Education and recommendations of today's plan of care including home remedies.  We discussed the u/s results; reviewed the possible contributory factors.  Discussed antibiotics not helping.  Good scrotal support and OTC pain relief.  Surgical management only way to remove the fluid.  Discussed the surgery; educational materials given on AVS  Needs to improve DM if wanting surgery to reduce post op infection  Diet modifications and exercise to improve high BMI and DM.  Voiced understanding

## 2019-02-06 NOTE — PATIENT INSTRUCTIONS
Hydrocele Surgery (Hydrocelectomy)    A hydrocele is a sac of fluid that forms around a testicle. It occurs when fluid builds up in the layer of tissue that covers the testicle. It may be caused by an infection or by injury to the testicle. But the cause is often not known. A large hydrocele can cause pain or swelling in the scrotum. Hydrocelectomy is surgery to remove the hydrocele.  Preparing for surgery  Prepare for the surgery as you have been told. In addition:  · Tell your doctor about all medicines you take. This includes herbs and other supplements. It also includes any blood thinners, such as warfarin, clopidogrel, or daily aspirin. You may need to stop taking some or all of them before surgery as instructed by your doctor.  · Follow any directions you are given for not eating or drinking before surgery.  The day of surgery  The procedure takes about 30 minutes. You will likely go home on the same day.  Before the surgery begins:  · An IV line is put into a vein in your arm or hand. This line delivers fluids and medications (such as antibiotics).  · You are then given medicine (anesthesia) to keep you free of pain during the surgery. This may be general anesthesia, which puts you in a state like deep sleep through the surgery. A tube may be inserted into your throat to help you breathe.  · Local anesthesia or numbing medicine may be given to help control post-surgery pain. The doctor or anesthesiologist can tell you more.  During the surgery:  · An incision is made in the scrotum.  · The hydrocele is drained of fluid. The tissue that forms the sac around the hydrocele is removed or repositioned. This helps prevent fluid from building up again.  · A thin tube (drain) may be placed in the incision to allow fluid to drain.  · The incision in the scrotum is closed with stitches (sutures) or surgical strips.  After the surgery  You will be taken to a room to recover from the anesthesia. A nurse will check on  you to make sure youre not in pain. You may feel sleepy and nauseated. If a breathing tube was used, your throat may be sore at first. An ice pack may be applied to the surgical area. This helps reduce swelling. You may also be given a jockstrap to wear. This helps relieve pain and swelling, and prevents injury. Once you are ready to go home, have an adult family member or friend drive you.  Recovering at home  Follow the instructions you have been given to care for yourself. During your recovery:  · To help reduce swelling, apply an ice pack or cold compress to the scrotum as directed. Do this for no longer than 15 minutes at a time. Continue using the cold pack for 2 days or until swelling improves.  · Take prescribed pain medicine as directed.  · Care for your incision as instructed.  · Follow your doctors guidelines for showering. Avoid swimming, bathing, using a hot tub, and other activities that cause the incision to be covered with water until your doctor says its OK.  · Wear a jockstrap or snug underwear as directed.  · Avoid heavy lifting and strenuous exercise as directed.  · Do not have sex for 4 weeks.  · Do not drive until you are no longer taking pain medicine and your doctor says its OK.  When should I call my doctor?  Call the doctor if you have any of the following:  · Chest pain or trouble breathing (call 911)  · Fever of 100.4°F (38.0°C) or higher  · Symptoms of infection at the incision site such as increased redness or swelling, warmth, worsening pain, or foul-smelling drainage  · Bleeding from the incision site  · Pain gets worse or is not relieved by pain medicine  · Increased pain or swelling in the scrotum or groin area   Follow-up care  You will have follow-up visits with your doctor to check on your healing. You may also have sutures that need to be removed. Be sure to tell your doctor if you have any questions or concerns about your recovery.  Risks and complications  Risk and  possible complications include:  · Bleeding  · Infection  · Blood clots  · Recurrence of the hydrocele  · Injury to the testicle and nearby structures, which can lead to infertility  · Risks of anesthesia (the anesthesiologist will discuss these with you)    Date Last Reviewed: 7/6/2015  © 8917-8662 [employer]. [address] [place] [address]. All rights reserved. This information is not intended as a substitute for professional medical care. Always follow your healthcare professional's instructions.

## 2019-03-06 ENCOUNTER — HOSPITAL ENCOUNTER (OUTPATIENT)
Dept: RADIOLOGY | Facility: HOSPITAL | Age: 54
Discharge: HOME | End: 2019-03-06
Attending: NURSE PRACTITIONER
Payer: COMMERCIAL

## 2019-03-06 DIAGNOSIS — K74.60 CIRRHOSIS OF LIVER WITHOUT ASCITES, UNSPECIFIED HEPATIC CIRRHOSIS TYPE: ICD-10-CM

## 2019-03-06 PROCEDURE — 76700 US EXAM ABDOM COMPLETE: CPT | Mod: TC

## 2019-03-06 PROCEDURE — 76700 US EXAM ABDOM COMPLETE: CPT | Mod: 26,,, | Performed by: RADIOLOGY

## 2019-03-06 PROCEDURE — 76700 US ABDOMEN COMPLETE: ICD-10-PCS | Mod: 26,,, | Performed by: RADIOLOGY

## 2019-03-07 ENCOUNTER — PATIENT MESSAGE (OUTPATIENT)
Dept: HEPATOLOGY | Facility: CLINIC | Age: 54
End: 2019-03-07

## 2019-03-13 ENCOUNTER — PATIENT MESSAGE (OUTPATIENT)
Dept: HEPATOLOGY | Facility: CLINIC | Age: 54
End: 2019-03-13

## 2019-03-13 DIAGNOSIS — K76.0 FATTY LIVER DISEASE, NONALCOHOLIC: Primary | ICD-10-CM

## 2019-03-13 NOTE — TELEPHONE ENCOUNTER
Spoke with pt and discussed that he needs HCC screenings Q 6 months, next due 9/2019. Pt would like to do repeat Fibroscan with next visit to see if worsening. Attempted to explain to pt this would not change his management since previous Fibroscan shows cirrhosis and he does not want to do biopsy or MRI to further stage fibrosis. Will see if insurance would approve this.     Please call pt to schedule f/u appt in 9/2019 with u/s, labs, and fibroscan all same day.

## 2019-03-15 ENCOUNTER — PROCEDURE VISIT (OUTPATIENT)
Dept: HEPATOLOGY | Facility: CLINIC | Age: 54
End: 2019-03-15
Attending: NURSE PRACTITIONER
Payer: COMMERCIAL

## 2019-03-15 ENCOUNTER — PATIENT MESSAGE (OUTPATIENT)
Dept: HEPATOLOGY | Facility: CLINIC | Age: 54
End: 2019-03-15

## 2019-03-15 DIAGNOSIS — K76.0 FATTY LIVER DISEASE, NONALCOHOLIC: ICD-10-CM

## 2019-03-15 PROCEDURE — 91200 PR LIVER ELASTOGRAPHY W/OUT IMAG W/INTERP & REPORT: ICD-10-PCS | Mod: S$GLB,,, | Performed by: NURSE PRACTITIONER

## 2019-03-15 PROCEDURE — 91200 LIVER ELASTOGRAPHY: CPT | Mod: S$GLB,,, | Performed by: NURSE PRACTITIONER

## 2019-03-15 NOTE — PROCEDURES
Fibroscan Procedure     Name: García Tang  Date of Procedure : 03/15/2019   :: Ema Johnson NP  Diagnosis: NAFLD    Probe: XL    Fibroscan readin.1 KPa    Fibrosis:F2     CAP readin dB/m    Steatosis: :S3

## 2019-03-19 ENCOUNTER — OFFICE VISIT (OUTPATIENT)
Dept: INTERNAL MEDICINE | Facility: CLINIC | Age: 54
End: 2019-03-19
Payer: COMMERCIAL

## 2019-03-19 VITALS
TEMPERATURE: 98 F | SYSTOLIC BLOOD PRESSURE: 122 MMHG | BODY MASS INDEX: 45.1 KG/M2 | HEART RATE: 78 BPM | OXYGEN SATURATION: 99 % | HEIGHT: 70 IN | WEIGHT: 315 LBS | DIASTOLIC BLOOD PRESSURE: 86 MMHG

## 2019-03-19 DIAGNOSIS — E66.01 MORBID OBESITY: Chronic | ICD-10-CM

## 2019-03-19 DIAGNOSIS — E11.65 TYPE 2 DIABETES MELLITUS WITH HYPERGLYCEMIA, WITH LONG-TERM CURRENT USE OF INSULIN: Chronic | ICD-10-CM

## 2019-03-19 DIAGNOSIS — Z79.4 TYPE 2 DIABETES MELLITUS WITH HYPERGLYCEMIA, WITH LONG-TERM CURRENT USE OF INSULIN: Chronic | ICD-10-CM

## 2019-03-19 DIAGNOSIS — J30.2 SEASONAL ALLERGIES: Primary | ICD-10-CM

## 2019-03-19 PROCEDURE — 3079F PR MOST RECENT DIASTOLIC BLOOD PRESSURE 80-89 MM HG: ICD-10-PCS | Mod: CPTII,S$GLB,, | Performed by: NURSE PRACTITIONER

## 2019-03-19 PROCEDURE — 99213 PR OFFICE/OUTPT VISIT, EST, LEVL III, 20-29 MIN: ICD-10-PCS | Mod: S$GLB,,, | Performed by: NURSE PRACTITIONER

## 2019-03-19 PROCEDURE — 3045F PR MOST RECENT HEMOGLOBIN A1C LEVEL 7.0-9.0%: CPT | Mod: CPTII,S$GLB,, | Performed by: NURSE PRACTITIONER

## 2019-03-19 PROCEDURE — 3074F PR MOST RECENT SYSTOLIC BLOOD PRESSURE < 130 MM HG: ICD-10-PCS | Mod: CPTII,S$GLB,, | Performed by: NURSE PRACTITIONER

## 2019-03-19 PROCEDURE — 3045F PR MOST RECENT HEMOGLOBIN A1C LEVEL 7.0-9.0%: ICD-10-PCS | Mod: CPTII,S$GLB,, | Performed by: NURSE PRACTITIONER

## 2019-03-19 PROCEDURE — 3008F BODY MASS INDEX DOCD: CPT | Mod: CPTII,S$GLB,, | Performed by: NURSE PRACTITIONER

## 2019-03-19 PROCEDURE — 99999 PR PBB SHADOW E&M-EST. PATIENT-LVL IV: CPT | Mod: PBBFAC,,, | Performed by: NURSE PRACTITIONER

## 2019-03-19 PROCEDURE — 3074F SYST BP LT 130 MM HG: CPT | Mod: CPTII,S$GLB,, | Performed by: NURSE PRACTITIONER

## 2019-03-19 PROCEDURE — 99213 OFFICE O/P EST LOW 20 MIN: CPT | Mod: S$GLB,,, | Performed by: NURSE PRACTITIONER

## 2019-03-19 PROCEDURE — 99999 PR PBB SHADOW E&M-EST. PATIENT-LVL IV: ICD-10-PCS | Mod: PBBFAC,,, | Performed by: NURSE PRACTITIONER

## 2019-03-19 PROCEDURE — 3079F DIAST BP 80-89 MM HG: CPT | Mod: CPTII,S$GLB,, | Performed by: NURSE PRACTITIONER

## 2019-03-19 PROCEDURE — 3008F PR BODY MASS INDEX (BMI) DOCUMENTED: ICD-10-PCS | Mod: CPTII,S$GLB,, | Performed by: NURSE PRACTITIONER

## 2019-03-19 RX ORDER — LORATADINE 10 MG/1
10 TABLET ORAL DAILY
Refills: 0 | COMMUNITY
Start: 2019-03-19 | End: 2019-09-17

## 2019-03-19 NOTE — PROGRESS NOTES
Subjective:       Patient ID: García Tang is a 53 y.o. male.    Chief Complaint: Dizziness (rt ear pain outside of ear )    Mr. Tang presents today for pain of his right outer ear, on and off, for a couple weeks. Associated with this he has left sided sore throat, sinus congestion and pain, dizziness, jaw pain and a broken tooth.       Review of Systems   Constitutional: Negative for fever.   HENT: Positive for congestion, dental problem, ear pain, sinus pressure and sinus pain. Negative for facial swelling.    Eyes: Negative for visual disturbance.   Respiratory: Positive for cough. Negative for shortness of breath.    Cardiovascular: Negative for chest pain.   Gastrointestinal: Negative for nausea and vomiting.   Genitourinary: Negative for dysuria.   Musculoskeletal: Negative for gait problem.   Skin: Negative for rash.   Neurological: Negative for headaches.   Psychiatric/Behavioral: Negative for confusion.       Objective:      Physical Exam   Constitutional: He is oriented to person, place, and time. No distress.   Morbid obesity   HENT:   Head: Normocephalic and atraumatic.   Right Ear: Tympanic membrane, external ear and ear canal normal.   Left Ear: Tympanic membrane, external ear and ear canal normal.   Nose: Mucosal edema and rhinorrhea present.   Mouth/Throat: Oropharyngeal exudate and posterior oropharyngeal erythema present.   Eyes: No scleral icterus.   Neck: Normal range of motion. Neck supple.   Cardiovascular: Normal rate, regular rhythm and normal heart sounds.   Pulmonary/Chest: Effort normal and breath sounds normal. No stridor. No respiratory distress. He has no wheezes. He has no rales.   Lymphadenopathy:     He has cervical adenopathy.   Neurological: He is alert and oriented to person, place, and time.   Skin: Skin is warm and dry. He is not diaphoretic.   Psychiatric: He has a normal mood and affect. His behavior is normal.   Nursing note and vitals reviewed.      Assessment:        1. Seasonal allergies    2. Morbid obesity    3. Type 2 diabetes mellitus with hyperglycemia, with long-term current use of insulin        Plan:   1. Seasonal allergies  - loratadine (CLARITIN) 10 mg tablet; Take 1 tablet (10 mg total) by mouth once daily.; Refill: 0    2. Morbid obesity  - Information of OA meetings provided.     3. Type 2 diabetes mellitus with hyperglycemia, with long-term current use of insulin  - Information of OA meetings provided.       Pt has been given instructions populated from [employer] database and has verbalized understanding of the after visit summary and information contained wherein.    Follow up with a primary care provider. May go to ER for acute shortness of breath, lightheadedness, fever, or any other emergent complaints or changes in condition.

## 2019-03-20 ENCOUNTER — PATIENT MESSAGE (OUTPATIENT)
Dept: INTERNAL MEDICINE | Facility: CLINIC | Age: 54
End: 2019-03-20

## 2019-03-20 DIAGNOSIS — J01.90 ACUTE NON-RECURRENT SINUSITIS, UNSPECIFIED LOCATION: Primary | ICD-10-CM

## 2019-03-20 RX ORDER — AMOXICILLIN AND CLAVULANATE POTASSIUM 875; 125 MG/1; MG/1
1 TABLET, FILM COATED ORAL 2 TIMES DAILY WITH MEALS
Qty: 14 TABLET | Refills: 0 | Status: SHIPPED | OUTPATIENT
Start: 2019-03-20 | End: 2019-04-04

## 2019-03-25 ENCOUNTER — PATIENT MESSAGE (OUTPATIENT)
Dept: INTERNAL MEDICINE | Facility: CLINIC | Age: 54
End: 2019-03-25

## 2019-03-25 DIAGNOSIS — K13.79 MOUTH SORE: ICD-10-CM

## 2019-03-25 DIAGNOSIS — H93.90 EAR PROBLEM, UNSPECIFIED LATERALITY: Primary | ICD-10-CM

## 2019-03-28 ENCOUNTER — TELEPHONE (OUTPATIENT)
Dept: OTOLARYNGOLOGY | Facility: CLINIC | Age: 54
End: 2019-03-28

## 2019-04-04 ENCOUNTER — OFFICE VISIT (OUTPATIENT)
Dept: INTERNAL MEDICINE | Facility: CLINIC | Age: 54
End: 2019-04-04
Payer: COMMERCIAL

## 2019-04-04 VITALS
WEIGHT: 315 LBS | OXYGEN SATURATION: 99 % | SYSTOLIC BLOOD PRESSURE: 110 MMHG | HEIGHT: 70 IN | DIASTOLIC BLOOD PRESSURE: 80 MMHG | BODY MASS INDEX: 45.1 KG/M2 | HEART RATE: 75 BPM

## 2019-04-04 DIAGNOSIS — K21.9 GASTROESOPHAGEAL REFLUX DISEASE, ESOPHAGITIS PRESENCE NOT SPECIFIED: Primary | ICD-10-CM

## 2019-04-04 DIAGNOSIS — E11.65 TYPE 2 DIABETES MELLITUS WITH HYPERGLYCEMIA, WITH LONG-TERM CURRENT USE OF INSULIN: Chronic | ICD-10-CM

## 2019-04-04 DIAGNOSIS — Z79.4 TYPE 2 DIABETES MELLITUS WITH HYPERGLYCEMIA, WITH LONG-TERM CURRENT USE OF INSULIN: Chronic | ICD-10-CM

## 2019-04-04 DIAGNOSIS — N34.2 URETHRITIS: ICD-10-CM

## 2019-04-04 PROCEDURE — 3079F DIAST BP 80-89 MM HG: CPT | Mod: CPTII,S$GLB,, | Performed by: INTERNAL MEDICINE

## 2019-04-04 PROCEDURE — 3074F SYST BP LT 130 MM HG: CPT | Mod: CPTII,S$GLB,, | Performed by: INTERNAL MEDICINE

## 2019-04-04 PROCEDURE — 3074F PR MOST RECENT SYSTOLIC BLOOD PRESSURE < 130 MM HG: ICD-10-PCS | Mod: CPTII,S$GLB,, | Performed by: INTERNAL MEDICINE

## 2019-04-04 PROCEDURE — 99214 PR OFFICE/OUTPT VISIT, EST, LEVL IV, 30-39 MIN: ICD-10-PCS | Mod: S$GLB,,, | Performed by: INTERNAL MEDICINE

## 2019-04-04 PROCEDURE — 3045F PR MOST RECENT HEMOGLOBIN A1C LEVEL 7.0-9.0%: CPT | Mod: CPTII,S$GLB,, | Performed by: INTERNAL MEDICINE

## 2019-04-04 PROCEDURE — 99214 OFFICE O/P EST MOD 30 MIN: CPT | Mod: S$GLB,,, | Performed by: INTERNAL MEDICINE

## 2019-04-04 PROCEDURE — 3045F PR MOST RECENT HEMOGLOBIN A1C LEVEL 7.0-9.0%: ICD-10-PCS | Mod: CPTII,S$GLB,, | Performed by: INTERNAL MEDICINE

## 2019-04-04 PROCEDURE — 99999 PR PBB SHADOW E&M-EST. PATIENT-LVL III: CPT | Mod: PBBFAC,,, | Performed by: INTERNAL MEDICINE

## 2019-04-04 PROCEDURE — 3008F PR BODY MASS INDEX (BMI) DOCUMENTED: ICD-10-PCS | Mod: CPTII,S$GLB,, | Performed by: INTERNAL MEDICINE

## 2019-04-04 PROCEDURE — 3079F PR MOST RECENT DIASTOLIC BLOOD PRESSURE 80-89 MM HG: ICD-10-PCS | Mod: CPTII,S$GLB,, | Performed by: INTERNAL MEDICINE

## 2019-04-04 PROCEDURE — 99999 PR PBB SHADOW E&M-EST. PATIENT-LVL III: ICD-10-PCS | Mod: PBBFAC,,, | Performed by: INTERNAL MEDICINE

## 2019-04-04 PROCEDURE — 3008F BODY MASS INDEX DOCD: CPT | Mod: CPTII,S$GLB,, | Performed by: INTERNAL MEDICINE

## 2019-04-04 RX ORDER — FLUCONAZOLE 150 MG/1
150 TABLET ORAL WEEKLY
Qty: 2 TABLET | Refills: 0 | Status: SHIPPED | OUTPATIENT
Start: 2019-04-04 | End: 2019-04-16

## 2019-04-04 RX ORDER — PANTOPRAZOLE SODIUM 40 MG/1
40 TABLET, DELAYED RELEASE ORAL DAILY
Qty: 30 TABLET | Refills: 3 | Status: SHIPPED | OUTPATIENT
Start: 2019-04-04 | End: 2019-09-17

## 2019-04-04 NOTE — PROGRESS NOTES
Chief Complaint: Sore throat    HPI: This is a 53 year old man who presents due to sore throat for the last several weeks. HE took a course of antibiotics for a sinus infection.  Sore throat was worse yesterday. He had a hard time swallowing yesterday. He felt that he had indigestion that was severe. He had nausea. No vomiting.  No shortness of breath, chest pain, constipation or diarrhea. He took PeptoBismol without relief.  He has a history of GERD.  HE is not on omeprazole. NO melana or bloody stools. Throat feels tight today.     HE saw Carmen Duong NP on 3/19/19 for sinus congestion. He also saw the dentist for tooth issue.  He took a course of augmentin 875 mg twice daily for 7 days. HE finished the antibiotics which helped a little.  He finished several days ago.     Rash on his penis - usually gets after antibiotics. . He put antifungal cream on the area which helped a little. He has burning when urinates at the end of the penis.     He is a diabetic and is taking NOvolin 70/30 27 units in the morning and 24 units in the evening. He has been a diabetic since 2014.  Last hemoglobin A1C was 8.3 on 2/6./9.     Past Medical History:   Diagnosis Date    Chronic sialoadenitis 12/11/2018    Cirrhosis     based on Fibroscan = F4 5/2018    CKD stage 2 due to type 2 diabetes mellitus 5/18/2017    Essential hypertension 7/18/2016    Fatty liver disease, nonalcoholic 4/7/2016    [place], 2006     GERD (gastroesophageal reflux disease)     HLD (hyperlipidemia)     Inflammatory bowel disease     diverticulitis    Kidney stone     requiring stent    Morbid obesity 5/29/2014    NAFLD (nonalcoholic fatty liver disease)     Near syncope 5/23/2017    Obesity hypoventilation syndrome 2/22/2016    Prostatitis     Rectal bleeding     diverticulitis    Type 2 diabetes mellitus with hyperglycemia, with long-term current use of insulin 5/18/2017    Uncontrolled type 2 diabetes mellitus with peripheral neuropathy 5/29/2014  "    Past Surgical History:   Procedure Laterality Date    COLONOSCOPY      kidney stent      KIDNEY SURGERY  1996    right kidney obstruction requiring stent    NOSE SURGERY         Meds and allergies: updated on EPIC      Physical exam:  /80   Pulse 75   Ht 5' 10" (1.778 m)   Wt (!) 153.4 kg (338 lb 1.2 oz)   SpO2 99%   BMI 48.51 kg/m²     General: alert, oriented x 3, no apparent distress.  Affect normal  HEENT: Conjunctivae: anicteric, PERRL, EOMI, TM clear, Oralpharynx clear  Neck: supple, no thyroid enlargement, no cervical lymphadenopathy  Resp: effort normal, lungs clear bilaterally  CV: Regular rate and rhythm without murmurs, gallops or rubs, no lower extremity edema, no carotid bruits,   Abdomen: soft, non-distended, non-tender, bowel sounds present, no hepatosplenomegaly.  Penis  - no rash    Assessment/Plan:  GERD - GI cocktail today. Pantoprazole 40 m g in am and ranitidine 300 mg in evening  Yeast in urethra diflucan 150 mg weekly for 2 weeks  DM - check sugars  Dizziness- could be due to fluid in middle ear from reflux. Will treat reflux.  He is to follow up with is pcp in the next m onth.     "

## 2019-04-12 ENCOUNTER — TELEPHONE (OUTPATIENT)
Dept: INTERNAL MEDICINE | Facility: CLINIC | Age: 54
End: 2019-04-12

## 2019-04-12 NOTE — TELEPHONE ENCOUNTER
----- Message from Erika Murrieta sent at 4/12/2019 12:02 PM CDT -----  Contact: self [phone]  Patient is calling to speak with someone regarding left leg has been hurting all week, around thigh area is intermittent pain Throbbing pain. Patient has been taking over the counter Ibuprofen and would like some advise.    Thank you

## 2019-04-12 NOTE — TELEPHONE ENCOUNTER
Pt says he has leg throbbing pain in his leg all week that comes and goes. He says it radiates from his thigh to his calf. Pt denies edema or warmth to leg. No  slot available today or rogelio, recommend pt be seen by a  dept. Provided with a location. Pt says he did not want to go to the doctors office. Offered pt an appt for next week, he also declined. Advised to call the office back if he decides he would like an appt.

## 2019-04-16 ENCOUNTER — TELEPHONE (OUTPATIENT)
Dept: INTERNAL MEDICINE | Facility: CLINIC | Age: 54
End: 2019-04-16

## 2019-04-16 ENCOUNTER — OFFICE VISIT (OUTPATIENT)
Dept: INTERNAL MEDICINE | Facility: CLINIC | Age: 54
End: 2019-04-16
Payer: COMMERCIAL

## 2019-04-16 VITALS
BODY MASS INDEX: 45.1 KG/M2 | HEART RATE: 82 BPM | TEMPERATURE: 98 F | DIASTOLIC BLOOD PRESSURE: 82 MMHG | SYSTOLIC BLOOD PRESSURE: 132 MMHG | WEIGHT: 315 LBS | HEIGHT: 70 IN

## 2019-04-16 DIAGNOSIS — E11.65 TYPE 2 DIABETES MELLITUS WITH HYPERGLYCEMIA, WITH LONG-TERM CURRENT USE OF INSULIN: Chronic | ICD-10-CM

## 2019-04-16 DIAGNOSIS — Z79.4 TYPE 2 DIABETES MELLITUS WITH HYPERGLYCEMIA, WITH LONG-TERM CURRENT USE OF INSULIN: Chronic | ICD-10-CM

## 2019-04-16 DIAGNOSIS — R30.0 DYSURIA: ICD-10-CM

## 2019-04-16 DIAGNOSIS — N48.1 BALANITIS: Primary | ICD-10-CM

## 2019-04-16 DIAGNOSIS — M26.623 BILATERAL TEMPOROMANDIBULAR JOINT PAIN: ICD-10-CM

## 2019-04-16 LAB
AMORPH CRY UR QL COMP ASSIST: ABNORMAL
BACTERIA #/AREA URNS AUTO: ABNORMAL /HPF
BILIRUB UR QL STRIP: NEGATIVE
CLARITY UR REFRACT.AUTO: ABNORMAL
COLOR UR AUTO: ABNORMAL
GLUCOSE UR QL STRIP: ABNORMAL
HGB UR QL STRIP: NEGATIVE
KETONES UR QL STRIP: NEGATIVE
LEUKOCYTE ESTERASE UR QL STRIP: NEGATIVE
MICROSCOPIC COMMENT: ABNORMAL
NITRITE UR QL STRIP: NEGATIVE
PH UR STRIP: 5 [PH] (ref 5–8)
PROT UR QL STRIP: NEGATIVE
SP GR UR STRIP: >1.03 (ref 1–1.03)
URN SPEC COLLECT METH UR: ABNORMAL
YEAST UR QL AUTO: ABNORMAL

## 2019-04-16 PROCEDURE — 99214 OFFICE O/P EST MOD 30 MIN: CPT | Mod: S$GLB,,, | Performed by: PHYSICIAN ASSISTANT

## 2019-04-16 PROCEDURE — 3075F PR MOST RECENT SYSTOLIC BLOOD PRESS GE 130-139MM HG: ICD-10-PCS | Mod: CPTII,S$GLB,, | Performed by: PHYSICIAN ASSISTANT

## 2019-04-16 PROCEDURE — 99214 PR OFFICE/OUTPT VISIT, EST, LEVL IV, 30-39 MIN: ICD-10-PCS | Mod: S$GLB,,, | Performed by: PHYSICIAN ASSISTANT

## 2019-04-16 PROCEDURE — 81001 URINALYSIS AUTO W/SCOPE: CPT

## 2019-04-16 PROCEDURE — 3079F DIAST BP 80-89 MM HG: CPT | Mod: CPTII,S$GLB,, | Performed by: PHYSICIAN ASSISTANT

## 2019-04-16 PROCEDURE — 3045F PR MOST RECENT HEMOGLOBIN A1C LEVEL 7.0-9.0%: CPT | Mod: CPTII,S$GLB,, | Performed by: PHYSICIAN ASSISTANT

## 2019-04-16 PROCEDURE — 3045F PR MOST RECENT HEMOGLOBIN A1C LEVEL 7.0-9.0%: ICD-10-PCS | Mod: CPTII,S$GLB,, | Performed by: PHYSICIAN ASSISTANT

## 2019-04-16 PROCEDURE — 3008F PR BODY MASS INDEX (BMI) DOCUMENTED: ICD-10-PCS | Mod: CPTII,S$GLB,, | Performed by: PHYSICIAN ASSISTANT

## 2019-04-16 PROCEDURE — 99999 PR PBB SHADOW E&M-EST. PATIENT-LVL V: ICD-10-PCS | Mod: PBBFAC,,, | Performed by: PHYSICIAN ASSISTANT

## 2019-04-16 PROCEDURE — 3075F SYST BP GE 130 - 139MM HG: CPT | Mod: CPTII,S$GLB,, | Performed by: PHYSICIAN ASSISTANT

## 2019-04-16 PROCEDURE — 3008F BODY MASS INDEX DOCD: CPT | Mod: CPTII,S$GLB,, | Performed by: PHYSICIAN ASSISTANT

## 2019-04-16 PROCEDURE — 99999 PR PBB SHADOW E&M-EST. PATIENT-LVL V: CPT | Mod: PBBFAC,,, | Performed by: PHYSICIAN ASSISTANT

## 2019-04-16 PROCEDURE — 87086 URINE CULTURE/COLONY COUNT: CPT

## 2019-04-16 PROCEDURE — 3079F PR MOST RECENT DIASTOLIC BLOOD PRESSURE 80-89 MM HG: ICD-10-PCS | Mod: CPTII,S$GLB,, | Performed by: PHYSICIAN ASSISTANT

## 2019-04-16 RX ORDER — METHOCARBAMOL 500 MG/1
500 TABLET, FILM COATED ORAL 4 TIMES DAILY
Qty: 40 TABLET | Refills: 0 | Status: SHIPPED | OUTPATIENT
Start: 2019-04-16 | End: 2019-04-26

## 2019-04-16 RX ORDER — DOXYLAMINE SUCCINATE 25 MG
TABLET ORAL 2 TIMES DAILY
Qty: 28 G | Status: SHIPPED | OUTPATIENT
Start: 2019-04-16 | End: 2019-09-17

## 2019-04-16 RX ORDER — DOXYLAMINE SUCCINATE 25 MG
TABLET ORAL 2 TIMES DAILY
Qty: 28 G | Status: SHIPPED | OUTPATIENT
Start: 2019-04-16 | End: 2019-04-16 | Stop reason: SDUPTHER

## 2019-04-16 NOTE — PATIENT INSTRUCTIONS
Balanitis    Balanitis is an inflammation of the head of the penis. It can happen because of a buildup of germs (bacteria, viruses, or fungi) under the foreskin. It can also happen because of exposure to soaps and other chemicals. In adults, this is most often a complication of diabetes. It can also happen because of obesity or poor genital cleaning habits.  If it is not treated right away, this can lead to a condition called phimosis. This means you cannot pull back the foreskin from the head of the penis.  Symptoms of balanitis may include pain or tenderness of the penis, discharge, inability to retract the foreskin, difficulty urinating, and impotence.  Home care  The following guidelines will help you care for your condition at home:  · If you are able to retract your foreskin:  ¨ Children. Retract the foreskin and clean with water. Apply antibiotic cream or ointment to the penis three times a day.  ¨ Adults. Retract the foreskin and clean with water. Apply clotrimazole cream to the penis 3 times a day unless another medicine was prescribed. Clotrimazole cream is available over the counter. Avoid sexual activity while being treated.  ¨ Sitz baths. Soak the penis and foreskin in warm water while inflammation is present.  · If you have diabetes, talk with your doctor about keeping your diabetes in good control.  · If you are overweight, talk with your doctor about a weight loss plan.  Follow-up care  Follow up with your healthcare provider, or as advised.  When to seek medical advice  Call your healthcare provider right away if any of these occur:  · You can't retract the foreskin  · You can't return the retracted foreskin to the forward position. This requires immediate attention!  · Your symptoms get worse  · You have partial or complete blockage of the flow of urine  Date Last Reviewed: 9/1/2016  © 1060-5042 [employer]. [address] [place] [address], [place] [address]. All rights reserved. This  information is not intended as a substitute for professional medical care. Always follow your healthcare professional's instructions.        Self-Care for Temporomandibular Disorders (TMD)  You have temporomandibular disorder (TMD). This term describes a group of problems related to the temporomandibular joint (TMJ) and nearby muscles. The TMJ is located where the upper and lower jaws meet. Treatment will get your jaw back to normal function. But your care doesnt end there. Once youve had TMD, its important to avoid reinjury. Get in the habit of doing self-checks. This can make you aware of any symptoms that begin to return, so you can take action right away.    Doing self-checks  Make it a habit to assess your body a few times each day. Try writing yourself a reminder. Or set an alarm on your watch or computer. When doing a self-check, ask yourself:  · Do I feel stressed?  · Are my muscles tense?  · Am I grinding or clenching my teeth?  · Is my posture healthy for my body?  · Is there anything I can do to make myself more comfortable?  If you answer yes to any of the questions above, you need to take action. Adjusting your posture or taking a short break can help prevent or relieve TMD symptoms.  Listening to your body  Many people get used to ignoring pain. But pain is a signal that your body needs care. To maintain your TMJ health:  · Avoid hard or chewy foods. Even if you feel fine, eating such foods can trigger symptoms again.  · Be aware of your body. Dont ignore TMD symptoms. The nagging pain in your neck or jaw may be a sign that you need care.  · Be sure to keep follow-up appointments with your health care team.  Managing stress  Stress is a key factor in TMD. Stress can cause you to clench your muscles or grind your teeth. It can also affect your sleep, reducing your bodys ability to heal. Here are a few tips to manage stress:  · Learn ways to relax. Try listening to music or gently stretching. Take  a few slow deep breaths. Or, close your eyes and imagine a place or object that is calming.  · Get plenty of rest and sleep.  · Set goals you know you can attain.  · Make time for people and things you enjoy.  · Ask for help if you need it. Friends and family can run errands and cook meals for you.   Staying active  Activity helps the body in many ways. You stay looser and more relaxed. It also helps keep muscles and tissues conditioned. That way you can heal faster and make reinjury less likely. Here are some tips to get you started:  · Talk to your health care provider before starting an exercise program.  · Always warm up and stretch before each activity. This helps prevent injury.  · Try walking or swimming. These activities are easy on your joints. They also benefit your heart and lungs.  · Try yoga or erika chi. These are relaxing activities known for reducing stress.  Date Last Reviewed: 7/13/2015  © 2199-0198 [employer]. [address] [place] [address], [place] [address]. All rights reserved. This information is not intended as a substitute for professional medical care. Always follow your healthcare professional's instructions.

## 2019-04-16 NOTE — PROGRESS NOTES
"Subjective:       Patient ID: García Tang is a 53 y.o. male.        Chief Complaint: Facial Pain (pain=5); Hip Pain (pain=5); and Urinary Tract Infection (burning)    García Tang is an established patient of Vaishali Patel MD here today for urgent care visit.    He felt pain in the face bilaterally in the maxillary area and ears and jaw, intense pain yesterday, described as "squeezing."  The pain lasted for about an hour.  He still feels it today but much improved.  No chest pain or shortness of breath.  He has had this facial pain in the past, first time was a few months ago.  Yesterday the pain was worse.  He doesn't grind his teeth that he knows of.  No nasal congestion, cough, or sore throat.  It does hurt sometimes when chewing and some clicking in the jaw.  No headache, blurred vision, numbness, tingling, weakness.      Rash foreskin of penis, itchy and burning.  Could not tolerate diflucan (heart racing).  He has been applying lotrimin OTC.  This started a month ago.  It burns when he urinates.  No other urinary sx.  Unsure if dysuria is from the urine or just the penile irritation.    Blood sugar has been around 200 at home.    He had some bilateral hip pain yesterday, 5/10, that is better today.         Review of Systems   Constitutional: Negative for appetite change, chills, fatigue and fever.   HENT: Negative for congestion and sore throat.    Eyes: Negative for visual disturbance.   Respiratory: Negative for cough, chest tightness and shortness of breath.    Cardiovascular: Negative for chest pain, palpitations and leg swelling.   Gastrointestinal: Negative for abdominal pain, blood in stool, constipation, diarrhea, nausea and vomiting.   Genitourinary: Negative for dysuria, frequency, hematuria and urgency.   Musculoskeletal: Negative for arthralgias and back pain.   Skin: Negative for rash.   Neurological: Negative for dizziness, syncope, weakness and headaches. "   Psychiatric/Behavioral: Negative for dysphoric mood and sleep disturbance. The patient is not nervous/anxious.        Objective:      Physical Exam   Constitutional: He appears well-developed and well-nourished.   HENT:   Head: Normocephalic.   Right Ear: External ear normal.   Left Ear: External ear normal.   Mouth/Throat: Oropharynx is clear and moist.   He has some clicking and pain at TMJ with opening and closing mouth, no other facial pain/tenderness   Eyes: Pupils are equal, round, and reactive to light.   Cardiovascular: Normal rate, regular rhythm and normal heart sounds. Exam reveals no gallop and no friction rub.   No murmur heard.  Pulmonary/Chest: Effort normal and breath sounds normal. No respiratory distress.   Abdominal: Soft. There is no tenderness.   Genitourinary:         Musculoskeletal: He exhibits no edema.   Neurological: He is alert. He has normal strength. No cranial nerve deficit or sensory deficit. He displays a negative Romberg sign.   Normal finger to thumb opposition, rapid hand movements, finger to nose   Skin: Skin is warm and dry.   Psychiatric: He has a normal mood and affect.   Nursing note and vitals reviewed.      Assessment:       1. Balanitis    2. Dysuria    3. Bilateral temporomandibular joint pain    4. Type 2 diabetes mellitus with hyperglycemia, with long-term current use of insulin        Plan:       García was seen today for facial pain, hip pain and urinary tract infection.    Diagnoses and all orders for this visit:    Balanitis: could not tolerate diflucan secondary to racing heart, lotrimin not helping, trial of miconazole, refer to urology given persistence  -     Ambulatory consult to Urology  -     miconazole (MICOTIN) 2 % cream; Apply topically 2 (two) times daily.    Dysuria  -     Ambulatory consult to Urology  -     Urinalysis  -     Urine culture    Bilateral temporomandibular joint pain  -     methocarbamol (ROBAXIN) 500 MG Tab; Take 1 tablet (500 mg  "total) by mouth 4 (four) times daily. for 10 days    Type 2 diabetes mellitus with hyperglycemia, with long-term current use of insulin: home blood sugar elevated to 200, last hemoglobin A1C 8.3 2/2019    Hypertension: stable and controlled    Needs f/u with PCP.    Advised patient to schedule as he hasn't seen her since 10/2018.    Pt has been given instructions populated from [employer] database and has verbalized understanding of the after visit summary and information contained wherein.    Follow up with a primary care provider. May go to ER for acute shortness of breath, lightheadedness, fever, or any other emergent complaints or changes in condition.    "This note will be shared with the patient"    Future Appointments   Date Time Provider Department Center   9/20/2019  8:00 AM [facility] OIC-US1 MASTER [facility] ULTR [facility]   9/20/2019  9:00 AM LAB, APPOINTMENT [facility] LAB [facility]   9/20/2019 10:00 AM Ema Johnson, NP [facility] HEPAT [facility]               "

## 2019-04-17 ENCOUNTER — PATIENT MESSAGE (OUTPATIENT)
Dept: UROLOGY | Facility: CLINIC | Age: 54
End: 2019-04-17

## 2019-04-17 ENCOUNTER — PATIENT MESSAGE (OUTPATIENT)
Dept: INTERNAL MEDICINE | Facility: CLINIC | Age: 54
End: 2019-04-17

## 2019-04-18 LAB — BACTERIA UR CULT: NORMAL

## 2019-04-22 ENCOUNTER — TELEPHONE (OUTPATIENT)
Dept: UROLOGY | Facility: CLINIC | Age: 54
End: 2019-04-22

## 2019-04-24 ENCOUNTER — CLINICAL SUPPORT (OUTPATIENT)
Dept: INTERNAL MEDICINE | Facility: CLINIC | Age: 54
End: 2019-04-24
Payer: COMMERCIAL

## 2019-04-24 ENCOUNTER — NURSE TRIAGE (OUTPATIENT)
Dept: ADMINISTRATIVE | Facility: CLINIC | Age: 54
End: 2019-04-24

## 2019-04-24 ENCOUNTER — OFFICE VISIT (OUTPATIENT)
Dept: UROLOGY | Facility: CLINIC | Age: 54
End: 2019-04-24
Payer: COMMERCIAL

## 2019-04-24 ENCOUNTER — PATIENT MESSAGE (OUTPATIENT)
Dept: UROLOGY | Facility: CLINIC | Age: 54
End: 2019-04-24

## 2019-04-24 ENCOUNTER — TELEPHONE (OUTPATIENT)
Dept: INTERNAL MEDICINE | Facility: CLINIC | Age: 54
End: 2019-04-24

## 2019-04-24 ENCOUNTER — OFFICE VISIT (OUTPATIENT)
Dept: INTERNAL MEDICINE | Facility: CLINIC | Age: 54
End: 2019-04-24
Payer: COMMERCIAL

## 2019-04-24 ENCOUNTER — LAB VISIT (OUTPATIENT)
Dept: LAB | Facility: HOSPITAL | Age: 54
End: 2019-04-24
Payer: COMMERCIAL

## 2019-04-24 VITALS
WEIGHT: 315 LBS | DIASTOLIC BLOOD PRESSURE: 96 MMHG | BODY MASS INDEX: 45.1 KG/M2 | SYSTOLIC BLOOD PRESSURE: 152 MMHG | HEIGHT: 70 IN | HEART RATE: 74 BPM

## 2019-04-24 VITALS
DIASTOLIC BLOOD PRESSURE: 92 MMHG | OXYGEN SATURATION: 94 % | SYSTOLIC BLOOD PRESSURE: 142 MMHG | HEART RATE: 78 BPM | TEMPERATURE: 98 F | HEIGHT: 70 IN | BODY MASS INDEX: 45.1 KG/M2 | WEIGHT: 315 LBS

## 2019-04-24 DIAGNOSIS — R35.0 URINARY FREQUENCY: ICD-10-CM

## 2019-04-24 DIAGNOSIS — R33.9 INCOMPLETE BLADDER EMPTYING: ICD-10-CM

## 2019-04-24 DIAGNOSIS — R30.0 DYSURIA: ICD-10-CM

## 2019-04-24 DIAGNOSIS — Z12.5 SCREENING FOR PROSTATE CANCER: ICD-10-CM

## 2019-04-24 DIAGNOSIS — N43.3 BILATERAL HYDROCELE: ICD-10-CM

## 2019-04-24 DIAGNOSIS — N40.1 BPH WITH OBSTRUCTION/LOWER URINARY TRACT SYMPTOMS: Primary | ICD-10-CM

## 2019-04-24 DIAGNOSIS — R73.9 HYPERGLYCEMIA: ICD-10-CM

## 2019-04-24 DIAGNOSIS — N13.8 BPH WITH OBSTRUCTION/LOWER URINARY TRACT SYMPTOMS: Primary | ICD-10-CM

## 2019-04-24 DIAGNOSIS — I10 ESSENTIAL HYPERTENSION: Chronic | ICD-10-CM

## 2019-04-24 DIAGNOSIS — R73.9 HYPERGLYCEMIA: Primary | ICD-10-CM

## 2019-04-24 DIAGNOSIS — R03.0 ELEVATED BLOOD PRESSURE READING: ICD-10-CM

## 2019-04-24 LAB
ALBUMIN/CREAT UR: 18.8 UG/MG (ref 0–30)
BACTERIA #/AREA URNS AUTO: NORMAL /HPF
BILIRUB SERPL-MCNC: NEGATIVE MG/DL
BILIRUB UR QL STRIP: NEGATIVE
BLOOD, POC UA: ABNORMAL
CLARITY UR REFRACT.AUTO: CLEAR
COLOR UR AUTO: YELLOW
COMPLEXED PSA SERPL-MCNC: 0.43 NG/ML (ref 0–4)
CREAT UR-MCNC: 85 MG/DL (ref 23–375)
GLUCOSE SERPL-MCNC: 418 MG/DL (ref 70–110)
GLUCOSE UR QL STRIP: ABNORMAL
GLUCOSE UR QL STRIP: ABNORMAL
HGB UR QL STRIP: NEGATIVE
HYALINE CASTS UR QL AUTO: 1 /LPF
KETONES UR QL STRIP: NEGATIVE
KETONES UR QL STRIP: NEGATIVE
LEUKOCYTE ESTERASE UR QL STRIP: NEGATIVE
LEUKOCYTE ESTERASE URINE, POC: NEGATIVE
MICROALBUMIN UR DL<=1MG/L-MCNC: 16 UG/ML
MICROSCOPIC COMMENT: NORMAL
NITRITE UR QL STRIP: NEGATIVE
NITRITE, POC UA: NEGATIVE
PH UR STRIP: 5 [PH] (ref 5–8)
PH, POC UA: 5
PROT UR QL STRIP: NEGATIVE
PROTEIN, POC: ABNORMAL
RBC #/AREA URNS AUTO: 0 /HPF (ref 0–4)
SP GR UR STRIP: >=1.03 (ref 1–1.03)
SPECIFIC GRAVITY, POC UA: 1.02
URN SPEC COLLECT METH UR: ABNORMAL
UROBILINOGEN, POC UA: NORMAL
WBC #/AREA URNS AUTO: 0 /HPF (ref 0–5)
YEAST UR QL AUTO: NORMAL

## 2019-04-24 PROCEDURE — 3080F PR MOST RECENT DIASTOLIC BLOOD PRESSURE >= 90 MM HG: ICD-10-PCS | Mod: CPTII,S$GLB,, | Performed by: INTERNAL MEDICINE

## 2019-04-24 PROCEDURE — 3077F PR MOST RECENT SYSTOLIC BLOOD PRESSURE >= 140 MM HG: ICD-10-PCS | Mod: CPTII,S$GLB,, | Performed by: INTERNAL MEDICINE

## 2019-04-24 PROCEDURE — 3046F HEMOGLOBIN A1C LEVEL >9.0%: CPT | Mod: CPTII,S$GLB,, | Performed by: INTERNAL MEDICINE

## 2019-04-24 PROCEDURE — 3046F PR MOST RECENT HEMOGLOBIN A1C LEVEL > 9.0%: ICD-10-PCS | Mod: CPTII,S$GLB,, | Performed by: INTERNAL MEDICINE

## 2019-04-24 PROCEDURE — 99999 PR PBB SHADOW E&M-EST. PATIENT-LVL V: CPT | Mod: PBBFAC,,, | Performed by: INTERNAL MEDICINE

## 2019-04-24 PROCEDURE — 81000 POCT URINALYSIS: ICD-10-PCS | Mod: S$GLB,,, | Performed by: INTERNAL MEDICINE

## 2019-04-24 PROCEDURE — 82043 UR ALBUMIN QUANTITATIVE: CPT

## 2019-04-24 PROCEDURE — 99999 PR PBB SHADOW E&M-EST. PATIENT-LVL V: ICD-10-PCS | Mod: PBBFAC,,, | Performed by: INTERNAL MEDICINE

## 2019-04-24 PROCEDURE — 82962 GLUCOSE BLOOD TEST: CPT | Mod: S$GLB,,, | Performed by: INTERNAL MEDICINE

## 2019-04-24 PROCEDURE — 81000 URINALYSIS NONAUTO W/SCOPE: CPT | Mod: S$GLB,,, | Performed by: INTERNAL MEDICINE

## 2019-04-24 PROCEDURE — 99999 PR PBB SHADOW E&M-EST. PATIENT-LVL IV: CPT | Mod: PBBFAC,,, | Performed by: NURSE PRACTITIONER

## 2019-04-24 PROCEDURE — 87086 URINE CULTURE/COLONY COUNT: CPT

## 2019-04-24 PROCEDURE — 99215 OFFICE O/P EST HI 40 MIN: CPT | Mod: 25,S$GLB,, | Performed by: INTERNAL MEDICINE

## 2019-04-24 PROCEDURE — 3080F DIAST BP >= 90 MM HG: CPT | Mod: CPTII,S$GLB,, | Performed by: INTERNAL MEDICINE

## 2019-04-24 PROCEDURE — 84153 ASSAY OF PSA TOTAL: CPT

## 2019-04-24 PROCEDURE — 87147 CULTURE TYPE IMMUNOLOGIC: CPT

## 2019-04-24 PROCEDURE — 36415 COLL VENOUS BLD VENIPUNCTURE: CPT

## 2019-04-24 PROCEDURE — 99214 OFFICE O/P EST MOD 30 MIN: CPT | Mod: 25,S$GLB,, | Performed by: NURSE PRACTITIONER

## 2019-04-24 PROCEDURE — 3077F SYST BP >= 140 MM HG: CPT | Mod: CPTII,S$GLB,, | Performed by: NURSE PRACTITIONER

## 2019-04-24 PROCEDURE — 82962 POCT GLUCOSE, HAND-HELD DEVICE: ICD-10-PCS | Mod: S$GLB,,, | Performed by: INTERNAL MEDICINE

## 2019-04-24 PROCEDURE — 99215 PR OFFICE/OUTPT VISIT, EST, LEVL V, 40-54 MIN: ICD-10-PCS | Mod: 25,S$GLB,, | Performed by: INTERNAL MEDICINE

## 2019-04-24 PROCEDURE — 3077F PR MOST RECENT SYSTOLIC BLOOD PRESSURE >= 140 MM HG: ICD-10-PCS | Mod: CPTII,S$GLB,, | Performed by: NURSE PRACTITIONER

## 2019-04-24 PROCEDURE — 3008F PR BODY MASS INDEX (BMI) DOCUMENTED: ICD-10-PCS | Mod: CPTII,S$GLB,, | Performed by: NURSE PRACTITIONER

## 2019-04-24 PROCEDURE — 99999 PR PBB SHADOW E&M-EST. PATIENT-LVL IV: ICD-10-PCS | Mod: PBBFAC,,, | Performed by: NURSE PRACTITIONER

## 2019-04-24 PROCEDURE — 3008F PR BODY MASS INDEX (BMI) DOCUMENTED: ICD-10-PCS | Mod: CPTII,S$GLB,, | Performed by: INTERNAL MEDICINE

## 2019-04-24 PROCEDURE — 3077F SYST BP >= 140 MM HG: CPT | Mod: CPTII,S$GLB,, | Performed by: INTERNAL MEDICINE

## 2019-04-24 PROCEDURE — 3080F DIAST BP >= 90 MM HG: CPT | Mod: CPTII,S$GLB,, | Performed by: NURSE PRACTITIONER

## 2019-04-24 PROCEDURE — 99214 PR OFFICE/OUTPT VISIT, EST, LEVL IV, 30-39 MIN: ICD-10-PCS | Mod: 25,S$GLB,, | Performed by: NURSE PRACTITIONER

## 2019-04-24 PROCEDURE — 3008F BODY MASS INDEX DOCD: CPT | Mod: CPTII,S$GLB,, | Performed by: INTERNAL MEDICINE

## 2019-04-24 PROCEDURE — 51798 PR MEAS,POST-VOID RES,US,NON-IMAGING: ICD-10-PCS | Mod: S$GLB,,, | Performed by: NURSE PRACTITIONER

## 2019-04-24 PROCEDURE — 81002 URINALYSIS NONAUTO W/O SCOPE: CPT | Mod: S$GLB,,, | Performed by: NURSE PRACTITIONER

## 2019-04-24 PROCEDURE — 3080F PR MOST RECENT DIASTOLIC BLOOD PRESSURE >= 90 MM HG: ICD-10-PCS | Mod: CPTII,S$GLB,, | Performed by: NURSE PRACTITIONER

## 2019-04-24 PROCEDURE — 51798 US URINE CAPACITY MEASURE: CPT | Mod: S$GLB,,, | Performed by: NURSE PRACTITIONER

## 2019-04-24 PROCEDURE — 81002 PR URINALYSIS NONAUTO W/O SCOPE: ICD-10-PCS | Mod: S$GLB,,, | Performed by: NURSE PRACTITIONER

## 2019-04-24 PROCEDURE — 87088 URINE BACTERIA CULTURE: CPT

## 2019-04-24 PROCEDURE — 81001 URINALYSIS AUTO W/SCOPE: CPT

## 2019-04-24 PROCEDURE — 3008F BODY MASS INDEX DOCD: CPT | Mod: CPTII,S$GLB,, | Performed by: NURSE PRACTITIONER

## 2019-04-24 RX ORDER — TAMSULOSIN HYDROCHLORIDE 0.4 MG/1
0.4 CAPSULE ORAL DAILY
Qty: 30 CAPSULE | Refills: 11 | Status: SHIPPED | OUTPATIENT
Start: 2019-04-24 | End: 2019-09-17

## 2019-04-24 RX ORDER — INSULIN ASPART 100 [IU]/ML
6 INJECTION, SOLUTION INTRAVENOUS; SUBCUTANEOUS ONCE
Status: CANCELLED | OUTPATIENT
Start: 2019-04-24 | End: 2019-04-24

## 2019-04-24 RX ORDER — INSULIN ASPART 100 [IU]/ML
8 INJECTION, SOLUTION INTRAVENOUS; SUBCUTANEOUS ONCE
Status: DISCONTINUED | OUTPATIENT
Start: 2019-04-24 | End: 2019-12-23

## 2019-04-24 RX ORDER — INSULIN ASPART 100 [IU]/ML
6 INJECTION, SOLUTION INTRAVENOUS; SUBCUTANEOUS ONCE
Status: COMPLETED | OUTPATIENT
Start: 2019-04-24 | End: 2019-04-24

## 2019-04-24 RX ORDER — INSULIN ASPART 100 [IU]/ML
6 INJECTION, SOLUTION INTRAVENOUS; SUBCUTANEOUS ONCE
Status: DISCONTINUED | OUTPATIENT
Start: 2019-04-24 | End: 2019-12-23

## 2019-04-24 RX ORDER — INSULIN ASPART 100 [IU]/ML
8 INJECTION, SOLUTION INTRAVENOUS; SUBCUTANEOUS ONCE
Status: COMPLETED | OUTPATIENT
Start: 2019-04-24 | End: 2019-04-24

## 2019-04-24 RX ADMIN — INSULIN ASPART 8 UNITS: 100 INJECTION, SOLUTION INTRAVENOUS; SUBCUTANEOUS at 05:04

## 2019-04-24 RX ADMIN — INSULIN ASPART 6 UNITS: 100 INJECTION, SOLUTION INTRAVENOUS; SUBCUTANEOUS at 12:04

## 2019-04-24 NOTE — PROGRESS NOTES
Subjective:       Patient ID: García Tang is a 53 y.o. male.    Chief Complaint: Dysuria, and decreased urinary stream.    HPI   García Tang is a 53 y.o. 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    Last seen in clinic 2/6/19 w/ LISA Long.    Today pt returns to clinic reporting dysuria, urine culture was unremarkable on 4/16/19 at  clinic. Reports urinary frequency q<1hr, and urgency. Denies incontinence. Nocturia 2x/night. Reports decreased FOS, sensation of incomplete bladder emptying. Denies starting tamsulosin as rx'd previous visit. Denies pelvic/abd/flank pain, GH, split stream, n/v, f/c.   Sexually active w/ female partner of 20+ yrs, monogamous, denies h/o STIs. Denies family h/o prostate, bladder and renal CA.    Review of Systems   Constitutional: Negative for chills and fever.   Eyes: Negative for visual disturbance.   Respiratory: Negative for shortness of breath.    Cardiovascular: Negative for chest pain, palpitations and leg swelling.   Gastrointestinal: Negative for abdominal pain, constipation, nausea and vomiting.   Genitourinary: Positive for dysuria, frequency and urgency. Negative for discharge, flank pain, hematuria, penile swelling and scrotal swelling.   Musculoskeletal: Negative for back pain.   Skin: Negative for rash.   Neurological: Negative for dizziness and headaches.   Psychiatric/Behavioral: Negative for behavioral problems.         Objective:       PVR in clinic today: 18mL.    UA dip in clinic today revealed glucose 1000, trace protein.    Cr 1.2 (1/3/19).    Lab Results   Component Value Date    PSA 0.47 08/22/2017    PSADIAG 0.38 02/06/2019     Hemoglobin A1C   Date Value Ref Range Status   02/06/2019 8.3 (H) 4.0 - 5.6 % Final     Comment:     ADA Screening Guidelines:  5.7-6.4%  Consistent with  prediabetes  >or=6.5%  Consistent with diabetes  High levels of fetal hemoglobin interfere with the HbA1C  assay. Heterozygous hemoglobin variants (HbS, HgC, etc)do  not significantly interfere with this assay.   However, presence of multiple variants may affect accuracy.     10/22/2018 7.7 (H) 4.0 - 5.6 % Final     Comment:     ADA Screening Guidelines:  5.7-6.4%  Consistent with prediabetes  >or=6.5%  Consistent with diabetes  High levels of fetal hemoglobin interfere with the HbA1C  assay. Heterozygous hemoglobin variants (HbS, HgC, etc)do  not significantly interfere with this assay.   However, presence of multiple variants may affect accuracy.     06/07/2018 7.4 (H) 4.0 - 5.6 % Final     Comment:     ADA Screening Guidelines:  5.7-6.4%  Consistent with prediabetes  >or=6.5%  Consistent with diabetes  High levels of fetal hemoglobin interfere with the HbA1C  assay. Heterozygous hemoglobin variants (HbS, HgC, etc)do  not significantly interfere with this assay.   However, presence of multiple variants may affect accuracy.       Physical Exam   Vitals reviewed.  Constitutional: He is oriented to person, place, and time. He appears well-developed and well-nourished. No distress.   Obese.   HENT:   Head: Normocephalic.   Eyes: Conjunctivae are normal. No scleral icterus.   Neck: Normal range of motion.   Pulmonary/Chest: Effort normal. No respiratory distress.   Abdominal: Soft. He exhibits no distension. There is no tenderness. There is no rebound. Hernia confirmed negative in the right inguinal area and confirmed negative in the left inguinal area.   Genitourinary: Rectal exam shows no external hemorrhoid. Right testis shows no mass, no swelling and no tenderness. Left testis shows no mass, no swelling and no tenderness. Uncircumcised. No phimosis, paraphimosis, penile erythema or penile tenderness. No discharge found.   Genitourinary Comments: The prostate is smooth, symmetrical, without nodule or induration. The  seminal vesicles were normal and nonpalpable. The prostate was not tender or boggy. Rectal tone was normal no rectal mass was palpable.     Musculoskeletal: He exhibits no edema.   Lymphadenopathy: No inguinal adenopathy noted on the right or left side.   Neurological: He is alert and oriented to person, place, and time.   Skin: Skin is warm.     Psychiatric: He has a normal mood and affect. His behavior is normal.       Assessment:       1. BPH with obstruction/lower urinary tract symptoms    2. Dysuria    3. Bilateral hydrocele    4. Screening for prostate cancer    5. Urinary frequency    6. Incomplete bladder emptying    7. Elevated blood pressure reading        Plan:       I spent 30 minutes with the patient of which more than half was spent in direct consultation with the patient in regards to our treatment and plan.    Discussed and reviewed BPH. Reviewed lower urinary anatomy. Discussed prostate normally enlarges to some degree in all men with advancing age, but not all men require treatment. Men with BPH may or may not have symptoms. Urinary symptoms include irritative (i.e., frequency, urgency) and/or obstructive (i.e., hesitancy, slow stream) symptoms. Treatment for BPH include medications (alpha blockers and/or alpha reductase inhibitors) or surgical intervention. Recommend starting tamsulosin as rx'd. Sent to preferred pharmacy. Reviewed proper take, and possible adverse effects.    Discussed and reviewed dysuria, potential etiologies including but not limited to inflammation, stricture. Reviewed recent unremarkable urine cx results. Discussed ua dip in clinic today w/ high glucose in urine. Advised to f/u w/ PCP/endocrine for management given his elevated hgba1c, and elevated bp. Urinary frequency likely secondary to DM.    Discussed and reviewed hydrocele, possible etiologies, not limited to inflammation, injury. Discussed hydrocele usually doesn't cause pain or harm, and usually do not require  treatment. Reassured pt. Discussed and recommend scrotal support (compression shorts/briefs). Advised pt to RTC or report to local ED for acute testicular/scrotal pain, fever/chills, hematuria.    Discussed and reviewed prostate CA screening w/ PSA, and KIMBERLY. Reviewed benefits, potential burdens, risks and/or harms related to screening.    Education sheets provided.    Patient verbalized and expressed understanding, and agree w/ plan.

## 2019-04-24 NOTE — PROGRESS NOTES
"Subjective:       Patient ID: García Tang is a 53 y.o. male.    Chief Complaint: Hyperlipidemia (elevated BG levels, this am (350) and today after noon (323). reports of dizziness, leg heaviness, weak feeling and lightheadedness.)    HPI   52 yo M with poorly controlled DM presents for eval of hyperglycemia with dizziness, weakness, lightheadedness.   BG earlier today 350, then 343 after insulin and eating. Then after noon 323. Was given 6 u novolog at nurse visit and current BG is 410. Ate beans and a 23 g carb wafer 2 hours before his afternoon BG reading of 323.    CMP today with nl kidney function, no AG. CBC nl.   a1c in February was 8.3% and repeat in progress. In October his a1c was 7.7%.     Balantis appears to have improved. Reports burning after urination. Testicle soreness. Urology said due to hydrocele and diabetes. Urine was checked but not in chart.     novolin 70/30   27am and 24 pm    BG was running 161-180 over previous days.   No fever, cough.   Thirsty and weak.   No recent missed doses of insulin. No steroids.     Given 8 u novolog in clinic with improvement to 321.     Review of Systems   Constitutional: Negative for fever.   Respiratory: Negative for shortness of breath.    Cardiovascular: Negative for chest pain.   Musculoskeletal: Negative.    Skin: Negative.        Objective:   BP (!) 142/92 (BP Location: Right arm, Patient Position: Sitting, BP Method: Large (Manual))   Pulse 78   Temp 98 °F (36.7 °C) (Oral)   Ht 5' 10" (1.778 m)   Wt (!) 152.4 kg (335 lb 15.7 oz)   SpO2 (!) 94%   BMI 48.21 kg/m²      Physical Exam   Constitutional: He is oriented to person, place, and time. He appears well-developed and well-nourished. No distress.   HENT:   Head: Normocephalic and atraumatic.   Cardiovascular: Normal rate and regular rhythm.   No murmur heard.  Pulmonary/Chest: Effort normal. No respiratory distress. He has no wheezes. He has no rales.   Neurological: He is alert and oriented " to person, place, and time.   Skin: Skin is warm and dry. He is not diaphoretic.   Psychiatric: He has a normal mood and affect. His behavior is normal.       Assessment:       1. Hyperglycemia    2. Uncontrolled type 2 diabetes mellitus with peripheral neuropathy    3. Essential hypertension    4. Dysuria        Plan:       García was seen today for hyperlipidemia.    Diagnoses and all orders for this visit:    Hyperglycemia  Uncontrolled type 2 diabetes mellitus with peripheral neuropathy  -     Microalbumin/creatinine urine ratio  -     insulin aspart U-100 injection 8 Units  -     insulin NPH-insulin regular, 70/30, (NOVOLIN 70/30) 100 unit/mL (70-30) injection; Inject subcutaneous 32 in am and 28 in pm  -     Ambulatory Referral to Diabetes Education  -     insulin aspart U-100 pen 6 Units  -     insulin aspart U-100 pen 8 Units  Given 6 u aspart at noon and 8 u at 4pm. BG improved from  410 to 310 after add'l 8 units. Increased insulin. Diet discussed. Will return to clinic on Monday 4/29 with BG log.     Essential hypertension  Monitor    Dysuria  -     POCT Urinalysis  -     Urinalysis  -     Urine culture

## 2019-04-24 NOTE — TELEPHONE ENCOUNTER
Reason for Disposition   Blood glucose > 300 mg/dl (16.7 mmol/l) AND two or more times in a row    Protocols used: DIABETES - HIGH BLOOD SUGAR-A-OH    Glucose has been elevated for a couple of days. Glucose was 35o prior to taking insulin and then 341 about 30 minutes after insulin. Patient feels thirsty and is a little dizzy. Per protocol PCP to advise patient. Please contact patient to advise

## 2019-04-24 NOTE — PATIENT INSTRUCTIONS
Prostate-Specific Antigen (PSA)  Does this test have other names?  PSA  What is this test?  This test measures the level of prostate-specific antigen (PSA) in your blood.  The cells of the prostate gland make the protein called PSA. Men normally have low levels of PSA. If your PSA levels start to rise, it could mean you have prostate cancer, benign prostate conditions, inflammation, or an infection.  PSA testing is controversial because the U.S. Preventative Services Task Force discourages men who don't have any symptoms of prostate cancer from being screened. The task force says that PSA test results can lead to treating small cancers that would never become life-threatening.  But the American Cancer Society believes men should be told the risks and benefits of PSA testing and allowed to make their own decision about if and when to be screened.  The American Urologic Society says that PSA screening is most important between the ages of 55 and 69. If you have a brother or father with prostate cancer or you are , you should start testing at age 40.   Why do I need this test?  You may have this test if you are 50 or older and your healthcare provider wants to screen you for prostate cancer. Some providers recommend screening at age 40 or 45 if you have a family history of prostate cancer or other risk factors.  You may also have this test if you have already been diagnosed with prostate cancer, so that your healthcare provider can monitor your treatment and see whether your cancer has come back.  What other tests might I have along with this test?  Your healthcare provider may also do a digital rectal exam (KIMBERLY). A KIMBERLY is a physical exam of the prostate, not a lab test. For the exam, your provider will place a gloved finger in your rectum and feel the prostate to check for any bumps or abnormal areas. The FDA recommends that a KIMBERLY be done along with a PSA test.  What do my test results mean?  Many  things may affect your lab test results. These include the method each lab uses to do the test. Even if your test results are different from the normal value, you may not have a problem. To learn what the results mean for you, talk with your healthcare provider.  Results are given in nanograms per milliliter ng/mL. Normal results are below 4.0 ng/mL. A rising PSA may mean that you have cancer. But the PSA results alone won't tell your healthcare provider whether it's cancer or a benign prostate condition. If your provider suspects cancer, he or she will likely suggest that you have a biopsy of the prostate to make the diagnosis.  How is this test done?  The test requires a blood sample, which is drawn through a needle from a vein in your arm.  Does this test pose any risks?  Taking a blood sample with a needle carries risks that include bleeding, infection, bruising, or feeling dizzy. When the needle pricks your arm, you may feel a slight stinging sensation or pain. Afterward, the site may be slightly sore.  What might affect my test results?  An infection can affect your results, as can certain medicines. Riding a bicycle and ejaculation may also affect your results.  How do I get ready for this test?  You may need to abstain from sex and not ride a bicycle within 1 to 2 days of the test. In addition, be sure your healthcare provider knows about all medicines, herbs, vitamins, and supplements you are taking. This includes medicines that don't need a prescription and any illicit drugs you may use.     © 6187-9370 [employer]. [address] [place] [address], [place] [address]. All rights reserved. This information is not intended as a substitute for professional medical care. Always follow your healthcare professional's instructions.        BPH (Enlarged Prostate)  The prostate is a gland at the base of the bladder. As some men get older, the prostate may get bigger in size. This problem is called benign  prostatic hyperplasia (BPH). BPH puts pressure on the urethra. This is the tube that carries urine from the bladder to the penis. It may interfere with the flow of urine. It may also keep the bladder from emptying fully.    Symptoms of BPH include trouble starting urination and feeling as though the bladder isnt emptying all the way. It also includes a weak urine stream, dribbling and leaking of urine, and frequent and urgent urination (especially at night). BPH can increase the risk of urinary infections. It can also block off urine flow completely. If this occurs, a thin tube (catheter) may be passed into the bladder to help drain urine.  If symptoms are mild, no treatment may be needed right now. If symptoms are more severe, treatment is likely needed. The goal of treatment is to improve urine flow and reduce symptoms. Treatments can include medicine and procedures. Your healthcare provider will discuss treatment options with you as needed.  Home care  The following guidelines will help you care for yourself at home:  · Urinate as soon as you feel the urge. Don't try to hold your urine.  · Don't limit your fluid intake during the day. Drink 6 to 8 glasses of water or liquids a day. This prevents bacteria from building up in the bladder.  · Avoid drinking fluids after dinner to help reduce urination during the night.  · Avoid medicines that can worsen your symptoms. These include certain cold and allergy medicines and antidepressants. Diuretics used for high blood pressure can also worsen symptoms. Talk to your doctor about the medicines you take. Other choices may work better for you.  Prostate cancer screening  BPH does not increase the risk of prostate cancer. But because prostate cancer is a common cancer in men, screening is sometimes recommended. This may help detect the cancer in its early stages when treatment is most effective. Factors that can increase the risk of prostate cancer include being  -American or having a father or brother who had prostate cancer. A high-fat diet may also increase the risk of prostate cancer. Talk to your healthcare provider to see whether you should be screened for prostate cancer.  Follow-up care  Follow up with your healthcare provider, or as advised  To learn more, go to:  · National Kidney & Urologic Diseases Information Clearinghouse  kidney.niddk.nih.gov, 850.397.7584  When to seek medical advice  Call your healthcare provider right away if any of these occur:  · Fever of 100.4°F (38.0°C) or higher, or as advised  · Unable to pass urine for 8 hours  · Increasing pressure or pain in your bladder (lower abdomen)  · Blood in the urine  · Increasing low back pain, not related to injury  · Symptoms of urinary infection (increased urge to urinate, burning when passing urine, foul-smelling urine)  Date Last Reviewed: 7/1/2016 © 2000-2017 [employer]. [address] [place] [address], [place] [address]. All rights reserved. This information is not intended as a substitute for professional medical care. Always follow your healthcare professional's instructions.

## 2019-04-24 NOTE — PROGRESS NOTES
Patient two identifiers used. Pt tolerated insulin well, asked to wait 15 minutes post injection.  BS was 326

## 2019-04-24 NOTE — TELEPHONE ENCOUNTER
Spoke with pt, scheduled next available urgent appt. Pt says he is across the street and would like to know if he can get his BS checked to see if his wife needs to bring his insulin. Next available appt is for 3    FYI

## 2019-04-24 NOTE — PROGRESS NOTES
Pt came in for BS check before apt with PCP. BS is 323    Administered 6 units of novolog to pt at 1233  Advised pt to wait in clinic for 15 minutes post administration

## 2019-04-24 NOTE — TELEPHONE ENCOUNTER
Please call pt to get BG readings Thursday afternoon. If still over 300 please ask covering physician to review possible insulin dose increases. Otherwise will see him Monday.   I increased his insulin on 4/24.

## 2019-04-24 NOTE — LETTER
April 24, 2019      Rhianna Katz PA-C  [address]  [facility] [address]           [facility] - Urology 4th Floor  [address]  [facility] [address]  Phone: [phone]          Patient: García Tang   MR Number: [id number]   YOB: 1965   Date of Visit: 4/24/2019       Dear Rhianna Katz:    Thank you for referring García Tang to me for evaluation. Attached you will find relevant portions of my assessment and plan of care.    If you have questions, please do not hesitate to call me. I look forward to following García Tang along with you.    Sincerely,    Alonzo Alexander, DNP    Enclosure  CC:  No Recipients    If you would like to receive this communication electronically, please contact [email or web address] or [phone] to request more information on [facility] Link access.    For providers and/or their staff who would like to refer a patient to [facility], please contact us through our one-stop-shop provider referral line, [facility], at [phone].    If you feel you have received this communication in error or would no longer like to receive these types of communications, please e-mail [email or web address]

## 2019-04-25 ENCOUNTER — TELEPHONE (OUTPATIENT)
Dept: INTERNAL MEDICINE | Facility: CLINIC | Age: 54
End: 2019-04-25

## 2019-04-25 NOTE — TELEPHONE ENCOUNTER
----- Message from Deepali Rich sent at 4/25/2019  3:51 PM CDT -----  Contact: Patient [phone]  Patient is returning a phone call.  Who left a message for the patient: Padmini   Does patient know what this is regarding:  No

## 2019-04-25 NOTE — TELEPHONE ENCOUNTER
Pt called back, he says he is feeling better then yesterday. He says this morning after eating breakfast his BS was 351. A couple hours later it was 252 then after lunch his BS was 229 and a hour ago it was 234. Advised pt to continue to check BS

## 2019-04-26 ENCOUNTER — PATIENT MESSAGE (OUTPATIENT)
Dept: INTERNAL MEDICINE | Facility: CLINIC | Age: 54
End: 2019-04-26

## 2019-04-26 ENCOUNTER — PATIENT MESSAGE (OUTPATIENT)
Dept: DIABETES | Facility: CLINIC | Age: 54
End: 2019-04-26

## 2019-04-26 LAB — BACTERIA UR CULT: NORMAL

## 2019-04-26 NOTE — LETTER
April 29, 2019    García Tang  [address] [facility] [address]             [facility]  [address]  [place] [address]  Phone: [phone]  Fax: [phone] To Whom It May Concern:    Please excuse García Tang from work on 4/24/19 - 4/26/19 due to illness.       Sincerely,          Vaishali Patel MD

## 2019-04-28 ENCOUNTER — PATIENT MESSAGE (OUTPATIENT)
Dept: INTERNAL MEDICINE | Facility: CLINIC | Age: 54
End: 2019-04-28

## 2019-04-29 ENCOUNTER — TELEPHONE (OUTPATIENT)
Dept: INTERNAL MEDICINE | Facility: CLINIC | Age: 54
End: 2019-04-29

## 2019-04-29 ENCOUNTER — PATIENT MESSAGE (OUTPATIENT)
Dept: INTERNAL MEDICINE | Facility: CLINIC | Age: 54
End: 2019-04-29

## 2019-04-29 NOTE — TELEPHONE ENCOUNTER
Pt called stating he just found out he has a mandatory meeting at 2 and does not think he can make his appt time. He does not want to cancel. He would like to know if PCP can see him before 12pm or after 4pm. Please advise

## 2019-04-30 ENCOUNTER — OFFICE VISIT (OUTPATIENT)
Dept: INTERNAL MEDICINE | Facility: CLINIC | Age: 54
End: 2019-04-30
Payer: COMMERCIAL

## 2019-04-30 VITALS
HEART RATE: 72 BPM | HEIGHT: 70 IN | SYSTOLIC BLOOD PRESSURE: 134 MMHG | DIASTOLIC BLOOD PRESSURE: 86 MMHG | WEIGHT: 315 LBS | OXYGEN SATURATION: 96 % | BODY MASS INDEX: 45.1 KG/M2

## 2019-04-30 DIAGNOSIS — B37.2 YEAST DERMATITIS: ICD-10-CM

## 2019-04-30 DIAGNOSIS — Z79.4 TYPE 2 DIABETES MELLITUS WITH HYPERGLYCEMIA, WITH LONG-TERM CURRENT USE OF INSULIN: Primary | Chronic | ICD-10-CM

## 2019-04-30 DIAGNOSIS — K76.0 FATTY LIVER DISEASE, NONALCOHOLIC: ICD-10-CM

## 2019-04-30 DIAGNOSIS — E11.65 TYPE 2 DIABETES MELLITUS WITH HYPERGLYCEMIA, WITH LONG-TERM CURRENT USE OF INSULIN: Primary | Chronic | ICD-10-CM

## 2019-04-30 DIAGNOSIS — K74.69 OTHER CIRRHOSIS OF LIVER: ICD-10-CM

## 2019-04-30 DIAGNOSIS — R10.12 LEFT UPPER QUADRANT PAIN: ICD-10-CM

## 2019-04-30 DIAGNOSIS — I10 ESSENTIAL HYPERTENSION: Chronic | ICD-10-CM

## 2019-04-30 PROCEDURE — 3075F SYST BP GE 130 - 139MM HG: CPT | Mod: CPTII,S$GLB,, | Performed by: INTERNAL MEDICINE

## 2019-04-30 PROCEDURE — 3008F BODY MASS INDEX DOCD: CPT | Mod: CPTII,S$GLB,, | Performed by: INTERNAL MEDICINE

## 2019-04-30 PROCEDURE — 3079F PR MOST RECENT DIASTOLIC BLOOD PRESSURE 80-89 MM HG: ICD-10-PCS | Mod: CPTII,S$GLB,, | Performed by: INTERNAL MEDICINE

## 2019-04-30 PROCEDURE — 3046F PR MOST RECENT HEMOGLOBIN A1C LEVEL > 9.0%: ICD-10-PCS | Mod: CPTII,S$GLB,, | Performed by: INTERNAL MEDICINE

## 2019-04-30 PROCEDURE — 3046F HEMOGLOBIN A1C LEVEL >9.0%: CPT | Mod: CPTII,S$GLB,, | Performed by: INTERNAL MEDICINE

## 2019-04-30 PROCEDURE — 99214 OFFICE O/P EST MOD 30 MIN: CPT | Mod: S$GLB,,, | Performed by: INTERNAL MEDICINE

## 2019-04-30 PROCEDURE — 99999 PR PBB SHADOW E&M-EST. PATIENT-LVL III: CPT | Mod: PBBFAC,,, | Performed by: INTERNAL MEDICINE

## 2019-04-30 PROCEDURE — 3008F PR BODY MASS INDEX (BMI) DOCUMENTED: ICD-10-PCS | Mod: CPTII,S$GLB,, | Performed by: INTERNAL MEDICINE

## 2019-04-30 PROCEDURE — 99214 PR OFFICE/OUTPT VISIT, EST, LEVL IV, 30-39 MIN: ICD-10-PCS | Mod: S$GLB,,, | Performed by: INTERNAL MEDICINE

## 2019-04-30 PROCEDURE — 99999 PR PBB SHADOW E&M-EST. PATIENT-LVL III: ICD-10-PCS | Mod: PBBFAC,,, | Performed by: INTERNAL MEDICINE

## 2019-04-30 PROCEDURE — 3075F PR MOST RECENT SYSTOLIC BLOOD PRESS GE 130-139MM HG: ICD-10-PCS | Mod: CPTII,S$GLB,, | Performed by: INTERNAL MEDICINE

## 2019-04-30 PROCEDURE — 3079F DIAST BP 80-89 MM HG: CPT | Mod: CPTII,S$GLB,, | Performed by: INTERNAL MEDICINE

## 2019-04-30 RX ORDER — NYSTATIN 100000 [USP'U]/G
POWDER TOPICAL 2 TIMES DAILY
Qty: 60 G | Refills: 0 | Status: SHIPPED | OUTPATIENT
Start: 2019-04-30 | End: 2019-09-17

## 2019-04-30 NOTE — PROGRESS NOTES
"Subjective:       Patient ID: García Tang is a 53 y.o. male.    Chief Complaint: Hyperglycemia (patient's most recent BG numbers have been 206, 253, 145. patient currently takes Insulin 28 nightly and 32 during the day. ) and Abdominal Pain (L upper abdomen pain, intermittently since last Thurs. sharp shooting pain. patient takes Tylenol every so often.)    HPI   52 yo M here for follow up of diabetes w hyperglycemia.    At last visit last week increased 70/30 insulin to 32 am and 28 pm.     He has been feeling fatigued since insulin increase. Feeling achy, sore, tender. No fevers.     Having left upper quadrant pain. Sore, tender. Nausea.     Working on diet changes, less carbs and sugary drinks. Stabbing, throbbing. Started 4 days ago.   Review of Systems   Constitutional: Negative for fever.   Respiratory: Negative for shortness of breath.    Cardiovascular: Negative for chest pain.   Musculoskeletal: Negative.    Skin: Negative.        Objective:   /86 (BP Location: Left arm, Patient Position: Sitting, BP Method: Large (Manual))   Pulse 72   Ht 5' 10" (1.778 m)   Wt (!) 152.4 kg (335 lb 15.7 oz)   SpO2 96%   BMI 48.21 kg/m²      Physical Exam   Constitutional: He is oriented to person, place, and time. He appears well-developed and well-nourished. No distress.   HENT:   Head: Normocephalic and atraumatic.   Cardiovascular: Normal rate and regular rhythm.   No murmur heard.  Pulmonary/Chest: Effort normal. No respiratory distress. He has no wheezes. He has no rales.   Neurological: He is alert and oriented to person, place, and time.   Skin: Skin is warm and dry. He is not diaphoretic.   Psychiatric: He has a normal mood and affect. His behavior is normal.         Mild ttp luq  Skin lef tchest with intertrigo with fungal infection  Assessment:       1. Type 2 diabetes mellitus with hyperglycemia, with long-term current use of insulin    2. Essential hypertension    3. Other cirrhosis of liver  "   4. Fatty liver disease, nonalcoholic    5. Yeast dermatitis    6. Left upper quadrant pain    7. Uncontrolled type 2 diabetes mellitus with peripheral neuropathy        Plan:       García was seen today for hyperglycemia and abdominal pain.    Diagnoses and all orders for this visit:    Type 2 diabetes mellitus with hyperglycemia, with long-term current use of insulin  Uncontrolled type 2 diabetes mellitus with peripheral neuropathy  -     insulin NPH-insulin regular, 70/30, (NOVOLIN 70/30) 100 unit/mL (70-30) injection; Inject subcutaneous 34 in am and 28 in pm (increase in am dose)  -     Labs in 3 mo:   Hemoglobin A1c; Future  -     Comprehensive metabolic panel; Future  -     Lipid panel; Future   Keep appt with diabetes education    Essential hypertension  Continue current meds    Other cirrhosis of liver  Fatty liver disease, nonalcoholic  Followed by hepatology    Yeast dermatitis  -     nystatin (MYCOSTATIN) powder; Apply topically 2 (two) times daily.  x1 week then PRN    Left upper quadrant pain  -     US Abdomen Complete; Future        Inc insulin to  34 am and keep night time dose at 28 pm    p23 at next visit

## 2019-05-02 ENCOUNTER — HOSPITAL ENCOUNTER (OUTPATIENT)
Dept: RADIOLOGY | Facility: HOSPITAL | Age: 54
Discharge: HOME | End: 2019-05-02
Attending: INTERNAL MEDICINE
Payer: COMMERCIAL

## 2019-05-02 DIAGNOSIS — R10.12 LEFT UPPER QUADRANT PAIN: ICD-10-CM

## 2019-05-02 PROCEDURE — 76700 US ABDOMEN COMPLETE: ICD-10-PCS | Mod: 26,,, | Performed by: RADIOLOGY

## 2019-05-02 PROCEDURE — 76700 US EXAM ABDOM COMPLETE: CPT | Mod: 26,,, | Performed by: RADIOLOGY

## 2019-05-02 PROCEDURE — 76700 US EXAM ABDOM COMPLETE: CPT | Mod: TC

## 2019-05-03 ENCOUNTER — PATIENT MESSAGE (OUTPATIENT)
Dept: INTERNAL MEDICINE | Facility: CLINIC | Age: 54
End: 2019-05-03

## 2019-05-03 DIAGNOSIS — R10.12 LUQ ABDOMINAL PAIN: Primary | ICD-10-CM

## 2019-05-03 DIAGNOSIS — R93.5 ABNORMAL ABDOMINAL ULTRASOUND: ICD-10-CM

## 2019-05-14 ENCOUNTER — PATIENT MESSAGE (OUTPATIENT)
Dept: INTERNAL MEDICINE | Facility: CLINIC | Age: 54
End: 2019-05-14

## 2019-05-16 ENCOUNTER — PATIENT MESSAGE (OUTPATIENT)
Dept: INTERNAL MEDICINE | Facility: CLINIC | Age: 54
End: 2019-05-16

## 2019-05-18 ENCOUNTER — OFFICE VISIT (OUTPATIENT)
Dept: INTERNAL MEDICINE | Facility: CLINIC | Age: 54
End: 2019-05-18
Payer: COMMERCIAL

## 2019-05-18 VITALS
DIASTOLIC BLOOD PRESSURE: 76 MMHG | HEIGHT: 70 IN | WEIGHT: 315 LBS | SYSTOLIC BLOOD PRESSURE: 124 MMHG | BODY MASS INDEX: 45.1 KG/M2 | HEART RATE: 71 BPM | OXYGEN SATURATION: 99 % | TEMPERATURE: 98 F

## 2019-05-18 DIAGNOSIS — J04.0 LARYNGITIS: Primary | ICD-10-CM

## 2019-05-18 LAB — DEPRECATED S PYO AG THROAT QL EIA: NEGATIVE

## 2019-05-18 PROCEDURE — 3078F DIAST BP <80 MM HG: CPT | Mod: CPTII,S$GLB,, | Performed by: INTERNAL MEDICINE

## 2019-05-18 PROCEDURE — 87880 STREP A ASSAY W/OPTIC: CPT

## 2019-05-18 PROCEDURE — 99999 PR PBB SHADOW E&M-EST. PATIENT-LVL III: ICD-10-PCS | Mod: PBBFAC,,, | Performed by: INTERNAL MEDICINE

## 2019-05-18 PROCEDURE — 3074F SYST BP LT 130 MM HG: CPT | Mod: CPTII,S$GLB,, | Performed by: INTERNAL MEDICINE

## 2019-05-18 PROCEDURE — 3074F PR MOST RECENT SYSTOLIC BLOOD PRESSURE < 130 MM HG: ICD-10-PCS | Mod: CPTII,S$GLB,, | Performed by: INTERNAL MEDICINE

## 2019-05-18 PROCEDURE — 3008F BODY MASS INDEX DOCD: CPT | Mod: CPTII,S$GLB,, | Performed by: INTERNAL MEDICINE

## 2019-05-18 PROCEDURE — 99212 OFFICE O/P EST SF 10 MIN: CPT | Mod: S$GLB,,, | Performed by: INTERNAL MEDICINE

## 2019-05-18 PROCEDURE — 99999 PR PBB SHADOW E&M-EST. PATIENT-LVL III: CPT | Mod: PBBFAC,,, | Performed by: INTERNAL MEDICINE

## 2019-05-18 PROCEDURE — 99212 PR OFFICE/OUTPT VISIT, EST, LEVL II, 10-19 MIN: ICD-10-PCS | Mod: S$GLB,,, | Performed by: INTERNAL MEDICINE

## 2019-05-18 PROCEDURE — 3078F PR MOST RECENT DIASTOLIC BLOOD PRESSURE < 80 MM HG: ICD-10-PCS | Mod: CPTII,S$GLB,, | Performed by: INTERNAL MEDICINE

## 2019-05-18 PROCEDURE — 87081 CULTURE SCREEN ONLY: CPT

## 2019-05-18 PROCEDURE — 3008F PR BODY MASS INDEX (BMI) DOCUMENTED: ICD-10-PCS | Mod: CPTII,S$GLB,, | Performed by: INTERNAL MEDICINE

## 2019-05-18 NOTE — PROGRESS NOTES
"Subjective:       Patient ID: García Tang is a 53 y.o. male.    Chief Complaint: Sore Throat; Laryngitis; Nasal Congestion; and Fever    HPI urgent    A week ago, drainage at the back of the throat. Hoarse voice. Strained to talk. As he's talking, feels like it's in the chest. No cough. Low grade fever (99, normal for him is 97.5) and chills.   Neck hurts. Not sure about swollen glands.   Grandson last week w/ strep throat.     Has uncontrolled diabetes.     Review of Systems  as above in HPI.     Objective:      Physical Exam    /76 (BP Location: Left arm, Patient Position: Sitting, BP Method: Large (Manual))   Pulse 71   Temp 97.9 °F (36.6 °C)   Ht 5' 10" (1.778 m)   Wt (!) 153.3 kg (337 lb 15.4 oz)   SpO2 99%   BMI 48.49 kg/m²     gEN - a+ox4, nad  heent - perrl, op mildly erythematous but no exudates. TM w/ dullness. MMM.   Neck - no LAD.  Chest - CTAB, no w/r/c.  CV - RRR  Abd - S/NT/ND/+BS  Ext - 2+ B radial and DP pulses. No LE edema.     Assessment/Plan     García was seen today for sore throat, laryngitis, nasal congestion and fever.    Diagnoses and all orders for this visit:    Laryngitis - likely viral laryngitis. Due to exposure, will check throat screen. OTC meds and symptomatic treatment.  -     THROAT SCREEN, RAPID      Follow up if symptoms worsen or fail to improve.      Cecilia Guo MD  Department of Internal Medicine - [facility]  11:44 AM  "

## 2019-05-21 ENCOUNTER — PATIENT MESSAGE (OUTPATIENT)
Dept: INTERNAL MEDICINE | Facility: CLINIC | Age: 54
End: 2019-05-21

## 2019-05-21 LAB — BACTERIA THROAT CULT: NORMAL

## 2019-05-21 RX ORDER — HYDROCHLOROTHIAZIDE 12.5 MG/1
12.5 CAPSULE ORAL DAILY
Qty: 180 CAPSULE | Refills: 3 | Status: SHIPPED | OUTPATIENT
Start: 2019-05-21 | End: 2020-10-30

## 2019-06-17 ENCOUNTER — OFFICE VISIT (OUTPATIENT)
Dept: INTERNAL MEDICINE | Facility: CLINIC | Age: 54
End: 2019-06-17
Payer: COMMERCIAL

## 2019-06-17 ENCOUNTER — HOSPITAL ENCOUNTER (OUTPATIENT)
Dept: RADIOLOGY | Facility: HOSPITAL | Age: 54
Discharge: HOME | End: 2019-06-17
Attending: INTERNAL MEDICINE
Payer: COMMERCIAL

## 2019-06-17 DIAGNOSIS — R42 VERTIGO: ICD-10-CM

## 2019-06-17 DIAGNOSIS — R05.8 PRODUCTIVE COUGH: ICD-10-CM

## 2019-06-17 DIAGNOSIS — R05.8 PRODUCTIVE COUGH: Primary | ICD-10-CM

## 2019-06-17 DIAGNOSIS — R53.82 CHRONIC FATIGUE: ICD-10-CM

## 2019-06-17 PROCEDURE — 3008F PR BODY MASS INDEX (BMI) DOCUMENTED: ICD-10-PCS | Mod: CPTII,S$GLB,, | Performed by: INTERNAL MEDICINE

## 2019-06-17 PROCEDURE — 71046 X-RAY EXAM CHEST 2 VIEWS: CPT | Mod: TC

## 2019-06-17 PROCEDURE — 71046 XR CHEST PA AND LATERAL: ICD-10-PCS | Mod: 26,,, | Performed by: SPECIALIST

## 2019-06-17 PROCEDURE — 99214 OFFICE O/P EST MOD 30 MIN: CPT | Mod: S$GLB,,, | Performed by: INTERNAL MEDICINE

## 2019-06-17 PROCEDURE — 99999 PR PBB SHADOW E&M-EST. PATIENT-LVL III: ICD-10-PCS | Mod: PBBFAC,,, | Performed by: INTERNAL MEDICINE

## 2019-06-17 PROCEDURE — 99999 PR PBB SHADOW E&M-EST. PATIENT-LVL III: CPT | Mod: PBBFAC,,, | Performed by: INTERNAL MEDICINE

## 2019-06-17 PROCEDURE — 99214 PR OFFICE/OUTPT VISIT, EST, LEVL IV, 30-39 MIN: ICD-10-PCS | Mod: S$GLB,,, | Performed by: INTERNAL MEDICINE

## 2019-06-17 PROCEDURE — 3008F BODY MASS INDEX DOCD: CPT | Mod: CPTII,S$GLB,, | Performed by: INTERNAL MEDICINE

## 2019-06-17 PROCEDURE — 3077F PR MOST RECENT SYSTOLIC BLOOD PRESSURE >= 140 MM HG: ICD-10-PCS | Mod: CPTII,S$GLB,, | Performed by: INTERNAL MEDICINE

## 2019-06-17 PROCEDURE — 3079F DIAST BP 80-89 MM HG: CPT | Mod: CPTII,S$GLB,, | Performed by: INTERNAL MEDICINE

## 2019-06-17 PROCEDURE — 3077F SYST BP >= 140 MM HG: CPT | Mod: CPTII,S$GLB,, | Performed by: INTERNAL MEDICINE

## 2019-06-17 PROCEDURE — 71046 X-RAY EXAM CHEST 2 VIEWS: CPT | Mod: 26,,, | Performed by: SPECIALIST

## 2019-06-17 PROCEDURE — 3079F PR MOST RECENT DIASTOLIC BLOOD PRESSURE 80-89 MM HG: ICD-10-PCS | Mod: CPTII,S$GLB,, | Performed by: INTERNAL MEDICINE

## 2019-06-17 NOTE — PROGRESS NOTES
"INTERNAL MEDICINE SAME DAY PRIMARY CARE VISIT NOTE    Subjective:     Chief Complaint: Dizziness and Fatigue       Patient ID: García Tang is a 53 y.o. male with HTN, Meniere's disease, type 2 DM c neuropathy, obesity, fatty liver c cirrhosis, GERD, pt of Dr. Patel, here today for focused same-day primary care visit.    Today, patient with several complaints including:  Dizziness.  Described as feeling like the room is spinning.  Has had vertigo in the past but states it has been slightly worse over the past few days.  No vision changes.  No LOC.  No h/a or focal deficits.  Says he saw another dr for this in the past and was rx'ed a medicine but unsure of the name but says he did not want to take it due to concerns regarding s/e.    Today also c/o "rattling noise" on the R side of his chest.  Sx present for about a week.  Occ cough but mild, occasionally productive.  Denies sob.  Reports he was a "social smoker" in the past but never smoked regularly.  No unexplained wt loss or hemoptysis.    Also states he has been feeling very tired, more than usual.      Past Medical History:  Past Medical History:   Diagnosis Date    Chronic sialoadenitis 12/11/2018    Cirrhosis     based on Fibroscan = F4 5/2018    CKD stage 2 due to type 2 diabetes mellitus 5/18/2017    Essential hypertension 7/18/2016    Fatty liver disease, nonalcoholic 4/7/2016    [place], 2006     GERD (gastroesophageal reflux disease)     HLD (hyperlipidemia)     Inflammatory bowel disease     diverticulitis    Kidney stone     requiring stent    Morbid obesity 5/29/2014    NAFLD (nonalcoholic fatty liver disease)     Near syncope 5/23/2017    Obesity hypoventilation syndrome 2/22/2016    Prostatitis     Rectal bleeding     diverticulitis    Type 2 diabetes mellitus with hyperglycemia, with long-term current use of insulin 5/18/2017    Uncontrolled type 2 diabetes mellitus with peripheral neuropathy 5/29/2014       Home " Medications:  Prior to Admission medications    Medication Sig Start Date End Date Taking? Authorizing Provider   aspirin (ECOTRIN) 81 MG EC tablet Take 81 mg by mouth once daily.   Yes Historical Provider, MD   DOCOSAHEXANOIC ACID/EPA (FISH OIL ORAL) Take 1 capsule by mouth once daily at 6am.    Yes Historical Provider, MD   ERGOCALCIFEROL, VITAMIN D2, (VITAMIN D ORAL) Take 1 capsule by mouth once daily at 6am.    Yes Historical Provider, MD   hydroCHLOROthiazide (MICROZIDE) 12.5 mg capsule Take 1 capsule (12.5 mg total) by mouth once daily. 5/21/19  Yes Vaishali Patel MD   inhalation spacing device Use with inhaler dispense with mouthpiece 8/29/18  Yes JUSTUS Cervantes-HEATHER   insulin NPH-insulin regular, 70/30, (NOVOLIN 70/30) 100 unit/mL (70-30) injection Inject subcutaneous 34 in am and 28 in pm 4/30/19  Yes Vaishali Patel MD   loratadine (CLARITIN) 10 mg tablet Take 1 tablet (10 mg total) by mouth once daily. 3/19/19 3/18/20 Yes Carmen Duong, LISA   metoprolol succinate (TOPROL XL) 50 MG 24 hr tablet Take 1 tablet (50 mg total) by mouth 2 (two) times daily. 5/7/18  Yes Vaishali Patel MD   miconazole (MICOTIN) 2 % cream Apply topically 2 (two) times daily. 4/16/19  Yes Rhianna Katz PA-C   multivitamin capsule Take by mouth. 1 Capsule Oral Every day   Yes Historical Provider, MD   nystatin (MYCOSTATIN) powder Apply topically 2 (two) times daily. 4/30/19  Yes Vaishali Patel MD   pantoprazole (PROTONIX) 40 MG tablet Take 1 tablet (40 mg total) by mouth once daily. 4/4/19 4/3/20 Yes Narda Bridges MD   ranitidine (ZANTAC) 300 MG tablet Take 1 tablet (300 mg total) by mouth every evening. 4/4/19 4/3/20 Yes Narda Bridges MD   tamsulosin (FLOMAX) 0.4 mg Cap Take 1 capsule (0.4 mg total) by mouth once daily. 4/24/19 5/24/19  Alonzo Alexander, DNP       Allergies:  Review of patient's allergies indicates:   Allergen Reactions    Lisinopril Swelling    Bactrim   "[sulfamethoxazole-trimethoprim]      Other reaction(s): Unknown    Diflucan [fluconazole]      Heart racing    Hydroxyzine hcl      Other reaction(s): elevated bp    Kenalog [triamcinolone acetonide] Other (See Comments)     Heart flutters all day    Phenergan [promethazine] Anxiety    Zofran [ondansetron hcl (pf)] Anxiety       Social History:  Social History     Tobacco Use    Smoking status: Former Smoker     Types: Cigarettes    Smokeless tobacco: Never Used    Tobacco comment: social   Substance Use Topics    Alcohol use: No    Drug use: No         Review of Systems   Constitutional: Positive for fatigue. Negative for chills, fever and unexpected weight change.   Respiratory: Positive for cough. Negative for chest tightness and shortness of breath.    Cardiovascular: Negative for chest pain.   Gastrointestinal: Negative for abdominal pain and blood in stool.   Genitourinary: Negative for dysuria and frequency.   Neurological: Positive for dizziness and light-headedness. Negative for syncope and weakness.           Objective:   BP (!) 140/80 (BP Location: Left arm, Patient Position: Sitting, BP Method: Large (Manual))   Pulse 68   Ht 5' 10" (1.778 m)   Wt (!) 155.3 kg (342 lb 6 oz)   SpO2 99%   BMI 49.13 kg/m²        General: AAO x3, no apparent distress, morbidly obese  CV: RRR, no m/r/g  Pulm: Lungs CTAB, no crackles, no wheezes; no changes in fremitus  Abd: s/NT/ND +BS  Extremities: no c/c/e    Labs:     Lab Results   Component Value Date    TSH 2.675 10/11/2017     Lab Results   Component Value Date    WBC 6.60 04/24/2019    HGB 14.8 04/24/2019    HCT 42.4 04/24/2019    MCV 84 04/24/2019     04/24/2019     CMP  Sodium   Date Value Ref Range Status   04/24/2019 135 (L) 136 - 145 mmol/L Final     Potassium   Date Value Ref Range Status   04/24/2019 4.2 3.5 - 5.1 mmol/L Final     Chloride   Date Value Ref Range Status   04/24/2019 97 95 - 110 mmol/L Final     CO2   Date Value Ref Range " "Status   04/24/2019 26 23 - 29 mmol/L Final     Glucose   Date Value Ref Range Status   04/24/2019 378 (H) 70 - 110 mg/dL Final     BUN, Bld   Date Value Ref Range Status   04/24/2019 12 6 - 20 mg/dL Final     Creatinine   Date Value Ref Range Status   04/24/2019 1.2 0.5 - 1.4 mg/dL Final     Calcium   Date Value Ref Range Status   04/24/2019 9.6 8.7 - 10.5 mg/dL Final     Total Protein   Date Value Ref Range Status   04/24/2019 7.5 6.0 - 8.4 g/dL Final     Albumin   Date Value Ref Range Status   04/24/2019 4.1 3.5 - 5.2 g/dL Final     Total Bilirubin   Date Value Ref Range Status   04/24/2019 0.6 0.1 - 1.0 mg/dL Final     Comment:     For infants and newborns, interpretation of results should be based  on gestational age, weight and in agreement with clinical  observations.  Premature Infant recommended reference ranges:  Up to 24 hours.............<8.0 mg/dL  Up to 48 hours............<12.0 mg/dL  3-5 days..................<15.0 mg/dL  6-29 days.................<15.0 mg/dL       Alkaline Phosphatase   Date Value Ref Range Status   04/24/2019 97 55 - 135 U/L Final     AST   Date Value Ref Range Status   04/24/2019 32 10 - 40 U/L Final     ALT   Date Value Ref Range Status   04/24/2019 41 10 - 44 U/L Final     Anion Gap   Date Value Ref Range Status   04/24/2019 12 8 - 16 mmol/L Final     eGFR if    Date Value Ref Range Status   04/24/2019 >60 >60 mL/min/1.73 m^2 Final     eGFR if non    Date Value Ref Range Status   04/24/2019 >60 >60 mL/min/1.73 m^2 Final     Comment:     Calculation used to obtain the estimated glomerular filtration  rate (eGFR) is the CKD-EPI equation.        Lab Results   Component Value Date    LDLCALC 113.0 10/22/2018     Lab Results   Component Value Date    HGBA1C 10.5 (H) 04/24/2019         Assessment/Plan     García was seen today for dizziness and fatigue.    Diagnoses and all orders for this visit:    Productive cough  Reports "rattling/vibration" " sensation at times on R.  Lungs clear on exam today.  Will check xray to fully evaluate, could be viral cough.  If sx persist or progress, rec f/u c PCP to discuss additional testing.  -     X-Ray Chest PA And Lateral; Future    Chronic fatigue  Recent labs overall unremarkable, h/h wnl and renal function and LFT wnl.  Sx appear acute on chronic.  Possible viral etiology.  Denies snoring but could consider sleep study in future as he does have RF for CHANELL.  Will check TFTs today.  -     TSH; Future    Vertigo  Appears benign positional.  Discussed exercises and offered Meclizine and referral to PT but pt declined today and states sx are chronic.    RTC prn and with PCP as per routine.    Bethany Hale MD  Department of Internal Medicine - [facility]  06/17/2019

## 2019-06-18 VITALS
DIASTOLIC BLOOD PRESSURE: 80 MMHG | HEART RATE: 68 BPM | BODY MASS INDEX: 45.1 KG/M2 | WEIGHT: 315 LBS | OXYGEN SATURATION: 99 % | HEIGHT: 70 IN | SYSTOLIC BLOOD PRESSURE: 140 MMHG

## 2019-07-02 DIAGNOSIS — Z76.0 MEDICATION REFILL: ICD-10-CM

## 2019-07-02 RX ORDER — METOPROLOL SUCCINATE 50 MG/1
TABLET, EXTENDED RELEASE ORAL
Qty: 60 TABLET | Refills: 2 | Status: SHIPPED | OUTPATIENT
Start: 2019-07-02 | End: 2019-09-17 | Stop reason: SDUPTHER

## 2019-07-02 RX ORDER — METOPROLOL SUCCINATE 50 MG/1
50 TABLET, EXTENDED RELEASE ORAL 2 TIMES DAILY
Qty: 60 TABLET | Refills: 6 | Status: SHIPPED | OUTPATIENT
Start: 2019-07-02 | End: 2020-04-28

## 2019-07-22 ENCOUNTER — LAB VISIT (OUTPATIENT)
Dept: LAB | Facility: HOSPITAL | Age: 54
End: 2019-07-22
Attending: INTERNAL MEDICINE
Payer: COMMERCIAL

## 2019-07-22 DIAGNOSIS — E11.65 TYPE 2 DIABETES MELLITUS WITH HYPERGLYCEMIA, WITH LONG-TERM CURRENT USE OF INSULIN: Chronic | ICD-10-CM

## 2019-07-22 DIAGNOSIS — Z79.4 TYPE 2 DIABETES MELLITUS WITH HYPERGLYCEMIA, WITH LONG-TERM CURRENT USE OF INSULIN: Chronic | ICD-10-CM

## 2019-07-22 LAB
ALBUMIN SERPL BCP-MCNC: 3.8 G/DL (ref 3.5–5.2)
ALP SERPL-CCNC: 84 U/L (ref 55–135)
ALT SERPL W/O P-5'-P-CCNC: 42 U/L (ref 10–44)
ANION GAP SERPL CALC-SCNC: 13 MMOL/L (ref 8–16)
AST SERPL-CCNC: 33 U/L (ref 10–40)
BILIRUB SERPL-MCNC: 0.8 MG/DL (ref 0.1–1)
BUN SERPL-MCNC: 14 MG/DL (ref 6–20)
CALCIUM SERPL-MCNC: 9.4 MG/DL (ref 8.7–10.5)
CHLORIDE SERPL-SCNC: 99 MMOL/L (ref 95–110)
CHOLEST SERPL-MCNC: 171 MG/DL (ref 120–199)
CHOLEST/HDLC SERPL: 5.3 {RATIO} (ref 2–5)
CO2 SERPL-SCNC: 24 MMOL/L (ref 23–29)
CREAT SERPL-MCNC: 1.1 MG/DL (ref 0.5–1.4)
EST. GFR  (AFRICAN AMERICAN): >60 ML/MIN/1.73 M^2
EST. GFR  (NON AFRICAN AMERICAN): >60 ML/MIN/1.73 M^2
ESTIMATED AVG GLUCOSE: 206 MG/DL (ref 68–131)
GLUCOSE SERPL-MCNC: 220 MG/DL (ref 70–110)
HBA1C MFR BLD HPLC: 8.8 % (ref 4–5.6)
HDLC SERPL-MCNC: 32 MG/DL (ref 40–75)
HDLC SERPL: 18.7 % (ref 20–50)
LDLC SERPL CALC-MCNC: 117.6 MG/DL (ref 63–159)
NONHDLC SERPL-MCNC: 139 MG/DL
POTASSIUM SERPL-SCNC: 4.3 MMOL/L (ref 3.5–5.1)
PROT SERPL-MCNC: 7.4 G/DL (ref 6–8.4)
SODIUM SERPL-SCNC: 136 MMOL/L (ref 136–145)
TRIGL SERPL-MCNC: 107 MG/DL (ref 30–150)

## 2019-07-22 PROCEDURE — 36415 COLL VENOUS BLD VENIPUNCTURE: CPT

## 2019-07-22 PROCEDURE — 80053 COMPREHEN METABOLIC PANEL: CPT

## 2019-07-22 PROCEDURE — 80061 LIPID PANEL: CPT

## 2019-07-22 PROCEDURE — 83036 HEMOGLOBIN GLYCOSYLATED A1C: CPT

## 2019-07-30 ENCOUNTER — OFFICE VISIT (OUTPATIENT)
Dept: INTERNAL MEDICINE | Facility: CLINIC | Age: 54
End: 2019-07-30
Payer: COMMERCIAL

## 2019-07-30 VITALS
HEIGHT: 70 IN | HEART RATE: 70 BPM | DIASTOLIC BLOOD PRESSURE: 80 MMHG | BODY MASS INDEX: 45.1 KG/M2 | OXYGEN SATURATION: 97 % | WEIGHT: 315 LBS | SYSTOLIC BLOOD PRESSURE: 130 MMHG

## 2019-07-30 DIAGNOSIS — D22.9 ATYPICAL NEVUS: ICD-10-CM

## 2019-07-30 DIAGNOSIS — E78.5 HYPERLIPIDEMIA, UNSPECIFIED HYPERLIPIDEMIA TYPE: ICD-10-CM

## 2019-07-30 DIAGNOSIS — Z12.11 SCREEN FOR COLON CANCER: ICD-10-CM

## 2019-07-30 DIAGNOSIS — I10 ESSENTIAL HYPERTENSION: Chronic | ICD-10-CM

## 2019-07-30 DIAGNOSIS — K76.0 FATTY LIVER DISEASE, NONALCOHOLIC: ICD-10-CM

## 2019-07-30 PROCEDURE — 3075F SYST BP GE 130 - 139MM HG: CPT | Mod: CPTII,S$GLB,, | Performed by: INTERNAL MEDICINE

## 2019-07-30 PROCEDURE — 3008F PR BODY MASS INDEX (BMI) DOCUMENTED: ICD-10-PCS | Mod: CPTII,S$GLB,, | Performed by: INTERNAL MEDICINE

## 2019-07-30 PROCEDURE — 99999 PR PBB SHADOW E&M-EST. PATIENT-LVL V: CPT | Mod: PBBFAC,,, | Performed by: INTERNAL MEDICINE

## 2019-07-30 PROCEDURE — 3075F PR MOST RECENT SYSTOLIC BLOOD PRESS GE 130-139MM HG: ICD-10-PCS | Mod: CPTII,S$GLB,, | Performed by: INTERNAL MEDICINE

## 2019-07-30 PROCEDURE — 99214 OFFICE O/P EST MOD 30 MIN: CPT | Mod: S$GLB,,, | Performed by: INTERNAL MEDICINE

## 2019-07-30 PROCEDURE — 99999 PR PBB SHADOW E&M-EST. PATIENT-LVL V: ICD-10-PCS | Mod: PBBFAC,,, | Performed by: INTERNAL MEDICINE

## 2019-07-30 PROCEDURE — 3045F PR MOST RECENT HEMOGLOBIN A1C LEVEL 7.0-9.0%: CPT | Mod: CPTII,S$GLB,, | Performed by: INTERNAL MEDICINE

## 2019-07-30 PROCEDURE — 3008F BODY MASS INDEX DOCD: CPT | Mod: CPTII,S$GLB,, | Performed by: INTERNAL MEDICINE

## 2019-07-30 PROCEDURE — 3079F DIAST BP 80-89 MM HG: CPT | Mod: CPTII,S$GLB,, | Performed by: INTERNAL MEDICINE

## 2019-07-30 PROCEDURE — 99214 PR OFFICE/OUTPT VISIT, EST, LEVL IV, 30-39 MIN: ICD-10-PCS | Mod: S$GLB,,, | Performed by: INTERNAL MEDICINE

## 2019-07-30 PROCEDURE — 3045F PR MOST RECENT HEMOGLOBIN A1C LEVEL 7.0-9.0%: ICD-10-PCS | Mod: CPTII,S$GLB,, | Performed by: INTERNAL MEDICINE

## 2019-07-30 PROCEDURE — 3079F PR MOST RECENT DIASTOLIC BLOOD PRESSURE 80-89 MM HG: ICD-10-PCS | Mod: CPTII,S$GLB,, | Performed by: INTERNAL MEDICINE

## 2019-07-30 NOTE — PROGRESS NOTES
"Subjective:       Patient ID: García Tang is a 53 y.o. male.    Chief Complaint: Follow-up (3 month follow up and would like to discuss results.); Arm Pain (reports of weak feeling/pain in L arm, recurrent and radiates up to L shoulder.); and Mole (mole check on right above L ear. )    HPI   54 yo M here for follow up of    DM2  a1c 8.8% last week down from 10.5%.    novolin 70/30  Am 34  Pm 28  Skips in am occasionally due to work schedule.  Diet not great.     Hyperlipidemia  The 10-year ASCVD risk score ([place] RHOD[place] Jr., et al., 2013) is: 12%    Values used to calculate the score:      Age: 53 years      Sex: Male      Is Non- : No      Diabetic: Yes      Tobacco smoker: No      Systolic Blood Pressure: 130 mmHg      Is BP treated: Yes      HDL Cholesterol: 32 mg/dL      Total Cholesterol: 171 mg/dL  Discussed statin.   Discussed diet.    LUQ abdominal pain  US abd in May with hepatic steatosis  Read as splenomegaly but radiologist later told me no splenomegaly  cT ordered but not done.       Left arm soreness and stiffness  Mid biceps over elbow.   Occasional tylenol.     NAFLD - possible cirrhosis?  Followed by hepatology  Review of Systems   Constitutional: Negative for fever.   HENT: Negative.    Eyes: Negative.    Respiratory: Negative for shortness of breath.    Cardiovascular: Negative for chest pain and leg swelling.   Gastrointestinal: Negative for abdominal pain, diarrhea, nausea and vomiting.        Some occasional abd discomfort, mostly RUQ   Genitourinary: Negative.    Musculoskeletal: Positive for myalgias. Negative for arthralgias.   Skin: Negative for rash.   Psychiatric/Behavioral: Negative.        Objective:   /80 (BP Location: Left arm, Patient Position: Sitting, BP Method: Large (Manual))   Pulse 70   Ht 5' 10" (1.778 m)   Wt (!) 154.1 kg (339 lb 11.7 oz)   SpO2 97%   BMI 48.75 kg/m²      Physical Exam   Constitutional: He is oriented to person, place, and " time. He appears well-developed and well-nourished. No distress.   HENT:   Head: Normocephalic and atraumatic.   Cardiovascular: Normal rate and regular rhythm.   No murmur heard.  Pulmonary/Chest: Effort normal. No respiratory distress. He has no wheezes. He has no rales.   Neurological: He is alert and oriented to person, place, and time.   Skin: Skin is warm and dry. He is not diaphoretic.   Left temple with 5mm hyperpigmented raised lesion with somewhat irregular border   Psychiatric: He has a normal mood and affect. His behavior is normal.       Assessment:       1. Uncontrolled type 2 diabetes mellitus with peripheral neuropathy    2. Fatty liver disease, nonalcoholic    3. Hyperlipidemia, unspecified hyperlipidemia type    4. Atypical nevus    5. Screen for colon cancer    6. Essential hypertension        Plan:       García was seen today for follow-up, arm pain and mole.    Diagnoses and all orders for this visit:    Uncontrolled type 2 diabetes mellitus with peripheral neuropathy  -     Hemoglobin A1c; Future  -     Comprehensive metabolic panel; Future    Fatty liver disease, nonalcoholic    Hyperlipidemia, unspecified hyperlipidemia type  -     Lipid panel; Future    Atypical nevus  -     Ambulatory Referral to Dermatology    Screen for colon cancer  -     Fecal Immunochemical Test (iFOBT); Future    Essential hypertension  Continue current meds          Wants to work on diet, hold off on adding metformin or increasing insulin  Wants to work on diet, weight loss and hold off on atorvastatin.     Declines p23    Discussed colon cancer screening and that colonoscopy is the preferred test for screening. After discussion of risks and benefits of colonoscopy joseph declines. However, patient does agree to FIT testing with understanding that colonoscopy is the preferred test and stipulation that any positive result requires a colonoscopy as follow up.

## 2019-08-02 ENCOUNTER — LAB VISIT (OUTPATIENT)
Dept: LAB | Facility: HOSPITAL | Age: 54
End: 2019-08-02
Attending: INTERNAL MEDICINE
Payer: COMMERCIAL

## 2019-08-02 DIAGNOSIS — Z12.11 SCREEN FOR COLON CANCER: ICD-10-CM

## 2019-08-02 LAB — HEMOCCULT STL QL IA: NEGATIVE

## 2019-08-02 PROCEDURE — 82274 ASSAY TEST FOR BLOOD FECAL: CPT

## 2019-08-12 ENCOUNTER — PATIENT MESSAGE (OUTPATIENT)
Dept: INTERNAL MEDICINE | Facility: CLINIC | Age: 54
End: 2019-08-12

## 2019-08-15 ENCOUNTER — OFFICE VISIT (OUTPATIENT)
Dept: INTERNAL MEDICINE | Facility: CLINIC | Age: 54
End: 2019-08-15
Attending: INTERNAL MEDICINE
Payer: COMMERCIAL

## 2019-08-15 VITALS
WEIGHT: 315 LBS | DIASTOLIC BLOOD PRESSURE: 84 MMHG | HEART RATE: 68 BPM | HEIGHT: 70 IN | SYSTOLIC BLOOD PRESSURE: 130 MMHG | TEMPERATURE: 98 F | BODY MASS INDEX: 45.1 KG/M2

## 2019-08-15 DIAGNOSIS — M79.10 MUSCLE PAIN: ICD-10-CM

## 2019-08-15 DIAGNOSIS — E66.2 OBESITY HYPOVENTILATION SYNDROME: Chronic | ICD-10-CM

## 2019-08-15 DIAGNOSIS — I10 ESSENTIAL HYPERTENSION: Chronic | ICD-10-CM

## 2019-08-15 PROCEDURE — 99999 PR PBB SHADOW E&M-EST. PATIENT-LVL III: ICD-10-PCS | Mod: PBBFAC,,, | Performed by: INTERNAL MEDICINE

## 2019-08-15 PROCEDURE — 3045F PR MOST RECENT HEMOGLOBIN A1C LEVEL 7.0-9.0%: CPT | Mod: CPTII,S$GLB,, | Performed by: INTERNAL MEDICINE

## 2019-08-15 PROCEDURE — 3079F DIAST BP 80-89 MM HG: CPT | Mod: CPTII,S$GLB,, | Performed by: INTERNAL MEDICINE

## 2019-08-15 PROCEDURE — 99213 OFFICE O/P EST LOW 20 MIN: CPT | Mod: S$GLB,,, | Performed by: INTERNAL MEDICINE

## 2019-08-15 PROCEDURE — 3075F PR MOST RECENT SYSTOLIC BLOOD PRESS GE 130-139MM HG: ICD-10-PCS | Mod: CPTII,S$GLB,, | Performed by: INTERNAL MEDICINE

## 2019-08-15 PROCEDURE — 3075F SYST BP GE 130 - 139MM HG: CPT | Mod: CPTII,S$GLB,, | Performed by: INTERNAL MEDICINE

## 2019-08-15 PROCEDURE — 99213 PR OFFICE/OUTPT VISIT, EST, LEVL III, 20-29 MIN: ICD-10-PCS | Mod: S$GLB,,, | Performed by: INTERNAL MEDICINE

## 2019-08-15 PROCEDURE — 3079F PR MOST RECENT DIASTOLIC BLOOD PRESSURE 80-89 MM HG: ICD-10-PCS | Mod: CPTII,S$GLB,, | Performed by: INTERNAL MEDICINE

## 2019-08-15 PROCEDURE — 99999 PR PBB SHADOW E&M-EST. PATIENT-LVL III: CPT | Mod: PBBFAC,,, | Performed by: INTERNAL MEDICINE

## 2019-08-15 PROCEDURE — 3008F PR BODY MASS INDEX (BMI) DOCUMENTED: ICD-10-PCS | Mod: CPTII,S$GLB,, | Performed by: INTERNAL MEDICINE

## 2019-08-15 PROCEDURE — 3045F PR MOST RECENT HEMOGLOBIN A1C LEVEL 7.0-9.0%: ICD-10-PCS | Mod: CPTII,S$GLB,, | Performed by: INTERNAL MEDICINE

## 2019-08-15 PROCEDURE — 3008F BODY MASS INDEX DOCD: CPT | Mod: CPTII,S$GLB,, | Performed by: INTERNAL MEDICINE

## 2019-08-15 NOTE — PROGRESS NOTES
Clinic Note  8/15/2019      Subjective:       Patient ID:  Brad is a 53 y.o. male being seen for an urgent care visit.    Chief Complaint: Insomnia; Dizziness; Leg Pain (right leg knot, clf); and Excessive Sweating    Patient with known poorly controlled type 2 DM, OHS, HTN and morbid obesity.  Last A1c 8.8.  Over weekend was working on house in heat for several (7-8) hrs /d and since then has been having increased muscle cramps/pain, orthostasis, and some R sided abd pain.  No cp/sob.  He does get tachycardic but no known hx of A fib.  No prolonged travel or immobility.  No confusion but has been lethargic.  Also complaining of diffuse itching since this weekend.        Review of Systems   Constitutional: Negative for chills and fever.   Cardiovascular: Positive for leg swelling. Negative for chest pain, orthopnea and claudication.   Gastrointestinal: Positive for abdominal pain.   Musculoskeletal: Positive for myalgias. Negative for back pain and falls.   Skin: Positive for itching.   Psychiatric/Behavioral: The patient has insomnia.        Medication List with Changes/Refills   Current Medications    ASPIRIN (ECOTRIN) 81 MG EC TABLET    Take 81 mg by mouth once daily.    DOCOSAHEXANOIC ACID/EPA (FISH OIL ORAL)    Take 1 capsule by mouth once daily at 6am.     ERGOCALCIFEROL, VITAMIN D2, (VITAMIN D ORAL)    Take 1 capsule by mouth once daily at 6am.     HYDROCHLOROTHIAZIDE (MICROZIDE) 12.5 MG CAPSULE    Take 1 capsule (12.5 mg total) by mouth once daily.    INHALATION SPACING DEVICE    Use with inhaler dispense with mouthpiece    INSULIN NPH-INSULIN REGULAR, 70/30, (NOVOLIN 70/30) 100 UNIT/ML (70-30) INJECTION    Inject subcutaneous 34 in am and 28 in pm    LORATADINE (CLARITIN) 10 MG TABLET    Take 1 tablet (10 mg total) by mouth once daily.    METOPROLOL SUCCINATE (TOPROL XL) 50 MG 24 HR TABLET    Take 1 tablet (50 mg total) by mouth 2 (two) times daily.    METOPROLOL SUCCINATE (TOPROL-XL) 50 MG 24 HR TABLET     "TAKE ONE BY MOUTH TWICE DAILY    MICONAZOLE (MICOTIN) 2 % CREAM    Apply topically 2 (two) times daily.    MULTIVITAMIN CAPSULE    Take by mouth. 1 Capsule Oral Every day    NYSTATIN (MYCOSTATIN) POWDER    Apply topically 2 (two) times daily.    PANTOPRAZOLE (PROTONIX) 40 MG TABLET    Take 1 tablet (40 mg total) by mouth once daily.    RANITIDINE (ZANTAC) 300 MG TABLET    Take 1 tablet (300 mg total) by mouth every evening.    TAMSULOSIN (FLOMAX) 0.4 MG CAP    Take 1 capsule (0.4 mg total) by mouth once daily.       Patient Active Problem List   Diagnosis    Morbid obesity    Uncontrolled type 2 diabetes mellitus with peripheral neuropathy    Obesity hypoventilation syndrome    Fatty liver disease, nonalcoholic    Essential hypertension    CKD stage 2 due to type 2 diabetes mellitus    Type 2 diabetes mellitus with hyperglycemia, with long-term current use of insulin    Dizziness    Structural abnormality of bladder    Meniere's disease    Sensory ataxia    Gait instability    ASCVD (arteriosclerotic cardiovascular disease)    Chronic sialoadenitis    Gastroesophageal reflux disease without esophagitis    Other cirrhosis of liver           Objective:      /84   Pulse 68   Temp 98 °F (36.7 °C)   Ht 5' 10" (1.778 m)   Wt (!) 151.7 kg (334 lb 7 oz)   BMI 47.99 kg/m²   Estimated body mass index is 47.99 kg/m² as calculated from the following:    Height as of this encounter: 5' 10" (1.778 m).    Weight as of this encounter: 151.7 kg (334 lb 7 oz).  Physical Exam   Constitutional: He is oriented to person, place, and time.   Mobidly obese     HENT:   Head: Normocephalic.   Mouth/Throat: Oropharynx is clear and moist.   Neck: Neck supple.   Cardiovascular: Normal rate and regular rhythm.   No murmur heard.  Pulmonary/Chest: Effort normal and breath sounds normal.   Abdominal: Soft. He exhibits no distension. There is no guarding.   Musculoskeletal: He exhibits edema (2+ to knee).   Neurological: " He is alert and oriented to person, place, and time.   Skin:   Areas of scratching on Lower legs         Assessment and Plan:         Problem List Items Addressed This Visit     Uncontrolled type 2 diabetes mellitus with peripheral neuropathy - Primary (Chronic)    Overview     A1C 7.9 in 11/17         Relevant Orders    Comprehensive metabolic panel    Obesity hypoventilation syndrome (Chronic)    Essential hypertension (Chronic)      Other Visit Diagnoses     Muscle pain    - worried about late effects of heat exhaustion including rhabdo, NASRIN.  Will check labs.  Sugars 240 now.  Told to stop drinking regular gatorade and focus on water or sugar free gatorade.  Stay out of heat.  Keep legs elevated.  If has not had eval for CHANELL would recommend his PCP consider this.  Will follow up with him today once labs are back.    Relevant Orders    CK          Follow Up:   Follow up if symptoms worsen or fail to improve.        Anthony Rockwell

## 2019-08-15 NOTE — LETTER
August 15, 2019        Vaishali Patel MD  [address]  [facility] [address]             [facility]  [address]  [facility] [address]  Phone: [phone]  Fax: [phone]   Patient: García Tang   MR Number: [id number]   YOB: 1965   Date of Visit: 8/15/2019       Dear Dr. Patel:    Thank you for referring García Tang to me for evaluation. Below are the relevant portions of my assessment and plan of care.           Problem List Items Addressed This Visit     Uncontrolled type 2 diabetes mellitus with peripheral neuropathy - Primary (Chronic)    Overview     A1C 7.9 in 11/17         Relevant Orders    Comprehensive metabolic panel    Obesity hypoventilation syndrome (Chronic)    Essential hypertension (Chronic)      Other Visit Diagnoses     Muscle pain    - worried about late effects of heat exhaustion including rhabdo, NASRIN.  Will check labs.  Sugars 240 now.  Told to stop drinking regular gatorade and focus on water or sugar free gatorade.  Stay out of heat.  Keep legs elevated.  If has not had eval for CHANELL would recommend his PCP consider this.  Will follow up with him today once labs are back.    Relevant Orders    CK          Follow Up:   Follow up if symptoms worsen or fail to improve.      If you have questions, please do not hesitate to call me. I look forward to following García along with you.    Sincerely,      Anthony Rockwell MD           CC  No Recipients

## 2019-08-16 ENCOUNTER — PATIENT MESSAGE (OUTPATIENT)
Dept: INTERNAL MEDICINE | Facility: CLINIC | Age: 54
End: 2019-08-16

## 2019-09-06 ENCOUNTER — TELEPHONE (OUTPATIENT)
Dept: HEPATOLOGY | Facility: CLINIC | Age: 54
End: 2019-09-06

## 2019-09-16 ENCOUNTER — OFFICE VISIT (OUTPATIENT)
Dept: INTERNAL MEDICINE | Facility: CLINIC | Age: 54
End: 2019-09-16
Payer: COMMERCIAL

## 2019-09-16 ENCOUNTER — HOSPITAL ENCOUNTER (OUTPATIENT)
Dept: RADIOLOGY | Facility: HOSPITAL | Age: 54
Discharge: HOME | End: 2019-09-16
Attending: PHYSICIAN ASSISTANT
Payer: COMMERCIAL

## 2019-09-16 VITALS
OXYGEN SATURATION: 95 % | TEMPERATURE: 98 F | BODY MASS INDEX: 45.1 KG/M2 | HEART RATE: 87 BPM | WEIGHT: 315 LBS | DIASTOLIC BLOOD PRESSURE: 85 MMHG | HEIGHT: 70 IN | SYSTOLIC BLOOD PRESSURE: 130 MMHG

## 2019-09-16 DIAGNOSIS — I15.2 HYPERTENSION ASSOCIATED WITH DIABETES: ICD-10-CM

## 2019-09-16 DIAGNOSIS — E11.65 TYPE 2 DIABETES MELLITUS WITH HYPERGLYCEMIA, WITH LONG-TERM CURRENT USE OF INSULIN: Chronic | ICD-10-CM

## 2019-09-16 DIAGNOSIS — K76.0 FATTY LIVER DISEASE, NONALCOHOLIC: ICD-10-CM

## 2019-09-16 DIAGNOSIS — R06.02 SOB (SHORTNESS OF BREATH): ICD-10-CM

## 2019-09-16 DIAGNOSIS — Z79.4 TYPE 2 DIABETES MELLITUS WITH HYPERGLYCEMIA, WITH LONG-TERM CURRENT USE OF INSULIN: Chronic | ICD-10-CM

## 2019-09-16 DIAGNOSIS — E66.01 MORBID OBESITY: Chronic | ICD-10-CM

## 2019-09-16 DIAGNOSIS — R06.02 SOB (SHORTNESS OF BREATH): Primary | ICD-10-CM

## 2019-09-16 DIAGNOSIS — E11.59 HYPERTENSION ASSOCIATED WITH DIABETES: ICD-10-CM

## 2019-09-16 PROCEDURE — 3079F DIAST BP 80-89 MM HG: CPT | Mod: CPTII,S$GLB,, | Performed by: PHYSICIAN ASSISTANT

## 2019-09-16 PROCEDURE — 93010 ELECTROCARDIOGRAM REPORT: CPT | Mod: S$GLB,,, | Performed by: INTERNAL MEDICINE

## 2019-09-16 PROCEDURE — 93005 ELECTROCARDIOGRAM TRACING: CPT | Mod: S$GLB,,, | Performed by: PHYSICIAN ASSISTANT

## 2019-09-16 PROCEDURE — 3045F PR MOST RECENT HEMOGLOBIN A1C LEVEL 7.0-9.0%: ICD-10-PCS | Mod: CPTII,S$GLB,, | Performed by: PHYSICIAN ASSISTANT

## 2019-09-16 PROCEDURE — 99214 PR OFFICE/OUTPT VISIT, EST, LEVL IV, 30-39 MIN: ICD-10-PCS | Mod: S$GLB,,, | Performed by: PHYSICIAN ASSISTANT

## 2019-09-16 PROCEDURE — 71046 XR CHEST PA AND LATERAL: ICD-10-PCS | Mod: 26,,, | Performed by: RADIOLOGY

## 2019-09-16 PROCEDURE — 93005 EKG 12-LEAD: ICD-10-PCS | Mod: S$GLB,,, | Performed by: PHYSICIAN ASSISTANT

## 2019-09-16 PROCEDURE — 93010 EKG 12-LEAD: ICD-10-PCS | Mod: S$GLB,,, | Performed by: INTERNAL MEDICINE

## 2019-09-16 PROCEDURE — 71046 X-RAY EXAM CHEST 2 VIEWS: CPT | Mod: TC

## 2019-09-16 PROCEDURE — 71046 X-RAY EXAM CHEST 2 VIEWS: CPT | Mod: 26,,, | Performed by: RADIOLOGY

## 2019-09-16 PROCEDURE — 3008F BODY MASS INDEX DOCD: CPT | Mod: CPTII,S$GLB,, | Performed by: PHYSICIAN ASSISTANT

## 2019-09-16 PROCEDURE — 99999 PR PBB SHADOW E&M-EST. PATIENT-LVL IV: CPT | Mod: PBBFAC,,, | Performed by: PHYSICIAN ASSISTANT

## 2019-09-16 PROCEDURE — 3045F PR MOST RECENT HEMOGLOBIN A1C LEVEL 7.0-9.0%: CPT | Mod: CPTII,S$GLB,, | Performed by: PHYSICIAN ASSISTANT

## 2019-09-16 PROCEDURE — 3075F PR MOST RECENT SYSTOLIC BLOOD PRESS GE 130-139MM HG: ICD-10-PCS | Mod: CPTII,S$GLB,, | Performed by: PHYSICIAN ASSISTANT

## 2019-09-16 PROCEDURE — 99214 OFFICE O/P EST MOD 30 MIN: CPT | Mod: S$GLB,,, | Performed by: PHYSICIAN ASSISTANT

## 2019-09-16 PROCEDURE — 3075F SYST BP GE 130 - 139MM HG: CPT | Mod: CPTII,S$GLB,, | Performed by: PHYSICIAN ASSISTANT

## 2019-09-16 PROCEDURE — 3008F PR BODY MASS INDEX (BMI) DOCUMENTED: ICD-10-PCS | Mod: CPTII,S$GLB,, | Performed by: PHYSICIAN ASSISTANT

## 2019-09-16 PROCEDURE — 3079F PR MOST RECENT DIASTOLIC BLOOD PRESSURE 80-89 MM HG: ICD-10-PCS | Mod: CPTII,S$GLB,, | Performed by: PHYSICIAN ASSISTANT

## 2019-09-16 PROCEDURE — 99999 PR PBB SHADOW E&M-EST. PATIENT-LVL IV: ICD-10-PCS | Mod: PBBFAC,,, | Performed by: PHYSICIAN ASSISTANT

## 2019-09-16 NOTE — PROGRESS NOTES
"Subjective:       Patient ID: García Tang is a 53 y.o. male.    Chief Complaint: Leg Pain (left leg near the shin area with augustus edema) and Flank Pain (left side hurts worst than the right and pt states that he has an enlarged liver and has not had a biopsy. )    Mr. García Tang is a 53 year old  male with a history of HTN, DM2, CKD stage 2, non-alcoholic fatty liver disease,  GERD, and cirrhosis who presents to the clinic for upper left and right quadrant pain and swelling of lower left extremity. Patient reports doing yard work yesterday when he noticed swelling of his left leg that was different than usual and "bluish" with associated pitting edema. He reports chronic pitting edema in both of his lower extremities but states that it was worse yesterday in his left leg with pitting into his left knee. He denies of any pain with ambulation, mottling, or rashes. He also reports of chronic upper right and left quadrant pain, but reports the pain experienced yesterday was worse than usual in which the pain on his left side was worse than on his right side. The pain is located right under his lower rib cage with a pulling sensation that is intermittently sharp with a 7/10 intensity. The pain is aggravated when laying down and with palpation. He reports taking tylenol once a day to help alleviate his pain with little to no relief. Patient has an appointment with hepatology next week and is due for abdominal imaging: CT scan, biopsy with EGD and EUS, and colonoscopy in which he has deferred in the past. Today, patient reports of new onset dyspnea while sitting at his desk, left shoulder pain, a decrease in urinary stream strength, and 1 episode of diarrhea Saturday.     Review of Systems   Constitutional: Negative for fever and unexpected weight change.   Respiratory: Positive for chest tightness and shortness of breath. Negative for cough and wheezing.    Cardiovascular: Positive for leg swelling. " Negative for chest pain and palpitations.   Gastrointestinal: Positive for abdominal pain and diarrhea. Negative for constipation, nausea and vomiting.   Endocrine: Negative for polyuria.   Genitourinary: Positive for difficulty urinating. Negative for dysuria, flank pain and frequency.   Skin: Negative for rash.   Neurological: Negative for headaches.       Objective:      Physical Exam   Constitutional: He is oriented to person, place, and time. He appears well-developed and well-nourished. No distress.   HENT:   Head: Normocephalic and atraumatic.   Right Ear: External ear normal.   Left Ear: External ear normal.   Nose: Nose normal.   Mouth/Throat: Oropharynx is clear and moist.   Eyes: Pupils are equal, round, and reactive to light. Conjunctivae and EOM are normal.   Neck: Normal range of motion. Neck supple.   Cardiovascular: Normal rate, regular rhythm, normal heart sounds and intact distal pulses. Exam reveals no gallop and no friction rub.   No murmur heard.  Pulmonary/Chest: Effort normal and breath sounds normal. No respiratory distress. He has no wheezes. He exhibits no tenderness.   Abdominal: Soft. Bowel sounds are normal. He exhibits no distension. There is tenderness. There is no guarding.   Musculoskeletal: Normal range of motion. He exhibits edema (to bilateral lower extremity with +1 pitting edema) and tenderness.   Neurological: He is alert and oriented to person, place, and time.   Skin: Skin is warm and dry. He is not diaphoretic.        Psychiatric: He has a normal mood and affect. His behavior is normal.   Nursing note and vitals reviewed.      Assessment:       1. SOB (shortness of breath)    2. Fatty liver disease, nonalcoholic    3. Type 2 diabetes mellitus with hyperglycemia, with long-term current use of insulin    4. Morbid obesity    5. Hypertension associated with diabetes        Plan:       García was seen today for leg pain and flank pain.    Diagnoses and all orders for this  visit:    SOB (shortness of breath)  Comments:  EKG is normal.  Will get chest x-ray.  Orders:  -     X-Ray Chest PA And Lateral; Future  -     IN OFFICE EKG 12-LEAD (to Muse)    Fatty liver disease, nonalcoholic  Comments:  Keep appointment with hepatology.    Type 2 diabetes mellitus with hyperglycemia, with long-term current use of insulin  Comments:  Uncontrolled, patient encouraged to follow up with primary care doctor    Morbid obesity  Comments:  Uncontrolled encourage diet    Hypertension associated with diabetes  Comments:  Controlled, continue current regimen     Will call patient with chest x-ray results.  EKG was normal.  Symptoms worsen or new symptoms develop he is to go to the emergency room immediately.  He expressed understanding

## 2019-09-17 ENCOUNTER — PATIENT MESSAGE (OUTPATIENT)
Dept: HEPATOLOGY | Facility: CLINIC | Age: 54
End: 2019-09-17

## 2019-09-17 ENCOUNTER — OFFICE VISIT (OUTPATIENT)
Dept: HEPATOLOGY | Facility: CLINIC | Age: 54
End: 2019-09-17
Payer: COMMERCIAL

## 2019-09-17 ENCOUNTER — HOSPITAL ENCOUNTER (OUTPATIENT)
Dept: RADIOLOGY | Facility: HOSPITAL | Age: 54
Discharge: HOME | End: 2019-09-17
Attending: NURSE PRACTITIONER
Payer: COMMERCIAL

## 2019-09-17 VITALS
HEIGHT: 70 IN | RESPIRATION RATE: 16 BRPM | WEIGHT: 315 LBS | BODY MASS INDEX: 45.1 KG/M2 | SYSTOLIC BLOOD PRESSURE: 133 MMHG | DIASTOLIC BLOOD PRESSURE: 83 MMHG | OXYGEN SATURATION: 97 % | HEART RATE: 75 BPM

## 2019-09-17 DIAGNOSIS — K76.0 FATTY LIVER DISEASE, NONALCOHOLIC: ICD-10-CM

## 2019-09-17 DIAGNOSIS — Z79.4 TYPE 2 DIABETES MELLITUS WITH HYPERGLYCEMIA, WITH LONG-TERM CURRENT USE OF INSULIN: Chronic | ICD-10-CM

## 2019-09-17 DIAGNOSIS — E66.01 MORBID OBESITY: Chronic | ICD-10-CM

## 2019-09-17 DIAGNOSIS — E11.65 TYPE 2 DIABETES MELLITUS WITH HYPERGLYCEMIA, WITH LONG-TERM CURRENT USE OF INSULIN: Chronic | ICD-10-CM

## 2019-09-17 DIAGNOSIS — Z23 NEED FOR HEPATITIS A AND B VACCINATION: ICD-10-CM

## 2019-09-17 DIAGNOSIS — K74.60 CIRRHOSIS OF LIVER WITHOUT ASCITES, UNSPECIFIED HEPATIC CIRRHOSIS TYPE: ICD-10-CM

## 2019-09-17 DIAGNOSIS — K74.69 OTHER CIRRHOSIS OF LIVER: Primary | ICD-10-CM

## 2019-09-17 DIAGNOSIS — R16.1 SPLENOMEGALY: ICD-10-CM

## 2019-09-17 PROBLEM — I73.9 PAD (PERIPHERAL ARTERY DISEASE): Status: ACTIVE | Noted: 2019-09-17

## 2019-09-17 PROCEDURE — 3045F PR MOST RECENT HEMOGLOBIN A1C LEVEL 7.0-9.0%: ICD-10-PCS | Mod: CPTII,S$GLB,, | Performed by: NURSE PRACTITIONER

## 2019-09-17 PROCEDURE — 76700 US EXAM ABDOM COMPLETE: CPT | Mod: TC

## 2019-09-17 PROCEDURE — 3075F PR MOST RECENT SYSTOLIC BLOOD PRESS GE 130-139MM HG: ICD-10-PCS | Mod: CPTII,S$GLB,, | Performed by: NURSE PRACTITIONER

## 2019-09-17 PROCEDURE — 99214 PR OFFICE/OUTPT VISIT, EST, LEVL IV, 30-39 MIN: ICD-10-PCS | Mod: S$GLB,,, | Performed by: NURSE PRACTITIONER

## 2019-09-17 PROCEDURE — 3045F PR MOST RECENT HEMOGLOBIN A1C LEVEL 7.0-9.0%: CPT | Mod: CPTII,S$GLB,, | Performed by: NURSE PRACTITIONER

## 2019-09-17 PROCEDURE — 3008F BODY MASS INDEX DOCD: CPT | Mod: CPTII,S$GLB,, | Performed by: NURSE PRACTITIONER

## 2019-09-17 PROCEDURE — 3079F DIAST BP 80-89 MM HG: CPT | Mod: CPTII,S$GLB,, | Performed by: NURSE PRACTITIONER

## 2019-09-17 PROCEDURE — 3008F PR BODY MASS INDEX (BMI) DOCUMENTED: ICD-10-PCS | Mod: CPTII,S$GLB,, | Performed by: NURSE PRACTITIONER

## 2019-09-17 PROCEDURE — 3075F SYST BP GE 130 - 139MM HG: CPT | Mod: CPTII,S$GLB,, | Performed by: NURSE PRACTITIONER

## 2019-09-17 PROCEDURE — 76700 US EXAM ABDOM COMPLETE: CPT | Mod: 26,,, | Performed by: RADIOLOGY

## 2019-09-17 PROCEDURE — 99999 PR PBB SHADOW E&M-EST. PATIENT-LVL V: ICD-10-PCS | Mod: PBBFAC,,, | Performed by: NURSE PRACTITIONER

## 2019-09-17 PROCEDURE — 3079F PR MOST RECENT DIASTOLIC BLOOD PRESSURE 80-89 MM HG: ICD-10-PCS | Mod: CPTII,S$GLB,, | Performed by: NURSE PRACTITIONER

## 2019-09-17 PROCEDURE — 99999 PR PBB SHADOW E&M-EST. PATIENT-LVL V: CPT | Mod: PBBFAC,,, | Performed by: NURSE PRACTITIONER

## 2019-09-17 PROCEDURE — 99214 OFFICE O/P EST MOD 30 MIN: CPT | Mod: S$GLB,,, | Performed by: NURSE PRACTITIONER

## 2019-09-17 PROCEDURE — 76700 US ABDOMEN COMPLETE: ICD-10-PCS | Mod: 26,,, | Performed by: RADIOLOGY

## 2019-09-17 NOTE — PROGRESS NOTES
[facility] Established Patient Visit    Reason for Visit:  Liver fibrosis (?), fatty liver    PCP: Vaishali Patel    HPI:  This is a 53 y.o. male with PMH noted below, here for follow up of liver fibrosis (?), fatty liver disease with elevated liver enzymes.    Previously seen by NITA Johnson NP, new to me today    Previous concern for advanced liver disease, conflicting testing results, recommended biopsy in past but patient did not wish to perform:  -- Fibroscan 7/2018 = kPa 27.2, suggestive of F4 cirrhosis. CAP = 400, S3 (>65%) steatosis  -- Fibroscan 3/2019 suggested F2, however BMI>40 so unsure of accuracy of fibroscan readings   -- US 5/2019 with splenomegaly, question possible portal HTN (?)    He attempted MRI elastography but was unable to complete exam d/t claustrophobia. Therefore, was advised previously to continue monitoring as if has advanced liver disease/cirrhosis with HCC screening given desire to not assess fibrosis definitively with biopsy     Interval HPI: Presents today alone. DM improved but continues to be uncontrolled. Drinking sweet tea. No formal exercise. Some mild weight loss (10 lbs) but BMI remains >47.     Labs done 9/16/19 show elevated transaminase levels (ALT > AST, mildly elevated since ~2010 ), platelets and alk phos WNL  Synthetic liver functioning WNL    Lab Results   Component Value Date    ALT 47 (H) 09/16/2019    AST 34 09/16/2019    ALKPHOS 92 09/16/2019    BILITOT 0.6 09/16/2019    ALBUMIN 4.2 09/16/2019    INR 0.9 09/16/2019     09/16/2019     MELD-Na score: 8 at 9/16/2019  6:16 PM  MELD score: 8 at 9/16/2019  6:16 PM  Calculated from:  Serum Creatinine: 1.2 mg/dL at 9/16/2019  6:16 PM  Serum Sodium: 134 mmol/L at 9/16/2019  6:16 PM  Total Bilirubin: 0.6 mg/dL (Rounded to 1 mg/dL) at 9/16/2019  6:16 PM  INR(ratio): 0.9 (Rounded to 1) at 9/16/2019  6:16 PM  Age: 53 years    Previous serologic w/u negative for Augie's (although ceruloplasmin 20 in  2016), hemochromatosis, autoimmune etiology, and viral hepatitis B and C in 2009 and 2016.     Abd U/S done 5/2/19 showed diffuse echogenicity, fatty liver, small hypoechoic area adjacent to the gallbladder measuring 1.9 x 0.9 x 1.7 cm, favored to represent area of focal fatty sparing. + splenomegaly    Risk factors for NAFLD include morbid obesity, HTN, HLD, and DM    Denies Alcohol consumption, see below  Social History     Substance and Sexual Activity   Alcohol Use No     Immunity to Hep A and B - needs vaccines, sent to [facility] today     Denies jaundice, dark urine, abdominal distention, hematemesis, melena, slowed mentation. No abnormal skin rashes. No generalized joint or muscle pain.     PMHX:  has a past medical history of ASCVD (arteriosclerotic cardiovascular disease) (2/5/2018), Chronic sialoadenitis (12/11/2018), Cirrhosis, CKD stage 2 due to type 2 diabetes mellitus (5/18/2017), Diverticulitis, Essential hypertension (7/18/2016), Fatty liver disease, nonalcoholic (4/7/2016), GERD (gastroesophageal reflux disease), HLD (hyperlipidemia), Kidney stone, Meniere's disease (1/9/2018), Morbid obesity (5/29/2014), NAFLD (nonalcoholic fatty liver disease), Near syncope (5/23/2017), Obesity hypoventilation syndrome (2/22/2016), PAD (peripheral artery disease) (9/17/2019), Prostatitis, Rectal bleeding, Splenomegaly (9/17/2019), Type 2 diabetes mellitus with hyperglycemia, with long-term current use of insulin (5/18/2017), and Uncontrolled type 2 diabetes mellitus with peripheral neuropathy (5/29/2014).    PSHX:  has a past surgical history that includes Nose surgery; kidney stent; Kidney surgery (1996); and Colonoscopy.    The patient's social and family histories were reviewed by me and updated in the appropriate section of the electronic medical record.    Review of patient's allergies indicates:   Allergen Reactions    Lisinopril Swelling    Bactrim  [sulfamethoxazole-trimethoprim]      Other  reaction(s): Unknown    Diflucan [fluconazole]      Heart racing    Hydroxyzine hcl      Other reaction(s): elevated bp    Kenalog [triamcinolone acetonide] Other (See Comments)     Heart flutters all day    Phenergan [promethazine] Anxiety    Zofran [ondansetron hcl (pf)] Anxiety       Current Outpatient Medications on File Prior to Visit   Medication Sig Dispense Refill    aspirin (ECOTRIN) 81 MG EC tablet Take 81 mg by mouth once daily.      DOCOSAHEXANOIC ACID/EPA (FISH OIL ORAL) Take 1 capsule by mouth once daily at 6am.       ERGOCALCIFEROL, VITAMIN D2, (VITAMIN D ORAL) Take 1 capsule by mouth once daily at 6am.       hydroCHLOROthiazide (MICROZIDE) 12.5 mg capsule Take 1 capsule (12.5 mg total) by mouth once daily. 180 capsule 3    inhalation spacing device Use with inhaler dispense with mouthpiece 1 Device 1    insulin NPH-insulin regular, 70/30, (NOVOLIN 70/30) 100 unit/mL (70-30) injection Inject subcutaneous 34 in am and 28 in pm 18 mL 11    loratadine (CLARITIN) 10 mg tablet Take 1 tablet (10 mg total) by mouth once daily.  0    metoprolol succinate (TOPROL XL) 50 MG 24 hr tablet Take 1 tablet (50 mg total) by mouth 2 (two) times daily. 60 tablet 6    metoprolol succinate (TOPROL-XL) 50 MG 24 hr tablet TAKE ONE BY MOUTH TWICE DAILY 60 tablet 2    miconazole (MICOTIN) 2 % cream Apply topically 2 (two) times daily. 28 g g    multivitamin capsule Take by mouth. 1 Capsule Oral Every day      nystatin (MYCOSTATIN) powder Apply topically 2 (two) times daily. 60 g 0    pantoprazole (PROTONIX) 40 MG tablet Take 1 tablet (40 mg total) by mouth once daily. 30 tablet 3    ranitidine (ZANTAC) 300 MG tablet Take 1 tablet (300 mg total) by mouth every evening. 30 tablet 3    tamsulosin (FLOMAX) 0.4 mg Cap Take 1 capsule (0.4 mg total) by mouth once daily. 30 capsule 11     Current Facility-Administered Medications on File Prior to Visit   Medication Dose Route Frequency Provider Last Rate Last Dose  "   insulin aspart U-100 injection 6 Units  6 Units Subcutaneous Once Vaishali Patel MD        insulin aspart U-100 injection 8 Units  8 Units Subcutaneous Once Vaishali Patel MD           SOCIAL HISTORY:   Social History     Tobacco Use   Smoking Status Former Smoker    Types: Cigarettes   Smokeless Tobacco Never Used   Tobacco Comment    social       Social History     Substance and Sexual Activity   Alcohol Use No       Social History     Substance and Sexual Activity   Drug Use No       ROS:   GENERAL: Denies fever, chills, weight loss/gain, fatigue  HEENT: Denies headaches, dizziness, vision/hearing changes  CARDIOVASCULAR: Denies chest pain, palpitations, or edema  RESPIRATORY: Denies dyspnea, cough  GI: + intermittent LUQ abdominal pain, denies rectal bleeding, nausea, vomiting. No change in bowel pattern or color  : Denies dysuria, hematuria   SKIN: Denies rash, itching   NEURO: Denies confusion, memory loss, or mood changes  PSYCH: Denies depression or anxiety  HEME/LYMPH: Denies easy bruising or bleeding     Objective Findings:    PHYSICAL EXAM:   Friendly White male, in no acute distress; alert and oriented to person, place and time  VITALS: /83 (BP Location: Left arm, Patient Position: Sitting, BP Method: Medium (Automatic))   Pulse 75   Resp 16   Ht 5' 10" (1.778 m)   Wt (!) 149.1 kg (328 lb 11.3 oz)   SpO2 97%   BMI 47.16 kg/m²   HENT: Normocephalic, without obvious abnormality. Oral mucosa pink and moist. Dentition good.  EYES: Sclerae anicteric. No conjunctival pallor.   NECK: Supple. No masses or cervical adenopathy.  CARDIOVASCULAR: Regular rate and rhythm. No murmurs.  RESPIRATORY: Normal respiratory effort. BBS CTA. No wheezes or crackles.  GI: Soft, non-tender, protuberant due to obesity. + hepatomegaly. No masses palpable. No ascites.  EXTREMITIES:  No clubbing, cyanosis or edema.  SKIN: Warm and dry. No jaundice. No rashes noted to exposed skin. + telangectasias " noted. No palmar erythema.  NEURO:  Normal gait. No asterixis.  PSYCH:  Memory intact. Thought and speech pattern appropriate. Behavior normal. No depression or anxiety noted.    DIAGNOSTIC STUDIES:  EGD-  None, consider in future, plts currently >150    ABD. U/S-    Done 19  FINDINGS:  The liver measures 16.3 cm and demonstrates diffusely increased echogenicity, suggesting hepatic steatosis.  There is a small hypoechoic area adjacent to the gallbladder measuring 1.9 x 0.9 x 1.7 cm, favored to represent area of focal fatty sparing.  There is no intra or extrahepatic bile duct dilatation.  The common duct measures 4 mm.    The gallbladder is unremarkable with no evidence of wall thickening, pericholecystic fluid, sonographic Almazan's sign, or cholelithiasis.    The visualized portions of the pancreas, IVC, and abdominal aorta are unremarkable.  The right kidney measures 12.3 cm and is unremarkable.  The left kidney measures 13.8 cm and is unremarkable.  The spleen measures 13.2 x 4.7 cm and is unremarkable.    There is no free fluid within the visualized abdomen.      Impression       Splenomegaly.    Hepatic steatosis with area of focal fatty sparing, unchanged when compared to ultrasound 2019.     LIVER BIOPSY-  None, pt not interested in scheduling     FIBROSCAN - done 3/15/19  Fibroscan readin.1 KPa  Fibrosis:F2   CAP readin dB/m  Steatosis: :S3     DONE 2018  Fibroscan readin.2 KPa  Fibrosis:F4   CAP readin dB/m  Steatosis: :S3       ASSESSMENT:  53 y.o. White male with:  1.  Liver fibrosis/ cirrhosis (?)  --  concern for possible advanced liver disease, conflicting testing results, recommended biopsy in past but patient did not wish to perform:  -- Fibroscan 2018 = kPa 27.2, suggestive of F4 cirrhosis. CAP = 400, S3 (>65%) steatosis  -- Fibroscan 3/2019 suggested F2, however BMI>40 so unsure of accuracy of fibroscan readings   -- US 2019 with splenomegaly, question possible  portal HTN (?)    -- Abd U/S done 5/2/19 showed diffuse echogenicity, fatty liver, small hypoechoic area adjacent to the gallbladder measuring 1.9 x 0.9 x 1.7 cm, favored to represent area of focal fatty sparing. + splenomegaly  - transaminases chronically mildly elevated since ~2010, ALT>AST  - Synthetic liver function WNL  - risk factors for fatty liver disease include morbid obesity, HTN, HLD, DM   -- Immunity to Hep A and B : needs TwinRIx, long discussion about benefits, as pt initially not interested. Pt now will proceed, sent to [facility] pharmacy     2. Elevated liver enzymes, likely r/t NAFLD  -- Previous serologic w/u negative for Augie's (although ceruloplasmin 20 in 2016), hemochromatosis, autoimmune etiology, and viral hepatitis B and C in 2009 and 2016    3. Morbid Obesity, HTN, HLD, T2DM   -- Body mass index is 47.16 kg/m².   -- increases risk for fatty liver      EDUCATION:     We discussed the manifestations of non-alcoholic fatty liver disease. At this time, there are no FDA approved therapy for non-alcoholic fatty liver disease.  The patient and I discussed the importance of diet, exercise, and weight loss for management of non-alcoholic fatty liver disease.    We discussed a low carb/sugar, high fiber and protein diet and a goal of 30 minutes of exercise 5 times per week    Referral to the [facility] was offered, referral placed     Recommend to avoid alcohol consumption. It is know that heavy alcohol use is associated with disease progression among patients with fatty liver disease. Information is unknown at this time of the effects on the liver with alcohol use in moderation.    Patient is aware that fatty liver can progress to steatohepatitis and possibly to cirrhosis, putting one at increased risk for liver cancer, liver failure, and death    Long discussion about inconsistency in fibrosis staging. Previous fibroscan suggests cirrhosis. Now, with recent US with splenomegaly, even  more concerning for cirrhosis but not definitive.   Discussed options for further evaluation/confirmation of fibrosis/cirrhosis includin. Liver biopsy (gold standard) OR  2. MRI elastography  3. Close survillance q6 months for HCC survillence (U/S, AFP)    Discussed liver biopsy procedure and possible complications associated with liver biopsy including pain, bleeding, infection, and organ perforation. Reviewed the role of the procedure including confirming of diagnosis and staging of liver disease so pt can be appropriately followed from this point forward.  Discussed implications of a cirrhosis diagnosis with HCC screenings and EGD and why it is important for us to determine if pt's have cirrhosis so they can be properly monitored for potential complications.     Pt does not wish to proceed with liver biopsy at this time. Therefore, will need to monitor as if has cirrhosis, discussed below with pt:     The disease process and manifestations of cirrhosis were discussed.    Discussed implications of a cirrhosis diagnosis with HCC screenings and EGD and why it is important for us to properly monitor for potential complications.     Signs and symptoms of hepatic decompensation were reviewed, including jaundice, ascites, and slowed mentation due to hepatic encephalopathy. The patient should seek medical attention if any of these things occur.  We discussed the potential for bleeding from esophageal varices with symptoms of hematemesis and melena. The patient should report to the Emergency Department for these symptoms.    We discussed the increased risk of hepatocellular carcinoma due to cirrhosis. Continued screening every six months with ultrasound and AFP is recommended, discussed with patient.     Cirrhosis Counseling  - strict abstinence of alcohol use (includes beer, wine, and/or liquor)  - avoid non-steroidal anti-inflammatory drugs (NSAIDs) such as ibuprofen, Motrin, naprosyn, Alleve due to the risk of  kidney damage  - can take acetaminophen (Tylenol), no more than 2000 mg per day  - low sodium (salt) 2 gram per day diet  - high protein diet: 170 grams per day to prevent muscle mass loss. Recommended at least 1 protein shake daily using Premier Protein shakes    - resistance exercises for muscle strength  - avoid raw seafoods due to the risk of fatal Vibrio vulnificus infection  - ultrasound of the liver every 6 months for liver cancer screening  - Upper endoscopy every 1-2 years to screen for varices in the stomach and esophagus      PLAN:  1. Referral to [facility], order placed   2. Recommend TwinRix, sent to [facility] pharmacy   3. HCC screening Q 6 months with AFP and abd. U/S - both next due 3/2020  4. EGD to screen for varices, will re-assess at next visit, previous kPA >20 but platelets currently >150 and cirrhosis diagnosis not confirmed   5.  Cirrhosis counseling as noted above and discussed with patient  6. Follow up in about 5 months (around 3/1/2020). with labs and US before    Thank you for allowing me to participate in the care of García Way, NP-C    CC'ed note to:   Vaishali Patel MD

## 2019-09-17 NOTE — PATIENT INSTRUCTIONS
1. Recommend liver biopsy to best understand how much scar tissue is in your liver. Your previous testing showed anywhere from stage 2 to stage 4 (cirrhosis). Your last ultrasound showed an enlarged spleen, which is concerning for cirrhosis.   2. NO immunity against Hepatitis A and B, recommend the vaccines (see below)  3. STOP all sugary beverages - STOP sweet tea, change to all diet drinks or water    There is no FDA approved therapy for non-alcoholic fatty liver disease. Therefore, these things are important:  1. Weight loss goal of 35-50 lbs, referral for [facility] if interested   2. Low carb/sugar, high fiber and protein diet,, referral to diabetes education for medical nutrition therapy, call [phone] to schedule your appt   3. Exercise, 5 days per week, 30 minutes per day, as tolerated  4. Recommend good cholesterol, blood pressure, blood sugar levels   5. Consider enrolling in [facility] fibrosis clinical trails since your fibroscan suggested that you may have fibrosis/scarring in your liver related to fatty liver      In some people, fatty liver can progress to steatohepatitis (inflamatory fatty liver) and possibly to cirrhosis, putting one at increased risk for liver cancer, liver failure, and death. Therefore, the lifestyle changes are very important to decrease this risk.     If you chose not to do the biopsy, below are recommendations for someone with cirrhosis  Because you have cirrhosis, it is important to attend clinic visits every 6 months with an Ultrasound and blood tests every 6 months to screen for liver cancer (you are at risk of developing liver cancer due to scar tissue in the liver)    Signs and symptoms of worsening liver disease include jaundice, fluid in the belly (ascites), and confusion/disorientation/slowed thought processes due to hepatic encephalopathy (toxins building up because of liver problems).   You should seek medical attention if any of these things occur.    Also,  possible bleeding from esophageal varices (blood vessels in the stomach and foodpipe can burst and cause fatal bleeding).  Therefore, if you have symptoms of vomiting blood, blood in your stool, dark or black stools or vomiting coffee ground vomit, YOU SHOULD GO TO THE EMERGENCY ROOM IMMEDIATELY.     Cirrhosis can increase the risk of liver cancer, liver failure, and death. However, we will watch your liver function score (MELD score) closely with each clinic visit. A normal MELD score is 6, highest is 40. Your last one was an 8. We will check this with every clinic visit. A MELD 15 or higher is when we start to consider transplant because MELD 15 or higher indicates that the liver is not functioning as well     Cirrhosis Counseling  - NO alcohol use (includes beer, wine, and/or liquor)  - avoid non-steroidal anti-inflammatory drugs (NSAIDs) such as ibuprofen, Motrin, naprosyn, Alleve due to the risk of kidney damage  - can take acetaminophen (Tylenol), no more than 2000 mg per day  - low sodium (salt) 2 gram per day diet  - high protein diet: 170 grams per day to prevent muscle mass loss. Drink at least 1 protein shake daily (Premier Protein is best option because it is very high protein and low sugar). Ok to use this as nighttime snack to fit it in   - resistance exercises for muscle strength  - avoid raw seafoods due to the risk of fatal Vibrio vulnificus infection  - ultrasound of the liver every 6 months for liver cancer screening (you are at risk of developing liver cancer due to scar tissue in the liver)  - Upper endoscopy every 1-2 years to screen for varices in the stomach and foodpipe which can burst and cause fatal bleeding      Your immunity markers show that you do NOT have immunity against Hepatitis A or B, so I recommend that you receive the combination Hepatitis A and B vaccine called TwinRix. This will protect your liver from these viruses, which can make your liver very sick.     Hep A can be  transmitted through food and water and can cause significant liver injury. There is a Hepatitis A outbreak in [place] currently. Hep B vaccine is transmitted through blood or bodily fluids (there are no symptoms typically) and can develop longstanding (chronic) Hep B and there is no cure, so many people have it for life. Therefore, the vaccines provide immunity against these viruses that can cause harm to the liver.   The vaccine series is 3 vaccines: one now, one at 4 weeks and one 6 months after the 1st one

## 2019-10-08 ENCOUNTER — PATIENT MESSAGE (OUTPATIENT)
Dept: HEPATOLOGY | Facility: CLINIC | Age: 54
End: 2019-10-08

## 2019-10-08 DIAGNOSIS — Z23 NEED FOR HEPATITIS A AND B VACCINATION: Primary | ICD-10-CM

## 2019-10-16 ENCOUNTER — LAB VISIT (OUTPATIENT)
Dept: LAB | Facility: HOSPITAL | Age: 54
End: 2019-10-16
Attending: INTERNAL MEDICINE
Payer: COMMERCIAL

## 2019-10-16 ENCOUNTER — OFFICE VISIT (OUTPATIENT)
Dept: INTERNAL MEDICINE | Facility: CLINIC | Age: 54
End: 2019-10-16
Payer: COMMERCIAL

## 2019-10-16 VITALS
BODY MASS INDEX: 45.1 KG/M2 | HEIGHT: 70 IN | WEIGHT: 315 LBS | OXYGEN SATURATION: 95 % | DIASTOLIC BLOOD PRESSURE: 78 MMHG | SYSTOLIC BLOOD PRESSURE: 130 MMHG

## 2019-10-16 DIAGNOSIS — K76.0 FATTY LIVER DISEASE, NONALCOHOLIC: ICD-10-CM

## 2019-10-16 DIAGNOSIS — R10.11 PAIN, ABDOMINAL, RUQ: Primary | ICD-10-CM

## 2019-10-16 DIAGNOSIS — K74.69 OTHER CIRRHOSIS OF LIVER: ICD-10-CM

## 2019-10-16 DIAGNOSIS — R11.0 NAUSEA: ICD-10-CM

## 2019-10-16 DIAGNOSIS — R10.11 PAIN, ABDOMINAL, RUQ: ICD-10-CM

## 2019-10-16 LAB
ALBUMIN SERPL BCP-MCNC: 4.2 G/DL (ref 3.5–5.2)
ALP SERPL-CCNC: 102 U/L (ref 55–135)
ALT SERPL W/O P-5'-P-CCNC: 46 U/L (ref 10–44)
ANION GAP SERPL CALC-SCNC: 10 MMOL/L (ref 8–16)
AST SERPL-CCNC: 34 U/L (ref 10–40)
BASOPHILS # BLD AUTO: 0.05 K/UL (ref 0–0.2)
BASOPHILS NFR BLD: 0.7 % (ref 0–1.9)
BILIRUB SERPL-MCNC: 0.7 MG/DL (ref 0.1–1)
BUN SERPL-MCNC: 10 MG/DL (ref 6–20)
CALCIUM SERPL-MCNC: 9.7 MG/DL (ref 8.7–10.5)
CHLORIDE SERPL-SCNC: 98 MMOL/L (ref 95–110)
CO2 SERPL-SCNC: 27 MMOL/L (ref 23–29)
CREAT SERPL-MCNC: 1.1 MG/DL (ref 0.5–1.4)
DIFFERENTIAL METHOD: NORMAL
EOSINOPHIL # BLD AUTO: 0.1 K/UL (ref 0–0.5)
EOSINOPHIL NFR BLD: 1.1 % (ref 0–8)
ERYTHROCYTE [DISTWIDTH] IN BLOOD BY AUTOMATED COUNT: 12.2 % (ref 11.5–14.5)
EST. GFR  (AFRICAN AMERICAN): >60 ML/MIN/1.73 M^2
EST. GFR  (NON AFRICAN AMERICAN): >60 ML/MIN/1.73 M^2
ESTIMATED AVG GLUCOSE: 246 MG/DL (ref 68–131)
GLUCOSE SERPL-MCNC: 248 MG/DL (ref 70–110)
HBA1C MFR BLD HPLC: 10.2 % (ref 4–5.6)
HCT VFR BLD AUTO: 42.3 % (ref 40–54)
HGB BLD-MCNC: 15.1 G/DL (ref 14–18)
LYMPHOCYTES # BLD AUTO: 2.2 K/UL (ref 1–4.8)
LYMPHOCYTES NFR BLD: 30.8 % (ref 18–48)
MCH RBC QN AUTO: 29.5 PG (ref 27–31)
MCHC RBC AUTO-ENTMCNC: 35.7 G/DL (ref 32–36)
MCV RBC AUTO: 83 FL (ref 82–98)
MONOCYTES # BLD AUTO: 0.6 K/UL (ref 0.3–1)
MONOCYTES NFR BLD: 8.6 % (ref 4–15)
NEUTROPHILS # BLD AUTO: 4.1 K/UL (ref 1.8–7.7)
NEUTROPHILS NFR BLD: 58.8 % (ref 38–73)
PLATELET # BLD AUTO: 247 K/UL (ref 150–350)
PMV BLD AUTO: 10.2 FL (ref 9.2–12.9)
POTASSIUM SERPL-SCNC: 3.9 MMOL/L (ref 3.5–5.1)
PROT SERPL-MCNC: 8.1 G/DL (ref 6–8.4)
RBC # BLD AUTO: 5.11 M/UL (ref 4.6–6.2)
SODIUM SERPL-SCNC: 135 MMOL/L (ref 136–145)
WBC # BLD AUTO: 7.07 K/UL (ref 3.9–12.7)

## 2019-10-16 PROCEDURE — 3078F DIAST BP <80 MM HG: CPT | Mod: CPTII,S$GLB,, | Performed by: INTERNAL MEDICINE

## 2019-10-16 PROCEDURE — 3078F PR MOST RECENT DIASTOLIC BLOOD PRESSURE < 80 MM HG: ICD-10-PCS | Mod: CPTII,S$GLB,, | Performed by: INTERNAL MEDICINE

## 2019-10-16 PROCEDURE — 80053 COMPREHEN METABOLIC PANEL: CPT

## 2019-10-16 PROCEDURE — 99999 PR PBB SHADOW E&M-EST. PATIENT-LVL IV: ICD-10-PCS | Mod: PBBFAC,,, | Performed by: INTERNAL MEDICINE

## 2019-10-16 PROCEDURE — 99214 OFFICE O/P EST MOD 30 MIN: CPT | Mod: S$GLB,,, | Performed by: INTERNAL MEDICINE

## 2019-10-16 PROCEDURE — 99999 PR PBB SHADOW E&M-EST. PATIENT-LVL IV: CPT | Mod: PBBFAC,,, | Performed by: INTERNAL MEDICINE

## 2019-10-16 PROCEDURE — 3008F PR BODY MASS INDEX (BMI) DOCUMENTED: ICD-10-PCS | Mod: CPTII,S$GLB,, | Performed by: INTERNAL MEDICINE

## 2019-10-16 PROCEDURE — 36415 COLL VENOUS BLD VENIPUNCTURE: CPT

## 2019-10-16 PROCEDURE — 83036 HEMOGLOBIN GLYCOSYLATED A1C: CPT

## 2019-10-16 PROCEDURE — 3075F PR MOST RECENT SYSTOLIC BLOOD PRESS GE 130-139MM HG: ICD-10-PCS | Mod: CPTII,S$GLB,, | Performed by: INTERNAL MEDICINE

## 2019-10-16 PROCEDURE — 3008F BODY MASS INDEX DOCD: CPT | Mod: CPTII,S$GLB,, | Performed by: INTERNAL MEDICINE

## 2019-10-16 PROCEDURE — 99214 PR OFFICE/OUTPT VISIT, EST, LEVL IV, 30-39 MIN: ICD-10-PCS | Mod: S$GLB,,, | Performed by: INTERNAL MEDICINE

## 2019-10-16 PROCEDURE — 3075F SYST BP GE 130 - 139MM HG: CPT | Mod: CPTII,S$GLB,, | Performed by: INTERNAL MEDICINE

## 2019-10-16 PROCEDURE — 85025 COMPLETE CBC W/AUTO DIFF WBC: CPT

## 2019-10-16 NOTE — PROGRESS NOTES
"Subjective:       Patient ID: García Tang is a 54 y.o. male.    Chief Complaint: Pain (patient states that for few months, recurrent throbbing pain on the upper R side (ribcage). patient was taking Tylenol (helps a bit).) and Dizziness (happens on and off throughout the day, does have a past hx of vertigo. along with sx like general body aches/soreness. )    HPI   53 yo M here for eval of right upper abd pain.     Sunday developed throbbing pulsating pain in RUQ. Nausea all week.   Feeling sore all over. Frequent BM - no d/c. No fever.   Having hot flashes. No urinary burning. Some hesitancy.      RIZVI fibrosis/possible cirrhosis followed by Krysta Quintanillaoggordon in hepatology. Recommended liver biopsy, lifestyle modifications.     abd US 5/2/19 showed hepatic steatosis and splenomegaly though when I spoke to the radiologist he reported that splenomegaly was not present. Gallbladder normal at that time.  Review of Systems   Constitutional: Negative for fever.   Respiratory: Negative for shortness of breath.    Cardiovascular: Negative for chest pain.   Gastrointestinal: Positive for abdominal pain.   Musculoskeletal: Negative.    Skin: Negative.        Objective:   /78 (BP Location: Right arm, Patient Position: Sitting, BP Method: Large (Manual))   Ht 5' 10" (1.778 m)   Wt (!) 147 kg (324 lb)   SpO2 95%   BMI 46.49 kg/m²      Physical Exam   Constitutional: He is oriented to person, place, and time. He appears well-developed and well-nourished. No distress.   HENT:   Head: Normocephalic and atraumatic.   Cardiovascular: Normal rate and regular rhythm.   No murmur heard.  Pulmonary/Chest: Effort normal. No respiratory distress. He has no wheezes. He has no rales.   Neurological: He is alert and oriented to person, place, and time.   Skin: Skin is warm and dry. He is not diaphoretic.   Psychiatric: He has a normal mood and affect. His behavior is normal.       Protective Sensation (w/ 10 gram " monofilament):  Right: decreased  Left: decreased    Visual Inspection:  Normal -  Bilateral x thickened nails    Pedal Pulses:   Right: Present  Left: Present    Posterior tibialis:   Right:Present  Left: Present    Slightly hyperpigmented dry patches on bilateral elbows    Assessment:       1. Pain, abdominal, RUQ    2. Nausea    3. Fatty liver disease, nonalcoholic    4. Other cirrhosis of liver (?)        Plan:       García was seen today for pain and dizziness.    Diagnoses and all orders for this visit:    Pain, abdominal, RUQ  -     Previous US showed possible fluid around liver. Will check for accumulation of fluid or ascites, rule out gallbladder disease.   CBC auto differential; Future  -     Comprehensive metabolic panel; Future  -     Hemoglobin A1c; Future  -     US Abdomen Complete; Future    Nausea  -     CBC auto differential; Future  -     Comprehensive metabolic panel; Future  -     Hemoglobin A1c; Future  -     US Abdomen Complete; Future    Fatty liver disease, nonalcoholic  -     CBC auto differential; Future  -     Comprehensive metabolic panel; Future  -     Hemoglobin A1c; Future  -     US Abdomen Complete; Future    Other cirrhosis of liver (?)  -     CBC auto differential; Future  -     Comprehensive metabolic panel; Future  -     Hemoglobin A1c; Future  -     US Abdomen Complete; Future

## 2019-10-17 ENCOUNTER — TELEPHONE (OUTPATIENT)
Dept: INTERNAL MEDICINE | Facility: CLINIC | Age: 54
End: 2019-10-17

## 2019-10-17 ENCOUNTER — HOSPITAL ENCOUNTER (OUTPATIENT)
Dept: RADIOLOGY | Facility: HOSPITAL | Age: 54
Discharge: HOME | End: 2019-10-17
Attending: INTERNAL MEDICINE
Payer: COMMERCIAL

## 2019-10-17 DIAGNOSIS — K76.0 FATTY LIVER DISEASE, NONALCOHOLIC: ICD-10-CM

## 2019-10-17 DIAGNOSIS — K74.69 OTHER CIRRHOSIS OF LIVER: ICD-10-CM

## 2019-10-17 DIAGNOSIS — R10.11 PAIN, ABDOMINAL, RUQ: ICD-10-CM

## 2019-10-17 DIAGNOSIS — R11.0 NAUSEA: ICD-10-CM

## 2019-10-17 PROCEDURE — 76700 US EXAM ABDOM COMPLETE: CPT | Mod: TC

## 2019-10-17 PROCEDURE — 76700 US EXAM ABDOM COMPLETE: CPT | Mod: 26,,, | Performed by: RADIOLOGY

## 2019-10-17 PROCEDURE — 76700 US ABDOMEN COMPLETE: ICD-10-PCS | Mod: 26,,, | Performed by: RADIOLOGY

## 2019-10-21 ENCOUNTER — OFFICE VISIT (OUTPATIENT)
Dept: OPTOMETRY | Facility: CLINIC | Age: 54
End: 2019-10-21
Payer: COMMERCIAL

## 2019-10-21 ENCOUNTER — PATIENT OUTREACH (OUTPATIENT)
Dept: ADMINISTRATIVE | Facility: OTHER | Age: 54
End: 2019-10-21

## 2019-10-21 DIAGNOSIS — H00.15 CHALAZION OF LEFT LOWER EYELID: Primary | ICD-10-CM

## 2019-10-21 PROCEDURE — 99999 PR PBB SHADOW E&M-EST. PATIENT-LVL II: ICD-10-PCS | Mod: PBBFAC,,, | Performed by: OPTOMETRIST

## 2019-10-21 PROCEDURE — 92012 INTRM OPH EXAM EST PATIENT: CPT | Mod: S$GLB,,, | Performed by: OPTOMETRIST

## 2019-10-21 PROCEDURE — 92012 PR EYE EXAM, EST PATIENT,INTERMED: ICD-10-PCS | Mod: S$GLB,,, | Performed by: OPTOMETRIST

## 2019-10-21 PROCEDURE — 99999 PR PBB SHADOW E&M-EST. PATIENT-LVL II: CPT | Mod: PBBFAC,,, | Performed by: OPTOMETRIST

## 2019-10-21 NOTE — PROGRESS NOTES
HPI     Left lower lid swelling and tender since Sat  Tried cold compress with no help  NO drops used  No vision changes    Last edited by Blade Velasquez, OD on 10/21/2019 12:30 PM. (History)              Assessment /Plan     For exam results, see Encounter Report.    Chalazion of left lower eyelid      1. Start warm compress several times a day, hold off on steroid drops for now, history of heart palp, Monitor condition. Patient to report any changes. RTC or call in 1 week if no better.

## 2019-10-23 ENCOUNTER — PATIENT MESSAGE (OUTPATIENT)
Dept: OPTOMETRY | Facility: CLINIC | Age: 54
End: 2019-10-23

## 2019-10-30 ENCOUNTER — NURSE TRIAGE (OUTPATIENT)
Dept: ADMINISTRATIVE | Facility: CLINIC | Age: 54
End: 2019-10-30

## 2019-10-31 NOTE — TELEPHONE ENCOUNTER
Pt is out of his TOPROL XL and his pharmacy is closed for refill.. Called in a 2 day supply for patient to 24-hour [facility]. Pt reported having palpitations but refused triage.     Reason for Disposition   Caller requesting a refill, no triage required, and triager able to refill per unit policy    Additional Information   Negative: Drug overdose and nurse unable to answer question   Negative: Caller requesting information not related to medicine   Negative: Caller requesting a prescription for Strep throat and has a positive culture result   Negative: Rash while taking a medication or within 3 days of stopping it   Negative: Immunization reaction suspected   Negative: [1] Asthma AND [2] having symptoms of asthma (cough, wheezing, etc)   Negative: Caller requesting information about medication use with breastfeeding; neither adult nor infant is ill, and triager answers question   Negative: Caller requesting information about medication during pregnancy; adult is not ill and triager answers question   Negative: Caller has medication question, adult has minor symptoms, caller declines triage, and triager answers question   Negative: Caller has medication question only, adult not sick, and triager answers question   Negative: [1] DOUBLE DOSE (an extra dose or lesser amount) of over-the-counter (OTC) drug AND [2] NO symptoms   Negative: [1] DOUBLE DOSE (an extra dose or lesser amount) of antibiotic drug AND [2] NO symptoms   Negative: Caller has medication question about med not prescribed by PCP and triager unable to answer question (e.g., compatibility with other med, storage)   Negative: Caller has NON-URGENT medication question about med that PCP prescribed and triager unable to answer question   Negative: Caller requesting a NON-URGENT new prescription or refill and triager unable to refill per unit policy   Negative: [1] DOUBLE DOSE (an extra dose or lesser amount) of prescription drug AND [2] NO  "symptoms (Exception: a double dose of antibiotics)   Negative: Diabetes drug error or overdose (e.g., insulin or extra dose)   Negative: [1] Request for URGENT new prescription or refill of "essential" medication (i.e., likelihood of harm to patient if not taken) AND [2] triager unable to fill per unit policy   Negative: [1] Prescription not at pharmacy AND [2] was prescribed today by PCP   Negative: Pharmacy calling with prescription questions and triager unable to answer question   Negative: Caller has urgent medication question about med that PCP prescribed and triager unable to answer question    Protocols used: MEDICATION QUESTION CALL-A-AH      "

## 2019-11-05 ENCOUNTER — OFFICE VISIT (OUTPATIENT)
Dept: INTERNAL MEDICINE | Facility: CLINIC | Age: 54
End: 2019-11-05
Payer: COMMERCIAL

## 2019-11-05 VITALS
DIASTOLIC BLOOD PRESSURE: 84 MMHG | BODY MASS INDEX: 45.1 KG/M2 | HEIGHT: 70 IN | HEART RATE: 87 BPM | SYSTOLIC BLOOD PRESSURE: 122 MMHG | WEIGHT: 315 LBS

## 2019-11-05 DIAGNOSIS — L03.012 CELLULITIS OF FINGER OF LEFT HAND: Primary | ICD-10-CM

## 2019-11-05 PROCEDURE — 99213 OFFICE O/P EST LOW 20 MIN: CPT | Mod: S$GLB,,, | Performed by: PHYSICIAN ASSISTANT

## 2019-11-05 PROCEDURE — 99999 PR PBB SHADOW E&M-EST. PATIENT-LVL III: ICD-10-PCS | Mod: PBBFAC,,, | Performed by: PHYSICIAN ASSISTANT

## 2019-11-05 PROCEDURE — 3074F PR MOST RECENT SYSTOLIC BLOOD PRESSURE < 130 MM HG: ICD-10-PCS | Mod: CPTII,S$GLB,, | Performed by: PHYSICIAN ASSISTANT

## 2019-11-05 PROCEDURE — 3008F BODY MASS INDEX DOCD: CPT | Mod: CPTII,S$GLB,, | Performed by: PHYSICIAN ASSISTANT

## 2019-11-05 PROCEDURE — 3079F DIAST BP 80-89 MM HG: CPT | Mod: CPTII,S$GLB,, | Performed by: PHYSICIAN ASSISTANT

## 2019-11-05 PROCEDURE — 3008F PR BODY MASS INDEX (BMI) DOCUMENTED: ICD-10-PCS | Mod: CPTII,S$GLB,, | Performed by: PHYSICIAN ASSISTANT

## 2019-11-05 PROCEDURE — 99999 PR PBB SHADOW E&M-EST. PATIENT-LVL III: CPT | Mod: PBBFAC,,, | Performed by: PHYSICIAN ASSISTANT

## 2019-11-05 PROCEDURE — 3074F SYST BP LT 130 MM HG: CPT | Mod: CPTII,S$GLB,, | Performed by: PHYSICIAN ASSISTANT

## 2019-11-05 PROCEDURE — 99213 PR OFFICE/OUTPT VISIT, EST, LEVL III, 20-29 MIN: ICD-10-PCS | Mod: S$GLB,,, | Performed by: PHYSICIAN ASSISTANT

## 2019-11-05 PROCEDURE — 3079F PR MOST RECENT DIASTOLIC BLOOD PRESSURE 80-89 MM HG: ICD-10-PCS | Mod: CPTII,S$GLB,, | Performed by: PHYSICIAN ASSISTANT

## 2019-11-05 RX ORDER — MUPIROCIN 20 MG/G
OINTMENT TOPICAL 3 TIMES DAILY
Qty: 30 G | Refills: 0 | Status: SHIPPED | OUTPATIENT
Start: 2019-11-05 | End: 2020-10-30

## 2019-11-05 RX ORDER — DOXYCYCLINE 100 MG/1
100 CAPSULE ORAL 2 TIMES DAILY
Qty: 20 CAPSULE | Refills: 0 | Status: SHIPPED | OUTPATIENT
Start: 2019-11-05 | End: 2019-11-15

## 2019-11-05 NOTE — PROGRESS NOTES
"Subjective:       Patient ID: García Tang is a 54 y.o. male.    Chief Complaint: Infection (on finger)    Patient presents for one week of left fifth finger pain and swelling. The pain is throbbing at the tip of the fifth finger with some radiation down the fifth finger. He noticed the development of a white "puss pocket" around the nailbed over the last week as well. He had a similar event on a different finger about a year and a half ago that required I&D. He does not feel that the pain or swelling affects his ability to move the hand or finger but endorses tenderness to touch. He denies any noticeable temperature change to the area.     Review of Systems   Constitutional: Negative for activity change, appetite change and fever.   HENT: Negative for congestion, ear pain, postnasal drip, rhinorrhea, sinus pressure, sinus pain and sore throat.    Eyes: Negative for pain and visual disturbance.   Respiratory: Negative for cough, chest tightness, shortness of breath and wheezing.    Cardiovascular: Negative for chest pain and palpitations.   Gastrointestinal: Negative for abdominal distention, abdominal pain, constipation, diarrhea, nausea and vomiting.   Genitourinary: Negative for difficulty urinating, dysuria and flank pain.   Musculoskeletal: Positive for arthralgias. Negative for back pain and myalgias.   Skin: Positive for color change. Negative for rash.   Neurological: Negative for dizziness, weakness, light-headedness, numbness and headaches.       Objective:      Physical Exam   Constitutional: He is oriented to person, place, and time. He appears well-developed and well-nourished. No distress.   HENT:   Head: Normocephalic and atraumatic.   Right Ear: External ear normal.   Left Ear: External ear normal.   Nose: Nose normal.   Mouth/Throat: Oropharynx is clear and moist.   Eyes: Pupils are equal, round, and reactive to light. Conjunctivae and EOM are normal.   Neck: Normal range of motion. Neck " supple.   Cardiovascular: Normal rate, regular rhythm and normal heart sounds.   No murmur heard.  Pulmonary/Chest: Effort normal and breath sounds normal. No respiratory distress.   Abdominal: Soft. Bowel sounds are normal. He exhibits no distension.   Musculoskeletal: He exhibits no edema or deformity.   Neurological: He is alert and oriented to person, place, and time.   Skin: Skin is warm and dry. Capillary refill takes less than 2 seconds. There is erythema.   Erythema and edema to the distal left fifth finger. Small, puss pocket to lateral nailbed of left fifth finger.        Assessment:       1. Cellulitis of finger of left hand        Plan:       García was seen today for infection.    Diagnoses and all orders for this visit:    Cellulitis of finger of left hand  -     doxycycline (MONODOX) 100 MG capsule; Take 1 capsule (100 mg total) by mouth 2 (two) times daily. for 10 days  -     mupirocin (BACTROBAN) 2 % ointment; Apply topically 3 (three) times daily.    Advised patient to notify our office if medication does not improve symptoms or if symptoms worse, will consider referral to dermatology.   Encouraged warm, wet compress to promote drainage.

## 2019-11-13 ENCOUNTER — PATIENT MESSAGE (OUTPATIENT)
Dept: INTERNAL MEDICINE | Facility: CLINIC | Age: 54
End: 2019-11-13

## 2019-11-14 ENCOUNTER — PATIENT MESSAGE (OUTPATIENT)
Dept: INTERNAL MEDICINE | Facility: CLINIC | Age: 54
End: 2019-11-14

## 2019-11-14 ENCOUNTER — OFFICE VISIT (OUTPATIENT)
Dept: INTERNAL MEDICINE | Facility: CLINIC | Age: 54
End: 2019-11-14
Payer: COMMERCIAL

## 2019-11-14 VITALS
WEIGHT: 315 LBS | HEART RATE: 68 BPM | SYSTOLIC BLOOD PRESSURE: 138 MMHG | BODY MASS INDEX: 45.1 KG/M2 | DIASTOLIC BLOOD PRESSURE: 84 MMHG | TEMPERATURE: 98 F | HEIGHT: 70 IN

## 2019-11-14 DIAGNOSIS — L03.019 PARONYCHIA OF FINGER, UNSPECIFIED LATERALITY: Primary | ICD-10-CM

## 2019-11-14 PROBLEM — Z79.4 TYPE 2 DIABETES MELLITUS WITH STAGE 2 CHRONIC KIDNEY DISEASE, WITH LONG-TERM CURRENT USE OF INSULIN: Status: ACTIVE | Noted: 2017-05-18

## 2019-11-14 PROBLEM — N18.2 TYPE 2 DIABETES MELLITUS WITH STAGE 2 CHRONIC KIDNEY DISEASE, WITH LONG-TERM CURRENT USE OF INSULIN: Status: ACTIVE | Noted: 2017-05-18

## 2019-11-14 PROBLEM — I25.10 ASCVD (ARTERIOSCLEROTIC CARDIOVASCULAR DISEASE): Chronic | Status: ACTIVE | Noted: 2018-02-05

## 2019-11-14 PROBLEM — E11.22 TYPE 2 DIABETES MELLITUS WITH STAGE 2 CHRONIC KIDNEY DISEASE, WITH LONG-TERM CURRENT USE OF INSULIN: Status: ACTIVE | Noted: 2017-05-18

## 2019-11-14 PROBLEM — R26.81 GAIT INSTABILITY: Status: RESOLVED | Noted: 2018-01-12 | Resolved: 2019-11-14

## 2019-11-14 PROCEDURE — 3075F PR MOST RECENT SYSTOLIC BLOOD PRESS GE 130-139MM HG: ICD-10-PCS | Mod: CPTII,S$GLB,, | Performed by: INTERNAL MEDICINE

## 2019-11-14 PROCEDURE — 99999 PR PBB SHADOW E&M-EST. PATIENT-LVL III: ICD-10-PCS | Mod: PBBFAC,,, | Performed by: INTERNAL MEDICINE

## 2019-11-14 PROCEDURE — 99213 PR OFFICE/OUTPT VISIT, EST, LEVL III, 20-29 MIN: ICD-10-PCS | Mod: S$GLB,,, | Performed by: INTERNAL MEDICINE

## 2019-11-14 PROCEDURE — 3079F DIAST BP 80-89 MM HG: CPT | Mod: CPTII,S$GLB,, | Performed by: INTERNAL MEDICINE

## 2019-11-14 PROCEDURE — 99999 PR PBB SHADOW E&M-EST. PATIENT-LVL III: CPT | Mod: PBBFAC,,, | Performed by: INTERNAL MEDICINE

## 2019-11-14 PROCEDURE — 3075F SYST BP GE 130 - 139MM HG: CPT | Mod: CPTII,S$GLB,, | Performed by: INTERNAL MEDICINE

## 2019-11-14 PROCEDURE — 3008F BODY MASS INDEX DOCD: CPT | Mod: CPTII,S$GLB,, | Performed by: INTERNAL MEDICINE

## 2019-11-14 PROCEDURE — 3008F PR BODY MASS INDEX (BMI) DOCUMENTED: ICD-10-PCS | Mod: CPTII,S$GLB,, | Performed by: INTERNAL MEDICINE

## 2019-11-14 PROCEDURE — 3079F PR MOST RECENT DIASTOLIC BLOOD PRESSURE 80-89 MM HG: ICD-10-PCS | Mod: CPTII,S$GLB,, | Performed by: INTERNAL MEDICINE

## 2019-11-14 PROCEDURE — 99213 OFFICE O/P EST LOW 20 MIN: CPT | Mod: S$GLB,,, | Performed by: INTERNAL MEDICINE

## 2019-11-14 RX ORDER — TRIAMCINOLONE ACETONIDE 5 MG/G
CREAM TOPICAL 2 TIMES DAILY
Qty: 15 G | Refills: 1 | Status: SHIPPED | OUTPATIENT
Start: 2019-11-14 | End: 2020-10-30

## 2019-11-14 NOTE — PROGRESS NOTES
García Tang presents today to urgent care for:  finger infection (bilaterial hands all fingers )      Pt. Here with complaint of paronychia on several fingers.  He was seen in [facility] and placed on doxy oral for 10 days (he's on day 7) and topical bactroban. He had some improvement but has developed paronychias on several other fingers of both hands now. He has been compliant with his medications. He doesn't bite his nails. He keeps his hands clean. No other symptoms.       Past medical, social, family and surgical history was reviewed and updated today as needed. See encounter for details.     Review of Systems   All other systems reviewed and are negative.      Vitals:    11/14/19 1106   BP: 138/84   Pulse: 68   Temp: 98.4 °F (36.9 °C)   Body mass index is 46.53 kg/m².   Physical Exam   Constitutional: He appears well-developed and well-nourished.   Musculoskeletal:        Hands:       Assessment/plan:   1. Paronychia of finger, unspecified laterality  Review of literature today shows that high potency topical steroids are the recommended modality of therapy.  RX:  - triamcinolone acetonide 0.5% (KENALOG) 0.5 % Crea; Apply topically 2 (two) times daily.  Dispense: 15 g; Refill: 1 (he doesn't have an allergy to kenalog, this was an intolerance to a shot of kenalog as it gave him palpitations) I discussed this with him and he should be able to tolerate this fine because he has used OTC hydrocortisone many times.  Soak finger tips for 2-5 mins in warm water and gently pull back cuticles on affected fingers BID before applying the cream   If doesn't resolve then consider seeing dermatology

## 2019-11-14 NOTE — PATIENT INSTRUCTIONS
Paronychia of the Finger or Toe  Paronychia is an infection near a fingernail or toenail. It usually occurs when an opening in the cuticle or an ingrown toenail lets bacteria under the skin.  The infection will need to be drained if pus is present. If the infection has been caught early, you may need only antibiotic treatment. Healing will take about 1 to 2 weeks.  Home care  Follow these guidelines when caring for yourself at home:  · Clean and soak the toe or finger. Do this 2 times a day for the first 3 days. To do so:  ¨ Soak your foot or hand in a tub of warm water for 5 minutes. Or hold your toe or finger under a faucet of warm running water for 5 minutes.  ¨ Clean any crust away with soap and water using a cotton swab.  ¨ Put antibiotic ointment on the infected area.  · Change the dressing daily or any time it gets dirty.  · If you were given antibiotics, take them as directed until they are all gone.  · If your infection is on a toe, wear comfortable shoes with a lot of toe room. You can also wear open-toed sandals while your toe heals.  · You may use over-the-counter medicine (acetaminophen or ibuprofen to help with pain, unless another medicine was prescribed. If you have chronic liver or kidney disease, talk with your healthcare provider before using these medicines. Also talk with your provider if you've had a stomach ulcer or GI (gastrointestinal) bleeding.  Prevention  The following can prevent paronychia:  · Avoid cutting or playing with your cuticles at home.  · Don't bite your nails.  · Don't suck on your thumbs or fingers.  Follow-up care  Follow up with your healthcare provider, or as advised.  When to seek medical advice  Call your healthcare provider right away if any of these occur:  · Redness, pain, or swelling of the finger or toe gets worse  · Red streaks in the skin leading away from the wound  · Pus or fluid draining from the nail area  · Fever of 100.4ºF (38ºC) or higher, or as directed  by your provider  Date Last Reviewed: 8/1/2016  © 9535-4906 [employer]. [address] [place] [address], [place] [address]. All rights reserved. This information is not intended as a substitute for professional medical care. Always follow your healthcare professional's instructions.

## 2019-12-04 ENCOUNTER — LAB VISIT (OUTPATIENT)
Dept: LAB | Facility: HOSPITAL | Age: 54
End: 2019-12-04
Attending: INTERNAL MEDICINE
Payer: COMMERCIAL

## 2019-12-04 ENCOUNTER — OFFICE VISIT (OUTPATIENT)
Dept: INTERNAL MEDICINE | Facility: CLINIC | Age: 54
End: 2019-12-04
Payer: COMMERCIAL

## 2019-12-04 ENCOUNTER — PATIENT MESSAGE (OUTPATIENT)
Dept: INTERNAL MEDICINE | Facility: CLINIC | Age: 54
End: 2019-12-04

## 2019-12-04 VITALS
SYSTOLIC BLOOD PRESSURE: 128 MMHG | DIASTOLIC BLOOD PRESSURE: 96 MMHG | OXYGEN SATURATION: 97 % | BODY MASS INDEX: 45.1 KG/M2 | HEIGHT: 70 IN | TEMPERATURE: 98 F | WEIGHT: 315 LBS | HEART RATE: 84 BPM

## 2019-12-04 DIAGNOSIS — E11.42 TYPE 2 DIABETES MELLITUS WITH DIABETIC POLYNEUROPATHY, WITH LONG-TERM CURRENT USE OF INSULIN: Chronic | ICD-10-CM

## 2019-12-04 DIAGNOSIS — M54.50 CHRONIC MIDLINE LOW BACK PAIN WITHOUT SCIATICA: Primary | ICD-10-CM

## 2019-12-04 DIAGNOSIS — M79.10 MYALGIA: ICD-10-CM

## 2019-12-04 DIAGNOSIS — R07.89 CHEST DISCOMFORT: ICD-10-CM

## 2019-12-04 DIAGNOSIS — R31.9 URINARY TRACT INFECTION WITH HEMATURIA, SITE UNSPECIFIED: ICD-10-CM

## 2019-12-04 DIAGNOSIS — G89.29 CHRONIC MIDLINE LOW BACK PAIN WITHOUT SCIATICA: Primary | ICD-10-CM

## 2019-12-04 DIAGNOSIS — Z79.4 TYPE 2 DIABETES MELLITUS WITH DIABETIC POLYNEUROPATHY, WITH LONG-TERM CURRENT USE OF INSULIN: Chronic | ICD-10-CM

## 2019-12-04 DIAGNOSIS — N39.0 URINARY TRACT INFECTION WITH HEMATURIA, SITE UNSPECIFIED: ICD-10-CM

## 2019-12-04 LAB
ALBUMIN SERPL BCP-MCNC: 4 G/DL (ref 3.5–5.2)
ALP SERPL-CCNC: 112 U/L (ref 55–135)
ALT SERPL W/O P-5'-P-CCNC: 40 U/L (ref 10–44)
ANION GAP SERPL CALC-SCNC: 11 MMOL/L (ref 8–16)
AST SERPL-CCNC: 29 U/L (ref 10–40)
BASOPHILS # BLD AUTO: 0.04 K/UL (ref 0–0.2)
BASOPHILS NFR BLD: 0.6 % (ref 0–1.9)
BILIRUB SERPL-MCNC: 0.6 MG/DL (ref 0.1–1)
BILIRUB SERPL-MCNC: NEGATIVE MG/DL
BLOOD URINE, POC: ABNORMAL
BUN SERPL-MCNC: 13 MG/DL (ref 6–20)
CALCIUM SERPL-MCNC: 9.6 MG/DL (ref 8.7–10.5)
CHLORIDE SERPL-SCNC: 97 MMOL/L (ref 95–110)
CO2 SERPL-SCNC: 27 MMOL/L (ref 23–29)
COLOR, POC UA: YELLOW
CREAT SERPL-MCNC: 1.3 MG/DL (ref 0.5–1.4)
CRP SERPL-MCNC: 11.3 MG/L (ref 0–8.2)
DIFFERENTIAL METHOD: NORMAL
EOSINOPHIL # BLD AUTO: 0.1 K/UL (ref 0–0.5)
EOSINOPHIL NFR BLD: 1.9 % (ref 0–8)
ERYTHROCYTE [DISTWIDTH] IN BLOOD BY AUTOMATED COUNT: 12.2 % (ref 11.5–14.5)
ERYTHROCYTE [SEDIMENTATION RATE] IN BLOOD BY WESTERGREN METHOD: 27 MM/HR (ref 0–23)
EST. GFR  (AFRICAN AMERICAN): >60 ML/MIN/1.73 M^2
EST. GFR  (NON AFRICAN AMERICAN): >60 ML/MIN/1.73 M^2
GLUCOSE SERPL-MCNC: 314 MG/DL (ref 70–110)
GLUCOSE SERPL-MCNC: 358 MG/DL (ref 70–110)
GLUCOSE UR QL STRIP: 1000
HCT VFR BLD AUTO: 43.5 % (ref 40–54)
HGB BLD-MCNC: 15.5 G/DL (ref 14–18)
KETONES UR QL STRIP: NEGATIVE
LEUKOCYTE ESTERASE URINE, POC: ABNORMAL
LYMPHOCYTES # BLD AUTO: 2.4 K/UL (ref 1–4.8)
LYMPHOCYTES NFR BLD: 35.3 % (ref 18–48)
MCH RBC QN AUTO: 29.2 PG (ref 27–31)
MCHC RBC AUTO-ENTMCNC: 35.6 G/DL (ref 32–36)
MCV RBC AUTO: 82 FL (ref 82–98)
MONOCYTES # BLD AUTO: 0.7 K/UL (ref 0.3–1)
MONOCYTES NFR BLD: 9.5 % (ref 4–15)
NEUTROPHILS # BLD AUTO: 3.6 K/UL (ref 1.8–7.7)
NEUTROPHILS NFR BLD: 52.7 % (ref 38–73)
NITRITE, POC UA: NEGATIVE
PH, POC UA: 5
PLATELET # BLD AUTO: 259 K/UL (ref 150–350)
PMV BLD AUTO: 10.4 FL (ref 9.2–12.9)
POTASSIUM SERPL-SCNC: 3.9 MMOL/L (ref 3.5–5.1)
PROT SERPL-MCNC: 7.9 G/DL (ref 6–8.4)
PROTEIN, POC: NEGATIVE
RBC # BLD AUTO: 5.3 M/UL (ref 4.6–6.2)
SODIUM SERPL-SCNC: 135 MMOL/L (ref 136–145)
SPECIFIC GRAVITY, POC UA: 1.01
UROBILINOGEN, POC UA: NORMAL
WBC # BLD AUTO: 6.83 K/UL (ref 3.9–12.7)

## 2019-12-04 PROCEDURE — 3046F HEMOGLOBIN A1C LEVEL >9.0%: CPT | Mod: CPTII,S$GLB,, | Performed by: PHYSICIAN ASSISTANT

## 2019-12-04 PROCEDURE — 3080F PR MOST RECENT DIASTOLIC BLOOD PRESSURE >= 90 MM HG: ICD-10-PCS | Mod: CPTII,S$GLB,, | Performed by: PHYSICIAN ASSISTANT

## 2019-12-04 PROCEDURE — 93005 ELECTROCARDIOGRAM TRACING: CPT | Mod: S$GLB,,, | Performed by: PHYSICIAN ASSISTANT

## 2019-12-04 PROCEDURE — 82550 ASSAY OF CK (CPK): CPT

## 2019-12-04 PROCEDURE — 3074F SYST BP LT 130 MM HG: CPT | Mod: CPTII,S$GLB,, | Performed by: PHYSICIAN ASSISTANT

## 2019-12-04 PROCEDURE — 3008F PR BODY MASS INDEX (BMI) DOCUMENTED: ICD-10-PCS | Mod: CPTII,S$GLB,, | Performed by: PHYSICIAN ASSISTANT

## 2019-12-04 PROCEDURE — 87086 URINE CULTURE/COLONY COUNT: CPT

## 2019-12-04 PROCEDURE — 36415 COLL VENOUS BLD VENIPUNCTURE: CPT

## 2019-12-04 PROCEDURE — 93010 EKG 12-LEAD: ICD-10-PCS | Mod: S$GLB,,, | Performed by: INTERNAL MEDICINE

## 2019-12-04 PROCEDURE — 82962 POCT GLUCOSE, HAND-HELD DEVICE: ICD-10-PCS | Mod: 59,S$GLB,, | Performed by: PHYSICIAN ASSISTANT

## 2019-12-04 PROCEDURE — 3046F PR MOST RECENT HEMOGLOBIN A1C LEVEL > 9.0%: ICD-10-PCS | Mod: CPTII,S$GLB,, | Performed by: PHYSICIAN ASSISTANT

## 2019-12-04 PROCEDURE — 3008F BODY MASS INDEX DOCD: CPT | Mod: CPTII,S$GLB,, | Performed by: PHYSICIAN ASSISTANT

## 2019-12-04 PROCEDURE — 85652 RBC SED RATE AUTOMATED: CPT

## 2019-12-04 PROCEDURE — 81002 POCT URINE DIPSTICK WITHOUT MICROSCOPE: ICD-10-PCS | Mod: 59,S$GLB,, | Performed by: PHYSICIAN ASSISTANT

## 2019-12-04 PROCEDURE — 99999 PR PBB SHADOW E&M-EST. PATIENT-LVL IV: CPT | Mod: PBBFAC,,, | Performed by: PHYSICIAN ASSISTANT

## 2019-12-04 PROCEDURE — 80053 COMPREHEN METABOLIC PANEL: CPT

## 2019-12-04 PROCEDURE — 85025 COMPLETE CBC W/AUTO DIFF WBC: CPT

## 2019-12-04 PROCEDURE — 99214 PR OFFICE/OUTPT VISIT, EST, LEVL IV, 30-39 MIN: ICD-10-PCS | Mod: 25,S$GLB,, | Performed by: PHYSICIAN ASSISTANT

## 2019-12-04 PROCEDURE — 3074F PR MOST RECENT SYSTOLIC BLOOD PRESSURE < 130 MM HG: ICD-10-PCS | Mod: CPTII,S$GLB,, | Performed by: PHYSICIAN ASSISTANT

## 2019-12-04 PROCEDURE — 93010 ELECTROCARDIOGRAM REPORT: CPT | Mod: S$GLB,,, | Performed by: INTERNAL MEDICINE

## 2019-12-04 PROCEDURE — 93005 EKG 12-LEAD: ICD-10-PCS | Mod: S$GLB,,, | Performed by: PHYSICIAN ASSISTANT

## 2019-12-04 PROCEDURE — 82962 GLUCOSE BLOOD TEST: CPT | Mod: 59,S$GLB,, | Performed by: PHYSICIAN ASSISTANT

## 2019-12-04 PROCEDURE — 86140 C-REACTIVE PROTEIN: CPT

## 2019-12-04 PROCEDURE — 99999 PR PBB SHADOW E&M-EST. PATIENT-LVL IV: ICD-10-PCS | Mod: PBBFAC,,, | Performed by: PHYSICIAN ASSISTANT

## 2019-12-04 PROCEDURE — 81002 URINALYSIS NONAUTO W/O SCOPE: CPT | Mod: 59,S$GLB,, | Performed by: PHYSICIAN ASSISTANT

## 2019-12-04 PROCEDURE — 83735 ASSAY OF MAGNESIUM: CPT

## 2019-12-04 PROCEDURE — 3080F DIAST BP >= 90 MM HG: CPT | Mod: CPTII,S$GLB,, | Performed by: PHYSICIAN ASSISTANT

## 2019-12-04 PROCEDURE — 99214 OFFICE O/P EST MOD 30 MIN: CPT | Mod: 25,S$GLB,, | Performed by: PHYSICIAN ASSISTANT

## 2019-12-04 RX ORDER — DOXYCYCLINE 100 MG/1
100 CAPSULE ORAL 2 TIMES DAILY
Qty: 20 CAPSULE | Refills: 0 | Status: SHIPPED | OUTPATIENT
Start: 2019-12-04 | End: 2019-12-14

## 2019-12-04 NOTE — PROGRESS NOTES
Subjective:       Patient ID: García Tang is a 54 y.o. male.    Chief Complaint: Arm Pain (bilateral arm, right bicep) and Back Pain (lower area)    Patient presents for evaluation of one week of muscle pain in bilateral arms and sever weeks of bilateral low back pain. The pain in the arms is a soreness that he states feels like he has been working out or just got a tetanus shot. He has not had any increased physical activity, accident or injury. The arm pain is the worst in the right bicep. The pain is intermittent but has more severe episodes. The pain in his low back has been present for a few weeks. He feels he is urinating more than usual and the back pain increases after urination. He endorses muscle cramping in the legs last night. His blood sugar has been uncontrolled at home running from 170 to 220. He also endorses decreased appetite over the last few weeks. Patient also endorses intermittent chest pain. At home he has tried Tylenol for the diffuse pain but this did not provide relief. He denies bowel or bladder incontinence.     Review of Systems   Constitutional: Positive for appetite change. Negative for activity change and fever.   HENT: Negative for congestion, ear pain, postnasal drip, rhinorrhea, sinus pressure, sinus pain and sore throat.    Eyes: Negative for pain and visual disturbance.   Respiratory: Negative for cough, chest tightness, shortness of breath and wheezing.    Cardiovascular: Positive for chest pain. Negative for palpitations.   Gastrointestinal: Negative for abdominal distention, abdominal pain, constipation, diarrhea, nausea and vomiting.   Genitourinary: Negative for difficulty urinating, dysuria and flank pain.   Musculoskeletal: Positive for back pain and myalgias. Negative for arthralgias.        Positive for cramping.   Skin: Negative for color change and rash.   Neurological: Negative for dizziness, weakness, light-headedness, numbness and headaches.       Objective:       Physical Exam   Constitutional: He is oriented to person, place, and time. He appears well-developed and well-nourished. No distress.   HENT:   Head: Normocephalic and atraumatic.   Right Ear: External ear normal.   Left Ear: External ear normal.   Nose: Nose normal.   Mouth/Throat: Oropharynx is clear and moist.   Eyes: Pupils are equal, round, and reactive to light. Conjunctivae and EOM are normal.   Neck: Normal range of motion. Neck supple.   Cardiovascular: Normal rate, regular rhythm and normal heart sounds.   No murmur heard.  Pulmonary/Chest: Effort normal and breath sounds normal. No respiratory distress.   Abdominal: Soft. Bowel sounds are normal. He exhibits no distension.   Musculoskeletal: Normal range of motion. He exhibits tenderness. He exhibits no edema or deformity.   Tenderness to palpation over BUE. Strength intact bilaterally in upper extremities but elicits pain. Tenderness to palpation over bilateral low back. No increased pain with motion. Negative straight leg raise bilaterally.   Neurological: He is alert and oriented to person, place, and time.   Skin: Skin is warm and dry.         POCT Glucose: 314.     Assessment:       1. Chronic midline low back pain without sciatica    2. Myalgia    3. Type 2 diabetes mellitus with diabetic polyneuropathy, with long-term current use of insulin    4. Chest discomfort    5. Urinary tract infection with hematuria, site unspecified        Plan:       García was seen today for arm pain and back pain.    Diagnoses and all orders for this visit:    Chronic midline low back pain without sciatica  -     POCT urine dipstick without microscope    Myalgia  -     CBC auto differential; Future  -     Comprehensive metabolic panel; Future  -     C-reactive protein; Future  -     Sedimentation rate; Future    Type 2 diabetes mellitus with diabetic polyneuropathy, with long-term current use of insulin  -     POCT Glucose, Hand-Held Device    Chest discomfort  -      IN OFFICE EKG 12-LEAD (to Muse)    Urinary tract infection with hematuria, site unspecified  -     Urine culture; Future  -     Urine culture  -     doxycycline (MONODOX) 100 MG capsule; Take 1 capsule (100 mg total) by mouth 2 (two) times daily. for 10 days     Point of care glucose was 314 and this may be contributing to the myalgia.  This is not out of the ordinary for the patient to have a blood sugar this high so I do not think this is the primary cause of muscle pain.  Point of care urine did show over a 1000 of glucose, significant leukocytes, and hematuria.  The urine was clear and pH was normal.  Protein was negative.  EKG looks essentially unchanged from the 1 in September but will await official cardiology report.  If symptoms worsen before lab results are known he is to go to the ER and he expressed understanding.

## 2019-12-04 NOTE — Clinical Note
I am out of the office tomorrow.  Please see clinic note.  Patient could possibly have hypokalemia doing blood work to rule out diabetic cause of myalgia as well as possible rhabdo although this is unlikely due to the urine results an EKG results.  Just wanted to give you a heads up since some off on Thursdays.  I did did not know how the CC you on the blood work

## 2019-12-05 ENCOUNTER — TELEPHONE (OUTPATIENT)
Dept: INTERNAL MEDICINE | Facility: CLINIC | Age: 54
End: 2019-12-05

## 2019-12-05 DIAGNOSIS — M79.10 MYALGIA: Primary | ICD-10-CM

## 2019-12-05 LAB
CK SERPL-CCNC: 217 U/L (ref 20–200)
MAGNESIUM SERPL-MCNC: 1.9 MG/DL (ref 1.6–2.6)

## 2019-12-05 NOTE — TELEPHONE ENCOUNTER
----- Message from Vaishali Patel MD sent at 12/4/2019  5:59 PM CST -----  Hi Cecilia,  The labs aren't back yet and I am out tomorrow as well. Can you check on this for me? Thanks!!!    ----- Message -----  From: ANNABEL Garnett  Sent: 12/4/2019   4:17 PM CST  To: Vaishali Patel MD    I am out of the office tomorrow.  Please see clinic note.  Patient could possibly have hypokalemia doing blood work to rule out diabetic cause of myalgia as well as possible rhabdo although this is unlikely due to the urine results an EKG results.  Just wanted to give you a heads up since some off on Thursdays.  I did did not know how the CC you on the blood work

## 2019-12-05 NOTE — TELEPHONE ENCOUNTER
Please call lab and see if they can add RANI, Mg and CPK to labs yesterday; if not, then please see if pt can have labs drawn. Thanks!  Please let pt know that Sandra is not here today and I'm just covering her inbox. But based on her note from yesterday, I'm adding a few other labs to further evaluate.

## 2019-12-05 NOTE — TELEPHONE ENCOUNTER
Please call and notify pt:    Inflammatory markers are very mildly elevated but similar to previously. Electrolytes are ok although sugars are high. Blood count is normal. Urine culture is pending.

## 2019-12-05 NOTE — TELEPHONE ENCOUNTER
Phoned lab CPK and Mg able to be ran on blood drawn 12/04/19, RANI has to be drawn, pt informed, RANI scheduled to be drawn today.

## 2019-12-05 NOTE — TELEPHONE ENCOUNTER
Phoned pt informed of results, inquiring why is he still having pain, please advise. Pt wants to know cause of pain, not informed.

## 2019-12-06 ENCOUNTER — LAB VISIT (OUTPATIENT)
Dept: LAB | Facility: HOSPITAL | Age: 54
End: 2019-12-06
Attending: INTERNAL MEDICINE
Payer: COMMERCIAL

## 2019-12-06 ENCOUNTER — PATIENT MESSAGE (OUTPATIENT)
Dept: INTERNAL MEDICINE | Facility: CLINIC | Age: 54
End: 2019-12-06

## 2019-12-06 DIAGNOSIS — M79.10 MYALGIA: ICD-10-CM

## 2019-12-06 LAB — BACTERIA UR CULT: NORMAL

## 2019-12-06 PROCEDURE — 36415 COLL VENOUS BLD VENIPUNCTURE: CPT

## 2019-12-06 PROCEDURE — 86038 ANTINUCLEAR ANTIBODIES: CPT

## 2019-12-09 ENCOUNTER — NURSE TRIAGE (OUTPATIENT)
Dept: ADMINISTRATIVE | Facility: CLINIC | Age: 54
End: 2019-12-09

## 2019-12-09 ENCOUNTER — TELEPHONE (OUTPATIENT)
Dept: INTERNAL MEDICINE | Facility: CLINIC | Age: 54
End: 2019-12-09

## 2019-12-09 ENCOUNTER — HOSPITAL ENCOUNTER (EMERGENCY)
Facility: HOSPITAL | Age: 54
Discharge: HOME | End: 2019-12-09
Attending: EMERGENCY MEDICINE
Payer: COMMERCIAL

## 2019-12-09 VITALS
WEIGHT: 315 LBS | HEIGHT: 70 IN | HEART RATE: 85 BPM | TEMPERATURE: 98 F | DIASTOLIC BLOOD PRESSURE: 80 MMHG | SYSTOLIC BLOOD PRESSURE: 145 MMHG | OXYGEN SATURATION: 100 % | BODY MASS INDEX: 45.1 KG/M2 | RESPIRATION RATE: 16 BRPM

## 2019-12-09 DIAGNOSIS — M79.621 PAIN IN RIGHT UPPER ARM: ICD-10-CM

## 2019-12-09 DIAGNOSIS — M79.603 ARM PAIN: Primary | ICD-10-CM

## 2019-12-09 DIAGNOSIS — T14.8XXA MUSCLE STRAIN: ICD-10-CM

## 2019-12-09 LAB
ALBUMIN SERPL BCP-MCNC: 3.9 G/DL (ref 3.5–5.2)
ALP SERPL-CCNC: 106 U/L (ref 55–135)
ALT SERPL W/O P-5'-P-CCNC: 43 U/L (ref 10–44)
ANA SER QL IF: NORMAL
ANION GAP SERPL CALC-SCNC: 10 MMOL/L (ref 8–16)
AST SERPL-CCNC: 36 U/L (ref 10–40)
BASOPHILS # BLD AUTO: 0.07 K/UL (ref 0–0.2)
BASOPHILS NFR BLD: 1.2 % (ref 0–1.9)
BILIRUB SERPL-MCNC: 0.5 MG/DL (ref 0.1–1)
BNP SERPL-MCNC: <10 PG/ML (ref 0–99)
BUN SERPL-MCNC: 11 MG/DL (ref 6–20)
CALCIUM SERPL-MCNC: 9.3 MG/DL (ref 8.7–10.5)
CHLORIDE SERPL-SCNC: 97 MMOL/L (ref 95–110)
CO2 SERPL-SCNC: 25 MMOL/L (ref 23–29)
CREAT SERPL-MCNC: 1.3 MG/DL (ref 0.5–1.4)
DIFFERENTIAL METHOD: NORMAL
EOSINOPHIL # BLD AUTO: 0.1 K/UL (ref 0–0.5)
EOSINOPHIL NFR BLD: 1.8 % (ref 0–8)
ERYTHROCYTE [DISTWIDTH] IN BLOOD BY AUTOMATED COUNT: 11.8 % (ref 11.5–14.5)
EST. GFR  (AFRICAN AMERICAN): >60 ML/MIN/1.73 M^2
EST. GFR  (NON AFRICAN AMERICAN): >60 ML/MIN/1.73 M^2
GLUCOSE SERPL-MCNC: 441 MG/DL (ref 70–110)
HCT VFR BLD AUTO: 45.5 % (ref 40–54)
HGB BLD-MCNC: 15.3 G/DL (ref 14–18)
IMM GRANULOCYTES # BLD AUTO: 0.01 K/UL (ref 0–0.04)
IMM GRANULOCYTES NFR BLD AUTO: 0.2 % (ref 0–0.5)
LYMPHOCYTES # BLD AUTO: 1.8 K/UL (ref 1–4.8)
LYMPHOCYTES NFR BLD: 31.9 % (ref 18–48)
MCH RBC QN AUTO: 28.8 PG (ref 27–31)
MCHC RBC AUTO-ENTMCNC: 33.6 G/DL (ref 32–36)
MCV RBC AUTO: 86 FL (ref 82–98)
MONOCYTES # BLD AUTO: 0.6 K/UL (ref 0.3–1)
MONOCYTES NFR BLD: 9.9 % (ref 4–15)
NEUTROPHILS # BLD AUTO: 3.1 K/UL (ref 1.8–7.7)
NEUTROPHILS NFR BLD: 55 % (ref 38–73)
NRBC BLD-RTO: 0 /100 WBC
PLATELET # BLD AUTO: 243 K/UL (ref 150–350)
PMV BLD AUTO: 10.3 FL (ref 9.2–12.9)
POCT GLUCOSE: 362 MG/DL (ref 70–110)
POTASSIUM SERPL-SCNC: 4.2 MMOL/L (ref 3.5–5.1)
PROT SERPL-MCNC: 7.6 G/DL (ref 6–8.4)
RBC # BLD AUTO: 5.32 M/UL (ref 4.6–6.2)
SODIUM SERPL-SCNC: 132 MMOL/L (ref 136–145)
TROPONIN I SERPL DL<=0.01 NG/ML-MCNC: 0.01 NG/ML (ref 0–0.03)
WBC # BLD AUTO: 5.65 K/UL (ref 3.9–12.7)

## 2019-12-09 PROCEDURE — 80053 COMPREHEN METABOLIC PANEL: CPT

## 2019-12-09 PROCEDURE — 83880 ASSAY OF NATRIURETIC PEPTIDE: CPT

## 2019-12-09 PROCEDURE — 84484 ASSAY OF TROPONIN QUANT: CPT

## 2019-12-09 PROCEDURE — 99285 EMERGENCY DEPT VISIT HI MDM: CPT | Mod: 25

## 2019-12-09 PROCEDURE — 99284 EMERGENCY DEPT VISIT MOD MDM: CPT | Mod: ,,, | Performed by: EMERGENCY MEDICINE

## 2019-12-09 PROCEDURE — 82962 GLUCOSE BLOOD TEST: CPT

## 2019-12-09 PROCEDURE — 85025 COMPLETE CBC W/AUTO DIFF WBC: CPT

## 2019-12-09 PROCEDURE — 36000 PLACE NEEDLE IN VEIN: CPT

## 2019-12-09 PROCEDURE — 93010 ELECTROCARDIOGRAM REPORT: CPT | Mod: ,,, | Performed by: INTERNAL MEDICINE

## 2019-12-09 PROCEDURE — 99284 PR EMERGENCY DEPT VISIT,LEVEL IV: ICD-10-PCS | Mod: ,,, | Performed by: EMERGENCY MEDICINE

## 2019-12-09 PROCEDURE — 93005 ELECTROCARDIOGRAM TRACING: CPT

## 2019-12-09 PROCEDURE — 93010 EKG 12-LEAD: ICD-10-PCS | Mod: ,,, | Performed by: INTERNAL MEDICINE

## 2019-12-09 RX ORDER — METHOCARBAMOL 500 MG/1
500 TABLET, FILM COATED ORAL 3 TIMES DAILY PRN
Qty: 15 TABLET | Refills: 0 | Status: SHIPPED | OUTPATIENT
Start: 2019-12-09 | End: 2019-12-14

## 2019-12-09 NOTE — ED PROVIDER NOTES
"Encounter Date: 12/9/2019       History     Chief Complaint   Patient presents with    Extremity Weakness     Right hand weakness started on saturday 8 am. Pt reports arm pain since thanksgiving.     HPI   55 yo M hx of DM on insulin, HTN, presenting with RUE pain since thanksgiving. He says pain is "deep" in his r arm. It comes and goes, is sharp. Somewhat aggravated by movement. Feels some weakness as well but is unclear if this is just due to pain. Says he thought it might have been due to lifting case of heavy water bottles around thanksgiving. Has been seen by PCP for this problem.     Review of patient's allergies indicates:   Allergen Reactions    Lisinopril Swelling    Bactrim  [sulfamethoxazole-trimethoprim]      Other reaction(s): Unknown    Diflucan [fluconazole]      Heart racing    Hydroxyzine hcl      Other reaction(s): elevated bp    Kenalog [triamcinolone acetonide] Other (See Comments)     Heart flutters all day    Phenergan [promethazine] Anxiety    Zofran [ondansetron hcl (pf)] Anxiety     Past Medical History:   Diagnosis Date    ASCVD (arteriosclerotic cardiovascular disease) 2/5/2018    Chronic sialoadenitis 12/11/2018    Cirrhosis     based on Fibroscan = F4 5/2018    CKD stage 2 due to type 2 diabetes mellitus 5/18/2017    Diverticulitis     diverticulitis    Essential hypertension 7/18/2016    Fatty liver disease, nonalcoholic 4/7/2016    [place], 2006     GERD (gastroesophageal reflux disease)     HLD (hyperlipidemia)     Kidney stone     requiring stent    Meniere's disease 1/9/2018    Morbid obesity 5/29/2014    NAFLD (nonalcoholic fatty liver disease)     Near syncope 5/23/2017    Obesity hypoventilation syndrome 2/22/2016    PAD (peripheral artery disease) 9/17/2019    Mild 9/2018    Prostatitis     Rectal bleeding     diverticulitis    Splenomegaly 9/17/2019    Type 2 diabetes mellitus with hyperglycemia, with long-term current use of insulin 5/18/2017    " Uncontrolled type 2 diabetes mellitus with peripheral neuropathy 5/29/2014     Past Surgical History:   Procedure Laterality Date    COLONOSCOPY      kidney stent      KIDNEY SURGERY  1996    right kidney obstruction requiring stent    NOSE SURGERY       Family History   Problem Relation Age of Onset    Cancer Father     Diabetes Father     Hypertension Father     Glaucoma Father     Liver disease Mother         d.    Cirrhosis Mother     Hypertension Mother     Diabetes Brother      Social History     Tobacco Use    Smoking status: Former Smoker     Types: Cigarettes    Smokeless tobacco: Never Used    Tobacco comment: social   Substance Use Topics    Alcohol use: No    Drug use: No     Review of Systems   Constitutional: Negative for chills, fever and unexpected weight change.   HENT: Negative for congestion and rhinorrhea.    Eyes: Negative for visual disturbance.   Respiratory: Negative for cough and shortness of breath.    Cardiovascular: Negative for chest pain.   Gastrointestinal: Negative for abdominal pain, diarrhea, nausea and vomiting.   Endocrine: Negative for polydipsia, polyphagia and polyuria.   Genitourinary: Negative for decreased urine volume and difficulty urinating.   Musculoskeletal: Positive for myalgias. Negative for back pain and gait problem.   Skin: Negative for color change and rash.   Neurological: Positive for weakness. Negative for seizures and numbness.   Psychiatric/Behavioral: Negative for agitation and behavioral problems.   All other systems reviewed and are negative.      Physical Exam     Initial Vitals [12/09/19 0915]   BP Pulse Resp Temp SpO2   (!) 143/73 73 18 97.6 °F (36.4 °C) 96 %      MAP       --         Physical Exam    Nursing note and vitals reviewed.  Constitutional: He appears well-developed and well-nourished. He is not diaphoretic. No distress.   HENT:   Head: Normocephalic and atraumatic.   Nose: Nose normal.   Mouth/Throat: Oropharynx is clear and  moist.   Eyes: Conjunctivae and EOM are normal.   Neck: Normal range of motion. Neck supple.   Cardiovascular: Normal rate, regular rhythm, normal heart sounds and intact distal pulses.   Pulmonary/Chest: Breath sounds normal. No respiratory distress. He has no wheezes. He has no rhonchi. He has no rales.   Abdominal: Soft. Bowel sounds are normal. He exhibits no distension. There is no tenderness.   Musculoskeletal: Normal range of motion. He exhibits no edema or tenderness.   RUE non tender, patient gestures to R upper arm when describing pain  No overlying skin changes  Strength 5/5, good  strength and sensation grossly intact and equal bilaterally   Neurological: He is alert and oriented to person, place, and time. He has normal strength. GCS score is 15. GCS eye subscore is 4. GCS verbal subscore is 5. GCS motor subscore is 6.   Skin: Skin is warm and dry. Capillary refill takes less than 2 seconds. No rash noted. No erythema.   Psychiatric: He has a normal mood and affect. Thought content normal.         ED Course   Procedures  Labs Reviewed   POCT GLUCOSE - Abnormal; Notable for the following components:       Result Value    POCT Glucose 362 (*)     All other components within normal limits          Imaging Results    None          Medical Decision Making:   Initial Assessment:   53 yo M hx of DM, HTN, presenting with R arm pain for about the last 2 weeks. Ddx includes muscle strain, metabolic abnormality, could be atypical angina although less likely, ligamentous injury, fracture.   Have ordered CBC, CMP, EKG, trop/BNP, XR of chest and RUE. Will continue to monitor closely.  Adamaris Pineda DO HO-II  10:42 AM      Update:  Patient hyperglycemic but consistent wtiih prior labs. EKG with no concerning changes, CXR and XR upper extremities wnl. No fracture. Remainder of labs unremarkable.  Discussed reassuring findings with patient. Likely MSK etiology as pain began after lifting heavy water bottles. Will  rx muscle relaxant and provide info for Ortho clinic so that patient can continue workup. He is amenable to this plan and questions answered. Stable for discharge.  Adamaris Pineda DO HO-II  12:23 PM                       ED Course as of Dec 09 1200   Mon Dec 09, 2019   1143 EKG interpretation by ED attending physician:  Normal sinus rhythm, rate 73.  No ST changes or ischemia, normal intervals.  Grossly stable from prior EKG 12/2019.    [SS]      ED Course User Index  [SS] Kulwinder Villareal MD                Clinical Impression:       ICD-10-CM ICD-9-CM   1. Arm pain M79.603 729.5                             Adamaris Pineda DO  Resident  12/09/19 1223

## 2019-12-09 NOTE — ED NOTES
Patient identifiers verified and correct for García JAN Nielszulma.    LOC: The patient is awake, alert and aware of environment with an appropriate affect, the patient is oriented x 3 and speaking appropriately.  APPEARANCE: Patient resting comfortably and in no acute distress, patient is clean and well groomed, patient's clothing is properly fastened.  SKIN: The skin is warm and dry, color consistent with ethnicity, patient has normal skin turgor and moist mucus membranes, skin intact, no breakdown or bruising noted.  MUSCULOSKELETAL: Patient moving all extremities spontaneously. Patient states he has been having some R sided arm pain and weakness that has been ongoing since thanksgiving. States pain in throbbing and intermittent and is worse at night. Also, right sided lower extremity weakness.   RESPIRATORY: Airway is open and patent, respirations are spontaneous.  CARDIAC: Patient has a normal rate, no periphreal edema noted, capillary refill < 3 seconds. HTN (on medications)  ABDOMEN: Soft and non tender to palpation.  PV: Some bilateral lower extremity edema noted.   GI/: Patient states he is on abx for a UTI at this time.

## 2019-12-09 NOTE — TELEPHONE ENCOUNTER
"    Reason for Disposition   New neurologic deficit that is present NOW, sudden onset of ANY of the following: * Weakness of the face, arm, or leg on one side of the body * Numbness of the face, arm, or leg on one side of the body * Loss of speech or garbled speech    Additional Information   Negative: Difficult to awaken or acting confused (e.g., disoriented, slurred speech)    Protocols used:  NEUROLOGIC DEFICIT-A-OH    García states he has been having numbness and severe pain in his right arm for several days.  It comes and goes, lasting approx an hour each time, and he states he now has weakness in that arm and hand, now also  his right leg as well.  Finding "changes in my walk, and I am weak in my hand when trying to hold things".  He says the pain to his arm, when present, "is greater than 10".   Per [facility] triage protocol, recommend he hang up now and call 911.  He states he just dropped his granddaughter off at school, will now call his wife.  He states he called the [employer] nurse line on Saturday, and she recommended ED for evaluation.  The note from ANNABEL Mclean in internal medicine that he has been communicating with about these concerns, also recommended ED if symptoms worsen. He has not gone.  Encouraged him to hang up now and call 911.  Message to Vaishali Patel MD, pcp, and ANNABEL Mclean.  Please contact caller directly with any additional care advice.    "

## 2019-12-09 NOTE — ED TRIAGE NOTES
Patient presents to the ED with weakness. He states he is having R arm pain and weakness and R leg weakness that started on Saturday. Arm pain has been ongoing since thanksgiving. Denies injury.

## 2019-12-09 NOTE — TELEPHONE ENCOUNTER
"Patient is currently admitted and is now in hospital, impatient.          Documentation        ABDIEL Valadez Staff; Savita MOYA Staff 2 hours ago (8:27 AM)      García states he has been having numbness and severe pain in his right arm for several days.  It comes and goes, lasting approx an hour each time, and he states he now has weakness in that arm and hand, now also  his right leg as well.  Finding "changes in my walk, and I am weak in my hand when trying to hold things".  He says the pain to his arm, when present, "is greater than 10".   Per [facility] triage protocol, recommend he hang up now and call 911.  He states he just dropped his granddaughter off at school, will now call his wife.  He states he called the [employer] nurse line on Saturday, and she recommended ED for evaluation.  The note from ANNABEL Mclean in internal medicine that he has been communicating with about these concerns, also recommended ED if symptoms worsen. He has not gone.  Encouraged him to hang up now and call 911.  Message to Vaishali Patel MD, pcp, and ANNABEL Mclean.  Please contact caller directly with any additional care advice.    Routing comment       Renata Laureano RN        "

## 2019-12-10 ENCOUNTER — PATIENT MESSAGE (OUTPATIENT)
Dept: INTERNAL MEDICINE | Facility: CLINIC | Age: 54
End: 2019-12-10

## 2019-12-23 ENCOUNTER — TELEPHONE (OUTPATIENT)
Dept: INTERNAL MEDICINE | Facility: CLINIC | Age: 54
End: 2019-12-23

## 2019-12-23 ENCOUNTER — OFFICE VISIT (OUTPATIENT)
Dept: INTERNAL MEDICINE | Facility: CLINIC | Age: 54
End: 2019-12-23
Payer: COMMERCIAL

## 2019-12-23 VITALS
BODY MASS INDEX: 44.85 KG/M2 | SYSTOLIC BLOOD PRESSURE: 114 MMHG | HEIGHT: 70 IN | WEIGHT: 313.25 LBS | DIASTOLIC BLOOD PRESSURE: 64 MMHG

## 2019-12-23 DIAGNOSIS — Z79.4 TYPE 2 DIABETES MELLITUS WITH DIABETIC POLYNEUROPATHY, WITH LONG-TERM CURRENT USE OF INSULIN: Chronic | ICD-10-CM

## 2019-12-23 DIAGNOSIS — E11.42 TYPE 2 DIABETES MELLITUS WITH DIABETIC POLYNEUROPATHY, WITH LONG-TERM CURRENT USE OF INSULIN: Primary | Chronic | ICD-10-CM

## 2019-12-23 DIAGNOSIS — Z79.4 TYPE 2 DIABETES MELLITUS WITH DIABETIC POLYNEUROPATHY, WITH LONG-TERM CURRENT USE OF INSULIN: Primary | Chronic | ICD-10-CM

## 2019-12-23 DIAGNOSIS — R30.0 DYSURIA: Primary | ICD-10-CM

## 2019-12-23 DIAGNOSIS — E11.42 TYPE 2 DIABETES MELLITUS WITH DIABETIC POLYNEUROPATHY, WITH LONG-TERM CURRENT USE OF INSULIN: Chronic | ICD-10-CM

## 2019-12-23 LAB
BILIRUB SERPL-MCNC: ABNORMAL MG/DL
BLOOD URINE, POC: ABNORMAL
COLOR, POC UA: YELLOW
GLUCOSE SERPL-MCNC: 334 MG/DL (ref 70–110)
GLUCOSE UR QL STRIP: ABNORMAL
KETONES UR QL STRIP: ABNORMAL
LEUKOCYTE ESTERASE URINE, POC: ABNORMAL
NITRITE, POC UA: ABNORMAL
PH, POC UA: 5
PROTEIN, POC: ABNORMAL
SPECIFIC GRAVITY, POC UA: 1.02
UROBILINOGEN, POC UA: NORMAL

## 2019-12-23 PROCEDURE — 96372 THER/PROPH/DIAG INJ SC/IM: CPT | Mod: S$GLB,,, | Performed by: PHYSICIAN ASSISTANT

## 2019-12-23 PROCEDURE — 96372 PR INJECTION,THERAP/PROPH/DIAG2ST, IM OR SUBCUT: ICD-10-PCS | Mod: S$GLB,,, | Performed by: PHYSICIAN ASSISTANT

## 2019-12-23 PROCEDURE — 3074F SYST BP LT 130 MM HG: CPT | Mod: CPTII,S$GLB,, | Performed by: PHYSICIAN ASSISTANT

## 2019-12-23 PROCEDURE — 81002 POCT URINE DIPSTICK WITHOUT MICROSCOPE: ICD-10-PCS | Mod: S$GLB,,, | Performed by: PHYSICIAN ASSISTANT

## 2019-12-23 PROCEDURE — 99214 PR OFFICE/OUTPT VISIT, EST, LEVL IV, 30-39 MIN: ICD-10-PCS | Mod: 25,S$GLB,, | Performed by: PHYSICIAN ASSISTANT

## 2019-12-23 PROCEDURE — 82962 GLUCOSE BLOOD TEST: CPT | Mod: S$GLB,,, | Performed by: PHYSICIAN ASSISTANT

## 2019-12-23 PROCEDURE — 81002 URINALYSIS NONAUTO W/O SCOPE: CPT | Mod: S$GLB,,, | Performed by: PHYSICIAN ASSISTANT

## 2019-12-23 PROCEDURE — 3008F PR BODY MASS INDEX (BMI) DOCUMENTED: ICD-10-PCS | Mod: CPTII,S$GLB,, | Performed by: PHYSICIAN ASSISTANT

## 2019-12-23 PROCEDURE — 82962 POCT GLUCOSE, HAND-HELD DEVICE: ICD-10-PCS | Mod: S$GLB,,, | Performed by: PHYSICIAN ASSISTANT

## 2019-12-23 PROCEDURE — 3078F PR MOST RECENT DIASTOLIC BLOOD PRESSURE < 80 MM HG: ICD-10-PCS | Mod: CPTII,S$GLB,, | Performed by: PHYSICIAN ASSISTANT

## 2019-12-23 PROCEDURE — 3078F DIAST BP <80 MM HG: CPT | Mod: CPTII,S$GLB,, | Performed by: PHYSICIAN ASSISTANT

## 2019-12-23 PROCEDURE — 99214 OFFICE O/P EST MOD 30 MIN: CPT | Mod: 25,S$GLB,, | Performed by: PHYSICIAN ASSISTANT

## 2019-12-23 PROCEDURE — 3046F HEMOGLOBIN A1C LEVEL >9.0%: CPT | Mod: CPTII,S$GLB,, | Performed by: PHYSICIAN ASSISTANT

## 2019-12-23 PROCEDURE — 99999 PR PBB SHADOW E&M-EST. PATIENT-LVL IV: CPT | Mod: PBBFAC,,, | Performed by: PHYSICIAN ASSISTANT

## 2019-12-23 PROCEDURE — 99999 PR PBB SHADOW E&M-EST. PATIENT-LVL IV: ICD-10-PCS | Mod: PBBFAC,,, | Performed by: PHYSICIAN ASSISTANT

## 2019-12-23 PROCEDURE — 3074F PR MOST RECENT SYSTOLIC BLOOD PRESSURE < 130 MM HG: ICD-10-PCS | Mod: CPTII,S$GLB,, | Performed by: PHYSICIAN ASSISTANT

## 2019-12-23 PROCEDURE — 3046F PR MOST RECENT HEMOGLOBIN A1C LEVEL > 9.0%: ICD-10-PCS | Mod: CPTII,S$GLB,, | Performed by: PHYSICIAN ASSISTANT

## 2019-12-23 PROCEDURE — 3008F BODY MASS INDEX DOCD: CPT | Mod: CPTII,S$GLB,, | Performed by: PHYSICIAN ASSISTANT

## 2019-12-23 PROCEDURE — 87086 URINE CULTURE/COLONY COUNT: CPT

## 2019-12-23 RX ORDER — INSULIN ASPART 100 [IU]/ML
INJECTION, SOLUTION INTRAVENOUS; SUBCUTANEOUS
Qty: 30 ML | Refills: 6 | Status: SHIPPED | OUTPATIENT
Start: 2019-12-23 | End: 2019-12-27 | Stop reason: SDUPTHER

## 2019-12-23 RX ORDER — INSULIN ASPART 100 [IU]/ML
10 INJECTION, SOLUTION INTRAVENOUS; SUBCUTANEOUS ONCE
Status: COMPLETED | OUTPATIENT
Start: 2019-12-23 | End: 2019-12-23

## 2019-12-23 RX ORDER — PEN NEEDLE, DIABETIC 30 GX3/16"
NEEDLE, DISPOSABLE MISCELLANEOUS
Qty: 400 EACH | Refills: 3 | Status: SHIPPED | OUTPATIENT
Start: 2019-12-23 | End: 2021-01-19 | Stop reason: ALTCHOICE

## 2019-12-23 RX ADMIN — INSULIN ASPART 10 UNITS: 100 INJECTION, SOLUTION INTRAVENOUS; SUBCUTANEOUS at 04:12

## 2019-12-23 NOTE — PROGRESS NOTES
Subjective:       Patient ID: García Tang is a 54 y.o. male.    Chief Complaint: Diabetes    Patient presents to clinic with concerns of urinary tract infection.  He was seen 2 weeks ago in clinic for severe muscle aches and was noted to have a significant amount of glucose as well as white cells and blood in his urine.  He was put on doxycycline for suspected UTI.  He states he has completed all of the medication but does not feel he is any better.  At the time of the last appointment he had a point of care glucose in the 300s and several days later did go to the emergency room for arm pain and was noted to have a blood sugar in the 400s.  He does not check his blood sugar on a regular basis and takes 70 30 insulin twice a day.  He states he is consistently thirsty as well as urinating constantly.  He has noticed a slight smell to the urine as concerned he has a continued infection.  He has also noted a significant weight loss since September of about 25-30 lb.  He has not been trying to lose weight.  He has not seen endocrinology.    Review of Systems   Constitutional: Negative for activity change, appetite change, fatigue and unexpected weight change.   HENT: Negative.  Negative for nosebleeds.    Eyes: Negative for pain and visual disturbance.   Respiratory: Negative for chest tightness, shortness of breath and wheezing.    Cardiovascular: Negative for chest pain, palpitations and leg swelling.   Gastrointestinal: Negative.    Endocrine: Positive for polydipsia, polyphagia and polyuria. Negative for cold intolerance and heat intolerance.   Genitourinary: Positive for frequency and urgency. Negative for decreased urine volume, difficulty urinating, discharge, dysuria, enuresis, flank pain, genital sores, hematuria, penile pain, penile swelling, scrotal swelling and testicular pain.   Musculoskeletal: Positive for arthralgias and myalgias. Negative for back pain, gait problem, joint swelling, neck pain and  neck stiffness.   Neurological: Negative for dizziness, syncope, light-headedness and headaches.   Psychiatric/Behavioral: Negative.        Objective:      Physical Exam   Constitutional: He is oriented to person, place, and time. He appears well-developed and well-nourished. No distress.   HENT:   Head: Normocephalic and atraumatic.   Eyes: Pupils are equal, round, and reactive to light. Conjunctivae are normal.   Neck: Normal range of motion. Neck supple. No JVD present. No thyromegaly present.   Cardiovascular: Normal rate and regular rhythm. Exam reveals no gallop and no friction rub.   No murmur heard.  Pulmonary/Chest: Effort normal. No respiratory distress. He has no wheezes. He has no rales. He exhibits no tenderness.   Neurological: He is alert and oriented to person, place, and time.   Skin: He is not diaphoretic.     Patient POCT glucose in clinic today was 334.    Point of care urine was negative except for over a 1000 of glucose.    Assessment:       1. Dysuria    2. Type 2 diabetes mellitus with diabetic polyneuropathy, with long-term current use of insulin        Plan:       García was seen today for diabetes.    Diagnoses and all orders for this visit:    Dysuria  -     POCT urine dipstick without microscope  -     Urine culture    Type 2 diabetes mellitus with diabetic polyneuropathy, with long-term current use of insulin  -     POCT Glucose, Hand-Held Device  -     insulin aspart U-100 injection 10 Units     Patient to see Mrs. Miller in 2 weeks.  He was started on Levemir 50 units at night as well as NovoLog sliding scale.  He was educated at bedside on sliding scale and pain use.  He is to check his blood sugars 3 times a day and expressed understanding.  He is to keep that appointment on January 7th.        insulin aspart U-100 (NOVOLOG FLEXPEN U-100 INSULIN) 100 unit/mL (3 mL) InPn pen, Inject 16 units w/ meals plus scale 150-200+2, 201-250+4, 251-300+6, 301-350+8, >350+10, snack dose 6 units.  Max daily, Disp: 30 mL, Rfl: 6    insulin detemir U-100 (LEVEMIR FLEXTOUCH U-100 INSULN) 100 unit/mL (3 mL) SubQ InPn pen, Inject 50 units into the skin at night, Disp: 15 mL, Rfl: 6

## 2019-12-25 ENCOUNTER — NURSE TRIAGE (OUTPATIENT)
Dept: ADMINISTRATIVE | Facility: CLINIC | Age: 54
End: 2019-12-25

## 2019-12-25 LAB
BACTERIA UR CULT: NORMAL
BACTERIA UR CULT: NORMAL

## 2019-12-25 NOTE — TELEPHONE ENCOUNTER
Reason for Disposition   Blood glucose > 500 mg/dl (27.5 mmol/l)    Additional Information   Negative: Unconscious or difficult to awaken   Negative: Acting confused (e.g., disoriented, slurred speech)   Negative: Very weak (e.g., can't stand)   Negative: Sounds like a life-threatening emergency to the triager   Negative: [1] Vomiting AND [2] signs of dehydration (e.g., very dry mouth, lightheaded, etc.)   Negative: [1] Blood glucose > 240 mg/dl (13 mmol/l) AND [2] rapid breathing    Protocols used: DIABETES - HIGH BLOOD SUGAR-A-AH    Pt stated he has been peeing a lot and have muscle pain. His bs was 315 this am and it's now 565 and he feels terrible and his arm hurts really bad. Pt stated he feels weak and have some lightheadedness. Care advice recommend pt go to Er now. Pt verbalized understanding.

## 2019-12-27 RX ORDER — INSULIN ASPART 100 [IU]/ML
INJECTION, SOLUTION INTRAVENOUS; SUBCUTANEOUS
Qty: 30 ML | Refills: 6 | Status: SHIPPED | OUTPATIENT
Start: 2019-12-27 | End: 2020-10-30

## 2019-12-30 ENCOUNTER — PATIENT MESSAGE (OUTPATIENT)
Dept: INTERNAL MEDICINE | Facility: CLINIC | Age: 54
End: 2019-12-30

## 2019-12-31 ENCOUNTER — PATIENT MESSAGE (OUTPATIENT)
Dept: INTERNAL MEDICINE | Facility: CLINIC | Age: 54
End: 2019-12-31

## 2020-01-03 ENCOUNTER — LAB VISIT (OUTPATIENT)
Dept: LAB | Facility: HOSPITAL | Age: 55
End: 2020-01-03
Attending: NURSE PRACTITIONER
Payer: COMMERCIAL

## 2020-01-03 ENCOUNTER — OFFICE VISIT (OUTPATIENT)
Dept: ENDOCRINOLOGY | Facility: CLINIC | Age: 55
End: 2020-01-03
Payer: COMMERCIAL

## 2020-01-03 VITALS
WEIGHT: 315 LBS | HEART RATE: 65 BPM | SYSTOLIC BLOOD PRESSURE: 110 MMHG | DIASTOLIC BLOOD PRESSURE: 88 MMHG | HEIGHT: 70 IN | BODY MASS INDEX: 45.1 KG/M2

## 2020-01-03 DIAGNOSIS — E66.01 MORBID OBESITY WITH BMI OF 45.0-49.9, ADULT: ICD-10-CM

## 2020-01-03 DIAGNOSIS — R07.9 CHEST PAIN, UNSPECIFIED TYPE: ICD-10-CM

## 2020-01-03 DIAGNOSIS — Z79.4 TYPE 2 DIABETES MELLITUS WITH DIABETIC POLYNEUROPATHY, WITH LONG-TERM CURRENT USE OF INSULIN: Chronic | ICD-10-CM

## 2020-01-03 DIAGNOSIS — Z79.4 TYPE 2 DIABETES MELLITUS WITH DIABETIC POLYNEUROPATHY, WITH LONG-TERM CURRENT USE OF INSULIN: Primary | Chronic | ICD-10-CM

## 2020-01-03 DIAGNOSIS — E11.42 TYPE 2 DIABETES MELLITUS WITH DIABETIC POLYNEUROPATHY, WITH LONG-TERM CURRENT USE OF INSULIN: Chronic | ICD-10-CM

## 2020-01-03 DIAGNOSIS — E11.42 TYPE 2 DIABETES MELLITUS WITH DIABETIC POLYNEUROPATHY, WITH LONG-TERM CURRENT USE OF INSULIN: Primary | Chronic | ICD-10-CM

## 2020-01-03 DIAGNOSIS — I10 ESSENTIAL HYPERTENSION: Chronic | ICD-10-CM

## 2020-01-03 DIAGNOSIS — K76.0 FATTY LIVER DISEASE, NONALCOHOLIC: Chronic | ICD-10-CM

## 2020-01-03 LAB
ESTIMATED AVG GLUCOSE: 306 MG/DL (ref 68–131)
HBA1C MFR BLD HPLC: 12.3 % (ref 4–5.6)

## 2020-01-03 PROCEDURE — 99999 PR PBB SHADOW E&M-EST. PATIENT-LVL IV: CPT | Mod: PBBFAC,,, | Performed by: NURSE PRACTITIONER

## 2020-01-03 PROCEDURE — 3079F DIAST BP 80-89 MM HG: CPT | Mod: CPTII,S$GLB,, | Performed by: NURSE PRACTITIONER

## 2020-01-03 PROCEDURE — 83036 HEMOGLOBIN GLYCOSYLATED A1C: CPT

## 2020-01-03 PROCEDURE — 3046F PR MOST RECENT HEMOGLOBIN A1C LEVEL > 9.0%: ICD-10-PCS | Mod: CPTII,S$GLB,, | Performed by: NURSE PRACTITIONER

## 2020-01-03 PROCEDURE — 3074F PR MOST RECENT SYSTOLIC BLOOD PRESSURE < 130 MM HG: ICD-10-PCS | Mod: CPTII,S$GLB,, | Performed by: NURSE PRACTITIONER

## 2020-01-03 PROCEDURE — 3079F PR MOST RECENT DIASTOLIC BLOOD PRESSURE 80-89 MM HG: ICD-10-PCS | Mod: CPTII,S$GLB,, | Performed by: NURSE PRACTITIONER

## 2020-01-03 PROCEDURE — 99214 PR OFFICE/OUTPT VISIT, EST, LEVL IV, 30-39 MIN: ICD-10-PCS | Mod: S$GLB,,, | Performed by: NURSE PRACTITIONER

## 2020-01-03 PROCEDURE — 3008F BODY MASS INDEX DOCD: CPT | Mod: CPTII,S$GLB,, | Performed by: NURSE PRACTITIONER

## 2020-01-03 PROCEDURE — 3008F PR BODY MASS INDEX (BMI) DOCUMENTED: ICD-10-PCS | Mod: CPTII,S$GLB,, | Performed by: NURSE PRACTITIONER

## 2020-01-03 PROCEDURE — 99999 PR PBB SHADOW E&M-EST. PATIENT-LVL IV: ICD-10-PCS | Mod: PBBFAC,,, | Performed by: NURSE PRACTITIONER

## 2020-01-03 PROCEDURE — 99214 OFFICE O/P EST MOD 30 MIN: CPT | Mod: S$GLB,,, | Performed by: NURSE PRACTITIONER

## 2020-01-03 PROCEDURE — 3074F SYST BP LT 130 MM HG: CPT | Mod: CPTII,S$GLB,, | Performed by: NURSE PRACTITIONER

## 2020-01-03 PROCEDURE — 3046F HEMOGLOBIN A1C LEVEL >9.0%: CPT | Mod: CPTII,S$GLB,, | Performed by: NURSE PRACTITIONER

## 2020-01-03 PROCEDURE — 36415 COLL VENOUS BLD VENIPUNCTURE: CPT | Mod: PN

## 2020-01-03 NOTE — PROGRESS NOTES
"CC: Mr. García Tang arrives today for management of Type 2 DM and review of chronic medical conditions, as listed in the Visit Diagnosis section of this encounter.       HPI: Mr. García Tang was diagnosed with Type 2 DM in . He was diagnosed based on lab work. Initial treatment consisted of Novolin 70/30. He was not started on metformin because of renal issues. + FH of DM in father and brother. Denies hospitalizations due to DM. He went to ER on Bill Day 2019 for persistently elevated glucoses in 300-500 range. He was given IVF and insulin in the ER and was then discharged after glucoses decreased into 200s.     This is his first time being seen in endocrine.     His DM has been managed by [facility] - Dr. Patel and ANNABEL Hussein.      He previously used Novolin 70/30 but this was changed to MDI approximately 1 month ago. He states that he only gave one dose of Levemir 50 units but stopped taking due to a "swaying feeling." He didn't check his blood glucose during this episode and has been afraid to continue until he had this appt today.     BG readings are checked 3-4x/day. No logs or meter to clinic. Reports the following:  Fastin-270  Lunch: 200  Dinner: 200  Bedtime: 200-250    Hypoglycemia: No    Missing Insulin/PO medication doses: Rare  Timing prandial insulin 5-15 minutes before meals: yes    Exercise: No formal but does yard work.     Dietary Habits: Eats 2-3 meals/day. Rare snacking. Has cut back on added sugar. Has cut out sweet teas as of a few weeks ago. However drinking 1-2 cans of Coke Life (22g sugar per can)..    Last DM education appointment:     He is not taking a statin and had previously declined this since his cholesterol was not elevated.  He states that he takes metoprolol for tachycardia. Does not follow with cardiology regularly.     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      CURRENT DIABETIC MEDS: Novolog 16 units AC + " "SSI (150/25)  Vial or pen: pen  Glucometer type: Relion    Previous DM treatments:  Levemir - pt put on hold  Novolin 70/30    Last Eye Exam: 2/2018, no DR   Last Podiatry Exam: n/a    REVIEW OF SYSTEMS  Constitutional: no c/o fatigue, weakness. + 20# unintentional weight loss over the past 2 months.   Eyes: reports blurred vision with reading.   ENT: denies dysphagia, hearing loss  Cardiac: no palpitations or chest pain. Reports intermittent substernal "stabbing" chest pain that lasts a few minutes and is self-limited - had recent EKG 2 weeks ago.  Respiratory: no cough or dyspnea.  GI: no c/o abdominal pain or nausea. Denies h/o pancreatitis. Reports RUQ dull pain that comes and goes erratically. + GERD  : denies urinary frequency, burning, discharge, frequent UTIs  Skin: no lesions or rashes.  Musculoskeletal: denies difficulty mobilizing. Reports soreness and heaviness in arms and legs.   Psych: reports panic attack after episode of feeling unwell. + depression and anxiety. Denies SI/HI.  Neuro: + numbness, tingling to L great toe. Takes B complex vitamin  Endocrine: denies polyphagia, polydipsia, polyuria      Personally reviewed Past Medical, Surgical, Social History.    Vital Signs  /88   Pulse 65   Ht 5' 10" (1.778 m)   Wt (!) 144.7 kg (319 lb 0.1 oz)   BMI 45.77 kg/m²     Personally reviewed the below labs:    Hemoglobin A1C   Date Value Ref Range Status   10/16/2019 10.2 (H) 4.0 - 5.6 % Final     Comment:     ADA Screening Guidelines:  5.7-6.4%  Consistent with prediabetes  >or=6.5%  Consistent with diabetes  High levels of fetal hemoglobin interfere with the HbA1C  assay. Heterozygous hemoglobin variants (HbS, HgC, etc)do  not significantly interfere with this assay.   However, presence of multiple variants may affect accuracy.     07/22/2019 8.8 (H) 4.0 - 5.6 % Final     Comment:     ADA Screening Guidelines:  5.7-6.4%  Consistent with prediabetes  >or=6.5%  Consistent with diabetes  High " levels of fetal hemoglobin interfere with the HbA1C  assay. Heterozygous hemoglobin variants (HbS, HgC, etc)do  not significantly interfere with this assay.   However, presence of multiple variants may affect accuracy.     04/24/2019 10.5 (H) 4.0 - 5.6 % Final     Comment:     ADA Screening Guidelines:  5.7-6.4%  Consistent with prediabetes  >or=6.5%  Consistent with diabetes  High levels of fetal hemoglobin interfere with the HbA1C  assay. Heterozygous hemoglobin variants (HbS, HgC, etc)do  not significantly interfere with this assay.   However, presence of multiple variants may affect accuracy.         Chemistry        Component Value Date/Time     12/25/2019 1341    K 4.1 12/25/2019 1341    CL 94 (L) 12/25/2019 1341    CO2 32 (H) 12/25/2019 1341    BUN 17 12/25/2019 1341    CREATININE 1.00 12/25/2019 1341     (H) 12/25/2019 1341        Component Value Date/Time    CALCIUM 9.4 12/25/2019 1341    ALKPHOS 91 12/25/2019 1341    AST 32 12/25/2019 1341    ALT 35 12/25/2019 1341    BILITOT 0.6 12/25/2019 1341    ESTGFRAFRICA >60 12/25/2019 1341    EGFRNONAA >60 12/25/2019 1341          Lab Results   Component Value Date    CHOL 171 07/22/2019    CHOL 162 10/22/2018    CHOL 202 (H) 01/25/2018     Lab Results   Component Value Date    HDL 32 (L) 07/22/2019    HDL 28 (L) 10/22/2018    HDL 32 (L) 01/25/2018     Lab Results   Component Value Date    LDLCALC 117.6 07/22/2019    LDLCALC 113.0 10/22/2018    LDLCALC 141.2 01/25/2018     Lab Results   Component Value Date    TRIG 107 07/22/2019    TRIG 105 10/22/2018    TRIG 144 01/25/2018     Lab Results   Component Value Date    CHOLHDL 18.7 (L) 07/22/2019    CHOLHDL 17.3 (L) 10/22/2018    CHOLHDL 15.8 (L) 01/25/2018       Lab Results   Component Value Date    MICALBCREAT 18.8 04/24/2019     Lab Results   Component Value Date    TSH 1.562 06/17/2019       CrCl cannot be calculated (Patient's most recent lab result is older than the maximum 7 days allowed.).    Vit  D, 25-Hydroxy   Date Value Ref Range Status   06/15/2009 44 25 - 80 ng/mL Final     Comment:     Vitamin D, 25-Hydroxy:   -- EXPECTED VALUES --   25-HYDROXY D TOTAL (D2+D3)   Optimum levels in the normal   population are 25-80            PHYSICAL EXAMINATION  Constitutional: Appears well, no distress  Neck: Supple, trachea midline; no thyromegaly or nodules.   Respiratory: CTA, even and unlabored.  Cardiovascular: RRR, no murmurs, no carotid bruits. DP pulses  1+ bilaterally; no edema.    GI: bowel sounds active, no hernia noted  Skin: warm and dry; no lipohypertrophy, or acanthosis nigracans observed.  Psych: normal affect, pleasant mood, conversant  Musculoskeletal: steady gait, no clubbing, full ROM to all extremities  Neuro: DTR 1+ BUE/1+BLE.  Feet: appropriate footwear.      Assessment/Plan  1. Type 2 diabetes mellitus with diabetic polyneuropathy, with long-term current use of insulin  -- Uncontrolled. A1c today. Has been off of basal insulin, which is contributing to hyperglycemia.   -- resume Levemir at lower dose of 40 units QHS (closer to daily NPH dose he was receiving through Novolin 70/30 regimen). May increase Levemir dose by 4 units once weekly until fasting blood sugars are < 150. Don't exceed 60 units without notifying me.   -- continue Novolog 16 units AC + SSI for now until basal insulin requirements are known. Should include both CHO and protein with every meal.   -- can consider Ozempic in the future to help with weight loss and additional glucose control.  -- check BG 4x/day and send log in 2 weeks for review.  -- I explained that until his body becomes acclimated to normal glucoses again, he may feel symptoms similar to hypoglycemia. I advised him to check his glucose when he feels this way.   -- schedule eye exam    -- discussed the benefits of statin therapy in patients with DM. If he is open to this in the future, will run by hepatology before prescribing.     -- Discussed diagnosis of DM,  A1c goals, progression of disease, long term complications and tx options.  -- Reviewed hypoglycemia management: treat with 4 oz of juice, 4 oz regular soda, or 4 glucose tablets. Monitor and repeat treatment every 15 minutes until BG is >70 Then have a snack, which includes 15 grams of complex carbohydrates and protein.   Advised patient to check BG before activities, such as driving or exercise.    -- takes aspirin   2. Essential hypertension  -- acceptable  -- has allergy to lisinopril.   3. Fatty liver disease, nonalcoholic  -- follows with Hepatology   4. Morbid obesity with BMI of 45.0-49.9, adult  -- uncontrolled  -- recent weight loss may be associated with uncontrolled DM  -- I recommended avoiding sodas and advised him to drink more water.    5. Chest pain -- not occurring now. May be related to GERD.  -- Advised patient to notify PCP if chest pain occurs more often or to go to ER if pain significant, is accompanied by any other symptoms, or doesn't cease.           FOLLOW UP  Follow up in about 6 weeks (around 2/14/2020).   Patient instructed to bring BG logs to each follow up   Patient encouraged to call for any BG/medication issues, concerns, or questions.      Orders Placed This Encounter   Procedures    Hemoglobin A1c    Comprehensive metabolic panel    Microalbumin/creatinine urine ratio

## 2020-01-03 NOTE — PATIENT INSTRUCTIONS
Increase Levemir dose by 4 units once weekly until fasting blood sugars are < 150. Don't exceed 60 units without notifying me.       Hypoglycemia (Low Blood Sugar)     Fast-acting sugar includes a cup of nonfat milk.     Too little sugar (glucose) in your blood is called hypoglycemia or low blood sugar. Low blood sugar usually means anything lower than 70 mg/dL. Talk with your healthcare provider about your target range and what level is too low for you. Diabetes itself doesnt cause low blood sugar. But some of the treatments for diabetes, such as pills or insulin, may raise your risk for it. Low blood sugar may cause you to pass out or have a seizure. So always treat low blood sugar right away, but don't overeat.  Special note: Always carry a source of fast-acting sugar and a snack in case of hypoglycemia.   What you may notice  If you have low blood sugar, you may have one or more of these symptoms:  · Shakiness or dizziness  · Cold, clammy skin or sweating  · Feelings of hunger  · Headache  · Nervousness  · A hard, fast heartbeat  · Weakness  · Confusion or irritability  · Blurred vision  · Having nightmares or waking up confused or sweating  · Numbness or tingling in the lips or tongue  What you should do  Here are tips to follow if you have hypoglycemia:   · First check your blood sugar. If it is too low (out of your target range), eat or drink 15 to 20 grams of fast-acting sugar. This may be 3 to 4 glucose tablets, 4 ounces (half a cup) of fruit juice or regular (nondiet) soda, 8 ounces (1 cup) of fat-free milk, or 1 tablespoon of honey. Dont take more than this, or your blood sugar may go too high.  · Wait 15 minutes. Then recheck your blood sugar if you can.  · If your blood sugar is still too low, repeat the steps above and check your blood sugar again. If your blood sugar still has not returned to your target range, contact your healthcare provider or seek emergency care.  · Once your blood sugar returns  to target range, eat a snack or meal.  Preventing low blood sugar  Things you can do include the following:   · If your condition needs a strict treatment plan, eat your meals and snacks at the same times each day. Dont skip meals!  · If your treatment plan lets you change when you eat and what you eat, learn how to change the time and dose of your rapid-acting insulin to match this.   · Ask your healthcare provider if it is safe for you to drink alcohol. Never drink on an empty stomach.  · Take your medicine at the prescribed times.  · Always carry a source of fast-acting sugar and a snack when youre away from home.  Other things to do  Additional tips include the following:  · Carry a medical ID card, a compact USB drive, or wear a medical alert bracelet or necklace. It should say that you have diabetes. It should also say what to do if you pass out or have a seizure.  · Make sure your family, friends, and coworkers know the signs of low blood sugar. Tell them what to do if your blood sugar falls very low and you cant treat yourself.  · Keep a glucagon emergency kit handy. Be sure your family, friends, and coworkers know how and when to use it. Check it regularly and replace the glucagon before it expires.  · Talk with your health care team about other things you can do to prevent low blood sugar.     If you have unexplained hypoglycemia or hypoglycemia several times, call your healthcare provider.   Date Last Reviewed: 5/1/2016  © 6528-9326 [employer]. [address] [place] [address], [place] [address]. All rights reserved. This information is not intended as a substitute for professional medical care. Always follow your healthcare professional's instructions.

## 2020-01-06 ENCOUNTER — OFFICE VISIT (OUTPATIENT)
Dept: CARDIOLOGY | Facility: CLINIC | Age: 55
End: 2020-01-06
Payer: COMMERCIAL

## 2020-01-06 ENCOUNTER — PATIENT MESSAGE (OUTPATIENT)
Dept: ENDOCRINOLOGY | Facility: CLINIC | Age: 55
End: 2020-01-06

## 2020-01-06 ENCOUNTER — PATIENT OUTREACH (OUTPATIENT)
Dept: ADMINISTRATIVE | Facility: OTHER | Age: 55
End: 2020-01-06

## 2020-01-06 VITALS
DIASTOLIC BLOOD PRESSURE: 79 MMHG | SYSTOLIC BLOOD PRESSURE: 130 MMHG | WEIGHT: 315 LBS | BODY MASS INDEX: 45.1 KG/M2 | HEIGHT: 70 IN | HEART RATE: 68 BPM

## 2020-01-06 DIAGNOSIS — E78.5 DYSLIPIDEMIA WITH ELEVATED LOW DENSITY LIPOPROTEIN (LDL) CHOLESTEROL AND ABNORMALLY LOW HIGH DENSITY LIPOPROTEIN CHOLESTEROL: ICD-10-CM

## 2020-01-06 DIAGNOSIS — I10 ESSENTIAL HYPERTENSION: Chronic | ICD-10-CM

## 2020-01-06 DIAGNOSIS — R07.9 CHEST PAIN OF UNKNOWN ETIOLOGY: Primary | ICD-10-CM

## 2020-01-06 DIAGNOSIS — E66.01 MORBID OBESITY WITH BMI OF 45.0-49.9, ADULT: ICD-10-CM

## 2020-01-06 DIAGNOSIS — K76.0 FATTY LIVER DISEASE, NONALCOHOLIC: Chronic | ICD-10-CM

## 2020-01-06 DIAGNOSIS — Z79.4 TYPE 2 DIABETES MELLITUS WITH HYPERGLYCEMIA, WITH LONG-TERM CURRENT USE OF INSULIN: Chronic | ICD-10-CM

## 2020-01-06 DIAGNOSIS — Z12.11 ENCOUNTER FOR FIT (FECAL IMMUNOCHEMICAL TEST) SCREENING: Primary | ICD-10-CM

## 2020-01-06 DIAGNOSIS — E11.65 TYPE 2 DIABETES MELLITUS WITH HYPERGLYCEMIA, WITH LONG-TERM CURRENT USE OF INSULIN: Chronic | ICD-10-CM

## 2020-01-06 PROCEDURE — 99214 OFFICE O/P EST MOD 30 MIN: CPT | Mod: S$GLB,,, | Performed by: INTERNAL MEDICINE

## 2020-01-06 PROCEDURE — 3078F PR MOST RECENT DIASTOLIC BLOOD PRESSURE < 80 MM HG: ICD-10-PCS | Mod: CPTII,S$GLB,, | Performed by: INTERNAL MEDICINE

## 2020-01-06 PROCEDURE — 3075F PR MOST RECENT SYSTOLIC BLOOD PRESS GE 130-139MM HG: ICD-10-PCS | Mod: CPTII,S$GLB,, | Performed by: INTERNAL MEDICINE

## 2020-01-06 PROCEDURE — 3008F BODY MASS INDEX DOCD: CPT | Mod: CPTII,S$GLB,, | Performed by: INTERNAL MEDICINE

## 2020-01-06 PROCEDURE — 3046F HEMOGLOBIN A1C LEVEL >9.0%: CPT | Mod: CPTII,S$GLB,, | Performed by: INTERNAL MEDICINE

## 2020-01-06 PROCEDURE — 99214 PR OFFICE/OUTPT VISIT, EST, LEVL IV, 30-39 MIN: ICD-10-PCS | Mod: S$GLB,,, | Performed by: INTERNAL MEDICINE

## 2020-01-06 PROCEDURE — 3078F DIAST BP <80 MM HG: CPT | Mod: CPTII,S$GLB,, | Performed by: INTERNAL MEDICINE

## 2020-01-06 PROCEDURE — 99999 PR PBB SHADOW E&M-EST. PATIENT-LVL III: CPT | Mod: PBBFAC,,, | Performed by: INTERNAL MEDICINE

## 2020-01-06 PROCEDURE — 3046F PR MOST RECENT HEMOGLOBIN A1C LEVEL > 9.0%: ICD-10-PCS | Mod: CPTII,S$GLB,, | Performed by: INTERNAL MEDICINE

## 2020-01-06 PROCEDURE — 3075F SYST BP GE 130 - 139MM HG: CPT | Mod: CPTII,S$GLB,, | Performed by: INTERNAL MEDICINE

## 2020-01-06 PROCEDURE — 3008F PR BODY MASS INDEX (BMI) DOCUMENTED: ICD-10-PCS | Mod: CPTII,S$GLB,, | Performed by: INTERNAL MEDICINE

## 2020-01-06 PROCEDURE — 99999 PR PBB SHADOW E&M-EST. PATIENT-LVL III: ICD-10-PCS | Mod: PBBFAC,,, | Performed by: INTERNAL MEDICINE

## 2020-01-06 NOTE — PROGRESS NOTES
Chart has been dictated using voice recognition software.  It is not been reviewed carefully for any transcriptional errors due to this technology.   Subjective:   Patient ID:  García Tang is a 54 y.o. male who presents for evaluation of Chest Pain (intermittent x 2-3 weeks) and Abnormal ECG      HPI:  Patient with uncontrolled insulin dependent type 2 diabetes mellitus, hypertension, hyperlipidemia, morbid obesity, and nonalcoholic fatty liver disease.    Patient has been having chest pain for past couple pains on left sternal border when he wakes up in morning.  Onset is somewhat unpredictable. Pain lasts about 15-20 min, relieved with rubbing the area. Not associated with dyspnea, lightheadedness, nausea, or diaphoresis.  Has some dyspnea when he is laying down upon waking. No PND. Patient walks 1/2 mile every couple days.  No chest pain with house or yard work.  Has had diabetes for 6 years.  Has had tachycardia but not recently. Had an syncopal episode of unknown etiology in 2015.      Patient out of control for past year or 2.  Never taken a statin. Stress echo in Feb-2016 showed no ischemia on ECG or echo.   Echo was normal. Carotid Doppler in Nov-2018 showed ICA/CCA ratios 0.9 bilaterally.        Cardiac risk factors: diabetes, hypertension, obesity, tobacco use    Past Medical History:   Diagnosis Date    ASCVD (arteriosclerotic cardiovascular disease) 2/5/2018    Chronic sialoadenitis 12/11/2018    Cirrhosis     based on Fibroscan = F4 5/2018    CKD stage 2 due to type 2 diabetes mellitus 5/18/2017    Diverticulitis     diverticulitis    Essential hypertension 7/18/2016    Fatty liver disease, nonalcoholic 4/7/2016    [place], 2006     GERD (gastroesophageal reflux disease)     HLD (hyperlipidemia)     Kidney stone     requiring stent    Meniere's disease 1/9/2018    Morbid obesity 5/29/2014    NAFLD (nonalcoholic fatty liver disease)     Near syncope 5/23/2017    Obesity hypoventilation  syndrome 2016    PAD (peripheral artery disease) 2019    Mild 2018    Prostatitis     Rectal bleeding     diverticulitis    Splenomegaly 2019    Type 2 diabetes mellitus with hyperglycemia, with long-term current use of insulin 2017    Uncontrolled type 2 diabetes mellitus with peripheral neuropathy 2014       Past Surgical History:   Procedure Laterality Date    COLONOSCOPY      kidney stent      KIDNEY SURGERY      right kidney obstruction requiring stent    NOSE SURGERY         Social History     Tobacco Use    Smoking status: Former Smoker     Types: Cigarettes     Last attempt to quit: 1990     Years since quittin.0    Smokeless tobacco: Never Used    Tobacco comment: social   Substance Use Topics    Alcohol use: No    Drug use: No       Outpatient Medications Prior to Visit   Medication Sig Dispense Refill    aspirin (ECOTRIN) 81 MG EC tablet Take 81 mg by mouth once daily.      DOCOSAHEXANOIC ACID/EPA (FISH OIL ORAL) Take 1 capsule by mouth once daily at 6am.       ERGOCALCIFEROL, VITAMIN D2, (VITAMIN D ORAL) Take 1 capsule by mouth once daily at 6am.       hydroCHLOROthiazide (MICROZIDE) 12.5 mg capsule Take 1 capsule (12.5 mg total) by mouth once daily. 180 capsule 3    inhalation spacing device Use with inhaler dispense with mouthpiece 1 Device 1    insulin aspart U-100 (NOVOLOG FLEXPEN U-100 INSULIN) 100 unit/mL (3 mL) InPn pen Inject 16 units w/ meals plus scale 150-200+2, 201-250+4, 251-300+6, 301-350+8, >350+10, snack dose 6 units. Max daily dose of 78 units 30 mL 6    insulin detemir U-100 (LEVEMIR FLEXTOUCH U-100 INSULN) 100 unit/mL (3 mL) SubQ InPn pen Inject 40 units into the skin at night 15 mL 6    metoprolol succinate (TOPROL XL) 50 MG 24 hr tablet Take 1 tablet (50 mg total) by mouth 2 (two) times daily. 60 tablet 6    multivitamin capsule Take by mouth. 1 Capsule Oral Every day      mupirocin (BACTROBAN) 2 % ointment Apply  "topically 3 (three) times daily. 30 g 0    pen needle, diabetic (BD RAFFY 2ND GEN PEN NEEDLE) 32 gauge x 5/32" Ndle Uses 4 times a day. 400 each 3    triamcinolone acetonide 0.5% (KENALOG) 0.5 % Crea Apply topically 2 (two) times daily. 15 g 1     No facility-administered medications prior to visit.        Review of patient's allergies indicates:   Allergen Reactions    Lisinopril Swelling    Bactrim  [sulfamethoxazole-trimethoprim]      Other reaction(s): Unknown    Diflucan [fluconazole]      Heart racing    Hydroxyzine hcl      Other reaction(s): elevated bp    Kenalog [triamcinolone acetonide] Other (See Comments)     Heart flutters all day    Phenergan [promethazine] Anxiety    Zofran [ondansetron hcl (pf)] Anxiety       Review of Systems   Constitution: Positive for weight loss (20 pound weight loss not on purpose since July). Negative for weight gain.   HENT: Negative for nosebleeds.    Eyes: Negative for vision loss in left eye and vision loss in right eye.   Cardiovascular: Negative for claudication.        As above   Respiratory: Negative for hemoptysis, shortness of breath, snoring, sputum production and wheezing.    Endocrine: Positive for polydipsia and polyuria.   Hematologic/Lymphatic: Does not bruise/bleed easily.   Musculoskeletal: Negative for myalgias.   Gastrointestinal: Positive for abdominal pain and nausea. Negative for change in bowel habit, hematemesis, hematochezia, melena and vomiting.   Genitourinary: Negative for hematuria.   Neurological: Positive for focal weakness (heaviness in legs; right arm weaker than normal). Negative for numbness and weakness.      Objective:   Physical Exam   Constitutional: He is oriented to person, place, and time. He appears well-developed and well-nourished.   /79 (BP Location: Left arm, Patient Position: Sitting, BP Method: X-Large (Automatic))   Pulse 68   Ht 5' 10" (1.778 m)   Wt (!) 145.8 kg (321 lb 6.9 oz)   BMI 46.12 kg/m²      Neck: " Neck supple. No JVD present. Carotid bruit is not present.   Cardiovascular: Normal rate, regular rhythm and normal heart sounds. Exam reveals no gallop and no friction rub.   No murmur heard.  Pulses:       Carotid pulses are 1+ on the right side, and 1+ on the left side.       Radial pulses are 2+ on the right side, and 2+ on the left side.        Femoral pulses are 2+ on the right side, and 2+ on the left side.       Popliteal pulses are 1+ on the right side, and 1+ on the left side.        Dorsalis pedis pulses are 1+ on the right side, and 2+ on the left side.        Posterior tibial pulses are 1+ on the right side, and 2+ on the left side.   No carotid, abdominal, or femoral bruits.     Pulmonary/Chest: Effort normal and breath sounds normal. He has no wheezes. He has no rales.   Abdominal: Soft. Bowel sounds are normal. He exhibits no abdominal bruit. There is no hepatomegaly. There is no tenderness.   Musculoskeletal: He exhibits no edema.   Neurological: He is alert and oriented to person, place, and time. He has normal strength.   Skin: No cyanosis. Nails show no clubbing.       Lab Results   Component Value Date    WBC 6.38 12/25/2019    HGB 14.8 12/25/2019    HCT 42.7 12/25/2019    MCV 84 12/25/2019     12/25/2019       Lab Results   Component Value Date     12/25/2019    K 4.1 12/25/2019    BUN 17 12/25/2019    CREATININE 1.00 12/25/2019     (H) 12/25/2019    HGBA1C 12.3 (H) 01/03/2020    CHOL 171 07/22/2019    HDL 32 (L) 07/22/2019    LDLCALC 117.6 07/22/2019    TRIG 107 07/22/2019    CHOLHDL 18.7 (L) 07/22/2019    HGB 14.8 12/25/2019    HCT 42.7 12/25/2019     12/25/2019    INR 0.9 09/16/2019     ECG (09-December-2019) showed normal sinus rhythm with normal intervals and axis.  There is poor R-wave progression which could be due to lead placement in an otherwise normal electrocardiogram.    ASCVD+ 10 year cardiovascular risk =  13.1  Assessment:     1. Chest pain of unknown  etiology    2. Dyslipidemia with elevated low density lipoprotein (LDL) cholesterol and abnormally low high density lipoprotein cholesterol    3. Type 2 diabetes mellitus with hyperglycemia, with long-term current use of insulin    4. Essential hypertension    5. Morbid obesity with BMI of 45.0-49.9, adult    6. Fatty liver disease, nonalcoholic      Patient with multiple cardiovascular risk factors enter a relatively uncontrolled presents with chest pain of unknown etiology.  The chest pain is not totally consistent with ischemic chest pain.  However, given the patient's lack of control of cholesterol and diabetes, with borderline hypertension control, he is at high risk of having obstructive coronary disease.  Patient's diabetes needs better control in this is being pursued by his primary care physician as well as an endocrinologist.  Patient will also need better control of his lipids.  Will need to discuss use of higher dose of statins with patient's hepatologist to make sure will not interfere with his fatty liver disease.  The patient has also been counseled on weight control and exercise.  Further decisions will be made following review of the results stress test. Unless there are intervening problems, patient should return for re-evaluation in 3 months.   Plan:     García was seen today for chest pain and abnormal ecg.    Diagnoses and all orders for this visit:    Chest pain of unknown etiology  -     Stress Echo Which stress agent will be used? Exercise; Future; Expected date: 01/06/2020    Dyslipidemia with elevated low density lipoprotein (LDL) cholesterol and abnormally low high density lipoprotein cholesterol    Type 2 diabetes mellitus with hyperglycemia, with long-term current use of insulin    Essential hypertension    Morbid obesity with BMI of 45.0-49.9, adult    Fatty liver disease, nonalcoholic          Jose Roberto Mooney MD  Consultative Cardiology

## 2020-01-06 NOTE — Clinical Note
Thank you for referring García JAN Kitty for evaluation of potential coronary artery disease. Please see my note for details of this encounter. If you have any questions, please contact me.  Thank you again for the referral.

## 2020-01-08 ENCOUNTER — HOSPITAL ENCOUNTER (OUTPATIENT)
Dept: CARDIOLOGY | Facility: CLINIC | Age: 55
Discharge: HOME | End: 2020-01-08
Attending: INTERNAL MEDICINE
Payer: COMMERCIAL

## 2020-01-08 ENCOUNTER — PATIENT MESSAGE (OUTPATIENT)
Dept: CARDIOLOGY | Facility: CLINIC | Age: 55
End: 2020-01-08

## 2020-01-08 VITALS — BODY MASS INDEX: 43.54 KG/M2 | HEIGHT: 71 IN | WEIGHT: 311 LBS

## 2020-01-08 DIAGNOSIS — R07.9 CHEST PAIN OF UNKNOWN ETIOLOGY: ICD-10-CM

## 2020-01-08 LAB
ASCENDING AORTA: 3.41 CM
AV INDEX (PROSTH): 0.96
AV MEAN GRADIENT: 2 MMHG
AV PEAK GRADIENT: 4 MMHG
AV VALVE AREA: 4.82 CM2
AV VELOCITY RATIO: 0.91
BSA FOR ECHO PROCEDURE: 2.66 M2
CV ECHO LV RWT: 0.35 CM
CV STRESS BASE HR: 71 BPM
DIASTOLIC BLOOD PRESSURE: 83 MMHG
DOP CALC AO PEAK VEL: 1.01 M/S
DOP CALC AO VTI: 18.47 CM
DOP CALC LVOT AREA: 5 CM2
DOP CALC LVOT DIAMETER: 2.53 CM
DOP CALC LVOT PEAK VEL: 0.92 M/S
DOP CALC LVOT STROKE VOLUME: 89.09 CM3
DOP CALCLVOT PEAK VEL VTI: 17.73 CM
E WAVE DECELERATION TIME: 171.39 MSEC
E/A RATIO: 1.22
E/E' RATIO: 7.89 M/S
ECHO LV POSTERIOR WALL: 0.89 CM (ref 0.6–1.1)
FRACTIONAL SHORTENING: 35 % (ref 28–44)
INTERVENTRICULAR SEPTUM: 0.91 CM (ref 0.6–1.1)
IVRT: 0.08 MSEC
LA MAJOR: 5.05 CM
LA MINOR: 4.84 CM
LA WIDTH: 3.73 CM
LEFT ATRIUM SIZE: 3.73 CM
LEFT ATRIUM VOLUME INDEX: 23 ML/M2
LEFT ATRIUM VOLUME: 58.45 CM3
LEFT INTERNAL DIMENSION IN SYSTOLE: 3.36 CM (ref 2.1–4)
LEFT VENTRICLE DIASTOLIC VOLUME INDEX: 49.83 ML/M2
LEFT VENTRICLE DIASTOLIC VOLUME: 126.77 ML
LEFT VENTRICLE MASS INDEX: 65 G/M2
LEFT VENTRICLE SYSTOLIC VOLUME INDEX: 18.1 ML/M2
LEFT VENTRICLE SYSTOLIC VOLUME: 46.06 ML
LEFT VENTRICULAR INTERNAL DIMENSION IN DIASTOLE: 5.15 CM (ref 3.5–6)
LEFT VENTRICULAR MASS: 166.26 G
LV LATERAL E/E' RATIO: 7.1 M/S
LV SEPTAL E/E' RATIO: 8.88 M/S
MV PEAK A VEL: 0.58 M/S
MV PEAK E VEL: 0.71 M/S
OHS CV CPX 1 MINUTE RECOVERY HEART RATE: 104 BPM
OHS CV CPX 85 PERCENT MAX PREDICTED HEART RATE MALE: 142
OHS CV CPX ESTIMATED METS: 9
OHS CV CPX MAX PREDICTED HEART RATE: 167
OHS CV CPX PATIENT IS FEMALE: 0
OHS CV CPX PATIENT IS MALE: 1
OHS CV CPX PEAK DIASTOLIC BLOOD PRESSURE: 82 MMHG
OHS CV CPX PEAK HEAR RATE: 150 BPM
OHS CV CPX PEAK RATE PRESSURE PRODUCT: NORMAL
OHS CV CPX PEAK SYSTOLIC BLOOD PRESSURE: 193 MMHG
OHS CV CPX PERCENT MAX PREDICTED HEART RATE ACHIEVED: 90
OHS CV CPX RATE PRESSURE PRODUCT PRESENTING: 8378
PISA TR MAX VEL: 1.92 M/S
PULM VEIN S/D RATIO: 1.19
PV PEAK D VEL: 0.42 M/S
PV PEAK S VEL: 0.5 M/S
RA MAJOR: 4.98 CM
RA PRESSURE: 3 MMHG
RA WIDTH: 3.17 CM
RIGHT VENTRICULAR END-DIASTOLIC DIMENSION: 3.95 CM
RV TISSUE DOPPLER FREE WALL SYSTOLIC VELOCITY 1 (APICAL 4 CHAMBER VIEW): 12.22 CM/S
SINUS: 3.65 CM
STJ: 3.04 CM
STRESS ECHO POST EXERCISE DUR MIN: 5 MINUTES
STRESS ECHO POST EXERCISE DUR SEC: 48 SECONDS
SYSTOLIC BLOOD PRESSURE: 118 MMHG
TDI LATERAL: 0.1 M/S
TDI SEPTAL: 0.08 M/S
TDI: 0.09 M/S
TR MAX PG: 15 MMHG
TRICUSPID ANNULAR PLANE SYSTOLIC EXCURSION: 1.88 CM
TV REST PULMONARY ARTERY PRESSURE: 18 MMHG

## 2020-01-08 PROCEDURE — 93325 DOPPLER ECHO COLOR FLOW MAPG: CPT | Mod: 26,,, | Performed by: INTERNAL MEDICINE

## 2020-01-08 PROCEDURE — 93320 DOPPLER ECHO COMPLETE: CPT | Mod: 26,,, | Performed by: INTERNAL MEDICINE

## 2020-01-08 PROCEDURE — 93351 STRESS TTE COMPLETE: CPT | Mod: 26,,, | Performed by: INTERNAL MEDICINE

## 2020-01-08 PROCEDURE — 93325 STRESS ECHO (CUPID ONLY): ICD-10-PCS | Mod: 26,,, | Performed by: INTERNAL MEDICINE

## 2020-01-08 PROCEDURE — 93320 STRESS ECHO (CUPID ONLY): ICD-10-PCS | Mod: 26,,, | Performed by: INTERNAL MEDICINE

## 2020-01-08 PROCEDURE — 93325 DOPPLER ECHO COLOR FLOW MAPG: CPT

## 2020-01-08 PROCEDURE — 93351 STRESS ECHO (CUPID ONLY): ICD-10-PCS | Mod: 26,,, | Performed by: INTERNAL MEDICINE

## 2020-03-04 ENCOUNTER — TELEPHONE (OUTPATIENT)
Dept: HEPATOLOGY | Facility: CLINIC | Age: 55
End: 2020-03-04

## 2020-03-04 NOTE — TELEPHONE ENCOUNTER
Attempted to contact patient to inform them of the need to cancel appointment due to Mrs. Chaparro being on maternity leave, but patient did not answer. Left patient a voicemail stating the purpose for the call, awaiting a call back.     INSTRUCTIONS: Cancel follow up appointment but keep ultrasound and lab appointment. We will contact him with the results

## 2020-03-30 ENCOUNTER — TELEPHONE (OUTPATIENT)
Dept: HEPATOLOGY | Facility: CLINIC | Age: 55
End: 2020-03-30

## 2020-04-07 NOTE — PROGRESS NOTES
Result released to patient with summarizing note.  See test in labs to read note. Spoke to patient and patient's daughter and provided instructions to set up apps and patient portal for [facility]. Scheduled appt for tomorrow with Dr. Mae.    Advised if she has any difficulties setting up patient portal/apps to call clinic.    Will close encounter at this time.

## 2020-04-13 ENCOUNTER — OFFICE VISIT (OUTPATIENT)
Dept: FAMILY MEDICINE | Facility: CLINIC | Age: 55
End: 2020-04-13
Payer: COMMERCIAL

## 2020-04-13 DIAGNOSIS — J01.90 ACUTE BACTERIAL SINUSITIS: Primary | ICD-10-CM

## 2020-04-13 DIAGNOSIS — B96.89 ACUTE BACTERIAL SINUSITIS: Primary | ICD-10-CM

## 2020-04-13 PROCEDURE — 99213 PR OFFICE/OUTPT VISIT, EST, LEVL III, 20-29 MIN: ICD-10-PCS | Mod: 95,,, | Performed by: NURSE PRACTITIONER

## 2020-04-13 PROCEDURE — 99213 OFFICE O/P EST LOW 20 MIN: CPT | Mod: 95,,, | Performed by: NURSE PRACTITIONER

## 2020-04-13 RX ORDER — AZELASTINE 1 MG/ML
1 SPRAY, METERED NASAL 2 TIMES DAILY
Qty: 30 ML | Refills: 0 | Status: SHIPPED | OUTPATIENT
Start: 2020-04-13 | End: 2020-10-30

## 2020-04-13 RX ORDER — AMOXICILLIN AND CLAVULANATE POTASSIUM 875; 125 MG/1; MG/1
1 TABLET, FILM COATED ORAL EVERY 12 HOURS
Qty: 20 TABLET | Refills: 0 | Status: SHIPPED | OUTPATIENT
Start: 2020-04-13 | End: 2020-04-23

## 2020-04-13 NOTE — PROGRESS NOTES
Subjective:       Patient ID: García Tang is a 54 y.o. male.    Chief Complaint: No chief complaint on file.    The patient location is: [place], [place]  The chief complaint leading to consultation is: sinus pain  Visit type: Virtual visit with synchronous audio and video  Total time spent with patient: 15 minutes  Each patient to whom he or she provides medical services by telemedicine is:  (1) informed of the relationship between the physician and patient and the respective role of any other health care provider with respect to management of the patient; and (2) notified that he or she may decline to receive medical services by telemedicine and may withdraw from such care at any time.    Notes:   Last Tuesday patient began to have some sinus conegstion. Having nasal congestion and facial pressure and pain. Taking OTC sinus medication with some relief. He is continuing to have facial pain and pressure. He does have some pain radiating from ear and into his jaw. Having mirza circles under his eyes.    Past Medical History:  2/5/2018: ASCVD (arteriosclerotic cardiovascular disease)  12/11/2018: Chronic sialoadenitis  No date: Cirrhosis      Comment:  based on Fibroscan = F4 5/2018 5/18/2017: CKD stage 2 due to type 2 diabetes mellitus  No date: Diverticulitis      Comment:  diverticulitis  7/18/2016: Essential hypertension  4/7/2016: Fatty liver disease, nonalcoholic      Comment:  Us, 2006   No date: GERD (gastroesophageal reflux disease)  No date: HLD (hyperlipidemia)  No date: Kidney stone      Comment:  requiring stent  1/9/2018: Meniere's disease  5/29/2014: Morbid obesity  No date: NAFLD (nonalcoholic fatty liver disease)  5/23/2017: Near syncope  2/22/2016: Obesity hypoventilation syndrome  9/17/2019: PAD (peripheral artery disease)      Comment:  Mild 9/2018  No date: Prostatitis  No date: Rectal bleeding      Comment:  diverticulitis  9/17/2019: Splenomegaly  5/18/2017: Type 2 diabetes mellitus with  hyperglycemia, with long-  term current use of insulin  5/29/2014: Uncontrolled type 2 diabetes mellitus with peripheral   neuropathy    Past Surgical History:  No date: COLONOSCOPY  No date: kidney stent  1996: KIDNEY SURGERY      Comment:  right kidney obstruction requiring stent  No date: NOSE SURGERY    Review of patient's family history indicates:  Problem: Cancer      Relation: Father          Age of Onset: (Not Specified)  Problem: Diabetes      Relation: Father          Age of Onset: (Not Specified)  Problem: Hypertension      Relation: Father          Age of Onset: (Not Specified)  Problem: Glaucoma      Relation: Father          Age of Onset: (Not Specified)  Problem: Liver disease      Relation: Mother          Age of Onset: (Not Specified)          Comment: d.  Problem: Cirrhosis      Relation: Mother          Age of Onset: (Not Specified)  Problem: Hypertension      Relation: Mother          Age of Onset: (Not Specified)  Problem: Diabetes      Relation: Brother          Age of Onset: (Not Specified)  Problem: Heart attack      Relation: Neg Hx          Age of Onset: (Not Specified)  Problem: Heart disease      Relation: Neg Hx          Age of Onset: (Not Specified)  Problem: Heart failure      Relation: Neg Hx          Age of Onset: (Not Specified)  Problem: Hyperlipidemia      Relation: Neg Hx          Age of Onset: (Not Specified)      Social History    Socioeconomic History      Marital status:       Spouse name: Not on file      Number of children: Not on file      Years of education: Not on file      Highest education level: Not on file    Occupational History      Not on file    Social Needs      Financial resource strain: Not on file      Food insecurity:        Worry: Not on file        Inability: Not on file      Transportation needs:        Medical: Not on file        Non-medical: Not on file    Tobacco Use      Smoking status: Former Smoker        Types: Cigarettes        Quit date:  1990        Years since quittin.3      Smokeless tobacco: Never Used      Tobacco comment: social    Substance and Sexual Activity      Alcohol use: No      Drug use: No      Sexual activity: Yes        Partners: Female    Lifestyle      Physical activity:        Days per week: Not on file        Minutes per session: Not on file      Stress: Not on file    Relationships      Social connections:        Talks on phone: Not on file        Gets together: Not on file        Attends Voodoo service: Not on file        Active member of club or organization: Not on file        Attends meetings of clubs or organizations: Not on file        Relationship status: Not on file    Other Topics      Concerns:        Not on file    Social History Narrative      Not on file      Current Outpatient Medications:  aspirin (ECOTRIN) 81 MG EC tablet, Take 81 mg by mouth once daily., Disp: , Rfl:   DOCOSAHEXANOIC ACID/EPA (FISH OIL ORAL), Take 1 capsule by mouth once daily at 6am. , Disp: , Rfl:   ERGOCALCIFEROL, VITAMIN D2, (VITAMIN D ORAL), Take 1 capsule by mouth once daily at 6am. , Disp: , Rfl:   hydroCHLOROthiazide (MICROZIDE) 12.5 mg capsule, Take 1 capsule (12.5 mg total) by mouth once daily., Disp: 180 capsule, Rfl: 3  inhalation spacing device, Use with inhaler dispense with mouthpiece, Disp: 1 Device, Rfl: 1  insulin aspart U-100 (NOVOLOG FLEXPEN U-100 INSULIN) 100 unit/mL (3 mL) InPn pen, Inject 16 units w/ meals plus scale 150-200+2, 201-250+4, 251-300+6, 301-350+8, >350+10, snack dose 6 units. Max daily dose of 78 units, Disp: 30 mL, Rfl: 6  insulin detemir U-100 (LEVEMIR FLEXTOUCH U-100 INSULN) 100 unit/mL (3 mL) SubQ InPn pen, Inject 50 units into the skin at night, Disp: 15 mL, Rfl: 6  insulin detemir U-100 (LEVEMIR FLEXTOUCH U-100 INSULN) 100 unit/mL (3 mL) SubQ InPn pen, Inject 40 units into the skin at night, Disp: 15 mL, Rfl: 6  metoprolol succinate (TOPROL XL) 50 MG 24 hr tablet, Take 1 tablet (50 mg total)  "by mouth 2 (two) times daily., Disp: 60 tablet, Rfl: 6  multivitamin capsule, Take by mouth. 1 Capsule Oral Every day, Disp: , Rfl:    mupirocin (BACTROBAN) 2 % ointment, Apply topically 3 (three) times daily., Disp: 30 g, Rfl: 0  pen needle, diabetic (BD RAFFY 2ND GEN PEN NEEDLE) 32 gauge x 5/32" Ndle, Uses 4 times a day., Disp: 400 each, Rfl: 3  triamcinolone acetonide 0.5% (KENALOG) 0.5 % Crea, Apply topically 2 (two) times daily., Disp: 15 g, Rfl: 1    No current facility-administered medications for this visit.       Review of patient's allergies indicates:   -- Lisinopril -- Swelling   -- Bactrim  (sulfamethoxazole-trimethoprim)     --  Other reaction(s): Unknown   -- Diflucan (fluconazole)     --  Heart racing   -- Hydroxyzine hcl     --  Other reaction(s): elevated bp   -- Kenalog (triamcinolone acetonide) -- Other (See Comments)    --  Heart flutters all day   -- Phenergan (promethazine) -- Anxiety   -- Zofran (ondansetron hcl (pf)) -- Anxiety    Review of Systems   Constitutional: Positive for fatigue. Negative for chills, diaphoresis and fever.   HENT: Positive for congestion, ear pain, postnasal drip, sinus pressure and sinus pain. Negative for sore throat, trouble swallowing and voice change.    Respiratory: Negative.  Negative for cough, shortness of breath and wheezing.    Cardiovascular: Negative.  Negative for chest pain and leg swelling.   Gastrointestinal: Negative.  Negative for abdominal pain, constipation, diarrhea and vomiting.   Neurological: Positive for headaches. Negative for dizziness and light-headedness.       Objective:      Physical Exam   HENT:   Head: Head is with raccoon's eyes.   Nose: Right sinus exhibits maxillary sinus tenderness and frontal sinus tenderness. Left sinus exhibits maxillary sinus tenderness and frontal sinus tenderness.   Mouth/Throat: No dental abscesses.       Assessment:       1. Acute bacterial sinusitis        Plan:       1. Acute bacterial sinusitis  Follow " up if not resolving, immediately for worsening.   - amoxicillin-clavulanate 875-125mg (AUGMENTIN) 875-125 mg per tablet; Take 1 tablet by mouth every 12 (twelve) hours. for 10 days  Dispense: 20 tablet; Refill: 0  - azelastine (ASTELIN) 137 mcg (0.1 %) nasal spray; 1 spray (137 mcg total) by Nasal route 2 (two) times daily.  Dispense: 30 mL; Refill: 0

## 2020-04-15 ENCOUNTER — TELEPHONE (OUTPATIENT)
Dept: ENDOCRINOLOGY | Facility: CLINIC | Age: 55
End: 2020-04-15

## 2020-04-18 ENCOUNTER — NURSE TRIAGE (OUTPATIENT)
Dept: ADMINISTRATIVE | Facility: CLINIC | Age: 55
End: 2020-04-18

## 2020-04-19 NOTE — TELEPHONE ENCOUNTER
Pt c/o dried nasal drainage causing airway blockage to his nostril. Pt reports digging in nose with tissue last night that eventually caused an abrasion to the inside of his nostril that then caused bleeding. Pt also states he has already spoken to a provider about his sinus drip and is currently prescribed 2 antibiotics. Pt wants to know if there is anything else he can do for the abrasion in his nose. Advised per protocol. Verbalizes understanding.    Reason for Disposition   [1] Mild-moderate nosebleed AND [2] bleeding stopped now    Additional Information   Negative: Fainted or too weak to stand following large blood loss   Negative: Sounds like a life-threatening emergency to the triager   Negative: Nosebleed followed a nose injury   Negative: [1] Bleeding present > 30 minutes AND [2] using correct method of direct pressure   Negative: [1] Bleeding now AND [2] second call after being instructed in correct technique of direct pressure   Negative: Dizziness or lightheadedness   Negative: Pale skin (pallor) of new onset or worsening   Negative: [1] Has nasal packing (inserted by health care provider to control bleeding) AND [2] new rash   Negative: [1] Has nasal packing AND [2] now bleeding around the packing (Exception: few drops or ooze)   Negative: Patient sounds very sick or weak to the triager   Negative: [1] Bleeding recurs 3 or more times in 24 hours AND [2] direct pressure applied correctly   Negative: [1] Skin bruises or bleeding gums AND [2] not caused by an injury   Negative: [1] Large amount of blood has been lost (e.g., 1 cup or 240 ml) AND [2] bleeding now controlled (stopped)   Negative: [1] Has nasal packing AND [2] fever > 100.5 F (38.1 C)   Negative: Taking Coumadin (warfarin) or other strong blood thinner, or known bleeding disorder (e.g., thrombocytopenia)   Negative: Has nasal packing (inserted by health care provider to control bleeding)   Negative: Hard-to-stop nosebleeds  are a chronic symptom (recurrent or ongoing AND present > 4 weeks)   Negative: Easy bleeding present in other family members    Protocols used: NOSEBLEED-A-AH

## 2020-04-20 ENCOUNTER — TELEPHONE (OUTPATIENT)
Dept: INTERNAL MEDICINE | Facility: CLINIC | Age: 55
End: 2020-04-20

## 2020-04-20 NOTE — TELEPHONE ENCOUNTER
Please call pt. If still having irritation to nostril can apply vaseline or neosporin to the scab. May use nasal saline spray. DO NOT forcefully blow or pick nose as can irritate scab. Let me know if still having issues.

## 2020-04-20 NOTE — TELEPHONE ENCOUNTER
----- Message from Maria Del Carmen Mosley sent at 4/20/2020  3:19 PM CDT -----  Contact: [phone]  Would like to get medical advice.  Symptoms (please be specific):  Drooling and Slobbering   How long has patient had these symptoms:  For 2 days     Any drug allergies (copy from chart):      Would the patient rather a call back or a response via [facility]?:  Call  Comments:  Patient would like to know if antibiotics has that side effect

## 2020-04-23 DIAGNOSIS — Z76.0 MEDICATION REFILL: ICD-10-CM

## 2020-04-23 RX ORDER — METOPROLOL SUCCINATE 50 MG/1
TABLET, EXTENDED RELEASE ORAL
Qty: 180 TABLET | Status: CANCELLED | OUTPATIENT
Start: 2020-04-23

## 2020-04-28 RX ORDER — METOPROLOL SUCCINATE 50 MG/1
TABLET, EXTENDED RELEASE ORAL
Qty: 180 TABLET | Refills: 3 | Status: SHIPPED | OUTPATIENT
Start: 2020-04-28 | End: 2021-04-28

## 2020-04-28 NOTE — PROGRESS NOTES
Refill Routing Note   Medication(s) are not appropriate for processing by [facility]:       Drug-Disease Interaction (Fatty liver disease, nonalcoholic; Other cirrhosis of liver and PAD)          Medication Therapy Plan: Per EPIC data 51% adherence; DDzi(Fatty liver disease, nonalcoholic; Other cirrhosis of liver and PAD), defer to you    Medication reconciliation completed: No      Automatic Epic Protocol Generated Data:    Requested Prescriptions   Pending Prescriptions Disp Refills    metoprolol succinate (TOPROL-XL) 50 MG 24 hr tablet [Pharmacy Med Name: METOPROLOL ER SUCCINATE 50MG TABS] 180 tablet 0     Sig: TAKE 1 TABLET BY MOUTH TWICE DAILY       Cardiovascular:  Beta Blockers Passed - 4/23/2020 12:17 PM        Passed - Patient is at least 18 years old        Passed - Last BP in normal range within 360 days.     BP Readings from Last 3 Encounters:   01/06/20 130/79   01/03/20 110/88   12/25/19 125/89              Passed - Last Heart Rate in normal range within 360 days.     Pulse Readings from Last 3 Encounters:   01/06/20 68   01/03/20 65   12/25/19 77             Passed - Office visit in past 12 months or future 90 days.     Recent Outpatient Visits            2 weeks ago Acute bacterial sinusitis    [facility] Laxmi Koch NP    3 months ago Chest pain of unknown etiology    [facility] Jose Roberto Mooney MD    3 months ago Type 2 diabetes mellitus with diabetic polyneuropathy, with long-term current use of insulin    [facility] Angelica Coates, APRN,FNP-C    4 months ago Dysuria    [facility] - Internal Medicine ANNABEL Garnett    4 months ago Chronic midline low back pain without sciatica    [facility] ANNABEL Garnett                       Appointments  past 12m or future 3m with PCP    Date Provider   Last Visit   10/16/2019 Vaishali Patel MD   Next Visit   Visit date not found  Vaishali Patel MD   ED visits in past 90 days: 0     Note composed:2:32 PM 04/28/2020

## 2020-06-17 ENCOUNTER — OFFICE VISIT (OUTPATIENT)
Dept: FAMILY MEDICINE | Facility: CLINIC | Age: 55
End: 2020-06-17
Payer: COMMERCIAL

## 2020-06-17 DIAGNOSIS — E66.01 MORBID OBESITY WITH BMI OF 45.0-49.9, ADULT: ICD-10-CM

## 2020-06-17 DIAGNOSIS — E11.65 TYPE 2 DIABETES MELLITUS WITH HYPERGLYCEMIA, WITH LONG-TERM CURRENT USE OF INSULIN: Chronic | ICD-10-CM

## 2020-06-17 DIAGNOSIS — Z79.4 TYPE 2 DIABETES MELLITUS WITH DIABETIC POLYNEUROPATHY, WITH LONG-TERM CURRENT USE OF INSULIN: Chronic | ICD-10-CM

## 2020-06-17 DIAGNOSIS — Z79.4 TYPE 2 DIABETES MELLITUS WITH HYPERGLYCEMIA, WITH LONG-TERM CURRENT USE OF INSULIN: Chronic | ICD-10-CM

## 2020-06-17 DIAGNOSIS — H66.001 ACUTE SUPPURATIVE OTITIS MEDIA OF RIGHT EAR WITHOUT SPONTANEOUS RUPTURE OF TYMPANIC MEMBRANE, RECURRENCE NOT SPECIFIED: Primary | ICD-10-CM

## 2020-06-17 DIAGNOSIS — E11.42 TYPE 2 DIABETES MELLITUS WITH DIABETIC POLYNEUROPATHY, WITH LONG-TERM CURRENT USE OF INSULIN: Chronic | ICD-10-CM

## 2020-06-17 DIAGNOSIS — I10 ESSENTIAL HYPERTENSION: Chronic | ICD-10-CM

## 2020-06-17 DIAGNOSIS — I25.10 ASCVD (ARTERIOSCLEROTIC CARDIOVASCULAR DISEASE): Chronic | ICD-10-CM

## 2020-06-17 PROCEDURE — 99214 OFFICE O/P EST MOD 30 MIN: CPT | Mod: 95,,, | Performed by: NURSE PRACTITIONER

## 2020-06-17 PROCEDURE — 3046F PR MOST RECENT HEMOGLOBIN A1C LEVEL > 9.0%: ICD-10-PCS | Mod: CPTII,,, | Performed by: NURSE PRACTITIONER

## 2020-06-17 PROCEDURE — 3046F HEMOGLOBIN A1C LEVEL >9.0%: CPT | Mod: CPTII,,, | Performed by: NURSE PRACTITIONER

## 2020-06-17 PROCEDURE — 99214 PR OFFICE/OUTPT VISIT, EST, LEVL IV, 30-39 MIN: ICD-10-PCS | Mod: 95,,, | Performed by: NURSE PRACTITIONER

## 2020-06-17 RX ORDER — FLUTICASONE PROPIONATE 50 MCG
1 SPRAY, SUSPENSION (ML) NASAL DAILY
Qty: 16 G | Refills: 0 | Status: SHIPPED | OUTPATIENT
Start: 2020-06-17 | End: 2020-06-17

## 2020-06-17 RX ORDER — CEFDINIR 300 MG/1
300 CAPSULE ORAL 2 TIMES DAILY
Qty: 20 CAPSULE | Refills: 0 | Status: SHIPPED | OUTPATIENT
Start: 2020-06-17 | End: 2020-06-27

## 2020-06-17 NOTE — PROGRESS NOTES
The patient location is: [place], [place]  The chief complaint leading to consultation is: right ear pain    Visit type: audiovisual    Face to Face time with patient: 20 minutes  5 minutes of total time spent on the encounter, which includes face to face time and non-face to face time preparing to see the patient (eg, review of tests), Obtaining and/or reviewing separately obtained history, Documenting clinical information in the electronic or other health record, Independently interpreting results (not separately reported) and communicating results to the patient/family/caregiver, or Care coordination (not separately reported).         Each patient to whom he or she provides medical services by telemedicine is:  (1) informed of the relationship between the physician and patient and the respective role of any other health care provider with respect to management of the patient; and (2) notified that he or she may decline to receive medical services by telemedicine and may withdraw from such care at any time.    Notes:   History of Present Illness   García Tang is a 54 y.o. man with medical history as listed below with virtual visit today for evaluation of right ear pain. He had a virtual visit about 3 month prior and was treated with Augmentin for presumed bacterial sinusitis, which he completed with initial resolution of symptoms. He reports that over the last week pain to right ear has returned. Pain has gradually intensified and over the last two days become unbearable. The pain radiates to the right lower jaw line. He is unsure if pain is inner ear or related to dental pain, although he is able to chew and swallow without difficulty. He denies sinus pressure, sore throat, nasal congestion, post nasal drip, drainage from ear, hearing loss, cough, fever, chills, body aches. He has no symptoms to left side. He is taking Tylenol and ASA as well as applying cool compresses without relief. He has no  additional complaints and is otherwise healthy on today's visit.    Past Medical History:   Diagnosis Date    ASCVD (arteriosclerotic cardiovascular disease) 2018    Chronic sialoadenitis 2018    Cirrhosis     based on Fibroscan = F4 2018    CKD stage 2 due to type 2 diabetes mellitus 2017    Diverticulitis     diverticulitis    Essential hypertension 2016    Fatty liver disease, nonalcoholic 2016    Us, 2006     GERD (gastroesophageal reflux disease)     HLD (hyperlipidemia)     Kidney stone     requiring stent    Meniere's disease 2018    Morbid obesity 2014    NAFLD (nonalcoholic fatty liver disease)     Near syncope 2017    Obesity hypoventilation syndrome 2016    PAD (peripheral artery disease) 2019    Mild 2018    Prostatitis     Rectal bleeding     diverticulitis    Splenomegaly 2019    Type 2 diabetes mellitus with hyperglycemia, with long-term current use of insulin 2017    Uncontrolled type 2 diabetes mellitus with peripheral neuropathy 2014       Past Surgical History:   Procedure Laterality Date    COLONOSCOPY      kidney stent      KIDNEY SURGERY      right kidney obstruction requiring stent    NOSE SURGERY         Social History     Socioeconomic History    Marital status:      Spouse name: Not on file    Number of children: Not on file    Years of education: Not on file    Highest education level: Not on file   Occupational History    Not on file   Social Needs    Financial resource strain: Not on file    Food insecurity     Worry: Not on file     Inability: Not on file    Transportation needs     Medical: Not on file     Non-medical: Not on file   Tobacco Use    Smoking status: Former Smoker     Types: Cigarettes     Quit date: 1990     Years since quittin.4    Smokeless tobacco: Never Used    Tobacco comment: social   Substance and Sexual Activity    Alcohol use: No    Drug  use: No    Sexual activity: Yes     Partners: Female   Lifestyle    Physical activity     Days per week: Not on file     Minutes per session: Not on file    Stress: Not on file   Relationships    Social connections     Talks on phone: Not on file     Gets together: Not on file     Attends Hindu service: Not on file     Active member of club or organization: Not on file     Attends meetings of clubs or organizations: Not on file     Relationship status: Not on file   Other Topics Concern    Not on file   Social History Narrative    Not on file       Family History   Problem Relation Age of Onset    Cancer Father     Diabetes Father     Hypertension Father     Glaucoma Father     Liver disease Mother         d.    Cirrhosis Mother     Hypertension Mother     Diabetes Brother     Heart attack Neg Hx     Heart disease Neg Hx     Heart failure Neg Hx     Hyperlipidemia Neg Hx        Review of Systems  Review of Systems   Constitutional: Negative for chills and fever.   HENT: Positive for ear pain (right). Negative for congestion, ear discharge, hearing loss, sinus pain and sore throat.    Respiratory: Negative for cough.    Musculoskeletal: Positive for neck pain (right jaw line).   Skin: Negative for rash.     A complete review of systems was otherwise negative.    Physical Exam  General appearance: alert, appears stated age, cooperative and no distress  Lungs: respirations are even and unlabored, no acute distress noted  Skin: no visible rash or lesions  Neurologic: Grossly normal, AAOx3, speaking in full sentences    Assessment/Plan  Acute suppurative otitis media of right ear without spontaneous rupture of tympanic membrane, recurrence not specified  We discussed difficulty with accurate diagnosis given lack of assessment via virtual visit. I will cover him with a ten day course of Cefdinir with Flonase, Tylenol, and NSAID for symptom management. We discussed this may be otitis media, dental  infection, and/or sinus infection- or noninfectious process. If no improvement, would recommend in office visit with PCP team, ENT, and/or dentist. ER precautions discussed.   -     cefdinir (OMNICEF) 300 MG capsule; Take 1 capsule (300 mg total) by mouth 2 (two) times daily. for 10 days  Dispense: 20 capsule; Refill: 0  -     fluticasone propionate (FLONASE) 50 mcg/actuation nasal spray; 1 spray (50 mcg total) by Each Nostril route once daily.  Dispense: 16 g; Refill: 0    Type 2 diabetes mellitus with diabetic polyneuropathy, with long-term current use of insulin  The current medical regimen is effective;  continue present plan and medications.  Followed by endocrinology.    Type 2 diabetes mellitus with hyperglycemia, with long-term current use of insulin  The current medical regimen is effective;  continue present plan and medications.  Followed by endocrinology.    Essential hypertension  The current medical regimen is effective;  continue present plan and medications.    Morbid obesity with BMI of 45.0-49.9, adult  The patient is asked to make an attempt to improve diet and exercise patterns to aid in medical management of this problem.    ASCVD (arteriosclerotic cardiovascular disease)  The current medical regimen is effective;  continue present plan and medications.    Patient has verbalized understanding and is in agreement with plan of care.    Follow up if symptoms worsen or fail to improve.    Answers for HPI/ROS submitted by the patient on 6/17/2020   Ear pain  Affected ear: both  Chronicity: new  Onset: in the past 7 days  Progression since onset: gradually worsening  Frequency: every few minutes  Fever: no fever  Pain - numeric: 7/10  rhinorrhea: No  drainage: No  Treatments tried: acetaminophen, cold packs  Improvement on treatment: mild  Pain severity: moderate  chronic ear infection: No  hearing loss: Yes  tympanostomy tube: No

## 2020-08-20 DIAGNOSIS — E11.9 TYPE 2 DIABETES MELLITUS WITHOUT COMPLICATION: ICD-10-CM

## 2020-10-05 ENCOUNTER — PATIENT MESSAGE (OUTPATIENT)
Dept: ADMINISTRATIVE | Facility: HOSPITAL | Age: 55
End: 2020-10-05

## 2020-10-30 ENCOUNTER — OFFICE VISIT (OUTPATIENT)
Dept: FAMILY MEDICINE | Facility: CLINIC | Age: 55
End: 2020-10-30
Payer: COMMERCIAL

## 2020-10-30 ENCOUNTER — LAB VISIT (OUTPATIENT)
Dept: LAB | Facility: HOSPITAL | Age: 55
End: 2020-10-30
Attending: INTERNAL MEDICINE
Payer: COMMERCIAL

## 2020-10-30 VITALS
TEMPERATURE: 98 F | HEART RATE: 60 BPM | BODY MASS INDEX: 34.11 KG/M2 | WEIGHT: 243.63 LBS | SYSTOLIC BLOOD PRESSURE: 124 MMHG | OXYGEN SATURATION: 100 % | HEIGHT: 71 IN | DIASTOLIC BLOOD PRESSURE: 78 MMHG

## 2020-10-30 DIAGNOSIS — Z12.12 ENCOUNTER FOR SCREENING FOR COLORECTAL MALIGNANT NEOPLASM: ICD-10-CM

## 2020-10-30 DIAGNOSIS — Z01.84 ENCOUNTER FOR ANTIBODY RESPONSE EXAMINATION: ICD-10-CM

## 2020-10-30 DIAGNOSIS — E11.9 TYPE 2 DIABETES MELLITUS WITHOUT COMPLICATION: ICD-10-CM

## 2020-10-30 DIAGNOSIS — Z12.5 ENCOUNTER FOR SCREENING FOR MALIGNANT NEOPLASM OF PROSTATE: ICD-10-CM

## 2020-10-30 DIAGNOSIS — E55.9 VITAMIN D DEFICIENCY: ICD-10-CM

## 2020-10-30 DIAGNOSIS — I10 ESSENTIAL HYPERTENSION: Chronic | ICD-10-CM

## 2020-10-30 DIAGNOSIS — K76.0 FATTY LIVER DISEASE, NONALCOHOLIC: Chronic | ICD-10-CM

## 2020-10-30 DIAGNOSIS — E11.42 TYPE 2 DIABETES MELLITUS WITH DIABETIC POLYNEUROPATHY, WITHOUT LONG-TERM CURRENT USE OF INSULIN: Primary | ICD-10-CM

## 2020-10-30 DIAGNOSIS — Z91.09 ENVIRONMENTAL ALLERGIES: ICD-10-CM

## 2020-10-30 DIAGNOSIS — Z12.11 ENCOUNTER FOR SCREENING FOR COLORECTAL MALIGNANT NEOPLASM: ICD-10-CM

## 2020-10-30 DIAGNOSIS — E78.2 MIXED HYPERLIPIDEMIA: ICD-10-CM

## 2020-10-30 DIAGNOSIS — E66.09 CLASS 1 OBESITY DUE TO EXCESS CALORIES WITH SERIOUS COMORBIDITY AND BODY MASS INDEX (BMI) OF 33.0 TO 33.9 IN ADULT: ICD-10-CM

## 2020-10-30 DIAGNOSIS — Z00.00 PREVENTATIVE HEALTH CARE: ICD-10-CM

## 2020-10-30 PROBLEM — R42 DIZZINESS: Status: RESOLVED | Noted: 2017-05-18 | Resolved: 2020-10-30

## 2020-10-30 PROBLEM — R07.9 CHEST PAIN OF UNKNOWN ETIOLOGY: Status: RESOLVED | Noted: 2020-01-06 | Resolved: 2020-10-30

## 2020-10-30 PROBLEM — K74.69 OTHER CIRRHOSIS OF LIVER: Status: RESOLVED | Noted: 2018-12-17 | Resolved: 2020-10-30

## 2020-10-30 PROBLEM — E66.01 MORBID OBESITY WITH BMI OF 45.0-49.9, ADULT: Status: RESOLVED | Noted: 2020-01-03 | Resolved: 2020-10-30

## 2020-10-30 LAB
CHOLEST SERPL-MCNC: 159 MG/DL (ref 120–199)
CHOLEST/HDLC SERPL: 4.1 {RATIO} (ref 2–5)
HDLC SERPL-MCNC: 39 MG/DL (ref 40–75)
HDLC SERPL: 24.5 % (ref 20–50)
LDLC SERPL CALC-MCNC: 110.6 MG/DL (ref 63–159)
NONHDLC SERPL-MCNC: 120 MG/DL
TRIGL SERPL-MCNC: 47 MG/DL (ref 30–150)

## 2020-10-30 PROCEDURE — 3008F PR BODY MASS INDEX (BMI) DOCUMENTED: ICD-10-PCS | Mod: CPTII,S$GLB,, | Performed by: NURSE PRACTITIONER

## 2020-10-30 PROCEDURE — 3046F PR MOST RECENT HEMOGLOBIN A1C LEVEL > 9.0%: ICD-10-PCS | Mod: CPTII,S$GLB,, | Performed by: NURSE PRACTITIONER

## 2020-10-30 PROCEDURE — 99999 PR PBB SHADOW E&M-EST. PATIENT-LVL III: ICD-10-PCS | Mod: PBBFAC,,, | Performed by: NURSE PRACTITIONER

## 2020-10-30 PROCEDURE — 3074F PR MOST RECENT SYSTOLIC BLOOD PRESSURE < 130 MM HG: ICD-10-PCS | Mod: CPTII,S$GLB,, | Performed by: NURSE PRACTITIONER

## 2020-10-30 PROCEDURE — 3046F HEMOGLOBIN A1C LEVEL >9.0%: CPT | Mod: CPTII,S$GLB,, | Performed by: NURSE PRACTITIONER

## 2020-10-30 PROCEDURE — 36415 COLL VENOUS BLD VENIPUNCTURE: CPT | Mod: PO

## 2020-10-30 PROCEDURE — 99999 PR PBB SHADOW E&M-EST. PATIENT-LVL III: CPT | Mod: PBBFAC,,, | Performed by: NURSE PRACTITIONER

## 2020-10-30 PROCEDURE — 3078F PR MOST RECENT DIASTOLIC BLOOD PRESSURE < 80 MM HG: ICD-10-PCS | Mod: CPTII,S$GLB,, | Performed by: NURSE PRACTITIONER

## 2020-10-30 PROCEDURE — 80061 LIPID PANEL: CPT | Mod: 91

## 2020-10-30 PROCEDURE — 99396 PR PREVENTIVE VISIT,EST,40-64: ICD-10-PCS | Mod: S$GLB,,, | Performed by: NURSE PRACTITIONER

## 2020-10-30 PROCEDURE — 3074F SYST BP LT 130 MM HG: CPT | Mod: CPTII,S$GLB,, | Performed by: NURSE PRACTITIONER

## 2020-10-30 PROCEDURE — 3078F DIAST BP <80 MM HG: CPT | Mod: CPTII,S$GLB,, | Performed by: NURSE PRACTITIONER

## 2020-10-30 PROCEDURE — 3008F BODY MASS INDEX DOCD: CPT | Mod: CPTII,S$GLB,, | Performed by: NURSE PRACTITIONER

## 2020-10-30 PROCEDURE — 99396 PREV VISIT EST AGE 40-64: CPT | Mod: S$GLB,,, | Performed by: NURSE PRACTITIONER

## 2020-10-30 RX ORDER — FLUTICASONE PROPIONATE 50 MCG
SPRAY, SUSPENSION (ML) NASAL
Qty: 48 G | Refills: 2 | Status: SHIPPED | OUTPATIENT
Start: 2020-10-30 | End: 2021-01-19

## 2020-10-30 NOTE — PROGRESS NOTES
Subjective:       Patient ID: García Tang is a 55 y.o. male.    Chief Complaint: Annual Exam    HPI   Patient presents for annual exam    T2DM: Previously followed by endo--lost to follow up. Last A1C 12.3 (January 2020). He became motivated to get off of medication and patient has lost ~80lbs since last year.  Following keto diet and exercising. Stopped taking insulin around May. Reports fasting glucose ranges 80s-90s. Reports neuropathy pain resolved. Due for eye exam.     HTN: controlled  Vit D def: on ergo  Chronic allergy symptoms: not taking medication regularly. +deviated septum   Vitals:    10/30/20 0826   BP: 124/78   Pulse: 60   Temp: 98.2 °F (36.8 °C)     Review of Systems   Constitutional: Negative for diaphoresis and fever.   HENT: Negative for facial swelling and trouble swallowing.    Eyes: Negative for discharge and redness.   Respiratory: Negative for cough and shortness of breath.    Cardiovascular: Negative for chest pain, palpitations and leg swelling.   Gastrointestinal: Negative for abdominal pain and diarrhea.   Genitourinary: Negative for difficulty urinating.   Musculoskeletal: Negative for gait problem.   Skin: Negative for pallor and rash.   Neurological: Negative for facial asymmetry, speech difficulty and numbness.   Psychiatric/Behavioral: Negative for confusion. The patient is not nervous/anxious.        Past Medical History:   Diagnosis Date    ASCVD (arteriosclerotic cardiovascular disease) 2/5/2018    Chronic sialoadenitis 12/11/2018    Cirrhosis     based on Fibroscan = F4 5/2018    CKD stage 2 due to type 2 diabetes mellitus 5/18/2017    Diverticulitis     diverticulitis    Essential hypertension 7/18/2016    Fatty liver disease, nonalcoholic 4/7/2016    [place], 2006     GERD (gastroesophageal reflux disease)     HLD (hyperlipidemia)     Kidney stone     requiring stent    Meniere's disease 1/9/2018    Morbid obesity 5/29/2014    NAFLD (nonalcoholic fatty liver  disease)     Near syncope 5/23/2017    Obesity hypoventilation syndrome 2/22/2016    PAD (peripheral artery disease) 9/17/2019    Mild 9/2018    Prostatitis     Rectal bleeding     diverticulitis    Splenomegaly 9/17/2019    Type 2 diabetes mellitus with hyperglycemia, with long-term current use of insulin 5/18/2017    Uncontrolled type 2 diabetes mellitus with peripheral neuropathy 5/29/2014     Objective:      Physical Exam  Vitals signs and nursing note reviewed.   Constitutional:       General: He is not in acute distress.     Appearance: He is not diaphoretic.   HENT:      Head: Normocephalic.      Right Ear: Hearing, tympanic membrane, ear canal and external ear normal.      Left Ear: Hearing, tympanic membrane, ear canal and external ear normal.      Nose: Nose normal.   Eyes:      General: Lids are normal.      Conjunctiva/sclera: Conjunctivae normal.   Neck:      Vascular: No JVD.      Trachea: No tracheal deviation.   Cardiovascular:      Rate and Rhythm: Normal rate.      Pulses:           Dorsalis pedis pulses are 2+ on the right side and 2+ on the left side.      Heart sounds: Normal heart sounds.   Pulmonary:      Effort: Pulmonary effort is normal.      Breath sounds: Normal breath sounds.   Abdominal:      General: There is no distension.   Musculoskeletal:         General: No deformity.      Right lower leg: No edema.      Left lower leg: No edema.   Feet:      Right foot:      Protective Sensation: 10 sites tested. 7 sites sensed.      Skin integrity: Skin integrity normal.      Left foot:      Protective Sensation: 10 sites tested. 7 sites sensed.      Skin integrity: Skin integrity normal.      Comments: Numbness to heel justin  Skin:     Coloration: Skin is not pale.   Neurological:      Mental Status: He is alert and oriented to person, place, and time.   Psychiatric:         Speech: Speech normal.         Behavior: Behavior normal.         Thought Content: Thought content normal.          Judgment: Judgment normal.         Assessment:       1. Type 2 diabetes mellitus with diabetic polyneuropathy, without long-term current use of insulin    2. Preventative health care    3. Class 1 obesity due to excess calories with serious comorbidity and body mass index (BMI) of 33.0 to 33.9 in adult    4. Essential hypertension    5. Mixed hyperlipidemia    6. Vitamin D deficiency    7. Fatty liver disease, nonalcoholic    8. Encounter for screening for malignant neoplasm of prostate    9. Encounter for antibody response examination    10. Encounter for screening for colorectal malignant neoplasm    11. Environmental allergies        Plan:       Type 2 diabetes mellitus with diabetic polyneuropathy, without long-term current use of insulin  -     Comprehensive Metabolic Panel; Future; Expected date: 10/30/2020  -     Hemoglobin A1C; Future; Expected date: 10/30/2020  -     Ambulatory referral/consult to Optometry; Future; Expected date: 11/06/2020    Preventative health care  -     CBC Auto Differential; Future; Expected date: 10/30/2020  -     Lipid Panel; Future; Expected date: 10/30/2020  -     PSA, Screening; Future; Expected date: 10/30/2020  -     TSH; Future; Expected date: 10/30/2020  -     Vitamin D; Future; Expected date: 10/30/2020  -     Comprehensive Metabolic Panel; Future; Expected date: 10/30/2020  -     Hemoglobin A1C; Future; Expected date: 10/30/2020    Class 1 obesity due to excess calories with serious comorbidity and body mass index (BMI) of 33.0 to 33.9 in adult    Essential hypertension  -     CBC Auto Differential; Future; Expected date: 10/30/2020  -     TSH; Future; Expected date: 10/30/2020  -     Comprehensive Metabolic Panel; Future; Expected date: 10/30/2020    Mixed hyperlipidemia  -     Lipid Panel; Future; Expected date: 10/30/2020    Vitamin D deficiency  -     Vitamin D; Future; Expected date: 10/30/2020    Fatty liver disease, nonalcoholic  -     Comprehensive Metabolic Panel;  "Future; Expected date: 10/30/2020    Encounter for screening for malignant neoplasm of prostate  -     PSA, Screening; Future; Expected date: 10/30/2020    Encounter for antibody response examination  -     COVID-19 (SARS CoV-2) IgG Antibody; Future; Expected date: 10/30/2020    Encounter for screening for colorectal malignant neoplasm  -     Fecal Immunochemical Test (iFOBT); Future; Expected date: 10/30/2020    Environmental allergies  -     fluticasone propionate (FLONASE) 50 mcg/actuation nasal spray; SHAKE LIQUID AND USE 1 SPRAY(50 MCG) IN EACH NOSTRIL EVERY DAY  Dispense: 48 g; Refill: 2        complete labs--will recommend follow up based on results      Medication List with Changes/Refills   Current Medications    ASPIRIN (ECOTRIN) 81 MG EC TABLET    Take 81 mg by mouth once daily.    DOCOSAHEXANOIC ACID/EPA (FISH OIL ORAL)    Take 1 capsule by mouth once daily at 6am.     ERGOCALCIFEROL, VITAMIN D2, (VITAMIN D ORAL)    Take 1 capsule by mouth once daily at 6am.     METOPROLOL SUCCINATE (TOPROL-XL) 50 MG 24 HR TABLET    TAKE 1 TABLET BY MOUTH TWICE DAILY    MULTIVITAMIN CAPSULE    Take by mouth. 1 Capsule Oral Every day    PEN NEEDLE, DIABETIC (BD RAFFY 2ND GEN PEN NEEDLE) 32 GAUGE X 5/32" NDLE    Uses 4 times a day.   Changed and/or Refilled Medications    Modified Medication Previous Medication    FLUTICASONE PROPIONATE (FLONASE) 50 MCG/ACTUATION NASAL SPRAY fluticasone propionate (FLONASE) 50 mcg/actuation nasal spray       SHAKE LIQUID AND USE 1 SPRAY(50 MCG) IN EACH NOSTRIL EVERY DAY    SHAKE LIQUID AND USE 1 SPRAY(50 MCG) IN EACH NOSTRIL EVERY DAY   Discontinued Medications    AZELASTINE (ASTELIN) 137 MCG (0.1 %) NASAL SPRAY    1 spray (137 mcg total) by Nasal route 2 (two) times daily.    HYDROCHLOROTHIAZIDE (MICROZIDE) 12.5 MG CAPSULE    Take 1 capsule (12.5 mg total) by mouth once daily.    INHALATION SPACING DEVICE    Use with inhaler dispense with mouthpiece    INSULIN ASPART U-100 (NOVOLOG FLEXPEN " U-100 INSULIN) 100 UNIT/ML (3 ML) INPN PEN    Inject 16 units w/ meals plus scale 150-200+2, 201-250+4, 251-300+6, 301-350+8, >350+10, snack dose 6 units. Max daily dose of 78 units    INSULIN DETEMIR U-100 (LEVEMIR FLEXTOUCH U-100 INSULN) 100 UNIT/ML (3 ML) SUBQ INPN PEN    Inject 50 units into the skin at night    INSULIN DETEMIR U-100 (LEVEMIR FLEXTOUCH U-100 INSULN) 100 UNIT/ML (3 ML) SUBQ INPN PEN    Inject 40 units into the skin at night    MUPIROCIN (BACTROBAN) 2 % OINTMENT    Apply topically 3 (three) times daily.    TRIAMCINOLONE ACETONIDE 0.5% (KENALOG) 0.5 % CREA    Apply topically 2 (two) times daily.

## 2021-01-04 ENCOUNTER — PATIENT MESSAGE (OUTPATIENT)
Dept: ADMINISTRATIVE | Facility: HOSPITAL | Age: 56
End: 2021-01-04

## 2021-01-17 ENCOUNTER — NURSE TRIAGE (OUTPATIENT)
Dept: ADMINISTRATIVE | Facility: CLINIC | Age: 56
End: 2021-01-17

## 2021-01-19 ENCOUNTER — TELEPHONE (OUTPATIENT)
Dept: INTERNAL MEDICINE | Facility: CLINIC | Age: 56
End: 2021-01-19

## 2021-01-19 ENCOUNTER — OFFICE VISIT (OUTPATIENT)
Dept: FAMILY MEDICINE | Facility: CLINIC | Age: 56
End: 2021-01-19
Payer: COMMERCIAL

## 2021-01-19 ENCOUNTER — CLINICAL SUPPORT (OUTPATIENT)
Dept: URGENT CARE | Facility: CLINIC | Age: 56
End: 2021-01-19
Payer: COMMERCIAL

## 2021-01-19 ENCOUNTER — PATIENT MESSAGE (OUTPATIENT)
Dept: INTERNAL MEDICINE | Facility: CLINIC | Age: 56
End: 2021-01-19

## 2021-01-19 VITALS
HEIGHT: 71 IN | BODY MASS INDEX: 34.72 KG/M2 | DIASTOLIC BLOOD PRESSURE: 70 MMHG | TEMPERATURE: 98 F | HEART RATE: 86 BPM | WEIGHT: 248 LBS | RESPIRATION RATE: 18 BRPM | SYSTOLIC BLOOD PRESSURE: 128 MMHG

## 2021-01-19 DIAGNOSIS — H93.13 TINNITUS OF BOTH EARS: Primary | ICD-10-CM

## 2021-01-19 DIAGNOSIS — Z11.52 ENCOUNTER FOR SCREENING FOR COVID-19: Primary | ICD-10-CM

## 2021-01-19 DIAGNOSIS — B34.9 VIRAL SYNDROME: ICD-10-CM

## 2021-01-19 DIAGNOSIS — U07.1 COVID-19: Primary | ICD-10-CM

## 2021-01-19 LAB
CTP QC/QA: YES
SARS-COV-2 RDRP RESP QL NAA+PROBE: POSITIVE

## 2021-01-19 PROCEDURE — 99999 PR PBB SHADOW E&M-EST. PATIENT-LVL IV: CPT | Mod: PBBFAC,,, | Performed by: PHYSICIAN ASSISTANT

## 2021-01-19 PROCEDURE — 99214 OFFICE O/P EST MOD 30 MIN: CPT | Mod: S$GLB,,, | Performed by: PHYSICIAN ASSISTANT

## 2021-01-19 PROCEDURE — 3074F SYST BP LT 130 MM HG: CPT | Mod: CPTII,S$GLB,, | Performed by: PHYSICIAN ASSISTANT

## 2021-01-19 PROCEDURE — 3008F PR BODY MASS INDEX (BMI) DOCUMENTED: ICD-10-PCS | Mod: CPTII,S$GLB,, | Performed by: PHYSICIAN ASSISTANT

## 2021-01-19 PROCEDURE — 99999 PR PBB SHADOW E&M-EST. PATIENT-LVL IV: ICD-10-PCS | Mod: PBBFAC,,, | Performed by: PHYSICIAN ASSISTANT

## 2021-01-19 PROCEDURE — 1125F PR PAIN SEVERITY QUANTIFIED, PAIN PRESENT: ICD-10-PCS | Mod: S$GLB,,, | Performed by: PHYSICIAN ASSISTANT

## 2021-01-19 PROCEDURE — 3074F PR MOST RECENT SYSTOLIC BLOOD PRESSURE < 130 MM HG: ICD-10-PCS | Mod: CPTII,S$GLB,, | Performed by: PHYSICIAN ASSISTANT

## 2021-01-19 PROCEDURE — 99214 PR OFFICE/OUTPT VISIT, EST, LEVL IV, 30-39 MIN: ICD-10-PCS | Mod: S$GLB,,, | Performed by: PHYSICIAN ASSISTANT

## 2021-01-19 PROCEDURE — U0002 COVID-19 LAB TEST NON-CDC: HCPCS | Mod: QW,S$GLB,, | Performed by: FAMILY MEDICINE

## 2021-01-19 PROCEDURE — 3078F DIAST BP <80 MM HG: CPT | Mod: CPTII,S$GLB,, | Performed by: PHYSICIAN ASSISTANT

## 2021-01-19 PROCEDURE — 3078F PR MOST RECENT DIASTOLIC BLOOD PRESSURE < 80 MM HG: ICD-10-PCS | Mod: CPTII,S$GLB,, | Performed by: PHYSICIAN ASSISTANT

## 2021-01-19 PROCEDURE — 3008F BODY MASS INDEX DOCD: CPT | Mod: CPTII,S$GLB,, | Performed by: PHYSICIAN ASSISTANT

## 2021-01-19 PROCEDURE — 1125F AMNT PAIN NOTED PAIN PRSNT: CPT | Mod: S$GLB,,, | Performed by: PHYSICIAN ASSISTANT

## 2021-01-19 PROCEDURE — U0002: ICD-10-PCS | Mod: QW,S$GLB,, | Performed by: FAMILY MEDICINE

## 2021-01-25 ENCOUNTER — PATIENT MESSAGE (OUTPATIENT)
Dept: INTERNAL MEDICINE | Facility: CLINIC | Age: 56
End: 2021-01-25

## 2021-02-01 ENCOUNTER — PATIENT MESSAGE (OUTPATIENT)
Dept: INTERNAL MEDICINE | Facility: CLINIC | Age: 56
End: 2021-02-01

## 2021-02-08 ENCOUNTER — OFFICE VISIT (OUTPATIENT)
Dept: FAMILY MEDICINE | Facility: CLINIC | Age: 56
End: 2021-02-08
Payer: COMMERCIAL

## 2021-02-08 ENCOUNTER — PATIENT MESSAGE (OUTPATIENT)
Dept: FAMILY MEDICINE | Facility: CLINIC | Age: 56
End: 2021-02-08

## 2021-02-08 ENCOUNTER — PATIENT OUTREACH (OUTPATIENT)
Dept: ADMINISTRATIVE | Facility: OTHER | Age: 56
End: 2021-02-08

## 2021-02-08 ENCOUNTER — PATIENT MESSAGE (OUTPATIENT)
Dept: INTERNAL MEDICINE | Facility: CLINIC | Age: 56
End: 2021-02-08

## 2021-02-08 DIAGNOSIS — H93.19 TINNITUS, UNSPECIFIED LATERALITY: Primary | ICD-10-CM

## 2021-02-08 DIAGNOSIS — E11.65 TYPE 2 DIABETES MELLITUS WITH HYPERGLYCEMIA, WITH LONG-TERM CURRENT USE OF INSULIN: ICD-10-CM

## 2021-02-08 DIAGNOSIS — Z79.4 TYPE 2 DIABETES MELLITUS WITH DIABETIC POLYNEUROPATHY, WITH LONG-TERM CURRENT USE OF INSULIN: Chronic | ICD-10-CM

## 2021-02-08 DIAGNOSIS — Z86.16 HISTORY OF COVID-19: Primary | ICD-10-CM

## 2021-02-08 DIAGNOSIS — E11.42 TYPE 2 DIABETES MELLITUS WITH DIABETIC POLYNEUROPATHY, WITH LONG-TERM CURRENT USE OF INSULIN: Chronic | ICD-10-CM

## 2021-02-08 DIAGNOSIS — K21.9 GASTROESOPHAGEAL REFLUX DISEASE WITHOUT ESOPHAGITIS: ICD-10-CM

## 2021-02-08 DIAGNOSIS — G93.31 POSTVIRAL FATIGUE SYNDROME: ICD-10-CM

## 2021-02-08 DIAGNOSIS — Z79.4 TYPE 2 DIABETES MELLITUS WITH DIABETIC POLYNEUROPATHY, WITH LONG-TERM CURRENT USE OF INSULIN: Primary | ICD-10-CM

## 2021-02-08 DIAGNOSIS — Z79.4 TYPE 2 DIABETES MELLITUS WITH HYPERGLYCEMIA, WITH LONG-TERM CURRENT USE OF INSULIN: ICD-10-CM

## 2021-02-08 DIAGNOSIS — E11.42 TYPE 2 DIABETES MELLITUS WITH DIABETIC POLYNEUROPATHY, WITH LONG-TERM CURRENT USE OF INSULIN: Primary | ICD-10-CM

## 2021-02-08 DIAGNOSIS — K76.0 FATTY LIVER DISEASE, NONALCOHOLIC: Chronic | ICD-10-CM

## 2021-02-08 DIAGNOSIS — K21.9 GASTROESOPHAGEAL REFLUX DISEASE, UNSPECIFIED WHETHER ESOPHAGITIS PRESENT: ICD-10-CM

## 2021-02-08 DIAGNOSIS — I10 ESSENTIAL HYPERTENSION: Chronic | ICD-10-CM

## 2021-02-08 PROCEDURE — 3044F PR MOST RECENT HEMOGLOBIN A1C LEVEL <7.0%: ICD-10-PCS | Mod: CPTII,,, | Performed by: PHYSICIAN ASSISTANT

## 2021-02-08 PROCEDURE — 99214 PR OFFICE/OUTPT VISIT, EST, LEVL IV, 30-39 MIN: ICD-10-PCS | Mod: 95,,, | Performed by: PHYSICIAN ASSISTANT

## 2021-02-08 PROCEDURE — 3044F HG A1C LEVEL LT 7.0%: CPT | Mod: CPTII,,, | Performed by: PHYSICIAN ASSISTANT

## 2021-02-08 PROCEDURE — 99214 OFFICE O/P EST MOD 30 MIN: CPT | Mod: 95,,, | Performed by: PHYSICIAN ASSISTANT

## 2021-02-08 RX ORDER — FAMOTIDINE 20 MG/1
20 TABLET, FILM COATED ORAL 2 TIMES DAILY PRN
Qty: 60 TABLET | Refills: 11 | Status: SHIPPED | OUTPATIENT
Start: 2021-02-08 | End: 2021-03-22

## 2021-02-09 ENCOUNTER — OFFICE VISIT (OUTPATIENT)
Dept: FAMILY MEDICINE | Facility: CLINIC | Age: 56
End: 2021-02-09
Payer: COMMERCIAL

## 2021-02-09 VITALS
WEIGHT: 248.25 LBS | OXYGEN SATURATION: 97 % | HEIGHT: 71 IN | DIASTOLIC BLOOD PRESSURE: 88 MMHG | SYSTOLIC BLOOD PRESSURE: 130 MMHG | HEART RATE: 66 BPM | BODY MASS INDEX: 34.75 KG/M2

## 2021-02-09 DIAGNOSIS — R14.0 ABDOMINAL DISTENSION: Primary | ICD-10-CM

## 2021-02-09 PROCEDURE — 1125F PR PAIN SEVERITY QUANTIFIED, PAIN PRESENT: ICD-10-PCS | Mod: S$GLB,,, | Performed by: NURSE PRACTITIONER

## 2021-02-09 PROCEDURE — 3079F PR MOST RECENT DIASTOLIC BLOOD PRESSURE 80-89 MM HG: ICD-10-PCS | Mod: CPTII,S$GLB,, | Performed by: NURSE PRACTITIONER

## 2021-02-09 PROCEDURE — 3075F SYST BP GE 130 - 139MM HG: CPT | Mod: CPTII,S$GLB,, | Performed by: NURSE PRACTITIONER

## 2021-02-09 PROCEDURE — 3008F PR BODY MASS INDEX (BMI) DOCUMENTED: ICD-10-PCS | Mod: CPTII,S$GLB,, | Performed by: NURSE PRACTITIONER

## 2021-02-09 PROCEDURE — 3008F BODY MASS INDEX DOCD: CPT | Mod: CPTII,S$GLB,, | Performed by: NURSE PRACTITIONER

## 2021-02-09 PROCEDURE — 99214 OFFICE O/P EST MOD 30 MIN: CPT | Mod: S$GLB,,, | Performed by: NURSE PRACTITIONER

## 2021-02-09 PROCEDURE — 99214 PR OFFICE/OUTPT VISIT, EST, LEVL IV, 30-39 MIN: ICD-10-PCS | Mod: S$GLB,,, | Performed by: NURSE PRACTITIONER

## 2021-02-09 PROCEDURE — 99999 PR PBB SHADOW E&M-EST. PATIENT-LVL IV: CPT | Mod: PBBFAC,,, | Performed by: NURSE PRACTITIONER

## 2021-02-09 PROCEDURE — 99999 PR PBB SHADOW E&M-EST. PATIENT-LVL IV: ICD-10-PCS | Mod: PBBFAC,,, | Performed by: NURSE PRACTITIONER

## 2021-02-09 PROCEDURE — 3075F PR MOST RECENT SYSTOLIC BLOOD PRESS GE 130-139MM HG: ICD-10-PCS | Mod: CPTII,S$GLB,, | Performed by: NURSE PRACTITIONER

## 2021-02-09 PROCEDURE — 1125F AMNT PAIN NOTED PAIN PRSNT: CPT | Mod: S$GLB,,, | Performed by: NURSE PRACTITIONER

## 2021-02-09 PROCEDURE — 3079F DIAST BP 80-89 MM HG: CPT | Mod: CPTII,S$GLB,, | Performed by: NURSE PRACTITIONER

## 2021-02-09 RX ORDER — MECLIZINE HYDROCHLORIDE 25 MG/1
25 TABLET ORAL 2 TIMES DAILY PRN
COMMUNITY
Start: 2021-01-17 | End: 2021-07-28

## 2021-02-09 RX ORDER — LEVOCETIRIZINE DIHYDROCHLORIDE 5 MG/1
1 TABLET ORAL
Status: ON HOLD | COMMUNITY
Start: 2021-01-17 | End: 2021-05-17

## 2021-02-10 ENCOUNTER — PATIENT MESSAGE (OUTPATIENT)
Dept: FAMILY MEDICINE | Facility: CLINIC | Age: 56
End: 2021-02-10

## 2021-02-21 ENCOUNTER — NURSE TRIAGE (OUTPATIENT)
Dept: ADMINISTRATIVE | Facility: CLINIC | Age: 56
End: 2021-02-21

## 2021-02-22 ENCOUNTER — TELEPHONE (OUTPATIENT)
Dept: OTOLARYNGOLOGY | Facility: CLINIC | Age: 56
End: 2021-02-22

## 2021-02-23 ENCOUNTER — TELEPHONE (OUTPATIENT)
Dept: OTOLARYNGOLOGY | Facility: CLINIC | Age: 56
End: 2021-02-23

## 2021-03-03 ENCOUNTER — CLINICAL SUPPORT (OUTPATIENT)
Dept: AUDIOLOGY | Facility: CLINIC | Age: 56
End: 2021-03-03
Payer: COMMERCIAL

## 2021-03-03 ENCOUNTER — OFFICE VISIT (OUTPATIENT)
Dept: OTOLARYNGOLOGY | Facility: CLINIC | Age: 56
End: 2021-03-03
Payer: COMMERCIAL

## 2021-03-03 VITALS — BODY MASS INDEX: 34.75 KG/M2 | HEIGHT: 71 IN | WEIGHT: 248.25 LBS

## 2021-03-03 DIAGNOSIS — H90.3 BILATERAL SENSORINEURAL HEARING LOSS: Primary | ICD-10-CM

## 2021-03-03 DIAGNOSIS — H93.13 TINNITUS, BILATERAL: Primary | ICD-10-CM

## 2021-03-03 DIAGNOSIS — H90.3 BILATERAL HIGH FREQUENCY SENSORINEURAL HEARING LOSS: ICD-10-CM

## 2021-03-03 DIAGNOSIS — H93.19 TINNITUS, UNSPECIFIED LATERALITY: ICD-10-CM

## 2021-03-03 DIAGNOSIS — H61.23 BILATERAL IMPACTED CERUMEN: ICD-10-CM

## 2021-03-03 PROCEDURE — 1126F PR PAIN SEVERITY QUANTIFIED, NO PAIN PRESENT: ICD-10-PCS | Mod: S$GLB,,, | Performed by: NURSE PRACTITIONER

## 2021-03-03 PROCEDURE — 1126F AMNT PAIN NOTED NONE PRSNT: CPT | Mod: S$GLB,,, | Performed by: NURSE PRACTITIONER

## 2021-03-03 PROCEDURE — 99999 PR PBB SHADOW E&M-EST. PATIENT-LVL III: ICD-10-PCS | Mod: PBBFAC,,, | Performed by: NURSE PRACTITIONER

## 2021-03-03 PROCEDURE — 92567 TYMPANOMETRY: CPT | Mod: S$GLB,,, | Performed by: AUDIOLOGIST-HEARING AID FITTER

## 2021-03-03 PROCEDURE — 99999 PR PBB SHADOW E&M-EST. PATIENT-LVL III: CPT | Mod: PBBFAC,,, | Performed by: NURSE PRACTITIONER

## 2021-03-03 PROCEDURE — 92567 PR TYMPA2METRY: ICD-10-PCS | Mod: S$GLB,,, | Performed by: AUDIOLOGIST-HEARING AID FITTER

## 2021-03-03 PROCEDURE — 92557 PR COMPREHENSIVE HEARING TEST: ICD-10-PCS | Mod: S$GLB,,, | Performed by: AUDIOLOGIST-HEARING AID FITTER

## 2021-03-03 PROCEDURE — 99999 PR PBB SHADOW E&M-EST. PATIENT-LVL II: ICD-10-PCS | Mod: PBBFAC,,,

## 2021-03-03 PROCEDURE — 3008F BODY MASS INDEX DOCD: CPT | Mod: CPTII,S$GLB,, | Performed by: NURSE PRACTITIONER

## 2021-03-03 PROCEDURE — 99999 PR PBB SHADOW E&M-EST. PATIENT-LVL II: CPT | Mod: PBBFAC,,,

## 2021-03-03 PROCEDURE — G0268 REMOVAL OF IMPACTED WAX MD: HCPCS | Mod: S$GLB,,, | Performed by: NURSE PRACTITIONER

## 2021-03-03 PROCEDURE — G0268 PR REMOVAL OF IMPACTED WAX MD: ICD-10-PCS | Mod: S$GLB,,, | Performed by: NURSE PRACTITIONER

## 2021-03-03 PROCEDURE — 92557 COMPREHENSIVE HEARING TEST: CPT | Mod: S$GLB,,, | Performed by: AUDIOLOGIST-HEARING AID FITTER

## 2021-03-03 PROCEDURE — 99214 OFFICE O/P EST MOD 30 MIN: CPT | Mod: 25,S$GLB,, | Performed by: NURSE PRACTITIONER

## 2021-03-03 PROCEDURE — 3008F PR BODY MASS INDEX (BMI) DOCUMENTED: ICD-10-PCS | Mod: CPTII,S$GLB,, | Performed by: NURSE PRACTITIONER

## 2021-03-03 PROCEDURE — 99214 PR OFFICE/OUTPT VISIT, EST, LEVL IV, 30-39 MIN: ICD-10-PCS | Mod: 25,S$GLB,, | Performed by: NURSE PRACTITIONER

## 2021-03-05 ENCOUNTER — PATIENT MESSAGE (OUTPATIENT)
Dept: OPTOMETRY | Facility: CLINIC | Age: 56
End: 2021-03-05

## 2021-03-10 ENCOUNTER — PATIENT MESSAGE (OUTPATIENT)
Dept: INTERNAL MEDICINE | Facility: CLINIC | Age: 56
End: 2021-03-10

## 2021-03-22 ENCOUNTER — OFFICE VISIT (OUTPATIENT)
Dept: FAMILY MEDICINE | Facility: CLINIC | Age: 56
End: 2021-03-22
Payer: COMMERCIAL

## 2021-03-22 VITALS
OXYGEN SATURATION: 100 % | BODY MASS INDEX: 34.24 KG/M2 | DIASTOLIC BLOOD PRESSURE: 86 MMHG | WEIGHT: 245.38 LBS | SYSTOLIC BLOOD PRESSURE: 130 MMHG | HEART RATE: 54 BPM | TEMPERATURE: 98 F

## 2021-03-22 DIAGNOSIS — R26.89 BALANCE PROBLEM: Primary | ICD-10-CM

## 2021-03-22 DIAGNOSIS — H91.91 HEARING LOSS OF RIGHT EAR, UNSPECIFIED HEARING LOSS TYPE: ICD-10-CM

## 2021-03-22 DIAGNOSIS — R42 DIZZINESS AND GIDDINESS: ICD-10-CM

## 2021-03-22 DIAGNOSIS — H93.11 TINNITUS OF RIGHT EAR: ICD-10-CM

## 2021-03-22 PROCEDURE — 3008F PR BODY MASS INDEX (BMI) DOCUMENTED: ICD-10-PCS | Mod: CPTII,S$GLB,, | Performed by: FAMILY MEDICINE

## 2021-03-22 PROCEDURE — 3075F SYST BP GE 130 - 139MM HG: CPT | Mod: CPTII,S$GLB,, | Performed by: FAMILY MEDICINE

## 2021-03-22 PROCEDURE — 99999 PR PBB SHADOW E&M-EST. PATIENT-LVL IV: CPT | Mod: PBBFAC,,, | Performed by: FAMILY MEDICINE

## 2021-03-22 PROCEDURE — 3079F PR MOST RECENT DIASTOLIC BLOOD PRESSURE 80-89 MM HG: ICD-10-PCS | Mod: CPTII,S$GLB,, | Performed by: FAMILY MEDICINE

## 2021-03-22 PROCEDURE — 3075F PR MOST RECENT SYSTOLIC BLOOD PRESS GE 130-139MM HG: ICD-10-PCS | Mod: CPTII,S$GLB,, | Performed by: FAMILY MEDICINE

## 2021-03-22 PROCEDURE — 3008F BODY MASS INDEX DOCD: CPT | Mod: CPTII,S$GLB,, | Performed by: FAMILY MEDICINE

## 2021-03-22 PROCEDURE — 3079F DIAST BP 80-89 MM HG: CPT | Mod: CPTII,S$GLB,, | Performed by: FAMILY MEDICINE

## 2021-03-22 PROCEDURE — 1125F AMNT PAIN NOTED PAIN PRSNT: CPT | Mod: S$GLB,,, | Performed by: FAMILY MEDICINE

## 2021-03-22 PROCEDURE — 99214 PR OFFICE/OUTPT VISIT, EST, LEVL IV, 30-39 MIN: ICD-10-PCS | Mod: S$GLB,,, | Performed by: FAMILY MEDICINE

## 2021-03-22 PROCEDURE — 99214 OFFICE O/P EST MOD 30 MIN: CPT | Mod: S$GLB,,, | Performed by: FAMILY MEDICINE

## 2021-03-22 PROCEDURE — 1125F PR PAIN SEVERITY QUANTIFIED, PAIN PRESENT: ICD-10-PCS | Mod: S$GLB,,, | Performed by: FAMILY MEDICINE

## 2021-03-22 PROCEDURE — 99999 PR PBB SHADOW E&M-EST. PATIENT-LVL IV: ICD-10-PCS | Mod: PBBFAC,,, | Performed by: FAMILY MEDICINE

## 2021-03-23 ENCOUNTER — PATIENT MESSAGE (OUTPATIENT)
Dept: FAMILY MEDICINE | Facility: CLINIC | Age: 56
End: 2021-03-23

## 2021-03-23 DIAGNOSIS — H91.22 SUDDEN IDIOPATHIC HEARING LOSS OF LEFT EAR WITH UNRESTRICTED HEARING OF RIGHT EAR: ICD-10-CM

## 2021-03-23 DIAGNOSIS — R26.89 BALANCE PROBLEM: Primary | ICD-10-CM

## 2021-03-23 DIAGNOSIS — H91.21 SUDDEN IDIOPATHIC HEARING LOSS OF RIGHT EAR, UNSPECIFIED HEARING STATUS ON CONTRALATERAL SIDE: ICD-10-CM

## 2021-03-23 DIAGNOSIS — H93.11 TINNITUS OF RIGHT EAR: ICD-10-CM

## 2021-03-23 DIAGNOSIS — H91.91 HEARING LOSS OF RIGHT EAR, UNSPECIFIED HEARING LOSS TYPE: ICD-10-CM

## 2021-03-23 DIAGNOSIS — H91.90 HEARING LOSS, UNSPECIFIED HEARING LOSS TYPE, UNSPECIFIED LATERALITY: ICD-10-CM

## 2021-03-24 ENCOUNTER — PATIENT MESSAGE (OUTPATIENT)
Dept: FAMILY MEDICINE | Facility: CLINIC | Age: 56
End: 2021-03-24

## 2021-03-24 ENCOUNTER — IMMUNIZATION (OUTPATIENT)
Dept: FAMILY MEDICINE | Facility: CLINIC | Age: 56
End: 2021-03-24
Payer: COMMERCIAL

## 2021-03-24 ENCOUNTER — NURSE TRIAGE (OUTPATIENT)
Dept: ADMINISTRATIVE | Facility: CLINIC | Age: 56
End: 2021-03-24

## 2021-03-24 DIAGNOSIS — Z23 NEED FOR VACCINATION: Primary | ICD-10-CM

## 2021-03-24 PROCEDURE — 91300 COVID-19, MRNA, LNP-S, PF, 30 MCG/0.3 ML DOSE VACCINE: ICD-10-PCS | Mod: ,,, | Performed by: FAMILY MEDICINE

## 2021-03-24 PROCEDURE — 91300 COVID-19, MRNA, LNP-S, PF, 30 MCG/0.3 ML DOSE VACCINE: CPT | Mod: ,,, | Performed by: FAMILY MEDICINE

## 2021-03-24 PROCEDURE — 0001A COVID-19, MRNA, LNP-S, PF, 30 MCG/0.3 ML DOSE VACCINE: CPT | Mod: CV19,,, | Performed by: FAMILY MEDICINE

## 2021-03-24 PROCEDURE — 0001A COVID-19, MRNA, LNP-S, PF, 30 MCG/0.3 ML DOSE VACCINE: ICD-10-PCS | Mod: CV19,,, | Performed by: FAMILY MEDICINE

## 2021-03-26 ENCOUNTER — TELEPHONE (OUTPATIENT)
Dept: FAMILY MEDICINE | Facility: CLINIC | Age: 56
End: 2021-03-26

## 2021-04-05 ENCOUNTER — NURSE TRIAGE (OUTPATIENT)
Dept: ADMINISTRATIVE | Facility: CLINIC | Age: 56
End: 2021-04-05

## 2021-04-05 ENCOUNTER — TELEPHONE (OUTPATIENT)
Dept: FAMILY MEDICINE | Facility: CLINIC | Age: 56
End: 2021-04-05

## 2021-04-06 ENCOUNTER — OFFICE VISIT (OUTPATIENT)
Dept: FAMILY MEDICINE | Facility: CLINIC | Age: 56
End: 2021-04-06
Payer: COMMERCIAL

## 2021-04-06 VITALS
WEIGHT: 245.56 LBS | BODY MASS INDEX: 34.38 KG/M2 | OXYGEN SATURATION: 97 % | HEIGHT: 71 IN | SYSTOLIC BLOOD PRESSURE: 124 MMHG | HEART RATE: 67 BPM | RESPIRATION RATE: 18 BRPM | DIASTOLIC BLOOD PRESSURE: 76 MMHG

## 2021-04-06 DIAGNOSIS — E66.9 OBESITY, CLASS I, BMI 30-34.9: ICD-10-CM

## 2021-04-06 DIAGNOSIS — I10 ESSENTIAL HYPERTENSION: ICD-10-CM

## 2021-04-06 DIAGNOSIS — L23.9 ALLERGIC DERMATITIS: Primary | ICD-10-CM

## 2021-04-06 PROCEDURE — 1125F PR PAIN SEVERITY QUANTIFIED, PAIN PRESENT: ICD-10-PCS | Mod: S$GLB,,, | Performed by: FAMILY MEDICINE

## 2021-04-06 PROCEDURE — 99999 PR PBB SHADOW E&M-EST. PATIENT-LVL V: CPT | Mod: PBBFAC,,, | Performed by: FAMILY MEDICINE

## 2021-04-06 PROCEDURE — 3008F BODY MASS INDEX DOCD: CPT | Mod: CPTII,S$GLB,, | Performed by: FAMILY MEDICINE

## 2021-04-06 PROCEDURE — 3008F PR BODY MASS INDEX (BMI) DOCUMENTED: ICD-10-PCS | Mod: CPTII,S$GLB,, | Performed by: FAMILY MEDICINE

## 2021-04-06 PROCEDURE — 3074F PR MOST RECENT SYSTOLIC BLOOD PRESSURE < 130 MM HG: ICD-10-PCS | Mod: CPTII,S$GLB,, | Performed by: FAMILY MEDICINE

## 2021-04-06 PROCEDURE — 3074F SYST BP LT 130 MM HG: CPT | Mod: CPTII,S$GLB,, | Performed by: FAMILY MEDICINE

## 2021-04-06 PROCEDURE — 99999 PR PBB SHADOW E&M-EST. PATIENT-LVL V: ICD-10-PCS | Mod: PBBFAC,,, | Performed by: FAMILY MEDICINE

## 2021-04-06 PROCEDURE — 99214 PR OFFICE/OUTPT VISIT, EST, LEVL IV, 30-39 MIN: ICD-10-PCS | Mod: S$GLB,,, | Performed by: FAMILY MEDICINE

## 2021-04-06 PROCEDURE — 3078F PR MOST RECENT DIASTOLIC BLOOD PRESSURE < 80 MM HG: ICD-10-PCS | Mod: CPTII,S$GLB,, | Performed by: FAMILY MEDICINE

## 2021-04-06 PROCEDURE — 1125F AMNT PAIN NOTED PAIN PRSNT: CPT | Mod: S$GLB,,, | Performed by: FAMILY MEDICINE

## 2021-04-06 PROCEDURE — 3078F DIAST BP <80 MM HG: CPT | Mod: CPTII,S$GLB,, | Performed by: FAMILY MEDICINE

## 2021-04-06 PROCEDURE — 99214 OFFICE O/P EST MOD 30 MIN: CPT | Mod: S$GLB,,, | Performed by: FAMILY MEDICINE

## 2021-04-07 DIAGNOSIS — E11.9 TYPE 2 DIABETES MELLITUS WITHOUT COMPLICATION: ICD-10-CM

## 2021-04-07 DIAGNOSIS — E11.9 TYPE 2 DIABETES MELLITUS WITHOUT COMPLICATION, UNSPECIFIED WHETHER LONG TERM INSULIN USE: ICD-10-CM

## 2021-04-14 ENCOUNTER — NURSE TRIAGE (OUTPATIENT)
Dept: ADMINISTRATIVE | Facility: CLINIC | Age: 56
End: 2021-04-14

## 2021-04-14 ENCOUNTER — IMMUNIZATION (OUTPATIENT)
Dept: FAMILY MEDICINE | Facility: CLINIC | Age: 56
End: 2021-04-14
Payer: COMMERCIAL

## 2021-04-14 DIAGNOSIS — Z23 NEED FOR VACCINATION: Primary | ICD-10-CM

## 2021-04-14 PROCEDURE — 91300 COVID-19, MRNA, LNP-S, PF, 30 MCG/0.3 ML DOSE VACCINE: CPT | Mod: PBBFAC | Performed by: FAMILY MEDICINE

## 2021-04-14 PROCEDURE — 0002A COVID-19, MRNA, LNP-S, PF, 30 MCG/0.3 ML DOSE VACCINE: CPT | Mod: PBBFAC | Performed by: FAMILY MEDICINE

## 2021-04-15 ENCOUNTER — PATIENT MESSAGE (OUTPATIENT)
Dept: FAMILY MEDICINE | Facility: CLINIC | Age: 56
End: 2021-04-15

## 2021-04-18 ENCOUNTER — NURSE TRIAGE (OUTPATIENT)
Dept: ADMINISTRATIVE | Facility: CLINIC | Age: 56
End: 2021-04-18

## 2021-04-19 ENCOUNTER — TELEPHONE (OUTPATIENT)
Dept: FAMILY MEDICINE | Facility: CLINIC | Age: 56
End: 2021-04-19

## 2021-04-19 ENCOUNTER — PATIENT MESSAGE (OUTPATIENT)
Dept: FAMILY MEDICINE | Facility: CLINIC | Age: 56
End: 2021-04-19

## 2021-04-20 ENCOUNTER — OFFICE VISIT (OUTPATIENT)
Dept: FAMILY MEDICINE | Facility: CLINIC | Age: 56
End: 2021-04-20
Payer: COMMERCIAL

## 2021-04-20 ENCOUNTER — PATIENT MESSAGE (OUTPATIENT)
Dept: FAMILY MEDICINE | Facility: CLINIC | Age: 56
End: 2021-04-20

## 2021-04-20 ENCOUNTER — DOCUMENTATION ONLY (OUTPATIENT)
Dept: FAMILY MEDICINE | Facility: CLINIC | Age: 56
End: 2021-04-20

## 2021-04-20 ENCOUNTER — TELEPHONE (OUTPATIENT)
Dept: FAMILY MEDICINE | Facility: CLINIC | Age: 56
End: 2021-04-20

## 2021-04-20 ENCOUNTER — HOSPITAL ENCOUNTER (OUTPATIENT)
Dept: RADIOLOGY | Facility: HOSPITAL | Age: 56
Discharge: HOME | End: 2021-04-20
Attending: NURSE PRACTITIONER
Payer: COMMERCIAL

## 2021-04-20 VITALS
WEIGHT: 246.5 LBS | BODY MASS INDEX: 35.29 KG/M2 | HEIGHT: 70 IN | OXYGEN SATURATION: 97 % | SYSTOLIC BLOOD PRESSURE: 112 MMHG | TEMPERATURE: 98 F | DIASTOLIC BLOOD PRESSURE: 78 MMHG | HEART RATE: 69 BPM

## 2021-04-20 DIAGNOSIS — R10.9 ABDOMINAL PAIN, UNSPECIFIED ABDOMINAL LOCATION: ICD-10-CM

## 2021-04-20 DIAGNOSIS — R42 DIZZINESS: Primary | ICD-10-CM

## 2021-04-20 LAB
BILIRUB UR QL STRIP: NEGATIVE
CLARITY UR: CLEAR
COLOR UR: YELLOW
GLUCOSE UR QL STRIP: NEGATIVE
HGB UR QL STRIP: NEGATIVE
KETONES UR QL STRIP: NEGATIVE
LEUKOCYTE ESTERASE UR QL STRIP: NEGATIVE
NITRITE UR QL STRIP: NEGATIVE
PH UR STRIP: 7 [PH] (ref 5–8)
PROT UR QL STRIP: NEGATIVE
SP GR UR STRIP: 1.01 (ref 1–1.03)
URN SPEC COLLECT METH UR: NORMAL

## 2021-04-20 PROCEDURE — 81003 URINALYSIS AUTO W/O SCOPE: CPT | Mod: PO | Performed by: NURSE PRACTITIONER

## 2021-04-20 PROCEDURE — 74177 CT ABD & PELVIS W/CONTRAST: CPT | Mod: 26,,, | Performed by: RADIOLOGY

## 2021-04-20 PROCEDURE — 1125F PR PAIN SEVERITY QUANTIFIED, PAIN PRESENT: ICD-10-PCS | Mod: S$GLB,,, | Performed by: NURSE PRACTITIONER

## 2021-04-20 PROCEDURE — 74177 CT ABD & PELVIS W/CONTRAST: CPT | Mod: TC,PO

## 2021-04-20 PROCEDURE — 99999 PR PBB SHADOW E&M-EST. PATIENT-LVL IV: CPT | Mod: PBBFAC,,, | Performed by: NURSE PRACTITIONER

## 2021-04-20 PROCEDURE — 25500020 PHARM REV CODE 255: Mod: PO | Performed by: NURSE PRACTITIONER

## 2021-04-20 PROCEDURE — 99214 PR OFFICE/OUTPT VISIT, EST, LEVL IV, 30-39 MIN: ICD-10-PCS | Mod: S$GLB,,, | Performed by: NURSE PRACTITIONER

## 2021-04-20 PROCEDURE — 99999 PR PBB SHADOW E&M-EST. PATIENT-LVL IV: ICD-10-PCS | Mod: PBBFAC,,, | Performed by: NURSE PRACTITIONER

## 2021-04-20 PROCEDURE — 1125F AMNT PAIN NOTED PAIN PRSNT: CPT | Mod: S$GLB,,, | Performed by: NURSE PRACTITIONER

## 2021-04-20 PROCEDURE — 74177 CT ABDOMEN PELVIS WITH CONTRAST: ICD-10-PCS | Mod: 26,,, | Performed by: RADIOLOGY

## 2021-04-20 PROCEDURE — 99214 OFFICE O/P EST MOD 30 MIN: CPT | Mod: S$GLB,,, | Performed by: NURSE PRACTITIONER

## 2021-04-20 PROCEDURE — 3008F BODY MASS INDEX DOCD: CPT | Mod: CPTII,S$GLB,, | Performed by: NURSE PRACTITIONER

## 2021-04-20 PROCEDURE — 3008F PR BODY MASS INDEX (BMI) DOCUMENTED: ICD-10-PCS | Mod: CPTII,S$GLB,, | Performed by: NURSE PRACTITIONER

## 2021-04-20 PROCEDURE — A9698 NON-RAD CONTRAST MATERIALNOC: HCPCS | Mod: PO | Performed by: NURSE PRACTITIONER

## 2021-04-20 RX ORDER — FAMOTIDINE 40 MG/1
40 TABLET, FILM COATED ORAL DAILY
Qty: 7 TABLET | Refills: 0 | Status: ON HOLD | OUTPATIENT
Start: 2021-04-20 | End: 2021-05-17 | Stop reason: HOSPADM

## 2021-04-20 RX ADMIN — IOHEXOL 1000 ML: 12 SOLUTION ORAL at 12:04

## 2021-04-20 RX ADMIN — IOHEXOL 100 ML: 350 INJECTION, SOLUTION INTRAVENOUS at 12:04

## 2021-04-21 ENCOUNTER — TELEPHONE (OUTPATIENT)
Dept: FAMILY MEDICINE | Facility: CLINIC | Age: 56
End: 2021-04-21

## 2021-04-21 ENCOUNTER — PATIENT OUTREACH (OUTPATIENT)
Dept: ADMINISTRATIVE | Facility: OTHER | Age: 56
End: 2021-04-21

## 2021-04-21 ENCOUNTER — OFFICE VISIT (OUTPATIENT)
Dept: UROLOGY | Facility: CLINIC | Age: 56
End: 2021-04-21
Payer: COMMERCIAL

## 2021-04-21 VITALS
HEART RATE: 66 BPM | BODY MASS INDEX: 35.29 KG/M2 | DIASTOLIC BLOOD PRESSURE: 80 MMHG | SYSTOLIC BLOOD PRESSURE: 114 MMHG | WEIGHT: 246.5 LBS | HEIGHT: 70 IN | RESPIRATION RATE: 18 BRPM

## 2021-04-21 DIAGNOSIS — N13.4 HYDROURETER: ICD-10-CM

## 2021-04-21 DIAGNOSIS — N13.5 UPJ (URETEROPELVIC JUNCTION) OBSTRUCTION: Primary | ICD-10-CM

## 2021-04-21 DIAGNOSIS — R10.9 ABDOMINAL PAIN, UNSPECIFIED ABDOMINAL LOCATION: ICD-10-CM

## 2021-04-21 PROCEDURE — 88112 PR  CYTOPATH, CELL ENHANCE TECH: ICD-10-PCS | Mod: 26,,, | Performed by: STUDENT IN AN ORGANIZED HEALTH CARE EDUCATION/TRAINING PROGRAM

## 2021-04-21 PROCEDURE — 87086 URINE CULTURE/COLONY COUNT: CPT | Performed by: NURSE PRACTITIONER

## 2021-04-21 PROCEDURE — 88112 CYTOPATH CELL ENHANCE TECH: CPT | Performed by: STUDENT IN AN ORGANIZED HEALTH CARE EDUCATION/TRAINING PROGRAM

## 2021-04-21 PROCEDURE — 1125F AMNT PAIN NOTED PAIN PRSNT: CPT | Mod: S$GLB,,, | Performed by: NURSE PRACTITIONER

## 2021-04-21 PROCEDURE — 3008F BODY MASS INDEX DOCD: CPT | Mod: CPTII,S$GLB,, | Performed by: NURSE PRACTITIONER

## 2021-04-21 PROCEDURE — 99214 PR OFFICE/OUTPT VISIT, EST, LEVL IV, 30-39 MIN: ICD-10-PCS | Mod: S$GLB,,, | Performed by: NURSE PRACTITIONER

## 2021-04-21 PROCEDURE — 99999 PR PBB SHADOW E&M-EST. PATIENT-LVL IV: ICD-10-PCS | Mod: PBBFAC,,, | Performed by: NURSE PRACTITIONER

## 2021-04-21 PROCEDURE — 1125F PR PAIN SEVERITY QUANTIFIED, PAIN PRESENT: ICD-10-PCS | Mod: S$GLB,,, | Performed by: NURSE PRACTITIONER

## 2021-04-21 PROCEDURE — 99214 OFFICE O/P EST MOD 30 MIN: CPT | Mod: S$GLB,,, | Performed by: NURSE PRACTITIONER

## 2021-04-21 PROCEDURE — 88112 CYTOPATH CELL ENHANCE TECH: CPT | Mod: 26,,, | Performed by: STUDENT IN AN ORGANIZED HEALTH CARE EDUCATION/TRAINING PROGRAM

## 2021-04-21 PROCEDURE — 3008F PR BODY MASS INDEX (BMI) DOCUMENTED: ICD-10-PCS | Mod: CPTII,S$GLB,, | Performed by: NURSE PRACTITIONER

## 2021-04-21 PROCEDURE — 99999 PR PBB SHADOW E&M-EST. PATIENT-LVL IV: CPT | Mod: PBBFAC,,, | Performed by: NURSE PRACTITIONER

## 2021-04-21 RX ORDER — TAMSULOSIN HYDROCHLORIDE 0.4 MG/1
0.4 CAPSULE ORAL DAILY
Qty: 30 CAPSULE | Refills: 3 | Status: ON HOLD | OUTPATIENT
Start: 2021-04-21 | End: 2021-05-17 | Stop reason: HOSPADM

## 2021-04-22 ENCOUNTER — PATIENT MESSAGE (OUTPATIENT)
Dept: FAMILY MEDICINE | Facility: CLINIC | Age: 56
End: 2021-04-22

## 2021-04-23 LAB — BACTERIA UR CULT: NO GROWTH

## 2021-04-26 ENCOUNTER — HOSPITAL ENCOUNTER (OUTPATIENT)
Dept: RADIOLOGY | Facility: HOSPITAL | Age: 56
Discharge: HOME | End: 2021-04-26
Attending: NURSE PRACTITIONER
Payer: COMMERCIAL

## 2021-04-26 ENCOUNTER — PATIENT MESSAGE (OUTPATIENT)
Dept: UROLOGY | Facility: CLINIC | Age: 56
End: 2021-04-26

## 2021-04-26 DIAGNOSIS — N13.4 HYDROURETER: ICD-10-CM

## 2021-04-26 LAB
FINAL PATHOLOGIC DIAGNOSIS: NORMAL
Lab: NORMAL

## 2021-04-26 PROCEDURE — 78708 K FLOW/FUNCT IMAGE W/DRUG: CPT | Mod: 26,,, | Performed by: RADIOLOGY

## 2021-04-26 PROCEDURE — 78708 NM RENOGRAM WITH LASIX: ICD-10-PCS | Mod: 26,,, | Performed by: RADIOLOGY

## 2021-04-26 PROCEDURE — A9562 TC99M MERTIATIDE: HCPCS

## 2021-04-27 ENCOUNTER — TELEPHONE (OUTPATIENT)
Dept: UROLOGY | Facility: CLINIC | Age: 56
End: 2021-04-27

## 2021-04-27 ENCOUNTER — PATIENT MESSAGE (OUTPATIENT)
Dept: FAMILY MEDICINE | Facility: CLINIC | Age: 56
End: 2021-04-27

## 2021-04-28 ENCOUNTER — LAB VISIT (OUTPATIENT)
Dept: LAB | Facility: HOSPITAL | Age: 56
End: 2021-04-28
Attending: FAMILY MEDICINE
Payer: COMMERCIAL

## 2021-04-28 ENCOUNTER — OFFICE VISIT (OUTPATIENT)
Dept: FAMILY MEDICINE | Facility: CLINIC | Age: 56
End: 2021-04-28
Payer: COMMERCIAL

## 2021-04-28 VITALS
WEIGHT: 247.38 LBS | TEMPERATURE: 98 F | OXYGEN SATURATION: 97 % | DIASTOLIC BLOOD PRESSURE: 62 MMHG | HEART RATE: 68 BPM | HEIGHT: 70 IN | BODY MASS INDEX: 35.42 KG/M2 | SYSTOLIC BLOOD PRESSURE: 110 MMHG

## 2021-04-28 DIAGNOSIS — H81.02 MENIERE'S DISEASE OF LEFT EAR: ICD-10-CM

## 2021-04-28 DIAGNOSIS — Z76.0 MEDICATION REFILL: ICD-10-CM

## 2021-04-28 DIAGNOSIS — R53.83 FATIGUE, UNSPECIFIED TYPE: ICD-10-CM

## 2021-04-28 DIAGNOSIS — M25.50 ARTHRALGIA, UNSPECIFIED JOINT: ICD-10-CM

## 2021-04-28 DIAGNOSIS — R53.83 FATIGUE, UNSPECIFIED TYPE: Primary | ICD-10-CM

## 2021-04-28 LAB
BASOPHILS # BLD AUTO: 0.07 K/UL (ref 0–0.2)
BASOPHILS NFR BLD: 1.8 % (ref 0–1.9)
CRP SERPL-MCNC: 0.8 MG/L (ref 0–8.2)
DIFFERENTIAL METHOD: NORMAL
EOSINOPHIL # BLD AUTO: 0.1 K/UL (ref 0–0.5)
EOSINOPHIL NFR BLD: 3.6 % (ref 0–8)
ERYTHROCYTE [DISTWIDTH] IN BLOOD BY AUTOMATED COUNT: 12.5 % (ref 11.5–14.5)
ERYTHROCYTE [SEDIMENTATION RATE] IN BLOOD BY WESTERGREN METHOD: 1 MM/HR (ref 0–10)
HCT VFR BLD AUTO: 46.4 % (ref 40–54)
HGB BLD-MCNC: 15.2 G/DL (ref 14–18)
IMM GRANULOCYTES # BLD AUTO: 0 K/UL (ref 0–0.04)
IMM GRANULOCYTES NFR BLD AUTO: 0 % (ref 0–0.5)
LYMPHOCYTES # BLD AUTO: 1.4 K/UL (ref 1–4.8)
LYMPHOCYTES NFR BLD: 34.8 % (ref 18–48)
MCH RBC QN AUTO: 30 PG (ref 27–31)
MCHC RBC AUTO-ENTMCNC: 32.8 G/DL (ref 32–36)
MCV RBC AUTO: 92 FL (ref 82–98)
MONOCYTES # BLD AUTO: 0.6 K/UL (ref 0.3–1)
MONOCYTES NFR BLD: 14 % (ref 4–15)
NEUTROPHILS # BLD AUTO: 1.8 K/UL (ref 1.8–7.7)
NEUTROPHILS NFR BLD: 45.8 % (ref 38–73)
NRBC BLD-RTO: 0 /100 WBC
PLATELET # BLD AUTO: 186 K/UL (ref 150–450)
PMV BLD AUTO: 11.3 FL (ref 9.2–12.9)
RBC # BLD AUTO: 5.07 M/UL (ref 4.6–6.2)
RHEUMATOID FACT SERPL-ACNC: <10 IU/ML (ref 0–15)
WBC # BLD AUTO: 3.94 K/UL (ref 3.9–12.7)

## 2021-04-28 PROCEDURE — 99214 OFFICE O/P EST MOD 30 MIN: CPT | Mod: S$GLB,,, | Performed by: FAMILY MEDICINE

## 2021-04-28 PROCEDURE — 86431 RHEUMATOID FACTOR QUANT: CPT | Performed by: FAMILY MEDICINE

## 2021-04-28 PROCEDURE — 1125F PR PAIN SEVERITY QUANTIFIED, PAIN PRESENT: ICD-10-PCS | Mod: S$GLB,,, | Performed by: FAMILY MEDICINE

## 2021-04-28 PROCEDURE — 99214 PR OFFICE/OUTPT VISIT, EST, LEVL IV, 30-39 MIN: ICD-10-PCS | Mod: S$GLB,,, | Performed by: FAMILY MEDICINE

## 2021-04-28 PROCEDURE — 3008F PR BODY MASS INDEX (BMI) DOCUMENTED: ICD-10-PCS | Mod: CPTII,S$GLB,, | Performed by: FAMILY MEDICINE

## 2021-04-28 PROCEDURE — 36415 COLL VENOUS BLD VENIPUNCTURE: CPT | Mod: PO | Performed by: FAMILY MEDICINE

## 2021-04-28 PROCEDURE — 85025 COMPLETE CBC W/AUTO DIFF WBC: CPT | Performed by: FAMILY MEDICINE

## 2021-04-28 PROCEDURE — 99999 PR PBB SHADOW E&M-EST. PATIENT-LVL IV: ICD-10-PCS | Mod: PBBFAC,,, | Performed by: FAMILY MEDICINE

## 2021-04-28 PROCEDURE — 85651 RBC SED RATE NONAUTOMATED: CPT | Mod: PO | Performed by: FAMILY MEDICINE

## 2021-04-28 PROCEDURE — 99999 PR PBB SHADOW E&M-EST. PATIENT-LVL IV: CPT | Mod: PBBFAC,,, | Performed by: FAMILY MEDICINE

## 2021-04-28 PROCEDURE — 3008F BODY MASS INDEX DOCD: CPT | Mod: CPTII,S$GLB,, | Performed by: FAMILY MEDICINE

## 2021-04-28 PROCEDURE — 86140 C-REACTIVE PROTEIN: CPT | Performed by: FAMILY MEDICINE

## 2021-04-28 PROCEDURE — 1125F AMNT PAIN NOTED PAIN PRSNT: CPT | Mod: S$GLB,,, | Performed by: FAMILY MEDICINE

## 2021-04-28 PROCEDURE — 86038 ANTINUCLEAR ANTIBODIES: CPT | Performed by: FAMILY MEDICINE

## 2021-04-28 RX ORDER — METOPROLOL SUCCINATE 50 MG/1
50 TABLET, EXTENDED RELEASE ORAL DAILY
Qty: 90 TABLET | Refills: 3 | Status: SHIPPED | OUTPATIENT
Start: 2021-04-28 | End: 2021-09-15

## 2021-04-28 RX ORDER — HYDROCHLOROTHIAZIDE 12.5 MG/1
12.5 TABLET ORAL DAILY
Qty: 30 TABLET | Refills: 11 | Status: ON HOLD | OUTPATIENT
Start: 2021-04-28 | End: 2021-05-17 | Stop reason: HOSPADM

## 2021-04-29 LAB — ANA SER QL IF: NORMAL

## 2021-05-02 ENCOUNTER — PATIENT MESSAGE (OUTPATIENT)
Dept: ADMINISTRATIVE | Facility: HOSPITAL | Age: 56
End: 2021-05-02

## 2021-05-04 ENCOUNTER — PATIENT MESSAGE (OUTPATIENT)
Dept: FAMILY MEDICINE | Facility: CLINIC | Age: 56
End: 2021-05-04

## 2021-05-04 DIAGNOSIS — H93.19 TINNITUS, UNSPECIFIED LATERALITY: ICD-10-CM

## 2021-05-04 DIAGNOSIS — R42 DIZZINESS: Primary | ICD-10-CM

## 2021-05-05 ENCOUNTER — OFFICE VISIT (OUTPATIENT)
Dept: UROLOGY | Facility: CLINIC | Age: 56
End: 2021-05-05
Payer: COMMERCIAL

## 2021-05-05 VITALS
DIASTOLIC BLOOD PRESSURE: 73 MMHG | WEIGHT: 246.94 LBS | HEART RATE: 55 BPM | SYSTOLIC BLOOD PRESSURE: 118 MMHG | BODY MASS INDEX: 35.35 KG/M2 | HEIGHT: 70 IN

## 2021-05-05 DIAGNOSIS — N13.5 UPJ (URETEROPELVIC JUNCTION) OBSTRUCTION: ICD-10-CM

## 2021-05-05 DIAGNOSIS — N13.30 BILATERAL HYDRONEPHROSIS: Primary | ICD-10-CM

## 2021-05-05 PROCEDURE — 3008F BODY MASS INDEX DOCD: CPT | Mod: CPTII,S$GLB,, | Performed by: UROLOGY

## 2021-05-05 PROCEDURE — 99999 PR PBB SHADOW E&M-EST. PATIENT-LVL IV: ICD-10-PCS | Mod: PBBFAC,,, | Performed by: UROLOGY

## 2021-05-05 PROCEDURE — 1126F AMNT PAIN NOTED NONE PRSNT: CPT | Mod: S$GLB,,, | Performed by: UROLOGY

## 2021-05-05 PROCEDURE — 3008F PR BODY MASS INDEX (BMI) DOCUMENTED: ICD-10-PCS | Mod: CPTII,S$GLB,, | Performed by: UROLOGY

## 2021-05-05 PROCEDURE — 99999 PR PBB SHADOW E&M-EST. PATIENT-LVL IV: CPT | Mod: PBBFAC,,, | Performed by: UROLOGY

## 2021-05-05 PROCEDURE — 99214 PR OFFICE/OUTPT VISIT, EST, LEVL IV, 30-39 MIN: ICD-10-PCS | Mod: S$GLB,,, | Performed by: UROLOGY

## 2021-05-05 PROCEDURE — 1126F PR PAIN SEVERITY QUANTIFIED, NO PAIN PRESENT: ICD-10-PCS | Mod: S$GLB,,, | Performed by: UROLOGY

## 2021-05-05 PROCEDURE — 99214 OFFICE O/P EST MOD 30 MIN: CPT | Mod: S$GLB,,, | Performed by: UROLOGY

## 2021-05-12 ENCOUNTER — PATIENT MESSAGE (OUTPATIENT)
Dept: FAMILY MEDICINE | Facility: CLINIC | Age: 56
End: 2021-05-12

## 2021-05-16 ENCOUNTER — NURSE TRIAGE (OUTPATIENT)
Dept: ADMINISTRATIVE | Facility: CLINIC | Age: 56
End: 2021-05-16

## 2021-05-16 PROBLEM — R07.9 CHEST PAIN: Status: ACTIVE | Noted: 2021-05-16

## 2021-05-18 ENCOUNTER — HOSPITAL ENCOUNTER (OUTPATIENT)
Dept: RADIOLOGY | Facility: HOSPITAL | Age: 56
Discharge: HOME | End: 2021-05-18
Attending: NURSE PRACTITIONER
Payer: COMMERCIAL

## 2021-05-18 ENCOUNTER — OFFICE VISIT (OUTPATIENT)
Dept: FAMILY MEDICINE | Facility: CLINIC | Age: 56
End: 2021-05-18
Payer: COMMERCIAL

## 2021-05-18 VITALS
WEIGHT: 244.69 LBS | BODY MASS INDEX: 35.03 KG/M2 | HEIGHT: 70 IN | SYSTOLIC BLOOD PRESSURE: 124 MMHG | OXYGEN SATURATION: 99 % | HEART RATE: 65 BPM | DIASTOLIC BLOOD PRESSURE: 82 MMHG

## 2021-05-18 DIAGNOSIS — M25.512 ACUTE PAIN OF LEFT SHOULDER: ICD-10-CM

## 2021-05-18 DIAGNOSIS — M75.82 TENDINITIS OF LEFT ROTATOR CUFF: ICD-10-CM

## 2021-05-18 DIAGNOSIS — M25.512 ACUTE PAIN OF LEFT SHOULDER: Primary | ICD-10-CM

## 2021-05-18 PROCEDURE — 99999 PR PBB SHADOW E&M-EST. PATIENT-LVL V: ICD-10-PCS | Mod: PBBFAC,,, | Performed by: NURSE PRACTITIONER

## 2021-05-18 PROCEDURE — 73030 X-RAY EXAM OF SHOULDER: CPT | Mod: 26,LT,, | Performed by: RADIOLOGY

## 2021-05-18 PROCEDURE — 3008F BODY MASS INDEX DOCD: CPT | Mod: CPTII,S$GLB,, | Performed by: NURSE PRACTITIONER

## 2021-05-18 PROCEDURE — 99214 PR OFFICE/OUTPT VISIT, EST, LEVL IV, 30-39 MIN: ICD-10-PCS | Mod: S$GLB,,, | Performed by: NURSE PRACTITIONER

## 2021-05-18 PROCEDURE — 1125F PR PAIN SEVERITY QUANTIFIED, PAIN PRESENT: ICD-10-PCS | Mod: S$GLB,,, | Performed by: NURSE PRACTITIONER

## 2021-05-18 PROCEDURE — 73030 X-RAY EXAM OF SHOULDER: CPT | Mod: TC,FY,PO,LT

## 2021-05-18 PROCEDURE — 73030 XR SHOULDER COMPLETE 2 OR MORE VIEWS LEFT: ICD-10-PCS | Mod: 26,LT,, | Performed by: RADIOLOGY

## 2021-05-18 PROCEDURE — 99999 PR PBB SHADOW E&M-EST. PATIENT-LVL V: CPT | Mod: PBBFAC,,, | Performed by: NURSE PRACTITIONER

## 2021-05-18 PROCEDURE — 1125F AMNT PAIN NOTED PAIN PRSNT: CPT | Mod: S$GLB,,, | Performed by: NURSE PRACTITIONER

## 2021-05-18 PROCEDURE — 99214 OFFICE O/P EST MOD 30 MIN: CPT | Mod: S$GLB,,, | Performed by: NURSE PRACTITIONER

## 2021-05-18 PROCEDURE — 3008F PR BODY MASS INDEX (BMI) DOCUMENTED: ICD-10-PCS | Mod: CPTII,S$GLB,, | Performed by: NURSE PRACTITIONER

## 2021-05-24 ENCOUNTER — TELEPHONE (OUTPATIENT)
Dept: OPTOMETRY | Facility: CLINIC | Age: 56
End: 2021-05-24

## 2021-05-24 ENCOUNTER — OFFICE VISIT (OUTPATIENT)
Dept: OPTOMETRY | Facility: CLINIC | Age: 56
End: 2021-05-24
Payer: COMMERCIAL

## 2021-05-24 ENCOUNTER — NURSE TRIAGE (OUTPATIENT)
Dept: ADMINISTRATIVE | Facility: CLINIC | Age: 56
End: 2021-05-24

## 2021-05-24 DIAGNOSIS — S05.8X1A SUPERFICIAL INJURY OF CORNEA, RIGHT, INITIAL ENCOUNTER: Primary | ICD-10-CM

## 2021-05-24 PROCEDURE — 1125F PR PAIN SEVERITY QUANTIFIED, PAIN PRESENT: ICD-10-PCS | Mod: S$GLB,,, | Performed by: OPTOMETRIST

## 2021-05-24 PROCEDURE — 92014 PR EYE EXAM, EST PATIENT,COMPREHESV: ICD-10-PCS | Mod: S$GLB,,, | Performed by: OPTOMETRIST

## 2021-05-24 PROCEDURE — 1125F AMNT PAIN NOTED PAIN PRSNT: CPT | Mod: S$GLB,,, | Performed by: OPTOMETRIST

## 2021-05-24 PROCEDURE — 99999 PR PBB SHADOW E&M-EST. PATIENT-LVL IV: ICD-10-PCS | Mod: PBBFAC,,, | Performed by: OPTOMETRIST

## 2021-05-24 PROCEDURE — 92014 COMPRE OPH EXAM EST PT 1/>: CPT | Mod: S$GLB,,, | Performed by: OPTOMETRIST

## 2021-05-24 PROCEDURE — 99999 PR PBB SHADOW E&M-EST. PATIENT-LVL IV: CPT | Mod: PBBFAC,,, | Performed by: OPTOMETRIST

## 2021-05-24 RX ORDER — NEOMYCIN SULFATE, POLYMYXIN B SULFATE AND DEXAMETHASONE 3.5; 10000; 1 MG/ML; [USP'U]/ML; MG/ML
1 SUSPENSION/ DROPS OPHTHALMIC 4 TIMES DAILY
Qty: 5 ML | Refills: 1 | Status: SHIPPED | OUTPATIENT
Start: 2021-05-24 | End: 2021-05-29

## 2021-05-26 ENCOUNTER — TELEPHONE (OUTPATIENT)
Dept: FAMILY MEDICINE | Facility: CLINIC | Age: 56
End: 2021-05-26

## 2021-05-27 ENCOUNTER — PATIENT MESSAGE (OUTPATIENT)
Dept: FAMILY MEDICINE | Facility: CLINIC | Age: 56
End: 2021-05-27

## 2021-05-27 ENCOUNTER — TELEPHONE (OUTPATIENT)
Dept: FAMILY MEDICINE | Facility: CLINIC | Age: 56
End: 2021-05-27

## 2021-05-31 ENCOUNTER — PATIENT MESSAGE (OUTPATIENT)
Dept: FAMILY MEDICINE | Facility: CLINIC | Age: 56
End: 2021-05-31

## 2021-06-01 ENCOUNTER — PATIENT MESSAGE (OUTPATIENT)
Dept: FAMILY MEDICINE | Facility: CLINIC | Age: 56
End: 2021-06-01

## 2021-06-01 ENCOUNTER — TELEPHONE (OUTPATIENT)
Dept: FAMILY MEDICINE | Facility: CLINIC | Age: 56
End: 2021-06-01

## 2021-06-01 ENCOUNTER — TELEPHONE (OUTPATIENT)
Dept: ORTHOPEDICS | Facility: CLINIC | Age: 56
End: 2021-06-01

## 2021-06-01 DIAGNOSIS — M25.519 SHOULDER PAIN, UNSPECIFIED CHRONICITY, UNSPECIFIED LATERALITY: Primary | ICD-10-CM

## 2021-06-15 ENCOUNTER — OFFICE VISIT (OUTPATIENT)
Dept: CARDIOLOGY | Facility: CLINIC | Age: 56
End: 2021-06-15
Payer: COMMERCIAL

## 2021-06-15 ENCOUNTER — PATIENT OUTREACH (OUTPATIENT)
Dept: ADMINISTRATIVE | Facility: OTHER | Age: 56
End: 2021-06-15

## 2021-06-15 VITALS
SYSTOLIC BLOOD PRESSURE: 134 MMHG | BODY MASS INDEX: 35.27 KG/M2 | DIASTOLIC BLOOD PRESSURE: 80 MMHG | WEIGHT: 245.81 LBS | HEART RATE: 64 BPM | OXYGEN SATURATION: 100 %

## 2021-06-15 DIAGNOSIS — Z79.4 TYPE 2 DIABETES MELLITUS WITH DIABETIC POLYNEUROPATHY, WITH LONG-TERM CURRENT USE OF INSULIN: Chronic | ICD-10-CM

## 2021-06-15 DIAGNOSIS — I73.9 PVD (PERIPHERAL VASCULAR DISEASE): ICD-10-CM

## 2021-06-15 DIAGNOSIS — E78.5 DYSLIPIDEMIA WITH ELEVATED LOW DENSITY LIPOPROTEIN (LDL) CHOLESTEROL AND ABNORMALLY LOW HIGH DENSITY LIPOPROTEIN CHOLESTEROL: ICD-10-CM

## 2021-06-15 DIAGNOSIS — I51.89 GRADE II DIASTOLIC DYSFUNCTION: Primary | ICD-10-CM

## 2021-06-15 DIAGNOSIS — K76.0 FATTY LIVER DISEASE, NONALCOHOLIC: Chronic | ICD-10-CM

## 2021-06-15 DIAGNOSIS — R07.89 OTHER CHEST PAIN: ICD-10-CM

## 2021-06-15 DIAGNOSIS — I25.10 ASCVD (ARTERIOSCLEROTIC CARDIOVASCULAR DISEASE): Chronic | ICD-10-CM

## 2021-06-15 DIAGNOSIS — I10 ESSENTIAL HYPERTENSION: Chronic | ICD-10-CM

## 2021-06-15 DIAGNOSIS — E11.42 TYPE 2 DIABETES MELLITUS WITH DIABETIC POLYNEUROPATHY, WITH LONG-TERM CURRENT USE OF INSULIN: Chronic | ICD-10-CM

## 2021-06-15 PROCEDURE — 99999 PR PBB SHADOW E&M-EST. PATIENT-LVL III: CPT | Mod: PBBFAC,,, | Performed by: INTERNAL MEDICINE

## 2021-06-15 PROCEDURE — 99214 OFFICE O/P EST MOD 30 MIN: CPT | Mod: S$GLB,,, | Performed by: INTERNAL MEDICINE

## 2021-06-15 PROCEDURE — 3008F PR BODY MASS INDEX (BMI) DOCUMENTED: ICD-10-PCS | Mod: CPTII,S$GLB,, | Performed by: INTERNAL MEDICINE

## 2021-06-15 PROCEDURE — 99214 PR OFFICE/OUTPT VISIT, EST, LEVL IV, 30-39 MIN: ICD-10-PCS | Mod: S$GLB,,, | Performed by: INTERNAL MEDICINE

## 2021-06-15 PROCEDURE — 99999 PR PBB SHADOW E&M-EST. PATIENT-LVL III: ICD-10-PCS | Mod: PBBFAC,,, | Performed by: INTERNAL MEDICINE

## 2021-06-15 PROCEDURE — 3008F BODY MASS INDEX DOCD: CPT | Mod: CPTII,S$GLB,, | Performed by: INTERNAL MEDICINE

## 2021-06-15 RX ORDER — HYDROCHLOROTHIAZIDE 12.5 MG/1
12.5 TABLET ORAL DAILY
Qty: 30 TABLET | Refills: 5 | Status: SHIPPED | OUTPATIENT
Start: 2021-06-15 | End: 2021-10-25

## 2021-06-21 ENCOUNTER — PATIENT MESSAGE (OUTPATIENT)
Dept: NEUROLOGY | Facility: CLINIC | Age: 56
End: 2021-06-21

## 2021-06-21 ENCOUNTER — OFFICE VISIT (OUTPATIENT)
Dept: NEUROLOGY | Facility: CLINIC | Age: 56
End: 2021-06-21
Payer: COMMERCIAL

## 2021-06-21 ENCOUNTER — LAB VISIT (OUTPATIENT)
Dept: LAB | Facility: HOSPITAL | Age: 56
End: 2021-06-21
Attending: NURSE PRACTITIONER
Payer: COMMERCIAL

## 2021-06-21 VITALS
BODY MASS INDEX: 35.11 KG/M2 | HEART RATE: 63 BPM | WEIGHT: 245.25 LBS | SYSTOLIC BLOOD PRESSURE: 121 MMHG | HEIGHT: 70 IN | TEMPERATURE: 97 F | RESPIRATION RATE: 16 BRPM | DIASTOLIC BLOOD PRESSURE: 76 MMHG

## 2021-06-21 DIAGNOSIS — H93.13 TINNITUS OF BOTH EARS: ICD-10-CM

## 2021-06-21 DIAGNOSIS — H81.02 MENIERE'S DISEASE OF LEFT EAR: ICD-10-CM

## 2021-06-21 DIAGNOSIS — R20.2 PARESTHESIAS: ICD-10-CM

## 2021-06-21 DIAGNOSIS — R27.8 SENSORY ATAXIA: ICD-10-CM

## 2021-06-21 DIAGNOSIS — H90.8 MIXED CONDUCTIVE AND SENSORINEURAL HEARING LOSS, UNSPECIFIED LATERALITY: ICD-10-CM

## 2021-06-21 DIAGNOSIS — R42 DIZZINESS AND GIDDINESS: ICD-10-CM

## 2021-06-21 DIAGNOSIS — R42 VERTIGO: Primary | ICD-10-CM

## 2021-06-21 DIAGNOSIS — K11.23 CHRONIC SIALOADENITIS: ICD-10-CM

## 2021-06-21 LAB — VIT B12 SERPL-MCNC: >2000 PG/ML (ref 210–950)

## 2021-06-21 PROCEDURE — 86334 PATHOLOGIST INTERPRETATION IFE: ICD-10-PCS | Mod: 26,,, | Performed by: PATHOLOGY

## 2021-06-21 PROCEDURE — 3008F BODY MASS INDEX DOCD: CPT | Mod: CPTII,S$GLB,, | Performed by: NURSE PRACTITIONER

## 2021-06-21 PROCEDURE — 99999 PR PBB SHADOW E&M-EST. PATIENT-LVL V: CPT | Mod: PBBFAC,,, | Performed by: NURSE PRACTITIONER

## 2021-06-21 PROCEDURE — 83921 ORGANIC ACID SINGLE QUANT: CPT | Performed by: NURSE PRACTITIONER

## 2021-06-21 PROCEDURE — 82565 ASSAY OF CREATININE: CPT | Performed by: NURSE PRACTITIONER

## 2021-06-21 PROCEDURE — 84425 ASSAY OF VITAMIN B-1: CPT | Performed by: NURSE PRACTITIONER

## 2021-06-21 PROCEDURE — 84165 PROTEIN E-PHORESIS SERUM: CPT | Performed by: NURSE PRACTITIONER

## 2021-06-21 PROCEDURE — 99205 PR OFFICE/OUTPT VISIT, NEW, LEVL V, 60-74 MIN: ICD-10-PCS | Mod: S$GLB,,, | Performed by: NURSE PRACTITIONER

## 2021-06-21 PROCEDURE — 1126F AMNT PAIN NOTED NONE PRSNT: CPT | Mod: S$GLB,,, | Performed by: NURSE PRACTITIONER

## 2021-06-21 PROCEDURE — 84165 PROTEIN E-PHORESIS SERUM: CPT | Mod: 26,,, | Performed by: PATHOLOGY

## 2021-06-21 PROCEDURE — 84165 PATHOLOGIST INTERPRETATION SPE: ICD-10-PCS | Mod: 26,,, | Performed by: PATHOLOGY

## 2021-06-21 PROCEDURE — 84630 ASSAY OF ZINC: CPT | Performed by: NURSE PRACTITIONER

## 2021-06-21 PROCEDURE — 86334 IMMUNOFIX E-PHORESIS SERUM: CPT | Performed by: NURSE PRACTITIONER

## 2021-06-21 PROCEDURE — 86235 NUCLEAR ANTIGEN ANTIBODY: CPT | Mod: 59 | Performed by: NURSE PRACTITIONER

## 2021-06-21 PROCEDURE — 3008F PR BODY MASS INDEX (BMI) DOCUMENTED: ICD-10-PCS | Mod: CPTII,S$GLB,, | Performed by: NURSE PRACTITIONER

## 2021-06-21 PROCEDURE — 99999 PR PBB SHADOW E&M-EST. PATIENT-LVL V: ICD-10-PCS | Mod: PBBFAC,,, | Performed by: NURSE PRACTITIONER

## 2021-06-21 PROCEDURE — 86334 IMMUNOFIX E-PHORESIS SERUM: CPT | Mod: 26,,, | Performed by: PATHOLOGY

## 2021-06-21 PROCEDURE — 84207 ASSAY OF VITAMIN B-6: CPT | Performed by: NURSE PRACTITIONER

## 2021-06-21 PROCEDURE — 99205 OFFICE O/P NEW HI 60 MIN: CPT | Mod: S$GLB,,, | Performed by: NURSE PRACTITIONER

## 2021-06-21 PROCEDURE — 1126F PR PAIN SEVERITY QUANTIFIED, NO PAIN PRESENT: ICD-10-PCS | Mod: S$GLB,,, | Performed by: NURSE PRACTITIONER

## 2021-06-21 PROCEDURE — 86235 NUCLEAR ANTIGEN ANTIBODY: CPT | Performed by: NURSE PRACTITIONER

## 2021-06-21 PROCEDURE — 36415 COLL VENOUS BLD VENIPUNCTURE: CPT | Mod: PO | Performed by: NURSE PRACTITIONER

## 2021-06-21 PROCEDURE — 82607 VITAMIN B-12: CPT | Performed by: NURSE PRACTITIONER

## 2021-06-21 RX ORDER — DIAZEPAM 5 MG/1
5 TABLET ORAL
Qty: 2 TABLET | Refills: 0 | Status: SHIPPED | OUTPATIENT
Start: 2021-06-21 | End: 2021-08-16

## 2021-06-22 PROBLEM — H93.13 TINNITUS OF BOTH EARS: Status: ACTIVE | Noted: 2021-06-22

## 2021-06-22 LAB
ALBUMIN SERPL ELPH-MCNC: 4.29 G/DL (ref 3.35–5.55)
ALPHA1 GLOB SERPL ELPH-MCNC: 0.26 G/DL (ref 0.17–0.41)
ALPHA2 GLOB SERPL ELPH-MCNC: 0.55 G/DL (ref 0.43–0.99)
ANTI-SSA ANTIBODY: 0.06 RATIO (ref 0–0.99)
ANTI-SSA INTERPRETATION: NEGATIVE
ANTI-SSB ANTIBODY: 0.07 RATIO (ref 0–0.99)
ANTI-SSB INTERPRETATION: NEGATIVE
B-GLOBULIN SERPL ELPH-MCNC: 0.73 G/DL (ref 0.5–1.1)
CREAT SERPL-MCNC: 1.1 MG/DL (ref 0.5–1.4)
EST. GFR  (AFRICAN AMERICAN): >60 ML/MIN/1.73 M^2
EST. GFR  (NON AFRICAN AMERICAN): >60 ML/MIN/1.73 M^2
GAMMA GLOB SERPL ELPH-MCNC: 0.97 G/DL (ref 0.67–1.58)
INTERPRETATION SERPL IFE-IMP: NORMAL
PROT SERPL-MCNC: 6.8 G/DL (ref 6–8.4)

## 2021-06-23 ENCOUNTER — PATIENT MESSAGE (OUTPATIENT)
Dept: NEUROLOGY | Facility: CLINIC | Age: 56
End: 2021-06-23

## 2021-06-23 LAB
PATHOLOGIST INTERPRETATION IFE: NORMAL
PATHOLOGIST INTERPRETATION SPE: NORMAL

## 2021-06-24 ENCOUNTER — OFFICE VISIT (OUTPATIENT)
Dept: FAMILY MEDICINE | Facility: CLINIC | Age: 56
End: 2021-06-24
Payer: COMMERCIAL

## 2021-06-24 VITALS
HEIGHT: 70 IN | BODY MASS INDEX: 34.9 KG/M2 | TEMPERATURE: 98 F | HEART RATE: 64 BPM | WEIGHT: 243.81 LBS | DIASTOLIC BLOOD PRESSURE: 78 MMHG | SYSTOLIC BLOOD PRESSURE: 112 MMHG | RESPIRATION RATE: 14 BRPM

## 2021-06-24 DIAGNOSIS — M79.89 LEG SWELLING: Primary | ICD-10-CM

## 2021-06-24 DIAGNOSIS — I73.9 PAD (PERIPHERAL ARTERY DISEASE): ICD-10-CM

## 2021-06-24 DIAGNOSIS — R21 RASH: ICD-10-CM

## 2021-06-24 PROCEDURE — 1125F AMNT PAIN NOTED PAIN PRSNT: CPT | Mod: S$GLB,,, | Performed by: FAMILY MEDICINE

## 2021-06-24 PROCEDURE — 99214 PR OFFICE/OUTPT VISIT, EST, LEVL IV, 30-39 MIN: ICD-10-PCS | Mod: S$GLB,,, | Performed by: FAMILY MEDICINE

## 2021-06-24 PROCEDURE — 99214 OFFICE O/P EST MOD 30 MIN: CPT | Mod: S$GLB,,, | Performed by: FAMILY MEDICINE

## 2021-06-24 PROCEDURE — 1125F PR PAIN SEVERITY QUANTIFIED, PAIN PRESENT: ICD-10-PCS | Mod: S$GLB,,, | Performed by: FAMILY MEDICINE

## 2021-06-24 PROCEDURE — 99999 PR PBB SHADOW E&M-EST. PATIENT-LVL V: CPT | Mod: PBBFAC,,, | Performed by: FAMILY MEDICINE

## 2021-06-24 PROCEDURE — 3008F PR BODY MASS INDEX (BMI) DOCUMENTED: ICD-10-PCS | Mod: CPTII,S$GLB,, | Performed by: FAMILY MEDICINE

## 2021-06-24 PROCEDURE — 3008F BODY MASS INDEX DOCD: CPT | Mod: CPTII,S$GLB,, | Performed by: FAMILY MEDICINE

## 2021-06-24 PROCEDURE — 99999 PR PBB SHADOW E&M-EST. PATIENT-LVL V: ICD-10-PCS | Mod: PBBFAC,,, | Performed by: FAMILY MEDICINE

## 2021-06-24 RX ORDER — VALACYCLOVIR HYDROCHLORIDE 1 G/1
1000 TABLET, FILM COATED ORAL 3 TIMES DAILY
Qty: 21 TABLET | Refills: 0 | Status: SHIPPED | OUTPATIENT
Start: 2021-06-24 | End: 2021-08-16

## 2021-06-25 ENCOUNTER — HOSPITAL ENCOUNTER (OUTPATIENT)
Dept: RADIOLOGY | Facility: HOSPITAL | Age: 56
Discharge: HOME | End: 2021-06-25
Attending: FAMILY MEDICINE
Payer: COMMERCIAL

## 2021-06-25 ENCOUNTER — TELEPHONE (OUTPATIENT)
Dept: FAMILY MEDICINE | Facility: CLINIC | Age: 56
End: 2021-06-25

## 2021-06-25 ENCOUNTER — PATIENT MESSAGE (OUTPATIENT)
Dept: FAMILY MEDICINE | Facility: CLINIC | Age: 56
End: 2021-06-25

## 2021-06-25 DIAGNOSIS — I73.9 PAD (PERIPHERAL ARTERY DISEASE): ICD-10-CM

## 2021-06-25 DIAGNOSIS — M79.89 LEG SWELLING: ICD-10-CM

## 2021-06-25 LAB
METHYLMALONATE SERPL-SCNC: 0.1 UMOL/L
PYRIDOXAL SERPL-MCNC: 34 UG/L (ref 5–50)
VIT B1 BLD-MCNC: 58 UG/L (ref 38–122)
ZINC SERPL-MCNC: 65 UG/DL (ref 60–130)

## 2021-06-25 PROCEDURE — 93970 EXTREMITY STUDY: CPT | Mod: 26,,, | Performed by: RADIOLOGY

## 2021-06-25 PROCEDURE — 93922 UPR/L XTREMITY ART 2 LEVELS: CPT | Mod: 26,,, | Performed by: RADIOLOGY

## 2021-06-25 PROCEDURE — 93970 EXTREMITY STUDY: CPT | Mod: TC,PO

## 2021-06-25 PROCEDURE — 93922 UPR/L XTREMITY ART 2 LEVELS: CPT | Mod: TC,PO

## 2021-06-25 PROCEDURE — 93922 US ANKLE/BRACH INDICES EXT LTD W/O STR 1-2 LEVELS: ICD-10-PCS | Mod: 26,,, | Performed by: RADIOLOGY

## 2021-06-25 PROCEDURE — 93970 US LOWER EXTREMITY VEINS BILATERAL: ICD-10-PCS | Mod: 26,,, | Performed by: RADIOLOGY

## 2021-06-26 ENCOUNTER — NURSE TRIAGE (OUTPATIENT)
Dept: ADMINISTRATIVE | Facility: CLINIC | Age: 56
End: 2021-06-26

## 2021-06-28 ENCOUNTER — PATIENT MESSAGE (OUTPATIENT)
Dept: NEUROLOGY | Facility: CLINIC | Age: 56
End: 2021-06-28

## 2021-06-28 DIAGNOSIS — R29.818 OTHER SYMPTOMS AND SIGNS INVOLVING THE NERVOUS SYSTEM: ICD-10-CM

## 2021-06-28 DIAGNOSIS — R42 DIZZINESS AND GIDDINESS: ICD-10-CM

## 2021-06-28 DIAGNOSIS — R41.82 ALTERED MENTAL STATUS, UNSPECIFIED ALTERED MENTAL STATUS TYPE: Primary | ICD-10-CM

## 2021-06-29 ENCOUNTER — TELEPHONE (OUTPATIENT)
Dept: HEPATOLOGY | Facility: CLINIC | Age: 56
End: 2021-06-29

## 2021-06-29 ENCOUNTER — TELEPHONE (OUTPATIENT)
Dept: NEUROLOGY | Facility: CLINIC | Age: 56
End: 2021-06-29

## 2021-06-30 ENCOUNTER — TELEPHONE (OUTPATIENT)
Dept: FAMILY MEDICINE | Facility: CLINIC | Age: 56
End: 2021-06-30

## 2021-06-30 ENCOUNTER — TELEPHONE (OUTPATIENT)
Dept: HEPATOLOGY | Facility: CLINIC | Age: 56
End: 2021-06-30

## 2021-06-30 ENCOUNTER — NURSE TRIAGE (OUTPATIENT)
Dept: ADMINISTRATIVE | Facility: CLINIC | Age: 56
End: 2021-06-30

## 2021-06-30 DIAGNOSIS — K76.0 FATTY LIVER DISEASE, NONALCOHOLIC: ICD-10-CM

## 2021-06-30 DIAGNOSIS — K74.00 LIVER FIBROSIS: Primary | ICD-10-CM

## 2021-07-01 ENCOUNTER — HOSPITAL ENCOUNTER (OUTPATIENT)
Dept: RADIOLOGY | Facility: HOSPITAL | Age: 56
Discharge: HOME | End: 2021-07-01
Attending: PHYSICIAN ASSISTANT
Payer: COMMERCIAL

## 2021-07-01 ENCOUNTER — OFFICE VISIT (OUTPATIENT)
Dept: FAMILY MEDICINE | Facility: CLINIC | Age: 56
End: 2021-07-01
Payer: COMMERCIAL

## 2021-07-01 ENCOUNTER — LAB VISIT (OUTPATIENT)
Dept: LAB | Facility: HOSPITAL | Age: 56
End: 2021-07-01
Payer: COMMERCIAL

## 2021-07-01 VITALS
SYSTOLIC BLOOD PRESSURE: 138 MMHG | DIASTOLIC BLOOD PRESSURE: 86 MMHG | OXYGEN SATURATION: 99 % | HEART RATE: 74 BPM | TEMPERATURE: 98 F | WEIGHT: 243.81 LBS | BODY MASS INDEX: 34.9 KG/M2 | HEIGHT: 70 IN

## 2021-07-01 DIAGNOSIS — M25.562 ACUTE PAIN OF LEFT KNEE: ICD-10-CM

## 2021-07-01 DIAGNOSIS — M25.562 ACUTE PAIN OF LEFT KNEE: Primary | ICD-10-CM

## 2021-07-01 LAB
ALBUMIN SERPL BCP-MCNC: 4 G/DL (ref 3.5–5.2)
ALP SERPL-CCNC: 72 U/L (ref 55–135)
ALT SERPL W/O P-5'-P-CCNC: 19 U/L (ref 10–44)
ANION GAP SERPL CALC-SCNC: 10 MMOL/L (ref 8–16)
AST SERPL-CCNC: 21 U/L (ref 10–40)
BASOPHILS # BLD AUTO: 0.08 K/UL (ref 0–0.2)
BASOPHILS NFR BLD: 1.6 % (ref 0–1.9)
BILIRUB SERPL-MCNC: 0.6 MG/DL (ref 0.1–1)
BUN SERPL-MCNC: 19 MG/DL (ref 6–20)
CALCIUM SERPL-MCNC: 9.9 MG/DL (ref 8.7–10.5)
CHLORIDE SERPL-SCNC: 104 MMOL/L (ref 95–110)
CO2 SERPL-SCNC: 27 MMOL/L (ref 23–29)
CREAT SERPL-MCNC: 1 MG/DL (ref 0.5–1.4)
DIFFERENTIAL METHOD: NORMAL
EOSINOPHIL # BLD AUTO: 0.3 K/UL (ref 0–0.5)
EOSINOPHIL NFR BLD: 4.9 % (ref 0–8)
ERYTHROCYTE [DISTWIDTH] IN BLOOD BY AUTOMATED COUNT: 12.4 % (ref 11.5–14.5)
EST. GFR  (AFRICAN AMERICAN): >60 ML/MIN/1.73 M^2
EST. GFR  (NON AFRICAN AMERICAN): >60 ML/MIN/1.73 M^2
GLUCOSE SERPL-MCNC: 103 MG/DL (ref 70–110)
HCT VFR BLD AUTO: 44.3 % (ref 40–54)
HGB BLD-MCNC: 14.7 G/DL (ref 14–18)
IMM GRANULOCYTES # BLD AUTO: 0 K/UL (ref 0–0.04)
IMM GRANULOCYTES NFR BLD AUTO: 0 % (ref 0–0.5)
LYMPHOCYTES # BLD AUTO: 1.5 K/UL (ref 1–4.8)
LYMPHOCYTES NFR BLD: 28.9 % (ref 18–48)
MCH RBC QN AUTO: 30 PG (ref 27–31)
MCHC RBC AUTO-ENTMCNC: 33.2 G/DL (ref 32–36)
MCV RBC AUTO: 90 FL (ref 82–98)
MONOCYTES # BLD AUTO: 0.6 K/UL (ref 0.3–1)
MONOCYTES NFR BLD: 12.2 % (ref 4–15)
NEUTROPHILS # BLD AUTO: 2.7 K/UL (ref 1.8–7.7)
NEUTROPHILS NFR BLD: 52.4 % (ref 38–73)
NRBC BLD-RTO: 0 /100 WBC
PLATELET # BLD AUTO: 207 K/UL (ref 150–450)
PMV BLD AUTO: 10.6 FL (ref 9.2–12.9)
POTASSIUM SERPL-SCNC: 4.4 MMOL/L (ref 3.5–5.1)
PROT SERPL-MCNC: 7.2 G/DL (ref 6–8.4)
RBC # BLD AUTO: 4.9 M/UL (ref 4.6–6.2)
SODIUM SERPL-SCNC: 141 MMOL/L (ref 136–145)
URATE SERPL-MCNC: 5.6 MG/DL (ref 3.4–7)
WBC # BLD AUTO: 5.09 K/UL (ref 3.9–12.7)

## 2021-07-01 PROCEDURE — 73564 X-RAY EXAM KNEE 4 OR MORE: CPT | Mod: TC,FY,PO,LT

## 2021-07-01 PROCEDURE — 3008F BODY MASS INDEX DOCD: CPT | Mod: CPTII,S$GLB,, | Performed by: PHYSICIAN ASSISTANT

## 2021-07-01 PROCEDURE — 73564 XR KNEE ORTHO LEFT WITH FLEXION: ICD-10-PCS | Mod: 26,LT,, | Performed by: RADIOLOGY

## 2021-07-01 PROCEDURE — 80053 COMPREHEN METABOLIC PANEL: CPT | Performed by: PHYSICIAN ASSISTANT

## 2021-07-01 PROCEDURE — 1125F PR PAIN SEVERITY QUANTIFIED, PAIN PRESENT: ICD-10-PCS | Mod: S$GLB,,, | Performed by: PHYSICIAN ASSISTANT

## 2021-07-01 PROCEDURE — 99214 OFFICE O/P EST MOD 30 MIN: CPT | Mod: S$GLB,,, | Performed by: PHYSICIAN ASSISTANT

## 2021-07-01 PROCEDURE — 1125F AMNT PAIN NOTED PAIN PRSNT: CPT | Mod: S$GLB,,, | Performed by: PHYSICIAN ASSISTANT

## 2021-07-01 PROCEDURE — 99999 PR PBB SHADOW E&M-EST. PATIENT-LVL V: CPT | Mod: PBBFAC,,, | Performed by: PHYSICIAN ASSISTANT

## 2021-07-01 PROCEDURE — 36415 COLL VENOUS BLD VENIPUNCTURE: CPT | Mod: PO | Performed by: PHYSICIAN ASSISTANT

## 2021-07-01 PROCEDURE — 3008F PR BODY MASS INDEX (BMI) DOCUMENTED: ICD-10-PCS | Mod: CPTII,S$GLB,, | Performed by: PHYSICIAN ASSISTANT

## 2021-07-01 PROCEDURE — 85025 COMPLETE CBC W/AUTO DIFF WBC: CPT | Performed by: PHYSICIAN ASSISTANT

## 2021-07-01 PROCEDURE — 73562 XR KNEE ORTHO LEFT WITH FLEXION: ICD-10-PCS | Mod: 26,RT,, | Performed by: RADIOLOGY

## 2021-07-01 PROCEDURE — 84550 ASSAY OF BLOOD/URIC ACID: CPT | Performed by: PHYSICIAN ASSISTANT

## 2021-07-01 PROCEDURE — 99999 PR PBB SHADOW E&M-EST. PATIENT-LVL V: ICD-10-PCS | Mod: PBBFAC,,, | Performed by: PHYSICIAN ASSISTANT

## 2021-07-01 PROCEDURE — 73562 X-RAY EXAM OF KNEE 3: CPT | Mod: 26,RT,, | Performed by: RADIOLOGY

## 2021-07-01 PROCEDURE — 99214 PR OFFICE/OUTPT VISIT, EST, LEVL IV, 30-39 MIN: ICD-10-PCS | Mod: S$GLB,,, | Performed by: PHYSICIAN ASSISTANT

## 2021-07-01 PROCEDURE — 73564 X-RAY EXAM KNEE 4 OR MORE: CPT | Mod: 26,LT,, | Performed by: RADIOLOGY

## 2021-07-02 ENCOUNTER — PATIENT MESSAGE (OUTPATIENT)
Dept: FAMILY MEDICINE | Facility: CLINIC | Age: 56
End: 2021-07-02

## 2021-07-02 ENCOUNTER — PATIENT MESSAGE (OUTPATIENT)
Dept: HEPATOLOGY | Facility: CLINIC | Age: 56
End: 2021-07-02

## 2021-07-05 ENCOUNTER — NURSE TRIAGE (OUTPATIENT)
Dept: ADMINISTRATIVE | Facility: CLINIC | Age: 56
End: 2021-07-05

## 2021-07-05 ENCOUNTER — HOSPITAL ENCOUNTER (EMERGENCY)
Facility: HOSPITAL | Age: 56
Discharge: HOME | End: 2021-07-06
Attending: EMERGENCY MEDICINE
Payer: COMMERCIAL

## 2021-07-05 DIAGNOSIS — R42 DIZZINESS: Primary | ICD-10-CM

## 2021-07-05 DIAGNOSIS — R53.83 FATIGUE: ICD-10-CM

## 2021-07-05 DIAGNOSIS — I67.1 CEREBRAL ANEURYSM: ICD-10-CM

## 2021-07-05 PROBLEM — E66.9 OBESITY, UNSPECIFIED: Status: ACTIVE | Noted: 2020-01-03

## 2021-07-05 PROBLEM — K76.9 LIVER DISEASE, UNSPECIFIED: Status: ACTIVE | Noted: 2021-01-17

## 2021-07-05 PROBLEM — H91.90 UNSPECIFIED HEARING LOSS, UNSPECIFIED EAR: Status: ACTIVE | Noted: 2021-01-17

## 2021-07-05 PROBLEM — F41.9 ANXIETY DISORDER, UNSPECIFIED: Status: ACTIVE | Noted: 2021-01-17

## 2021-07-05 LAB
ALBUMIN SERPL BCP-MCNC: 4.2 G/DL (ref 3.5–5.2)
ALP SERPL-CCNC: 77 U/L (ref 55–135)
ALT SERPL W/O P-5'-P-CCNC: 21 U/L (ref 10–44)
ANION GAP SERPL CALC-SCNC: 11 MMOL/L (ref 8–16)
AST SERPL-CCNC: 21 U/L (ref 10–40)
BASOPHILS # BLD AUTO: 0.06 K/UL (ref 0–0.2)
BASOPHILS NFR BLD: 1.1 % (ref 0–1.9)
BILIRUB SERPL-MCNC: 0.5 MG/DL (ref 0.1–1)
BILIRUB UR QL STRIP: NEGATIVE
BUN SERPL-MCNC: 20 MG/DL (ref 6–20)
CALCIUM SERPL-MCNC: 10 MG/DL (ref 8.7–10.5)
CHLORIDE SERPL-SCNC: 104 MMOL/L (ref 95–110)
CLARITY UR REFRACT.AUTO: CLEAR
CO2 SERPL-SCNC: 26 MMOL/L (ref 23–29)
COLOR UR AUTO: YELLOW
CREAT SERPL-MCNC: 1.1 MG/DL (ref 0.5–1.4)
DIFFERENTIAL METHOD: NORMAL
EOSINOPHIL # BLD AUTO: 0.2 K/UL (ref 0–0.5)
EOSINOPHIL NFR BLD: 3.8 % (ref 0–8)
ERYTHROCYTE [DISTWIDTH] IN BLOOD BY AUTOMATED COUNT: 12.4 % (ref 11.5–14.5)
EST. GFR  (AFRICAN AMERICAN): >60 ML/MIN/1.73 M^2
EST. GFR  (NON AFRICAN AMERICAN): >60 ML/MIN/1.73 M^2
GLUCOSE SERPL-MCNC: 88 MG/DL (ref 70–110)
GLUCOSE UR QL STRIP: NEGATIVE
HCT VFR BLD AUTO: 43.4 % (ref 40–54)
HGB BLD-MCNC: 14.3 G/DL (ref 14–18)
HGB UR QL STRIP: NEGATIVE
IMM GRANULOCYTES # BLD AUTO: 0.01 K/UL (ref 0–0.04)
IMM GRANULOCYTES NFR BLD AUTO: 0.2 % (ref 0–0.5)
KETONES UR QL STRIP: ABNORMAL
LEUKOCYTE ESTERASE UR QL STRIP: NEGATIVE
LYMPHOCYTES # BLD AUTO: 1.9 K/UL (ref 1–4.8)
LYMPHOCYTES NFR BLD: 34.1 % (ref 18–48)
MAGNESIUM SERPL-MCNC: 2.2 MG/DL (ref 1.6–2.6)
MCH RBC QN AUTO: 30.3 PG (ref 27–31)
MCHC RBC AUTO-ENTMCNC: 32.9 G/DL (ref 32–36)
MCV RBC AUTO: 92 FL (ref 82–98)
MICROSCOPIC COMMENT: NORMAL
MONOCYTES # BLD AUTO: 0.5 K/UL (ref 0.3–1)
MONOCYTES NFR BLD: 9.5 % (ref 4–15)
NEUTROPHILS # BLD AUTO: 2.9 K/UL (ref 1.8–7.7)
NEUTROPHILS NFR BLD: 51.3 % (ref 38–73)
NITRITE UR QL STRIP: NEGATIVE
NRBC BLD-RTO: 0 /100 WBC
PH UR STRIP: 5 [PH] (ref 5–8)
PLATELET # BLD AUTO: 195 K/UL (ref 150–450)
PMV BLD AUTO: 9.9 FL (ref 9.2–12.9)
POTASSIUM SERPL-SCNC: 4.3 MMOL/L (ref 3.5–5.1)
PROT SERPL-MCNC: 7.2 G/DL (ref 6–8.4)
PROT UR QL STRIP: NEGATIVE
RBC # BLD AUTO: 4.72 M/UL (ref 4.6–6.2)
RBC #/AREA URNS AUTO: 0 /HPF (ref 0–4)
SODIUM SERPL-SCNC: 141 MMOL/L (ref 136–145)
SP GR UR STRIP: 1.02 (ref 1–1.03)
URN SPEC COLLECT METH UR: ABNORMAL
WBC # BLD AUTO: 5.55 K/UL (ref 3.9–12.7)
WBC #/AREA URNS AUTO: 0 /HPF (ref 0–5)

## 2021-07-05 PROCEDURE — 96374 THER/PROPH/DIAG INJ IV PUSH: CPT

## 2021-07-05 PROCEDURE — 87389 HIV-1 AG W/HIV-1&-2 AB AG IA: CPT | Performed by: EMERGENCY MEDICINE

## 2021-07-05 PROCEDURE — 80053 COMPREHEN METABOLIC PANEL: CPT | Performed by: PHYSICIAN ASSISTANT

## 2021-07-05 PROCEDURE — 99284 EMERGENCY DEPT VISIT MOD MDM: CPT | Mod: 25

## 2021-07-05 PROCEDURE — 85025 COMPLETE CBC W/AUTO DIFF WBC: CPT | Performed by: PHYSICIAN ASSISTANT

## 2021-07-05 PROCEDURE — 96375 TX/PRO/DX INJ NEW DRUG ADDON: CPT

## 2021-07-05 PROCEDURE — 93010 EKG 12-LEAD: ICD-10-PCS | Mod: ,,, | Performed by: INTERNAL MEDICINE

## 2021-07-05 PROCEDURE — 83735 ASSAY OF MAGNESIUM: CPT | Performed by: PHYSICIAN ASSISTANT

## 2021-07-05 PROCEDURE — 93005 ELECTROCARDIOGRAM TRACING: CPT

## 2021-07-05 PROCEDURE — 93010 ELECTROCARDIOGRAM REPORT: CPT | Mod: ,,, | Performed by: INTERNAL MEDICINE

## 2021-07-05 PROCEDURE — 25000003 PHARM REV CODE 250: Performed by: PHYSICIAN ASSISTANT

## 2021-07-05 PROCEDURE — 99285 EMERGENCY DEPT VISIT HI MDM: CPT | Mod: ,,, | Performed by: PHYSICIAN ASSISTANT

## 2021-07-05 PROCEDURE — 99285 PR EMERGENCY DEPT VISIT,LEVEL V: ICD-10-PCS | Mod: ,,, | Performed by: PHYSICIAN ASSISTANT

## 2021-07-05 PROCEDURE — 86803 HEPATITIS C AB TEST: CPT | Performed by: EMERGENCY MEDICINE

## 2021-07-05 PROCEDURE — 81001 URINALYSIS AUTO W/SCOPE: CPT | Performed by: PHYSICIAN ASSISTANT

## 2021-07-05 RX ORDER — DEXAMETHASONE SODIUM PHOSPHATE 4 MG/ML
4 INJECTION, SOLUTION INTRA-ARTICULAR; INTRALESIONAL; INTRAMUSCULAR; INTRAVENOUS; SOFT TISSUE
Status: DISCONTINUED | OUTPATIENT
Start: 2021-07-05 | End: 2021-07-06

## 2021-07-05 RX ORDER — ACETAMINOPHEN 500 MG
1000 TABLET ORAL
Status: COMPLETED | OUTPATIENT
Start: 2021-07-05 | End: 2021-07-05

## 2021-07-05 RX ORDER — KETOROLAC TROMETHAMINE 30 MG/ML
10 INJECTION, SOLUTION INTRAMUSCULAR; INTRAVENOUS
Status: DISCONTINUED | OUTPATIENT
Start: 2021-07-05 | End: 2021-07-05

## 2021-07-05 RX ORDER — DIPHENHYDRAMINE HYDROCHLORIDE 50 MG/ML
25 INJECTION INTRAMUSCULAR; INTRAVENOUS
Status: DISCONTINUED | OUTPATIENT
Start: 2021-07-05 | End: 2021-07-06

## 2021-07-05 RX ORDER — METOCLOPRAMIDE HYDROCHLORIDE 5 MG/ML
10 INJECTION INTRAMUSCULAR; INTRAVENOUS
Status: DISCONTINUED | OUTPATIENT
Start: 2021-07-05 | End: 2021-07-05

## 2021-07-05 RX ADMIN — ACETAMINOPHEN 1000 MG: 500 TABLET ORAL at 09:07

## 2021-07-06 VITALS
TEMPERATURE: 98 F | BODY MASS INDEX: 34.65 KG/M2 | WEIGHT: 242 LBS | HEART RATE: 75 BPM | RESPIRATION RATE: 16 BRPM | OXYGEN SATURATION: 99 % | SYSTOLIC BLOOD PRESSURE: 149 MMHG | HEIGHT: 70 IN | DIASTOLIC BLOOD PRESSURE: 85 MMHG

## 2021-07-06 PROBLEM — I67.1 CEREBRAL ANEURYSM: Status: ACTIVE | Noted: 2021-07-06

## 2021-07-06 LAB
HCV AB SERPL QL IA: NEGATIVE
HIV 1+2 AB+HIV1 P24 AG SERPL QL IA: NEGATIVE

## 2021-07-06 PROCEDURE — 99284 EMERGENCY DEPT VISIT MOD MDM: CPT | Mod: ,,, | Performed by: PHYSICIAN ASSISTANT

## 2021-07-06 PROCEDURE — 99284 PR EMERGENCY DEPT VISIT,LEVEL IV: ICD-10-PCS | Mod: ,,, | Performed by: PHYSICIAN ASSISTANT

## 2021-07-06 PROCEDURE — 25500020 PHARM REV CODE 255: Performed by: EMERGENCY MEDICINE

## 2021-07-06 PROCEDURE — 25000003 PHARM REV CODE 250: Performed by: PHYSICIAN ASSISTANT

## 2021-07-06 PROCEDURE — 63600175 PHARM REV CODE 636 W HCPCS: Performed by: PHYSICIAN ASSISTANT

## 2021-07-06 RX ORDER — IBUPROFEN 600 MG/1
600 TABLET ORAL
Status: COMPLETED | OUTPATIENT
Start: 2021-07-06 | End: 2021-07-06

## 2021-07-06 RX ORDER — DIPHENHYDRAMINE HYDROCHLORIDE 50 MG/ML
50 INJECTION INTRAMUSCULAR; INTRAVENOUS
Status: COMPLETED | OUTPATIENT
Start: 2021-07-06 | End: 2021-07-06

## 2021-07-06 RX ORDER — ACETAMINOPHEN 500 MG
1000 TABLET ORAL
Status: COMPLETED | OUTPATIENT
Start: 2021-07-06 | End: 2021-07-06

## 2021-07-06 RX ORDER — PROCHLORPERAZINE MALEATE 10 MG
10 TABLET ORAL EVERY 6 HOURS PRN
Qty: 15 TABLET | Refills: 0 | Status: SHIPPED | OUTPATIENT
Start: 2021-07-06 | End: 2021-08-16

## 2021-07-06 RX ORDER — MECLIZINE HYDROCHLORIDE 25 MG/1
25 TABLET ORAL 3 TIMES DAILY PRN
Qty: 15 TABLET | Refills: 0 | Status: SHIPPED | OUTPATIENT
Start: 2021-07-06 | End: 2021-07-28

## 2021-07-06 RX ORDER — MECLIZINE HYDROCHLORIDE 25 MG/1
25 TABLET ORAL 3 TIMES DAILY PRN
Qty: 15 TABLET | Refills: 0 | Status: SHIPPED | OUTPATIENT
Start: 2021-07-06 | End: 2021-07-06 | Stop reason: SDUPTHER

## 2021-07-06 RX ADMIN — ACETAMINOPHEN 1000 MG: 500 TABLET ORAL at 08:07

## 2021-07-06 RX ADMIN — IBUPROFEN 600 MG: 600 TABLET, FILM COATED ORAL at 08:07

## 2021-07-06 RX ADMIN — IOHEXOL 100 ML: 350 INJECTION, SOLUTION INTRAVENOUS at 06:07

## 2021-07-06 RX ADMIN — HYDROCORTISONE SODIUM SUCCINATE 200 MG: 100 INJECTION, POWDER, FOR SOLUTION INTRAMUSCULAR; INTRAVENOUS at 01:07

## 2021-07-06 RX ADMIN — DIPHENHYDRAMINE HYDROCHLORIDE 50 MG: 50 INJECTION INTRAMUSCULAR; INTRAVENOUS at 04:07

## 2021-07-07 ENCOUNTER — PATIENT MESSAGE (OUTPATIENT)
Dept: ADMINISTRATIVE | Facility: HOSPITAL | Age: 56
End: 2021-07-07

## 2021-07-07 ENCOUNTER — TELEPHONE (OUTPATIENT)
Dept: EMERGENCY MEDICINE | Facility: HOSPITAL | Age: 56
End: 2021-07-07

## 2021-07-08 ENCOUNTER — TELEPHONE (OUTPATIENT)
Dept: NEUROSURGERY | Facility: CLINIC | Age: 56
End: 2021-07-08

## 2021-07-09 ENCOUNTER — PATIENT MESSAGE (OUTPATIENT)
Dept: FAMILY MEDICINE | Facility: CLINIC | Age: 56
End: 2021-07-09

## 2021-07-12 ENCOUNTER — TELEPHONE (OUTPATIENT)
Dept: NEUROSURGERY | Facility: CLINIC | Age: 56
End: 2021-07-12

## 2021-07-16 ENCOUNTER — PATIENT MESSAGE (OUTPATIENT)
Dept: HEPATOLOGY | Facility: CLINIC | Age: 56
End: 2021-07-16

## 2021-07-16 ENCOUNTER — TELEPHONE (OUTPATIENT)
Dept: HEPATOLOGY | Facility: CLINIC | Age: 56
End: 2021-07-16

## 2021-07-18 ENCOUNTER — PATIENT MESSAGE (OUTPATIENT)
Dept: CARDIOLOGY | Facility: CLINIC | Age: 56
End: 2021-07-18

## 2021-07-19 ENCOUNTER — LAB VISIT (OUTPATIENT)
Dept: LAB | Facility: HOSPITAL | Age: 56
End: 2021-07-19
Payer: COMMERCIAL

## 2021-07-19 ENCOUNTER — TELEPHONE (OUTPATIENT)
Dept: HEPATOLOGY | Facility: CLINIC | Age: 56
End: 2021-07-19

## 2021-07-19 ENCOUNTER — TELEPHONE (OUTPATIENT)
Dept: NEUROLOGY | Facility: CLINIC | Age: 56
End: 2021-07-19

## 2021-07-19 DIAGNOSIS — K76.0 FATTY LIVER DISEASE, NONALCOHOLIC: ICD-10-CM

## 2021-07-19 DIAGNOSIS — K74.00 LIVER FIBROSIS: ICD-10-CM

## 2021-07-19 LAB
INR PPP: 1 (ref 0.8–1.2)
PROTHROMBIN TIME: 10.4 SEC (ref 9–12.5)

## 2021-07-19 PROCEDURE — 85610 PROTHROMBIN TIME: CPT | Mod: PO | Performed by: NURSE PRACTITIONER

## 2021-07-19 PROCEDURE — 80053 COMPREHEN METABOLIC PANEL: CPT | Performed by: NURSE PRACTITIONER

## 2021-07-19 PROCEDURE — 36415 COLL VENOUS BLD VENIPUNCTURE: CPT | Mod: PO | Performed by: NURSE PRACTITIONER

## 2021-07-19 PROCEDURE — 82105 ALPHA-FETOPROTEIN SERUM: CPT | Performed by: NURSE PRACTITIONER

## 2021-07-19 PROCEDURE — 85027 COMPLETE CBC AUTOMATED: CPT | Performed by: NURSE PRACTITIONER

## 2021-07-19 PROCEDURE — 80321 ALCOHOLS BIOMARKERS 1OR 2: CPT | Performed by: NURSE PRACTITIONER

## 2021-07-20 ENCOUNTER — HOSPITAL ENCOUNTER (OUTPATIENT)
Dept: RADIOLOGY | Facility: HOSPITAL | Age: 56
Discharge: HOME | End: 2021-07-20
Attending: NURSE PRACTITIONER
Payer: COMMERCIAL

## 2021-07-20 DIAGNOSIS — K76.0 FATTY LIVER DISEASE, NONALCOHOLIC: ICD-10-CM

## 2021-07-20 DIAGNOSIS — K74.00 LIVER FIBROSIS: ICD-10-CM

## 2021-07-20 LAB
AFP SERPL-MCNC: 2.2 NG/ML (ref 0–8.4)
ALBUMIN SERPL BCP-MCNC: 4.1 G/DL (ref 3.5–5.2)
ALP SERPL-CCNC: 76 U/L (ref 55–135)
ALT SERPL W/O P-5'-P-CCNC: 22 U/L (ref 10–44)
ANION GAP SERPL CALC-SCNC: 10 MMOL/L (ref 8–16)
AST SERPL-CCNC: 22 U/L (ref 10–40)
BILIRUB SERPL-MCNC: 0.7 MG/DL (ref 0.1–1)
BUN SERPL-MCNC: 20 MG/DL (ref 6–20)
CALCIUM SERPL-MCNC: 9.9 MG/DL (ref 8.7–10.5)
CHLORIDE SERPL-SCNC: 102 MMOL/L (ref 95–110)
CO2 SERPL-SCNC: 29 MMOL/L (ref 23–29)
CREAT SERPL-MCNC: 1.1 MG/DL (ref 0.5–1.4)
ERYTHROCYTE [DISTWIDTH] IN BLOOD BY AUTOMATED COUNT: 12.8 % (ref 11.5–14.5)
EST. GFR  (AFRICAN AMERICAN): >60 ML/MIN/1.73 M^2
EST. GFR  (NON AFRICAN AMERICAN): >60 ML/MIN/1.73 M^2
GLUCOSE SERPL-MCNC: 120 MG/DL (ref 70–110)
HCT VFR BLD AUTO: 45 % (ref 40–54)
HGB BLD-MCNC: 14.8 G/DL (ref 14–18)
MCH RBC QN AUTO: 30.1 PG (ref 27–31)
MCHC RBC AUTO-ENTMCNC: 32.9 G/DL (ref 32–36)
MCV RBC AUTO: 92 FL (ref 82–98)
PLATELET # BLD AUTO: 204 K/UL (ref 150–450)
PMV BLD AUTO: 10.7 FL (ref 9.2–12.9)
POTASSIUM SERPL-SCNC: 4.3 MMOL/L (ref 3.5–5.1)
PROT SERPL-MCNC: 7.3 G/DL (ref 6–8.4)
RBC # BLD AUTO: 4.92 M/UL (ref 4.6–6.2)
SODIUM SERPL-SCNC: 141 MMOL/L (ref 136–145)
WBC # BLD AUTO: 4.67 K/UL (ref 3.9–12.7)

## 2021-07-20 PROCEDURE — 76700 US EXAM ABDOM COMPLETE: CPT | Mod: TC,PO

## 2021-07-22 ENCOUNTER — TELEPHONE (OUTPATIENT)
Dept: NEUROLOGY | Facility: CLINIC | Age: 56
End: 2021-07-22

## 2021-07-23 ENCOUNTER — PROCEDURE VISIT (OUTPATIENT)
Dept: HEPATOLOGY | Facility: CLINIC | Age: 56
End: 2021-07-23
Payer: COMMERCIAL

## 2021-07-23 DIAGNOSIS — K76.0 FATTY LIVER DISEASE, NONALCOHOLIC: ICD-10-CM

## 2021-07-23 DIAGNOSIS — K74.00 LIVER FIBROSIS: ICD-10-CM

## 2021-07-23 PROCEDURE — 91200 LIVER ELASTOGRAPHY: CPT | Mod: S$GLB,,, | Performed by: NURSE PRACTITIONER

## 2021-07-23 PROCEDURE — 91200 FIBROSCAN (VIBRATION CONTROLLED TRANSIENT ELASTOGRAPHY): ICD-10-PCS | Mod: S$GLB,,, | Performed by: NURSE PRACTITIONER

## 2021-07-24 ENCOUNTER — PATIENT OUTREACH (OUTPATIENT)
Dept: ADMINISTRATIVE | Facility: OTHER | Age: 56
End: 2021-07-24

## 2021-07-27 ENCOUNTER — TELEPHONE (OUTPATIENT)
Dept: NEUROLOGY | Facility: CLINIC | Age: 56
End: 2021-07-27

## 2021-07-27 DIAGNOSIS — R29.818 OTHER SYMPTOMS AND SIGNS INVOLVING THE NERVOUS SYSTEM: ICD-10-CM

## 2021-07-27 DIAGNOSIS — H90.2 CONDUCTIVE HEARING LOSS, UNSPECIFIED LATERALITY: Primary | ICD-10-CM

## 2021-07-27 DIAGNOSIS — R42 DIZZINESS AND GIDDINESS: Primary | ICD-10-CM

## 2021-07-27 LAB — PHOSPHATIDYLETHANOL (PETH): NEGATIVE NG/ML

## 2021-07-28 ENCOUNTER — OFFICE VISIT (OUTPATIENT)
Dept: FAMILY MEDICINE | Facility: CLINIC | Age: 56
End: 2021-07-28
Payer: COMMERCIAL

## 2021-07-28 ENCOUNTER — TELEPHONE (OUTPATIENT)
Dept: NEUROLOGY | Facility: CLINIC | Age: 56
End: 2021-07-28

## 2021-07-28 VITALS
SYSTOLIC BLOOD PRESSURE: 130 MMHG | BODY MASS INDEX: 35.73 KG/M2 | TEMPERATURE: 99 F | DIASTOLIC BLOOD PRESSURE: 76 MMHG | HEIGHT: 70 IN | WEIGHT: 249.56 LBS | HEART RATE: 76 BPM | OXYGEN SATURATION: 96 %

## 2021-07-28 DIAGNOSIS — E11.9 TYPE 2 DIABETES MELLITUS WITHOUT COMPLICATION, WITHOUT LONG-TERM CURRENT USE OF INSULIN: ICD-10-CM

## 2021-07-28 DIAGNOSIS — E66.9 OBESITY (BMI 30-39.9): ICD-10-CM

## 2021-07-28 DIAGNOSIS — I10 ESSENTIAL (PRIMARY) HYPERTENSION: Primary | ICD-10-CM

## 2021-07-28 DIAGNOSIS — I67.1 CEREBRAL ANEURYSM: ICD-10-CM

## 2021-07-28 DIAGNOSIS — R42 VERTIGO: ICD-10-CM

## 2021-07-28 PROBLEM — Z79.4 TYPE 2 DIABETES MELLITUS WITH HYPERGLYCEMIA, WITH LONG-TERM CURRENT USE OF INSULIN: Chronic | Status: RESOLVED | Noted: 2017-05-18 | Resolved: 2021-07-28

## 2021-07-28 PROBLEM — E11.65 TYPE 2 DIABETES MELLITUS WITH HYPERGLYCEMIA, WITH LONG-TERM CURRENT USE OF INSULIN: Chronic | Status: RESOLVED | Noted: 2017-05-18 | Resolved: 2021-07-28

## 2021-07-28 PROCEDURE — 3078F PR MOST RECENT DIASTOLIC BLOOD PRESSURE < 80 MM HG: ICD-10-PCS | Mod: CPTII,S$GLB,, | Performed by: FAMILY MEDICINE

## 2021-07-28 PROCEDURE — 3008F BODY MASS INDEX DOCD: CPT | Mod: CPTII,S$GLB,, | Performed by: FAMILY MEDICINE

## 2021-07-28 PROCEDURE — 3075F SYST BP GE 130 - 139MM HG: CPT | Mod: CPTII,S$GLB,, | Performed by: FAMILY MEDICINE

## 2021-07-28 PROCEDURE — 3008F PR BODY MASS INDEX (BMI) DOCUMENTED: ICD-10-PCS | Mod: CPTII,S$GLB,, | Performed by: FAMILY MEDICINE

## 2021-07-28 PROCEDURE — 99999 PR PBB SHADOW E&M-EST. PATIENT-LVL IV: ICD-10-PCS | Mod: PBBFAC,,, | Performed by: FAMILY MEDICINE

## 2021-07-28 PROCEDURE — 99214 OFFICE O/P EST MOD 30 MIN: CPT | Mod: S$GLB,,, | Performed by: FAMILY MEDICINE

## 2021-07-28 PROCEDURE — 1126F AMNT PAIN NOTED NONE PRSNT: CPT | Mod: CPTII,S$GLB,, | Performed by: FAMILY MEDICINE

## 2021-07-28 PROCEDURE — 3078F DIAST BP <80 MM HG: CPT | Mod: CPTII,S$GLB,, | Performed by: FAMILY MEDICINE

## 2021-07-28 PROCEDURE — 3075F PR MOST RECENT SYSTOLIC BLOOD PRESS GE 130-139MM HG: ICD-10-PCS | Mod: CPTII,S$GLB,, | Performed by: FAMILY MEDICINE

## 2021-07-28 PROCEDURE — 99999 PR PBB SHADOW E&M-EST. PATIENT-LVL IV: CPT | Mod: PBBFAC,,, | Performed by: FAMILY MEDICINE

## 2021-07-28 PROCEDURE — 99214 PR OFFICE/OUTPT VISIT, EST, LEVL IV, 30-39 MIN: ICD-10-PCS | Mod: S$GLB,,, | Performed by: FAMILY MEDICINE

## 2021-07-28 PROCEDURE — 1126F PR PAIN SEVERITY QUANTIFIED, NO PAIN PRESENT: ICD-10-PCS | Mod: CPTII,S$GLB,, | Performed by: FAMILY MEDICINE

## 2021-07-29 ENCOUNTER — TELEPHONE (OUTPATIENT)
Dept: NEUROLOGY | Facility: CLINIC | Age: 56
End: 2021-07-29

## 2021-07-30 ENCOUNTER — OFFICE VISIT (OUTPATIENT)
Dept: HEPATOLOGY | Facility: CLINIC | Age: 56
End: 2021-07-30
Payer: COMMERCIAL

## 2021-07-30 ENCOUNTER — TELEPHONE (OUTPATIENT)
Dept: HEPATOLOGY | Facility: CLINIC | Age: 56
End: 2021-07-30

## 2021-07-30 DIAGNOSIS — K74.01 HEPATIC FIBROSIS, EARLY FIBROSIS: ICD-10-CM

## 2021-07-30 DIAGNOSIS — I10 ESSENTIAL (PRIMARY) HYPERTENSION: ICD-10-CM

## 2021-07-30 DIAGNOSIS — E66.9 OBESITY (BMI 30-39.9): ICD-10-CM

## 2021-07-30 DIAGNOSIS — K76.0 FATTY LIVER DISEASE, NONALCOHOLIC: Primary | ICD-10-CM

## 2021-07-30 DIAGNOSIS — E11.9 TYPE 2 DIABETES MELLITUS WITHOUT COMPLICATION, WITHOUT LONG-TERM CURRENT USE OF INSULIN: ICD-10-CM

## 2021-07-30 PROBLEM — K76.9 LIVER DISEASE, UNSPECIFIED: Status: RESOLVED | Noted: 2021-01-17 | Resolved: 2021-07-30

## 2021-07-30 PROBLEM — R16.1 SPLENOMEGALY: Status: RESOLVED | Noted: 2019-09-17 | Resolved: 2021-07-30

## 2021-07-30 PROCEDURE — 1159F PR MEDICATION LIST DOCUMENTED IN MEDICAL RECORD: ICD-10-PCS | Mod: CPTII,,, | Performed by: NURSE PRACTITIONER

## 2021-07-30 PROCEDURE — 1160F PR REVIEW ALL MEDS BY PRESCRIBER/CLIN PHARMACIST DOCUMENTED: ICD-10-PCS | Mod: CPTII,,, | Performed by: NURSE PRACTITIONER

## 2021-07-30 PROCEDURE — 1160F RVW MEDS BY RX/DR IN RCRD: CPT | Mod: CPTII,,, | Performed by: NURSE PRACTITIONER

## 2021-07-30 PROCEDURE — 1159F MED LIST DOCD IN RCRD: CPT | Mod: CPTII,,, | Performed by: NURSE PRACTITIONER

## 2021-07-30 PROCEDURE — 99214 OFFICE O/P EST MOD 30 MIN: CPT | Mod: 95,,, | Performed by: NURSE PRACTITIONER

## 2021-07-30 PROCEDURE — 99214 PR OFFICE/OUTPT VISIT, EST, LEVL IV, 30-39 MIN: ICD-10-PCS | Mod: 95,,, | Performed by: NURSE PRACTITIONER

## 2021-08-02 ENCOUNTER — OFFICE VISIT (OUTPATIENT)
Dept: NEUROSURGERY | Facility: CLINIC | Age: 56
End: 2021-08-02
Payer: COMMERCIAL

## 2021-08-02 VITALS — DIASTOLIC BLOOD PRESSURE: 86 MMHG | HEART RATE: 67 BPM | SYSTOLIC BLOOD PRESSURE: 134 MMHG

## 2021-08-02 DIAGNOSIS — Z01.818 PRE-OP TESTING: ICD-10-CM

## 2021-08-02 DIAGNOSIS — I67.1 CEREBRAL ANEURYSM: Primary | ICD-10-CM

## 2021-08-02 PROCEDURE — 1160F RVW MEDS BY RX/DR IN RCRD: CPT | Mod: CPTII,S$GLB,, | Performed by: NEUROLOGICAL SURGERY

## 2021-08-02 PROCEDURE — 3075F SYST BP GE 130 - 139MM HG: CPT | Mod: CPTII,S$GLB,, | Performed by: NEUROLOGICAL SURGERY

## 2021-08-02 PROCEDURE — 1159F PR MEDICATION LIST DOCUMENTED IN MEDICAL RECORD: ICD-10-PCS | Mod: CPTII,S$GLB,, | Performed by: NEUROLOGICAL SURGERY

## 2021-08-02 PROCEDURE — 99215 PR OFFICE/OUTPT VISIT, EST, LEVL V, 40-54 MIN: ICD-10-PCS | Mod: S$GLB,,, | Performed by: NEUROLOGICAL SURGERY

## 2021-08-02 PROCEDURE — 3079F PR MOST RECENT DIASTOLIC BLOOD PRESSURE 80-89 MM HG: ICD-10-PCS | Mod: CPTII,S$GLB,, | Performed by: NEUROLOGICAL SURGERY

## 2021-08-02 PROCEDURE — 1159F MED LIST DOCD IN RCRD: CPT | Mod: CPTII,S$GLB,, | Performed by: NEUROLOGICAL SURGERY

## 2021-08-02 PROCEDURE — 99417 PR PROLONGED SVC, OUTPT, W/WO DIRECT PT CONTACT,  EA ADDTL 15 MIN: ICD-10-PCS | Mod: S$GLB,,, | Performed by: NEUROLOGICAL SURGERY

## 2021-08-02 PROCEDURE — 3079F DIAST BP 80-89 MM HG: CPT | Mod: CPTII,S$GLB,, | Performed by: NEUROLOGICAL SURGERY

## 2021-08-02 PROCEDURE — 1125F AMNT PAIN NOTED PAIN PRSNT: CPT | Mod: CPTII,S$GLB,, | Performed by: NEUROLOGICAL SURGERY

## 2021-08-02 PROCEDURE — 1125F PR PAIN SEVERITY QUANTIFIED, PAIN PRESENT: ICD-10-PCS | Mod: CPTII,S$GLB,, | Performed by: NEUROLOGICAL SURGERY

## 2021-08-02 PROCEDURE — 99999 PR PBB SHADOW E&M-EST. PATIENT-LVL IV: CPT | Mod: PBBFAC,,, | Performed by: NEUROLOGICAL SURGERY

## 2021-08-02 PROCEDURE — 99417 PROLNG OP E/M EACH 15 MIN: CPT | Mod: S$GLB,,, | Performed by: NEUROLOGICAL SURGERY

## 2021-08-02 PROCEDURE — 3075F PR MOST RECENT SYSTOLIC BLOOD PRESS GE 130-139MM HG: ICD-10-PCS | Mod: CPTII,S$GLB,, | Performed by: NEUROLOGICAL SURGERY

## 2021-08-02 PROCEDURE — 99215 OFFICE O/P EST HI 40 MIN: CPT | Mod: S$GLB,,, | Performed by: NEUROLOGICAL SURGERY

## 2021-08-02 PROCEDURE — 99999 PR PBB SHADOW E&M-EST. PATIENT-LVL IV: ICD-10-PCS | Mod: PBBFAC,,, | Performed by: NEUROLOGICAL SURGERY

## 2021-08-02 PROCEDURE — 1160F PR REVIEW ALL MEDS BY PRESCRIBER/CLIN PHARMACIST DOCUMENTED: ICD-10-PCS | Mod: CPTII,S$GLB,, | Performed by: NEUROLOGICAL SURGERY

## 2021-08-03 ENCOUNTER — TELEPHONE (OUTPATIENT)
Dept: NEUROLOGY | Facility: CLINIC | Age: 56
End: 2021-08-03

## 2021-08-04 ENCOUNTER — PATIENT MESSAGE (OUTPATIENT)
Dept: ADMINISTRATIVE | Facility: HOSPITAL | Age: 56
End: 2021-08-04

## 2021-08-04 ENCOUNTER — TELEPHONE (OUTPATIENT)
Dept: NEUROLOGY | Facility: CLINIC | Age: 56
End: 2021-08-04

## 2021-08-05 ENCOUNTER — PATIENT MESSAGE (OUTPATIENT)
Dept: FAMILY MEDICINE | Facility: CLINIC | Age: 56
End: 2021-08-05

## 2021-08-11 ENCOUNTER — PATIENT MESSAGE (OUTPATIENT)
Dept: NEUROLOGY | Facility: CLINIC | Age: 56
End: 2021-08-11

## 2021-08-16 ENCOUNTER — OFFICE VISIT (OUTPATIENT)
Dept: FAMILY MEDICINE | Facility: CLINIC | Age: 56
End: 2021-08-16
Payer: COMMERCIAL

## 2021-08-16 ENCOUNTER — HOSPITAL ENCOUNTER (OUTPATIENT)
Dept: RADIOLOGY | Facility: HOSPITAL | Age: 56
Discharge: HOME | End: 2021-08-16
Attending: FAMILY MEDICINE
Payer: COMMERCIAL

## 2021-08-16 VITALS
RESPIRATION RATE: 18 BRPM | HEIGHT: 70 IN | DIASTOLIC BLOOD PRESSURE: 81 MMHG | BODY MASS INDEX: 35.85 KG/M2 | WEIGHT: 250.44 LBS | SYSTOLIC BLOOD PRESSURE: 121 MMHG | OXYGEN SATURATION: 96 % | HEART RATE: 71 BPM

## 2021-08-16 DIAGNOSIS — I10 ESSENTIAL HYPERTENSION: ICD-10-CM

## 2021-08-16 DIAGNOSIS — R22.31 SKIN LUMP OF ARM, RIGHT: ICD-10-CM

## 2021-08-16 DIAGNOSIS — R22.31 SKIN LUMP OF ARM, RIGHT: Primary | ICD-10-CM

## 2021-08-16 DIAGNOSIS — E11.9 TYPE 2 DIABETES MELLITUS WITHOUT COMPLICATION, WITHOUT LONG-TERM CURRENT USE OF INSULIN: ICD-10-CM

## 2021-08-16 PROCEDURE — 1126F AMNT PAIN NOTED NONE PRSNT: CPT | Mod: CPTII,S$GLB,, | Performed by: FAMILY MEDICINE

## 2021-08-16 PROCEDURE — 1160F RVW MEDS BY RX/DR IN RCRD: CPT | Mod: CPTII,S$GLB,, | Performed by: FAMILY MEDICINE

## 2021-08-16 PROCEDURE — 3079F PR MOST RECENT DIASTOLIC BLOOD PRESSURE 80-89 MM HG: ICD-10-PCS | Mod: CPTII,S$GLB,, | Performed by: FAMILY MEDICINE

## 2021-08-16 PROCEDURE — 3008F PR BODY MASS INDEX (BMI) DOCUMENTED: ICD-10-PCS | Mod: CPTII,S$GLB,, | Performed by: FAMILY MEDICINE

## 2021-08-16 PROCEDURE — 3079F DIAST BP 80-89 MM HG: CPT | Mod: CPTII,S$GLB,, | Performed by: FAMILY MEDICINE

## 2021-08-16 PROCEDURE — 3074F SYST BP LT 130 MM HG: CPT | Mod: CPTII,S$GLB,, | Performed by: FAMILY MEDICINE

## 2021-08-16 PROCEDURE — 1159F PR MEDICATION LIST DOCUMENTED IN MEDICAL RECORD: ICD-10-PCS | Mod: CPTII,S$GLB,, | Performed by: FAMILY MEDICINE

## 2021-08-16 PROCEDURE — 3008F BODY MASS INDEX DOCD: CPT | Mod: CPTII,S$GLB,, | Performed by: FAMILY MEDICINE

## 2021-08-16 PROCEDURE — 1159F MED LIST DOCD IN RCRD: CPT | Mod: CPTII,S$GLB,, | Performed by: FAMILY MEDICINE

## 2021-08-16 PROCEDURE — 76882 US LMTD JT/FCL EVL NVASC XTR: CPT | Mod: 26,RT,, | Performed by: RADIOLOGY

## 2021-08-16 PROCEDURE — 99999 PR PBB SHADOW E&M-EST. PATIENT-LVL V: CPT | Mod: PBBFAC,,, | Performed by: FAMILY MEDICINE

## 2021-08-16 PROCEDURE — 99214 OFFICE O/P EST MOD 30 MIN: CPT | Mod: S$GLB,,, | Performed by: FAMILY MEDICINE

## 2021-08-16 PROCEDURE — 3074F PR MOST RECENT SYSTOLIC BLOOD PRESSURE < 130 MM HG: ICD-10-PCS | Mod: CPTII,S$GLB,, | Performed by: FAMILY MEDICINE

## 2021-08-16 PROCEDURE — 99999 PR PBB SHADOW E&M-EST. PATIENT-LVL V: ICD-10-PCS | Mod: PBBFAC,,, | Performed by: FAMILY MEDICINE

## 2021-08-16 PROCEDURE — 1160F PR REVIEW ALL MEDS BY PRESCRIBER/CLIN PHARMACIST DOCUMENTED: ICD-10-PCS | Mod: CPTII,S$GLB,, | Performed by: FAMILY MEDICINE

## 2021-08-16 PROCEDURE — 1126F PR PAIN SEVERITY QUANTIFIED, NO PAIN PRESENT: ICD-10-PCS | Mod: CPTII,S$GLB,, | Performed by: FAMILY MEDICINE

## 2021-08-16 PROCEDURE — 99214 PR OFFICE/OUTPT VISIT, EST, LEVL IV, 30-39 MIN: ICD-10-PCS | Mod: S$GLB,,, | Performed by: FAMILY MEDICINE

## 2021-08-16 PROCEDURE — 76882 US LMTD JT/FCL EVL NVASC XTR: CPT | Mod: TC,PO,RT

## 2021-08-16 PROCEDURE — 76882 US SOFT TISSUE, UPPER EXTREMITY, RIGHT: ICD-10-PCS | Mod: 26,RT,, | Performed by: RADIOLOGY

## 2021-09-03 ENCOUNTER — PATIENT MESSAGE (OUTPATIENT)
Dept: NEUROSURGERY | Facility: CLINIC | Age: 56
End: 2021-09-03

## 2021-09-07 ENCOUNTER — HOSPITAL ENCOUNTER (OUTPATIENT)
Dept: RADIOLOGY | Facility: HOSPITAL | Age: 56
Discharge: HOME | End: 2021-09-07
Attending: PHYSICIAN ASSISTANT
Payer: COMMERCIAL

## 2021-09-07 ENCOUNTER — OFFICE VISIT (OUTPATIENT)
Dept: FAMILY MEDICINE | Facility: CLINIC | Age: 56
End: 2021-09-07
Payer: COMMERCIAL

## 2021-09-07 ENCOUNTER — PATIENT MESSAGE (OUTPATIENT)
Dept: NEUROSURGERY | Facility: CLINIC | Age: 56
End: 2021-09-07

## 2021-09-07 VITALS
DIASTOLIC BLOOD PRESSURE: 84 MMHG | OXYGEN SATURATION: 98 % | HEART RATE: 72 BPM | SYSTOLIC BLOOD PRESSURE: 126 MMHG | HEIGHT: 70 IN | BODY MASS INDEX: 35.69 KG/M2 | WEIGHT: 249.31 LBS

## 2021-09-07 DIAGNOSIS — M54.9 DORSALGIA, UNSPECIFIED: ICD-10-CM

## 2021-09-07 DIAGNOSIS — R41.0 CONFUSION: Primary | ICD-10-CM

## 2021-09-07 PROCEDURE — 1159F PR MEDICATION LIST DOCUMENTED IN MEDICAL RECORD: ICD-10-PCS | Mod: CPTII,S$GLB,, | Performed by: PHYSICIAN ASSISTANT

## 2021-09-07 PROCEDURE — 3074F SYST BP LT 130 MM HG: CPT | Mod: CPTII,S$GLB,, | Performed by: PHYSICIAN ASSISTANT

## 2021-09-07 PROCEDURE — 3079F PR MOST RECENT DIASTOLIC BLOOD PRESSURE 80-89 MM HG: ICD-10-PCS | Mod: CPTII,S$GLB,, | Performed by: PHYSICIAN ASSISTANT

## 2021-09-07 PROCEDURE — 99214 OFFICE O/P EST MOD 30 MIN: CPT | Mod: S$GLB,,, | Performed by: PHYSICIAN ASSISTANT

## 2021-09-07 PROCEDURE — 3079F DIAST BP 80-89 MM HG: CPT | Mod: CPTII,S$GLB,, | Performed by: PHYSICIAN ASSISTANT

## 2021-09-07 PROCEDURE — 72100 X-RAY EXAM L-S SPINE 2/3 VWS: CPT | Mod: TC,FY,PO

## 2021-09-07 PROCEDURE — 3008F PR BODY MASS INDEX (BMI) DOCUMENTED: ICD-10-PCS | Mod: CPTII,S$GLB,, | Performed by: PHYSICIAN ASSISTANT

## 2021-09-07 PROCEDURE — 99999 PR PBB SHADOW E&M-EST. PATIENT-LVL V: ICD-10-PCS | Mod: PBBFAC,,, | Performed by: PHYSICIAN ASSISTANT

## 2021-09-07 PROCEDURE — 3074F PR MOST RECENT SYSTOLIC BLOOD PRESSURE < 130 MM HG: ICD-10-PCS | Mod: CPTII,S$GLB,, | Performed by: PHYSICIAN ASSISTANT

## 2021-09-07 PROCEDURE — 1159F MED LIST DOCD IN RCRD: CPT | Mod: CPTII,S$GLB,, | Performed by: PHYSICIAN ASSISTANT

## 2021-09-07 PROCEDURE — 72100 XR LUMBAR SPINE AP AND LATERAL: ICD-10-PCS | Mod: 26,,, | Performed by: RADIOLOGY

## 2021-09-07 PROCEDURE — 1160F PR REVIEW ALL MEDS BY PRESCRIBER/CLIN PHARMACIST DOCUMENTED: ICD-10-PCS | Mod: CPTII,S$GLB,, | Performed by: PHYSICIAN ASSISTANT

## 2021-09-07 PROCEDURE — 99999 PR PBB SHADOW E&M-EST. PATIENT-LVL V: CPT | Mod: PBBFAC,,, | Performed by: PHYSICIAN ASSISTANT

## 2021-09-07 PROCEDURE — 1160F RVW MEDS BY RX/DR IN RCRD: CPT | Mod: CPTII,S$GLB,, | Performed by: PHYSICIAN ASSISTANT

## 2021-09-07 PROCEDURE — 99214 PR OFFICE/OUTPT VISIT, EST, LEVL IV, 30-39 MIN: ICD-10-PCS | Mod: S$GLB,,, | Performed by: PHYSICIAN ASSISTANT

## 2021-09-07 PROCEDURE — 3008F BODY MASS INDEX DOCD: CPT | Mod: CPTII,S$GLB,, | Performed by: PHYSICIAN ASSISTANT

## 2021-09-07 PROCEDURE — 72100 X-RAY EXAM L-S SPINE 2/3 VWS: CPT | Mod: 26,,, | Performed by: RADIOLOGY

## 2021-09-07 RX ORDER — NAPROXEN 500 MG/1
500 TABLET ORAL 2 TIMES DAILY
Qty: 20 TABLET | Refills: 0 | Status: SHIPPED | OUTPATIENT
Start: 2021-09-07 | End: 2021-09-17

## 2021-09-07 RX ORDER — TIZANIDINE 4 MG/1
4 TABLET ORAL NIGHTLY PRN
Qty: 10 TABLET | Refills: 0 | Status: SHIPPED | OUTPATIENT
Start: 2021-09-07 | End: 2021-09-15

## 2021-09-14 ENCOUNTER — OFFICE VISIT (OUTPATIENT)
Dept: NEUROLOGY | Facility: CLINIC | Age: 56
End: 2021-09-14
Payer: COMMERCIAL

## 2021-09-14 ENCOUNTER — PATIENT MESSAGE (OUTPATIENT)
Dept: NEUROLOGY | Facility: CLINIC | Age: 56
End: 2021-09-14

## 2021-09-14 VITALS
WEIGHT: 251.44 LBS | DIASTOLIC BLOOD PRESSURE: 78 MMHG | SYSTOLIC BLOOD PRESSURE: 124 MMHG | HEART RATE: 77 BPM | BODY MASS INDEX: 36 KG/M2 | HEIGHT: 70 IN

## 2021-09-14 DIAGNOSIS — F41.9 ANXIETY DISORDER, UNSPECIFIED TYPE: Primary | ICD-10-CM

## 2021-09-14 DIAGNOSIS — R41.82 ALTERED MENTAL STATUS, UNSPECIFIED ALTERED MENTAL STATUS TYPE: ICD-10-CM

## 2021-09-14 DIAGNOSIS — H93.13 TINNITUS OF BOTH EARS: ICD-10-CM

## 2021-09-14 DIAGNOSIS — I67.1 CEREBRAL ANEURYSM: ICD-10-CM

## 2021-09-14 PROCEDURE — 99215 OFFICE O/P EST HI 40 MIN: CPT | Mod: S$GLB,,, | Performed by: PSYCHIATRY & NEUROLOGY

## 2021-09-14 PROCEDURE — 3074F SYST BP LT 130 MM HG: CPT | Mod: CPTII,S$GLB,, | Performed by: PSYCHIATRY & NEUROLOGY

## 2021-09-14 PROCEDURE — 3008F PR BODY MASS INDEX (BMI) DOCUMENTED: ICD-10-PCS | Mod: CPTII,S$GLB,, | Performed by: PSYCHIATRY & NEUROLOGY

## 2021-09-14 PROCEDURE — 3074F PR MOST RECENT SYSTOLIC BLOOD PRESSURE < 130 MM HG: ICD-10-PCS | Mod: CPTII,S$GLB,, | Performed by: PSYCHIATRY & NEUROLOGY

## 2021-09-14 PROCEDURE — 99999 PR PBB SHADOW E&M-EST. PATIENT-LVL IV: ICD-10-PCS | Mod: PBBFAC,,, | Performed by: PSYCHIATRY & NEUROLOGY

## 2021-09-14 PROCEDURE — 99215 PR OFFICE/OUTPT VISIT, EST, LEVL V, 40-54 MIN: ICD-10-PCS | Mod: S$GLB,,, | Performed by: PSYCHIATRY & NEUROLOGY

## 2021-09-14 PROCEDURE — 3078F PR MOST RECENT DIASTOLIC BLOOD PRESSURE < 80 MM HG: ICD-10-PCS | Mod: CPTII,S$GLB,, | Performed by: PSYCHIATRY & NEUROLOGY

## 2021-09-14 PROCEDURE — 3078F DIAST BP <80 MM HG: CPT | Mod: CPTII,S$GLB,, | Performed by: PSYCHIATRY & NEUROLOGY

## 2021-09-14 PROCEDURE — 3008F BODY MASS INDEX DOCD: CPT | Mod: CPTII,S$GLB,, | Performed by: PSYCHIATRY & NEUROLOGY

## 2021-09-14 PROCEDURE — 99999 PR PBB SHADOW E&M-EST. PATIENT-LVL IV: CPT | Mod: PBBFAC,,, | Performed by: PSYCHIATRY & NEUROLOGY

## 2021-09-14 RX ORDER — LORAZEPAM 1 MG/1
1 TABLET ORAL
Qty: 1 TABLET | Refills: 0 | Status: SHIPPED | OUTPATIENT
Start: 2021-09-14 | End: 2021-10-25

## 2021-09-14 RX ORDER — SERTRALINE HYDROCHLORIDE 25 MG/1
25 TABLET, FILM COATED ORAL DAILY
Qty: 30 TABLET | Refills: 3 | Status: SHIPPED | OUTPATIENT
Start: 2021-09-14 | End: 2021-10-25

## 2021-09-15 ENCOUNTER — PATIENT OUTREACH (OUTPATIENT)
Dept: ADMINISTRATIVE | Facility: OTHER | Age: 56
End: 2021-09-15

## 2021-09-15 ENCOUNTER — OFFICE VISIT (OUTPATIENT)
Dept: CARDIOLOGY | Facility: CLINIC | Age: 56
End: 2021-09-15
Payer: COMMERCIAL

## 2021-09-15 VITALS
BODY MASS INDEX: 36.23 KG/M2 | HEART RATE: 62 BPM | SYSTOLIC BLOOD PRESSURE: 135 MMHG | HEIGHT: 70 IN | DIASTOLIC BLOOD PRESSURE: 86 MMHG | OXYGEN SATURATION: 99 % | WEIGHT: 253.06 LBS

## 2021-09-15 DIAGNOSIS — R00.2 PALPITATIONS: Primary | ICD-10-CM

## 2021-09-15 DIAGNOSIS — E66.01 MORBID OBESITY WITH BMI OF 45.0-49.9, ADULT: ICD-10-CM

## 2021-09-15 DIAGNOSIS — E11.42 TYPE 2 DIABETES MELLITUS WITH DIABETIC POLYNEUROPATHY, WITH LONG-TERM CURRENT USE OF INSULIN: ICD-10-CM

## 2021-09-15 DIAGNOSIS — I10 ESSENTIAL HYPERTENSION: ICD-10-CM

## 2021-09-15 DIAGNOSIS — Z79.4 TYPE 2 DIABETES MELLITUS WITH DIABETIC POLYNEUROPATHY, WITH LONG-TERM CURRENT USE OF INSULIN: ICD-10-CM

## 2021-09-15 DIAGNOSIS — E66.9 OBESITY (BMI 30-39.9): ICD-10-CM

## 2021-09-15 DIAGNOSIS — I67.1 CEREBRAL ANEURYSM: ICD-10-CM

## 2021-09-15 PROCEDURE — 1160F RVW MEDS BY RX/DR IN RCRD: CPT | Mod: CPTII,S$GLB,, | Performed by: INTERNAL MEDICINE

## 2021-09-15 PROCEDURE — 3008F BODY MASS INDEX DOCD: CPT | Mod: CPTII,S$GLB,, | Performed by: INTERNAL MEDICINE

## 2021-09-15 PROCEDURE — 3079F PR MOST RECENT DIASTOLIC BLOOD PRESSURE 80-89 MM HG: ICD-10-PCS | Mod: CPTII,S$GLB,, | Performed by: INTERNAL MEDICINE

## 2021-09-15 PROCEDURE — 3008F PR BODY MASS INDEX (BMI) DOCUMENTED: ICD-10-PCS | Mod: CPTII,S$GLB,, | Performed by: INTERNAL MEDICINE

## 2021-09-15 PROCEDURE — 99214 OFFICE O/P EST MOD 30 MIN: CPT | Mod: S$GLB,,, | Performed by: INTERNAL MEDICINE

## 2021-09-15 PROCEDURE — 3079F DIAST BP 80-89 MM HG: CPT | Mod: CPTII,S$GLB,, | Performed by: INTERNAL MEDICINE

## 2021-09-15 PROCEDURE — 1160F PR REVIEW ALL MEDS BY PRESCRIBER/CLIN PHARMACIST DOCUMENTED: ICD-10-PCS | Mod: CPTII,S$GLB,, | Performed by: INTERNAL MEDICINE

## 2021-09-15 PROCEDURE — 99999 PR PBB SHADOW E&M-EST. PATIENT-LVL IV: CPT | Mod: PBBFAC,,, | Performed by: INTERNAL MEDICINE

## 2021-09-15 PROCEDURE — 99999 PR PBB SHADOW E&M-EST. PATIENT-LVL IV: ICD-10-PCS | Mod: PBBFAC,,, | Performed by: INTERNAL MEDICINE

## 2021-09-15 PROCEDURE — 1159F MED LIST DOCD IN RCRD: CPT | Mod: CPTII,S$GLB,, | Performed by: INTERNAL MEDICINE

## 2021-09-15 PROCEDURE — 3075F PR MOST RECENT SYSTOLIC BLOOD PRESS GE 130-139MM HG: ICD-10-PCS | Mod: CPTII,S$GLB,, | Performed by: INTERNAL MEDICINE

## 2021-09-15 PROCEDURE — 1159F PR MEDICATION LIST DOCUMENTED IN MEDICAL RECORD: ICD-10-PCS | Mod: CPTII,S$GLB,, | Performed by: INTERNAL MEDICINE

## 2021-09-15 PROCEDURE — 99214 PR OFFICE/OUTPT VISIT, EST, LEVL IV, 30-39 MIN: ICD-10-PCS | Mod: S$GLB,,, | Performed by: INTERNAL MEDICINE

## 2021-09-15 PROCEDURE — 3075F SYST BP GE 130 - 139MM HG: CPT | Mod: CPTII,S$GLB,, | Performed by: INTERNAL MEDICINE

## 2021-09-15 RX ORDER — METOPROLOL TARTRATE 25 MG/1
25 TABLET, FILM COATED ORAL 2 TIMES DAILY
Qty: 120 TABLET | Refills: 3 | Status: SHIPPED | OUTPATIENT
Start: 2021-09-15 | End: 2021-11-01

## 2021-09-17 ENCOUNTER — CLINICAL SUPPORT (OUTPATIENT)
Dept: CARDIOLOGY | Facility: HOSPITAL | Age: 56
End: 2021-09-17
Attending: INTERNAL MEDICINE
Payer: COMMERCIAL

## 2021-09-17 DIAGNOSIS — R00.2 PALPITATIONS: ICD-10-CM

## 2021-09-17 PROCEDURE — 93272 CARDIAC EVENT MONITOR (CUPID ONLY): ICD-10-PCS | Mod: ,,, | Performed by: INTERNAL MEDICINE

## 2021-09-17 PROCEDURE — 93271 ECG/MONITORING AND ANALYSIS: CPT

## 2021-09-17 PROCEDURE — 93272 ECG/REVIEW INTERPRET ONLY: CPT | Mod: ,,, | Performed by: INTERNAL MEDICINE

## 2021-09-24 ENCOUNTER — PATIENT MESSAGE (OUTPATIENT)
Dept: NEUROSURGERY | Facility: CLINIC | Age: 56
End: 2021-09-24

## 2021-09-27 ENCOUNTER — TELEPHONE (OUTPATIENT)
Dept: NEUROSURGERY | Facility: CLINIC | Age: 56
End: 2021-09-27

## 2021-09-27 DIAGNOSIS — I67.1 CEREBRAL ANEURYSM: Primary | ICD-10-CM

## 2021-09-28 ENCOUNTER — LAB VISIT (OUTPATIENT)
Dept: FAMILY MEDICINE | Facility: CLINIC | Age: 56
End: 2021-09-28
Payer: COMMERCIAL

## 2021-09-28 ENCOUNTER — LAB VISIT (OUTPATIENT)
Dept: LAB | Facility: HOSPITAL | Age: 56
End: 2021-09-28
Attending: NEUROLOGICAL SURGERY
Payer: COMMERCIAL

## 2021-09-28 DIAGNOSIS — I67.1 CEREBRAL ANEURYSM: ICD-10-CM

## 2021-09-28 DIAGNOSIS — Z01.818 PRE-OP TESTING: ICD-10-CM

## 2021-09-28 LAB
ANION GAP SERPL CALC-SCNC: 10 MMOL/L (ref 8–16)
BUN SERPL-MCNC: 18 MG/DL (ref 6–20)
CALCIUM SERPL-MCNC: 10.3 MG/DL (ref 8.7–10.5)
CHLORIDE SERPL-SCNC: 100 MMOL/L (ref 95–110)
CO2 SERPL-SCNC: 29 MMOL/L (ref 23–29)
CREAT SERPL-MCNC: 1 MG/DL (ref 0.5–1.4)
EST. GFR  (AFRICAN AMERICAN): >60 ML/MIN/1.73 M^2
EST. GFR  (NON AFRICAN AMERICAN): >60 ML/MIN/1.73 M^2
GLUCOSE SERPL-MCNC: 95 MG/DL (ref 70–110)
POTASSIUM SERPL-SCNC: 4.6 MMOL/L (ref 3.5–5.1)
SODIUM SERPL-SCNC: 139 MMOL/L (ref 136–145)

## 2021-09-28 PROCEDURE — U0005 INFEC AGEN DETEC AMPLI PROBE: HCPCS | Performed by: NEUROLOGICAL SURGERY

## 2021-09-28 PROCEDURE — 36415 COLL VENOUS BLD VENIPUNCTURE: CPT | Mod: PO | Performed by: NEUROLOGICAL SURGERY

## 2021-09-28 PROCEDURE — 80048 BASIC METABOLIC PNL TOTAL CA: CPT | Performed by: NEUROLOGICAL SURGERY

## 2021-09-28 PROCEDURE — U0003 INFECTIOUS AGENT DETECTION BY NUCLEIC ACID (DNA OR RNA); SEVERE ACUTE RESPIRATORY SYNDROME CORONAVIRUS 2 (SARS-COV-2) (CORONAVIRUS DISEASE [COVID-19]), AMPLIFIED PROBE TECHNIQUE, MAKING USE OF HIGH THROUGHPUT TECHNOLOGIES AS DESCRIBED BY CMS-2020-01-R: HCPCS | Performed by: NEUROLOGICAL SURGERY

## 2021-09-29 ENCOUNTER — CLINICAL SUPPORT (OUTPATIENT)
Dept: AUDIOLOGY | Facility: CLINIC | Age: 56
End: 2021-09-29
Payer: COMMERCIAL

## 2021-09-29 ENCOUNTER — PATIENT MESSAGE (OUTPATIENT)
Dept: INTERVENTIONAL RADIOLOGY/VASCULAR | Facility: HOSPITAL | Age: 56
End: 2021-09-29

## 2021-09-29 ENCOUNTER — TELEPHONE (OUTPATIENT)
Dept: INTERVENTIONAL RADIOLOGY/VASCULAR | Facility: HOSPITAL | Age: 56
End: 2021-09-29

## 2021-09-29 ENCOUNTER — OFFICE VISIT (OUTPATIENT)
Dept: OTOLARYNGOLOGY | Facility: CLINIC | Age: 56
End: 2021-09-29
Payer: COMMERCIAL

## 2021-09-29 ENCOUNTER — TELEPHONE (OUTPATIENT)
Dept: NEUROSURGERY | Facility: CLINIC | Age: 56
End: 2021-09-29

## 2021-09-29 VITALS — DIASTOLIC BLOOD PRESSURE: 97 MMHG | HEART RATE: 66 BPM | SYSTOLIC BLOOD PRESSURE: 148 MMHG

## 2021-09-29 DIAGNOSIS — Z88.8 ALLERGY TO IODINE: Primary | ICD-10-CM

## 2021-09-29 DIAGNOSIS — H90.3 BILATERAL SENSORINEURAL HEARING LOSS: Primary | ICD-10-CM

## 2021-09-29 DIAGNOSIS — H90.3 BILATERAL HIGH FREQUENCY SENSORINEURAL HEARING LOSS: Primary | ICD-10-CM

## 2021-09-29 DIAGNOSIS — H93.13 BILATERAL TINNITUS: ICD-10-CM

## 2021-09-29 DIAGNOSIS — R09.81 NASAL CONGESTION: ICD-10-CM

## 2021-09-29 DIAGNOSIS — H93.13 TINNITUS OF BOTH EARS: ICD-10-CM

## 2021-09-29 LAB
SARS-COV-2 RNA RESP QL NAA+PROBE: NOT DETECTED
SARS-COV-2- CYCLE NUMBER: NORMAL

## 2021-09-29 PROCEDURE — 1159F MED LIST DOCD IN RCRD: CPT | Mod: CPTII,S$GLB,, | Performed by: OTOLARYNGOLOGY

## 2021-09-29 PROCEDURE — 92567 TYMPANOMETRY: CPT | Mod: S$GLB,,, | Performed by: AUDIOLOGIST

## 2021-09-29 PROCEDURE — 99214 PR OFFICE/OUTPT VISIT, EST, LEVL IV, 30-39 MIN: ICD-10-PCS | Mod: S$GLB,,, | Performed by: OTOLARYNGOLOGY

## 2021-09-29 PROCEDURE — 92557 COMPREHENSIVE HEARING TEST: CPT | Mod: S$GLB,,, | Performed by: AUDIOLOGIST

## 2021-09-29 PROCEDURE — 3080F PR MOST RECENT DIASTOLIC BLOOD PRESSURE >= 90 MM HG: ICD-10-PCS | Mod: CPTII,S$GLB,, | Performed by: OTOLARYNGOLOGY

## 2021-09-29 PROCEDURE — 92557 PR COMPREHENSIVE HEARING TEST: ICD-10-PCS | Mod: S$GLB,,, | Performed by: AUDIOLOGIST

## 2021-09-29 PROCEDURE — 99214 OFFICE O/P EST MOD 30 MIN: CPT | Mod: S$GLB,,, | Performed by: OTOLARYNGOLOGY

## 2021-09-29 PROCEDURE — 92567 PR TYMPA2METRY: ICD-10-PCS | Mod: S$GLB,,, | Performed by: AUDIOLOGIST

## 2021-09-29 PROCEDURE — 3077F PR MOST RECENT SYSTOLIC BLOOD PRESSURE >= 140 MM HG: ICD-10-PCS | Mod: CPTII,S$GLB,, | Performed by: OTOLARYNGOLOGY

## 2021-09-29 PROCEDURE — 99999 PR PBB SHADOW E&M-EST. PATIENT-LVL I: ICD-10-PCS | Mod: PBBFAC,,, | Performed by: AUDIOLOGIST

## 2021-09-29 PROCEDURE — 99999 PR PBB SHADOW E&M-EST. PATIENT-LVL IV: CPT | Mod: PBBFAC,,, | Performed by: OTOLARYNGOLOGY

## 2021-09-29 PROCEDURE — 3077F SYST BP >= 140 MM HG: CPT | Mod: CPTII,S$GLB,, | Performed by: OTOLARYNGOLOGY

## 2021-09-29 PROCEDURE — 99999 PR PBB SHADOW E&M-EST. PATIENT-LVL I: CPT | Mod: PBBFAC,,, | Performed by: AUDIOLOGIST

## 2021-09-29 PROCEDURE — 1160F RVW MEDS BY RX/DR IN RCRD: CPT | Mod: CPTII,S$GLB,, | Performed by: OTOLARYNGOLOGY

## 2021-09-29 PROCEDURE — 1160F PR REVIEW ALL MEDS BY PRESCRIBER/CLIN PHARMACIST DOCUMENTED: ICD-10-PCS | Mod: CPTII,S$GLB,, | Performed by: OTOLARYNGOLOGY

## 2021-09-29 PROCEDURE — 1159F PR MEDICATION LIST DOCUMENTED IN MEDICAL RECORD: ICD-10-PCS | Mod: CPTII,S$GLB,, | Performed by: OTOLARYNGOLOGY

## 2021-09-29 PROCEDURE — 99999 PR PBB SHADOW E&M-EST. PATIENT-LVL IV: ICD-10-PCS | Mod: PBBFAC,,, | Performed by: OTOLARYNGOLOGY

## 2021-09-29 PROCEDURE — 3080F DIAST BP >= 90 MM HG: CPT | Mod: CPTII,S$GLB,, | Performed by: OTOLARYNGOLOGY

## 2021-09-29 RX ORDER — PREDNISONE 50 MG/1
TABLET ORAL
Qty: 3 TABLET | Refills: 0 | Status: SHIPPED | OUTPATIENT
Start: 2021-09-29 | End: 2021-10-25

## 2021-09-29 RX ORDER — DIPHENHYDRAMINE HCL 50 MG
50 CAPSULE ORAL ONCE
Qty: 1 CAPSULE | Refills: 0 | Status: SHIPPED | OUTPATIENT
Start: 2021-09-29 | End: 2021-09-30

## 2021-09-30 ENCOUNTER — PATIENT MESSAGE (OUTPATIENT)
Dept: NEUROSURGERY | Facility: CLINIC | Age: 56
End: 2021-09-30

## 2021-09-30 ENCOUNTER — HOSPITAL ENCOUNTER (OUTPATIENT)
Dept: INTERVENTIONAL RADIOLOGY/VASCULAR | Facility: HOSPITAL | Age: 56
Discharge: HOME | End: 2021-09-30
Attending: NEUROLOGICAL SURGERY
Payer: COMMERCIAL

## 2021-09-30 ENCOUNTER — NURSE TRIAGE (OUTPATIENT)
Dept: ADMINISTRATIVE | Facility: CLINIC | Age: 56
End: 2021-09-30

## 2021-09-30 VITALS
OXYGEN SATURATION: 100 % | HEART RATE: 81 BPM | WEIGHT: 247 LBS | BODY MASS INDEX: 35.36 KG/M2 | TEMPERATURE: 98 F | RESPIRATION RATE: 14 BRPM | SYSTOLIC BLOOD PRESSURE: 129 MMHG | HEIGHT: 70 IN | DIASTOLIC BLOOD PRESSURE: 71 MMHG

## 2021-09-30 DIAGNOSIS — I67.1 CEREBRAL ANEURYSM: ICD-10-CM

## 2021-09-30 PROCEDURE — 25000003 PHARM REV CODE 250: Performed by: NEUROLOGICAL SURGERY

## 2021-09-30 PROCEDURE — 76377 3D RENDER W/INTRP POSTPROCES: CPT | Mod: TC | Performed by: NEUROLOGICAL SURGERY

## 2021-09-30 PROCEDURE — 63600175 PHARM REV CODE 636 W HCPCS: Performed by: NEUROLOGICAL SURGERY

## 2021-09-30 PROCEDURE — 36227 PR ANGIO XTRNL CAROTD CIRC, XTRNL CAROTID, SELECTV CATH S&I: ICD-10-PCS | Mod: 50,,, | Performed by: NEUROLOGICAL SURGERY

## 2021-09-30 PROCEDURE — 99153 MOD SED SAME PHYS/QHP EA: CPT | Performed by: NEUROLOGICAL SURGERY

## 2021-09-30 PROCEDURE — 76377 PR  3D RENDERING W/ IMAGE POSTPROCESS: ICD-10-PCS | Mod: 26,,, | Performed by: NEUROLOGICAL SURGERY

## 2021-09-30 PROCEDURE — 36227 PLACE CATH XTRNL CAROTID: CPT | Mod: 50,,, | Performed by: NEUROLOGICAL SURGERY

## 2021-09-30 PROCEDURE — A4215 STERILE NEEDLE: HCPCS

## 2021-09-30 PROCEDURE — 36226 PLACE CATH VERTEBRAL ART: CPT | Mod: 51,50,, | Performed by: NEUROLOGICAL SURGERY

## 2021-09-30 PROCEDURE — 36224 IR ANGIOGRAM CAROTID INTERNAL INC ARCH AND CEREBRAL BILAT: ICD-10-PCS | Mod: 50,,, | Performed by: NEUROLOGICAL SURGERY

## 2021-09-30 PROCEDURE — 36226 PR ANGIO VERTEBRAL ARTERY +/- CERVIOCEREBRAL ARCH, VERTEBRAL ART, SELECTV CATH,S&I: ICD-10-PCS | Mod: 51,50,, | Performed by: NEUROLOGICAL SURGERY

## 2021-09-30 PROCEDURE — 76377 3D RENDER W/INTRP POSTPROCES: CPT | Mod: 26,,, | Performed by: NEUROLOGICAL SURGERY

## 2021-09-30 PROCEDURE — 99204 OFFICE O/P NEW MOD 45 MIN: CPT | Mod: 25,,, | Performed by: NEUROLOGICAL SURGERY

## 2021-09-30 PROCEDURE — 25000003 PHARM REV CODE 250: Performed by: PHYSICIAN ASSISTANT

## 2021-09-30 PROCEDURE — 99152 MOD SED SAME PHYS/QHP 5/>YRS: CPT | Performed by: NEUROLOGICAL SURGERY

## 2021-09-30 PROCEDURE — 36227 PLACE CATH XTRNL CAROTID: CPT | Mod: 50 | Performed by: NEUROLOGICAL SURGERY

## 2021-09-30 PROCEDURE — 36224 PLACE CATH CAROTD ART: CPT | Mod: 50 | Performed by: NEUROLOGICAL SURGERY

## 2021-09-30 PROCEDURE — 36226 PLACE CATH VERTEBRAL ART: CPT | Mod: 50 | Performed by: NEUROLOGICAL SURGERY

## 2021-09-30 PROCEDURE — 63600175 PHARM REV CODE 636 W HCPCS: Performed by: PHYSICIAN ASSISTANT

## 2021-09-30 PROCEDURE — 36224 PLACE CATH CAROTD ART: CPT | Mod: 50,,, | Performed by: NEUROLOGICAL SURGERY

## 2021-09-30 PROCEDURE — 99204 PR OFFICE/OUTPT VISIT, NEW, LEVL IV, 45-59 MIN: ICD-10-PCS | Mod: 25,,, | Performed by: NEUROLOGICAL SURGERY

## 2021-09-30 PROCEDURE — 99152 MOD SED SAME PHYS/QHP 5/>YRS: CPT | Mod: ,,, | Performed by: NEUROLOGICAL SURGERY

## 2021-09-30 PROCEDURE — 99152 PR MOD CONSCIOUS SEDATION, SAME PHYS, 5+ YRS, FIRST 15 MIN: ICD-10-PCS | Mod: ,,, | Performed by: NEUROLOGICAL SURGERY

## 2021-09-30 PROCEDURE — 25500020 PHARM REV CODE 255: Performed by: NEUROLOGICAL SURGERY

## 2021-09-30 RX ORDER — FENTANYL CITRATE 50 UG/ML
INJECTION, SOLUTION INTRAMUSCULAR; INTRAVENOUS CODE/TRAUMA/SEDATION MEDICATION
Status: COMPLETED | OUTPATIENT
Start: 2021-09-30 | End: 2021-09-30

## 2021-09-30 RX ORDER — LIDOCAINE AND PRILOCAINE 25; 25 MG/G; MG/G
CREAM TOPICAL ONCE
Status: COMPLETED | OUTPATIENT
Start: 2021-09-30 | End: 2021-09-30

## 2021-09-30 RX ORDER — HEPARIN SODIUM 1000 [USP'U]/ML
INJECTION, SOLUTION INTRAVENOUS; SUBCUTANEOUS CODE/TRAUMA/SEDATION MEDICATION
Status: COMPLETED | OUTPATIENT
Start: 2021-09-30 | End: 2021-09-30

## 2021-09-30 RX ORDER — SODIUM CHLORIDE 0.9 % (FLUSH) 0.9 %
10 SYRINGE (ML) INJECTION
Status: DISCONTINUED | OUTPATIENT
Start: 2021-09-30 | End: 2021-10-01 | Stop reason: HOSPADM

## 2021-09-30 RX ORDER — SODIUM CHLORIDE 9 MG/ML
INJECTION, SOLUTION INTRAVENOUS CONTINUOUS
Status: DISCONTINUED | OUTPATIENT
Start: 2021-09-30 | End: 2021-10-01 | Stop reason: HOSPADM

## 2021-09-30 RX ORDER — MIDAZOLAM HYDROCHLORIDE 1 MG/ML
1 INJECTION INTRAMUSCULAR; INTRAVENOUS
Status: DISCONTINUED | OUTPATIENT
Start: 2021-09-30 | End: 2021-10-01 | Stop reason: HOSPADM

## 2021-09-30 RX ORDER — MIDAZOLAM HYDROCHLORIDE 1 MG/ML
INJECTION INTRAMUSCULAR; INTRAVENOUS CODE/TRAUMA/SEDATION MEDICATION
Status: COMPLETED | OUTPATIENT
Start: 2021-09-30 | End: 2021-09-30

## 2021-09-30 RX ORDER — IODIXANOL 320 MG/ML
200 INJECTION, SOLUTION INTRAVASCULAR
Status: COMPLETED | OUTPATIENT
Start: 2021-09-30 | End: 2021-09-30

## 2021-09-30 RX ORDER — FENTANYL CITRATE 50 UG/ML
50 INJECTION, SOLUTION INTRAMUSCULAR; INTRAVENOUS
Status: DISCONTINUED | OUTPATIENT
Start: 2021-09-30 | End: 2021-10-01 | Stop reason: HOSPADM

## 2021-09-30 RX ORDER — LIDOCAINE HYDROCHLORIDE 10 MG/ML
INJECTION INFILTRATION; PERINEURAL CODE/TRAUMA/SEDATION MEDICATION
Status: COMPLETED | OUTPATIENT
Start: 2021-09-30 | End: 2021-09-30

## 2021-09-30 RX ORDER — VERAPAMIL HYDROCHLORIDE 2.5 MG/ML
INJECTION, SOLUTION INTRAVENOUS CODE/TRAUMA/SEDATION MEDICATION
Status: COMPLETED | OUTPATIENT
Start: 2021-09-30 | End: 2021-09-30

## 2021-09-30 RX ADMIN — MIDAZOLAM HYDROCHLORIDE 0.5 MG: 1 INJECTION INTRAMUSCULAR; INTRAVENOUS at 09:09

## 2021-09-30 RX ADMIN — FENTANYL CITRATE 25 MCG: 50 INJECTION, SOLUTION INTRAMUSCULAR; INTRAVENOUS at 10:09

## 2021-09-30 RX ADMIN — SODIUM CHLORIDE: 0.9 INJECTION, SOLUTION INTRAVENOUS at 07:09

## 2021-09-30 RX ADMIN — FENTANYL CITRATE 25 MCG: 50 INJECTION, SOLUTION INTRAMUSCULAR; INTRAVENOUS at 09:09

## 2021-09-30 RX ADMIN — IODIXANOL 110 ML: 320 INJECTION, SOLUTION INTRAVASCULAR at 10:09

## 2021-09-30 RX ADMIN — LIDOCAINE AND PRILOCAINE: 25; 25 CREAM TOPICAL at 07:09

## 2021-09-30 RX ADMIN — FENTANYL CITRATE 25 MCG: 50 INJECTION, SOLUTION INTRAMUSCULAR; INTRAVENOUS at 08:09

## 2021-09-30 RX ADMIN — MIDAZOLAM HYDROCHLORIDE 0.5 MG: 1 INJECTION INTRAMUSCULAR; INTRAVENOUS at 08:09

## 2021-09-30 RX ADMIN — VERAPAMIL HYDROCHLORIDE 10 MG: 2.5 INJECTION, SOLUTION INTRAVENOUS at 09:09

## 2021-09-30 RX ADMIN — MIDAZOLAM HYDROCHLORIDE 0.5 MG: 1 INJECTION INTRAMUSCULAR; INTRAVENOUS at 10:09

## 2021-09-30 RX ADMIN — MIDAZOLAM HYDROCHLORIDE 1 MG: 1 INJECTION INTRAMUSCULAR; INTRAVENOUS at 08:09

## 2021-09-30 RX ADMIN — LIDOCAINE HYDROCHLORIDE 3 ML: 10 INJECTION, SOLUTION INFILTRATION; PERINEURAL at 09:09

## 2021-09-30 RX ADMIN — HEPARIN SODIUM 1000 UNITS: 1000 INJECTION, SOLUTION INTRAVENOUS; SUBCUTANEOUS at 09:09

## 2021-09-30 RX ADMIN — FENTANYL CITRATE 50 MCG: 50 INJECTION, SOLUTION INTRAMUSCULAR; INTRAVENOUS at 08:09

## 2021-10-02 ENCOUNTER — NURSE TRIAGE (OUTPATIENT)
Dept: ADMINISTRATIVE | Facility: CLINIC | Age: 56
End: 2021-10-02

## 2021-10-04 ENCOUNTER — PATIENT MESSAGE (OUTPATIENT)
Dept: NEUROSURGERY | Facility: CLINIC | Age: 56
End: 2021-10-04

## 2021-10-05 ENCOUNTER — TELEPHONE (OUTPATIENT)
Dept: NEUROSURGERY | Facility: CLINIC | Age: 56
End: 2021-10-05

## 2021-10-05 ENCOUNTER — PATIENT MESSAGE (OUTPATIENT)
Dept: NEUROSURGERY | Facility: CLINIC | Age: 56
End: 2021-10-05

## 2021-10-07 ENCOUNTER — PATIENT MESSAGE (OUTPATIENT)
Dept: NEUROLOGY | Facility: CLINIC | Age: 56
End: 2021-10-07

## 2021-10-07 ENCOUNTER — PATIENT MESSAGE (OUTPATIENT)
Dept: FAMILY MEDICINE | Facility: CLINIC | Age: 56
End: 2021-10-07

## 2021-10-14 ENCOUNTER — TELEPHONE (OUTPATIENT)
Dept: NEUROSURGERY | Facility: CLINIC | Age: 56
End: 2021-10-14

## 2021-10-16 ENCOUNTER — IMMUNIZATION (OUTPATIENT)
Dept: FAMILY MEDICINE | Facility: CLINIC | Age: 56
End: 2021-10-16
Payer: COMMERCIAL

## 2021-10-16 DIAGNOSIS — Z23 NEED FOR VACCINATION: Primary | ICD-10-CM

## 2021-10-16 PROCEDURE — 91300 COVID-19, MRNA, LNP-S, PF, 30 MCG/0.3 ML DOSE VACCINE: CPT | Mod: PBBFAC | Performed by: RADIOLOGY

## 2021-10-16 PROCEDURE — 0003A COVID-19, MRNA, LNP-S, PF, 30 MCG/0.3 ML DOSE VACCINE: CPT | Mod: PBBFAC | Performed by: RADIOLOGY

## 2021-10-18 ENCOUNTER — TELEPHONE (OUTPATIENT)
Dept: NEUROSURGERY | Facility: CLINIC | Age: 56
End: 2021-10-18

## 2021-10-18 DIAGNOSIS — I67.1 CEREBRAL ANEURYSM: Primary | ICD-10-CM

## 2021-10-19 ENCOUNTER — OFFICE VISIT (OUTPATIENT)
Dept: NEUROLOGY | Facility: CLINIC | Age: 56
End: 2021-10-19
Payer: COMMERCIAL

## 2021-10-19 DIAGNOSIS — R41.3 MEMORY CHANGE: ICD-10-CM

## 2021-10-19 DIAGNOSIS — F34.1 DYSTHYMIA: ICD-10-CM

## 2021-10-19 DIAGNOSIS — F41.1 GENERALIZED ANXIETY DISORDER: Primary | ICD-10-CM

## 2021-10-19 PROCEDURE — 90791 PSYCH DIAGNOSTIC EVALUATION: CPT | Mod: 95,,, | Performed by: CLINICAL NEUROPSYCHOLOGIST

## 2021-10-19 PROCEDURE — 99499 NO LOS: ICD-10-PCS | Mod: 95,,, | Performed by: CLINICAL NEUROPSYCHOLOGIST

## 2021-10-19 PROCEDURE — 90791 PR PSYCHIATRIC DIAGNOSTIC EVALUATION: ICD-10-PCS | Mod: 95,,, | Performed by: CLINICAL NEUROPSYCHOLOGIST

## 2021-10-19 PROCEDURE — 99499 UNLISTED E&M SERVICE: CPT | Mod: 95,,, | Performed by: CLINICAL NEUROPSYCHOLOGIST

## 2021-10-22 ENCOUNTER — LAB VISIT (OUTPATIENT)
Dept: LAB | Facility: HOSPITAL | Age: 56
End: 2021-10-22
Attending: FAMILY MEDICINE
Payer: COMMERCIAL

## 2021-10-22 DIAGNOSIS — E11.9 TYPE 2 DIABETES MELLITUS WITHOUT COMPLICATION: ICD-10-CM

## 2021-10-22 LAB
ESTIMATED AVG GLUCOSE: 108 MG/DL (ref 68–131)
HBA1C MFR BLD: 5.4 % (ref 4–5.6)

## 2021-10-22 PROCEDURE — 83036 HEMOGLOBIN GLYCOSYLATED A1C: CPT | Performed by: FAMILY MEDICINE

## 2021-10-22 PROCEDURE — 36415 COLL VENOUS BLD VENIPUNCTURE: CPT | Mod: PO | Performed by: FAMILY MEDICINE

## 2021-10-25 ENCOUNTER — OFFICE VISIT (OUTPATIENT)
Dept: NEUROSURGERY | Facility: CLINIC | Age: 56
End: 2021-10-25
Payer: COMMERCIAL

## 2021-10-25 VITALS
HEIGHT: 70 IN | DIASTOLIC BLOOD PRESSURE: 78 MMHG | BODY MASS INDEX: 35.44 KG/M2 | HEART RATE: 71 BPM | SYSTOLIC BLOOD PRESSURE: 134 MMHG

## 2021-10-25 DIAGNOSIS — I67.1 CEREBRAL ANEURYSM: Primary | ICD-10-CM

## 2021-10-25 PROCEDURE — 3075F PR MOST RECENT SYSTOLIC BLOOD PRESS GE 130-139MM HG: ICD-10-PCS | Mod: CPTII,S$GLB,, | Performed by: NEUROLOGICAL SURGERY

## 2021-10-25 PROCEDURE — 3078F DIAST BP <80 MM HG: CPT | Mod: CPTII,S$GLB,, | Performed by: NEUROLOGICAL SURGERY

## 2021-10-25 PROCEDURE — 1160F RVW MEDS BY RX/DR IN RCRD: CPT | Mod: CPTII,S$GLB,, | Performed by: NEUROLOGICAL SURGERY

## 2021-10-25 PROCEDURE — 3008F PR BODY MASS INDEX (BMI) DOCUMENTED: ICD-10-PCS | Mod: CPTII,S$GLB,, | Performed by: NEUROLOGICAL SURGERY

## 2021-10-25 PROCEDURE — 3008F BODY MASS INDEX DOCD: CPT | Mod: CPTII,S$GLB,, | Performed by: NEUROLOGICAL SURGERY

## 2021-10-25 PROCEDURE — 99999 PR PBB SHADOW E&M-EST. PATIENT-LVL III: ICD-10-PCS | Mod: PBBFAC,,, | Performed by: NEUROLOGICAL SURGERY

## 2021-10-25 PROCEDURE — 3075F SYST BP GE 130 - 139MM HG: CPT | Mod: CPTII,S$GLB,, | Performed by: NEUROLOGICAL SURGERY

## 2021-10-25 PROCEDURE — 99215 OFFICE O/P EST HI 40 MIN: CPT | Mod: S$GLB,,, | Performed by: NEUROLOGICAL SURGERY

## 2021-10-25 PROCEDURE — 1160F PR REVIEW ALL MEDS BY PRESCRIBER/CLIN PHARMACIST DOCUMENTED: ICD-10-PCS | Mod: CPTII,S$GLB,, | Performed by: NEUROLOGICAL SURGERY

## 2021-10-25 PROCEDURE — 1159F PR MEDICATION LIST DOCUMENTED IN MEDICAL RECORD: ICD-10-PCS | Mod: CPTII,S$GLB,, | Performed by: NEUROLOGICAL SURGERY

## 2021-10-25 PROCEDURE — 99999 PR PBB SHADOW E&M-EST. PATIENT-LVL III: CPT | Mod: PBBFAC,,, | Performed by: NEUROLOGICAL SURGERY

## 2021-10-25 PROCEDURE — 3078F PR MOST RECENT DIASTOLIC BLOOD PRESSURE < 80 MM HG: ICD-10-PCS | Mod: CPTII,S$GLB,, | Performed by: NEUROLOGICAL SURGERY

## 2021-10-25 PROCEDURE — 3044F HG A1C LEVEL LT 7.0%: CPT | Mod: CPTII,S$GLB,, | Performed by: NEUROLOGICAL SURGERY

## 2021-10-25 PROCEDURE — 1159F MED LIST DOCD IN RCRD: CPT | Mod: CPTII,S$GLB,, | Performed by: NEUROLOGICAL SURGERY

## 2021-10-25 PROCEDURE — 99215 PR OFFICE/OUTPT VISIT, EST, LEVL V, 40-54 MIN: ICD-10-PCS | Mod: S$GLB,,, | Performed by: NEUROLOGICAL SURGERY

## 2021-10-25 PROCEDURE — 3044F PR MOST RECENT HEMOGLOBIN A1C LEVEL <7.0%: ICD-10-PCS | Mod: CPTII,S$GLB,, | Performed by: NEUROLOGICAL SURGERY

## 2021-10-27 ENCOUNTER — TELEPHONE (OUTPATIENT)
Dept: CARDIOLOGY | Facility: CLINIC | Age: 56
End: 2021-10-27
Payer: COMMERCIAL

## 2021-10-27 ENCOUNTER — HOSPITAL ENCOUNTER (OUTPATIENT)
Dept: CARDIOLOGY | Facility: HOSPITAL | Age: 56
Discharge: HOME | End: 2021-10-27
Attending: INTERNAL MEDICINE
Payer: COMMERCIAL

## 2021-10-27 VITALS
DIASTOLIC BLOOD PRESSURE: 78 MMHG | SYSTOLIC BLOOD PRESSURE: 134 MMHG | HEIGHT: 70 IN | HEART RATE: 70 BPM | WEIGHT: 247 LBS | BODY MASS INDEX: 35.36 KG/M2

## 2021-10-27 DIAGNOSIS — I51.89 GRADE II DIASTOLIC DYSFUNCTION: ICD-10-CM

## 2021-10-27 LAB
ASCENDING AORTA: 3.36 CM
AV INDEX (PROSTH): 0.83
AV MEAN GRADIENT: 4 MMHG
AV PEAK GRADIENT: 7 MMHG
AV VALVE AREA: 3.56 CM2
AV VELOCITY RATIO: 0.87
BSA FOR ECHO PROCEDURE: 2.35 M2
CV ECHO LV RWT: 0.29 CM
DOP CALC AO PEAK VEL: 1.32 M/S
DOP CALC AO VTI: 28.51 CM
DOP CALC LVOT AREA: 4.3 CM2
DOP CALC LVOT DIAMETER: 2.33 CM
DOP CALC LVOT PEAK VEL: 1.15 M/S
DOP CALC LVOT STROKE VOLUME: 101.43 CM3
DOP CALCLVOT PEAK VEL VTI: 23.8 CM
E WAVE DECELERATION TIME: 256.64 MSEC
E/A RATIO: 0.99
E/E' RATIO: 6.64 M/S
ECHO LV POSTERIOR WALL: 0.83 CM (ref 0.6–1.1)
EJECTION FRACTION: 60 %
FRACTIONAL SHORTENING: 43 % (ref 28–44)
INTERVENTRICULAR SEPTUM: 0.85 CM (ref 0.6–1.1)
IVRT: 102.76 MSEC
LA MAJOR: 5.17 CM
LA MINOR: 5.12 CM
LA WIDTH: 3.83 CM
LEFT ATRIUM SIZE: 3.95 CM
LEFT ATRIUM VOLUME INDEX MOD: 16.7 ML/M2
LEFT ATRIUM VOLUME INDEX: 29 ML/M2
LEFT ATRIUM VOLUME MOD: 37.97 CM3
LEFT ATRIUM VOLUME: 66.16 CM3
LEFT INTERNAL DIMENSION IN SYSTOLE: 3.21 CM (ref 2.1–4)
LEFT VENTRICLE DIASTOLIC VOLUME INDEX: 68.51 ML/M2
LEFT VENTRICLE DIASTOLIC VOLUME: 156.21 ML
LEFT VENTRICLE MASS INDEX: 78 G/M2
LEFT VENTRICLE SYSTOLIC VOLUME INDEX: 18.1 ML/M2
LEFT VENTRICLE SYSTOLIC VOLUME: 41.36 ML
LEFT VENTRICULAR INTERNAL DIMENSION IN DIASTOLE: 5.64 CM (ref 3.5–6)
LEFT VENTRICULAR MASS: 177.66 G
LV LATERAL E/E' RATIO: 6.08 M/S
LV SEPTAL E/E' RATIO: 7.3 M/S
MV A" WAVE DURATION": 11.99 MSEC
MV PEAK A VEL: 0.74 M/S
MV PEAK E VEL: 0.73 M/S
MV STENOSIS PRESSURE HALF TIME: 74.42 MS
MV VALVE AREA P 1/2 METHOD: 2.96 CM2
PISA TR MAX VEL: 2.18 M/S
PULM VEIN S/D RATIO: 1.23
PV PEAK D VEL: 0.44 M/S
PV PEAK S VEL: 0.54 M/S
RA MAJOR: 5.45 CM
RA PRESSURE: 3 MMHG
RA WIDTH: 3.79 CM
RIGHT VENTRICULAR END-DIASTOLIC DIMENSION: 3.75 CM
RV TISSUE DOPPLER FREE WALL SYSTOLIC VELOCITY 1 (APICAL 4 CHAMBER VIEW): 13.28 CM/S
SINUS: 3.31 CM
STJ: 3.22 CM
TDI LATERAL: 0.12 M/S
TDI SEPTAL: 0.1 M/S
TDI: 0.11 M/S
TR MAX PG: 19 MMHG
TRICUSPID ANNULAR PLANE SYSTOLIC EXCURSION: 1.98 CM
TV REST PULMONARY ARTERY PRESSURE: 22 MMHG

## 2021-10-27 PROCEDURE — 93306 TTE W/DOPPLER COMPLETE: CPT | Mod: 26,,, | Performed by: INTERNAL MEDICINE

## 2021-10-27 PROCEDURE — 93306 TTE W/DOPPLER COMPLETE: CPT

## 2021-10-27 PROCEDURE — 93306 ECHO (CUPID ONLY): ICD-10-PCS | Mod: 26,,, | Performed by: INTERNAL MEDICINE

## 2021-10-29 PROBLEM — F34.1 DYSTHYMIA: Status: ACTIVE | Noted: 2021-10-29

## 2021-10-29 PROBLEM — F41.1 GENERALIZED ANXIETY DISORDER: Status: ACTIVE | Noted: 2021-01-17

## 2021-11-01 ENCOUNTER — OFFICE VISIT (OUTPATIENT)
Dept: FAMILY MEDICINE | Facility: CLINIC | Age: 56
End: 2021-11-01
Payer: COMMERCIAL

## 2021-11-01 ENCOUNTER — LAB VISIT (OUTPATIENT)
Dept: LAB | Facility: HOSPITAL | Age: 56
End: 2021-11-01
Attending: FAMILY MEDICINE
Payer: COMMERCIAL

## 2021-11-01 ENCOUNTER — PATIENT MESSAGE (OUTPATIENT)
Dept: FAMILY MEDICINE | Facility: CLINIC | Age: 56
End: 2021-11-01

## 2021-11-01 VITALS
HEART RATE: 70 BPM | BODY MASS INDEX: 37.18 KG/M2 | OXYGEN SATURATION: 99 % | DIASTOLIC BLOOD PRESSURE: 86 MMHG | HEIGHT: 70 IN | SYSTOLIC BLOOD PRESSURE: 138 MMHG | WEIGHT: 259.69 LBS

## 2021-11-01 DIAGNOSIS — E66.9 OBESITY (BMI 30-39.9): ICD-10-CM

## 2021-11-01 DIAGNOSIS — Z80.0 FAMILY HISTORY OF COLON CANCER: ICD-10-CM

## 2021-11-01 DIAGNOSIS — E11.9 TYPE 2 DIABETES MELLITUS WITHOUT COMPLICATION, WITHOUT LONG-TERM CURRENT USE OF INSULIN: ICD-10-CM

## 2021-11-01 DIAGNOSIS — F41.1 GENERALIZED ANXIETY DISORDER: ICD-10-CM

## 2021-11-01 DIAGNOSIS — F33.0 MILD EPISODE OF RECURRENT MAJOR DEPRESSIVE DISORDER: Primary | ICD-10-CM

## 2021-11-01 DIAGNOSIS — Z12.11 COLON CANCER SCREENING: ICD-10-CM

## 2021-11-01 DIAGNOSIS — I10 ESSENTIAL (PRIMARY) HYPERTENSION: ICD-10-CM

## 2021-11-01 LAB
ALBUMIN/CREAT UR: NORMAL UG/MG (ref 0–30)
CREAT UR-MCNC: 46 MG/DL (ref 23–375)
MICROALBUMIN UR DL<=1MG/L-MCNC: <5 UG/ML

## 2021-11-01 PROCEDURE — 1160F PR REVIEW ALL MEDS BY PRESCRIBER/CLIN PHARMACIST DOCUMENTED: ICD-10-PCS | Mod: CPTII,S$GLB,, | Performed by: FAMILY MEDICINE

## 2021-11-01 PROCEDURE — 3044F PR MOST RECENT HEMOGLOBIN A1C LEVEL <7.0%: ICD-10-PCS | Mod: CPTII,S$GLB,, | Performed by: FAMILY MEDICINE

## 2021-11-01 PROCEDURE — 1159F MED LIST DOCD IN RCRD: CPT | Mod: CPTII,S$GLB,, | Performed by: FAMILY MEDICINE

## 2021-11-01 PROCEDURE — 1159F PR MEDICATION LIST DOCUMENTED IN MEDICAL RECORD: ICD-10-PCS | Mod: CPTII,S$GLB,, | Performed by: FAMILY MEDICINE

## 2021-11-01 PROCEDURE — 3079F PR MOST RECENT DIASTOLIC BLOOD PRESSURE 80-89 MM HG: ICD-10-PCS | Mod: CPTII,S$GLB,, | Performed by: FAMILY MEDICINE

## 2021-11-01 PROCEDURE — 3008F BODY MASS INDEX DOCD: CPT | Mod: CPTII,S$GLB,, | Performed by: FAMILY MEDICINE

## 2021-11-01 PROCEDURE — 99214 OFFICE O/P EST MOD 30 MIN: CPT | Mod: S$GLB,,, | Performed by: FAMILY MEDICINE

## 2021-11-01 PROCEDURE — 3075F PR MOST RECENT SYSTOLIC BLOOD PRESS GE 130-139MM HG: ICD-10-PCS | Mod: CPTII,S$GLB,, | Performed by: FAMILY MEDICINE

## 2021-11-01 PROCEDURE — 3008F PR BODY MASS INDEX (BMI) DOCUMENTED: ICD-10-PCS | Mod: CPTII,S$GLB,, | Performed by: FAMILY MEDICINE

## 2021-11-01 PROCEDURE — 3075F SYST BP GE 130 - 139MM HG: CPT | Mod: CPTII,S$GLB,, | Performed by: FAMILY MEDICINE

## 2021-11-01 PROCEDURE — 99214 PR OFFICE/OUTPT VISIT, EST, LEVL IV, 30-39 MIN: ICD-10-PCS | Mod: S$GLB,,, | Performed by: FAMILY MEDICINE

## 2021-11-01 PROCEDURE — 3079F DIAST BP 80-89 MM HG: CPT | Mod: CPTII,S$GLB,, | Performed by: FAMILY MEDICINE

## 2021-11-01 PROCEDURE — 82570 ASSAY OF URINE CREATININE: CPT | Performed by: FAMILY MEDICINE

## 2021-11-01 PROCEDURE — 3044F HG A1C LEVEL LT 7.0%: CPT | Mod: CPTII,S$GLB,, | Performed by: FAMILY MEDICINE

## 2021-11-01 PROCEDURE — 99999 PR PBB SHADOW E&M-EST. PATIENT-LVL IV: CPT | Mod: PBBFAC,,, | Performed by: FAMILY MEDICINE

## 2021-11-01 PROCEDURE — 1160F RVW MEDS BY RX/DR IN RCRD: CPT | Mod: CPTII,S$GLB,, | Performed by: FAMILY MEDICINE

## 2021-11-01 PROCEDURE — 99999 PR PBB SHADOW E&M-EST. PATIENT-LVL IV: ICD-10-PCS | Mod: PBBFAC,,, | Performed by: FAMILY MEDICINE

## 2021-11-01 RX ORDER — METOPROLOL SUCCINATE 50 MG/1
TABLET, EXTENDED RELEASE ORAL
COMMUNITY
Start: 2021-10-08 | End: 2021-11-01 | Stop reason: SDUPTHER

## 2021-11-01 RX ORDER — METOPROLOL SUCCINATE 50 MG/1
50 TABLET, EXTENDED RELEASE ORAL DAILY
Qty: 90 TABLET | Refills: 3 | Status: SHIPPED | OUTPATIENT
Start: 2021-11-01 | End: 2022-07-11 | Stop reason: SDUPTHER

## 2021-11-03 ENCOUNTER — OFFICE VISIT (OUTPATIENT)
Dept: FAMILY MEDICINE | Facility: CLINIC | Age: 56
End: 2021-11-03
Payer: COMMERCIAL

## 2021-11-03 ENCOUNTER — NURSE TRIAGE (OUTPATIENT)
Dept: ADMINISTRATIVE | Facility: CLINIC | Age: 56
End: 2021-11-03
Payer: COMMERCIAL

## 2021-11-03 VITALS
BODY MASS INDEX: 36.88 KG/M2 | HEART RATE: 60 BPM | DIASTOLIC BLOOD PRESSURE: 84 MMHG | OXYGEN SATURATION: 98 % | WEIGHT: 257.06 LBS | SYSTOLIC BLOOD PRESSURE: 132 MMHG

## 2021-11-03 DIAGNOSIS — R22.0 FACIAL SWELLING: Primary | ICD-10-CM

## 2021-11-03 DIAGNOSIS — L03.90 CELLULITIS, UNSPECIFIED CELLULITIS SITE: ICD-10-CM

## 2021-11-03 PROCEDURE — 1159F PR MEDICATION LIST DOCUMENTED IN MEDICAL RECORD: ICD-10-PCS | Mod: CPTII,S$GLB,, | Performed by: NURSE PRACTITIONER

## 2021-11-03 PROCEDURE — 3061F NEG MICROALBUMINURIA REV: CPT | Mod: CPTII,S$GLB,, | Performed by: NURSE PRACTITIONER

## 2021-11-03 PROCEDURE — 3066F PR DOCUMENTATION OF TREATMENT FOR NEPHROPATHY: ICD-10-PCS | Mod: CPTII,S$GLB,, | Performed by: NURSE PRACTITIONER

## 2021-11-03 PROCEDURE — 1160F PR REVIEW ALL MEDS BY PRESCRIBER/CLIN PHARMACIST DOCUMENTED: ICD-10-PCS | Mod: CPTII,S$GLB,, | Performed by: NURSE PRACTITIONER

## 2021-11-03 PROCEDURE — 3079F DIAST BP 80-89 MM HG: CPT | Mod: CPTII,S$GLB,, | Performed by: NURSE PRACTITIONER

## 2021-11-03 PROCEDURE — 3075F PR MOST RECENT SYSTOLIC BLOOD PRESS GE 130-139MM HG: ICD-10-PCS | Mod: CPTII,S$GLB,, | Performed by: NURSE PRACTITIONER

## 2021-11-03 PROCEDURE — 3008F PR BODY MASS INDEX (BMI) DOCUMENTED: ICD-10-PCS | Mod: CPTII,S$GLB,, | Performed by: NURSE PRACTITIONER

## 2021-11-03 PROCEDURE — 99999 PR PBB SHADOW E&M-EST. PATIENT-LVL IV: CPT | Mod: PBBFAC,,, | Performed by: NURSE PRACTITIONER

## 2021-11-03 PROCEDURE — 99214 OFFICE O/P EST MOD 30 MIN: CPT | Mod: S$GLB,,, | Performed by: NURSE PRACTITIONER

## 2021-11-03 PROCEDURE — 3061F PR NEG MICROALBUMINURIA RESULT DOCUMENTED/REVIEW: ICD-10-PCS | Mod: CPTII,S$GLB,, | Performed by: NURSE PRACTITIONER

## 2021-11-03 PROCEDURE — 3075F SYST BP GE 130 - 139MM HG: CPT | Mod: CPTII,S$GLB,, | Performed by: NURSE PRACTITIONER

## 2021-11-03 PROCEDURE — 99999 PR PBB SHADOW E&M-EST. PATIENT-LVL IV: ICD-10-PCS | Mod: PBBFAC,,, | Performed by: NURSE PRACTITIONER

## 2021-11-03 PROCEDURE — 1159F MED LIST DOCD IN RCRD: CPT | Mod: CPTII,S$GLB,, | Performed by: NURSE PRACTITIONER

## 2021-11-03 PROCEDURE — 3008F BODY MASS INDEX DOCD: CPT | Mod: CPTII,S$GLB,, | Performed by: NURSE PRACTITIONER

## 2021-11-03 PROCEDURE — 3079F PR MOST RECENT DIASTOLIC BLOOD PRESSURE 80-89 MM HG: ICD-10-PCS | Mod: CPTII,S$GLB,, | Performed by: NURSE PRACTITIONER

## 2021-11-03 PROCEDURE — 3044F HG A1C LEVEL LT 7.0%: CPT | Mod: CPTII,S$GLB,, | Performed by: NURSE PRACTITIONER

## 2021-11-03 PROCEDURE — 99214 PR OFFICE/OUTPT VISIT, EST, LEVL IV, 30-39 MIN: ICD-10-PCS | Mod: S$GLB,,, | Performed by: NURSE PRACTITIONER

## 2021-11-03 PROCEDURE — 3066F NEPHROPATHY DOC TX: CPT | Mod: CPTII,S$GLB,, | Performed by: NURSE PRACTITIONER

## 2021-11-03 PROCEDURE — 3044F PR MOST RECENT HEMOGLOBIN A1C LEVEL <7.0%: ICD-10-PCS | Mod: CPTII,S$GLB,, | Performed by: NURSE PRACTITIONER

## 2021-11-03 PROCEDURE — 1160F RVW MEDS BY RX/DR IN RCRD: CPT | Mod: CPTII,S$GLB,, | Performed by: NURSE PRACTITIONER

## 2021-11-03 RX ORDER — DOXYCYCLINE 100 MG/1
100 CAPSULE ORAL 2 TIMES DAILY
Qty: 20 CAPSULE | Refills: 0 | Status: SHIPPED | OUTPATIENT
Start: 2021-11-03 | End: 2021-11-03

## 2021-11-03 RX ORDER — DOXYCYCLINE 100 MG/1
100 CAPSULE ORAL 2 TIMES DAILY
Qty: 20 CAPSULE | Refills: 0 | Status: SHIPPED | OUTPATIENT
Start: 2021-11-03 | End: 2021-11-19

## 2021-11-03 RX ORDER — MUPIROCIN 20 MG/G
OINTMENT TOPICAL 3 TIMES DAILY
Qty: 22 G | Refills: 0 | Status: SHIPPED | OUTPATIENT
Start: 2021-11-03 | End: 2021-11-03

## 2021-11-03 RX ORDER — MUPIROCIN 20 MG/G
OINTMENT TOPICAL 3 TIMES DAILY
Qty: 22 G | Refills: 0 | Status: SHIPPED | OUTPATIENT
Start: 2021-11-03 | End: 2022-06-08

## 2021-11-04 ENCOUNTER — PATIENT MESSAGE (OUTPATIENT)
Dept: FAMILY MEDICINE | Facility: CLINIC | Age: 56
End: 2021-11-04
Payer: COMMERCIAL

## 2021-11-04 ENCOUNTER — NURSE TRIAGE (OUTPATIENT)
Dept: ADMINISTRATIVE | Facility: CLINIC | Age: 56
End: 2021-11-04
Payer: COMMERCIAL

## 2021-11-05 ENCOUNTER — TELEPHONE (OUTPATIENT)
Dept: URGENT CARE | Facility: CLINIC | Age: 56
End: 2021-11-05
Payer: COMMERCIAL

## 2021-11-05 ENCOUNTER — OFFICE VISIT (OUTPATIENT)
Dept: URGENT CARE | Facility: CLINIC | Age: 56
End: 2021-11-05
Payer: COMMERCIAL

## 2021-11-05 VITALS
RESPIRATION RATE: 19 BRPM | BODY MASS INDEX: 35.79 KG/M2 | OXYGEN SATURATION: 97 % | DIASTOLIC BLOOD PRESSURE: 92 MMHG | WEIGHT: 250 LBS | SYSTOLIC BLOOD PRESSURE: 145 MMHG | HEIGHT: 70 IN | HEART RATE: 66 BPM | TEMPERATURE: 98 F

## 2021-11-05 DIAGNOSIS — R22.0 FACIAL SWELLING: Primary | ICD-10-CM

## 2021-11-05 PROCEDURE — 3008F BODY MASS INDEX DOCD: CPT | Mod: CPTII,S$GLB,, | Performed by: PHYSICIAN ASSISTANT

## 2021-11-05 PROCEDURE — 1160F RVW MEDS BY RX/DR IN RCRD: CPT | Mod: CPTII,S$GLB,, | Performed by: PHYSICIAN ASSISTANT

## 2021-11-05 PROCEDURE — 3066F PR DOCUMENTATION OF TREATMENT FOR NEPHROPATHY: ICD-10-PCS | Mod: CPTII,S$GLB,, | Performed by: PHYSICIAN ASSISTANT

## 2021-11-05 PROCEDURE — 1160F PR REVIEW ALL MEDS BY PRESCRIBER/CLIN PHARMACIST DOCUMENTED: ICD-10-PCS | Mod: CPTII,S$GLB,, | Performed by: PHYSICIAN ASSISTANT

## 2021-11-05 PROCEDURE — 99213 PR OFFICE/OUTPT VISIT, EST, LEVL III, 20-29 MIN: ICD-10-PCS | Mod: S$GLB,,, | Performed by: PHYSICIAN ASSISTANT

## 2021-11-05 PROCEDURE — 1159F PR MEDICATION LIST DOCUMENTED IN MEDICAL RECORD: ICD-10-PCS | Mod: CPTII,S$GLB,, | Performed by: PHYSICIAN ASSISTANT

## 2021-11-05 PROCEDURE — 3077F PR MOST RECENT SYSTOLIC BLOOD PRESSURE >= 140 MM HG: ICD-10-PCS | Mod: CPTII,S$GLB,, | Performed by: PHYSICIAN ASSISTANT

## 2021-11-05 PROCEDURE — 1159F MED LIST DOCD IN RCRD: CPT | Mod: CPTII,S$GLB,, | Performed by: PHYSICIAN ASSISTANT

## 2021-11-05 PROCEDURE — 3061F NEG MICROALBUMINURIA REV: CPT | Mod: CPTII,S$GLB,, | Performed by: PHYSICIAN ASSISTANT

## 2021-11-05 PROCEDURE — 3080F DIAST BP >= 90 MM HG: CPT | Mod: CPTII,S$GLB,, | Performed by: PHYSICIAN ASSISTANT

## 2021-11-05 PROCEDURE — 3044F PR MOST RECENT HEMOGLOBIN A1C LEVEL <7.0%: ICD-10-PCS | Mod: CPTII,S$GLB,, | Performed by: PHYSICIAN ASSISTANT

## 2021-11-05 PROCEDURE — 3080F PR MOST RECENT DIASTOLIC BLOOD PRESSURE >= 90 MM HG: ICD-10-PCS | Mod: CPTII,S$GLB,, | Performed by: PHYSICIAN ASSISTANT

## 2021-11-05 PROCEDURE — 3077F SYST BP >= 140 MM HG: CPT | Mod: CPTII,S$GLB,, | Performed by: PHYSICIAN ASSISTANT

## 2021-11-05 PROCEDURE — 99213 OFFICE O/P EST LOW 20 MIN: CPT | Mod: S$GLB,,, | Performed by: PHYSICIAN ASSISTANT

## 2021-11-05 PROCEDURE — 3066F NEPHROPATHY DOC TX: CPT | Mod: CPTII,S$GLB,, | Performed by: PHYSICIAN ASSISTANT

## 2021-11-05 PROCEDURE — 3061F PR NEG MICROALBUMINURIA RESULT DOCUMENTED/REVIEW: ICD-10-PCS | Mod: CPTII,S$GLB,, | Performed by: PHYSICIAN ASSISTANT

## 2021-11-05 PROCEDURE — 3008F PR BODY MASS INDEX (BMI) DOCUMENTED: ICD-10-PCS | Mod: CPTII,S$GLB,, | Performed by: PHYSICIAN ASSISTANT

## 2021-11-05 PROCEDURE — 3044F HG A1C LEVEL LT 7.0%: CPT | Mod: CPTII,S$GLB,, | Performed by: PHYSICIAN ASSISTANT

## 2021-11-08 ENCOUNTER — TELEPHONE (OUTPATIENT)
Dept: FAMILY MEDICINE | Facility: CLINIC | Age: 56
End: 2021-11-08
Payer: COMMERCIAL

## 2021-11-08 ENCOUNTER — TELEPHONE (OUTPATIENT)
Dept: GASTROENTEROLOGY | Facility: CLINIC | Age: 56
End: 2021-11-08
Payer: COMMERCIAL

## 2021-11-08 ENCOUNTER — OFFICE VISIT (OUTPATIENT)
Dept: NEUROLOGY | Facility: CLINIC | Age: 56
End: 2021-11-08
Payer: COMMERCIAL

## 2021-11-08 DIAGNOSIS — F41.9 ANXIETY DISORDER, UNSPECIFIED TYPE: ICD-10-CM

## 2021-11-08 DIAGNOSIS — R41.89 COGNITIVE CHANGES: Primary | ICD-10-CM

## 2021-11-08 DIAGNOSIS — F41.1 GENERALIZED ANXIETY DISORDER: ICD-10-CM

## 2021-11-08 DIAGNOSIS — F34.1 DYSTHYMIA: ICD-10-CM

## 2021-11-08 PROCEDURE — 96133 NRPSYC TST EVAL PHYS/QHP EA: CPT | Mod: S$GLB,,, | Performed by: CLINICAL NEUROPSYCHOLOGIST

## 2021-11-08 PROCEDURE — 96138 PSYCL/NRPSYC TECH 1ST: CPT | Mod: S$GLB,,, | Performed by: CLINICAL NEUROPSYCHOLOGIST

## 2021-11-08 PROCEDURE — 99499 UNLISTED E&M SERVICE: CPT | Mod: S$GLB,,, | Performed by: CLINICAL NEUROPSYCHOLOGIST

## 2021-11-08 PROCEDURE — 96132 NRPSYC TST EVAL PHYS/QHP 1ST: CPT | Mod: S$GLB,,, | Performed by: CLINICAL NEUROPSYCHOLOGIST

## 2021-11-08 PROCEDURE — 99999 PR PBB SHADOW E&M-EST. PATIENT-LVL I: CPT | Mod: PBBFAC,,,

## 2021-11-08 PROCEDURE — 96132 PR NEUROPSYCHOLOGIC TEST EVAL SVCS, 1ST HR: ICD-10-PCS | Mod: S$GLB,,, | Performed by: CLINICAL NEUROPSYCHOLOGIST

## 2021-11-08 PROCEDURE — 96139 PSYCL/NRPSYC TST TECH EA: CPT | Mod: S$GLB,,, | Performed by: CLINICAL NEUROPSYCHOLOGIST

## 2021-11-08 PROCEDURE — 99499 NO LOS: ICD-10-PCS | Mod: S$GLB,,, | Performed by: CLINICAL NEUROPSYCHOLOGIST

## 2021-11-08 PROCEDURE — 96138 PR PSYCH/NEUROPSYCH TEST ADMIN/SCORING, BY TECH, 2+ TESTS, 1ST 30 MIN: ICD-10-PCS | Mod: S$GLB,,, | Performed by: CLINICAL NEUROPSYCHOLOGIST

## 2021-11-08 PROCEDURE — 99999 PR PBB SHADOW E&M-EST. PATIENT-LVL I: ICD-10-PCS | Mod: PBBFAC,,,

## 2021-11-08 PROCEDURE — 96139 PR PSYCH/NEUROPSYCH TEST ADMIN/SCORING, BY TECH, 2+ TESTS, EA ADDTL 30 MIN: ICD-10-PCS | Mod: S$GLB,,, | Performed by: CLINICAL NEUROPSYCHOLOGIST

## 2021-11-08 PROCEDURE — 96133 PR NEUROPSYCHOLOGIC TEST EVAL SVCS, EA ADDTL HR: ICD-10-PCS | Mod: S$GLB,,, | Performed by: CLINICAL NEUROPSYCHOLOGIST

## 2021-11-08 PROCEDURE — 99211 OFF/OP EST MAY X REQ PHY/QHP: CPT | Mod: PBBFAC

## 2021-11-09 ENCOUNTER — OFFICE VISIT (OUTPATIENT)
Dept: FAMILY MEDICINE | Facility: CLINIC | Age: 56
End: 2021-11-09
Payer: COMMERCIAL

## 2021-11-09 VITALS
HEART RATE: 60 BPM | OXYGEN SATURATION: 97 % | WEIGHT: 257.25 LBS | DIASTOLIC BLOOD PRESSURE: 80 MMHG | HEIGHT: 70 IN | SYSTOLIC BLOOD PRESSURE: 120 MMHG | BODY MASS INDEX: 36.83 KG/M2

## 2021-11-09 DIAGNOSIS — R45.89 ANXIETY ABOUT HEALTH: ICD-10-CM

## 2021-11-09 DIAGNOSIS — L03.211 CELLULITIS OF FACE: Primary | ICD-10-CM

## 2021-11-09 DIAGNOSIS — L84 CORN OF FOOT: ICD-10-CM

## 2021-11-09 PROCEDURE — 3061F NEG MICROALBUMINURIA REV: CPT | Mod: CPTII,S$GLB,, | Performed by: FAMILY MEDICINE

## 2021-11-09 PROCEDURE — 3008F BODY MASS INDEX DOCD: CPT | Mod: CPTII,S$GLB,, | Performed by: FAMILY MEDICINE

## 2021-11-09 PROCEDURE — 3074F SYST BP LT 130 MM HG: CPT | Mod: CPTII,S$GLB,, | Performed by: FAMILY MEDICINE

## 2021-11-09 PROCEDURE — 3074F PR MOST RECENT SYSTOLIC BLOOD PRESSURE < 130 MM HG: ICD-10-PCS | Mod: CPTII,S$GLB,, | Performed by: FAMILY MEDICINE

## 2021-11-09 PROCEDURE — 3079F PR MOST RECENT DIASTOLIC BLOOD PRESSURE 80-89 MM HG: ICD-10-PCS | Mod: CPTII,S$GLB,, | Performed by: FAMILY MEDICINE

## 2021-11-09 PROCEDURE — 3044F HG A1C LEVEL LT 7.0%: CPT | Mod: CPTII,S$GLB,, | Performed by: FAMILY MEDICINE

## 2021-11-09 PROCEDURE — 99214 OFFICE O/P EST MOD 30 MIN: CPT | Mod: S$GLB,,, | Performed by: FAMILY MEDICINE

## 2021-11-09 PROCEDURE — 1160F PR REVIEW ALL MEDS BY PRESCRIBER/CLIN PHARMACIST DOCUMENTED: ICD-10-PCS | Mod: CPTII,S$GLB,, | Performed by: FAMILY MEDICINE

## 2021-11-09 PROCEDURE — 3008F PR BODY MASS INDEX (BMI) DOCUMENTED: ICD-10-PCS | Mod: CPTII,S$GLB,, | Performed by: FAMILY MEDICINE

## 2021-11-09 PROCEDURE — 3044F PR MOST RECENT HEMOGLOBIN A1C LEVEL <7.0%: ICD-10-PCS | Mod: CPTII,S$GLB,, | Performed by: FAMILY MEDICINE

## 2021-11-09 PROCEDURE — 3066F NEPHROPATHY DOC TX: CPT | Mod: CPTII,S$GLB,, | Performed by: FAMILY MEDICINE

## 2021-11-09 PROCEDURE — 1159F MED LIST DOCD IN RCRD: CPT | Mod: CPTII,S$GLB,, | Performed by: FAMILY MEDICINE

## 2021-11-09 PROCEDURE — 3061F PR NEG MICROALBUMINURIA RESULT DOCUMENTED/REVIEW: ICD-10-PCS | Mod: CPTII,S$GLB,, | Performed by: FAMILY MEDICINE

## 2021-11-09 PROCEDURE — 3066F PR DOCUMENTATION OF TREATMENT FOR NEPHROPATHY: ICD-10-PCS | Mod: CPTII,S$GLB,, | Performed by: FAMILY MEDICINE

## 2021-11-09 PROCEDURE — 3079F DIAST BP 80-89 MM HG: CPT | Mod: CPTII,S$GLB,, | Performed by: FAMILY MEDICINE

## 2021-11-09 PROCEDURE — 99999 PR PBB SHADOW E&M-EST. PATIENT-LVL III: ICD-10-PCS | Mod: PBBFAC,,, | Performed by: FAMILY MEDICINE

## 2021-11-09 PROCEDURE — 1160F RVW MEDS BY RX/DR IN RCRD: CPT | Mod: CPTII,S$GLB,, | Performed by: FAMILY MEDICINE

## 2021-11-09 PROCEDURE — 99999 PR PBB SHADOW E&M-EST. PATIENT-LVL III: CPT | Mod: PBBFAC,,, | Performed by: FAMILY MEDICINE

## 2021-11-09 PROCEDURE — 1159F PR MEDICATION LIST DOCUMENTED IN MEDICAL RECORD: ICD-10-PCS | Mod: CPTII,S$GLB,, | Performed by: FAMILY MEDICINE

## 2021-11-09 PROCEDURE — 99214 PR OFFICE/OUTPT VISIT, EST, LEVL IV, 30-39 MIN: ICD-10-PCS | Mod: S$GLB,,, | Performed by: FAMILY MEDICINE

## 2021-11-10 ENCOUNTER — OFFICE VISIT (OUTPATIENT)
Dept: URGENT CARE | Facility: CLINIC | Age: 56
End: 2021-11-10
Payer: COMMERCIAL

## 2021-11-10 VITALS
WEIGHT: 257 LBS | SYSTOLIC BLOOD PRESSURE: 144 MMHG | RESPIRATION RATE: 16 BRPM | BODY MASS INDEX: 36.79 KG/M2 | OXYGEN SATURATION: 98 % | TEMPERATURE: 98 F | DIASTOLIC BLOOD PRESSURE: 85 MMHG | HEART RATE: 69 BPM | HEIGHT: 70 IN

## 2021-11-10 DIAGNOSIS — M79.604 RIGHT LEG PAIN: ICD-10-CM

## 2021-11-10 DIAGNOSIS — E11.9 TYPE 2 DIABETES MELLITUS WITHOUT COMPLICATION: ICD-10-CM

## 2021-11-10 PROCEDURE — 1160F RVW MEDS BY RX/DR IN RCRD: CPT | Mod: CPTII,S$GLB,, | Performed by: INTERNAL MEDICINE

## 2021-11-10 PROCEDURE — 99499 NO LOS: ICD-10-PCS | Mod: S$GLB,,, | Performed by: INTERNAL MEDICINE

## 2021-11-10 PROCEDURE — 3061F NEG MICROALBUMINURIA REV: CPT | Mod: CPTII,S$GLB,, | Performed by: INTERNAL MEDICINE

## 2021-11-10 PROCEDURE — 3079F PR MOST RECENT DIASTOLIC BLOOD PRESSURE 80-89 MM HG: ICD-10-PCS | Mod: CPTII,S$GLB,, | Performed by: INTERNAL MEDICINE

## 2021-11-10 PROCEDURE — 3008F PR BODY MASS INDEX (BMI) DOCUMENTED: ICD-10-PCS | Mod: CPTII,S$GLB,, | Performed by: INTERNAL MEDICINE

## 2021-11-10 PROCEDURE — 99499 UNLISTED E&M SERVICE: CPT | Mod: S$GLB,,, | Performed by: INTERNAL MEDICINE

## 2021-11-10 PROCEDURE — 1159F MED LIST DOCD IN RCRD: CPT | Mod: CPTII,S$GLB,, | Performed by: INTERNAL MEDICINE

## 2021-11-10 PROCEDURE — 3066F PR DOCUMENTATION OF TREATMENT FOR NEPHROPATHY: ICD-10-PCS | Mod: CPTII,S$GLB,, | Performed by: INTERNAL MEDICINE

## 2021-11-10 PROCEDURE — 3077F PR MOST RECENT SYSTOLIC BLOOD PRESSURE >= 140 MM HG: ICD-10-PCS | Mod: CPTII,S$GLB,, | Performed by: INTERNAL MEDICINE

## 2021-11-10 PROCEDURE — 3061F PR NEG MICROALBUMINURIA RESULT DOCUMENTED/REVIEW: ICD-10-PCS | Mod: CPTII,S$GLB,, | Performed by: INTERNAL MEDICINE

## 2021-11-10 PROCEDURE — 1160F PR REVIEW ALL MEDS BY PRESCRIBER/CLIN PHARMACIST DOCUMENTED: ICD-10-PCS | Mod: CPTII,S$GLB,, | Performed by: INTERNAL MEDICINE

## 2021-11-10 PROCEDURE — 3044F PR MOST RECENT HEMOGLOBIN A1C LEVEL <7.0%: ICD-10-PCS | Mod: CPTII,S$GLB,, | Performed by: INTERNAL MEDICINE

## 2021-11-10 PROCEDURE — 3066F NEPHROPATHY DOC TX: CPT | Mod: CPTII,S$GLB,, | Performed by: INTERNAL MEDICINE

## 2021-11-10 PROCEDURE — 1159F PR MEDICATION LIST DOCUMENTED IN MEDICAL RECORD: ICD-10-PCS | Mod: CPTII,S$GLB,, | Performed by: INTERNAL MEDICINE

## 2021-11-10 PROCEDURE — 3077F SYST BP >= 140 MM HG: CPT | Mod: CPTII,S$GLB,, | Performed by: INTERNAL MEDICINE

## 2021-11-10 PROCEDURE — 3079F DIAST BP 80-89 MM HG: CPT | Mod: CPTII,S$GLB,, | Performed by: INTERNAL MEDICINE

## 2021-11-10 PROCEDURE — 3044F HG A1C LEVEL LT 7.0%: CPT | Mod: CPTII,S$GLB,, | Performed by: INTERNAL MEDICINE

## 2021-11-10 PROCEDURE — 3008F BODY MASS INDEX DOCD: CPT | Mod: CPTII,S$GLB,, | Performed by: INTERNAL MEDICINE

## 2021-11-11 ENCOUNTER — PATIENT MESSAGE (OUTPATIENT)
Dept: FAMILY MEDICINE | Facility: CLINIC | Age: 56
End: 2021-11-11
Payer: COMMERCIAL

## 2021-11-12 ENCOUNTER — PATIENT MESSAGE (OUTPATIENT)
Dept: GASTROENTEROLOGY | Facility: CLINIC | Age: 56
End: 2021-11-12
Payer: COMMERCIAL

## 2021-11-12 ENCOUNTER — TELEPHONE (OUTPATIENT)
Dept: FAMILY MEDICINE | Facility: CLINIC | Age: 56
End: 2021-11-12
Payer: COMMERCIAL

## 2021-11-12 ENCOUNTER — OFFICE VISIT (OUTPATIENT)
Dept: FAMILY MEDICINE | Facility: CLINIC | Age: 56
End: 2021-11-12
Payer: COMMERCIAL

## 2021-11-12 VITALS
WEIGHT: 260.81 LBS | SYSTOLIC BLOOD PRESSURE: 137 MMHG | DIASTOLIC BLOOD PRESSURE: 89 MMHG | HEART RATE: 69 BPM | BODY MASS INDEX: 37.42 KG/M2 | OXYGEN SATURATION: 99 %

## 2021-11-12 DIAGNOSIS — R59.0 LOCALIZED ENLARGED LYMPH NODES: ICD-10-CM

## 2021-11-12 DIAGNOSIS — R10.31 RIGHT INGUINAL PAIN: Primary | ICD-10-CM

## 2021-11-12 LAB
BILIRUB SERPL-MCNC: NEGATIVE MG/DL
BLOOD URINE, POC: NEGATIVE
COLOR, POC UA: NORMAL
GLUCOSE UR QL STRIP: NORMAL
KETONES UR QL STRIP: NEGATIVE
LEUKOCYTE ESTERASE URINE, POC: NEGATIVE
NITRITE, POC UA: NEGATIVE
PH, POC UA: 7
PROTEIN, POC: NEGATIVE
SPECIFIC GRAVITY, POC UA: 1
UROBILINOGEN, POC UA: NORMAL

## 2021-11-12 PROCEDURE — 1160F PR REVIEW ALL MEDS BY PRESCRIBER/CLIN PHARMACIST DOCUMENTED: ICD-10-PCS | Mod: CPTII,S$GLB,, | Performed by: FAMILY MEDICINE

## 2021-11-12 PROCEDURE — 81001 POCT URINALYSIS, DIPSTICK OR TABLET REAGENT, AUTOMATED, WITH MICROSCOP: ICD-10-PCS | Mod: S$GLB,,, | Performed by: FAMILY MEDICINE

## 2021-11-12 PROCEDURE — 3061F PR NEG MICROALBUMINURIA RESULT DOCUMENTED/REVIEW: ICD-10-PCS | Mod: CPTII,S$GLB,, | Performed by: FAMILY MEDICINE

## 2021-11-12 PROCEDURE — 99999 PR PBB SHADOW E&M-EST. PATIENT-LVL IV: CPT | Mod: PBBFAC,,, | Performed by: FAMILY MEDICINE

## 2021-11-12 PROCEDURE — 99213 OFFICE O/P EST LOW 20 MIN: CPT | Mod: S$GLB,,, | Performed by: FAMILY MEDICINE

## 2021-11-12 PROCEDURE — 3008F BODY MASS INDEX DOCD: CPT | Mod: CPTII,S$GLB,, | Performed by: FAMILY MEDICINE

## 2021-11-12 PROCEDURE — 3008F PR BODY MASS INDEX (BMI) DOCUMENTED: ICD-10-PCS | Mod: CPTII,S$GLB,, | Performed by: FAMILY MEDICINE

## 2021-11-12 PROCEDURE — 3066F PR DOCUMENTATION OF TREATMENT FOR NEPHROPATHY: ICD-10-PCS | Mod: CPTII,S$GLB,, | Performed by: FAMILY MEDICINE

## 2021-11-12 PROCEDURE — 1160F RVW MEDS BY RX/DR IN RCRD: CPT | Mod: CPTII,S$GLB,, | Performed by: FAMILY MEDICINE

## 2021-11-12 PROCEDURE — 3075F PR MOST RECENT SYSTOLIC BLOOD PRESS GE 130-139MM HG: ICD-10-PCS | Mod: CPTII,S$GLB,, | Performed by: FAMILY MEDICINE

## 2021-11-12 PROCEDURE — 3044F HG A1C LEVEL LT 7.0%: CPT | Mod: CPTII,S$GLB,, | Performed by: FAMILY MEDICINE

## 2021-11-12 PROCEDURE — 3044F PR MOST RECENT HEMOGLOBIN A1C LEVEL <7.0%: ICD-10-PCS | Mod: CPTII,S$GLB,, | Performed by: FAMILY MEDICINE

## 2021-11-12 PROCEDURE — 99213 PR OFFICE/OUTPT VISIT, EST, LEVL III, 20-29 MIN: ICD-10-PCS | Mod: S$GLB,,, | Performed by: FAMILY MEDICINE

## 2021-11-12 PROCEDURE — 99999 PR PBB SHADOW E&M-EST. PATIENT-LVL IV: ICD-10-PCS | Mod: PBBFAC,,, | Performed by: FAMILY MEDICINE

## 2021-11-12 PROCEDURE — 81001 URINALYSIS AUTO W/SCOPE: CPT | Mod: S$GLB,,, | Performed by: FAMILY MEDICINE

## 2021-11-12 PROCEDURE — 1159F MED LIST DOCD IN RCRD: CPT | Mod: CPTII,S$GLB,, | Performed by: FAMILY MEDICINE

## 2021-11-12 PROCEDURE — 3061F NEG MICROALBUMINURIA REV: CPT | Mod: CPTII,S$GLB,, | Performed by: FAMILY MEDICINE

## 2021-11-12 PROCEDURE — 3075F SYST BP GE 130 - 139MM HG: CPT | Mod: CPTII,S$GLB,, | Performed by: FAMILY MEDICINE

## 2021-11-12 PROCEDURE — 3079F DIAST BP 80-89 MM HG: CPT | Mod: CPTII,S$GLB,, | Performed by: FAMILY MEDICINE

## 2021-11-12 PROCEDURE — 3079F PR MOST RECENT DIASTOLIC BLOOD PRESSURE 80-89 MM HG: ICD-10-PCS | Mod: CPTII,S$GLB,, | Performed by: FAMILY MEDICINE

## 2021-11-12 PROCEDURE — 3066F NEPHROPATHY DOC TX: CPT | Mod: CPTII,S$GLB,, | Performed by: FAMILY MEDICINE

## 2021-11-12 PROCEDURE — 1159F PR MEDICATION LIST DOCUMENTED IN MEDICAL RECORD: ICD-10-PCS | Mod: CPTII,S$GLB,, | Performed by: FAMILY MEDICINE

## 2021-11-15 PROBLEM — R41.89 COGNITIVE CHANGES: Status: ACTIVE | Noted: 2021-11-15

## 2021-11-17 ENCOUNTER — OFFICE VISIT (OUTPATIENT)
Dept: UROLOGY | Facility: CLINIC | Age: 56
End: 2021-11-17
Payer: COMMERCIAL

## 2021-11-17 ENCOUNTER — LAB VISIT (OUTPATIENT)
Dept: LAB | Facility: HOSPITAL | Age: 56
End: 2021-11-17
Attending: UROLOGY
Payer: COMMERCIAL

## 2021-11-17 ENCOUNTER — OFFICE VISIT (OUTPATIENT)
Dept: NEUROLOGY | Facility: CLINIC | Age: 56
End: 2021-11-17
Payer: COMMERCIAL

## 2021-11-17 VITALS
DIASTOLIC BLOOD PRESSURE: 81 MMHG | WEIGHT: 260.13 LBS | HEIGHT: 70 IN | BODY MASS INDEX: 37.24 KG/M2 | SYSTOLIC BLOOD PRESSURE: 129 MMHG | HEART RATE: 69 BPM

## 2021-11-17 DIAGNOSIS — Z12.5 SCREENING FOR PROSTATE CANCER: ICD-10-CM

## 2021-11-17 DIAGNOSIS — N13.30 BILATERAL HYDRONEPHROSIS: ICD-10-CM

## 2021-11-17 DIAGNOSIS — R41.89 COGNITIVE CHANGES: ICD-10-CM

## 2021-11-17 DIAGNOSIS — R10.9 ABDOMINAL PAIN, UNSPECIFIED ABDOMINAL LOCATION: Primary | ICD-10-CM

## 2021-11-17 DIAGNOSIS — F41.1 GENERALIZED ANXIETY DISORDER: Primary | ICD-10-CM

## 2021-11-17 LAB
BILIRUB SERPL-MCNC: NORMAL MG/DL
BLOOD URINE, POC: NORMAL
CLARITY, POC UA: CLEAR
COLOR, POC UA: YELLOW
COMPLEXED PSA SERPL-MCNC: 0.46 NG/ML (ref 0–4)
GLUCOSE UR QL STRIP: NORMAL
KETONES UR QL STRIP: NORMAL
LEUKOCYTE ESTERASE URINE, POC: NORMAL
NITRITE, POC UA: NORMAL
PH, POC UA: 6
POC RESIDUAL URINE VOLUME: 18 ML (ref 0–100)
PROTEIN, POC: NORMAL
SPECIFIC GRAVITY, POC UA: 1.01
UROBILINOGEN, POC UA: 0.2

## 2021-11-17 PROCEDURE — 99214 PR OFFICE/OUTPT VISIT, EST, LEVL IV, 30-39 MIN: ICD-10-PCS | Mod: 25,S$GLB,, | Performed by: UROLOGY

## 2021-11-17 PROCEDURE — 3008F PR BODY MASS INDEX (BMI) DOCUMENTED: ICD-10-PCS | Mod: CPTII,S$GLB,, | Performed by: UROLOGY

## 2021-11-17 PROCEDURE — 1159F PR MEDICATION LIST DOCUMENTED IN MEDICAL RECORD: ICD-10-PCS | Mod: CPTII,S$GLB,, | Performed by: UROLOGY

## 2021-11-17 PROCEDURE — 99499 UNLISTED E&M SERVICE: CPT | Mod: 95,,, | Performed by: CLINICAL NEUROPSYCHOLOGIST

## 2021-11-17 PROCEDURE — 3061F PR NEG MICROALBUMINURIA RESULT DOCUMENTED/REVIEW: ICD-10-PCS | Mod: CPTII,95,, | Performed by: CLINICAL NEUROPSYCHOLOGIST

## 2021-11-17 PROCEDURE — 99999 PR PBB SHADOW E&M-EST. PATIENT-LVL IV: CPT | Mod: PBBFAC,,, | Performed by: UROLOGY

## 2021-11-17 PROCEDURE — 1159F MED LIST DOCD IN RCRD: CPT | Mod: CPTII,S$GLB,, | Performed by: UROLOGY

## 2021-11-17 PROCEDURE — 84153 ASSAY OF PSA TOTAL: CPT | Performed by: UROLOGY

## 2021-11-17 PROCEDURE — 81002 POCT URINE DIPSTICK WITHOUT MICROSCOPE: ICD-10-PCS | Mod: S$GLB,,, | Performed by: UROLOGY

## 2021-11-17 PROCEDURE — 1160F PR REVIEW ALL MEDS BY PRESCRIBER/CLIN PHARMACIST DOCUMENTED: ICD-10-PCS | Mod: CPTII,S$GLB,, | Performed by: UROLOGY

## 2021-11-17 PROCEDURE — 3044F HG A1C LEVEL LT 7.0%: CPT | Mod: CPTII,S$GLB,, | Performed by: UROLOGY

## 2021-11-17 PROCEDURE — 3066F PR DOCUMENTATION OF TREATMENT FOR NEPHROPATHY: ICD-10-PCS | Mod: CPTII,S$GLB,, | Performed by: UROLOGY

## 2021-11-17 PROCEDURE — 3066F PR DOCUMENTATION OF TREATMENT FOR NEPHROPATHY: ICD-10-PCS | Mod: CPTII,95,, | Performed by: CLINICAL NEUROPSYCHOLOGIST

## 2021-11-17 PROCEDURE — 3061F NEG MICROALBUMINURIA REV: CPT | Mod: CPTII,S$GLB,, | Performed by: UROLOGY

## 2021-11-17 PROCEDURE — 1160F RVW MEDS BY RX/DR IN RCRD: CPT | Mod: CPTII,S$GLB,, | Performed by: UROLOGY

## 2021-11-17 PROCEDURE — 3008F BODY MASS INDEX DOCD: CPT | Mod: CPTII,S$GLB,, | Performed by: UROLOGY

## 2021-11-17 PROCEDURE — 51798 POCT BLADDER SCAN: ICD-10-PCS | Mod: S$GLB,,, | Performed by: UROLOGY

## 2021-11-17 PROCEDURE — 3066F NEPHROPATHY DOC TX: CPT | Mod: CPTII,95,, | Performed by: CLINICAL NEUROPSYCHOLOGIST

## 2021-11-17 PROCEDURE — 3061F NEG MICROALBUMINURIA REV: CPT | Mod: CPTII,95,, | Performed by: CLINICAL NEUROPSYCHOLOGIST

## 2021-11-17 PROCEDURE — 3074F PR MOST RECENT SYSTOLIC BLOOD PRESSURE < 130 MM HG: ICD-10-PCS | Mod: CPTII,S$GLB,, | Performed by: UROLOGY

## 2021-11-17 PROCEDURE — 3079F PR MOST RECENT DIASTOLIC BLOOD PRESSURE 80-89 MM HG: ICD-10-PCS | Mod: CPTII,S$GLB,, | Performed by: UROLOGY

## 2021-11-17 PROCEDURE — 3044F PR MOST RECENT HEMOGLOBIN A1C LEVEL <7.0%: ICD-10-PCS | Mod: CPTII,S$GLB,, | Performed by: UROLOGY

## 2021-11-17 PROCEDURE — 81002 URINALYSIS NONAUTO W/O SCOPE: CPT | Mod: S$GLB,,, | Performed by: UROLOGY

## 2021-11-17 PROCEDURE — 3079F DIAST BP 80-89 MM HG: CPT | Mod: CPTII,S$GLB,, | Performed by: UROLOGY

## 2021-11-17 PROCEDURE — 51798 US URINE CAPACITY MEASURE: CPT | Mod: S$GLB,,, | Performed by: UROLOGY

## 2021-11-17 PROCEDURE — 99499 NO LOS: ICD-10-PCS | Mod: 95,,, | Performed by: CLINICAL NEUROPSYCHOLOGIST

## 2021-11-17 PROCEDURE — 99214 OFFICE O/P EST MOD 30 MIN: CPT | Mod: 25,S$GLB,, | Performed by: UROLOGY

## 2021-11-17 PROCEDURE — 3066F NEPHROPATHY DOC TX: CPT | Mod: CPTII,S$GLB,, | Performed by: UROLOGY

## 2021-11-17 PROCEDURE — 3061F PR NEG MICROALBUMINURIA RESULT DOCUMENTED/REVIEW: ICD-10-PCS | Mod: CPTII,S$GLB,, | Performed by: UROLOGY

## 2021-11-17 PROCEDURE — 36415 COLL VENOUS BLD VENIPUNCTURE: CPT | Mod: PO | Performed by: UROLOGY

## 2021-11-17 PROCEDURE — 3074F SYST BP LT 130 MM HG: CPT | Mod: CPTII,S$GLB,, | Performed by: UROLOGY

## 2021-11-17 PROCEDURE — 99999 PR PBB SHADOW E&M-EST. PATIENT-LVL IV: ICD-10-PCS | Mod: PBBFAC,,, | Performed by: UROLOGY

## 2021-11-18 ENCOUNTER — PATIENT MESSAGE (OUTPATIENT)
Dept: FAMILY MEDICINE | Facility: CLINIC | Age: 56
End: 2021-11-18
Payer: COMMERCIAL

## 2021-11-18 ENCOUNTER — TELEPHONE (OUTPATIENT)
Dept: FAMILY MEDICINE | Facility: CLINIC | Age: 56
End: 2021-11-18
Payer: COMMERCIAL

## 2021-11-18 ENCOUNTER — PATIENT MESSAGE (OUTPATIENT)
Dept: UROLOGY | Facility: CLINIC | Age: 56
End: 2021-11-18
Payer: COMMERCIAL

## 2021-11-19 ENCOUNTER — OFFICE VISIT (OUTPATIENT)
Dept: FAMILY MEDICINE | Facility: CLINIC | Age: 56
End: 2021-11-19
Payer: COMMERCIAL

## 2021-11-19 VITALS
BODY MASS INDEX: 37.31 KG/M2 | HEART RATE: 65 BPM | WEIGHT: 260.06 LBS | SYSTOLIC BLOOD PRESSURE: 122 MMHG | DIASTOLIC BLOOD PRESSURE: 84 MMHG | OXYGEN SATURATION: 98 %

## 2021-11-19 DIAGNOSIS — L03.211 CELLULITIS OF FACE: Primary | ICD-10-CM

## 2021-11-19 DIAGNOSIS — E11.9 TYPE 2 DIABETES MELLITUS WITHOUT COMPLICATION, WITHOUT LONG-TERM CURRENT USE OF INSULIN: ICD-10-CM

## 2021-11-19 PROCEDURE — 3008F PR BODY MASS INDEX (BMI) DOCUMENTED: ICD-10-PCS | Mod: CPTII,S$GLB,, | Performed by: FAMILY MEDICINE

## 2021-11-19 PROCEDURE — 3066F PR DOCUMENTATION OF TREATMENT FOR NEPHROPATHY: ICD-10-PCS | Mod: CPTII,S$GLB,, | Performed by: FAMILY MEDICINE

## 2021-11-19 PROCEDURE — 1159F PR MEDICATION LIST DOCUMENTED IN MEDICAL RECORD: ICD-10-PCS | Mod: CPTII,S$GLB,, | Performed by: FAMILY MEDICINE

## 2021-11-19 PROCEDURE — 3044F PR MOST RECENT HEMOGLOBIN A1C LEVEL <7.0%: ICD-10-PCS | Mod: CPTII,S$GLB,, | Performed by: FAMILY MEDICINE

## 2021-11-19 PROCEDURE — 99214 OFFICE O/P EST MOD 30 MIN: CPT | Mod: S$GLB,,, | Performed by: FAMILY MEDICINE

## 2021-11-19 PROCEDURE — 99999 PR PBB SHADOW E&M-EST. PATIENT-LVL IV: ICD-10-PCS | Mod: PBBFAC,,, | Performed by: FAMILY MEDICINE

## 2021-11-19 PROCEDURE — 99999 PR PBB SHADOW E&M-EST. PATIENT-LVL IV: CPT | Mod: PBBFAC,,, | Performed by: FAMILY MEDICINE

## 2021-11-19 PROCEDURE — 3061F PR NEG MICROALBUMINURIA RESULT DOCUMENTED/REVIEW: ICD-10-PCS | Mod: CPTII,S$GLB,, | Performed by: FAMILY MEDICINE

## 2021-11-19 PROCEDURE — 3074F SYST BP LT 130 MM HG: CPT | Mod: CPTII,S$GLB,, | Performed by: FAMILY MEDICINE

## 2021-11-19 PROCEDURE — 1160F RVW MEDS BY RX/DR IN RCRD: CPT | Mod: CPTII,S$GLB,, | Performed by: FAMILY MEDICINE

## 2021-11-19 PROCEDURE — 3079F DIAST BP 80-89 MM HG: CPT | Mod: CPTII,S$GLB,, | Performed by: FAMILY MEDICINE

## 2021-11-19 PROCEDURE — 3061F NEG MICROALBUMINURIA REV: CPT | Mod: CPTII,S$GLB,, | Performed by: FAMILY MEDICINE

## 2021-11-19 PROCEDURE — 1159F MED LIST DOCD IN RCRD: CPT | Mod: CPTII,S$GLB,, | Performed by: FAMILY MEDICINE

## 2021-11-19 PROCEDURE — 99214 PR OFFICE/OUTPT VISIT, EST, LEVL IV, 30-39 MIN: ICD-10-PCS | Mod: S$GLB,,, | Performed by: FAMILY MEDICINE

## 2021-11-19 PROCEDURE — 3008F BODY MASS INDEX DOCD: CPT | Mod: CPTII,S$GLB,, | Performed by: FAMILY MEDICINE

## 2021-11-19 PROCEDURE — 3079F PR MOST RECENT DIASTOLIC BLOOD PRESSURE 80-89 MM HG: ICD-10-PCS | Mod: CPTII,S$GLB,, | Performed by: FAMILY MEDICINE

## 2021-11-19 PROCEDURE — 3066F NEPHROPATHY DOC TX: CPT | Mod: CPTII,S$GLB,, | Performed by: FAMILY MEDICINE

## 2021-11-19 PROCEDURE — 1160F PR REVIEW ALL MEDS BY PRESCRIBER/CLIN PHARMACIST DOCUMENTED: ICD-10-PCS | Mod: CPTII,S$GLB,, | Performed by: FAMILY MEDICINE

## 2021-11-19 PROCEDURE — 3044F HG A1C LEVEL LT 7.0%: CPT | Mod: CPTII,S$GLB,, | Performed by: FAMILY MEDICINE

## 2021-11-19 PROCEDURE — 3074F PR MOST RECENT SYSTOLIC BLOOD PRESSURE < 130 MM HG: ICD-10-PCS | Mod: CPTII,S$GLB,, | Performed by: FAMILY MEDICINE

## 2021-11-19 RX ORDER — CLINDAMYCIN HYDROCHLORIDE 300 MG/1
300 CAPSULE ORAL EVERY 8 HOURS
Qty: 21 CAPSULE | Refills: 0 | Status: SHIPPED | OUTPATIENT
Start: 2021-11-19 | End: 2021-11-26

## 2021-11-22 ENCOUNTER — OFFICE VISIT (OUTPATIENT)
Dept: PSYCHIATRY | Facility: CLINIC | Age: 56
End: 2021-11-22
Payer: COMMERCIAL

## 2021-11-22 ENCOUNTER — NURSE TRIAGE (OUTPATIENT)
Dept: ADMINISTRATIVE | Facility: CLINIC | Age: 56
End: 2021-11-22
Payer: COMMERCIAL

## 2021-11-22 ENCOUNTER — PATIENT MESSAGE (OUTPATIENT)
Dept: FAMILY MEDICINE | Facility: CLINIC | Age: 56
End: 2021-11-22
Payer: COMMERCIAL

## 2021-11-22 DIAGNOSIS — F33.1 MODERATE EPISODE OF RECURRENT MAJOR DEPRESSIVE DISORDER: ICD-10-CM

## 2021-11-22 DIAGNOSIS — F41.1 GENERALIZED ANXIETY DISORDER: ICD-10-CM

## 2021-11-22 PROCEDURE — 3066F NEPHROPATHY DOC TX: CPT | Mod: CPTII,S$GLB,, | Performed by: PSYCHOLOGIST

## 2021-11-22 PROCEDURE — 3066F PR DOCUMENTATION OF TREATMENT FOR NEPHROPATHY: ICD-10-PCS | Mod: CPTII,S$GLB,, | Performed by: PSYCHOLOGIST

## 2021-11-22 PROCEDURE — 90791 PR PSYCHIATRIC DIAGNOSTIC EVALUATION: ICD-10-PCS | Mod: S$GLB,,, | Performed by: PSYCHOLOGIST

## 2021-11-22 PROCEDURE — 90791 PSYCH DIAGNOSTIC EVALUATION: CPT | Mod: S$GLB,,, | Performed by: PSYCHOLOGIST

## 2021-11-22 PROCEDURE — 99999 PR PBB SHADOW E&M-EST. PATIENT-LVL II: CPT | Mod: PBBFAC,,, | Performed by: PSYCHOLOGIST

## 2021-11-22 PROCEDURE — 3061F PR NEG MICROALBUMINURIA RESULT DOCUMENTED/REVIEW: ICD-10-PCS | Mod: CPTII,S$GLB,, | Performed by: PSYCHOLOGIST

## 2021-11-22 PROCEDURE — 99999 PR PBB SHADOW E&M-EST. PATIENT-LVL II: ICD-10-PCS | Mod: PBBFAC,,, | Performed by: PSYCHOLOGIST

## 2021-11-22 PROCEDURE — 3061F NEG MICROALBUMINURIA REV: CPT | Mod: CPTII,S$GLB,, | Performed by: PSYCHOLOGIST

## 2021-11-23 ENCOUNTER — OFFICE VISIT (OUTPATIENT)
Dept: FAMILY MEDICINE | Facility: CLINIC | Age: 56
End: 2021-11-23
Payer: COMMERCIAL

## 2021-11-23 ENCOUNTER — LAB VISIT (OUTPATIENT)
Dept: LAB | Facility: HOSPITAL | Age: 56
End: 2021-11-23
Attending: INTERNAL MEDICINE
Payer: COMMERCIAL

## 2021-11-23 VITALS
OXYGEN SATURATION: 99 % | BODY MASS INDEX: 37.42 KG/M2 | DIASTOLIC BLOOD PRESSURE: 81 MMHG | WEIGHT: 260.81 LBS | HEART RATE: 67 BPM | SYSTOLIC BLOOD PRESSURE: 125 MMHG

## 2021-11-23 DIAGNOSIS — R25.1 OCCASIONAL TREMORS: ICD-10-CM

## 2021-11-23 DIAGNOSIS — R25.1 OCCASIONAL TREMORS: Primary | ICD-10-CM

## 2021-11-23 LAB
ANION GAP SERPL CALC-SCNC: 6 MMOL/L (ref 8–16)
BASOPHILS # BLD AUTO: 0.07 K/UL (ref 0–0.2)
BASOPHILS NFR BLD: 1.7 % (ref 0–1.9)
BUN SERPL-MCNC: 17 MG/DL (ref 6–20)
CALCIUM SERPL-MCNC: 9.3 MG/DL (ref 8.7–10.5)
CHLORIDE SERPL-SCNC: 101 MMOL/L (ref 95–110)
CO2 SERPL-SCNC: 30 MMOL/L (ref 23–29)
CREAT SERPL-MCNC: 0.8 MG/DL (ref 0.5–1.4)
DIFFERENTIAL METHOD: NORMAL
EOSINOPHIL # BLD AUTO: 0.1 K/UL (ref 0–0.5)
EOSINOPHIL NFR BLD: 3.3 % (ref 0–8)
ERYTHROCYTE [DISTWIDTH] IN BLOOD BY AUTOMATED COUNT: 11.9 % (ref 11.5–14.5)
ERYTHROCYTE [SEDIMENTATION RATE] IN BLOOD BY WESTERGREN METHOD: 2 MM/HR (ref 0–10)
EST. GFR  (AFRICAN AMERICAN): >60 ML/MIN/1.73 M^2
EST. GFR  (NON AFRICAN AMERICAN): >60 ML/MIN/1.73 M^2
GLUCOSE SERPL-MCNC: 99 MG/DL (ref 70–110)
HCT VFR BLD AUTO: 46.8 % (ref 40–54)
HGB BLD-MCNC: 15.4 G/DL (ref 14–18)
IMM GRANULOCYTES # BLD AUTO: 0.01 K/UL (ref 0–0.04)
IMM GRANULOCYTES NFR BLD AUTO: 0.2 % (ref 0–0.5)
LYMPHOCYTES # BLD AUTO: 1.3 K/UL (ref 1–4.8)
LYMPHOCYTES NFR BLD: 31.7 % (ref 18–48)
MAGNESIUM SERPL-MCNC: 2 MG/DL (ref 1.6–2.6)
MCH RBC QN AUTO: 29.8 PG (ref 27–31)
MCHC RBC AUTO-ENTMCNC: 32.9 G/DL (ref 32–36)
MCV RBC AUTO: 91 FL (ref 82–98)
MONOCYTES # BLD AUTO: 0.6 K/UL (ref 0.3–1)
MONOCYTES NFR BLD: 14.1 % (ref 4–15)
NEUTROPHILS # BLD AUTO: 2.1 K/UL (ref 1.8–7.7)
NEUTROPHILS NFR BLD: 49 % (ref 38–73)
NRBC BLD-RTO: 0 /100 WBC
PLATELET # BLD AUTO: 190 K/UL (ref 150–450)
PMV BLD AUTO: 10.9 FL (ref 9.2–12.9)
POTASSIUM SERPL-SCNC: 4.5 MMOL/L (ref 3.5–5.1)
RBC # BLD AUTO: 5.17 M/UL (ref 4.6–6.2)
SODIUM SERPL-SCNC: 137 MMOL/L (ref 136–145)
WBC # BLD AUTO: 4.19 K/UL (ref 3.9–12.7)

## 2021-11-23 PROCEDURE — 3066F NEPHROPATHY DOC TX: CPT | Mod: CPTII,S$GLB,, | Performed by: INTERNAL MEDICINE

## 2021-11-23 PROCEDURE — 99214 PR OFFICE/OUTPT VISIT, EST, LEVL IV, 30-39 MIN: ICD-10-PCS | Mod: S$GLB,,, | Performed by: INTERNAL MEDICINE

## 2021-11-23 PROCEDURE — 3061F NEG MICROALBUMINURIA REV: CPT | Mod: CPTII,S$GLB,, | Performed by: INTERNAL MEDICINE

## 2021-11-23 PROCEDURE — 85025 COMPLETE CBC W/AUTO DIFF WBC: CPT | Performed by: INTERNAL MEDICINE

## 2021-11-23 PROCEDURE — 99214 OFFICE O/P EST MOD 30 MIN: CPT | Mod: S$GLB,,, | Performed by: INTERNAL MEDICINE

## 2021-11-23 PROCEDURE — 80048 BASIC METABOLIC PNL TOTAL CA: CPT | Performed by: INTERNAL MEDICINE

## 2021-11-23 PROCEDURE — 83735 ASSAY OF MAGNESIUM: CPT | Performed by: INTERNAL MEDICINE

## 2021-11-23 PROCEDURE — 3066F PR DOCUMENTATION OF TREATMENT FOR NEPHROPATHY: ICD-10-PCS | Mod: CPTII,S$GLB,, | Performed by: INTERNAL MEDICINE

## 2021-11-23 PROCEDURE — 99999 PR PBB SHADOW E&M-EST. PATIENT-LVL IV: ICD-10-PCS | Mod: PBBFAC,,, | Performed by: INTERNAL MEDICINE

## 2021-11-23 PROCEDURE — 99999 PR PBB SHADOW E&M-EST. PATIENT-LVL IV: CPT | Mod: PBBFAC,,, | Performed by: INTERNAL MEDICINE

## 2021-11-23 PROCEDURE — 3061F PR NEG MICROALBUMINURIA RESULT DOCUMENTED/REVIEW: ICD-10-PCS | Mod: CPTII,S$GLB,, | Performed by: INTERNAL MEDICINE

## 2021-11-23 PROCEDURE — 36415 COLL VENOUS BLD VENIPUNCTURE: CPT | Mod: PO | Performed by: INTERNAL MEDICINE

## 2021-11-23 PROCEDURE — 85651 RBC SED RATE NONAUTOMATED: CPT | Mod: PO | Performed by: INTERNAL MEDICINE

## 2021-11-24 ENCOUNTER — PATIENT MESSAGE (OUTPATIENT)
Dept: FAMILY MEDICINE | Facility: CLINIC | Age: 56
End: 2021-11-24
Payer: COMMERCIAL

## 2021-11-24 ENCOUNTER — TELEPHONE (OUTPATIENT)
Dept: FAMILY MEDICINE | Facility: CLINIC | Age: 56
End: 2021-11-24
Payer: COMMERCIAL

## 2021-11-24 DIAGNOSIS — U09.9 COVID-19 LONG HAULER: Primary | ICD-10-CM

## 2021-11-26 ENCOUNTER — NURSE TRIAGE (OUTPATIENT)
Dept: ADMINISTRATIVE | Facility: CLINIC | Age: 56
End: 2021-11-26
Payer: COMMERCIAL

## 2021-11-30 ENCOUNTER — HOSPITAL ENCOUNTER (EMERGENCY)
Facility: HOSPITAL | Age: 56
Discharge: HOME | End: 2021-11-30
Attending: EMERGENCY MEDICINE
Payer: COMMERCIAL

## 2021-11-30 VITALS
BODY MASS INDEX: 36.51 KG/M2 | RESPIRATION RATE: 16 BRPM | OXYGEN SATURATION: 100 % | DIASTOLIC BLOOD PRESSURE: 94 MMHG | HEIGHT: 70 IN | WEIGHT: 255 LBS | SYSTOLIC BLOOD PRESSURE: 178 MMHG | HEART RATE: 68 BPM | TEMPERATURE: 98 F

## 2021-11-30 DIAGNOSIS — R25.3 TONGUE FASCICULATION: ICD-10-CM

## 2021-11-30 DIAGNOSIS — R53.1 WEAKNESS: Primary | ICD-10-CM

## 2021-11-30 DIAGNOSIS — R25.1 OCCASIONAL TREMORS: ICD-10-CM

## 2021-11-30 LAB
ALBUMIN SERPL BCP-MCNC: 4.1 G/DL (ref 3.5–5.2)
ALP SERPL-CCNC: 65 U/L (ref 55–135)
ALT SERPL W/O P-5'-P-CCNC: 21 U/L (ref 10–44)
ANION GAP SERPL CALC-SCNC: 6 MMOL/L (ref 8–16)
AST SERPL-CCNC: 24 U/L (ref 10–40)
BACTERIA #/AREA URNS AUTO: NORMAL /HPF
BASOPHILS # BLD AUTO: 0.05 K/UL (ref 0–0.2)
BASOPHILS NFR BLD: 1.1 % (ref 0–1.9)
BILIRUB SERPL-MCNC: 0.9 MG/DL (ref 0.1–1)
BILIRUB UR QL STRIP: NEGATIVE
BUN SERPL-MCNC: 18 MG/DL (ref 6–20)
BUN SERPL-MCNC: 22 MG/DL (ref 6–30)
CALCIUM SERPL-MCNC: 9.7 MG/DL (ref 8.7–10.5)
CHLORIDE SERPL-SCNC: 103 MMOL/L (ref 95–110)
CHLORIDE SERPL-SCNC: 103 MMOL/L (ref 95–110)
CLARITY UR REFRACT.AUTO: CLEAR
CO2 SERPL-SCNC: 27 MMOL/L (ref 23–29)
COLOR UR AUTO: ABNORMAL
CREAT SERPL-MCNC: 0.9 MG/DL (ref 0.5–1.4)
CREAT SERPL-MCNC: 0.9 MG/DL (ref 0.5–1.4)
DIFFERENTIAL METHOD: NORMAL
EOSINOPHIL # BLD AUTO: 0.2 K/UL (ref 0–0.5)
EOSINOPHIL NFR BLD: 3.2 % (ref 0–8)
ERYTHROCYTE [DISTWIDTH] IN BLOOD BY AUTOMATED COUNT: 12 % (ref 11.5–14.5)
EST. GFR  (AFRICAN AMERICAN): >60 ML/MIN/1.73 M^2
EST. GFR  (NON AFRICAN AMERICAN): >60 ML/MIN/1.73 M^2
GLUCOSE SERPL-MCNC: 101 MG/DL (ref 70–110)
GLUCOSE SERPL-MCNC: 102 MG/DL (ref 70–110)
GLUCOSE UR QL STRIP: NEGATIVE
HCT VFR BLD AUTO: 44.5 % (ref 40–54)
HCT VFR BLD CALC: 46 %PCV (ref 36–54)
HGB BLD-MCNC: 15.5 G/DL (ref 14–18)
HGB UR QL STRIP: NEGATIVE
IMM GRANULOCYTES # BLD AUTO: 0.01 K/UL (ref 0–0.04)
IMM GRANULOCYTES NFR BLD AUTO: 0.2 % (ref 0–0.5)
KETONES UR QL STRIP: ABNORMAL
LEUKOCYTE ESTERASE UR QL STRIP: NEGATIVE
LYMPHOCYTES # BLD AUTO: 1.7 K/UL (ref 1–4.8)
LYMPHOCYTES NFR BLD: 36.4 % (ref 18–48)
MAGNESIUM SERPL-MCNC: 1.8 MG/DL (ref 1.6–2.6)
MCH RBC QN AUTO: 30.8 PG (ref 27–31)
MCHC RBC AUTO-ENTMCNC: 34.8 G/DL (ref 32–36)
MCV RBC AUTO: 88 FL (ref 82–98)
MICROSCOPIC COMMENT: NORMAL
MONOCYTES # BLD AUTO: 0.5 K/UL (ref 0.3–1)
MONOCYTES NFR BLD: 11.3 % (ref 4–15)
NEUTROPHILS # BLD AUTO: 2.2 K/UL (ref 1.8–7.7)
NEUTROPHILS NFR BLD: 47.8 % (ref 38–73)
NITRITE UR QL STRIP: NEGATIVE
NRBC BLD-RTO: 0 /100 WBC
PH UR STRIP: 5 [PH] (ref 5–8)
PLATELET # BLD AUTO: 195 K/UL (ref 150–450)
PMV BLD AUTO: 9.9 FL (ref 9.2–12.9)
POC IONIZED CALCIUM: 1.2 MMOL/L (ref 1.06–1.42)
POC TCO2 (MEASURED): 27 MMOL/L (ref 23–29)
POTASSIUM BLD-SCNC: 4.2 MMOL/L (ref 3.5–5.1)
POTASSIUM SERPL-SCNC: 4.3 MMOL/L (ref 3.5–5.1)
PROT SERPL-MCNC: 7 G/DL (ref 6–8.4)
PROT UR QL STRIP: NEGATIVE
RBC # BLD AUTO: 5.04 M/UL (ref 4.6–6.2)
RBC #/AREA URNS AUTO: 1 /HPF (ref 0–4)
SAMPLE: NORMAL
SODIUM BLD-SCNC: 140 MMOL/L (ref 136–145)
SODIUM SERPL-SCNC: 136 MMOL/L (ref 136–145)
SP GR UR STRIP: 1.01 (ref 1–1.03)
URN SPEC COLLECT METH UR: ABNORMAL
WBC # BLD AUTO: 4.62 K/UL (ref 3.9–12.7)
WBC #/AREA URNS AUTO: 0 /HPF (ref 0–5)

## 2021-11-30 PROCEDURE — 85025 COMPLETE CBC W/AUTO DIFF WBC: CPT | Performed by: EMERGENCY MEDICINE

## 2021-11-30 PROCEDURE — 99285 PR EMERGENCY DEPT VISIT,LEVEL V: ICD-10-PCS | Mod: ,,, | Performed by: EMERGENCY MEDICINE

## 2021-11-30 PROCEDURE — 96361 HYDRATE IV INFUSION ADD-ON: CPT

## 2021-11-30 PROCEDURE — 96374 THER/PROPH/DIAG INJ IV PUSH: CPT

## 2021-11-30 PROCEDURE — 25500020 PHARM REV CODE 255: Performed by: EMERGENCY MEDICINE

## 2021-11-30 PROCEDURE — 99285 PR EMERGENCY DEPT VISIT,LEVEL V: ICD-10-PCS | Mod: ,,, | Performed by: PSYCHIATRY & NEUROLOGY

## 2021-11-30 PROCEDURE — 99285 EMERGENCY DEPT VISIT HI MDM: CPT | Mod: 25

## 2021-11-30 PROCEDURE — 93010 ELECTROCARDIOGRAM REPORT: CPT | Mod: ,,, | Performed by: INTERNAL MEDICINE

## 2021-11-30 PROCEDURE — 99285 EMERGENCY DEPT VISIT HI MDM: CPT | Mod: ,,, | Performed by: EMERGENCY MEDICINE

## 2021-11-30 PROCEDURE — 99285 EMERGENCY DEPT VISIT HI MDM: CPT | Mod: ,,, | Performed by: PSYCHIATRY & NEUROLOGY

## 2021-11-30 PROCEDURE — 82330 ASSAY OF CALCIUM: CPT

## 2021-11-30 PROCEDURE — 80047 BASIC METABLC PNL IONIZED CA: CPT

## 2021-11-30 PROCEDURE — 25000003 PHARM REV CODE 250: Performed by: EMERGENCY MEDICINE

## 2021-11-30 PROCEDURE — 93010 EKG 12-LEAD: ICD-10-PCS | Mod: ,,, | Performed by: INTERNAL MEDICINE

## 2021-11-30 PROCEDURE — 93005 ELECTROCARDIOGRAM TRACING: CPT

## 2021-11-30 PROCEDURE — 63600175 PHARM REV CODE 636 W HCPCS: Performed by: EMERGENCY MEDICINE

## 2021-11-30 PROCEDURE — 80053 COMPREHEN METABOLIC PANEL: CPT | Performed by: EMERGENCY MEDICINE

## 2021-11-30 PROCEDURE — 81001 URINALYSIS AUTO W/SCOPE: CPT | Performed by: EMERGENCY MEDICINE

## 2021-11-30 PROCEDURE — 83735 ASSAY OF MAGNESIUM: CPT | Performed by: EMERGENCY MEDICINE

## 2021-11-30 RX ORDER — DIPHENHYDRAMINE HYDROCHLORIDE 50 MG/ML
50 INJECTION INTRAMUSCULAR; INTRAVENOUS
Status: COMPLETED | OUTPATIENT
Start: 2021-11-30 | End: 2021-11-30

## 2021-11-30 RX ORDER — LORAZEPAM 1 MG/1
1 TABLET ORAL
Status: COMPLETED | OUTPATIENT
Start: 2021-11-30 | End: 2021-11-30

## 2021-11-30 RX ADMIN — IOHEXOL 100 ML: 350 INJECTION, SOLUTION INTRAVENOUS at 10:11

## 2021-11-30 RX ADMIN — LORAZEPAM 1 MG: 1 TABLET ORAL at 12:11

## 2021-11-30 RX ADMIN — DIPHENHYDRAMINE HYDROCHLORIDE 50 MG: 50 INJECTION INTRAMUSCULAR; INTRAVENOUS at 09:11

## 2021-11-30 RX ADMIN — SODIUM CHLORIDE 1000 ML: 0.9 INJECTION, SOLUTION INTRAVENOUS at 09:11

## 2021-12-01 ENCOUNTER — TELEPHONE (OUTPATIENT)
Dept: FAMILY MEDICINE | Facility: CLINIC | Age: 56
End: 2021-12-01
Payer: COMMERCIAL

## 2021-12-01 ENCOUNTER — PATIENT MESSAGE (OUTPATIENT)
Dept: FAMILY MEDICINE | Facility: CLINIC | Age: 56
End: 2021-12-01
Payer: COMMERCIAL

## 2021-12-01 ENCOUNTER — PATIENT MESSAGE (OUTPATIENT)
Dept: PSYCHIATRY | Facility: CLINIC | Age: 56
End: 2021-12-01
Payer: COMMERCIAL

## 2021-12-03 ENCOUNTER — TELEPHONE (OUTPATIENT)
Dept: PSYCHIATRY | Facility: CLINIC | Age: 56
End: 2021-12-03
Payer: COMMERCIAL

## 2021-12-08 DIAGNOSIS — U09.9 POST-COVID CHRONIC NEUROLOGIC SYMPTOMS: Primary | ICD-10-CM

## 2021-12-08 DIAGNOSIS — R29.90 POST-COVID CHRONIC NEUROLOGIC SYMPTOMS: Primary | ICD-10-CM

## 2021-12-10 ENCOUNTER — TELEPHONE (OUTPATIENT)
Dept: FAMILY MEDICINE | Facility: CLINIC | Age: 56
End: 2021-12-10
Payer: COMMERCIAL

## 2021-12-10 ENCOUNTER — CLINICAL SUPPORT (OUTPATIENT)
Dept: REHABILITATION | Facility: HOSPITAL | Age: 56
End: 2021-12-10
Payer: COMMERCIAL

## 2021-12-10 DIAGNOSIS — R41.841 COGNITIVE COMMUNICATION DEFICIT: ICD-10-CM

## 2021-12-10 DIAGNOSIS — U09.9 POST-COVID CHRONIC NEUROLOGIC SYMPTOMS: ICD-10-CM

## 2021-12-10 DIAGNOSIS — R29.90 POST-COVID CHRONIC NEUROLOGIC SYMPTOMS: ICD-10-CM

## 2021-12-10 PROCEDURE — 96125 COGNITIVE TEST BY HC PRO: CPT | Mod: PN

## 2021-12-13 ENCOUNTER — PATIENT OUTREACH (OUTPATIENT)
Dept: ADMINISTRATIVE | Facility: HOSPITAL | Age: 56
End: 2021-12-13
Payer: COMMERCIAL

## 2021-12-14 PROBLEM — Z74.09 IMPAIRED FUNCTIONAL MOBILITY, BALANCE, GAIT, AND ENDURANCE: Status: ACTIVE | Noted: 2021-12-14

## 2021-12-14 PROBLEM — U09.9 POST-COVID CHRONIC NEUROLOGIC SYMPTOMS: Status: ACTIVE | Noted: 2021-12-14

## 2021-12-14 PROBLEM — R29.90 POST-COVID CHRONIC NEUROLOGIC SYMPTOMS: Status: ACTIVE | Noted: 2021-12-14

## 2021-12-14 PROBLEM — M62.81 GENERALIZED MUSCLE WEAKNESS: Status: ACTIVE | Noted: 2021-12-14

## 2021-12-14 PROBLEM — R26.89 OTHER ABNORMALITIES OF GAIT AND MOBILITY: Status: ACTIVE | Noted: 2021-12-14

## 2021-12-14 PROBLEM — U09.9 COVID-19 LONG HAULER: Status: ACTIVE | Noted: 2021-12-14

## 2021-12-15 ENCOUNTER — CLINICAL SUPPORT (OUTPATIENT)
Dept: REHABILITATION | Facility: HOSPITAL | Age: 56
End: 2021-12-15
Payer: COMMERCIAL

## 2021-12-15 DIAGNOSIS — R41.841 COGNITIVE COMMUNICATION DEFICIT: ICD-10-CM

## 2021-12-15 PROCEDURE — 97130 THER IVNTJ EA ADDL 15 MIN: CPT | Mod: PN

## 2021-12-15 PROCEDURE — 97129 THER IVNTJ 1ST 15 MIN: CPT | Mod: PN

## 2021-12-27 ENCOUNTER — CLINICAL SUPPORT (OUTPATIENT)
Dept: REHABILITATION | Facility: HOSPITAL | Age: 56
End: 2021-12-27
Payer: COMMERCIAL

## 2021-12-27 DIAGNOSIS — R41.841 COGNITIVE COMMUNICATION DEFICIT: ICD-10-CM

## 2021-12-27 PROCEDURE — 97130 THER IVNTJ EA ADDL 15 MIN: CPT | Mod: PN

## 2021-12-27 PROCEDURE — 97129 THER IVNTJ 1ST 15 MIN: CPT | Mod: PN

## 2021-12-28 ENCOUNTER — OFFICE VISIT (OUTPATIENT)
Dept: PSYCHIATRY | Facility: CLINIC | Age: 56
End: 2021-12-28
Payer: COMMERCIAL

## 2021-12-28 DIAGNOSIS — F33.1 MODERATE EPISODE OF RECURRENT MAJOR DEPRESSIVE DISORDER: ICD-10-CM

## 2021-12-28 DIAGNOSIS — F41.1 GENERALIZED ANXIETY DISORDER: Primary | ICD-10-CM

## 2021-12-28 PROCEDURE — 3044F HG A1C LEVEL LT 7.0%: CPT | Mod: CPTII,S$GLB,, | Performed by: PSYCHOLOGIST

## 2021-12-28 PROCEDURE — 3044F PR MOST RECENT HEMOGLOBIN A1C LEVEL <7.0%: ICD-10-PCS | Mod: CPTII,S$GLB,, | Performed by: PSYCHOLOGIST

## 2021-12-28 PROCEDURE — 3061F PR NEG MICROALBUMINURIA RESULT DOCUMENTED/REVIEW: ICD-10-PCS | Mod: CPTII,S$GLB,, | Performed by: PSYCHOLOGIST

## 2021-12-28 PROCEDURE — 3066F NEPHROPATHY DOC TX: CPT | Mod: CPTII,S$GLB,, | Performed by: PSYCHOLOGIST

## 2021-12-28 PROCEDURE — 90837 PR PSYCHOTHERAPY W/PATIENT, 60 MIN: ICD-10-PCS | Mod: S$GLB,,, | Performed by: PSYCHOLOGIST

## 2021-12-28 PROCEDURE — 90837 PSYTX W PT 60 MINUTES: CPT | Mod: S$GLB,,, | Performed by: PSYCHOLOGIST

## 2021-12-28 PROCEDURE — 3066F PR DOCUMENTATION OF TREATMENT FOR NEPHROPATHY: ICD-10-PCS | Mod: CPTII,S$GLB,, | Performed by: PSYCHOLOGIST

## 2021-12-28 PROCEDURE — 3061F NEG MICROALBUMINURIA REV: CPT | Mod: CPTII,S$GLB,, | Performed by: PSYCHOLOGIST

## 2022-01-05 ENCOUNTER — OFFICE VISIT (OUTPATIENT)
Dept: FAMILY MEDICINE | Facility: CLINIC | Age: 57
End: 2022-01-05
Payer: COMMERCIAL

## 2022-01-05 ENCOUNTER — CLINICAL SUPPORT (OUTPATIENT)
Dept: REHABILITATION | Facility: HOSPITAL | Age: 57
End: 2022-01-05
Payer: COMMERCIAL

## 2022-01-05 VITALS
WEIGHT: 265.63 LBS | HEART RATE: 62 BPM | DIASTOLIC BLOOD PRESSURE: 80 MMHG | OXYGEN SATURATION: 97 % | HEIGHT: 70 IN | BODY MASS INDEX: 38.03 KG/M2 | SYSTOLIC BLOOD PRESSURE: 130 MMHG

## 2022-01-05 DIAGNOSIS — F33.1 MODERATE EPISODE OF RECURRENT MAJOR DEPRESSIVE DISORDER: ICD-10-CM

## 2022-01-05 DIAGNOSIS — R41.841 COGNITIVE COMMUNICATION DEFICIT: ICD-10-CM

## 2022-01-05 DIAGNOSIS — R21 RASH OF BODY: Primary | ICD-10-CM

## 2022-01-05 PROCEDURE — 1159F MED LIST DOCD IN RCRD: CPT | Mod: CPTII,S$GLB,, | Performed by: PHYSICIAN ASSISTANT

## 2022-01-05 PROCEDURE — 99999 PR PBB SHADOW E&M-EST. PATIENT-LVL V: CPT | Mod: PBBFAC,,, | Performed by: PHYSICIAN ASSISTANT

## 2022-01-05 PROCEDURE — 3008F PR BODY MASS INDEX (BMI) DOCUMENTED: ICD-10-PCS | Mod: CPTII,S$GLB,, | Performed by: PHYSICIAN ASSISTANT

## 2022-01-05 PROCEDURE — 99999 PR PBB SHADOW E&M-EST. PATIENT-LVL V: ICD-10-PCS | Mod: PBBFAC,,, | Performed by: PHYSICIAN ASSISTANT

## 2022-01-05 PROCEDURE — 3079F DIAST BP 80-89 MM HG: CPT | Mod: CPTII,S$GLB,, | Performed by: PHYSICIAN ASSISTANT

## 2022-01-05 PROCEDURE — 97129 THER IVNTJ 1ST 15 MIN: CPT | Mod: PN

## 2022-01-05 PROCEDURE — 99213 PR OFFICE/OUTPT VISIT, EST, LEVL III, 20-29 MIN: ICD-10-PCS | Mod: S$GLB,,, | Performed by: PHYSICIAN ASSISTANT

## 2022-01-05 PROCEDURE — 3075F PR MOST RECENT SYSTOLIC BLOOD PRESS GE 130-139MM HG: ICD-10-PCS | Mod: CPTII,S$GLB,, | Performed by: PHYSICIAN ASSISTANT

## 2022-01-05 PROCEDURE — 3075F SYST BP GE 130 - 139MM HG: CPT | Mod: CPTII,S$GLB,, | Performed by: PHYSICIAN ASSISTANT

## 2022-01-05 PROCEDURE — 3008F BODY MASS INDEX DOCD: CPT | Mod: CPTII,S$GLB,, | Performed by: PHYSICIAN ASSISTANT

## 2022-01-05 PROCEDURE — 97130 THER IVNTJ EA ADDL 15 MIN: CPT | Mod: PN

## 2022-01-05 PROCEDURE — 99213 OFFICE O/P EST LOW 20 MIN: CPT | Mod: S$GLB,,, | Performed by: PHYSICIAN ASSISTANT

## 2022-01-05 PROCEDURE — 3079F PR MOST RECENT DIASTOLIC BLOOD PRESSURE 80-89 MM HG: ICD-10-PCS | Mod: CPTII,S$GLB,, | Performed by: PHYSICIAN ASSISTANT

## 2022-01-05 PROCEDURE — 1159F PR MEDICATION LIST DOCUMENTED IN MEDICAL RECORD: ICD-10-PCS | Mod: CPTII,S$GLB,, | Performed by: PHYSICIAN ASSISTANT

## 2022-01-05 NOTE — PROGRESS NOTES
[facility]  Speech Therapy Treatment Note- Neurological Rehabilitation   PROGRESS NOTE  Name: García Tang   MRN: [id number]   Therapy Diagnosis:   Encounter Diagnosis   Name Primary?    Cognitive communication deficit    Physician: LORETTA Domingo MD  Physician Orders: AMB REFERRAL/CONSULT TO SPEECH THERAPY  Medical Diagnosis: Post-COVID chronic neurologic symptoms    Visit #/ Visits Authorized:  1/20  Date of Evaluation: 12/10/2021  Insurance Authorization Period: 1/1/2022 - 12/31/2022  Plan of Care Expiration Date:  1/28/2022  Extended Plan of Care: n/a  Progress Note: 1/7/2022 - today's note  Visits Cancelled: 0  Visits No Show: 0    Time In: 11:18AM  Time Out: 12:00PM  Total Billable Time: 43 minutes    Precautions: Standard  Subjective:   Pt reports:I think my skills are getting better. I have been trying to read as much as possible.     He was compliant to home exercise program.   Response to previous treatment: tolerated well   Pain Scale:  0/10 on a Visual Analog Scale currently.   Pain Location: none indicated  Objective:   TIMED  Procedure Min.   Cognitive Therapeutic Interventions, first 15 minutes CPT 84871  15   Cognitive Therapeutic Interventions, each additional 15 minutes CPT 92169  30         Total Timed Units: 3  Charges Billed/Number of units: 3/3    Short Term Goals: (3weeks) Current Progress:   1. Pt will recall 3/4 memory strategies independently 3 times overall.    Progressing/ Not Met 1/5/2022   Recalled 1/4 independently today.  See education below       2. Pt will recall 3/3 units after a 30 minute delay in three sessions.     Progressing/ Not Met 1/5/2022   Pt recalled 3/3 memory strategies following 30 minute delay.    3. Pt will complete moderate level auditory memory tasks with 80% accuracy independently.    Progressing/ Not Met 1/5/2022   Not formally addressed on this date    4. Pt will complete moderate level visual memory tasks with 80% accuracy  independently.    Progressing/ Not Met 1/5/2022   Not formally addressed on this date   5. Pt will complete remaining portions of [employer].     Met 12/15/2021 Completed 12/15/2021   6. Pt will complete sections of the FAVRES assessment for further assessment of executive function skills (I.e. Task 2).      Progressing/ Not Met 1/5/2022   Completed 12/15/2021   7. Pt will sustain attention to a moderate task for 10 minutes independently in a minimally distracting environment.    Progressing/Not Met 1/5/2022 Pt completed moderate level deductive reasoning puzzle for 10 minutes in a minimally distracting environment.     Met x1   8. Pt will alternate attention between 2 moderate task for 5 minutes while maintaining 90% accuracy.    Progressing/Not Met 1/5/2022 Pt completed 2 tasks (completing a schedule and focused attention task) for 5 minutes with 1 minute interval for each task with 3 errors.        9. Pt will divide attention between 2 moderate tasks for 5 minutes while maintaining 90% accuracy.    Progressing/Not Met 1/5/2022 Not addressed in today's session.     10. Pt will complete moderate level problem solving task with 90% accuracy independently.     Progressing/Not Met 1/5/2022 Pt completed moderate level problem solving task with 50% accuracy independently.        Patient Education/Response:   Reviewed compensatory memory strategies:  write, repeat, associate, and picture.   Discussed attention strategy of scheduling breaks    Home program established: yes-pt will schedule breaks during high level reading comprehension activities at home following 10 minute intervals  Exercises were reviewed and Brad was able to demonstrate them prior to the end of the session.  Brad demonstrated good  understanding of the education provided.     See Electronic Medical Record under Patient Instructions for exercises provided throughout therapy.  Assessment:   PROGRESS NOTE: Brad is progressing well towards his goals. Patient  demonstrates improvements with sustained attention and delayed recall on this date. He continues to benefit from review of compensatory memory and attention strategies. Current goals remain appropriate. Goals to be updated as necessary.     Pt prognosis is Good. Pt will continue to benefit from skilled outpatient speech and language therapy to address the deficits listed in the problem list on initial evaluation, provide patient/family education and to maximize patient's level of independence in the home and community environment.   Medical necessity is demonstrated by the following IMPAIRMENTS:  Deficits in executive functioning, attention, memory, problem solving/reasoning prevents the pt from relaying medically and safety relevant information in a timely manner in a state of emergency and place him at risk of unsafe behavior and a decline in quality of life.    Barriers to Therapy: none identified at this time.   Pt's spiritual, cultural and educational needs considered and patient agreeable to plan of care and goals.  Plan:   Continue Plan of Care with focus on attention, memory, processing, and problem solving. Recommend monitoring moderate to complex reading comprehension.     Fide Cevallos CCC-SLP   1/5/2022

## 2022-01-05 NOTE — PROGRESS NOTES
"Subjective:      Patient ID: García Tang is a 56 y.o. male.    Chief Complaint: Rash (Left side of stomach, had for 5-6 days, occasional burning, hx of cellulitis )    HPI   Patient has PMH of cerebral aneurysm with cognitive communication deficit, anxiety, dysthymia, meniere's disease, HTN, dyslipidemia, arteriosclerotic cardiovascular disease, Type 2 DM, and GERD.    Patient reports non-pruritic burning rash on left side of abdomen for 5-6 days.  Using mupirocin on this without resolution of symptoms.  Not vaccinated against shingles.    Review of Systems   Constitutional: Negative for chills and fever.   Respiratory: Negative for shortness of breath.    Cardiovascular: Negative for chest pain.   Skin: Positive for rash.       Objective:   /80   Pulse 62   Ht 5' 10" (1.778 m)   Wt 120.5 kg (265 lb 10.5 oz)   SpO2 97%   BMI 38.12 kg/m²      Physical Exam  Vitals reviewed.   Constitutional:       General: He is not in acute distress.     Appearance: Normal appearance. He is well-developed and well-nourished.   HENT:      Head: Normocephalic and atraumatic.      Right Ear: External ear normal.      Left Ear: External ear normal.   Eyes:      Extraocular Movements: EOM normal.      Conjunctiva/sclera: Conjunctivae normal.   Cardiovascular:      Rate and Rhythm: Normal rate and regular rhythm.      Heart sounds: Normal heart sounds. No murmur heard.  No friction rub. No gallop.    Pulmonary:      Effort: Pulmonary effort is normal. No respiratory distress.      Breath sounds: Normal breath sounds. No wheezing or rales.   Musculoskeletal:         General: Normal range of motion.      Cervical back: Normal range of motion.   Skin:     General: Skin is warm and dry.      Findings: Rash present.          Neurological:      General: No focal deficit present.      Mental Status: He is alert and oriented to person, place, and time.   Psychiatric:         Attention and Perception: Attention normal.         " Mood and Affect: Mood and affect and mood normal.         Speech: Speech normal.         Behavior: Behavior normal.         Judgment: Judgment normal.        Assessment:      1. Rash of body    2. Moderate episode of recurrent major depressive disorder       Plan:   1. Rash of body  Try Cortaid or Calamine lotion on rash.  Let us know if it continues to bother you or spread.    2. Moderate episode of recurrent major depressive disorder  Dr. Easton manages this.    Follow up as needed.  Patient agreed with plan and expressed understanding.    Thank you for allowing me to serve you,

## 2022-01-05 NOTE — PATIENT INSTRUCTIONS
Try Cortaid or Calamine lotion on rash.  Let us know if it continues to bother you or spread.    Thanks for seeing me,  Carmen Villaseñor PA-C

## 2022-01-08 PROBLEM — F33.1 MODERATE EPISODE OF RECURRENT MAJOR DEPRESSIVE DISORDER: Status: ACTIVE | Noted: 2022-01-08

## 2022-01-11 ENCOUNTER — TELEPHONE (OUTPATIENT)
Dept: GASTROENTEROLOGY | Facility: CLINIC | Age: 57
End: 2022-01-11
Payer: COMMERCIAL

## 2022-01-12 ENCOUNTER — CLINICAL SUPPORT (OUTPATIENT)
Dept: REHABILITATION | Facility: HOSPITAL | Age: 57
End: 2022-01-12
Payer: COMMERCIAL

## 2022-01-12 DIAGNOSIS — R41.841 COGNITIVE COMMUNICATION DEFICIT: ICD-10-CM

## 2022-01-12 PROCEDURE — 97130 THER IVNTJ EA ADDL 15 MIN: CPT | Mod: PN

## 2022-01-12 PROCEDURE — 97129 THER IVNTJ 1ST 15 MIN: CPT | Mod: PN

## 2022-01-12 NOTE — PROGRESS NOTES
"[facility]  Speech Therapy Treatment Note- Neurological Rehabilitation     Name: García Tang   MRN: [id number]   Therapy Diagnosis:   Encounter Diagnosis   Name Primary?    Cognitive communication deficit    Physician: LORETTA Domingo MD  Physician Orders: AMB REFERRAL/CONSULT TO SPEECH THERAPY  Medical Diagnosis: Post-COVID chronic neurologic symptoms    Visit #/ Visits Authorized: 2/20  Date of Evaluation: 12/10/2021  Insurance Authorization Period: 1/1/2022 - 12/31/2022  Plan of Care Expiration Date:  1/28/2022  Extended Plan of Care: n/a  Progress Note: 2/2/2022  Visits Cancelled: 0  Visits No Show: 0    Time In: 9:45AM  Time Out: 10:30AM  Total Billable Time: 45 minutes    Precautions: Standard  Subjective:   Pt reports: Pt reports he has been reading and doing "thinking" task workbooks at home.  Pt reports listening to music sometimes helps with tuning out the tinnitus, thus improves his ability to concentrate.    Pt also states he still is not sleeping well.     He was compliant to home exercise program.   Response to previous treatment: tolerated well   Pain Scale:  0/10 on a Visual Analog Scale currently.   Pain Location: none indicated  Objective:   TIMED  Procedure Min.   Cognitive Therapeutic Interventions, first 15 minutes CPT 12452  15   Cognitive Therapeutic Interventions, each additional 15 minutes CPT 69637  30         Total Timed Units: 3  Charges Billed/Number of units: 3/3    Short Term Goals: (3weeks) Current Progress:   1. Pt will recall 3/4 memory strategies independently 3 times overall.    Progressing/ Not Met 1/12/2022   Recalled 4/4 independently.      Met 1/3   2. Pt will recall 3/3 units after a 30 minute delay in three sessions.     Progressing/ Not Met 1/12/2022   3/3 after 30 minutes       Met 1/3   3. Pt will complete moderate level auditory memory tasks with 80% accuracy independently.    Progressing/ Not Met 1/12/2022   Not formally addressed on this date  "   4. Pt will complete moderate level visual memory tasks with 80% accuracy independently.    Progressing/ Not Met 1/12/2022   Pt identified the word that was two cards prior with 50% accuracy.    5. Pt will complete remaining portions of SCCAN.     Met 12/15/2021 Met    6. Pt will complete sections of the FAVRES assessment for further assessment of executive function skills (I.e. Task 2).      Met 12/15/2021 Met   7. Pt will sustain attention to a moderate task for 10 minutes independently in a minimally distracting environment.    Progressing/Not Met 1/12/2022 -Not addressed this session.      Met 1/3   8. Pt will alternate attention between 2 moderate task for 5 minutes while maintaining 90% accuracy.    Progressing/Not Met 1/12/2022 Alternate between color and number = 95% accuracy. Pt self corrected in many opportunities.   Slow processing   15 minutes     9. Pt will divide attention between 2 moderate tasks for 5 minutes while maintaining 90% accuracy.    Progressing/Not Met 1/12/2022 Alternating attention task: connect dots in order of the colors listed and connect them in numerical order = 2 errors with task. However took 15 minutes to complete a task that should have taken approximately 5 minutes.     Met 1/3   10. Pt will complete moderate level problem solving task with 90% accuracy independently.     Progressing/Not Met 1/12/2022 -Not addressed this session.       Patient Education/Response:   Reviewed compensatory memory strategies:  write, repeat, associate, and picture.   Provided brief education about Constant Therapy trae.     Home program established: yes-Provided an [employer] handout with various pictures. Encouraged to work on assoicating three unrelated items.   Exercises were reviewed and Brad was able to demonstrate them prior to the end of the session.  Brad demonstrated good  understanding of the education provided.     See Electronic Medical Record under Patient Instructions for exercises  provided throughout therapy.  Assessment:   Brad is progressing well towards his goals. Improvement with recalling memory strategies and short term recall after using association strategy. Pt with improvements in alternating attention. Overall pt's accuracy within tasks were relatively good, however processing speed is exceptionally slow in comparison to pt's baseline. Current goals remain appropriate. Goals to be updated as necessary.     Pt prognosis is Good. Pt will continue to benefit from skilled outpatient speech and language therapy to address the deficits listed in the problem list on initial evaluation, provide patient/family education and to maximize patient's level of independence in the home and community environment.   Medical necessity is demonstrated by the following IMPAIRMENTS:  Deficits in executive functioning, attention, memory, problem solving/reasoning prevents the pt from relaying medically and safety relevant information in a timely manner in a state of emergency and place him at risk of unsafe behavior and a decline in quality of life.    Barriers to Therapy: none identified at this time.   Pt's spiritual, cultural and educational needs considered and patient agreeable to plan of care and goals.  Plan:   Continue Plan of Care with focus on attention, memory, processing, and problem solving. Recommend monitoring moderate to complex reading comprehension.     Laura Joya, CCC-SLP   1/12/2022

## 2022-01-19 ENCOUNTER — PATIENT MESSAGE (OUTPATIENT)
Dept: FAMILY MEDICINE | Facility: CLINIC | Age: 57
End: 2022-01-19
Payer: COMMERCIAL

## 2022-01-24 ENCOUNTER — TELEPHONE (OUTPATIENT)
Dept: GASTROENTEROLOGY | Facility: CLINIC | Age: 57
End: 2022-01-24
Payer: COMMERCIAL

## 2022-01-24 NOTE — TELEPHONE ENCOUNTER
Several attempts have been made to contact patient with no response. Case request canceled at this time. PCP notified. Phone number provided for call back. Pt will be scheduled from this order upon call back.

## 2022-01-25 ENCOUNTER — TELEPHONE (OUTPATIENT)
Dept: PSYCHIATRY | Facility: CLINIC | Age: 57
End: 2022-01-25
Payer: COMMERCIAL

## 2022-01-25 NOTE — TELEPHONE ENCOUNTER
Pt is in the middle of getting new insurance. Pt states he will call and or sent [employer] message when he would like to schedule again-jl

## 2022-02-02 ENCOUNTER — DOCUMENTATION ONLY (OUTPATIENT)
Dept: REHABILITATION | Facility: HOSPITAL | Age: 57
End: 2022-02-02
Payer: COMMERCIAL

## 2022-02-02 DIAGNOSIS — E11.9 TYPE 2 DIABETES MELLITUS WITHOUT COMPLICATION: ICD-10-CM

## 2022-02-02 NOTE — PROGRESS NOTES
Outpatient Therapy Discharge Summary   Discharge Date: 2/2/2022   Name: García Tang  Clinic Number: [id number]  Therapy Diagnosis: No diagnosis found.  Physician: No ref. provider found  Physician Orders: AMB REFERRAL/CONSULT TO SPEECH THERAPY  Medical Diagnosis: Post-COVID chronic neurologic symptoms  Evaluation Date: 12/10/2021    Date of Last visit: 1/12/2022  Total Visits Received: 2  Cancelled Visits: 2  No Show Visits: 0    Assessment    Assessment of Current Status: Brad was progressing well towards his goals. Improvements were observed with recalling memory strategies and short term recall after using the association strategy. Pt with improvements in alternating attention. Overall pt's accuracy within tasks were relatively good, however processing speed was exceptionally slow in comparison to pt's baseline.     Goals:   Short Term Goals: (3weeks) Current Progress:   1. Pt will recall 3/4 memory strategies independently 3 times overall.    Met 1/3, Progressing/ Not Met 1/12/2022     2. Pt will recall 3/3 units after a 30 minute delay in three sessions.       Met 1/3, Progressing/ Not Met 1/12/2022     3. Pt will complete moderate level auditory memory tasks with 80% accuracy independently.      Progressing/ Not Met 1/12/2022     4. Pt will complete moderate level visual memory tasks with 80% accuracy independently.      Progressing/ Not Met 1/12/2022     5. Pt will complete remaining portions of SCCAN.      Met 12/15/2021 Met    6. Pt will complete sections of the FAVRES assessment for further assessment of executive function skills (I.e. Task 2).       Met 12/15/2021 Met   7. Pt will sustain attention to a moderate task for 10 minutes independently in a minimally distracting environment.      Met 1/3, Progressing/Not Met 1/12/2022   8. Pt will alternate attention between 2 moderate task for 5 minutes while maintaining 90% accuracy.      Progressing/Not Met 1/12/2022      9. Pt will divide attention  between 2 moderate tasks for 5 minutes while maintaining 90% accuracy.      Met 1/3, Progressing/Not Met 1/12/2022   10. Pt will complete moderate level problem solving task with 90% accuracy independently.       Progressing/Not Met 1/12/2022      Long Term Goals:   He will maintain functional cognitive-linguistic based skills and utilize compensatory strategies to communicate wants and needs effectively to different conversational partners, maintain safety, and participate socially in functional living environment.     Discharge reason: Patient has not attended therapy since 1/12/2022    Plan   This patient is discharged from Speech Therapy    Fide Cevallos CCC-SLP   2/2/2022

## 2022-02-07 ENCOUNTER — TELEPHONE (OUTPATIENT)
Dept: NEUROLOGY | Facility: CLINIC | Age: 57
End: 2022-02-07
Payer: COMMERCIAL

## 2022-02-07 ENCOUNTER — PATIENT OUTREACH (OUTPATIENT)
Dept: ADMINISTRATIVE | Facility: HOSPITAL | Age: 57
End: 2022-02-07
Payer: COMMERCIAL

## 2022-02-25 ENCOUNTER — TELEPHONE (OUTPATIENT)
Dept: ORTHOPEDICS | Facility: CLINIC | Age: 57
End: 2022-02-25
Payer: COMMERCIAL

## 2022-03-11 ENCOUNTER — TELEPHONE (OUTPATIENT)
Dept: FAMILY MEDICINE | Facility: CLINIC | Age: 57
End: 2022-03-11
Payer: COMMERCIAL

## 2022-03-11 NOTE — TELEPHONE ENCOUNTER
----- Message from Johana Rowell sent at 3/11/2022 10:02 AM CST -----  Contact: Self  Patient is requesting a call back in regards to his Booster Shot he received in October. Call back at [phone] (home) wants to know if he should have a 4th. Tried to explain but wants to hear it from you.

## 2022-04-12 ENCOUNTER — IMMUNIZATION (OUTPATIENT)
Dept: FAMILY MEDICINE | Facility: CLINIC | Age: 57
End: 2022-04-12
Payer: COMMERCIAL

## 2022-04-12 DIAGNOSIS — Z23 NEED FOR VACCINATION: Primary | ICD-10-CM

## 2022-04-12 PROCEDURE — 91305 COVID-19, MRNA, LNP-S, PF, 30 MCG/0.3 ML DOSE VACCINE (PFIZER): CPT | Mod: PBBFAC | Performed by: FAMILY MEDICINE

## 2022-04-13 ENCOUNTER — OFFICE VISIT (OUTPATIENT)
Dept: FAMILY MEDICINE | Facility: CLINIC | Age: 57
End: 2022-04-13
Payer: COMMERCIAL

## 2022-04-13 VITALS
SYSTOLIC BLOOD PRESSURE: 116 MMHG | BODY MASS INDEX: 37.34 KG/M2 | HEART RATE: 57 BPM | HEIGHT: 70 IN | DIASTOLIC BLOOD PRESSURE: 86 MMHG | OXYGEN SATURATION: 99 % | WEIGHT: 260.81 LBS

## 2022-04-13 DIAGNOSIS — Z12.11 COLON CANCER SCREENING: ICD-10-CM

## 2022-04-13 DIAGNOSIS — Z00.00 WELLNESS EXAMINATION: Primary | ICD-10-CM

## 2022-04-13 PROCEDURE — 3074F PR MOST RECENT SYSTOLIC BLOOD PRESSURE < 130 MM HG: ICD-10-PCS | Mod: CPTII,S$GLB,, | Performed by: FAMILY MEDICINE

## 2022-04-13 PROCEDURE — 99396 PR PREVENTIVE VISIT,EST,40-64: ICD-10-PCS | Mod: S$GLB,,, | Performed by: FAMILY MEDICINE

## 2022-04-13 PROCEDURE — 99396 PREV VISIT EST AGE 40-64: CPT | Mod: S$GLB,,, | Performed by: FAMILY MEDICINE

## 2022-04-13 PROCEDURE — 1159F MED LIST DOCD IN RCRD: CPT | Mod: CPTII,S$GLB,, | Performed by: FAMILY MEDICINE

## 2022-04-13 PROCEDURE — 3008F PR BODY MASS INDEX (BMI) DOCUMENTED: ICD-10-PCS | Mod: CPTII,S$GLB,, | Performed by: FAMILY MEDICINE

## 2022-04-13 PROCEDURE — 1160F PR REVIEW ALL MEDS BY PRESCRIBER/CLIN PHARMACIST DOCUMENTED: ICD-10-PCS | Mod: CPTII,S$GLB,, | Performed by: FAMILY MEDICINE

## 2022-04-13 PROCEDURE — 99999 PR PBB SHADOW E&M-EST. PATIENT-LVL IV: CPT | Mod: PBBFAC,,, | Performed by: FAMILY MEDICINE

## 2022-04-13 PROCEDURE — 3079F PR MOST RECENT DIASTOLIC BLOOD PRESSURE 80-89 MM HG: ICD-10-PCS | Mod: CPTII,S$GLB,, | Performed by: FAMILY MEDICINE

## 2022-04-13 PROCEDURE — 1160F RVW MEDS BY RX/DR IN RCRD: CPT | Mod: CPTII,S$GLB,, | Performed by: FAMILY MEDICINE

## 2022-04-13 PROCEDURE — 1159F PR MEDICATION LIST DOCUMENTED IN MEDICAL RECORD: ICD-10-PCS | Mod: CPTII,S$GLB,, | Performed by: FAMILY MEDICINE

## 2022-04-13 PROCEDURE — 3008F BODY MASS INDEX DOCD: CPT | Mod: CPTII,S$GLB,, | Performed by: FAMILY MEDICINE

## 2022-04-13 PROCEDURE — 99999 PR PBB SHADOW E&M-EST. PATIENT-LVL IV: ICD-10-PCS | Mod: PBBFAC,,, | Performed by: FAMILY MEDICINE

## 2022-04-13 PROCEDURE — 3079F DIAST BP 80-89 MM HG: CPT | Mod: CPTII,S$GLB,, | Performed by: FAMILY MEDICINE

## 2022-04-13 PROCEDURE — 3074F SYST BP LT 130 MM HG: CPT | Mod: CPTII,S$GLB,, | Performed by: FAMILY MEDICINE

## 2022-04-13 NOTE — PROGRESS NOTES
"Subjective:       Patient ID: García Tang is a 56 y.o. male.    Chief Complaint: Routine Health Maintenance    RUBEN oDmingo MD.  The pt presents for annual wellness exam.  The pt is still dealing with long COVID--manisha neuro symptoms.  He was going to COVID neuro rehab but his insurance situation changed.  He resigned from his work and is self employed but is not doing much.  However he now has insurance again.  He is unsure if he wants to get back in to the program.  The pt does think his anxious is better.  He thinks that the memory and "brain fog" issues are a little better.  The pt says he has watched his diet and lost weight.  He reports good glucose values.  His last Hba1c was 5.4 and he is on no DM meds.  (he had Hba1c of >12 two years ago)  The pt has difficulty sleeping.  He takes melatonin--he can fall asleep but has trouble going back to sleep if he wakes up.  He says his wife says he does not snore manisha since he lost weight (starting weight 319--260 pounds today).    Review of Systems   Constitutional: Positive for fatigue. Negative for activity change, appetite change and unexpected weight change.   Eyes: Negative for visual disturbance.   Respiratory: Negative for cough, chest tightness and shortness of breath.    Cardiovascular: Negative for chest pain, palpitations and leg swelling.   Gastrointestinal: Negative for abdominal pain, constipation, diarrhea, nausea and vomiting.   Endocrine: Negative for cold intolerance, heat intolerance and polyuria.   Genitourinary: Negative for decreased urine volume and dysuria.   Musculoskeletal: Negative for arthralgias and back pain.   Skin: Negative for rash.   Neurological: Negative for numbness and headaches.   Psychiatric/Behavioral: Positive for decreased concentration and sleep disturbance. Negative for dysphoric mood. The patient is not nervous/anxious.        Objective:       Vitals:    04/13/22 1042   BP: 116/86   Pulse: (!) 57   SpO2: 99%   Weight: " "118.3 kg (260 lb 12.9 oz)   Height: 5' 10" (1.778 m)     Physical Exam  Constitutional:       Appearance: Normal appearance. He is obese. He is not ill-appearing.   HENT:      Mouth/Throat:      Mouth: Mucous membranes are moist.      Pharynx: Oropharynx is clear.   Eyes:      Extraocular Movements: Extraocular movements intact.      Pupils: Pupils are equal, round, and reactive to light.   Neck:      Vascular: No carotid bruit.   Cardiovascular:      Rate and Rhythm: Normal rate and regular rhythm.      Pulses: Normal pulses.      Heart sounds: Normal heart sounds.   Pulmonary:      Effort: Pulmonary effort is normal.      Breath sounds: Normal breath sounds.   Abdominal:      General: There is no distension.      Palpations: Abdomen is soft. There is no mass.      Tenderness: There is no abdominal tenderness. There is no right CVA tenderness or left CVA tenderness.   Musculoskeletal:         General: No tenderness. Normal range of motion.      Cervical back: No tenderness.   Lymphadenopathy:      Cervical: No cervical adenopathy.   Skin:     General: Skin is warm and dry.   Neurological:      General: No focal deficit present.      Mental Status: He is alert and oriented to person, place, and time. Mental status is at baseline.   Psychiatric:         Mood and Affect: Mood normal.         Behavior: Behavior normal.         Assessment:       1. Wellness examination    2. Colon cancer screening        Plan:       García was seen today for routine health maintenance.    Diagnoses and all orders for this visit:    Wellness examination  -     Comprehensive Metabolic Panel; Future  -     Hemoglobin A1C; Future  -     Lipid Panel; Future    Colon cancer screening  -     Case Request Endoscopy: COLONOSCOPY      During this visit, I reviewed the pt's history, medications, allergies, and problem list.        "

## 2022-04-14 ENCOUNTER — LAB VISIT (OUTPATIENT)
Dept: LAB | Facility: HOSPITAL | Age: 57
End: 2022-04-14
Attending: FAMILY MEDICINE
Payer: COMMERCIAL

## 2022-04-14 ENCOUNTER — TELEPHONE (OUTPATIENT)
Dept: GASTROENTEROLOGY | Facility: CLINIC | Age: 57
End: 2022-04-14
Payer: COMMERCIAL

## 2022-04-14 DIAGNOSIS — Z00.00 WELLNESS EXAMINATION: ICD-10-CM

## 2022-04-14 LAB
ALBUMIN SERPL BCP-MCNC: 4.1 G/DL (ref 3.5–5.2)
ALP SERPL-CCNC: 61 U/L (ref 55–135)
ALT SERPL W/O P-5'-P-CCNC: 20 U/L (ref 10–44)
ANION GAP SERPL CALC-SCNC: 11 MMOL/L (ref 8–16)
AST SERPL-CCNC: 22 U/L (ref 10–40)
BILIRUB SERPL-MCNC: 0.7 MG/DL (ref 0.1–1)
BUN SERPL-MCNC: 15 MG/DL (ref 6–20)
CALCIUM SERPL-MCNC: 9.7 MG/DL (ref 8.7–10.5)
CHLORIDE SERPL-SCNC: 103 MMOL/L (ref 95–110)
CHOLEST SERPL-MCNC: 157 MG/DL (ref 120–199)
CHOLEST/HDLC SERPL: 3.8 {RATIO} (ref 2–5)
CO2 SERPL-SCNC: 26 MMOL/L (ref 23–29)
CREAT SERPL-MCNC: 0.9 MG/DL (ref 0.5–1.4)
EST. GFR  (AFRICAN AMERICAN): >60 ML/MIN/1.73 M^2
EST. GFR  (NON AFRICAN AMERICAN): >60 ML/MIN/1.73 M^2
ESTIMATED AVG GLUCOSE: 108 MG/DL (ref 68–131)
GLUCOSE SERPL-MCNC: 102 MG/DL (ref 70–110)
HBA1C MFR BLD: 5.4 % (ref 4–5.6)
HDLC SERPL-MCNC: 41 MG/DL (ref 40–75)
HDLC SERPL: 26.1 % (ref 20–50)
LDLC SERPL CALC-MCNC: 103.6 MG/DL (ref 63–159)
NONHDLC SERPL-MCNC: 116 MG/DL
POTASSIUM SERPL-SCNC: 4.3 MMOL/L (ref 3.5–5.1)
PROT SERPL-MCNC: 7.1 G/DL (ref 6–8.4)
SODIUM SERPL-SCNC: 140 MMOL/L (ref 136–145)
TRIGL SERPL-MCNC: 62 MG/DL (ref 30–150)

## 2022-04-14 PROCEDURE — 83036 HEMOGLOBIN GLYCOSYLATED A1C: CPT | Performed by: FAMILY MEDICINE

## 2022-04-14 PROCEDURE — 36415 COLL VENOUS BLD VENIPUNCTURE: CPT | Mod: PO | Performed by: FAMILY MEDICINE

## 2022-04-14 PROCEDURE — 80061 LIPID PANEL: CPT | Performed by: FAMILY MEDICINE

## 2022-04-14 PROCEDURE — 80053 COMPREHEN METABOLIC PANEL: CPT | Performed by: FAMILY MEDICINE

## 2022-05-09 ENCOUNTER — PATIENT MESSAGE (OUTPATIENT)
Dept: SMOKING CESSATION | Facility: CLINIC | Age: 57
End: 2022-05-09
Payer: COMMERCIAL

## 2022-05-10 ENCOUNTER — OFFICE VISIT (OUTPATIENT)
Dept: OPHTHALMOLOGY | Facility: CLINIC | Age: 57
End: 2022-05-10
Payer: COMMERCIAL

## 2022-05-10 DIAGNOSIS — E11.9 DIABETES MELLITUS TYPE 2 WITHOUT RETINOPATHY: Primary | ICD-10-CM

## 2022-05-10 DIAGNOSIS — H40.013 OPEN ANGLE WITH BORDERLINE FINDINGS AND LOW GLAUCOMA RISK IN BOTH EYES: ICD-10-CM

## 2022-05-10 PROCEDURE — 1160F RVW MEDS BY RX/DR IN RCRD: CPT | Mod: CPTII,S$GLB,, | Performed by: OPHTHALMOLOGY

## 2022-05-10 PROCEDURE — 99999 PR PBB SHADOW E&M-EST. PATIENT-LVL III: CPT | Mod: PBBFAC,,, | Performed by: OPHTHALMOLOGY

## 2022-05-10 PROCEDURE — 92015 DETERMINE REFRACTIVE STATE: CPT | Mod: S$GLB,,, | Performed by: OPHTHALMOLOGY

## 2022-05-10 PROCEDURE — 3044F HG A1C LEVEL LT 7.0%: CPT | Mod: CPTII,S$GLB,, | Performed by: OPHTHALMOLOGY

## 2022-05-10 PROCEDURE — 1160F PR REVIEW ALL MEDS BY PRESCRIBER/CLIN PHARMACIST DOCUMENTED: ICD-10-PCS | Mod: CPTII,S$GLB,, | Performed by: OPHTHALMOLOGY

## 2022-05-10 PROCEDURE — 92133 CPTRZD OPH DX IMG PST SGM ON: CPT | Mod: S$GLB,,, | Performed by: OPHTHALMOLOGY

## 2022-05-10 PROCEDURE — 99999 PR PBB SHADOW E&M-EST. PATIENT-LVL III: ICD-10-PCS | Mod: PBBFAC,,, | Performed by: OPHTHALMOLOGY

## 2022-05-10 PROCEDURE — 2023F DILAT RTA XM W/O RTNOPTHY: CPT | Mod: CPTII,S$GLB,, | Performed by: OPHTHALMOLOGY

## 2022-05-10 PROCEDURE — 92004 COMPRE OPH EXAM NEW PT 1/>: CPT | Mod: S$GLB,,, | Performed by: OPHTHALMOLOGY

## 2022-05-10 PROCEDURE — 2023F PR DILATED RETINAL EXAM W/O EVID OF RETINOPATHY: ICD-10-PCS | Mod: CPTII,S$GLB,, | Performed by: OPHTHALMOLOGY

## 2022-05-10 PROCEDURE — 92015 PR REFRACTION: ICD-10-PCS | Mod: S$GLB,,, | Performed by: OPHTHALMOLOGY

## 2022-05-10 PROCEDURE — 1159F PR MEDICATION LIST DOCUMENTED IN MEDICAL RECORD: ICD-10-PCS | Mod: CPTII,S$GLB,, | Performed by: OPHTHALMOLOGY

## 2022-05-10 PROCEDURE — 3044F PR MOST RECENT HEMOGLOBIN A1C LEVEL <7.0%: ICD-10-PCS | Mod: CPTII,S$GLB,, | Performed by: OPHTHALMOLOGY

## 2022-05-10 PROCEDURE — 92004 PR EYE EXAM, NEW PATIENT,COMPREHESV: ICD-10-PCS | Mod: S$GLB,,, | Performed by: OPHTHALMOLOGY

## 2022-05-10 PROCEDURE — 1159F MED LIST DOCD IN RCRD: CPT | Mod: CPTII,S$GLB,, | Performed by: OPHTHALMOLOGY

## 2022-05-10 PROCEDURE — 92133 POSTERIOR SEGMENT OCT OPTIC NERVE(OCULAR COHERENCE TOMOGRAPHY) - OU - BOTH EYES: ICD-10-PCS | Mod: S$GLB,,, | Performed by: OPHTHALMOLOGY

## 2022-05-10 NOTE — PROGRESS NOTES
HPI     Diabetic Eye Exam      Additional comments: DM eye exam              Comments     DLS: 5/24/21    Pt states seeing ok at this time. Was told by [facility] that he   might have some thinning optic nerve. No floaters or flashes.     LBSL: does not ck dialy  Hemoglobin A1C       Date                     Value               Ref Range             Status                04/14/2022               5.4                 4.0 - 5.6 %           Final                 10/22/2021               5.4                 4.0 - 5.6 %           Final                 10/30/2020               5.3                 4.0 - 5.6 %           Final                    Last edited by Cheryle Quintana on 5/10/2022  2:34 PM. (History)        ROS     Negative for: Constitutional, Gastrointestinal, Neurological, Skin,   Genitourinary, Musculoskeletal, HENT, Endocrine, Cardiovascular, Eyes,   Respiratory, Psychiatric, Allergic/Imm, Heme/Lymph    Last edited by Vikas Tabor Jr., MD on 5/10/2022  3:00 PM. (History)        Assessment /Plan     For exam results, see Encounter Report.    Diabetes mellitus type 2 without retinopathy    Open angle with borderline findings and low glaucoma risk in both eyes  -     Posterior Segment OCT Optic Nerve- Both eyes      -no retinopathy seen on DFE today  -continue good blood pressure and glucose control  OCT ON OD WNL OS borderline temporal thinning  Rx for glasses given to patient  Follow up in about 6 months (around 11/10/2022) for HVF,Gonio, Pachy.

## 2022-06-01 ENCOUNTER — PATIENT MESSAGE (OUTPATIENT)
Dept: FAMILY MEDICINE | Facility: CLINIC | Age: 57
End: 2022-06-01
Payer: COMMERCIAL

## 2022-06-08 ENCOUNTER — OFFICE VISIT (OUTPATIENT)
Dept: FAMILY MEDICINE | Facility: CLINIC | Age: 57
End: 2022-06-08
Payer: COMMERCIAL

## 2022-06-08 VITALS
DIASTOLIC BLOOD PRESSURE: 78 MMHG | HEART RATE: 70 BPM | BODY MASS INDEX: 38.76 KG/M2 | OXYGEN SATURATION: 98 % | WEIGHT: 270.75 LBS | SYSTOLIC BLOOD PRESSURE: 120 MMHG | HEIGHT: 70 IN

## 2022-06-08 DIAGNOSIS — H69.92 DYSFUNCTION OF LEFT EUSTACHIAN TUBE: ICD-10-CM

## 2022-06-08 DIAGNOSIS — S39.011A STRAIN OF MUSCLE OF RIGHT GROIN REGION: ICD-10-CM

## 2022-06-08 DIAGNOSIS — M46.1 SI (SACROILIAC) JOINT INFLAMMATION: Primary | ICD-10-CM

## 2022-06-08 PROCEDURE — 3044F HG A1C LEVEL LT 7.0%: CPT | Mod: CPTII,S$GLB,, | Performed by: FAMILY MEDICINE

## 2022-06-08 PROCEDURE — 99214 PR OFFICE/OUTPT VISIT, EST, LEVL IV, 30-39 MIN: ICD-10-PCS | Mod: S$GLB,,, | Performed by: FAMILY MEDICINE

## 2022-06-08 PROCEDURE — 3078F DIAST BP <80 MM HG: CPT | Mod: CPTII,S$GLB,, | Performed by: FAMILY MEDICINE

## 2022-06-08 PROCEDURE — 3074F SYST BP LT 130 MM HG: CPT | Mod: CPTII,S$GLB,, | Performed by: FAMILY MEDICINE

## 2022-06-08 PROCEDURE — 3078F PR MOST RECENT DIASTOLIC BLOOD PRESSURE < 80 MM HG: ICD-10-PCS | Mod: CPTII,S$GLB,, | Performed by: FAMILY MEDICINE

## 2022-06-08 PROCEDURE — 3044F PR MOST RECENT HEMOGLOBIN A1C LEVEL <7.0%: ICD-10-PCS | Mod: CPTII,S$GLB,, | Performed by: FAMILY MEDICINE

## 2022-06-08 PROCEDURE — 1160F RVW MEDS BY RX/DR IN RCRD: CPT | Mod: CPTII,S$GLB,, | Performed by: FAMILY MEDICINE

## 2022-06-08 PROCEDURE — 3074F PR MOST RECENT SYSTOLIC BLOOD PRESSURE < 130 MM HG: ICD-10-PCS | Mod: CPTII,S$GLB,, | Performed by: FAMILY MEDICINE

## 2022-06-08 PROCEDURE — 1159F MED LIST DOCD IN RCRD: CPT | Mod: CPTII,S$GLB,, | Performed by: FAMILY MEDICINE

## 2022-06-08 PROCEDURE — 99214 OFFICE O/P EST MOD 30 MIN: CPT | Mod: S$GLB,,, | Performed by: FAMILY MEDICINE

## 2022-06-08 PROCEDURE — 3008F BODY MASS INDEX DOCD: CPT | Mod: CPTII,S$GLB,, | Performed by: FAMILY MEDICINE

## 2022-06-08 PROCEDURE — 99999 PR PBB SHADOW E&M-EST. PATIENT-LVL III: ICD-10-PCS | Mod: PBBFAC,,, | Performed by: FAMILY MEDICINE

## 2022-06-08 PROCEDURE — 1159F PR MEDICATION LIST DOCUMENTED IN MEDICAL RECORD: ICD-10-PCS | Mod: CPTII,S$GLB,, | Performed by: FAMILY MEDICINE

## 2022-06-08 PROCEDURE — 3008F PR BODY MASS INDEX (BMI) DOCUMENTED: ICD-10-PCS | Mod: CPTII,S$GLB,, | Performed by: FAMILY MEDICINE

## 2022-06-08 PROCEDURE — 1160F PR REVIEW ALL MEDS BY PRESCRIBER/CLIN PHARMACIST DOCUMENTED: ICD-10-PCS | Mod: CPTII,S$GLB,, | Performed by: FAMILY MEDICINE

## 2022-06-08 PROCEDURE — 99999 PR PBB SHADOW E&M-EST. PATIENT-LVL III: CPT | Mod: PBBFAC,,, | Performed by: FAMILY MEDICINE

## 2022-06-08 RX ORDER — KETOROLAC TROMETHAMINE 10 MG/1
10 TABLET, FILM COATED ORAL EVERY 6 HOURS PRN
Qty: 20 TABLET | Refills: 0 | Status: SHIPPED | OUTPATIENT
Start: 2022-06-08 | End: 2022-06-13

## 2022-06-08 NOTE — PROGRESS NOTES
"Subjective:       Patient ID: García Tang is a 56 y.o. male.    Chief Complaint: Back Pain    Pt is known to me.  The pt played basketball 4 days ago (Sunday) and awoke Monday with significant right low back pain that radiated to his lower leg and groin.  It is worse with movement.  It is a little better today.  He has been taking ibuprofen.  This is a recurrent problem.  The pt reports that he was "trying to clear his ear"--he pulled down on his external ear.  For a couple of hours he lost about "80% hearing " in his left hear.  It is better today.  There has been no ear pain.  The pt still is having difficulty concentrating as a part of long COVID.    Review of Systems   Constitutional: Positive for fatigue. Negative for activity change, appetite change and unexpected weight change.   HENT: Positive for hearing loss.    Eyes: Negative for visual disturbance.   Respiratory: Negative for cough, chest tightness and shortness of breath.    Cardiovascular: Negative for chest pain, palpitations and leg swelling.   Gastrointestinal: Negative for abdominal pain, constipation, diarrhea, nausea and vomiting.   Endocrine: Negative for cold intolerance, heat intolerance and polyuria.   Genitourinary: Negative for decreased urine volume and dysuria.   Musculoskeletal: Positive for back pain. Negative for arthralgias.   Skin: Negative for rash.   Neurological: Negative for numbness and headaches.   Psychiatric/Behavioral: Positive for decreased concentration and sleep disturbance (getting better). Negative for dysphoric mood. The patient is not nervous/anxious.        Objective:      Physical Exam  Vitals and nursing note reviewed.   Constitutional:       General: He is not in acute distress.     Appearance: Normal appearance. He is obese. He is not ill-appearing.   Cardiovascular:      Rate and Rhythm: Normal rate and regular rhythm.      Pulses: Normal pulses.      Heart sounds: Normal heart sounds.   Pulmonary:      " Effort: Pulmonary effort is normal.      Breath sounds: Normal breath sounds.   Musculoskeletal:         General: Tenderness (right SI joint) present. Normal range of motion.      Comments: Pain with rotation of hip--mostly in right groin   Neurological:      General: No focal deficit present.      Mental Status: He is alert.      Sensory: No sensory deficit.      Coordination: Coordination normal.      Gait: Gait normal.      Deep Tendon Reflexes: Reflexes normal.         Assessment:       1. SI (sacroiliac) joint inflammation    2. Strain of muscle of right groin region    3. Dysfunction of left eustachian tube        Plan:       García was seen today for back pain.    Diagnoses and all orders for this visit:    SI (sacroiliac) joint inflammation  -     ketorolac (TORADOL) 10 mg tablet; Take 1 tablet (10 mg total) by mouth every 6 (six) hours as needed for Pain.    Strain of muscle of right groin region  -     ketorolac (TORADOL) 10 mg tablet; Take 1 tablet (10 mg total) by mouth every 6 (six) hours as needed for Pain.    Dysfunction of left eustachian tube      During this visit, I reviewed the pt's history, medications, allergies, and problem list.      Pt wants to avoid steroids--they cause his heart to race.  If hearing does not return to his usual hearing in a couple of days.

## 2022-06-13 ENCOUNTER — HOSPITAL ENCOUNTER (OUTPATIENT)
Dept: RADIOLOGY | Facility: HOSPITAL | Age: 57
Discharge: HOME | End: 2022-06-13
Attending: PHYSICIAN ASSISTANT
Payer: COMMERCIAL

## 2022-06-13 ENCOUNTER — OFFICE VISIT (OUTPATIENT)
Dept: FAMILY MEDICINE | Facility: CLINIC | Age: 57
End: 2022-06-13
Payer: COMMERCIAL

## 2022-06-13 ENCOUNTER — PATIENT MESSAGE (OUTPATIENT)
Dept: FAMILY MEDICINE | Facility: CLINIC | Age: 57
End: 2022-06-13

## 2022-06-13 VITALS
HEIGHT: 70 IN | WEIGHT: 271.63 LBS | SYSTOLIC BLOOD PRESSURE: 120 MMHG | BODY MASS INDEX: 38.89 KG/M2 | HEART RATE: 60 BPM | DIASTOLIC BLOOD PRESSURE: 72 MMHG | OXYGEN SATURATION: 97 %

## 2022-06-13 DIAGNOSIS — M54.16 LUMBAR RADICULOPATHY: ICD-10-CM

## 2022-06-13 DIAGNOSIS — M54.16 LUMBAR RADICULOPATHY: Primary | ICD-10-CM

## 2022-06-13 DIAGNOSIS — M47.816 FACET ARTHROPATHY, LUMBAR: ICD-10-CM

## 2022-06-13 PROCEDURE — 1159F MED LIST DOCD IN RCRD: CPT | Mod: CPTII,S$GLB,, | Performed by: PHYSICIAN ASSISTANT

## 2022-06-13 PROCEDURE — 72100 X-RAY EXAM L-S SPINE 2/3 VWS: CPT | Mod: TC,PN

## 2022-06-13 PROCEDURE — 99214 OFFICE O/P EST MOD 30 MIN: CPT | Mod: 25,S$GLB,, | Performed by: PHYSICIAN ASSISTANT

## 2022-06-13 PROCEDURE — 3008F PR BODY MASS INDEX (BMI) DOCUMENTED: ICD-10-PCS | Mod: CPTII,S$GLB,, | Performed by: PHYSICIAN ASSISTANT

## 2022-06-13 PROCEDURE — 3078F PR MOST RECENT DIASTOLIC BLOOD PRESSURE < 80 MM HG: ICD-10-PCS | Mod: CPTII,S$GLB,, | Performed by: PHYSICIAN ASSISTANT

## 2022-06-13 PROCEDURE — 72100 X-RAY EXAM L-S SPINE 2/3 VWS: CPT | Mod: 26,,, | Performed by: RADIOLOGY

## 2022-06-13 PROCEDURE — 99999 PR PBB SHADOW E&M-EST. PATIENT-LVL IV: ICD-10-PCS | Mod: PBBFAC,,, | Performed by: PHYSICIAN ASSISTANT

## 2022-06-13 PROCEDURE — 3044F PR MOST RECENT HEMOGLOBIN A1C LEVEL <7.0%: ICD-10-PCS | Mod: CPTII,S$GLB,, | Performed by: PHYSICIAN ASSISTANT

## 2022-06-13 PROCEDURE — 72100 XR LUMBAR SPINE AP AND LATERAL: ICD-10-PCS | Mod: 26,,, | Performed by: RADIOLOGY

## 2022-06-13 PROCEDURE — 96372 THER/PROPH/DIAG INJ SC/IM: CPT | Mod: S$GLB,,, | Performed by: PHYSICIAN ASSISTANT

## 2022-06-13 PROCEDURE — 3074F SYST BP LT 130 MM HG: CPT | Mod: CPTII,S$GLB,, | Performed by: PHYSICIAN ASSISTANT

## 2022-06-13 PROCEDURE — 3074F PR MOST RECENT SYSTOLIC BLOOD PRESSURE < 130 MM HG: ICD-10-PCS | Mod: CPTII,S$GLB,, | Performed by: PHYSICIAN ASSISTANT

## 2022-06-13 PROCEDURE — 96372 PR INJECTION,THERAP/PROPH/DIAG2ST, IM OR SUBCUT: ICD-10-PCS | Mod: S$GLB,,, | Performed by: PHYSICIAN ASSISTANT

## 2022-06-13 PROCEDURE — 1159F PR MEDICATION LIST DOCUMENTED IN MEDICAL RECORD: ICD-10-PCS | Mod: CPTII,S$GLB,, | Performed by: PHYSICIAN ASSISTANT

## 2022-06-13 PROCEDURE — 99999 PR PBB SHADOW E&M-EST. PATIENT-LVL IV: CPT | Mod: PBBFAC,,, | Performed by: PHYSICIAN ASSISTANT

## 2022-06-13 PROCEDURE — 3044F HG A1C LEVEL LT 7.0%: CPT | Mod: CPTII,S$GLB,, | Performed by: PHYSICIAN ASSISTANT

## 2022-06-13 PROCEDURE — 3008F BODY MASS INDEX DOCD: CPT | Mod: CPTII,S$GLB,, | Performed by: PHYSICIAN ASSISTANT

## 2022-06-13 PROCEDURE — 99214 PR OFFICE/OUTPT VISIT, EST, LEVL IV, 30-39 MIN: ICD-10-PCS | Mod: 25,S$GLB,, | Performed by: PHYSICIAN ASSISTANT

## 2022-06-13 PROCEDURE — 3078F DIAST BP <80 MM HG: CPT | Mod: CPTII,S$GLB,, | Performed by: PHYSICIAN ASSISTANT

## 2022-06-13 RX ORDER — NAPROXEN 500 MG/1
500 TABLET ORAL 2 TIMES DAILY WITH MEALS
Qty: 14 TABLET | Refills: 0 | Status: SHIPPED | OUTPATIENT
Start: 2022-06-14 | End: 2022-06-21

## 2022-06-13 RX ORDER — KETOROLAC TROMETHAMINE 30 MG/ML
60 INJECTION, SOLUTION INTRAMUSCULAR; INTRAVENOUS
Status: COMPLETED | OUTPATIENT
Start: 2022-06-13 | End: 2022-06-13

## 2022-06-13 RX ADMIN — KETOROLAC TROMETHAMINE 60 MG: 30 INJECTION, SOLUTION INTRAMUSCULAR; INTRAVENOUS at 11:06

## 2022-06-13 NOTE — PROGRESS NOTES
Subjective:       Patient ID: García Tang is a 56 y.o. male       Chief Complaint: Follow-up    HPI  Patient's  PCP: CANELO Domingo MD.  The patient's last visit with me was on 9/7/2021  Patient's past medical history includes:  Long covid, anxiety, hypertension and type 2 diabetes    The patient present today CO worsening lumbar pain that is radiating to his right leg and right groin area.  His initial pain began about 1 week ago, no injury or triggering event. He has noticed that the pain is worse with walking and certain positions and also he feels it more when he urinated.  He denies any urinary symptoms (no dysuria, no hematuria, no urinary frequency, no difficulty urinating or decreased urinary stream. He reports weakness of his right leg and when he got in the car today he had to pull his leg into the car with his arms.  He saw Dr Domingo for this problem 4 days ago and was prescribed Toradol PO which he did not ever fill from the pharmacy.  He had gotten some relief with the Ibuprofen and assumed he was better.      Review of Systems   Constitutional: Negative for chills and fever.   HENT: Negative for congestion and sore throat.    Eyes: Negative for visual disturbance.   Respiratory: Negative for cough and shortness of breath.    Cardiovascular: Negative for chest pain.   Gastrointestinal: Negative for diarrhea, nausea and vomiting.   Musculoskeletal: Positive for back pain. Negative for arthralgias.   Skin: Negative for rash.   Neurological: Negative for headaches.   Psychiatric/Behavioral: Negative for sleep disturbance.        Objective:   Physical Exam  Constitutional:       General: He is not in acute distress.     Appearance: He is well-developed.   HENT:      Head: Normocephalic and atraumatic.      Right Ear: External ear normal.      Left Ear: External ear normal.      Nose: Nose normal.   Eyes:      Conjunctiva/sclera: Conjunctivae normal.      Pupils: Pupils are equal, round, and reactive to  light.   Neck:      Vascular: No JVD.   Cardiovascular:      Rate and Rhythm: Normal rate and regular rhythm.      Heart sounds: Normal heart sounds. No murmur heard.    No friction rub. No gallop.   Pulmonary:      Effort: Pulmonary effort is normal. No respiratory distress.      Breath sounds: Normal breath sounds. No wheezing or rales.   Abdominal:      General: Bowel sounds are normal. There is no distension.      Palpations: Abdomen is soft. There is no mass.      Tenderness: There is no abdominal tenderness. There is no guarding.   Musculoskeletal:         General: Tenderness (ttp of the right lumbar paraspinal muscles, no midline tenderness) present. Normal range of motion.      Cervical back: Normal range of motion and neck supple.      Comments: Positive SLR on the right, anterior tibialis and hip flexor strength is 5/5 and symmetrical bilaterally, patient ambulates with a limp and has obvious discomfort standing from a seated position   Skin:     General: Skin is warm and dry.   Neurological:      Mental Status: He is alert and oriented to person, place, and time.   Psychiatric:         Behavior: Behavior normal.         Thought Content: Thought content normal.         Judgment: Judgment normal.          Assessment:       1. Lumbar radiculopathy  X-Ray Lumbar Spine AP And Lateral    ketorolac injection 60 mg    naproxen (NAPROSYN) 500 MG tablet        Plan:       Lumbar radiculopathy  -     X-Ray Lumbar Spine AP And Lateral; Future; Expected date: 06/13/2022  -     ketorolac injection 60 mg  -     naproxen (NAPROSYN) 500 MG tablet; Take 1 tablet (500 mg total) by mouth 2 (two) times daily with meals. for 7 days  Dispense: 14 tablet; Refill: 0         Follow up in about 1 week (around 6/20/2022), or if symptoms worsen or fail to improve.       Anni Messina PA-C   06/13/2022   10:49 AM

## 2022-07-11 ENCOUNTER — TELEPHONE (OUTPATIENT)
Dept: ADMINISTRATIVE | Facility: HOSPITAL | Age: 57
End: 2022-07-11
Payer: COMMERCIAL

## 2022-07-11 DIAGNOSIS — I10 ESSENTIAL (PRIMARY) HYPERTENSION: ICD-10-CM

## 2022-07-11 RX ORDER — METOPROLOL SUCCINATE 50 MG/1
50 TABLET, EXTENDED RELEASE ORAL DAILY
Qty: 90 TABLET | Refills: 3 | Status: SHIPPED | OUTPATIENT
Start: 2022-07-11 | End: 2023-07-17 | Stop reason: SDUPTHER

## 2022-07-11 NOTE — TELEPHONE ENCOUNTER
Spoke with pt, stated he had to cancel his colonoscopy for Wed.  He will call back to schedule at a later date.

## 2022-07-11 NOTE — TELEPHONE ENCOUNTER
"----- Message from LORETTA Domingo MD sent at 7/8/2022  5:19 PM CDT -----  Regarding: FW: Cancellation of Order # [id number]    ----- Message -----  From: Mitzy Lane MA  Sent: 7/8/2022   9:29 AM CDT  To: LORETTA Domingo MD  Subject: Cancellation of Order # [id number]                Order number [id number] for the procedure CASE REQUEST ENDOSCOPY   [GI5] has been canceled by Mitzy Lane MA [[id number]]. This   procedure was ordered by LORETTA Domingo MD [[id number]] on Apr 13, 2022 for the patient García Tang "Brad" [[id number]]. The   reason for cancellation was "None".    "

## 2022-07-14 ENCOUNTER — OFFICE VISIT (OUTPATIENT)
Dept: ENDOCRINOLOGY | Facility: CLINIC | Age: 57
End: 2022-07-14
Payer: COMMERCIAL

## 2022-07-14 ENCOUNTER — HOSPITAL ENCOUNTER (OUTPATIENT)
Dept: RADIOLOGY | Facility: HOSPITAL | Age: 57
Discharge: HOME | End: 2022-07-14
Attending: INTERNAL MEDICINE
Payer: COMMERCIAL

## 2022-07-14 ENCOUNTER — TELEPHONE (OUTPATIENT)
Dept: ENDOCRINOLOGY | Facility: CLINIC | Age: 57
End: 2022-07-14

## 2022-07-14 VITALS
DIASTOLIC BLOOD PRESSURE: 86 MMHG | BODY MASS INDEX: 38.38 KG/M2 | WEIGHT: 268.06 LBS | SYSTOLIC BLOOD PRESSURE: 116 MMHG | RESPIRATION RATE: 18 BRPM | HEART RATE: 88 BPM | HEIGHT: 70 IN

## 2022-07-14 DIAGNOSIS — E66.9 OBESITY (BMI 30-39.9): ICD-10-CM

## 2022-07-14 DIAGNOSIS — R61 EXCESSIVE SWEATING: ICD-10-CM

## 2022-07-14 DIAGNOSIS — E04.1 THYROID NODULE: Primary | ICD-10-CM

## 2022-07-14 DIAGNOSIS — I10 ESSENTIAL (PRIMARY) HYPERTENSION: ICD-10-CM

## 2022-07-14 DIAGNOSIS — E04.1 THYROID NODULE: ICD-10-CM

## 2022-07-14 DIAGNOSIS — E11.9 TYPE 2 DIABETES MELLITUS WITHOUT COMPLICATION, WITHOUT LONG-TERM CURRENT USE OF INSULIN: ICD-10-CM

## 2022-07-14 PROCEDURE — 99215 OFFICE O/P EST HI 40 MIN: CPT | Mod: S$GLB,,, | Performed by: INTERNAL MEDICINE

## 2022-07-14 PROCEDURE — 3079F PR MOST RECENT DIASTOLIC BLOOD PRESSURE 80-89 MM HG: ICD-10-PCS | Mod: CPTII,S$GLB,, | Performed by: INTERNAL MEDICINE

## 2022-07-14 PROCEDURE — 3074F PR MOST RECENT SYSTOLIC BLOOD PRESSURE < 130 MM HG: ICD-10-PCS | Mod: CPTII,S$GLB,, | Performed by: INTERNAL MEDICINE

## 2022-07-14 PROCEDURE — 99215 PR OFFICE/OUTPT VISIT, EST, LEVL V, 40-54 MIN: ICD-10-PCS | Mod: S$GLB,,, | Performed by: INTERNAL MEDICINE

## 2022-07-14 PROCEDURE — 3044F PR MOST RECENT HEMOGLOBIN A1C LEVEL <7.0%: ICD-10-PCS | Mod: CPTII,S$GLB,, | Performed by: INTERNAL MEDICINE

## 2022-07-14 PROCEDURE — 1159F PR MEDICATION LIST DOCUMENTED IN MEDICAL RECORD: ICD-10-PCS | Mod: CPTII,S$GLB,, | Performed by: INTERNAL MEDICINE

## 2022-07-14 PROCEDURE — 76536 US EXAM OF HEAD AND NECK: CPT | Mod: 26,,, | Performed by: RADIOLOGY

## 2022-07-14 PROCEDURE — 3008F PR BODY MASS INDEX (BMI) DOCUMENTED: ICD-10-PCS | Mod: CPTII,S$GLB,, | Performed by: INTERNAL MEDICINE

## 2022-07-14 PROCEDURE — 3079F DIAST BP 80-89 MM HG: CPT | Mod: CPTII,S$GLB,, | Performed by: INTERNAL MEDICINE

## 2022-07-14 PROCEDURE — 99999 PR PBB SHADOW E&M-EST. PATIENT-LVL IV: ICD-10-PCS | Mod: PBBFAC,,, | Performed by: INTERNAL MEDICINE

## 2022-07-14 PROCEDURE — 3044F HG A1C LEVEL LT 7.0%: CPT | Mod: CPTII,S$GLB,, | Performed by: INTERNAL MEDICINE

## 2022-07-14 PROCEDURE — 1160F RVW MEDS BY RX/DR IN RCRD: CPT | Mod: CPTII,S$GLB,, | Performed by: INTERNAL MEDICINE

## 2022-07-14 PROCEDURE — 3008F BODY MASS INDEX DOCD: CPT | Mod: CPTII,S$GLB,, | Performed by: INTERNAL MEDICINE

## 2022-07-14 PROCEDURE — 1159F MED LIST DOCD IN RCRD: CPT | Mod: CPTII,S$GLB,, | Performed by: INTERNAL MEDICINE

## 2022-07-14 PROCEDURE — 76536 US SOFT TISSUE HEAD NECK THYROID: ICD-10-PCS | Mod: 26,,, | Performed by: RADIOLOGY

## 2022-07-14 PROCEDURE — 1160F PR REVIEW ALL MEDS BY PRESCRIBER/CLIN PHARMACIST DOCUMENTED: ICD-10-PCS | Mod: CPTII,S$GLB,, | Performed by: INTERNAL MEDICINE

## 2022-07-14 PROCEDURE — 3074F SYST BP LT 130 MM HG: CPT | Mod: CPTII,S$GLB,, | Performed by: INTERNAL MEDICINE

## 2022-07-14 PROCEDURE — 76536 US EXAM OF HEAD AND NECK: CPT | Mod: TC,PO

## 2022-07-14 PROCEDURE — 99999 PR PBB SHADOW E&M-EST. PATIENT-LVL IV: CPT | Mod: PBBFAC,,, | Performed by: INTERNAL MEDICINE

## 2022-07-14 NOTE — PROGRESS NOTES
Subjective:    Patient ID:  García Tang is a 56 y.o. male.    Chief Complaint:  Thyroid Nodule (CTA last July with nodules noted. Hx of thyroid cancer in family. Has metabolism issues)      Pt presents to follow up with new concern of thyroid nodules and review of chronic medical conditions as listed in the visit diagnosis section of this encounter.    Pt previously seen by LISA Coates but is new to me.     Reviewed prior endo provider's last office note, LISA Coates    Overview:    Previously seen for T2 DM. Was taking insulin. Now DM management without meds. Lost about 80 lbs through lifestyle changes.     Had a CTA last July for neurologic sx's which showed thyroid nodules. Never that a thyroid US to follow up nodules.     No hoarseness, voice changes, dysphagia, compressive symptoms, or head/neck exposure to XRT. His brother was recently dx'ed with thyroid cancer now s/p total thyroidectomy.      Other concern: excessive sweating and unable to loose more weight.  Runs up to 5 miles per week. Notes he limits carbs and calorie intake.     Denies palpitations, flushing, or diarrhea.   No violaceous striae.  Takes tumeric but just noticed flushing.  BP controlled.  Checks BG during heat intolerance/sweating episodes and is b/w .               Diabetes Management Status    Statin: Not taking  ACE/ARB: Not taking    Screening or Prevention Patient's value Goal Complete/Controlled?   HgA1C Testing and Control   Lab Results   Component Value Date    HGBA1C 5.4 04/14/2022      Annually/Less than 8% Yes   Lipid profile : 04/14/2022 Annually Yes   LDL control Lab Results   Component Value Date    LDLCALC 103.6 04/14/2022    Annually/Less than 100 mg/dl  No   Nephropathy screening Lab Results   Component Value Date    LABMICR <5.0 11/01/2021     Lab Results   Component Value Date    PROTEINUA Negative 11/30/2021    Annually Yes   Blood pressure BP Readings from Last 1 Encounters:   07/14/22 116/86    Less than  140/90 Yes   Dilated retinal exam : 05/10/2022 Annually Yes   Foot exam   : 2021 Annually Yes     Review of Systems   Constitutional: Negative for fatigue.   Gastrointestinal: Negative for diarrhea.   Endocrine: Positive for cold intolerance and heat intolerance.        Past Medical History:   Diagnosis Date    ASCVD (arteriosclerotic cardiovascular disease) 2018    Chronic sialoadenitis 2018    CKD stage 2 due to type 2 diabetes mellitus 2017    Diverticulitis     diverticulitis    Essential hypertension 2016    Fatty liver disease, nonalcoholic 2016    [place], 2006     GERD (gastroesophageal reflux disease)     HLD (hyperlipidemia)     Kidney stone     requiring stent    Meniere's disease 2018    Morbid obesity 2014    NAFLD (nonalcoholic fatty liver disease)     Near syncope 2017    Obesity hypoventilation syndrome 2016    PAD (peripheral artery disease) 2019    Mild 2018    Prostatitis     Rectal bleeding     diverticulitis    Thyroid nodule 2022    Type 2 diabetes mellitus with hyperglycemia, with long-term current use of insulin 2017    Uncontrolled type 2 diabetes mellitus with peripheral neuropathy 2014      Social History     Tobacco Use    Smoking status: Former Smoker     Types: Cigarettes     Quit date: 1990     Years since quittin.5    Smokeless tobacco: Never Used    Tobacco comment: social   Substance Use Topics    Alcohol use: No     Comment: stopped in     Drug use: No     Family History   Problem Relation Age of Onset    Cancer Father     Diabetes Father     Hypertension Father     Glaucoma Father     Hyperlipidemia Father     Liver disease Mother         d.    Cirrhosis Mother     Hypertension Mother     Hyperlipidemia Mother     Diabetes Brother     Hypertension Brother     Heart attack Maternal Uncle     Heart attack Maternal Uncle     Heart disease Neg Hx     Heart failure Neg  Hx     Macular degeneration Neg Hx     Retinal detachment Neg Hx       Past Surgical History:   Procedure Laterality Date    COLONOSCOPY      kidney stent      KIDNEY SURGERY  1996    right kidney obstruction requiring stent    NOSE SURGERY            Current Outpatient Medications:     acetaminophen (TYLENOL) 500 MG tablet, Take 1,000 mg by mouth every 6 (six) hours as needed for Pain., Disp: , Rfl:     acetylcysteine (NAC ORAL), Take 2 tablets by mouth once daily., Disp: , Rfl:     ascorbic acid, vitamin C, (VITAMIN C) 1000 MG tablet, Take 3,000 mg by mouth once daily., Disp: , Rfl:     aspirin (ECOTRIN) 81 MG EC tablet, Take 162 mg by mouth once daily. , Disp: , Rfl:     b complex vitamins capsule, Take 1 capsule by mouth once daily., Disp: , Rfl:     BROMELAINS ORAL, Take 1 tablet by mouth 2 (two) times daily., Disp: , Rfl:     cyanocobalamin 2000 MCG tablet, Take 2,000 mcg by mouth once daily., Disp: , Rfl:     DOCOSAHEXANOIC ACID/EPA (FISH OIL ORAL), Take 3 capsules by mouth once daily at 6am. , Disp: , Rfl:     ERGOCALCIFEROL, VITAMIN D2, (VITAMIN D ORAL), Take 1 capsule by mouth once daily at 6am. , Disp: , Rfl:     MELATONIN ORAL, Take 5 mg by mouth nightly., Disp: , Rfl:     metoprolol succinate (TOPROL-XL) 50 MG 24 hr tablet, Take 1 tablet (50 mg total) by mouth once daily., Disp: 90 tablet, Rfl: 3    multivitamin capsule, Take 1 capsule by mouth once daily. , Disp: , Rfl:     OREGANO OIL ORAL, Take 1 capsule by mouth every other day., Disp: , Rfl:     PAPAYA ENZYME ORAL, Take 1 tablet by mouth daily as needed (heartburn)., Disp: , Rfl:     vitamin E 100 UNIT capsule, Take 400 Units by mouth once daily., Disp: , Rfl:      Review of patient's allergies indicates:   Allergen Reactions    Lisinopril Swelling    Iodinated contrast media Other (See Comments)     Spasms, tremor, and high blood pressure    Bactrim  [sulfamethoxazole-trimethoprim]      Other reaction(s): Unknown     "Diflucan [fluconazole]      Heart racing    Hydroxyzine hcl      Other reaction(s): elevated bp    Kenalog [triamcinolone acetonide] Other (See Comments)     Heart flutters all day    Phenergan [promethazine] Anxiety    Zofran [ondansetron hcl (pf)] Anxiety        Objective:   /86   Pulse 88   Resp 18   Ht 5' 10" (1.778 m)   Wt 121.6 kg (268 lb 1.3 oz)   BMI 38.47 kg/m²   BP Readings from Last 3 Encounters:   07/14/22 116/86   06/13/22 120/72   06/08/22 120/78     Wt Readings from Last 3 Encounters:   07/14/22 0930 121.6 kg (268 lb 1.3 oz)   06/13/22 1028 123.2 kg (271 lb 9.7 oz)   06/08/22 1300 122.8 kg (270 lb 11.6 oz)          Physical Exam  Vitals reviewed.   Constitutional:       General: He is not in acute distress.     Appearance: Normal appearance. He is well-developed. He is not ill-appearing, toxic-appearing or diaphoretic.   HENT:      Head: Normocephalic and atraumatic.   Eyes:      General: No scleral icterus.  Neck:      Trachea: No tracheal deviation.      Comments:  No thyromegaly or palpable thyroid nodules    No dorsocervical fat pad      Cardiovascular:      Rate and Rhythm: Normal rate and regular rhythm.      Heart sounds: No murmur heard.  Pulmonary:      Effort: Pulmonary effort is normal. No respiratory distress.   Abdominal:      Tenderness: There is no abdominal tenderness.      Comments: no violaceous striae     Lymphadenopathy:      Cervical: No cervical adenopathy.   Neurological:      Mental Status: He is alert and oriented to person, place, and time.   Psychiatric:         Thought Content: Thought content normal.           Lab Results   Component Value Date     04/14/2022    K 4.3 04/14/2022     04/14/2022    CO2 26 04/14/2022    BUN 15 04/14/2022    CREATININE 0.9 04/14/2022     04/14/2022    HGBA1C 5.4 04/14/2022    MG 1.8 11/30/2021    AST 22 04/14/2022    ALT 20 04/14/2022    ALBUMIN 4.1 04/14/2022    PROT 7.1 04/14/2022    BILITOT 0.7 04/14/2022 "    WBC 4.62 11/30/2021    HGB 15.5 11/30/2021    HCT 46 11/30/2021    HCT 44.5 11/30/2021    MCV 88 11/30/2021    MCH 30.8 11/30/2021     11/30/2021    MPV 9.9 11/30/2021    GRAN 2.2 11/30/2021    GRAN 47.8 11/30/2021    LYMPH 1.7 11/30/2021    LYMPH 36.4 11/30/2021    CHOL 157 04/14/2022    HDL 41 04/14/2022    LDLCALC 103.6 04/14/2022    TRIG 62 04/14/2022       Lab Results   Component Value Date    TSH 1.590 11/10/2021    FREET4 1.00 10/09/2017        Thyroid Labs Latest Ref Rng & Units 11/30/2021 11/30/2021 4/14/2022   TSH 0.400 - 4.000 uIU/mL - - -   Free T4 0.71 - 1.51 ng/dL - - -   Sodium 136 - 145 mmol/L - 136 140   Potassium 3.5 - 5.1 mmol/L - 4.3 4.3   Chloride 95 - 110 mmol/L - 103 103   Carbon Dioxide 23 - 29 mmol/L - 27 26   Glucose 70 - 110 mg/dL - 101 102   Blood Urea Nitrogen 6 - 20 mg/dL - 18 15   Creatinine 0.5 - 1.4 mg/dL - 0.9 0.9   Calcium 8.7 - 10.5 mg/dL - 9.7 9.7   Total Protein 6.0 - 8.4 g/dL - 7.0 7.1   Albumin 3.5 - 5.2 g/dL - 4.1 4.1   Total Bilirubin 0.1 - 1.0 mg/dL - 0.9 0.7   AST 10 - 40 U/L - 24 22   ALT 10 - 44 U/L - 21 20   Anion Gap 8 - 16 mmol/L - 6(L) 11   eGFR (African American) >60 mL/min/1.73 m:2 - >60.0 >60.0   eGFR (Non-African American) >60 mL/min/1.73 m:2 - >60.0 >60.0   WBC 3.90 - 12.70 K/uL - 4.62 -   RBC 4.60 - 6.20 M/uL - 5.04 -   Hemoglobin 14.0 - 18.0 g/dL - 15.5 -   Hematocrit 36 - 54 %PCV 44.5 46 -   MCV 82 - 98 fL - 88 -   MCH 27.0 - 31.0 pg - 30.8 -   MCHC 32.0 - 36.0 g/dL - 34.8 -   RDW 11.5 - 14.5 % - 12.0 -   Platelets 150 - 450 K/uL - 195 -   MPV 9.2 - 12.9 fL - 9.9 -   Gran # 1.8 - 7.7 K/uL - 2.2 -   Lymph # 1.0 - 4.8 K/uL - 1.7 -   Mono # 0.3 - 1.0 K/uL - 0.5 -   Eos # 0.0 - 0.5 K/uL - 0.2 -   Baso # 0.00 - 0.20 K/uL - 0.05 -   Gran % 38.0 - 73.0 % - 47.8 -   Lymph % 18.0 - 48.0 % - 36.4 -   Mono% 4.0 - 15.0 % - 11.3 -   Eos % 0.0 - 8.0 % - 3.2 -   Baso % 0.0 - 1.9 % - 1.1 -   Prothrombin Time 9.0 - 12.5 sec - - -   INR 0.8 - 1.2 - - -   aPTT 21.0 -  32.0 sec - - -           Hemoglobin A1C   Date Value Ref Range Status   04/14/2022 5.4 4.0 - 5.6 % Final     Comment:     ADA Screening Guidelines:  5.7-6.4%  Consistent with prediabetes  >or=6.5%  Consistent with diabetes    High levels of fetal hemoglobin interfere with the HbA1C  assay. Heterozygous hemoglobin variants (HbS, HgC, etc)do  not significantly interfere with this assay.   However, presence of multiple variants may affect accuracy.     10/22/2021 5.4 4.0 - 5.6 % Final     Comment:     ADA Screening Guidelines:  5.7-6.4%  Consistent with prediabetes  >or=6.5%  Consistent with diabetes    High levels of fetal hemoglobin interfere with the HbA1C  assay. Heterozygous hemoglobin variants (HbS, HgC, etc)do  not significantly interfere with this assay.   However, presence of multiple variants may affect accuracy.     10/30/2020 5.3 4.0 - 5.6 % Final     Comment:     ADA Screening Guidelines:  5.7-6.4%  Consistent with prediabetes  >or=6.5%  Consistent with diabetes  High levels of fetal hemoglobin interfere with the HbA1C  assay. Heterozygous hemoglobin variants (HbS, HgC, etc)do  not significantly interfere with this assay.   However, presence of multiple variants may affect accuracy.           Assessment and plan:     Problem List Items Addressed This Visit        Cardiac/Vascular    Essential (primary) hypertension     BP controlled. Continue current meds. Pt to notify PCP if BP consistently more than 140/90.                Endocrine    Thyroid nodule - Primary     No compressive sx's. Prior TSH wnls. Had thyroid nodules on CT last year. Check baseline thyroid US now. Pt very concerned due to FH of thyroid cancer. Recheck now with FT4 given h/o heat intolerance and excessive sweating. F/u results.           Relevant Orders    T4, Free    TSH    US Soft Tissue Head Neck Thyroid (Completed)    Type 2 diabetes mellitus without complication, without long-term current use of insulin     Last A1C 5.4%.    Previously took insulin.  DM is diet controlled.   F/u with PCP.           Obesity (BMI 30-39.9)     BMI 38. Encouraged healthy diet (mediterranean, keto, or low carb diet) and to increase physical activity. Also discussed intermittent fasting.                     Other    Excessive sweating     Etiology unclear. ? Due to pt being out of shape. Sx's mostly common with exertion. Prior TSH wnls. Low suspicion but will screen for pheo and NE tumor. Defer testing for acromegaly and cushings given no other clinical signs or symptoms. Advise may want to cut out Tumeric. If hormones wnls, f/u with PCP/derm.            Relevant Orders    5-HIAA Plasma (Neuoendocrine)    Metanephrines, Plasma Free         Spent 40 minutes on patient care including but not limited to preparing to see patient, obtaining/reviewing history, documenting clinical info in EMR, face-to-face time, care coordination, ordering meds/labs for visit.    Labs today    Thyroid US at pts convenience    RTC prn            Disclaimer: This note has been generated using voice-recognition software. There may be typographical errors that have been missed during proof-reading.

## 2022-07-14 NOTE — ASSESSMENT & PLAN NOTE
Etiology unclear. ? Due to pt being out of shape. Sx's mostly common with exertion. Prior TSH wnls. Low suspicion but will screen for pheo and NE tumor. Defer testing for acromegaly and cushings given no other clinical signs or symptoms. Advise may want to cut out Tumeric. If hormones wnls, f/u with PCP/derm.

## 2022-07-14 NOTE — TELEPHONE ENCOUNTER
Recent thyroid US with multiple nodules which do not meet criteria for FNA. Continue to monitor. Repeat next thyroid US in 1 year.     RTC in 1 year with any full thyroid provider

## 2022-07-14 NOTE — ASSESSMENT & PLAN NOTE
No compressive sx's. Prior TSH wnls. Had thyroid nodules on CT last year. Check baseline thyroid US now. Pt very concerned due to FH of thyroid cancer. Recheck now with FT4 given h/o heat intolerance and excessive sweating. F/u results.

## 2022-07-14 NOTE — ASSESSMENT & PLAN NOTE
BMI 38. Encouraged healthy diet (mediterranean, keto, or low carb diet) and to increase physical activity. Also discussed intermittent fasting.

## 2022-07-20 ENCOUNTER — PATIENT MESSAGE (OUTPATIENT)
Dept: ENDOCRINOLOGY | Facility: CLINIC | Age: 57
End: 2022-07-20
Payer: COMMERCIAL

## 2022-07-21 ENCOUNTER — PATIENT MESSAGE (OUTPATIENT)
Dept: ENDOCRINOLOGY | Facility: CLINIC | Age: 57
End: 2022-07-21
Payer: COMMERCIAL

## 2022-07-25 ENCOUNTER — PATIENT MESSAGE (OUTPATIENT)
Dept: ENDOCRINOLOGY | Facility: CLINIC | Age: 57
End: 2022-07-25
Payer: COMMERCIAL

## 2022-07-25 DIAGNOSIS — R61 EXCESSIVE SWEATING: Primary | ICD-10-CM

## 2022-07-25 NOTE — TELEPHONE ENCOUNTER
Usually we consider a positive test when it is 2-3 times above range.   This is mildly elevated    Will need 24 hr urine metanephrines

## 2022-07-27 ENCOUNTER — LAB VISIT (OUTPATIENT)
Dept: LAB | Facility: HOSPITAL | Age: 57
End: 2022-07-27
Attending: FAMILY MEDICINE
Payer: COMMERCIAL

## 2022-07-27 DIAGNOSIS — R61 EXCESSIVE SWEATING: ICD-10-CM

## 2022-07-27 PROCEDURE — 83835 ASSAY OF METANEPHRINES: CPT | Performed by: INTERNAL MEDICINE

## 2022-07-31 ENCOUNTER — TELEPHONE (OUTPATIENT)
Dept: ENDOCRINOLOGY | Facility: CLINIC | Age: 57
End: 2022-07-31
Payer: COMMERCIAL

## 2022-07-31 NOTE — TELEPHONE ENCOUNTER
Sandifer patient  Let him know that his workup for sweating was normal.  Metanephrines slightly elevated but not high enough to be considered positive.

## 2022-08-01 ENCOUNTER — PATIENT MESSAGE (OUTPATIENT)
Dept: ENDOCRINOLOGY | Facility: CLINIC | Age: 57
End: 2022-08-01
Payer: COMMERCIAL

## 2022-08-02 LAB
ANNOTATION COMMENT IMP: NORMAL
COLLECT DURATION TIME UR: 24 H
METANEPH 24H UR-MRATE: 168 MCG/24 H
METANEPHS 24H UR-MRATE: 581 MCG/24 H
NORMETANEPHRINE 24H UR-MRATE: 413 MCG/24 H
SPECIMEN VOL 24H UR: 1300 ML

## 2022-08-04 ENCOUNTER — TELEPHONE (OUTPATIENT)
Dept: HEPATOLOGY | Facility: CLINIC | Age: 57
End: 2022-08-04
Payer: COMMERCIAL

## 2022-08-04 ENCOUNTER — PATIENT MESSAGE (OUTPATIENT)
Dept: HEPATOLOGY | Facility: CLINIC | Age: 57
End: 2022-08-04
Payer: COMMERCIAL

## 2022-08-10 ENCOUNTER — TELEPHONE (OUTPATIENT)
Dept: FAMILY MEDICINE | Facility: CLINIC | Age: 57
End: 2022-08-10
Payer: COMMERCIAL

## 2022-08-10 ENCOUNTER — NURSE TRIAGE (OUTPATIENT)
Dept: ADMINISTRATIVE | Facility: CLINIC | Age: 57
End: 2022-08-10
Payer: COMMERCIAL

## 2022-08-10 NOTE — TELEPHONE ENCOUNTER
Well---he should be seen.  Depending upon history and exam he might need a noncontrast  CT head.  I cannot see him today but he does not need to wait.  He should have gone to ER or urgent care yesterday after the incident.  I advise he go to urgent care TODAY--unless someone here has an opening.

## 2022-08-10 NOTE — TELEPHONE ENCOUNTER
Yesterday afternoon he hit the back of his head on a wood utility cabinet. Pain is top part of head shooting pain from back to left side of head. Pain 5/10 that hurts more with movement. Pt stated it was a severe blow to a hard cabinet. He was bending and came up and hit head on cabinet. Pt also stated he has a his of concussions. Care advice recommend pt go to Er. Pt verbalized understanding.     Reason for Disposition   Dangerous injury (e.g., MVA, diving, trampoline, contact sports, fall > 10 feet or 3 meters) or severe blow from hard object (e.g., golf club or baseball bat)    Additional Information   Negative: ACUTE NEUROLOGIC SYMPTOM and symptom present now   Negative: Knocked out (unconscious) > 1 minute   Negative: Seizure (convulsion) occurred (Exception: prior history of seizures and now alert and without Acute Neurologic Symptoms)   Negative: Neck pain after dangerous injury (e.g., MVA, diving, trampoline, contact sports, fall > 10 feet or 3 meters) (Exception: neck pain began > 1 hour after injury)   Negative: Major bleeding (actively dripping or spurting) that can't be stopped   Negative: Penetrating head injury (e.g., knife, gunshot wound, metal object)   Negative: Sounds like a life-threatening emergency to the triager   Negative: Can't remember what happened (amnesia)   Negative: Vomiting once or more   Negative: Watery or blood-tinged fluid dripping from the nose or ears   Negative: ACUTE NEUROLOGIC SYMPTOM and now fine   Negative: Knocked out (unconscious) < 1 minute and now fine   Negative: Severe headache    Protocols used: HEAD INJURY-A-[place]

## 2022-08-10 NOTE — TELEPHONE ENCOUNTER
----- Message from Aquilino Oliveira sent at 8/10/2022  8:37 AM CDT -----  Type: Needs Medical Advice  Who Called:  Patient  Symptoms (please be specific): Hit left side of head--pain  How long has patient had these symptoms:  Yesterday  Pharmacy name and phone #:        [facility] #[id number] - [facility] [address] [address] AT [facility] [id number]  [address] [place] [id number]  Phone: [phone] Fax: [phone]          Best Call Back Number: [phone]  Additional Information: Please call to advise

## 2022-08-10 NOTE — TELEPHONE ENCOUNTER
Pt hit head yesterday. He spoke to on call nurse she advised he go to ER. He refuses to go. He said he the hit was hard and really hurt. No gashes. I placed him on Dr. Son' schedule however Dr. Son said the appt shouldn't have been in that spot so made me cancel it. Pt would like advise on what to do. Should any tests be ran?

## 2022-09-14 ENCOUNTER — IMMUNIZATION (OUTPATIENT)
Dept: FAMILY MEDICINE | Facility: CLINIC | Age: 57
End: 2022-09-14
Payer: COMMERCIAL

## 2022-09-14 DIAGNOSIS — Z23 NEED FOR VACCINATION: Primary | ICD-10-CM

## 2022-09-14 PROCEDURE — 91312 COVID-19, MRNA, LNP-S, BIVALENT BOOSTER, PF, 30 MCG/0.3 ML DOSE: ICD-10-PCS | Mod: S$GLB,,, | Performed by: FAMILY MEDICINE

## 2022-09-14 PROCEDURE — 91312 COVID-19, MRNA, LNP-S, BIVALENT BOOSTER, PF, 30 MCG/0.3 ML DOSE: CPT | Mod: S$GLB,,, | Performed by: FAMILY MEDICINE

## 2022-10-13 ENCOUNTER — OFFICE VISIT (OUTPATIENT)
Dept: FAMILY MEDICINE | Facility: CLINIC | Age: 57
End: 2022-10-13
Payer: COMMERCIAL

## 2022-10-13 ENCOUNTER — HOSPITAL ENCOUNTER (OUTPATIENT)
Dept: RADIOLOGY | Facility: HOSPITAL | Age: 57
Discharge: HOME | End: 2022-10-13
Attending: NURSE PRACTITIONER
Payer: COMMERCIAL

## 2022-10-13 VITALS
OXYGEN SATURATION: 99 % | BODY MASS INDEX: 35.3 KG/M2 | HEART RATE: 52 BPM | DIASTOLIC BLOOD PRESSURE: 84 MMHG | TEMPERATURE: 98 F | SYSTOLIC BLOOD PRESSURE: 116 MMHG | WEIGHT: 246.06 LBS

## 2022-10-13 DIAGNOSIS — K62.89 RECTAL PAIN: ICD-10-CM

## 2022-10-13 DIAGNOSIS — R10.30 LOWER ABDOMINAL PAIN: ICD-10-CM

## 2022-10-13 DIAGNOSIS — R10.30 LOWER ABDOMINAL PAIN: Primary | ICD-10-CM

## 2022-10-13 PROCEDURE — A9698 NON-RAD CONTRAST MATERIALNOC: HCPCS | Mod: PO | Performed by: NURSE PRACTITIONER

## 2022-10-13 PROCEDURE — 99999 PR PBB SHADOW E&M-EST. PATIENT-LVL IV: ICD-10-PCS | Mod: PBBFAC,,, | Performed by: NURSE PRACTITIONER

## 2022-10-13 PROCEDURE — 25500020 PHARM REV CODE 255: Mod: PO | Performed by: NURSE PRACTITIONER

## 2022-10-13 PROCEDURE — 74176 CT ABD & PELVIS W/O CONTRAST: CPT | Mod: 26,,, | Performed by: RADIOLOGY

## 2022-10-13 PROCEDURE — 74176 CT ABDOMEN PELVIS WITHOUT CONTRAST: ICD-10-PCS | Mod: 26,,, | Performed by: RADIOLOGY

## 2022-10-13 PROCEDURE — 99214 PR OFFICE/OUTPT VISIT, EST, LEVL IV, 30-39 MIN: ICD-10-PCS | Mod: S$GLB,,, | Performed by: NURSE PRACTITIONER

## 2022-10-13 PROCEDURE — 99214 OFFICE O/P EST MOD 30 MIN: CPT | Mod: S$GLB,,, | Performed by: NURSE PRACTITIONER

## 2022-10-13 PROCEDURE — 81003 URINALYSIS AUTO W/O SCOPE: CPT | Mod: PO | Performed by: NURSE PRACTITIONER

## 2022-10-13 PROCEDURE — 99999 PR PBB SHADOW E&M-EST. PATIENT-LVL IV: CPT | Mod: PBBFAC,,, | Performed by: NURSE PRACTITIONER

## 2022-10-13 PROCEDURE — 74176 CT ABD & PELVIS W/O CONTRAST: CPT | Mod: TC,PO

## 2022-10-13 RX ORDER — CLOTRIMAZOLE AND BETAMETHASONE DIPROPIONATE 10; .64 MG/G; MG/G
CREAM TOPICAL 2 TIMES DAILY
Qty: 45 G | Refills: 2 | Status: SHIPPED | OUTPATIENT
Start: 2022-10-13 | End: 2023-01-09

## 2022-10-13 RX ADMIN — BARIUM SULFATE 900 ML: 20 SUSPENSION ORAL at 01:10

## 2022-10-13 NOTE — PROGRESS NOTES
Subjective:       Patient ID: García Tang is a 57 y.o. male.    Chief Complaint: Constipation, Abdominal Pain (After he eats.), and Rectal Pain    Bowel changes about a month. Constipation and rectal pain. Having a BM every few days, small amount. Had liquid stool Sunday and today. He has noticed some soreness in his lower abdomen over the last month associated with the constipation.   Pain in rectum when he urinates. Increased urgency and frequency in the afternoons.  Has an itching rash in the groin.   No new medications, no new OTC medications, no new supplements.  No dietary changes, has increased fluids.   Had colonoscopy was in 2008.    Past Medical History:  2/5/2018: ASCVD (arteriosclerotic cardiovascular disease)  12/11/2018: Chronic sialoadenitis  5/18/2017: CKD stage 2 due to type 2 diabetes mellitus  No date: Diverticulitis      Comment:  diverticulitis  7/18/2016: Essential hypertension  4/7/2016: Fatty liver disease, nonalcoholic      Comment:  [place], 2006   No date: GERD (gastroesophageal reflux disease)  No date: HLD (hyperlipidemia)  No date: Kidney stone      Comment:  requiring stent  1/9/2018: Meniere's disease  5/29/2014: Morbid obesity  No date: NAFLD (nonalcoholic fatty liver disease)  5/23/2017: Near syncope  2/22/2016: Obesity hypoventilation syndrome  9/17/2019: PAD (peripheral artery disease)      Comment:  Mild 9/2018  No date: Prostatitis  No date: Rectal bleeding      Comment:  diverticulitis  7/14/2022: Thyroid nodule  5/18/2017: Type 2 diabetes mellitus with hyperglycemia, with long-  term current use of insulin  5/29/2014: Uncontrolled type 2 diabetes mellitus with peripheral   neuropathy    Past Surgical History:  No date: COLONOSCOPY  No date: kidney stent  1996: KIDNEY SURGERY      Comment:  right kidney obstruction requiring stent  No date: NOSE SURGERY    Review of patient's family history indicates:      Social History    Socioeconomic History      Marital status:      Tobacco Use      Smoking status: Former        Types: Cigarettes        Quit date: 1990        Years since quittin.8      Smokeless tobacco: Never      Tobacco comments: social    Substance and Sexual Activity      Alcohol use: No        Comment: stopped in       Drug use: No      Sexual activity: Yes        Partners: Female      Current Outpatient Medications:  acetaminophen (TYLENOL) 500 MG tablet, Take 1,000 mg by mouth every 6 (six) hours as needed for Pain., Disp: , Rfl:   acetylcysteine (NAC ORAL), Take 2 tablets by mouth once daily., Disp: , Rfl:   ascorbic acid, vitamin C, (VITAMIN C) 1000 MG tablet, Take 3,000 mg by mouth once daily., Disp: , Rfl:   b complex vitamins capsule, Take 1 capsule by mouth once daily., Disp: , Rfl:   DOCOSAHEXANOIC ACID/EPA (FISH OIL ORAL), Take 3 capsules by mouth once daily at 6am. , Disp: , Rfl:   ERGOCALCIFEROL, VITAMIN D2, (VITAMIN D ORAL), Take 1 capsule by mouth once daily at 6am. , Disp: , Rfl:   MELATONIN ORAL, Take 5 mg by mouth nightly., Disp: , Rfl:   metoprolol succinate (TOPROL-XL) 50 MG 24 hr tablet, Take 1 tablet (50 mg total) by mouth once daily., Disp: 90 tablet, Rfl: 3  multivitamin capsule, Take 1 capsule by mouth once daily. , Disp: , Rfl:   PAPAYA ENZYME ORAL, Take 1 tablet by mouth daily as needed (heartburn)., Disp: , Rfl:   vitamin E 100 UNIT capsule, Take 400 Units by mouth once daily., Disp: , Rfl:   aspirin (ECOTRIN) 81 MG EC tablet, Take 162 mg by mouth once daily. , Disp: , Rfl:   BROMELAINS ORAL, Take 1 tablet by mouth 2 (two) times daily., Disp: , Rfl:   cyanocobalamin 2000 MCG tablet, Take 2,000 mcg by mouth once daily., Disp: , Rfl:   OREGANO OIL ORAL, Take 1 capsule by mouth every other day., Disp: , Rfl:     No current facility-administered medications for this visit.      Review of patient's allergies indicates:   -- Lisinopril -- Swelling   -- Iodinated contrast media -- Other (See Comments)    --  Spasms, tremor, and high blood  pressure   -- Bactrim  [sulfamethoxazole-trimethoprim]     --  Other reaction(s): Unknown   -- Diflucan [fluconazole]     --  Heart racing   -- Hydroxyzine hcl     --  Other reaction(s): elevated bp   -- Kenalog [triamcinolone acetonide] -- Other (See Comments)    --  Heart flutters all day   -- Phenergan [promethazine] -- Anxiety   -- Zofran [ondansetron hcl (pf)] -- Anxiety     Constipation  Associated symptoms include abdominal pain and rectal pain. Pertinent negatives include no diarrhea, fever, nausea or vomiting.   Abdominal Pain  Associated symptoms include constipation and frequency. Pertinent negatives include no diarrhea, dysuria, fever, hematuria, nausea or vomiting.   Review of Systems   Constitutional: Negative.  Negative for chills, diaphoresis, fatigue and fever.   HENT: Negative.     Respiratory: Negative.  Negative for cough, shortness of breath and wheezing.    Cardiovascular: Negative.  Negative for chest pain and leg swelling.   Gastrointestinal:  Positive for abdominal pain, blood in stool (occasionally with wiping), constipation and rectal pain. Negative for abdominal distention, anal bleeding, change in bowel habit, diarrhea, nausea, vomiting, reflux, fecal incontinence and change in bowel habit.   Genitourinary:  Positive for frequency and urgency. Negative for dysuria and hematuria.   Musculoskeletal: Negative.    Integumentary:  Positive for rash.   Neurological: Negative.    Psychiatric/Behavioral: Negative.         Objective:      Physical Exam  Constitutional:       Appearance: Normal appearance.   HENT:      Head: Normocephalic and atraumatic.   Cardiovascular:      Rate and Rhythm: Normal rate and regular rhythm.      Heart sounds: No murmur heard.  Pulmonary:      Effort: Pulmonary effort is normal. No respiratory distress.      Breath sounds: Normal breath sounds.   Abdominal:      General: Bowel sounds are normal. There is distension.      Palpations: Abdomen is soft.       Tenderness: There is no abdominal tenderness.   Neurological:      Mental Status: He is alert and oriented to person, place, and time.   Psychiatric:         Mood and Affect: Mood normal.         Behavior: Behavior normal.       Assessment:       Problem List Items Addressed This Visit    None  Visit Diagnoses       Lower abdominal pain    -  Primary    Relevant Medications    clotrimazole-betamethasone 1-0.05% (LOTRISONE) cream    Other Relevant Orders    Urinalysis, Reflex to Urine Culture Urine, Clean Catch (Completed)    CT Abdomen Pelvis  Without Contrast (Completed)    Rectal pain        Relevant Medications    hydrocortisone (PROCTOCORT) 10 % (80 mg) rectal foam    Other Relevant Orders    Occult blood x 1, stool (Completed)    Urinalysis, Reflex to Urine Culture Urine, Clean Catch (Completed)            Plan:       1. Lower abdominal pain    - clotrimazole-betamethasone 1-0.05% (LOTRISONE) cream; Apply topically 2 (two) times daily. In the groin  Dispense: 45 g; Refill: 2  - Urinalysis, Reflex to Urine Culture Urine, Clean Catch  - CT Abdomen Pelvis  Without Contrast; Future    2. Rectal pain    - Occult blood x 1, stool; Future  - hydrocortisone (PROCTOCORT) 10 % (80 mg) rectal foam; Place 1 applicator rectally 2 (two) times daily.  Dispense: 15 g; Refill: 0  - Urinalysis, Reflex to Urine Culture Urine, Clean Catch

## 2022-10-15 ENCOUNTER — TELEPHONE (OUTPATIENT)
Dept: FAMILY MEDICINE | Facility: CLINIC | Age: 57
End: 2022-10-15
Payer: COMMERCIAL

## 2022-10-15 NOTE — TELEPHONE ENCOUNTER
----- Message from Laxmi Koch NP sent at 10/14/2022  4:00 PM CDT -----  No acute abnormalities, no diverticulitis (inflammation that would need treatment). See gastroenterology to further discuss symptoms.

## 2022-10-26 ENCOUNTER — PATIENT MESSAGE (OUTPATIENT)
Dept: FAMILY MEDICINE | Facility: CLINIC | Age: 57
End: 2022-10-26
Payer: COMMERCIAL

## 2022-10-27 ENCOUNTER — PATIENT MESSAGE (OUTPATIENT)
Dept: FAMILY MEDICINE | Facility: CLINIC | Age: 57
End: 2022-10-27

## 2022-10-27 ENCOUNTER — OFFICE VISIT (OUTPATIENT)
Dept: FAMILY MEDICINE | Facility: CLINIC | Age: 57
End: 2022-10-27
Payer: COMMERCIAL

## 2022-10-27 ENCOUNTER — HOSPITAL ENCOUNTER (OUTPATIENT)
Dept: RADIOLOGY | Facility: HOSPITAL | Age: 57
Discharge: HOME | End: 2022-10-27
Attending: INTERNAL MEDICINE
Payer: COMMERCIAL

## 2022-10-27 VITALS
OXYGEN SATURATION: 98 % | TEMPERATURE: 98 F | RESPIRATION RATE: 16 BRPM | SYSTOLIC BLOOD PRESSURE: 126 MMHG | HEART RATE: 60 BPM | DIASTOLIC BLOOD PRESSURE: 86 MMHG | HEIGHT: 70 IN | BODY MASS INDEX: 35 KG/M2 | WEIGHT: 244.5 LBS

## 2022-10-27 DIAGNOSIS — M79.632 PAIN IN LEFT FOREARM: ICD-10-CM

## 2022-10-27 DIAGNOSIS — M79.632 PAIN IN LEFT FOREARM: Primary | ICD-10-CM

## 2022-10-27 PROCEDURE — 73090 XR FOREARM LEFT: ICD-10-PCS | Mod: 26,LT,, | Performed by: RADIOLOGY

## 2022-10-27 PROCEDURE — 99213 PR OFFICE/OUTPT VISIT, EST, LEVL III, 20-29 MIN: ICD-10-PCS | Mod: S$GLB,,, | Performed by: INTERNAL MEDICINE

## 2022-10-27 PROCEDURE — 73090 X-RAY EXAM OF FOREARM: CPT | Mod: TC,FY,PO,LT

## 2022-10-27 PROCEDURE — 73090 X-RAY EXAM OF FOREARM: CPT | Mod: 26,LT,, | Performed by: RADIOLOGY

## 2022-10-27 PROCEDURE — 99213 OFFICE O/P EST LOW 20 MIN: CPT | Mod: S$GLB,,, | Performed by: INTERNAL MEDICINE

## 2022-10-27 RX ORDER — ASPIRIN 81 MG/1
81 TABLET ORAL DAILY
COMMUNITY
End: 2023-02-15

## 2022-10-27 NOTE — TELEPHONE ENCOUNTER
Please let him know that there is no abnormality of the bones in her forearm.  I feel his pain is due to a muscular strain and advised ice and rest.     Thanks

## 2022-10-27 NOTE — PROGRESS NOTES
Subjective:       Patient ID: García Tang is a 57 y.o. male.    Medication List with Changes/Refills   Current Medications    ACETAMINOPHEN (TYLENOL) 500 MG TABLET    Take 1,000 mg by mouth every 6 (six) hours as needed for Pain.    ACETYLCYSTEINE (NAC ORAL)    Take by mouth. Take 2 tablets daily    ASCORBIC ACID, VITAMIN C, (VITAMIN C) 1000 MG TABLET    Take 3,000 mg by mouth once daily.    ASPIRIN (ECOTRIN) 81 MG EC TABLET    Take 81 mg by mouth once daily.    B COMPLEX VITAMINS CAPSULE    Take 1 capsule by mouth once daily.    CLOTRIMAZOLE-BETAMETHASONE 1-0.05% (LOTRISONE) CREAM    Apply topically 2 (two) times daily. In the groin    ERGOCALCIFEROL, VITAMIN D2, (VITAMIN D ORAL)    Take 1 capsule by mouth once daily at 6am.     HYDROCORTISONE (PROCTOCORT) 10 % (80 MG) RECTAL FOAM    Place 1 applicator rectally 2 (two) times daily.    MELATONIN ORAL    Take 5 mg by mouth nightly.    METOPROLOL SUCCINATE (TOPROL-XL) 50 MG 24 HR TABLET    Take 1 tablet (50 mg total) by mouth once daily.    PAPAYA ENZYME ORAL    Take 1 tablet by mouth daily as needed (heartburn).    VITAMIN E 100 UNIT CAPSULE    Take 400 Units by mouth once daily.   Discontinued Medications    ACETYLCYSTEINE (NAC ORAL)    Take 2 tablets by mouth once daily.    ASPIRIN (ECOTRIN) 81 MG EC TABLET    Take 162 mg by mouth once daily.     BROMELAINS ORAL    Take 1 tablet by mouth 2 (two) times daily.    CYANOCOBALAMIN 2000 MCG TABLET    Take 2,000 mcg by mouth once daily.    DOCOSAHEXANOIC ACID/EPA (FISH OIL ORAL)    Take 3 capsules by mouth once daily at 6am.     MULTIVITAMIN CAPSULE    Take 1 capsule by mouth once daily.     OREGANO OIL ORAL    Take 1 capsule by mouth every other day.       Chief Complaint: Lump (Left forearm since Sunday / sore to touch )  He has a history of HTN, HLD, and CKD. He is new to me today.     He presents with lateral left lower forearm pain with touching. No known injury or over use injury. He has been exercising with  "weights lately. The pain is only when he touches that area. No swelling. No redness or warmth.  No pain with using his arm. It does not radiate.  No bruising.     Review of Systems   Constitutional:  Negative for activity change, appetite change, chills, fatigue and fever.   HENT:  Negative for congestion, ear discharge, ear pain, mouth sores, postnasal drip, rhinorrhea, sinus pressure and sore throat.    Eyes:  Negative for pain, discharge and redness.   Respiratory:  Negative for cough, chest tightness, shortness of breath and wheezing.    Gastrointestinal:  Negative for abdominal pain, constipation, diarrhea, nausea and vomiting.   Genitourinary:  Negative for dysuria.   Musculoskeletal:  Positive for arthralgias. Negative for neck stiffness.   Skin:  Negative for rash.   Neurological:  Negative for headaches.   Hematological:  Negative for adenopathy.     Objective:      Vitals:    10/27/22 0907   BP: 126/86   Pulse: 60   Resp: 16   Temp: 98.2 °F (36.8 °C)   TempSrc: Temporal   SpO2: 98%   Weight: 110.9 kg (244 lb 7.8 oz)   Height: 5' 10" (1.778 m)     Body mass index is 35.08 kg/m².  Physical Exam    General appearance: No acute distress, cooperative  Neck: FROM, soft, supple,  Lymph: no anterior or posterior cervical adenopathy  Heart::  Regular rate and rhythm, no murmur  Lung: Clear to ascultation bilaterally, no wheezing, no rales, no rhonchi, no distress  Skin: no rashes, no lesions, no warmth   Extremities: no edema, no cyanosis  Neuro: no focal abnormalities, strength 5/5 b/l UE and LE, 2+ DTRs b/l UE and LE  Peripheral pulses: 2+ radial pulses bilaterally, good perfusion and color  Musculoskeletal: FROM, good strenth, focal tenderness on the lateral left forearm above his thumb--no focal swelling but focal tenderness along the radial bone, no swelling, no erythema, no warmth  Joint: FROM of left wrist, elbow and arm, no tenderness with ROM testing    Assessment:       1. Pain in left forearm      "   Plan:       Pain in left forearm  Suspect muscle contusion but because there is some focal pain on along the radial bone, will get xray. Okay to use ice to help with pain. Okay to use tylenol for pain.   -     X-Ray Forearm Left; Future; Expected date: 10/27/2022    Follow up for will call with results .

## 2022-11-21 ENCOUNTER — TELEPHONE (OUTPATIENT)
Dept: FAMILY MEDICINE | Facility: CLINIC | Age: 57
End: 2022-11-21
Payer: COMMERCIAL

## 2022-11-21 NOTE — TELEPHONE ENCOUNTER
Called Pt, he Wanted to know if he should be tested for covid if he was possibly exposed but has no symptoms. Informed pt that if he has no symptoms he would not need to be tested. Pt verbalized understanding.

## 2022-11-21 NOTE — TELEPHONE ENCOUNTER
----- Message from Dee Aponte sent at 11/21/2022  2:18 PM CST -----  Regarding: pt call back  Name of Who is Calling:XIOMARA MARINO [[id number]]          What is the request in detail: pt would like to ask provider a personal matter           Can the clinic reply by [facility]: no           What Number [facility] Back [facility]: [phone] (home) [phone] (work)

## 2023-01-04 ENCOUNTER — TELEPHONE (OUTPATIENT)
Dept: FAMILY MEDICINE | Facility: CLINIC | Age: 58
End: 2023-01-04
Payer: COMMERCIAL

## 2023-01-04 DIAGNOSIS — E11.9 TYPE 2 DIABETES MELLITUS WITHOUT COMPLICATION: ICD-10-CM

## 2023-01-04 NOTE — TELEPHONE ENCOUNTER
Called pt twice leading up to virtual appointment to advise pt to come into clinic instead, was unable to reach pt. At time of virtual appointment called pt again and was able to contact, pt declined coming into clinic due to working from home and declined rescheduling to another day due to work. Advised pt that since headaches are worsening with dizziness and occasional blurry vision he needs to proceed to ER so he can receive timely and adequate care. Pt verbalized understanding.

## 2023-01-05 ENCOUNTER — OFFICE VISIT (OUTPATIENT)
Dept: FAMILY MEDICINE | Facility: CLINIC | Age: 58
End: 2023-01-05
Payer: COMMERCIAL

## 2023-01-05 ENCOUNTER — PATIENT MESSAGE (OUTPATIENT)
Dept: FAMILY MEDICINE | Facility: CLINIC | Age: 58
End: 2023-01-05
Payer: COMMERCIAL

## 2023-01-05 ENCOUNTER — LAB VISIT (OUTPATIENT)
Dept: LAB | Facility: HOSPITAL | Age: 58
End: 2023-01-05
Attending: FAMILY MEDICINE
Payer: COMMERCIAL

## 2023-01-05 VITALS
BODY MASS INDEX: 35.85 KG/M2 | OXYGEN SATURATION: 99 % | DIASTOLIC BLOOD PRESSURE: 86 MMHG | TEMPERATURE: 98 F | HEIGHT: 70 IN | HEART RATE: 51 BPM | SYSTOLIC BLOOD PRESSURE: 154 MMHG | WEIGHT: 250.44 LBS

## 2023-01-05 DIAGNOSIS — G89.29 CHRONIC NONINTRACTABLE HEADACHE, UNSPECIFIED HEADACHE TYPE: ICD-10-CM

## 2023-01-05 DIAGNOSIS — I10 ESSENTIAL (PRIMARY) HYPERTENSION: Primary | ICD-10-CM

## 2023-01-05 DIAGNOSIS — I10 PRIMARY HYPERTENSION: Primary | ICD-10-CM

## 2023-01-05 DIAGNOSIS — Z12.11 COLON CANCER SCREENING: ICD-10-CM

## 2023-01-05 DIAGNOSIS — E11.9 TYPE 2 DIABETES MELLITUS WITHOUT COMPLICATION, WITHOUT LONG-TERM CURRENT USE OF INSULIN: ICD-10-CM

## 2023-01-05 DIAGNOSIS — R51.9 CHRONIC NONINTRACTABLE HEADACHE, UNSPECIFIED HEADACHE TYPE: ICD-10-CM

## 2023-01-05 PROBLEM — I67.1 CEREBRAL ANEURYSM: Status: RESOLVED | Noted: 2021-07-06 | Resolved: 2023-01-05

## 2023-01-05 LAB
ALBUMIN/CREAT UR: 10.3 UG/MG (ref 0–30)
CREAT UR-MCNC: 68 MG/DL (ref 23–375)
MICROALBUMIN UR DL<=1MG/L-MCNC: 7 UG/ML

## 2023-01-05 PROCEDURE — 99999 PR PBB SHADOW E&M-EST. PATIENT-LVL IV: CPT | Mod: PBBFAC,,, | Performed by: FAMILY MEDICINE

## 2023-01-05 PROCEDURE — 99214 PR OFFICE/OUTPT VISIT, EST, LEVL IV, 30-39 MIN: ICD-10-PCS | Mod: S$GLB,,, | Performed by: FAMILY MEDICINE

## 2023-01-05 PROCEDURE — 82570 ASSAY OF URINE CREATININE: CPT | Performed by: FAMILY MEDICINE

## 2023-01-05 PROCEDURE — 99999 PR PBB SHADOW E&M-EST. PATIENT-LVL IV: ICD-10-PCS | Mod: PBBFAC,,, | Performed by: FAMILY MEDICINE

## 2023-01-05 PROCEDURE — 99214 OFFICE O/P EST MOD 30 MIN: CPT | Mod: S$GLB,,, | Performed by: FAMILY MEDICINE

## 2023-01-05 RX ORDER — AMITRIPTYLINE HYDROCHLORIDE 25 MG/1
25 TABLET, FILM COATED ORAL NIGHTLY PRN
Qty: 30 TABLET | Refills: 1 | Status: SHIPPED | OUTPATIENT
Start: 2023-01-05 | End: 2023-02-15

## 2023-01-05 NOTE — PROGRESS NOTES
"Subjective:       Patient ID: García Tang is a 57 y.o. male.    Chief Complaint: persistent headaches (Headaches daily for a month, dizziness at times , pt states bp has been elevated last few times he took it )    Pt is known to me.  The pt is having new daily headaches for the last month.  He gets nauseated.  He has found that his headaches go away when he exercises.  The headaches are frontal and down to temples.  His neck does not hurt.  He gets no relief with OTC pain relievers.  There is no photophobia nor vision change.   The headache gets worse if he has a lot of head movement.  He started running again--did a half marathon a couple of months ago.  He is back at work after a couple of years of long Covid.  He changed jobs in 9/2022.  He has lost 20+ pounds since his last visit with me.  He has not been taking his BP readings at home.    Review of Systems   Constitutional:  Negative for activity change, appetite change, fatigue and unexpected weight change.   HENT:  Positive for voice change ("laryngitis"). Negative for sinus pressure.    Eyes:  Negative for visual disturbance.   Respiratory:  Negative for cough, chest tightness and shortness of breath.    Cardiovascular:  Negative for chest pain, palpitations and leg swelling.   Gastrointestinal:  Negative for abdominal pain, constipation, diarrhea, nausea and vomiting.        Hx of reflux   Endocrine: Negative for cold intolerance, heat intolerance and polyuria.   Genitourinary:  Negative for decreased urine volume and dysuria.   Musculoskeletal:  Negative for arthralgias and back pain.   Skin:  Negative for rash.   Neurological:  Positive for headaches (wakes up with headache). Negative for numbness.   Psychiatric/Behavioral:  Positive for sleep disturbance. Negative for decreased concentration and dysphoric mood. The patient is not nervous/anxious.      Objective:      Physical Exam  Cardiovascular:      Pulses:           Dorsalis pedis pulses are " 2+ on the right side and 2+ on the left side.        Posterior tibial pulses are 2+ on the right side and 2+ on the left side.   Musculoskeletal:      Right foot: Normal range of motion. No deformity.      Left foot: Normal range of motion. No deformity.   Feet:      Right foot:      Protective Sensation: 7 sites tested.  7 sites sensed.      Skin integrity: No ulcer.      Left foot:      Protective Sensation: 7 sites tested.  7 sites sensed.      Skin integrity: No ulcer.       Assessment:       1. Essential (primary) hypertension    2. Type 2 diabetes mellitus without complication, without long-term current use of insulin    3. Chronic nonintractable headache, unspecified headache type    4. Colon cancer screening        Plan:       García was seen today for persistent headaches.    Diagnoses and all orders for this visit:    Essential (primary) hypertension    Type 2 diabetes mellitus without complication, without long-term current use of insulin  -     Hemoglobin A1C; Future  -     Microalbumin/Creatinine Ratio, Urine; Future    Chronic nonintractable headache, unspecified headache type  -     amitriptyline (ELAVIL) 25 MG tablet; Take 1 tablet (25 mg total) by mouth nightly as needed for Insomnia (and headache prevention).    Colon cancer screening  -     Case Request Endoscopy: COLONOSCOPY      During this visit, I reviewed the pt's history, medications, allergies, and problem list.

## 2023-01-06 ENCOUNTER — TELEPHONE (OUTPATIENT)
Dept: FAMILY MEDICINE | Facility: CLINIC | Age: 58
End: 2023-01-06

## 2023-01-06 ENCOUNTER — PATIENT MESSAGE (OUTPATIENT)
Dept: FAMILY MEDICINE | Facility: CLINIC | Age: 58
End: 2023-01-06
Payer: COMMERCIAL

## 2023-01-06 RX ORDER — LOSARTAN POTASSIUM 25 MG/1
25 TABLET ORAL DAILY
Qty: 30 TABLET | Refills: 1 | Status: SHIPPED | OUTPATIENT
Start: 2023-01-06 | End: 2023-02-15 | Stop reason: DRUGHIGH

## 2023-01-06 NOTE — TELEPHONE ENCOUNTER
----- Message from Latoya Nieto sent at 1/5/2023 10:49 AM CST -----  Regarding: pt called  Name of Who is Calling:    XIOMARA MARINO [[id number]]      What is the request in detail: The pt was seen today and is calling about concerns he has with a elevated bp  187/95  he says it has changed significantly since he left the office a hour ago .          Can the clinic reply by [facility]:    yes        What Number to Call Back if not in [facility]: [phone] (home)

## 2023-01-06 NOTE — TELEPHONE ENCOUNTER
----- Message from Azeb Carrasco, Patient Care Assistant sent at 1/6/2023  2:48 PM CST -----  Contact: Pt  Type: Needs Medical Advice    Who Called: Pt  Best Call Back Number: [phone]  Inquiry/Question: Pt is calling to follow up on his previous messages about elevated blood pressures. Pt was seen in office on yesterday. Please advise.Thank you~

## 2023-01-09 ENCOUNTER — TELEPHONE (OUTPATIENT)
Dept: FAMILY MEDICINE | Facility: CLINIC | Age: 58
End: 2023-01-09
Payer: COMMERCIAL

## 2023-01-09 ENCOUNTER — LAB VISIT (OUTPATIENT)
Dept: LAB | Facility: HOSPITAL | Age: 58
End: 2023-01-09
Attending: FAMILY MEDICINE
Payer: COMMERCIAL

## 2023-01-09 ENCOUNTER — OFFICE VISIT (OUTPATIENT)
Dept: FAMILY MEDICINE | Facility: CLINIC | Age: 58
End: 2023-01-09
Payer: COMMERCIAL

## 2023-01-09 VITALS
HEART RATE: 50 BPM | SYSTOLIC BLOOD PRESSURE: 144 MMHG | HEIGHT: 70 IN | OXYGEN SATURATION: 99 % | BODY MASS INDEX: 36.04 KG/M2 | WEIGHT: 251.75 LBS | DIASTOLIC BLOOD PRESSURE: 90 MMHG

## 2023-01-09 DIAGNOSIS — G89.29 CHRONIC NONINTRACTABLE HEADACHE, UNSPECIFIED HEADACHE TYPE: ICD-10-CM

## 2023-01-09 DIAGNOSIS — I67.9 CEREBRAL ARTERY DISEASE: Primary | ICD-10-CM

## 2023-01-09 DIAGNOSIS — R51.9 CHRONIC NONINTRACTABLE HEADACHE, UNSPECIFIED HEADACHE TYPE: ICD-10-CM

## 2023-01-09 DIAGNOSIS — I10 PRIMARY HYPERTENSION: ICD-10-CM

## 2023-01-09 DIAGNOSIS — Z91.041 CONTRAST MEDIA ALLERGY: Primary | ICD-10-CM

## 2023-01-09 DIAGNOSIS — I10 PRIMARY HYPERTENSION: Primary | ICD-10-CM

## 2023-01-09 LAB
CREAT SERPL-MCNC: 1 MG/DL (ref 0.5–1.4)
EST. GFR  (NO RACE VARIABLE): >60 ML/MIN/1.73 M^2

## 2023-01-09 PROCEDURE — 82565 ASSAY OF CREATININE: CPT | Mod: PO | Performed by: FAMILY MEDICINE

## 2023-01-09 PROCEDURE — 99214 PR OFFICE/OUTPT VISIT, EST, LEVL IV, 30-39 MIN: ICD-10-PCS | Mod: S$GLB,,, | Performed by: FAMILY MEDICINE

## 2023-01-09 PROCEDURE — 99214 OFFICE O/P EST MOD 30 MIN: CPT | Mod: S$GLB,,, | Performed by: FAMILY MEDICINE

## 2023-01-09 PROCEDURE — 36415 COLL VENOUS BLD VENIPUNCTURE: CPT | Mod: PO | Performed by: FAMILY MEDICINE

## 2023-01-09 PROCEDURE — 99999 PR PBB SHADOW E&M-EST. PATIENT-LVL V: ICD-10-PCS | Mod: PBBFAC,,, | Performed by: FAMILY MEDICINE

## 2023-01-09 PROCEDURE — 99999 PR PBB SHADOW E&M-EST. PATIENT-LVL V: CPT | Mod: PBBFAC,,, | Performed by: FAMILY MEDICINE

## 2023-01-09 RX ORDER — PREDNISONE 50 MG/1
50 TABLET ORAL SEE ADMIN INSTRUCTIONS
Qty: 3 TABLET | Refills: 0 | Status: SHIPPED | OUTPATIENT
Start: 2023-01-09 | End: 2023-01-30

## 2023-01-09 NOTE — TELEPHONE ENCOUNTER
Called pt to let them know that Radiology noticed that he has an allergy iodine and that his appointment need to be change to tomorrow due to him needing to be premedicated before the procedure. Please send pt to ext [phone] [facility] in [facility]

## 2023-01-09 NOTE — PROGRESS NOTES
"Subjective:       Patient ID: García Tang is a 57 y.o. male.    Chief Complaint: Follow-up and Hypertension    Pt is known to me.  The pt did not start the Elavil because he feels the Losartan made his drowsy.  He continues with the headaches.  He takes the losartan at night.  He reports that "everyone" in his family has HTN.  He thinks the headaches are making his BP go up.  He says his BP goes down when he exercises.  It goes up after exercises and when he eats.  The headaches started in November--he calls them migraines but he has never been diagnosed.      Follow-up  Associated symptoms include headaches (wakes up with headache). Pertinent negatives include no abdominal pain, arthralgias, chest pain, coughing, fatigue, nausea, numbness, rash or vomiting.   Hypertension  Associated symptoms include headaches (wakes up with headache). Pertinent negatives include no chest pain, palpitations or shortness of breath.   Review of Systems   Constitutional:  Negative for activity change, appetite change, fatigue and unexpected weight change.   HENT:  Negative for sinus pressure.    Eyes:  Negative for visual disturbance.   Respiratory:  Negative for cough, chest tightness and shortness of breath.    Cardiovascular:  Negative for chest pain, palpitations and leg swelling.   Gastrointestinal:  Negative for abdominal pain, constipation, diarrhea, nausea and vomiting.        Hx of reflux   Endocrine: Negative for cold intolerance, heat intolerance and polyuria.   Genitourinary:  Negative for decreased urine volume and dysuria.   Musculoskeletal:  Negative for arthralgias and back pain.   Skin:  Negative for rash.   Neurological:  Positive for headaches (wakes up with headache). Negative for numbness.   Psychiatric/Behavioral:  Positive for sleep disturbance. Negative for decreased concentration and dysphoric mood. The patient is not nervous/anxious.      Objective:      Physical Exam  Constitutional:       Appearance: " He is well-developed.   HENT:      Head: Normocephalic.   Eyes:      Conjunctiva/sclera: Conjunctivae normal.      Pupils: Pupils are equal, round, and reactive to light.   Neck:      Thyroid: No thyromegaly.   Cardiovascular:      Rate and Rhythm: Normal rate and regular rhythm.      Heart sounds: Normal heart sounds.   Pulmonary:      Effort: Pulmonary effort is normal.      Breath sounds: Normal breath sounds.   Abdominal:      General: Bowel sounds are normal.      Palpations: Abdomen is soft.      Tenderness: There is no abdominal tenderness.   Musculoskeletal:         General: No tenderness or deformity. Normal range of motion.      Cervical back: Normal range of motion and neck supple.   Lymphadenopathy:      Cervical: No cervical adenopathy.   Skin:     General: Skin is warm and dry.   Neurological:      Mental Status: He is alert and oriented to person, place, and time.      Cranial Nerves: No cranial nerve deficit.      Motor: No abnormal muscle tone.      Coordination: Coordination normal.      Deep Tendon Reflexes: Reflexes normal.   Psychiatric:         Behavior: Behavior normal.       Assessment:       1. Cerebral artery disease    2. Chronic nonintractable headache, unspecified headache type        Plan:       García was seen today for follow-up and hypertension.    Diagnoses and all orders for this visit:    Cerebral artery disease  -     CTA Head and Neck (xpd); Future    Chronic nonintractable headache, unspecified headache type  -     Ambulatory referral/consult to Neurology; Future      During this visit, I reviewed the pt's history, medications, allergies, and problem list.

## 2023-01-10 ENCOUNTER — HOSPITAL ENCOUNTER (OUTPATIENT)
Dept: RADIOLOGY | Facility: HOSPITAL | Age: 58
Discharge: HOME | End: 2023-01-10
Attending: FAMILY MEDICINE
Payer: COMMERCIAL

## 2023-01-10 DIAGNOSIS — I67.9 CEREBRAL ARTERY DISEASE: ICD-10-CM

## 2023-01-10 PROCEDURE — 25500020 PHARM REV CODE 255: Mod: PO | Performed by: FAMILY MEDICINE

## 2023-01-10 PROCEDURE — 70498 CTA HEAD AND NECK (XPD): ICD-10-PCS | Mod: 26,,, | Performed by: RADIOLOGY

## 2023-01-10 PROCEDURE — 70496 CT ANGIOGRAPHY HEAD: CPT | Mod: 26,,, | Performed by: RADIOLOGY

## 2023-01-10 PROCEDURE — 70496 CTA HEAD AND NECK (XPD): ICD-10-PCS | Mod: 26,,, | Performed by: RADIOLOGY

## 2023-01-10 PROCEDURE — 70496 CT ANGIOGRAPHY HEAD: CPT | Mod: TC,PO

## 2023-01-10 PROCEDURE — 70498 CT ANGIOGRAPHY NECK: CPT | Mod: 26,,, | Performed by: RADIOLOGY

## 2023-01-10 RX ADMIN — IOHEXOL 100 ML: 350 INJECTION, SOLUTION INTRAVENOUS at 11:01

## 2023-01-11 ENCOUNTER — PATIENT MESSAGE (OUTPATIENT)
Dept: FAMILY MEDICINE | Facility: CLINIC | Age: 58
End: 2023-01-11
Payer: COMMERCIAL

## 2023-01-11 DIAGNOSIS — I10 ESSENTIAL (PRIMARY) HYPERTENSION: Primary | ICD-10-CM

## 2023-01-11 DIAGNOSIS — I25.10 ASCVD (ARTERIOSCLEROTIC CARDIOVASCULAR DISEASE): Chronic | ICD-10-CM

## 2023-01-12 ENCOUNTER — PATIENT MESSAGE (OUTPATIENT)
Dept: FAMILY MEDICINE | Facility: CLINIC | Age: 58
End: 2023-01-12
Payer: COMMERCIAL

## 2023-01-12 ENCOUNTER — TELEPHONE (OUTPATIENT)
Dept: FAMILY MEDICINE | Facility: CLINIC | Age: 58
End: 2023-01-12
Payer: COMMERCIAL

## 2023-01-21 ENCOUNTER — NURSE TRIAGE (OUTPATIENT)
Dept: ADMINISTRATIVE | Facility: CLINIC | Age: 58
End: 2023-01-21
Payer: COMMERCIAL

## 2023-01-21 NOTE — TELEPHONE ENCOUNTER
Pt called for c/o feeling like something is blocking him from urinating he said that it started yesterday and he was recently started on new B/P meds. He said that he has a full feeling and that he has a strong urge to go but has trouble urinating as well. Pt triaged and care advice is to go to the ED. Pt said OK and to reach back out if any problems or concerns. Pt to go to the ED        Reason for Disposition   [1] Unable to urinate (or only a few drops) > 4 hours AND [2] bladder feels very full (e.g., palpable bladder or strong urge to urinate)    Additional Information   Negative: Shock suspected (e.g., cold/pale/clammy skin, too weak to stand, low BP, rapid pulse)   Negative: Sounds like a life-threatening emergency to the triager    Protocols used: Urinary Symptoms-A-

## 2023-01-24 ENCOUNTER — TELEPHONE (OUTPATIENT)
Dept: FAMILY MEDICINE | Facility: CLINIC | Age: 58
End: 2023-01-24
Payer: COMMERCIAL

## 2023-01-24 NOTE — TELEPHONE ENCOUNTER
----- Message from Colin Nieto sent at 1/24/2023  9:52 AM CST -----  Contact: pt  Type: Needs Medical Advice  Who Called:  pt     Best Call Back Number: [phone]    Additional Information: pt would like to speak with nurse in staff. Please advise.

## 2023-01-24 NOTE — TELEPHONE ENCOUNTER
Placed call to pt, no answer at this time. Left voicemail to call clinic. Call back number provided.

## 2023-01-28 ENCOUNTER — APPOINTMENT (OUTPATIENT)
Dept: LAB | Facility: HOSPITAL | Age: 58
End: 2023-01-28
Attending: NURSE PRACTITIONER
Payer: COMMERCIAL

## 2023-01-28 LAB
ALBUMIN SERPL BCP-MCNC: 4 G/DL (ref 3.5–5.2)
ALP SERPL-CCNC: 64 U/L (ref 55–135)
ALT SERPL W/O P-5'-P-CCNC: 23 U/L (ref 10–44)
ANION GAP SERPL CALC-SCNC: 13 MMOL/L (ref 8–16)
AST SERPL-CCNC: 27 U/L (ref 10–40)
BASOPHILS # BLD AUTO: 0.08 K/UL (ref 0–0.2)
BASOPHILS NFR BLD: 1.8 % (ref 0–1.9)
BILIRUB SERPL-MCNC: 0.6 MG/DL (ref 0.1–1)
BUN SERPL-MCNC: 17 MG/DL (ref 6–20)
CALCIUM SERPL-MCNC: 9.7 MG/DL (ref 8.7–10.5)
CHLORIDE SERPL-SCNC: 103 MMOL/L (ref 95–110)
CHOLEST SERPL-MCNC: 160 MG/DL (ref 120–199)
CHOLEST/HDLC SERPL: 3.6 {RATIO} (ref 2–5)
CO2 SERPL-SCNC: 24 MMOL/L (ref 23–29)
COMPLEXED PSA SERPL-MCNC: 7.8 NG/ML (ref 0–4)
CREAT SERPL-MCNC: 0.9 MG/DL (ref 0.5–1.4)
DIFFERENTIAL METHOD: NORMAL
EOSINOPHIL # BLD AUTO: 0.2 K/UL (ref 0–0.5)
EOSINOPHIL NFR BLD: 4.8 % (ref 0–8)
ERYTHROCYTE [DISTWIDTH] IN BLOOD BY AUTOMATED COUNT: 12.3 % (ref 11.5–14.5)
EST. GFR  (NO RACE VARIABLE): >60 ML/MIN/1.73 M^2
GLUCOSE SERPL-MCNC: 105 MG/DL (ref 70–110)
HCT VFR BLD AUTO: 45.5 % (ref 40–54)
HDLC SERPL-MCNC: 44 MG/DL (ref 40–75)
HDLC SERPL: 27.5 % (ref 20–50)
HGB BLD-MCNC: 15.3 G/DL (ref 14–18)
IMM GRANULOCYTES # BLD AUTO: 0.01 K/UL (ref 0–0.04)
IMM GRANULOCYTES NFR BLD AUTO: 0.2 % (ref 0–0.5)
LDLC SERPL CALC-MCNC: 106.6 MG/DL (ref 63–159)
LYMPHOCYTES # BLD AUTO: 1.5 K/UL (ref 1–4.8)
LYMPHOCYTES NFR BLD: 32.6 % (ref 18–48)
MCH RBC QN AUTO: 30.4 PG (ref 27–31)
MCHC RBC AUTO-ENTMCNC: 33.6 G/DL (ref 32–36)
MCV RBC AUTO: 91 FL (ref 82–98)
MONOCYTES # BLD AUTO: 0.5 K/UL (ref 0.3–1)
MONOCYTES NFR BLD: 11 % (ref 4–15)
NEUTROPHILS # BLD AUTO: 2.3 K/UL (ref 1.8–7.7)
NEUTROPHILS NFR BLD: 49.6 % (ref 38–73)
NONHDLC SERPL-MCNC: 116 MG/DL
NRBC BLD-RTO: 0 /100 WBC
PLATELET # BLD AUTO: 238 K/UL (ref 150–450)
PMV BLD AUTO: 9.6 FL (ref 9.2–12.9)
POTASSIUM SERPL-SCNC: 4.4 MMOL/L (ref 3.5–5.1)
PROT SERPL-MCNC: 7.2 G/DL (ref 6–8.4)
RBC # BLD AUTO: 5.03 M/UL (ref 4.6–6.2)
SODIUM SERPL-SCNC: 140 MMOL/L (ref 136–145)
T3FREE SERPL-MCNC: 3.2 PG/ML (ref 2.3–4.2)
T4 FREE SERPL-MCNC: 0.97 NG/DL (ref 0.71–1.51)
TRIGL SERPL-MCNC: 47 MG/DL (ref 30–150)
TSH SERPL DL<=0.005 MIU/L-ACNC: 1.18 UIU/ML (ref 0.4–4)
WBC # BLD AUTO: 4.54 K/UL (ref 3.9–12.7)

## 2023-01-28 PROCEDURE — 86038 ANTINUCLEAR ANTIBODIES: CPT | Performed by: NURSE PRACTITIONER

## 2023-01-28 PROCEDURE — 84443 ASSAY THYROID STIM HORMONE: CPT | Performed by: NURSE PRACTITIONER

## 2023-01-28 PROCEDURE — 84439 ASSAY OF FREE THYROXINE: CPT | Performed by: NURSE PRACTITIONER

## 2023-01-28 PROCEDURE — 80061 LIPID PANEL: CPT | Performed by: NURSE PRACTITIONER

## 2023-01-28 PROCEDURE — 80053 COMPREHEN METABOLIC PANEL: CPT | Performed by: NURSE PRACTITIONER

## 2023-01-28 PROCEDURE — 36415 COLL VENOUS BLD VENIPUNCTURE: CPT | Mod: PO | Performed by: NURSE PRACTITIONER

## 2023-01-28 PROCEDURE — 84153 ASSAY OF PSA TOTAL: CPT | Performed by: NURSE PRACTITIONER

## 2023-01-28 PROCEDURE — 84481 FREE ASSAY (FT-3): CPT | Performed by: NURSE PRACTITIONER

## 2023-01-28 PROCEDURE — 85025 COMPLETE CBC W/AUTO DIFF WBC: CPT | Performed by: NURSE PRACTITIONER

## 2023-01-30 ENCOUNTER — OFFICE VISIT (OUTPATIENT)
Dept: UROLOGY | Facility: CLINIC | Age: 58
End: 2023-01-30
Payer: COMMERCIAL

## 2023-01-30 VITALS — WEIGHT: 248.44 LBS | BODY MASS INDEX: 35.57 KG/M2 | HEIGHT: 70 IN

## 2023-01-30 DIAGNOSIS — Z98.890 HISTORY OF PYELOPLASTY: ICD-10-CM

## 2023-01-30 DIAGNOSIS — N40.1 BENIGN PROSTATIC HYPERPLASIA WITH URINARY OBSTRUCTION: ICD-10-CM

## 2023-01-30 DIAGNOSIS — Z87.448 HISTORY OF PYELOPLASTY: ICD-10-CM

## 2023-01-30 DIAGNOSIS — N13.8 BENIGN PROSTATIC HYPERPLASIA WITH URINARY OBSTRUCTION: ICD-10-CM

## 2023-01-30 DIAGNOSIS — N41.0 ACUTE BACTERIAL PROSTATITIS: Primary | ICD-10-CM

## 2023-01-30 DIAGNOSIS — B96.20 E. COLI UTI: ICD-10-CM

## 2023-01-30 DIAGNOSIS — N39.0 E. COLI UTI: ICD-10-CM

## 2023-01-30 LAB
ANA SER QL IF: NORMAL
BILIRUBIN, UA POC OHS: NEGATIVE
BLOOD, UA POC OHS: NEGATIVE
CLARITY, UA POC OHS: CLEAR
COLOR, UA POC OHS: YELLOW
GLUCOSE, UA POC OHS: NEGATIVE
KETONES, UA POC OHS: NEGATIVE
LEUKOCYTES, UA POC OHS: NEGATIVE
NITRITE, UA POC OHS: NEGATIVE
PH, UA POC OHS: 7
PROTEIN, UA POC OHS: NEGATIVE
SPECIFIC GRAVITY, UA POC OHS: 1.02
UROBILINOGEN, UA POC OHS: 0.2

## 2023-01-30 PROCEDURE — 99999 PR PBB SHADOW E&M-EST. PATIENT-LVL IV: CPT | Mod: PBBFAC,,, | Performed by: STUDENT IN AN ORGANIZED HEALTH CARE EDUCATION/TRAINING PROGRAM

## 2023-01-30 PROCEDURE — 81003 POCT URINALYSIS(INSTRUMENT): ICD-10-PCS | Mod: QW,S$GLB,, | Performed by: STUDENT IN AN ORGANIZED HEALTH CARE EDUCATION/TRAINING PROGRAM

## 2023-01-30 PROCEDURE — 81003 URINALYSIS AUTO W/O SCOPE: CPT | Mod: QW,S$GLB,, | Performed by: STUDENT IN AN ORGANIZED HEALTH CARE EDUCATION/TRAINING PROGRAM

## 2023-01-30 PROCEDURE — 99215 OFFICE O/P EST HI 40 MIN: CPT | Mod: 25,S$GLB,, | Performed by: STUDENT IN AN ORGANIZED HEALTH CARE EDUCATION/TRAINING PROGRAM

## 2023-01-30 PROCEDURE — 99999 PR PBB SHADOW E&M-EST. PATIENT-LVL IV: ICD-10-PCS | Mod: PBBFAC,,, | Performed by: STUDENT IN AN ORGANIZED HEALTH CARE EDUCATION/TRAINING PROGRAM

## 2023-01-30 PROCEDURE — 99215 PR OFFICE/OUTPT VISIT, EST, LEVL V, 40-54 MIN: ICD-10-PCS | Mod: 25,S$GLB,, | Performed by: STUDENT IN AN ORGANIZED HEALTH CARE EDUCATION/TRAINING PROGRAM

## 2023-01-30 RX ORDER — PREDNISONE 50 MG/1
50 TABLET ORAL 3 TIMES DAILY PRN
Qty: 3 TABLET | Refills: 0 | Status: SHIPPED | OUTPATIENT
Start: 2023-01-30 | End: 2023-02-09

## 2023-01-30 RX ORDER — AMLODIPINE BESYLATE 5 MG/1
5 TABLET ORAL DAILY
COMMUNITY
End: 2023-04-28

## 2023-01-30 RX ORDER — LOSARTAN POTASSIUM 100 MG/1
100 TABLET ORAL DAILY
COMMUNITY
Start: 2023-01-15 | End: 2023-10-31 | Stop reason: SDUPTHER

## 2023-01-30 RX ORDER — TAMSULOSIN HYDROCHLORIDE 0.4 MG/1
0.4 CAPSULE ORAL NIGHTLY
Qty: 30 CAPSULE | Refills: 2 | Status: SHIPPED | OUTPATIENT
Start: 2023-01-30 | End: 2023-02-15

## 2023-01-30 NOTE — PROGRESS NOTES
"[facility]   Clinic Note    Subjective:     Chief Complaint: Elevated PSA and Urinary Tract Infection (On cipro still having s/sx )      History of Present Illness:  García Tang is a 57 y.o. male who presents to clinic for evaluation and management of a recent UTI. He is established to our clinic previously seen by Dr. Erika Wing.     He reports onset with frequency and urgency about 2 weeks. He reports hesitancy and difficulty urinating. He had a recent UTI diagnosed at an urgent care and said the culture grew E. Coli. He has had associated fatigue and groin pain. He has been on cipro and has a few days left.     IPSS is more indicative of recent symptoms  IPSS Questionnaire (AUA-SS):  1)  Incomplete Emptying 4 - More than half the time   2)  Frequency 5 - Almost always   3)  Intermittency 4 - More than half the time   4) Urgency 4 - More than half the time   5) Weak Stream  4 - More than half the time   6) Straining 3 - About half the time   7) Nocturia 3 - 3 times   Total score:  0-7 mild, 8-19 mod, 20-35 severe 27   Quality of Life:  3 - Mixed        He had a PSA drawn 1/28/2023 which was elevated significantly above baseline to 7.8.     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     No family history of prostate cancer    Past medical, family, surgical and social history reviewed as documented in chart with pertinent positive medical, family, surgical and social history detailed in HPI.    A review systems was conducted with pertinent positive and negative findings documented in HPI.    Anticoagulation/Antiplatelets:  No    Objective:     Estimated body mass index is 35.65 kg/m² as calculated from the following:    Height as of this encounter: 5' 10" (1.778 m).    Weight as of this encounter: 112.7 kg (248 lb 7.3 oz).    Vital Signs (Most Recent)       Physical Exam  Vitals and nursing note reviewed.   Constitutional:       General: He is not in acute distress.     " Appearance: Normal appearance. He is well-developed. He is not ill-appearing or toxic-appearing.   Pulmonary:      Effort: Pulmonary effort is normal. No accessory muscle usage or respiratory distress.   Neurological:      Mental Status: He is alert.   Psychiatric:         Behavior: Behavior is cooperative.       Lab Results   Component Value Date    BUN 17 01/28/2023    CREATININE 0.9 01/28/2023    WBC 4.54 01/28/2023    HGB 15.3 01/28/2023    HCT 45.5 01/28/2023     01/28/2023    AST 27 01/28/2023    ALT 23 01/28/2023    ALKPHOS 64 01/28/2023    ALBUMIN 4.0 01/28/2023    HGBA1C 5.4 01/05/2023        Lab Results   Component Value Date    PSA 7.8 (H) 01/28/2023    PSA 0.46 11/17/2021    PSA 0.52 10/30/2020    PSA 0.47 08/22/2017    PSA 0.36 04/19/2017    PSADIAG 0.43 04/24/2019    PSADIAG 0.38 02/06/2019        Urine dipstick today was negative for all components.     Assessment:     1. Acute bacterial prostatitis    2. Benign prostatic hyperplasia with urinary obstruction    3. History of pyeloplasty    4. E. coli UTI      Plan:   1. Acute bacterial prostatitis  2. Benign prostatic hyperplasia with urinary obstruction  Discussed symptoms and acute on chronic nature.  Recommend at least a short course of Flomax and consider continuing it into the future.  - tamsulosin (FLOMAX) 0.4 mg Cap; Take 1 capsule (0.4 mg total) by mouth every evening.  Dispense: 30 capsule; Refill: 2  - Cystoscopy; Future    3. History of pyeloplasty  4. E. coli UTI  Given the UTI and history of pyeloplasty recommend further evaluation with cysto and CT urogram.  - Cystoscopy; Future  - CT Urogram Abd Pelvis W WO; Future  - predniSONE (DELTASONE) 50 MG Tab; Take 1 tablet (50 mg total) by mouth 3 (three) times daily as needed (7, 4, and 1 hour prior to CT scan).  Dispense: 3 tablet; Refill: 0    Discussed the steroid prep and Benadryl prior to contrast administration.    Ramos Richardson MD

## 2023-01-31 ENCOUNTER — PATIENT MESSAGE (OUTPATIENT)
Dept: UROLOGY | Facility: CLINIC | Age: 58
End: 2023-01-31
Payer: COMMERCIAL

## 2023-02-01 ENCOUNTER — TELEPHONE (OUTPATIENT)
Dept: UROLOGY | Facility: CLINIC | Age: 58
End: 2023-02-01
Payer: COMMERCIAL

## 2023-02-01 NOTE — TELEPHONE ENCOUNTER
----- Message from Dee Aponte sent at 2/1/2023  8:44 AM CST -----  Regarding: earlier appointment  Name of Who is Calling:XIOMARA MARINO [[id number]]          What is the request in detail: pt would like a earlier appointment please advise           Can the clinic reply by [facility]: no           What Number to Call Back if not in [facility]: [phone] (home) [phone] (work)

## 2023-02-02 ENCOUNTER — TELEPHONE (OUTPATIENT)
Dept: UROLOGY | Facility: CLINIC | Age: 58
End: 2023-02-02
Payer: COMMERCIAL

## 2023-02-02 NOTE — TELEPHONE ENCOUNTER
Went over instructions for prednisone and benadryl prior to contrast. Pt was instructed to follow the prescription directions of 7 hr, 4 hr, 1 hr prior for prednisone and 1 hr prior for benadryl per Dr. Richardson's clarification. Pt stated understanding of teaching. MT

## 2023-02-06 ENCOUNTER — PATIENT MESSAGE (OUTPATIENT)
Dept: UROLOGY | Facility: CLINIC | Age: 58
End: 2023-02-06
Payer: COMMERCIAL

## 2023-02-07 ENCOUNTER — LAB VISIT (OUTPATIENT)
Dept: LAB | Facility: HOSPITAL | Age: 58
End: 2023-02-07
Payer: COMMERCIAL

## 2023-02-07 DIAGNOSIS — N41.0 ACUTE PROSTATITIS: Primary | ICD-10-CM

## 2023-02-07 DIAGNOSIS — R10.32 LEFT INGUINAL PAIN: ICD-10-CM

## 2023-02-07 DIAGNOSIS — R10.30 LOWER ABDOMINAL PAIN, UNSPECIFIED: ICD-10-CM

## 2023-02-07 LAB
ALBUMIN SERPL BCP-MCNC: 4.1 G/DL (ref 3.5–5.2)
ALP SERPL-CCNC: 69 U/L (ref 55–135)
ALT SERPL W/O P-5'-P-CCNC: 36 U/L (ref 10–44)
AMYLASE SERPL-CCNC: 39 U/L (ref 20–110)
ANION GAP SERPL CALC-SCNC: 9 MMOL/L (ref 8–16)
AST SERPL-CCNC: 37 U/L (ref 10–40)
BASOPHILS # BLD AUTO: 0.07 K/UL (ref 0–0.2)
BASOPHILS NFR BLD: 1.3 % (ref 0–1.9)
BILIRUB SERPL-MCNC: 0.4 MG/DL (ref 0.1–1)
BILIRUB UR QL STRIP: NEGATIVE
BUN SERPL-MCNC: 15 MG/DL (ref 6–20)
CALCIUM SERPL-MCNC: 9.9 MG/DL (ref 8.7–10.5)
CHLORIDE SERPL-SCNC: 104 MMOL/L (ref 95–110)
CLARITY UR: CLEAR
CO2 SERPL-SCNC: 26 MMOL/L (ref 23–29)
COLOR UR: YELLOW
CREAT SERPL-MCNC: 1 MG/DL (ref 0.5–1.4)
DIFFERENTIAL METHOD: NORMAL
EOSINOPHIL # BLD AUTO: 0.1 K/UL (ref 0–0.5)
EOSINOPHIL NFR BLD: 2.4 % (ref 0–8)
ERYTHROCYTE [DISTWIDTH] IN BLOOD BY AUTOMATED COUNT: 12.3 % (ref 11.5–14.5)
EST. GFR  (NO RACE VARIABLE): >60 ML/MIN/1.73 M^2
GLUCOSE SERPL-MCNC: 112 MG/DL (ref 70–110)
GLUCOSE UR QL STRIP: NEGATIVE
HCT VFR BLD AUTO: 45 % (ref 40–54)
HGB BLD-MCNC: 14.9 G/DL (ref 14–18)
HGB UR QL STRIP: NEGATIVE
IMM GRANULOCYTES # BLD AUTO: 0.01 K/UL (ref 0–0.04)
IMM GRANULOCYTES NFR BLD AUTO: 0.2 % (ref 0–0.5)
KETONES UR QL STRIP: NEGATIVE
LEUKOCYTE ESTERASE UR QL STRIP: NEGATIVE
LIPASE SERPL-CCNC: 28 U/L (ref 4–60)
LYMPHOCYTES # BLD AUTO: 1.6 K/UL (ref 1–4.8)
LYMPHOCYTES NFR BLD: 29.6 % (ref 18–48)
MCH RBC QN AUTO: 30.2 PG (ref 27–31)
MCHC RBC AUTO-ENTMCNC: 33.1 G/DL (ref 32–36)
MCV RBC AUTO: 91 FL (ref 82–98)
MONOCYTES # BLD AUTO: 0.6 K/UL (ref 0.3–1)
MONOCYTES NFR BLD: 11.3 % (ref 4–15)
NEUTROPHILS # BLD AUTO: 3 K/UL (ref 1.8–7.7)
NEUTROPHILS NFR BLD: 55.2 % (ref 38–73)
NITRITE UR QL STRIP: NEGATIVE
NRBC BLD-RTO: 0 /100 WBC
PH UR STRIP: 6 [PH] (ref 5–8)
PLATELET # BLD AUTO: 248 K/UL (ref 150–450)
PMV BLD AUTO: 9.9 FL (ref 9.2–12.9)
POTASSIUM SERPL-SCNC: 4.5 MMOL/L (ref 3.5–5.1)
PROT SERPL-MCNC: 7.3 G/DL (ref 6–8.4)
PROT UR QL STRIP: NEGATIVE
RBC # BLD AUTO: 4.94 M/UL (ref 4.6–6.2)
SODIUM SERPL-SCNC: 139 MMOL/L (ref 136–145)
SP GR UR STRIP: 1.02 (ref 1–1.03)
TSH SERPL DL<=0.005 MIU/L-ACNC: 1.5 UIU/ML (ref 0.4–4)
URN SPEC COLLECT METH UR: NORMAL
WBC # BLD AUTO: 5.38 K/UL (ref 3.9–12.7)

## 2023-02-07 PROCEDURE — 80053 COMPREHEN METABOLIC PANEL: CPT | Performed by: NURSE PRACTITIONER

## 2023-02-07 PROCEDURE — 83690 ASSAY OF LIPASE: CPT | Performed by: NURSE PRACTITIONER

## 2023-02-07 PROCEDURE — 84443 ASSAY THYROID STIM HORMONE: CPT | Performed by: NURSE PRACTITIONER

## 2023-02-07 PROCEDURE — 82150 ASSAY OF AMYLASE: CPT | Performed by: NURSE PRACTITIONER

## 2023-02-07 PROCEDURE — 36415 COLL VENOUS BLD VENIPUNCTURE: CPT | Mod: PO | Performed by: NURSE PRACTITIONER

## 2023-02-07 PROCEDURE — 81003 URINALYSIS AUTO W/O SCOPE: CPT | Mod: PO | Performed by: NURSE PRACTITIONER

## 2023-02-07 PROCEDURE — 85025 COMPLETE CBC W/AUTO DIFF WBC: CPT | Performed by: NURSE PRACTITIONER

## 2023-02-13 ENCOUNTER — OFFICE VISIT (OUTPATIENT)
Dept: OPTOMETRY | Facility: CLINIC | Age: 58
End: 2023-02-13
Payer: COMMERCIAL

## 2023-02-13 DIAGNOSIS — E11.9 DIABETES MELLITUS TYPE 2 WITHOUT RETINOPATHY: Primary | ICD-10-CM

## 2023-02-13 DIAGNOSIS — H43.393 VITREOUS FLOATERS, BILATERAL: ICD-10-CM

## 2023-02-13 DIAGNOSIS — H40.023 OAG (OPEN ANGLE GLAUCOMA) SUSPECT, HIGH RISK, BILATERAL: ICD-10-CM

## 2023-02-13 PROCEDURE — 99999 PR PBB SHADOW E&M-EST. PATIENT-LVL III: ICD-10-PCS | Mod: PBBFAC,,, | Performed by: OPTOMETRIST

## 2023-02-13 PROCEDURE — 99999 PR PBB SHADOW E&M-EST. PATIENT-LVL III: CPT | Mod: PBBFAC,,, | Performed by: OPTOMETRIST

## 2023-02-13 PROCEDURE — 76514 PR  US, EYE, FOR CORNEAL THICKNESS: ICD-10-PCS | Mod: S$GLB,,, | Performed by: OPTOMETRIST

## 2023-02-13 PROCEDURE — 76514 ECHO EXAM OF EYE THICKNESS: CPT | Mod: S$GLB,,, | Performed by: OPTOMETRIST

## 2023-02-13 PROCEDURE — 92014 COMPRE OPH EXAM EST PT 1/>: CPT | Mod: S$GLB,,, | Performed by: OPTOMETRIST

## 2023-02-13 PROCEDURE — 92014 PR EYE EXAM, EST PATIENT,COMPREHESV: ICD-10-PCS | Mod: S$GLB,,, | Performed by: OPTOMETRIST

## 2023-02-13 RX ORDER — METRONIDAZOLE 500 MG/1
500 TABLET ORAL
COMMUNITY
Start: 2023-02-04 | End: 2023-02-15

## 2023-02-13 RX ORDER — CIPROFLOXACIN 500 MG/1
500 TABLET ORAL
COMMUNITY
Start: 2023-01-22 | End: 2023-02-15 | Stop reason: ALTCHOICE

## 2023-02-13 NOTE — PATIENT INSTRUCTIONS
"DRY EYES -- BURNING OR AYO SYMPTOMS:  Use Over The Counter artificial tears as needed for dry eye symptoms.   Some common brands include:  Systane, Optive, Refresh, and Thera-Tears.  These drops can be used as frequently as desired, but may be most helpful use during long periods of concentrated work.  For example, reading / working at the computer. Start with 3-4x per day.     Nighttime Ophthalmic gel or ointments are available: Refresh PM, Genteal, and Lacrilube.    Avoid drops that "get redness out" (Visine, Murine, Clear Eyes), as these may contain medication that could further irritate the eyes, especially with chronic use.    ALLERGY EYES -- ITCHING SYMPTOMS:  Over the counter medications include--Pataday, Zaditor, and Alaway.  Use as directed 1-2 drops daily for symptoms of itching / watering eyes.  These drops will not help for dry eye or exposure symptoms.    REDNESS RELIEF:  Lumify---is a good redness reliever that will not cause irritation if used chronically.        DIABETES AND THE EYE / DIABETIC RETINOPATHY    Diabetic retinopathy is a condition occurring in persons with diabetes, which causes progressive damage to the retina, the light sensitive lining at the back of the eye. It is a serious sight-threatening complication of diabetes.    Diabetic retinopathy is the result of damage to the tiny blood vessels that nourish the retina. They leak blood and other fluids that cause swelling of retinal tissue and clouding of vision. The condition usually affects both eyes. The longer a person has diabetes, the more likely they will develop diabetic retinopathy. If left untreated, diabetic retinopathy can cause blindness.  There are two basic types of diabetic retinopathy:    Background or nonproliferative diabetic retinopathy (NPDR)  Nonproliferative diabetic retinopathy (NPDR) is the earliest stage of diabetic retinopathy. With this condition, damaged blood vessels in the retina begin to leak extra fluid " and small amounts of blood into the eye. Sometimes, deposits of cholesterol or other fats from the blood may leak into the retina. Many people with diabetes have mild NPDR, which usually does not affect their vision. However, if their vision is affected, it is the result of macular edema and macular ischemia.    If vision is affected due to macular changes, a consult with a Retina Specialist may be advised.  This is an ophthalmologist that treats retina conditions, including diabetic retinopathy.     Proliferative diabetic retinopathy (PDR)  Proliferative diabetic retinopathy (PDR) mainly occurs when many of the blood vessels in the retina close, preventing enough blood flow. In an attempt to supply blood to the area where the original vessels closed, the retina responds by growing new blood vessels. This is called neovascularization. However, these new blood vessels are abnormal and do not supply the retina with proper blood flow. The new vessels are also often accompanied by scar tissue that may cause the retina to wrinkle or detach. PDR may cause more severe vision loss than NPDR because it can affect both central and peripheral vision.     A patient diagnosed with proliferative diabetic eye disease will be referred to a retinal specialist for consultation.    Often there are no visual symptoms in the early stages of diabetic retinopathy. That is why our eye care professionals recommend that everyone with diabetes have a comprehensive dilated eye examination once a year. Early detection and treatment can limit the potential for significant vision loss from diabetic retinopathy.       FLASHES / FLOATERS / POSTERIOR VITREOUS DETACHMENT    Call the clinic if you have any further changes in symptoms.  Including:  Increased numbers of floaters or flashing lights, dimness or darkness that moves through or stays constant in your vision, or any pain in the eye (s).    You may sometimes see small specks or clouds moving  in your field of vision.  They are called FLOATERS.  You can often see them when looking at a plain background, like a blank wall or blue noah.  Floaters are actually tiny clumps of gel or cells inside the VITREOUS, the clear jelly-like fluid that fills the inside of your eye.    While these objects look like they are in front of your eye, they are actually floating inside.  What you see are the shadows they cast on the RETINA, the nerve layer at the back of the eye that senses light and allows you to see.      POSTERIOR VITREOUS DETACHMENT    The appearance of new floaters may be alarming.  If you suddenly develop new floaters, you should contact your eye care professional  right away.    The retina can tear if the shrinking vitreous pulls away from the wall of the eye.  This sometimes causes a small amount of bleeding in the eye that may appear as new floaters.    A torn retina is always a serious problem, since it can lead to a retinal detachment.  You should see your eye care professional as soon as possible if:    even one new floater appears suddenly;  you see sudden flashes of light;  you notice other symptoms, like the loss of side vision, or a curtain closes down in your vision        POSTERIOR VITREOUS DETACHMENT is more common for people who:    are nearsighted;  have had cataract surgery;  have had YAG laser surgery of the eye;  have had inflammation inside the eye;  are over age 60.      While some floaters may remain visible, many of them will fade over time and become less noticeable.  Even if you've had some floaters for years, you should have your eyes checked as soon as possible if you notice new ones.    FLASHING LIGHTS    When the vitreous gel rubs or pulls on the retina, you may see what look like flashing lights or lightning streaks.  These flashes can appear off and on for several weeks or months.      Some people experience flashes of light that appear as jagged lines or heat waves in both  eyes, lasting 10-20 minutes.  These flashes are caused by a spasm of blood vessels in the brain, which is called a migraine.    If a headache follows these flashes, it's called a migraine headache.  If   no headache occurs, these flashes are called Ophthalmic or Ocular Migraine.           GLAUCOMA SUSPECT    Glaucoma is a condition in which damage occurs to the optic nerve inside the eye.  The optic nerve is the wire that transmits vision signals to the brain.   It is typically, but not always, associated with a painless increase in eye pressure over time. In some cases if untreated, Glaucoma can cause blindness.    A Glaucoma Suspect could be defined as a patient that has one or more of the following signs:    Elevated eye pressure.  Enlarged optic nerve head cupping.  Narrow anterior chamber angle.      Tests that may be performed to rule out glaucoma as a diagnosis include:    Visual fields- This test measures the health of the optic nerve and the extent of the peripheral vision as compared to normal.  Visual field loss can be consistent with advancing glaucoma.  HRT / OCT imaging- These computerized tests can obtain 3D scanning images of the optic nerve  / optic nerve cupping to compare past and future results.    Corneal thickness / pachymetry- This test measures the thickness of the clear outer surface of the eye, the cornea.  This physical thickness can have a bearing on the measurement of the eye pressure.  Gonioscopy- This measures or grades the anterior drainage angle.  This structure depth is the distance between the cornea (the clear surface layer) and iris (the colored portion) inside the eye.  If this angle is very slim, or narrow, it can impede normal fluid outflow from the eye, and sometimes cause the eye pressure to rise, and damage the optic nerve.  Optic nerve photography- baseline pictures of the optic nerve may be taken for future comparison.      If a diagnosis of Glaucoma is made based on  test results, you and your doctor will discuss treatment options. Treatment may include:    Rx Eye Drops- Many Glaucoma patients have their disease controlled with 1or 2 topical (eye drop) medications.    Laser Treatment of the Iris or Anterior Drainage Angle-  Out patient / in office laser treatments may be used as an alternative / additional therapy to prescription eye drops.  Advanced eye surgeries-  A small percentage of difficult cases may need more involved surgery with a Glaucoma specialist.  This is an eye surgeon that specializes in the treatment of Glaucoma.

## 2023-02-13 NOTE — PROGRESS NOTES
HPI    Dle- 05/24/2022-Dr. Tabor    Pt here for routine diabetic eye exam. Pt sts va in OD is blurred at   times. Pt sts blood pressure meds were increased recently. Twitching   OD>OS, started a couple of months ago. Floaters, occasional, nothing new,   no increase. Denies flashes/eye pain. Slight pressure/soreness in OS   occasional. No gtts. DM controlled with diet. HPN uncontrolled with meds,   recently increased.     Hemoglobin A1C       Date                     Value               Ref Range             Status                01/05/2023               5.4                 4.0 - 5.6 %           Final                       04/14/2022               5.4                 4.0 - 5.6 %           Final                       10/22/2021               5.4                 4.0 - 5.6 %           Final                    Last edited by Carmen De Santiago MA on 2/13/2023  1:33 PM.        ROS    Positive for: Eyes  Negative for: Constitutional, Gastrointestinal, Neurological, Skin,   Genitourinary, Musculoskeletal, HENT, Endocrine, Cardiovascular,   Respiratory, Psychiatric, Allergic/Imm, Heme/Lymph  Last edited by SATISH Caldera, OD on 2/13/2023  2:07 PM.        Assessment /Plan     For exam results, see Encounter Report.    Diabetes mellitus type 2 without retinopathy    OAG (open angle glaucoma) suspect, high risk, bilateral  -     OCT, Optic Nerve - OU - Both Eyes; Future  -     Fernandez Visual Field - OU - Extended - Both Eyes; Future    Vitreous floaters, bilateral      No rhett/ no csme, gave Diabetic Retinopathy info, advise tight control glucose, BP---Advise annual dilated fundus exam  Large c/d suspect w/ + fhx in father   High normal IOP  Angles open ---gonio next exam   CCT 2/2023--- [id number]     OCT 5/ 2022  G 98 wnl  G 97 bord thin T     Schedule updated fields and OCT     RTC pending those results     3. RD precautions given and reviewed. Patient knows to call/ message if any further changes in symptoms occur.    Ok  continue with old specs, pt defers refraction today       Discussed and educated patient on current findings /plan.  RTC glaucoma w/u, then 6 months for IOP / gonio     Judd complete 2/24/23  PSD  5.28 <1% w/ sup/ depression   7.66 <0.5% w/ sup>inf arc defect / depression   High false neg OU     G 96 wnl  G 96 bord w/ thin T  No gross progression

## 2023-02-14 ENCOUNTER — TELEPHONE (OUTPATIENT)
Dept: GASTROENTEROLOGY | Facility: CLINIC | Age: 58
End: 2023-02-14
Payer: COMMERCIAL

## 2023-02-14 ENCOUNTER — PATIENT MESSAGE (OUTPATIENT)
Dept: OPTOMETRY | Facility: CLINIC | Age: 58
End: 2023-02-14
Payer: COMMERCIAL

## 2023-02-14 NOTE — TELEPHONE ENCOUNTER
Left voicemail for patient to return phone call to schedule colonoscopy. [employer] message also sent.

## 2023-02-15 ENCOUNTER — OFFICE VISIT (OUTPATIENT)
Dept: NEUROLOGY | Facility: CLINIC | Age: 58
End: 2023-02-15
Payer: COMMERCIAL

## 2023-02-15 VITALS
BODY MASS INDEX: 35.4 KG/M2 | WEIGHT: 247.25 LBS | RESPIRATION RATE: 17 BRPM | SYSTOLIC BLOOD PRESSURE: 125 MMHG | HEIGHT: 70 IN | DIASTOLIC BLOOD PRESSURE: 84 MMHG | HEART RATE: 64 BPM | TEMPERATURE: 98 F

## 2023-02-15 DIAGNOSIS — G89.29 CHRONIC NONINTRACTABLE HEADACHE, UNSPECIFIED HEADACHE TYPE: ICD-10-CM

## 2023-02-15 DIAGNOSIS — R27.8 SENSORY ATAXIA: ICD-10-CM

## 2023-02-15 DIAGNOSIS — R51.9 CHRONIC NONINTRACTABLE HEADACHE, UNSPECIFIED HEADACHE TYPE: ICD-10-CM

## 2023-02-15 DIAGNOSIS — R26.89 BALANCE DISORDER: ICD-10-CM

## 2023-02-15 DIAGNOSIS — G47.9 SLEEP DISORDER: Primary | ICD-10-CM

## 2023-02-15 DIAGNOSIS — G43.019 INTRACTABLE MIGRAINE WITHOUT AURA AND WITHOUT STATUS MIGRAINOSUS: ICD-10-CM

## 2023-02-15 PROCEDURE — 99999 PR PBB SHADOW E&M-EST. PATIENT-LVL V: ICD-10-PCS | Mod: PBBFAC,,, | Performed by: NURSE PRACTITIONER

## 2023-02-15 PROCEDURE — 99215 OFFICE O/P EST HI 40 MIN: CPT | Mod: S$GLB,,, | Performed by: NURSE PRACTITIONER

## 2023-02-15 PROCEDURE — 99999 PR PBB SHADOW E&M-EST. PATIENT-LVL V: CPT | Mod: PBBFAC,,, | Performed by: NURSE PRACTITIONER

## 2023-02-15 PROCEDURE — 99215 PR OFFICE/OUTPT VISIT, EST, LEVL V, 40-54 MIN: ICD-10-PCS | Mod: S$GLB,,, | Performed by: NURSE PRACTITIONER

## 2023-02-15 RX ORDER — RIMEGEPANT SULFATE 75 MG/75MG
75 TABLET, ORALLY DISINTEGRATING ORAL DAILY PRN
Qty: 8 TABLET | Refills: 11 | Status: SHIPPED | OUTPATIENT
Start: 2023-02-15 | End: 2023-04-28

## 2023-02-15 NOTE — PROGRESS NOTES
Date of service: 2/15/2023  Referring provider: Dr. LORETTA Domingo    Subjective:      Chief complaint: Headache       Patient ID: García Tang is a 57 y.o. male with sensory ataxia, cognitive changes, tremor, post covid chronic neurologic symptoms, anxiety, depression, vertigo, meniere's disease, tinnitus, HTN, PVD, chest pain who presents for new patient evaluation of headaches     He has seen previous neurology: Alyssa Salinas NP for vertigo, Dr. Setinberg for anxiety, altered mental status and Dr. Arevalo for disorientation all in 2021.    History of Present Illness    ORIGINAL HEADACHE HISTORY - 2/15/23  Age at onset and course over time: November 2022 began with elevated blood pressure which he felt was related to the headaches. His medications were changed and now his blood pressure is better. Headaches have been improved. He also reports new fatigue with new blood pressure medications.     He has had history of headaches. Those headaches are described as stabbing in the back of the head. He would take tylenol and pain would resolve.  He got Covid in 2021 and has had issues since including sleep issues and tinnitus.     He also reports balance issues. He reports he constantly feels like he has to catch himself from falling.     Last eye exam - yesterday  Family history of migraines - unknown     He has been treated with antibiotics for prostatitis/UTI for the past month.     Location: front, sides, rear. Can change sides.   Quality:  [x] pressure [] tight [x] throbbing [x] sharp [x] stabbing   Severity: current 3 with range 7-10  Duration: hours  Frequency: daily  Headaches awaken at night?:  yes  Worst time of day: morning, mid-day  Associated with: [x] photophobia []  phonophobia [] osmophobia [x] blurred vision  [] double vision [] loss of appetite [] nausea [] vomiting [] dizziness [x] vertigo  [x] tinnitus [] irritability [] sinus pressure [x] problems with concentration   [] neck tightness   Alleviated  by:  [] sleep [] darkness [] massage [] heat [] ice [] medication exercise   Exacerbated by:  [] fatigue [] light [] noise [] smells [] coughing [] sneezing  [] bending over [] ovulation [] menses [] alcohol [] change in weather []  stress  Ipsilateral autonomic: [] nasal congestion [] lacrimation [] ptosis [] injection [] edema [] foreign body sensation [] ear fullness   ICP:  [] transient visual obscurations  [x] tinnitus high pitched, steady, pulsatile occasionally, right ear  [] positional headache  [x] non-positional   Sleep habits: trouble falling asleep, trouble staying asleep, unrefreshing sleep  Caffeine intake: 1-2 cups coffee  HIT 6 -  65    Current acute treatment:  Naproxen  Tylenol    Current prevention:  Losartan  Amlodipine  Metoprolol    Previously tried/failed acute treatment:  Naproxen  Meclizine  Tizanidine  Phenergan  Zofran  Excedrin    Previously tried/failed preventative treatment:  Amitriptyline  Sertraline  Irbesaratan  Lisinopril  Lyrica       Review of patient's allergies indicates:   Allergen Reactions    Lisinopril Swelling    Iodinated contrast media Other (See Comments)     Spasms, tremor, and high blood pressure    Bactrim  [sulfamethoxazole-trimethoprim]      Other reaction(s): Unknown    Diflucan [fluconazole]      Heart racing    Hydroxyzine hcl      Other reaction(s): elevated bp    Kenalog [triamcinolone acetonide] Other (See Comments)     Heart flutters all day    Phenergan [promethazine] Anxiety    Zofran [ondansetron hcl (pf)] Anxiety     Current Outpatient Medications   Medication Sig Dispense Refill    acetaminophen (TYLENOL) 500 MG tablet Take 1,000 mg by mouth every 6 (six) hours as needed for Pain.      acetylcysteine (NAC ORAL) Take by mouth. Take 2 tablets daily      amLODIPine (NORVASC) 5 MG tablet Take 5 mg by mouth once daily.      losartan (COZAAR) 100 MG tablet Take 100 mg by mouth once daily.      MELATONIN ORAL Take 5 mg by mouth nightly.      metoprolol  succinate (TOPROL-XL) 50 MG 24 hr tablet Take 1 tablet (50 mg total) by mouth once daily. 90 tablet 3    metroNIDAZOLE (FLAGYL) 500 MG tablet Take 500 mg by mouth.      rimegepant (NURTEC) 75 mg odt Take 1 tablet (75 mg total) by mouth daily as needed for Migraine. Place ODT tablet on the tongue; alternatively the ODT tablet may be placed under the tongue 8 tablet 11     No current facility-administered medications for this visit.       Past Medical History  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    Diverticulitis     diverticulitis    Essential hypertension 07/18/2016    Fatty liver disease, nonalcoholic 04/07/2016    [place], 2006     GERD (gastroesophageal reflux disease)     Headache     HLD (hyperlipidemia)     Kidney stone     requiring stent    Meniere's disease 01/09/2018    Morbid obesity 05/29/2014    NAFLD (nonalcoholic fatty liver disease)     Near syncope 05/23/2017    Obesity hypoventilation syndrome 02/22/2016    PAD (peripheral artery disease) 09/17/2019    Mild 9/2018    Prostatitis     Rectal bleeding     diverticulitis    Thyroid nodule 07/14/2022    Type 2 diabetes mellitus with hyperglycemia, with long-term current use of insulin 05/18/2017    Uncontrolled type 2 diabetes mellitus with peripheral neuropathy 05/29/2014       Past Surgical History  Past Surgical History:   Procedure Laterality Date    COLONOSCOPY      kidney stent      KIDNEY SURGERY  1996    right kidney obstruction requiring stent    NOSE SURGERY         Family History  Family History   Problem Relation Age of Onset    Cancer Father     Diabetes Father     Hypertension Father     Glaucoma Father     Hyperlipidemia Father     Liver disease Mother         d.    Cirrhosis Mother     Hypertension Mother     Hyperlipidemia Mother     Diabetes Brother     Hypertension Brother     Heart  attack Maternal Uncle     Heart attack Maternal Uncle     Heart disease Neg Hx     Heart failure Neg Hx     Macular degeneration Neg Hx     Retinal detachment Neg Hx        Social History  Social History     Socioeconomic History    Marital status:    Tobacco Use    Smoking status: Former     Types: Cigarettes     Quit date: 1990     Years since quittin.1    Smokeless tobacco: Never    Tobacco comments:     social   Substance and Sexual Activity    Alcohol use: No     Comment: stopped in     Drug use: No    Sexual activity: Yes     Partners: Female        Review of Systems  14-point review of systems as follows:   No check leesa indicates NEGATIVE response   Constitutional: [] weight loss, [] change to appetite   Eyes: [x] change in vision, [] double vision   Ears, nose, mouth, throat: [] frequent nose bleeds, [x] ringing in the ears   Respiratory: [] cough, [] wheezing   Cardiovascular: [] chest pain, [] palpitations   Gastrointestinal: [] jaundice, [] nausea/vomiting   Genitourinary: [] incontinence, [x] burning with urination   Hematologic/lymphatic: [] easy bruising/bleeding, [] night sweats   Neurological: [x] numbness, [x] weakness   Endocrine: [x] fatigue, [x] heat/cold intolerance   Allergy/Immunologic: [] fevers, [] chills   Musculoskeletal: [] muscle pain, [] joint pain   Psychiatric: [] thoughts of harming self/others, [] depression   Integumentary: [] rashes, [] sores that do not heal     Objective:        Vitals:    02/15/23 1045   BP: 125/84   Pulse: 64   Resp: 17   Temp: 97.9 °F (36.6 °C)     Body mass index is 35.48 kg/m².    2/15/23  Constitutional: appears in no acute distress, well-developed, well-nourished     Eyes: normal conjunctiva, PERRLA    Ears, nose, mouth, throat: external appearance of ears and nose normal, hearing intact     Cardiovascular: regular rate and rhythm, no murmurs appreciated    Respiratory: unlabored respirations, breath sounds normal  bilaterally    Gastrointestinal: no visible abdominal masses, no guarding, no visible hernia    Musculoskeletal: normal tone in all four extremities. No abnormal movements. No pronator drift. No orbit. Symmetric finger tapping. Normal station. Unsteady tandem gait      Spine:   CERVICAL SPINE:  ROM: normal   MUSCLE SPASM: no   FACET LOADING: bilateral    SPURLING: no  KACIE / SALUD tender: no     Psychiatric: normal judgment and insight. Oriented to person, place, and time.     Neurologic:   Cortical functions: recent and remote memory intact, normal attention span and concentration, speech fluent, adequate fund of knowledge   Cranial nerves: visual fields full, PERRLA, EOMI, symmetric facial strength, hearing intact, palate elevates symmetrically, shoulder shrug 5/5, tongue protrudes midline   Reflexes: 2+ in the upper and lower extremities, no Mahan  Sensation: intact to temperature throughout   Coordination: normal finger to nose, heel to shin    Data Review:     I have personally reviewed the referring provider's notes, labs, & imaging made available to me today.      RADIOLOGY STUDIES:  I have personally reviewed the pertinent images performed.       Results for orders placed or performed during the hospital encounter of 11/04/21   CT Head Without Contrast    Narrative    EXAMINATION:  CT HEAD WITHOUT CONTRAST    CLINICAL HISTORY:  Head trauma, moderate-severe;    TECHNIQUE:  Low dose axial images were obtained through the head.  Coronal and sagittal reformations were also performed. Contrast was not administered.    Automated exposure control radiation dose lowering technique was utilized.    COMPARISON:  06/27/2021    FINDINGS:  There are symmetric and normal lateral ventricles bilaterally.  No hydrocephalus is noted.  In the supratentorial cerebral hemispheres no acute hemorrhages or abnormal extra-axial fluid collections or midline shift or herniations.  There is no parenchymal hemorrhages.  The gray/white  matter delineation is preserved.    No acute cerebellar hemorrhages.  There is no parasellar or pineal region masses or Chiari type malformations.    There is normal cranial vault.  No fractures identified.  In the visualized paranasal air sinuses there is no fluid levels.  Normal pneumatization of the mastoids.  No skull base abnormalities.      Impression    No acute intracranial abnormalities identified.      Electronically signed by: John Perez MD  Date:    11/04/2021  Time:    10:56     *Note: Due to a large number of results and/or encounters for the requested time period, some results have not been displayed. A complete set of results can be found in Results Review.       Lab Results   Component Value Date    BNP <10 12/09/2019     02/07/2023    K 4.5 02/07/2023    MG 1.8 11/30/2021     02/07/2023    CO2 26 02/07/2023    BUN 15 02/07/2023    CREATININE 1.0 02/07/2023     (H) 02/07/2023    HGBA1C 5.4 01/05/2023    AST 37 02/07/2023    ALT 36 02/07/2023    ALBUMIN 4.1 02/07/2023    PROT 7.3 02/07/2023    BILITOT 0.4 02/07/2023    CHOL 160 01/28/2023    HDL 44 01/28/2023    LDLCALC 106.6 01/28/2023    TRIG 47 01/28/2023       Lab Results   Component Value Date    WBC 5.38 02/07/2023    HGB 14.9 02/07/2023    HCT 45.0 02/07/2023    MCV 91 02/07/2023     02/07/2023       Lab Results   Component Value Date    TSH 1.504 02/07/2023           Assessment & Plan:       Problem List Items Addressed This Visit          Neuro    Sensory ataxia    Relevant Orders    Ambulatory referral/consult to Physical/Occupational Therapy     Other Visit Diagnoses       Sleep disorder    -  Primary    Relevant Orders    Ambulatory referral/consult to Sleep Disorders    Chronic nonintractable headache, unspecified headache type        Intractable migraine without aura and without status migrainosus        Relevant Medications    rimegepant (NURTEC) 75 mg odt    Balance disorder        Relevant Orders     Ambulatory referral/consult to Physical/Occupational Therapy              He has headaches with migrainous features but unclear if true migraine. Rule out sleep apnea as a secondary cause of headaches.   ----------------------------------  Please call our clinic at [phone] or send a message on the [employer] portal if there are any changes to the plan described below, for example,if you are not contacted for the requested tests, referral(s) within one week, if you are unable to receive the medications prescribed, or if you feel you need to change the treatment course for any reason.     TESTING:  -- sleep study    REFERRALS:  -- sleep specialist    PREVENTION (use daily regardless of headache):  -- start magnesium in ONE of the following preparations -               1. Magnesium oxide 800mg daily (the most common over the counter kind, may causes loose stools)              2. Magnesium citrate 400-500mg daily (harder to find, but more neutral on the bowels)              3. Magnesium glycinate 400mg daily (hardest to find, look online, but most bowel-neutral, best absorbed)     AS-NEEDED TREATMENT (use total no more than 10 days per month unless otherwise stated):  -- START Nurtec with next migraine. This dissolves under the tongue and is only taken once in 24 hour time frame.      Follow up in about 2 months (around 4/15/2023) for follow up with MKD.    Face to Face time with patient: 40  50 minutes of total time spent on the encounter, which includes face to face time and non-face to face time on day of visit preparing to see the patient (eg, review of tests), Obtaining and/or reviewing separately obtained history, Documenting clinical information in the electronic or other health record, Independently interpreting results (not separately reported) and communicating results to the patient/family/caregiver, or Care coordination (not separately reported).     Cecilia Son NP

## 2023-02-15 NOTE — PATIENT INSTRUCTIONS
Please call our clinic at [phone] or send a message on the [employer] portal if there are any changes to the plan described below, for example,if you are not contacted for the requested tests, referral(s) within one week, if you are unable to receive the medications prescribed, or if you feel you need to change the treatment course for any reason.     TESTING:  -- sleep study    REFERRALS:  -- sleep specialist  -- physical therapy    PREVENTION (use daily regardless of headache):  -- start magnesium in ONE of the following preparations -               1. Magnesium oxide 800mg daily (the most common over the counter kind, may causes loose stools)              2. Magnesium citrate 400-500mg daily (harder to find, but more neutral on the bowels)              3. Magnesium glycinate 400mg daily (hardest to find, look online, but most bowel-neutral, best absorbed)     AS-NEEDED TREATMENT (use total no more than 10 days per month unless otherwise stated):  -- START Nurtec with next migraine. This dissolves under the tongue and is only taken once in 24 hour time frame.

## 2023-02-17 ENCOUNTER — PROCEDURE VISIT (OUTPATIENT)
Dept: UROLOGY | Facility: CLINIC | Age: 58
End: 2023-02-17
Payer: COMMERCIAL

## 2023-02-17 VITALS — BODY MASS INDEX: 35.35 KG/M2 | HEIGHT: 70 IN | WEIGHT: 246.94 LBS

## 2023-02-17 DIAGNOSIS — N13.8 BENIGN PROSTATIC HYPERPLASIA WITH URINARY OBSTRUCTION: ICD-10-CM

## 2023-02-17 DIAGNOSIS — N40.1 BENIGN PROSTATIC HYPERPLASIA WITH URINARY OBSTRUCTION: ICD-10-CM

## 2023-02-17 DIAGNOSIS — N39.0 E. COLI UTI: ICD-10-CM

## 2023-02-17 DIAGNOSIS — N41.0 ACUTE BACTERIAL PROSTATITIS: ICD-10-CM

## 2023-02-17 DIAGNOSIS — B96.20 E. COLI UTI: ICD-10-CM

## 2023-02-17 LAB
BILIRUBIN, UA POC OHS: NEGATIVE
BLOOD, UA POC OHS: NEGATIVE
CLARITY, UA POC OHS: CLEAR
COLOR, UA POC OHS: YELLOW
GLUCOSE, UA POC OHS: NEGATIVE
KETONES, UA POC OHS: NEGATIVE
LEUKOCYTES, UA POC OHS: NEGATIVE
NITRITE, UA POC OHS: NEGATIVE
PH, UA POC OHS: 7
POC RESIDUAL URINE VOLUME: 7 ML (ref 0–100)
PROTEIN, UA POC OHS: NEGATIVE
SPECIFIC GRAVITY, UA POC OHS: 1.02
UROBILINOGEN, UA POC OHS: 0.2

## 2023-02-17 PROCEDURE — 51798 US URINE CAPACITY MEASURE: CPT | Mod: S$GLB,,, | Performed by: STUDENT IN AN ORGANIZED HEALTH CARE EDUCATION/TRAINING PROGRAM

## 2023-02-17 PROCEDURE — 51798 POCT BLADDER SCAN: ICD-10-PCS | Mod: S$GLB,,, | Performed by: STUDENT IN AN ORGANIZED HEALTH CARE EDUCATION/TRAINING PROGRAM

## 2023-02-17 PROCEDURE — 52000 CYSTOSCOPY: ICD-10-PCS | Mod: S$GLB,,, | Performed by: STUDENT IN AN ORGANIZED HEALTH CARE EDUCATION/TRAINING PROGRAM

## 2023-02-17 PROCEDURE — 81003 URINALYSIS AUTO W/O SCOPE: CPT | Mod: QW,S$GLB,, | Performed by: STUDENT IN AN ORGANIZED HEALTH CARE EDUCATION/TRAINING PROGRAM

## 2023-02-17 PROCEDURE — 52000 CYSTOURETHROSCOPY: CPT | Mod: S$GLB,,, | Performed by: STUDENT IN AN ORGANIZED HEALTH CARE EDUCATION/TRAINING PROGRAM

## 2023-02-17 PROCEDURE — 81003 POCT URINALYSIS(INSTRUMENT): ICD-10-PCS | Mod: QW,S$GLB,, | Performed by: STUDENT IN AN ORGANIZED HEALTH CARE EDUCATION/TRAINING PROGRAM

## 2023-02-17 NOTE — LETTER
February 17, 2023      [facility]  [address]  [place] [address]  Phone: [phone]  Fax: [phone]       Patient: García Tang   YOB: 1965  Date of Visit: 02/17/2023    To Whom It May Concern:    Brad Tang  was at [facility] on 02/17/2023. The patient may return to work on 02/20/2023 with no restrictions. If you have any questions or concerns, or if I can be of further assistance, please do not hesitate to contact me.    Sincerely,    Mickey Fernando MA

## 2023-02-17 NOTE — PROCEDURES
Cystoscopy    Date/Time: 2/17/2023 8:00 AM  Performed by: Ramos Richardson MD  Authorized by: Ramos Richardson MD     Procedure:   Flexible cysto-urethroscopy    Pre Procedure Diagnosis:  BPH, LUTS, and recent UTI    Post Procedure Diagnosis:  Same    Surgeon: Ramos Richardson MD     Anesthesia: 2% uro-jet lidocaine jelly for local analgesia    Procedure note:  The risks and benefits were explained and informed consent was obtained. 2% lidocaine urojet was used for local analgesia. The genitalia was prepped and draped in the sterile fashion.    The flexible scope was advanced into the urethra and into the bladder. There were no urethral strictures noted throughout the course of the urethra.    The bladder was completely surveyed in a systematic fashion. The bilateral ureteral orifices were identified in their normal orthotopic locations bilaterally. The remainder of the bladder was evaluated. There were no foreign bodies, stones, diverticula, or trabeculations. No bladder tumors or lesions suspicious for malignancy were seen.     Upon retroflexion there was mild intravesical prostate extension. There was minimal median lobe enlargement. As the flexible cystoscope was being withdrawn, the prostate was evaluated carefully. The lateral lobes of the prostate were moderately enlarged. The estimated prostatic urethral length was 4 cm.     The patient tolerated the procedure well without complication.     Findings in summary:  BPH      Assessment:     IPSS Questionnaire (AUA-SS):  1)  Incomplete Emptying 2 - Less than half the time   2)  Frequency 3 - About half the time   3)  Intermittency 3 - About half the time   4) Urgency 3 - About half the time   5) Weak Stream  2 - Less than half the time   6) Straining 2 - Less than half the time   7) Nocturia 2 - 2 times   Total score:  0-7 mild, 8-19 mod, 20-35 severe 17   Quality of Life:  3 - Mixed        PVR was measured and noted to be 7 mL.     We reviewed his CT and reassured that  there were no significant abnormalities - finding expected given previous procedure.     He did not fill / take the flomax due to the side effects and current issues with new blood pressure meds he is taking. Discussed a trial of a different alpha-blocker or outlet procedures. He is not interested at this time      Plan:  Follow up 6 months with an IPSS

## 2023-02-18 ENCOUNTER — NURSE TRIAGE (OUTPATIENT)
Dept: ADMINISTRATIVE | Facility: CLINIC | Age: 58
End: 2023-02-18
Payer: COMMERCIAL

## 2023-02-18 NOTE — TELEPHONE ENCOUNTER
"Patient states s/p Cystoscopy on 02/17/23 with current c/o thigh pain this AM. Patient states he took Tylenol for thigh pain that has resolved. Patient also states new onset of abdominal pain, hardness/tightness rated 7/10.    Care Advice given per Abdominal Pain, Male (Adult) Guideline. Patient advised to Go to [facility] for evaluation/treatment. Patient states understanding of care advice.     Reason for Disposition   [1] MODERATE pain (e.g., interferes with normal activities) AND [2] pain comes and goes (cramps) AND [3] present > 24 hours  (Exception: pain with Vomiting or Diarrhea - see that Guideline)    Additional Information   Negative: Shock suspected (e.g., cold/pale/clammy skin, too weak to stand, low BP, rapid pulse)   Negative: Difficult to awaken or acting confused (e.g., disoriented, slurred speech)   Negative: Passed out (i.e., lost consciousness, collapsed and was not responding)   Negative: Sounds like a life-threatening emergency to the triager   Negative: Chest pain   Negative: Pain is mainly in upper abdomen  (if needed ask: "is it mainly above the belly button?")   Negative: Followed an abdomen (stomach) injury   Negative: [1] SEVERE pain (e.g., excruciating) AND [2] present > 1 hour   Negative: [1] SEVERE pain AND [2] age > 60 years   Negative: [1] Vomiting AND [2] contains red blood or black ("coffee ground") material  (Exception: few red streaks in vomit that only happened once)   Negative: Blood in bowel movements  (Exception: Blood on surface of BM with constipation)   Negative: Black or tarry bowel movements (Exception: chronic-unchanged black-grey bowel movements AND is taking iron pills or Pepto-bismol)   Negative: [1] Unable to urinate (or only a few drops) > 4 hours AND [2] bladder feels very full (e.g., palpable bladder or strong urge to urinate)   Negative: [1] Pain in the scrotum or testicle AND [2] present > 1 hour   Negative: Patient sounds very sick or weak to the " triager   Negative: [1] MILD-MODERATE pain AND [2] constant AND [3] present > 2 hours   Negative: [1] Vomiting AND [2] abdomen looks much more swollen than usual   Negative: [1] Vomiting AND [2] contains bile (green color)   Negative: White of the eyes have turned yellow (i.e., jaundice)   Negative: Fever > 103 F (39.4 C)   Negative: [1] Fever > 101 F (38.3 C) AND [2] age > 60 years   Negative: [1] Fever > 100.0 F (37.8 C) AND [2] bedridden (e.g., nursing home patient, CVA, chronic illness, recovering from surgery)   Negative: [1] Fever > 100.0 F (37.8 C) AND [2] diabetes mellitus or weak immune system (e.g., HIV positive, cancer chemo, splenectomy, organ transplant, chronic steroids)   Negative: [1] SEVERE pain AND [2] present < 1 hour    Protocols used: Abdominal Pain - Male-A-AH

## 2023-02-23 ENCOUNTER — LAB VISIT (OUTPATIENT)
Dept: LAB | Facility: HOSPITAL | Age: 58
End: 2023-02-23
Payer: COMMERCIAL

## 2023-02-23 DIAGNOSIS — N41.0 ACUTE PROSTATITIS: Primary | ICD-10-CM

## 2023-02-23 PROCEDURE — 84153 ASSAY OF PSA TOTAL: CPT | Performed by: NURSE PRACTITIONER

## 2023-02-23 PROCEDURE — 36415 COLL VENOUS BLD VENIPUNCTURE: CPT | Mod: PO | Performed by: NURSE PRACTITIONER

## 2023-02-24 ENCOUNTER — CLINICAL SUPPORT (OUTPATIENT)
Dept: OPHTHALMOLOGY | Facility: CLINIC | Age: 58
End: 2023-02-24
Payer: COMMERCIAL

## 2023-02-24 ENCOUNTER — TELEPHONE (OUTPATIENT)
Dept: GASTROENTEROLOGY | Facility: CLINIC | Age: 58
End: 2023-02-24
Payer: COMMERCIAL

## 2023-02-24 DIAGNOSIS — H40.023 OAG (OPEN ANGLE GLAUCOMA) SUSPECT, HIGH RISK, BILATERAL: ICD-10-CM

## 2023-02-24 LAB — COMPLEXED PSA SERPL-MCNC: 2.1 NG/ML (ref 0–4)

## 2023-02-24 NOTE — PROGRESS NOTES
HPI    24-2 HVF and OCT RNFL done today  Pt. Denies latex or adhesive allergies  Pt. Sat well for test  Last edited by Erin Ghosh on 2/24/2023  9:43 AM.            Assessment /Plan     For exam results, see Encounter Report.

## 2023-02-27 ENCOUNTER — OFFICE VISIT (OUTPATIENT)
Dept: GASTROENTEROLOGY | Facility: CLINIC | Age: 58
End: 2023-02-27
Payer: COMMERCIAL

## 2023-02-27 VITALS — WEIGHT: 248.44 LBS | HEIGHT: 70 IN | BODY MASS INDEX: 35.57 KG/M2

## 2023-02-27 DIAGNOSIS — N50.812 LEFT TESTICULAR PAIN: ICD-10-CM

## 2023-02-27 DIAGNOSIS — K62.89 RECTAL PAIN: ICD-10-CM

## 2023-02-27 DIAGNOSIS — R49.0 HOARSENESS OF VOICE: ICD-10-CM

## 2023-02-27 DIAGNOSIS — R12 HEARTBURN: ICD-10-CM

## 2023-02-27 DIAGNOSIS — Z87.19 HISTORY OF DIVERTICULOSIS: ICD-10-CM

## 2023-02-27 DIAGNOSIS — N13.30 HYDRONEPHROSIS, UNSPECIFIED HYDRONEPHROSIS TYPE: ICD-10-CM

## 2023-02-27 DIAGNOSIS — R10.84 GENERALIZED ABDOMINAL PAIN: Primary | ICD-10-CM

## 2023-02-27 DIAGNOSIS — R11.2 NON-INTRACTABLE VOMITING WITH NAUSEA: ICD-10-CM

## 2023-02-27 DIAGNOSIS — K92.1 HEMATOCHEZIA: ICD-10-CM

## 2023-02-27 DIAGNOSIS — Z80.0 FAMILY HISTORY OF COLON CANCER: ICD-10-CM

## 2023-02-27 PROCEDURE — 99999 PR PBB SHADOW E&M-EST. PATIENT-LVL III: ICD-10-PCS | Mod: PBBFAC,,, | Performed by: NURSE PRACTITIONER

## 2023-02-27 PROCEDURE — 99999 PR PBB SHADOW E&M-EST. PATIENT-LVL III: CPT | Mod: PBBFAC,,, | Performed by: NURSE PRACTITIONER

## 2023-02-27 PROCEDURE — 99214 OFFICE O/P EST MOD 30 MIN: CPT | Mod: S$GLB,,, | Performed by: NURSE PRACTITIONER

## 2023-02-27 PROCEDURE — 99214 PR OFFICE/OUTPT VISIT, EST, LEVL IV, 30-39 MIN: ICD-10-PCS | Mod: S$GLB,,, | Performed by: NURSE PRACTITIONER

## 2023-02-27 RX ORDER — PANTOPRAZOLE SODIUM 40 MG/1
40 TABLET, DELAYED RELEASE ORAL
Qty: 30 TABLET | Refills: 1 | Status: SHIPPED | OUTPATIENT
Start: 2023-02-27 | End: 2023-05-10

## 2023-02-27 RX ORDER — NAPROXEN 250 MG/1
250 TABLET ORAL 2 TIMES DAILY PRN
COMMUNITY
End: 2024-02-06

## 2023-02-27 NOTE — PROGRESS NOTES
"Subjective:       Patient ID: García Tang is a 57 y.o. male Body mass index is 35.65 kg/m².    Chief Complaint: Abdominal Pain    This patient is new to me.  Established patient of hepatology.    Diagnosed in 1/2023 with UTI, took 2 courses of cipro. Saw urology and had cystoscopy for it. Seen in urgent care and treated for possible diverticulitis with flagyl x 10 days- no relief. Went to the ER and CT scan was ok.  Changes in medication over the past month, blood pressure medication changes.    Abdominal Pain  The current episode started more than 1 month ago (started in 1/2023). The problem occurs constantly. The problem has been gradually worsening. The pain is located in the generalized abdominal region. The pain is at a severity of 8/10 (currently). The quality of the pain is a sensation of fullness ("punching me in the gut feeling"). The abdominal pain radiates to the LLQ. Associated symptoms include dysuria (pain after urinating is improving), flatus (not more than usual), hematochezia (intermittent small amount of bright red blood streaked in stool; denies bleeding in between bowel movements), nausea (occasional) and vomiting (once he vomited which was over a week ago; emesis of food and mucus; denies bright red blood or coffee ground emesis). Pertinent negatives include no belching, constipation, diarrhea, fever, melena or weight loss. The pain is aggravated by eating and movement (exercising, urinating). He has tried proton pump inhibitors (protonix 20 mg once daily, naproxen at least once a day) for the symptoms. Prior diagnostic workup includes CT scan (seeing urology and had cystoscope done recently). His past medical history is significant for GERD (recurred recently, had it on 2/25/2023).     Review of Systems   Constitutional:  Negative for appetite change, chills, fatigue, fever and weight loss.   HENT:  Positive for voice change (hoarse for the past week). Negative for sore throat and trouble " swallowing.    Respiratory:  Negative for cough, choking and shortness of breath.    Cardiovascular:  Negative for chest pain.   Gastrointestinal:  Positive for abdominal pain, anal bleeding, blood in stool, flatus (not more than usual), hematochezia (intermittent small amount of bright red blood streaked in stool; denies bleeding in between bowel movements), nausea (occasional), rectal pain (described as burning with bowel movement) and vomiting (once he vomited which was over a week ago; emesis of food and mucus; denies bright red blood or coffee ground emesis). Negative for constipation, diarrhea and melena.   Genitourinary:  Positive for dysuria (pain after urinating is improving) and testicular pain (left sided, seeing urology). Negative for difficulty urinating and flank pain.   Neurological:  Negative for weakness.       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    Diverticulitis     diverticulitis    Essential hypertension 07/18/2016    Fatty liver disease, nonalcoholic 04/07/2016    [place], 2006     GERD (gastroesophageal reflux disease)     Headache     HLD (hyperlipidemia)     Kidney stone     requiring stent    Meniere's disease 01/09/2018    Morbid obesity 05/29/2014    NAFLD (nonalcoholic fatty liver disease)     Near syncope 05/23/2017    Obesity hypoventilation syndrome 02/22/2016    PAD (peripheral artery disease) 09/17/2019    Mild 9/2018    Prostatitis     Rectal bleeding     diverticulitis    Thyroid nodule 07/14/2022    Type 2 diabetes mellitus with hyperglycemia, with long-term current use of insulin 05/18/2017    Uncontrolled type 2 diabetes mellitus with peripheral neuropathy 05/29/2014     Past Surgical History:   Procedure Laterality Date    COLONOSCOPY  2008    diverticulosis per patient report    kidney stent      KIDNEY SURGERY  1996     right kidney obstruction requiring stent    NOSE SURGERY      UPPER GASTROINTESTINAL ENDOSCOPY      [facility], gastritis per patient report     Family History   Problem Relation Age of Onset    Liver disease Mother         d.    Cirrhosis Mother     Hypertension Mother     Hyperlipidemia Mother     Colon cancer Father 70    Cancer Father     Diabetes Father     Hypertension Father     Glaucoma Father     Hyperlipidemia Father     Prostate cancer Father     Diabetes Brother     Hypertension Brother     Thyroid cancer Brother     Heart attack Maternal Uncle     Heart attack Maternal Uncle     Heart disease Neg Hx     Heart failure Neg Hx     Macular degeneration Neg Hx     Retinal detachment Neg Hx     Crohn's disease Neg Hx     Ulcerative colitis Neg Hx     Stomach cancer Neg Hx     Esophageal cancer Neg Hx      Social History     Tobacco Use    Smoking status: Former     Types: Cigarettes     Quit date: 1990     Years since quittin.2    Smokeless tobacco: Never    Tobacco comments:     social   Substance Use Topics    Alcohol use: No     Comment: stopped in     Drug use: No     Wt Readings from Last 10 Encounters:   23 112.7 kg (248 lb 7.3 oz)   23 109.8 kg (242 lb)   23 112 kg (246 lb 14.6 oz)   02/15/23 112.1 kg (247 lb 3.9 oz)   23 112.7 kg (248 lb 7.3 oz)   23 114.2 kg (251 lb 12.3 oz)   23 113.6 kg (250 lb 7.1 oz)   10/27/22 110.9 kg (244 lb 7.8 oz)   10/13/22 111.6 kg (246 lb 0.5 oz)   22 121.6 kg (268 lb 1.3 oz)     Lab Results   Component Value Date    WBC 6.34 2023    HGB 15.4 2023    HCT 45.2 2023    MCV 88 2023     2023     CMP  Sodium   Date Value Ref Range Status   2023 139 136 - 145 mmol/L Final     Potassium   Date Value Ref Range Status   2023 4.2 3.5 - 5.1 mmol/L Final     Chloride   Date Value Ref Range Status   2023 103 95 - 110 mmol/L Final     CO2   Date Value Ref Range  Status   02/20/2023 31 22 - 31 mmol/L Final     Glucose   Date Value Ref Range Status   02/20/2023 120 (H) 70 - 110 mg/dL Final     Comment:     The ADA recommends the following guidelines for fasting glucose:    Normal:       less than 100 mg/dL    Prediabetes:  100 mg/dL to 125 mg/dL    Diabetes:     126 mg/dL or higher       BUN   Date Value Ref Range Status   02/20/2023 14 9 - 21 mg/dL Final     Creatinine   Date Value Ref Range Status   02/20/2023 0.77 0.50 - 1.40 mg/dL Final     Calcium   Date Value Ref Range Status   02/20/2023 9.5 8.4 - 10.2 mg/dL Final     Total Protein   Date Value Ref Range Status   02/20/2023 7.9 6.0 - 8.4 g/dL Final     Albumin   Date Value Ref Range Status   02/20/2023 4.8 3.5 - 5.2 g/dL Final     Total Bilirubin   Date Value Ref Range Status   02/20/2023 0.6 0.2 - 1.3 mg/dL Final     Alkaline Phosphatase   Date Value Ref Range Status   02/20/2023 48 38 - 145 U/L Final     AST   Date Value Ref Range Status   02/20/2023 45 17 - 59 U/L Final     ALT   Date Value Ref Range Status   02/20/2023 46 0 - 50 U/L Final     Anion Gap   Date Value Ref Range Status   02/20/2023 5 (L) 8 - 16 mmol/L Final     eGFR if    Date Value Ref Range Status   04/14/2022 >60.0 >60 mL/min/1.73 m^2 Final     eGFR if non    Date Value Ref Range Status   04/14/2022 >60.0 >60 mL/min/1.73 m^2 Final     Comment:     Calculation used to obtain the estimated glomerular filtration  rate (eGFR) is the CKD-EPI equation.        Lab Results   Component Value Date    AMYLASE 39 02/07/2023     Lab Results   Component Value Date    LIPASE 28 02/07/2023     Lab Results   Component Value Date    LIPASERES 142 02/20/2023     Lab Results   Component Value Date    TSH 1.504 02/07/2023     Reviewed prior medical records including radiology report of 2/20/2023 and 10/13/2022 scans of CT abdomen pelvis; 2/3/2023 CT urogram abdomen pelvis; 7/20/2021 abdominal ultrasound; 2/20/2023 ER visit note;   7/30/2021 hepatology visit note; & endoscopy history (see surgical history/procedures).    Objective:      Physical Exam  Vitals and nursing note reviewed.   Constitutional:       General: He is not in acute distress.     Appearance: Normal appearance. He is well-developed. He is not diaphoretic.   HENT:      Mouth/Throat:      Lips: Pink. No lesions.      Mouth: Mucous membranes are moist. No oral lesions.      Tongue: No lesions.      Pharynx: Oropharynx is clear. No pharyngeal swelling or posterior oropharyngeal erythema.   Eyes:      General: No scleral icterus.     Conjunctiva/sclera: Conjunctivae normal.   Pulmonary:      Effort: Pulmonary effort is normal. No respiratory distress.      Breath sounds: Normal breath sounds. No wheezing.   Abdominal:      General: Bowel sounds are normal. There is no distension or abdominal bruit.      Palpations: Abdomen is soft. Abdomen is not rigid. There is no mass.      Tenderness: There is generalized abdominal tenderness (mild). There is no guarding or rebound.      Comments: Deferred rectal examination.   Skin:     General: Skin is warm and dry.      Coloration: Skin is not jaundiced or pale.      Findings: No erythema or rash.   Neurological:      Mental Status: He is alert and oriented to person, place, and time.   Psychiatric:         Behavior: Behavior normal.         Thought Content: Thought content normal.         Judgment: Judgment normal.       Assessment:       1. Generalized abdominal pain    2. Hematochezia    3. Rectal pain    4. History of diverticulosis    5. Family history of colon cancer    6. Non-intractable vomiting with nausea    7. Heartburn    8. Hoarseness of voice    9. Hydronephrosis, unspecified hydronephrosis type    10. Left testicular pain        Plan:       Generalized abdominal pain  -  INCREASE TO   pantoprazole (PROTONIX) 40 MG tablet; Take 1 tablet (40 mg total) by mouth before breakfast.  Dispense: 30 tablet; Refill: 1  - schedule EGD,  discussed procedure with patient, including risks and benefits, patient verbalized understanding  - schedule Colonoscopy, discussed procedure with the patient, including risks and benefits, patient verbalized understanding  - avoid/minimize use of NSAIDs- since they can cause GI upset, bleeding and/or ulcers. If NSAID must be taken, recommend take with food.    Hematochezia  - schedule Colonoscopy, discussed procedure with the patient, including risks and benefits, patient verbalized understanding  - discussed about different etiologies that can cause rectal bleeding, such as but not limited to diverticulosis, polyps, colon mass, colon inflammation or infection, anal fissure or hemorrhoids.   - You may resume normal activity as long as you feel well.  - Avoid/minimize NSAIDs such as ibuprofen (Advil, Motrin) and naproxen (Aleve and Naprosyn).  - Avoid alcohol.    Rectal pain  - schedule Colonoscopy, discussed procedure with the patient, including risks and benefits, patient verbalized understanding  - avoid constipation and straining with bowel movements; try using an OTC stool softener as directed and increase fiber in diet (20-30 grams daily)/OTC fiber supplement such as metamucil (take as directed)  - recommend SITZ baths  - possible referral to colorectal surgery if symptoms persist    History of diverticulosis  Recommend high fiber diet (20-30 grams of fiber daily)/OTC fiber supplements daily as directed, such as Benefiber or Metamucil.    Family history of colon cancer  - schedule Colonoscopy, discussed procedure with the patient, including risks and benefits, patient verbalized understanding    Non-intractable vomiting with nausea  - schedule EGD, discussed procedure with patient, including risks and benefits, patient verbalized understanding  - INCREASE TO    pantoprazole (PROTONIX) 40 MG tablet; Take 1 tablet (40 mg total) by mouth before breakfast.  Dispense: 30 tablet; Refill: 1    Heartburn  -  INCREASE  TO   pantoprazole (PROTONIX) 40 MG tablet; Take 1 tablet (40 mg total) by mouth before breakfast.  Dispense: 30 tablet; Refill: 1  - schedule EGD, discussed procedure with patient, including risks and benefits, patient verbalized understanding    Hoarseness of voice  Recommend follow-up with Primary Care Provider/ENT for continued evaluation and management.    Hydronephrosis, unspecified hydronephrosis type & Left testicular pain  Recommend follow-up with urology for continued evaluation and management.    Follow up in about 1 month (around 3/27/2023), or if symptoms worsen or fail to improve.      If no improvement in symptoms or symptoms worsen, call/follow-up at clinic or go to ER.        45 minutes of total time spent on the encounter, which includes face to face time and non-face to face time preparing to see the patient (e.g., review of tests), Obtaining and/or reviewing separately obtained history, Documenting clinical information in the electronic or other health record, Independently interpreting results (not separately reported) and communicating results to the patient/family/caregiver, or Care coordination (not separately reported).

## 2023-03-06 ENCOUNTER — OFFICE VISIT (OUTPATIENT)
Dept: FAMILY MEDICINE | Facility: CLINIC | Age: 58
End: 2023-03-06
Payer: COMMERCIAL

## 2023-03-06 VITALS
WEIGHT: 249.81 LBS | SYSTOLIC BLOOD PRESSURE: 132 MMHG | BODY MASS INDEX: 35.76 KG/M2 | OXYGEN SATURATION: 97 % | TEMPERATURE: 98 F | DIASTOLIC BLOOD PRESSURE: 82 MMHG | HEIGHT: 70 IN | HEART RATE: 58 BPM

## 2023-03-06 DIAGNOSIS — R30.0 DYSURIA: Primary | ICD-10-CM

## 2023-03-06 LAB
BILIRUB UR QL STRIP: NEGATIVE
CLARITY UR: CLEAR
COLOR UR: YELLOW
GLUCOSE UR QL STRIP: NEGATIVE
HGB UR QL STRIP: NEGATIVE
KETONES UR QL STRIP: NEGATIVE
LEUKOCYTE ESTERASE UR QL STRIP: NEGATIVE
NITRITE UR QL STRIP: NEGATIVE
PH UR STRIP: 6 [PH] (ref 5–8)
PROT UR QL STRIP: NEGATIVE
SP GR UR STRIP: >=1.03 (ref 1–1.03)
URN SPEC COLLECT METH UR: ABNORMAL

## 2023-03-06 PROCEDURE — 81003 URINALYSIS AUTO W/O SCOPE: CPT | Mod: PO | Performed by: NURSE PRACTITIONER

## 2023-03-06 PROCEDURE — 99999 PR PBB SHADOW E&M-EST. PATIENT-LVL IV: CPT | Mod: PBBFAC,,, | Performed by: NURSE PRACTITIONER

## 2023-03-06 PROCEDURE — 99213 OFFICE O/P EST LOW 20 MIN: CPT | Mod: S$GLB,,, | Performed by: NURSE PRACTITIONER

## 2023-03-06 PROCEDURE — 99999 PR PBB SHADOW E&M-EST. PATIENT-LVL IV: ICD-10-PCS | Mod: PBBFAC,,, | Performed by: NURSE PRACTITIONER

## 2023-03-06 PROCEDURE — 99213 PR OFFICE/OUTPT VISIT, EST, LEVL III, 20-29 MIN: ICD-10-PCS | Mod: S$GLB,,, | Performed by: NURSE PRACTITIONER

## 2023-03-06 RX ORDER — CIPROFLOXACIN 500 MG/1
500 TABLET ORAL EVERY 12 HOURS
Qty: 20 TABLET | Refills: 0 | Status: SHIPPED | OUTPATIENT
Start: 2023-03-06 | End: 2023-03-16

## 2023-03-06 NOTE — PROGRESS NOTES
Subjective:       Patient ID: García Tang is a 57 y.o. male.    Chief Complaint: Urinary Frequency and Extremity Weakness    Patient is having recurrent issues with prostatitis. He saw urology and had cystoscope. He says he was advised his prostate was enlarged and was offered flomax which he declined. He says he has begun to notice pain again after urination over the last week.  He says he notices his legs feel weak, concerned it may be related to his BP.    Review of Systems   Constitutional:  Positive for fatigue. Negative for chills, diaphoresis and fever.   Respiratory: Negative.     Cardiovascular: Negative.    Genitourinary:  Positive for dysuria and frequency.   Neurological:  Positive for weakness.       Objective:      Physical Exam  Constitutional:       Appearance: Normal appearance.   HENT:      Head: Normocephalic and atraumatic.   Cardiovascular:      Rate and Rhythm: Normal rate.   Pulmonary:      Effort: Pulmonary effort is normal.   Neurological:      Mental Status: He is alert and oriented to person, place, and time.   Psychiatric:         Mood and Affect: Mood normal.         Behavior: Behavior normal.       Assessment:       Problem List Items Addressed This Visit    None  Visit Diagnoses       Dysuria    -  Primary    Relevant Medications    ciprofloxacin HCl (CIPRO) 500 MG tablet    Other Relevant Orders    Urinalysis, Reflex to Urine Culture Urine, Clean Catch (Completed)              Plan:       Bp stable. Start Cipro 500 mg daily, encouraged patient to follow up with urology to discuss ongoing management of prostatitis.

## 2023-03-07 ENCOUNTER — PATIENT MESSAGE (OUTPATIENT)
Dept: OPTOMETRY | Facility: CLINIC | Age: 58
End: 2023-03-07
Payer: COMMERCIAL

## 2023-03-12 NOTE — ED TRIAGE NOTES
Patient states several days chest tightness and right upper flank pain. Pain several times per week, needs liver biopsy. States he is waking up in the morning with pain, better after moving around.    1 = Total assistance

## 2023-03-15 ENCOUNTER — TELEPHONE (OUTPATIENT)
Dept: FAMILY MEDICINE | Facility: CLINIC | Age: 58
End: 2023-03-15
Payer: COMMERCIAL

## 2023-03-15 NOTE — TELEPHONE ENCOUNTER
----- Message from Maryana Denson sent at 3/15/2023 10:22 AM CDT -----  Contact: pt  Type:  Patient Returning Call    Who Called:  pt  Who Left Message for Patient:  edna  Does the patient know what this is regarding?:  n/a  Best Call Back Number:  [phone]    Additional Information:  please give pt a call back

## 2023-03-20 NOTE — TELEPHONE ENCOUNTER
Called and spoke to pt in regards to referral from November. Pt stated he was fine and no longer needed to be seen. He declined to make an appt.   
Female

## 2023-04-10 ENCOUNTER — PATIENT MESSAGE (OUTPATIENT)
Dept: FAMILY MEDICINE | Facility: CLINIC | Age: 58
End: 2023-04-10

## 2023-04-10 ENCOUNTER — OFFICE VISIT (OUTPATIENT)
Dept: FAMILY MEDICINE | Facility: CLINIC | Age: 58
End: 2023-04-10
Payer: COMMERCIAL

## 2023-04-10 ENCOUNTER — HOSPITAL ENCOUNTER (OUTPATIENT)
Dept: RADIOLOGY | Facility: HOSPITAL | Age: 58
Discharge: HOME | End: 2023-04-10
Attending: NURSE PRACTITIONER
Payer: COMMERCIAL

## 2023-04-10 VITALS
HEART RATE: 74 BPM | DIASTOLIC BLOOD PRESSURE: 70 MMHG | SYSTOLIC BLOOD PRESSURE: 118 MMHG | RESPIRATION RATE: 18 BRPM | TEMPERATURE: 98 F | WEIGHT: 245.25 LBS | HEIGHT: 70 IN | BODY MASS INDEX: 35.11 KG/M2 | OXYGEN SATURATION: 99 %

## 2023-04-10 DIAGNOSIS — M25.571 ACUTE RIGHT ANKLE PAIN: Primary | ICD-10-CM

## 2023-04-10 DIAGNOSIS — M25.571 ACUTE RIGHT ANKLE PAIN: ICD-10-CM

## 2023-04-10 PROCEDURE — 73610 X-RAY EXAM OF ANKLE: CPT | Mod: TC,FY,PO,RT

## 2023-04-10 PROCEDURE — 99213 OFFICE O/P EST LOW 20 MIN: CPT | Mod: 25,S$GLB,, | Performed by: NURSE PRACTITIONER

## 2023-04-10 PROCEDURE — 96372 PR INJECTION,THERAP/PROPH/DIAG2ST, IM OR SUBCUT: ICD-10-PCS | Mod: S$GLB,,, | Performed by: NURSE PRACTITIONER

## 2023-04-10 PROCEDURE — 99213 PR OFFICE/OUTPT VISIT, EST, LEVL III, 20-29 MIN: ICD-10-PCS | Mod: 25,S$GLB,, | Performed by: NURSE PRACTITIONER

## 2023-04-10 PROCEDURE — 73610 X-RAY EXAM OF ANKLE: CPT | Mod: 26,RT,, | Performed by: RADIOLOGY

## 2023-04-10 PROCEDURE — 73610 XR ANKLE COMPLETE 3 VIEW RIGHT: ICD-10-PCS | Mod: 26,RT,, | Performed by: RADIOLOGY

## 2023-04-10 PROCEDURE — 96372 THER/PROPH/DIAG INJ SC/IM: CPT | Mod: S$GLB,,, | Performed by: NURSE PRACTITIONER

## 2023-04-10 RX ORDER — KETOROLAC TROMETHAMINE 30 MG/ML
60 INJECTION, SOLUTION INTRAMUSCULAR; INTRAVENOUS
Status: COMPLETED | OUTPATIENT
Start: 2023-04-10 | End: 2023-04-10

## 2023-04-10 RX ADMIN — KETOROLAC TROMETHAMINE 60 MG: 30 INJECTION, SOLUTION INTRAMUSCULAR; INTRAVENOUS at 11:04

## 2023-04-10 NOTE — PROGRESS NOTES
Subjective:       Patient ID: García Tang is a 57 y.o. male.    Chief Complaint: Foot Swelling (Pt presents with swelling in right ankle x- 4 wks)    HPI right ankle swelling over the past 4 weeks. Went to Urgent care today.  Starts to swell with activity. Throbbing type sensation. Urgent care gave him steroids to take. They did not do any labs or xrays. He does not remember any injury to his ankle. Tylenol/motrin not helping. Hurts to walk. See ROS    The following portion of the patients history was reviewed and updated as appropriate: allergies, current medications, past medical and surgical history. Past social history and problem list reviewed. Family PMH and Past social history reviewed. Tobacco, Illicit drug use reviewed.      Review of patient's allergies indicates:   Allergen Reactions    Lisinopril Swelling    Iodinated contrast media Other (See Comments)     Spasms, tremor, and high blood pressure    Bactrim  [sulfamethoxazole-trimethoprim]      Other reaction(s): Unknown    Diflucan [fluconazole]      Heart racing    Hydroxyzine hcl      Other reaction(s): elevated bp    Kenalog [triamcinolone acetonide] Other (See Comments)     Heart flutters all day    Phenergan [promethazine] Anxiety    Zofran [ondansetron hcl (pf)] Anxiety         Current Outpatient Medications:     acetaminophen (TYLENOL) 500 MG tablet, Take 1,000 mg by mouth every 6 (six) hours as needed for Pain., Disp: , Rfl:     acetylcysteine (NAC ORAL), Take by mouth. Take 2 tablets daily, Disp: , Rfl:     amLODIPine (NORVASC) 5 MG tablet, Take 5 mg by mouth once daily., Disp: , Rfl:     losartan (COZAAR) 100 MG tablet, Take 100 mg by mouth once daily., Disp: , Rfl:     MELATONIN ORAL, Take 5 mg by mouth nightly., Disp: , Rfl:     metoprolol succinate (TOPROL-XL) 50 MG 24 hr tablet, Take 1 tablet (50 mg total) by mouth once daily., Disp: 90 tablet, Rfl: 3    naproxen (NAPROSYN) 250 MG tablet, Take 250 mg by mouth 2 (two) times daily as  needed., Disp: , Rfl:     pantoprazole (PROTONIX) 40 MG tablet, Take 1 tablet (40 mg total) by mouth before breakfast., Disp: 30 tablet, Rfl: 1    rimegepant (NURTEC) 75 mg odt, Take 1 tablet (75 mg total) by mouth daily as needed for Migraine. Place ODT tablet on the tongue; alternatively the ODT tablet may be placed under the tongue (Patient not taking: Reported on 4/10/2023), Disp: 8 tablet, Rfl: 11    Past Medical History:   Diagnosis Date    ASCVD (arteriosclerotic cardiovascular disease) 2018    Chronic sialoadenitis 2018    CKD stage 2 due to type 2 diabetes mellitus 2017    Diverticulitis     diverticulitis    Essential hypertension 2016    Fatty liver disease, nonalcoholic 2016    [place], 2006     GERD (gastroesophageal reflux disease)     Headache     HLD (hyperlipidemia)     Kidney stone     requiring stent    Meniere's disease 2018    Morbid obesity 2014    NAFLD (nonalcoholic fatty liver disease)     Near syncope 2017    Obesity hypoventilation syndrome 2016    PAD (peripheral artery disease) 2019    Mild 2018    Prostatitis     Rectal bleeding     diverticulitis    Thyroid nodule 2022    Type 2 diabetes mellitus with hyperglycemia, with long-term current use of insulin 2017    Uncontrolled type 2 diabetes mellitus with peripheral neuropathy 2014       Past Surgical History:   Procedure Laterality Date    COLONOSCOPY      diverticulosis per patient report    kidney stent      KIDNEY SURGERY      right kidney obstruction requiring stent    NOSE SURGERY      UPPER GASTROINTESTINAL ENDOSCOPY      [facility], gastritis per patient report       Social History     Socioeconomic History    Marital status:    Tobacco Use    Smoking status: Former     Types: Cigarettes     Quit date: 1990     Years since quittin.2    Smokeless tobacco: Never    Tobacco comments:     social   Substance and Sexual  "Activity    Alcohol use: No     Comment: stopped in     Drug use: No    Sexual activity: Yes     Partners: Female     Review of Systems   Constitutional:  Negative for activity change, appetite change, fatigue and fever.   Eyes:  Negative for visual disturbance.   Respiratory:  Negative for cough, chest tightness, shortness of breath and wheezing.    Cardiovascular:  Negative for chest pain, palpitations and leg swelling.   Gastrointestinal:  Negative for abdominal pain, diarrhea, nausea and vomiting.   Musculoskeletal:  Positive for joint swelling. Negative for arthralgias, back pain and gait problem.        See HPI   Skin:  Negative for rash.   Hematological:  Negative for adenopathy. Does not bruise/bleed easily.   Psychiatric/Behavioral:  Negative for dysphoric mood.      Objective:      /70 (BP Location: Left arm, Patient Position: Sitting)   Pulse 74   Temp 98.1 °F (36.7 °C)   Resp 18   Ht 5' 10" (1.778 m)   Wt 111.3 kg (245 lb 4.2 oz)   SpO2 99%   BMI 35.19 kg/m²      Physical Exam  Constitutional:       Appearance: Normal appearance. He is well-developed. He is obese.   HENT:      Head: Normocephalic.   Neck:      Thyroid: No thyromegaly.      Vascular: No carotid bruit or JVD.   Cardiovascular:      Rate and Rhythm: Normal rate and regular rhythm.      Pulses: Normal pulses.      Heart sounds: Normal heart sounds and S1 normal. No murmur heard.    No gallop.   Pulmonary:      Effort: Pulmonary effort is normal.      Breath sounds: Normal breath sounds. No decreased breath sounds or wheezing.   Musculoskeletal:         General: Normal range of motion.      Cervical back: Normal range of motion and neck supple.      Right lower le+ Pitting Edema (extends from ankle down into foot, great toe) present.      Left lower leg: No edema.        Legs:       Comments: Gait and coordination normal   Skin:     General: Skin is warm and dry.      Capillary Refill: Capillary refill takes less than 2 " seconds.      Findings: No rash.   Neurological:      Mental Status: He is alert and oriented to person, place, and time.   Psychiatric:         Attention and Perception: Attention normal.         Mood and Affect: Mood and affect normal.         Speech: Speech normal.         Behavior: Behavior normal.       Assessment:       1. Acute right ankle pain        Plan:       Acute right ankle pain: not sure if this is gout or infection. Will get labs, xray. Give toradol injection for discomfort. Advised to take the steroids given by Urgent care.  -     X-Ray Ankle Complete 3 View Right; Future; Expected date: 04/10/2023  -     URIC ACID; Future; Expected date: 04/10/2023  -     ketorolac injection 60 mg       Continue current medication  Take medications only as prescribed  Healthy diet  Adequate rest  Adequate hydration  Avoid allergens  Avoid excessive caffeine      Will follow up after xrays done

## 2023-04-11 ENCOUNTER — TELEPHONE (OUTPATIENT)
Dept: PODIATRY | Facility: CLINIC | Age: 58
End: 2023-04-11
Payer: COMMERCIAL

## 2023-04-11 ENCOUNTER — PATIENT MESSAGE (OUTPATIENT)
Dept: FAMILY MEDICINE | Facility: CLINIC | Age: 58
End: 2023-04-11
Payer: COMMERCIAL

## 2023-04-12 ENCOUNTER — PATIENT MESSAGE (OUTPATIENT)
Dept: FAMILY MEDICINE | Facility: CLINIC | Age: 58
End: 2023-04-12
Payer: COMMERCIAL

## 2023-04-12 RX ORDER — CLINDAMYCIN HYDROCHLORIDE 300 MG/1
300 CAPSULE ORAL 3 TIMES DAILY
Qty: 30 CAPSULE | Refills: 0 | Status: SHIPPED | OUTPATIENT
Start: 2023-04-12 | End: 2023-04-28

## 2023-04-13 ENCOUNTER — PATIENT MESSAGE (OUTPATIENT)
Dept: FAMILY MEDICINE | Facility: CLINIC | Age: 58
End: 2023-04-13
Payer: COMMERCIAL

## 2023-04-13 NOTE — TELEPHONE ENCOUNTER
"Please see pts question, "how long would I expect improvement once I take the med before I would get back to you or Primary"?    Pt already scheduled podiatry appt.  "

## 2023-04-14 ENCOUNTER — PATIENT MESSAGE (OUTPATIENT)
Dept: ENDOSCOPY | Facility: HOSPITAL | Age: 58
End: 2023-04-14
Payer: COMMERCIAL

## 2023-04-17 NOTE — TELEPHONE ENCOUNTER
Please see if anyone in Podiatry can see him sooner than what he has scheduled.  Right now his visit is scheduled for 5/5.

## 2023-04-19 ENCOUNTER — PATIENT MESSAGE (OUTPATIENT)
Dept: FAMILY MEDICINE | Facility: CLINIC | Age: 58
End: 2023-04-19
Payer: COMMERCIAL

## 2023-04-21 ENCOUNTER — OFFICE VISIT (OUTPATIENT)
Dept: PODIATRY | Facility: CLINIC | Age: 58
End: 2023-04-21
Payer: COMMERCIAL

## 2023-04-21 VITALS — BODY MASS INDEX: 35.13 KG/M2 | WEIGHT: 245.38 LBS | HEIGHT: 70 IN

## 2023-04-21 DIAGNOSIS — M76.821 POSTERIOR TIBIAL TENDON DYSFUNCTION (PTTD) OF RIGHT LOWER EXTREMITY: ICD-10-CM

## 2023-04-21 DIAGNOSIS — R60.0 PERIPHERAL EDEMA: ICD-10-CM

## 2023-04-21 DIAGNOSIS — M79.671 FOOT PAIN, RIGHT: Primary | ICD-10-CM

## 2023-04-21 PROCEDURE — 99203 PR OFFICE/OUTPT VISIT, NEW, LEVL III, 30-44 MIN: ICD-10-PCS | Mod: S$GLB,,, | Performed by: PODIATRIST

## 2023-04-21 PROCEDURE — 99203 OFFICE O/P NEW LOW 30 MIN: CPT | Mod: S$GLB,,, | Performed by: PODIATRIST

## 2023-04-21 PROCEDURE — 99999 PR PBB SHADOW E&M-EST. PATIENT-LVL III: CPT | Mod: PBBFAC,,, | Performed by: PODIATRIST

## 2023-04-21 PROCEDURE — 99999 PR PBB SHADOW E&M-EST. PATIENT-LVL III: ICD-10-PCS | Mod: PBBFAC,,, | Performed by: PODIATRIST

## 2023-04-21 NOTE — PATIENT INSTRUCTIONS
Apply ice to the affected area up to 20 minutes daily.    Apply voltaren cream to the affected area up to 4 times daily.

## 2023-04-23 NOTE — PROGRESS NOTES
Subjective:     Patient ID: García Tang is a 57 y.o. male.    Chief Complaint: Ankle Pain and Foot Pain (Right foot/ankle px 1 month)    García is a 57 y.o. male with a past medical history of ASCVD (arteriosclerotic cardiovascular disease) (02/05/2018), Chronic sialoadenitis (12/11/2018), CKD stage 2 due to type 2 diabetes mellitus (05/18/2017), Diverticulitis, Essential hypertension (07/18/2016), Fatty liver disease, nonalcoholic (04/07/2016), GERD (gastroesophageal reflux disease), Headache, HLD (hyperlipidemia), Kidney stone, Meniere's disease (01/09/2018), Morbid obesity (05/29/2014), NAFLD (nonalcoholic fatty liver disease), Near syncope (05/23/2017), Obesity hypoventilation syndrome (02/22/2016), PAD (peripheral artery disease) (09/17/2019), Prostatitis, Rectal bleeding, Thyroid nodule (07/14/2022), Type 2 diabetes mellitus with hyperglycemia, with long-term current use of insulin (05/18/2017), and Uncontrolled type 2 diabetes mellitus with peripheral neuropathy (05/29/2014). The patient's chief complaint consists of Rt. Medial foot pain that has been present for over a month.  Denies injury or an inciting event leading to said symptoms.  Notes the limb has also been swollen in conjunction with said issues, and he inquires as to whether the two issues might be related.  Describes pain as sharp and rates currently as an 8/10.  Symptoms are aggravated with all weight bearing and alleviated with rest.  He has worn a compression sleeve over the Rt. Ankle in efforts to mitigate said pain symptoms.  Notes this minimally improves symptoms.  Denies any additional pedal complaints.      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    Diverticulitis     diverticulitis    Essential hypertension 07/18/2016    Fatty liver disease, nonalcoholic 04/07/2016    [place], 2006      GERD (gastroesophageal reflux disease)     Headache     HLD (hyperlipidemia)     Kidney stone     requiring stent    Meniere's disease 2018    Morbid obesity 2014    NAFLD (nonalcoholic fatty liver disease)     Near syncope 2017    Obesity hypoventilation syndrome 2016    PAD (peripheral artery disease) 2019    Mild 2018    Prostatitis     Rectal bleeding     diverticulitis    Thyroid nodule 2022    Type 2 diabetes mellitus with hyperglycemia, with long-term current use of insulin 2017    Uncontrolled type 2 diabetes mellitus with peripheral neuropathy 2014       Past Surgical History:   Procedure Laterality Date    COLONOSCOPY      diverticulosis per patient report    kidney stent      KIDNEY SURGERY      right kidney obstruction requiring stent    NOSE SURGERY      UPPER GASTROINTESTINAL ENDOSCOPY      [facility], gastritis per patient report       Family History   Problem Relation Age of Onset    Liver disease Mother         d.    Cirrhosis Mother     Hypertension Mother     Hyperlipidemia Mother     Colon cancer Father 70    Cancer Father     Diabetes Father     Hypertension Father     Glaucoma Father     Hyperlipidemia Father     Prostate cancer Father     Diabetes Brother     Hypertension Brother     Thyroid cancer Brother     Heart attack Maternal Uncle     Heart attack Maternal Uncle     Heart disease Neg Hx     Heart failure Neg Hx     Macular degeneration Neg Hx     Retinal detachment Neg Hx     Crohn's disease Neg Hx     Ulcerative colitis Neg Hx     Stomach cancer Neg Hx     Esophageal cancer Neg Hx        Social History     Socioeconomic History    Marital status:    Tobacco Use    Smoking status: Former     Types: Cigarettes     Quit date: 1990     Years since quittin.3    Smokeless tobacco: Never    Tobacco comments:     social   Substance and Sexual Activity    Alcohol use: No     Comment: stopped in     Drug  use: No    Sexual activity: Yes     Partners: Female       Current Outpatient Medications   Medication Sig Dispense Refill    acetaminophen (TYLENOL) 500 MG tablet Take 1,000 mg by mouth every 6 (six) hours as needed for Pain.      acetylcysteine (NAC ORAL) Take by mouth. Take 2 tablets daily      amLODIPine (NORVASC) 5 MG tablet Take 5 mg by mouth once daily.      clindamycin (CLEOCIN) 300 MG capsule Take 1 capsule (300 mg total) by mouth 3 (three) times daily. 30 capsule 0    losartan (COZAAR) 100 MG tablet Take 100 mg by mouth once daily.      MELATONIN ORAL Take 5 mg by mouth nightly.      metoprolol succinate (TOPROL-XL) 50 MG 24 hr tablet Take 1 tablet (50 mg total) by mouth once daily. 90 tablet 3    naproxen (NAPROSYN) 250 MG tablet Take 250 mg by mouth 2 (two) times daily as needed.      pantoprazole (PROTONIX) 40 MG tablet Take 1 tablet (40 mg total) by mouth before breakfast. 30 tablet 1    rimegepant (NURTEC) 75 mg odt Take 1 tablet (75 mg total) by mouth daily as needed for Migraine. Place ODT tablet on the tongue; alternatively the ODT tablet may be placed under the tongue 8 tablet 11     No current facility-administered medications for this visit.       Review of patient's allergies indicates:   Allergen Reactions    Lisinopril Swelling    Iodinated contrast media Other (See Comments)     Spasms, tremor, and high blood pressure    Bactrim  [sulfamethoxazole-trimethoprim]      Other reaction(s): Unknown    Diflucan [fluconazole]      Heart racing    Hydroxyzine hcl      Other reaction(s): elevated bp    Kenalog [triamcinolone acetonide] Other (See Comments)     Heart flutters all day    Phenergan [promethazine] Anxiety    Zofran [ondansetron hcl (pf)] Anxiety        Hemoglobin A1C   Date Value Ref Range Status   01/05/2023 5.4 4.0 - 5.6 % Final     Comment:     ADA Screening Guidelines:  5.7-6.4%  Consistent with prediabetes  >or=6.5%  Consistent with diabetes    High levels of fetal hemoglobin  interfere with the HbA1C  assay. Heterozygous hemoglobin variants (HbS, HgC, etc)do  not significantly interfere with this assay.   However, presence of multiple variants may affect accuracy.     04/14/2022 5.4 4.0 - 5.6 % Final     Comment:     ADA Screening Guidelines:  5.7-6.4%  Consistent with prediabetes  >or=6.5%  Consistent with diabetes    High levels of fetal hemoglobin interfere with the HbA1C  assay. Heterozygous hemoglobin variants (HbS, HgC, etc)do  not significantly interfere with this assay.   However, presence of multiple variants may affect accuracy.     10/22/2021 5.4 4.0 - 5.6 % Final     Comment:     ADA Screening Guidelines:  5.7-6.4%  Consistent with prediabetes  >or=6.5%  Consistent with diabetes    High levels of fetal hemoglobin interfere with the HbA1C  assay. Heterozygous hemoglobin variants (HbS, HgC, etc)do  not significantly interfere with this assay.   However, presence of multiple variants may affect accuracy.         Review of Systems   Constitutional: Negative for chills and fever.   Cardiovascular:  Positive for leg swelling. Negative for claudication.   Skin:  Negative for color change and nail changes.   Musculoskeletal:  Positive for joint pain, joint swelling and myalgias. Negative for muscle cramps and muscle weakness.   Gastrointestinal:  Negative for nausea and vomiting.   Neurological:  Negative for numbness and paresthesias.   Psychiatric/Behavioral:  Negative for altered mental status.       Objective:     Physical Exam  Constitutional:       Appearance: Normal appearance. He is not ill-appearing.   Cardiovascular:      Pulses:           Dorsalis pedis pulses are 2+ on the right side and 2+ on the left side.        Posterior tibial pulses are 2+ on the right side and 2+ on the left side.      Comments: CFT is < 3 seconds bilateral.  Pedal hair growth is present bilateral.  Moderate non pitting edema noted to the Rt. Lower leg and foot.  Toes are warm to touch bilateral.     Musculoskeletal:         General: Swelling, tenderness and deformity present. No signs of injury.      Comments: Muscle strength 5/5 in all muscle groups bilateral.  No tenderness nor crepitation with ROM of foot/ankle joints bilateral.  Pain with palpation to the Rt. Posterior tibialis tendon as it courses posterior and inferior to the medial malleolus.  Rt. Sided pes planus with moderate collapse of the medial longitudinal arch.  (+) single heel rise test on the Rt.    Skin:     General: Skin is warm and dry.      Capillary Refill: Capillary refill takes 2 to 3 seconds.      Findings: No bruising, ecchymosis, erythema, signs of injury, laceration, lesion, petechiae, rash or wound.      Comments: Increased turgor noted to the Rt. Lower extremity. Toenails x 10 appear normotrophic. Examination of the skin reveals no evidence of significant maceration, rashes, open lesions, suspicious appearing nevi or other concerning lesions.       Neurological:      General: No focal deficit present.      Mental Status: He is alert.      Sensory: No sensory deficit.      Motor: No weakness or atrophy.      Comments: Light touch is intact bilateral.           Assessment:      Encounter Diagnoses   Name Primary?    Foot pain, right Yes    Posterior tibial tendon dysfunction (PTTD) of right lower extremity     Peripheral edema      Plan:     García was seen today for ankle pain and foot pain.    Diagnoses and all orders for this visit:    Foot pain, right  -     ANKLE FOOT ORTHOSIS FOR HOME USE    Posterior tibial tendon dysfunction (PTTD) of right lower extremity  -     ANKLE FOOT ORTHOSIS FOR HOME USE    Peripheral edema      I counseled the patient on his conditions, their implications and medical management.    Reviewed with the patient the most recent set of films.  There is a spur along the dorsum of the Rt. Talus, however, I do not feel it to be the primary source of pain.    On exam, patient is struggling with Rt. Sided  PTTD.      Explained said pathology and reviewed treatment options including an [place] brace vs flatfoot reconstruction.  Patient is amenable to the former.      Rx written for said AFO.    Patient was provided the name and information of the orthotist who provides this service.    He is to follow up upon obtaining and utilizing the brace for two weeks.    Regarding RT. LE peripheral edema, I suspect this to be venous insufficiency.  Will consider an ultrasound to rule out/in said pathology in the future.    RTC prn.    Antione Hansen DPM

## 2023-04-27 ENCOUNTER — PATIENT MESSAGE (OUTPATIENT)
Dept: ENDOSCOPY | Facility: HOSPITAL | Age: 58
End: 2023-04-27
Payer: COMMERCIAL

## 2023-04-27 ENCOUNTER — TELEPHONE (OUTPATIENT)
Dept: PODIATRY | Facility: CLINIC | Age: 58
End: 2023-04-27
Payer: COMMERCIAL

## 2023-04-27 NOTE — TELEPHONE ENCOUNTER
Spoke with patient and asked that he keep his appointment on 5/5, elevate his swollen ankle, and take his AFO Rx to [facility] vendor. He voiced understanding.

## 2023-04-27 NOTE — TELEPHONE ENCOUNTER
----- Message from Lakesha Bravo sent at 4/27/2023  3:21 PM CDT -----  Regarding: advice  Contact: patient  Type: Needs Medical Advice  Who Called:  patient  Symptoms (please be specific):  right foot still swelling and ankle has raised white patches where swollen  How long has patient had these symptoms:  since last visit  Pharmacy name and phone #:    [facility] #[id number] [place] [id number] [place] [address] [address] [facility] [address] [address]  Phone: [phone] Fax: [phone]  Best Call Back Number:[phone] (home)   Additional Information: Patient states it is throbbing where the swelling is. Foot is getting worst since last appointment.  Please call patient to advise.Thanks!

## 2023-04-28 ENCOUNTER — OFFICE VISIT (OUTPATIENT)
Dept: FAMILY MEDICINE | Facility: CLINIC | Age: 58
End: 2023-04-28
Payer: COMMERCIAL

## 2023-04-28 ENCOUNTER — LAB VISIT (OUTPATIENT)
Dept: LAB | Facility: HOSPITAL | Age: 58
End: 2023-04-28
Attending: FAMILY MEDICINE
Payer: COMMERCIAL

## 2023-04-28 VITALS
DIASTOLIC BLOOD PRESSURE: 74 MMHG | OXYGEN SATURATION: 99 % | WEIGHT: 262.13 LBS | BODY MASS INDEX: 37.53 KG/M2 | HEART RATE: 63 BPM | SYSTOLIC BLOOD PRESSURE: 120 MMHG | HEIGHT: 70 IN

## 2023-04-28 DIAGNOSIS — M25.469 JOINT SWELLING OF LOWER LEG: Primary | ICD-10-CM

## 2023-04-28 DIAGNOSIS — I83.899 VARICOSE VEINS OF LOWER EXTREMITY WITH EDEMA, UNSPECIFIED LATERALITY: ICD-10-CM

## 2023-04-28 DIAGNOSIS — M25.469 JOINT SWELLING OF LOWER LEG: ICD-10-CM

## 2023-04-28 LAB
CRP SERPL-MCNC: 0.3 MG/L (ref 0–8.2)
ERYTHROCYTE [SEDIMENTATION RATE] IN BLOOD BY PHOTOMETRIC METHOD: 3 MM/HR (ref 0–23)

## 2023-04-28 PROCEDURE — 99999 PR PBB SHADOW E&M-EST. PATIENT-LVL IV: ICD-10-PCS | Mod: PBBFAC,,, | Performed by: FAMILY MEDICINE

## 2023-04-28 PROCEDURE — 99214 OFFICE O/P EST MOD 30 MIN: CPT | Mod: S$GLB,,, | Performed by: FAMILY MEDICINE

## 2023-04-28 PROCEDURE — 85652 RBC SED RATE AUTOMATED: CPT | Performed by: FAMILY MEDICINE

## 2023-04-28 PROCEDURE — 99999 PR PBB SHADOW E&M-EST. PATIENT-LVL IV: CPT | Mod: PBBFAC,,, | Performed by: FAMILY MEDICINE

## 2023-04-28 PROCEDURE — 86140 C-REACTIVE PROTEIN: CPT | Performed by: FAMILY MEDICINE

## 2023-04-28 PROCEDURE — 99214 PR OFFICE/OUTPT VISIT, EST, LEVL IV, 30-39 MIN: ICD-10-PCS | Mod: S$GLB,,, | Performed by: FAMILY MEDICINE

## 2023-04-28 PROCEDURE — 36415 COLL VENOUS BLD VENIPUNCTURE: CPT | Mod: PO | Performed by: FAMILY MEDICINE

## 2023-04-28 NOTE — PROGRESS NOTES
Subjective:       Patient ID: García Tang is a 57 y.o. male.    Chief Complaint: Follow-up (3 month f/u)    Pt is known to me.   The pt has better BP at home since his headaches are better.  He is not taking the amlodipine.  The pt has seen podiatry for pain in the right foot.  He was advised about an orthotic.  He has swelling of the right ankle--it never resolves.  He now has some white patches on the ankle where the swelling is.  Dr. Hansen was concerned about venous insufficiency.    Follow-up  Associated symptoms include arthralgias and joint swelling. Pertinent negatives include no abdominal pain, chest pain, coughing, fatigue, fever, nausea, rash or vomiting. Review of Systems   Constitutional:  Negative for activity change, appetite change, fatigue and fever.   Eyes:  Negative for visual disturbance.   Respiratory:  Negative for cough, chest tightness, shortness of breath and wheezing.    Cardiovascular:  Negative for chest pain, palpitations and leg swelling.   Gastrointestinal:  Negative for abdominal pain, diarrhea, nausea and vomiting.   Musculoskeletal:  Positive for arthralgias and joint swelling. Negative for back pain and gait problem.        See HPI   Skin:  Negative for rash.   Hematological:  Negative for adenopathy. Does not bruise/bleed easily.   Psychiatric/Behavioral:  Negative for dysphoric mood.      Objective:      Physical Exam  Vitals and nursing note reviewed.   Constitutional:       General: He is not in acute distress.     Appearance: He is well-developed. He is obese.   HENT:      Head: Normocephalic.   Eyes:      Conjunctiva/sclera: Conjunctivae normal.      Pupils: Pupils are equal, round, and reactive to light.   Neck:      Thyroid: No thyromegaly.      Vascular: No carotid bruit.   Cardiovascular:      Rate and Rhythm: Normal rate and regular rhythm.      Heart sounds: Normal heart sounds.   Pulmonary:      Effort: Pulmonary effort is normal.      Breath sounds: Normal  breath sounds. No rales.   Abdominal:      General: Bowel sounds are normal.      Palpations: Abdomen is soft.      Tenderness: There is no abdominal tenderness.   Musculoskeletal:         General: No tenderness or deformity. Normal range of motion.      Cervical back: Normal range of motion and neck supple.      Right lower leg: Edema present.      Left lower leg: No edema.   Lymphadenopathy:      Cervical: No cervical adenopathy.   Skin:     General: Skin is warm and dry.      Findings: No lesion or rash.   Neurological:      Mental Status: He is alert and oriented to person, place, and time.      Cranial Nerves: No cranial nerve deficit.      Motor: No abnormal muscle tone.      Coordination: Coordination normal.      Deep Tendon Reflexes: Reflexes normal.   Psychiatric:         Behavior: Behavior normal.       Assessment:       1. Joint swelling of lower leg    2. Varicose veins of lower extremity with edema, unspecified laterality        Plan:       García was seen today for follow-up.    Diagnoses and all orders for this visit:    Joint swelling of lower leg  -     Sedimentation rate; Future  -     C-REACTIVE PROTEIN; Future    Varicose veins of lower extremity with edema, unspecified laterality  -     US Lower Extremity Veins Bilateral Insufficiency; Future      During this visit, I reviewed the pt's history, medications, allergies, and problem list.

## 2023-05-03 ENCOUNTER — HOSPITAL ENCOUNTER (OUTPATIENT)
Dept: RADIOLOGY | Facility: HOSPITAL | Age: 58
Discharge: HOME | End: 2023-05-03
Attending: FAMILY MEDICINE
Payer: COMMERCIAL

## 2023-05-03 DIAGNOSIS — I83.899 VARICOSE VEINS OF LOWER EXTREMITY WITH EDEMA, UNSPECIFIED LATERALITY: ICD-10-CM

## 2023-05-03 PROCEDURE — 93970 EXTREMITY STUDY: CPT | Mod: 26,,, | Performed by: RADIOLOGY

## 2023-05-03 PROCEDURE — 93970 EXTREMITY STUDY: CPT | Mod: TC,PO

## 2023-05-03 PROCEDURE — 93970 US LOWER EXTREMITY VEINS BILATERAL INSUFFICIENCY: ICD-10-PCS | Mod: 26,,, | Performed by: RADIOLOGY

## 2023-05-09 ENCOUNTER — TELEPHONE (OUTPATIENT)
Dept: GASTROENTEROLOGY | Facility: CLINIC | Age: 58
End: 2023-05-09
Payer: COMMERCIAL

## 2023-05-09 DIAGNOSIS — R10.84 GENERALIZED ABDOMINAL PAIN: ICD-10-CM

## 2023-05-09 DIAGNOSIS — R12 HEARTBURN: ICD-10-CM

## 2023-05-09 DIAGNOSIS — K92.1 HEMATOCHEZIA: ICD-10-CM

## 2023-05-10 RX ORDER — PANTOPRAZOLE SODIUM 40 MG/1
TABLET, DELAYED RELEASE ORAL
Qty: 30 TABLET | Refills: 0 | Status: SHIPPED | OUTPATIENT
Start: 2023-05-10 | End: 2023-11-28 | Stop reason: SDUPTHER

## 2023-05-10 NOTE — TELEPHONE ENCOUNTER
Please inform the patient that I refilled the prescription for protonix and patient is due for a follow-up visit for continued evaluation and management.  Thanks  KAILEY

## 2023-06-06 DIAGNOSIS — R12 HEARTBURN: ICD-10-CM

## 2023-06-06 DIAGNOSIS — R10.84 GENERALIZED ABDOMINAL PAIN: ICD-10-CM

## 2023-06-06 DIAGNOSIS — K92.1 HEMATOCHEZIA: ICD-10-CM

## 2023-06-07 RX ORDER — PANTOPRAZOLE SODIUM 40 MG/1
TABLET, DELAYED RELEASE ORAL
Qty: 30 TABLET | Refills: 0 | OUTPATIENT
Start: 2023-06-07

## 2023-06-10 NOTE — TELEPHONE ENCOUNTER
Attempted to contact patient and reschedule his ultrasound until after May 4th but patient didn't answer. Left patient a voicemail stating the purpose for the call. Awaiting a call back.   
1.78
good, to achieve stated therapy goals

## 2023-06-19 ENCOUNTER — PATIENT MESSAGE (OUTPATIENT)
Dept: FAMILY MEDICINE | Facility: CLINIC | Age: 58
End: 2023-06-19
Payer: COMMERCIAL

## 2023-07-07 ENCOUNTER — OFFICE VISIT (OUTPATIENT)
Dept: FAMILY MEDICINE | Facility: CLINIC | Age: 58
End: 2023-07-07
Payer: COMMERCIAL

## 2023-07-07 VITALS
SYSTOLIC BLOOD PRESSURE: 122 MMHG | WEIGHT: 269.63 LBS | DIASTOLIC BLOOD PRESSURE: 88 MMHG | BODY MASS INDEX: 38.6 KG/M2 | OXYGEN SATURATION: 98 % | HEART RATE: 65 BPM | HEIGHT: 70 IN

## 2023-07-07 DIAGNOSIS — L24.9 IRRITANT CONTACT DERMATITIS, UNSPECIFIED TRIGGER: Primary | ICD-10-CM

## 2023-07-07 PROCEDURE — 99999 PR PBB SHADOW E&M-EST. PATIENT-LVL III: ICD-10-PCS | Mod: PBBFAC,,, | Performed by: STUDENT IN AN ORGANIZED HEALTH CARE EDUCATION/TRAINING PROGRAM

## 2023-07-07 PROCEDURE — 99999 PR PBB SHADOW E&M-EST. PATIENT-LVL III: CPT | Mod: PBBFAC,,, | Performed by: STUDENT IN AN ORGANIZED HEALTH CARE EDUCATION/TRAINING PROGRAM

## 2023-07-07 PROCEDURE — 99213 PR OFFICE/OUTPT VISIT, EST, LEVL III, 20-29 MIN: ICD-10-PCS | Mod: S$GLB,,, | Performed by: STUDENT IN AN ORGANIZED HEALTH CARE EDUCATION/TRAINING PROGRAM

## 2023-07-07 PROCEDURE — 99213 OFFICE O/P EST LOW 20 MIN: CPT | Mod: S$GLB,,, | Performed by: STUDENT IN AN ORGANIZED HEALTH CARE EDUCATION/TRAINING PROGRAM

## 2023-07-07 RX ORDER — PREDNISONE 20 MG/1
20 TABLET ORAL DAILY
Qty: 7 TABLET | Refills: 0 | Status: SHIPPED | OUTPATIENT
Start: 2023-07-07 | End: 2023-07-14

## 2023-07-07 NOTE — PROGRESS NOTES
Name: García Tang  MRN: [id number]  : 1965  PCP: CANELO Domingo MD    HPI  Patient presents with hand rash. He was bit by some insect about 9 days ago, he suspects, while he was working in his yard. A few days later he started developing a rash on the back of his hand, mild swelling, and joint pains in his wrist, hand, up to his elbow. He has been applying hydrocortisone cream but it has not yet resolved. The rash itself is doesn't itch, maybe it hurts. Has also been having malaise.    Review of Systems   Constitutional:  Negative for fever.     Patient Active Problem List   Diagnosis    Morbid obesity    Type 2 diabetes mellitus without complication, without long-term current use of insulin    Fatty liver disease, nonalcoholic    Essential (primary) hypertension    Vertigo    Structural abnormality of bladder    Meniere's disease    Sensory ataxia    ASCVD (arteriosclerotic cardiovascular disease)    Chronic sialoadenitis    Gastroesophageal reflux disease without esophagitis    PVD (peripheral vascular disease)    Obesity (BMI 30-39.9)    Dyslipidemia with elevated low density lipoprotein (LDL) cholesterol and abnormally low high density lipoprotein cholesterol    Chest pain    Grade II diastolic dysfunction    Tinnitus of both ears    Generalized anxiety disorder    Unspecified hearing loss, unspecified ear    Hepatic fibrosis, early fibrosis    Skin lump of arm, right    Dysthymia    Right leg pain    Cognitive changes    Tremor, unspecified    Cognitive communication deficit    Post-COVID chronic neurologic symptoms    Impaired functional mobility, balance, gait, and endurance    Generalized muscle weakness    COVID-19 long hauler    Other abnormalities of gait and mobility    Moderate episode of recurrent major depressive disorder    Excessive sweating    Thyroid nodule    Benign prostatic hyperplasia with urinary obstruction       Vitals:    23 1134   BP: 122/88   Pulse: 65       Physical  Exam  Musculoskeletal:      Right elbow: No swelling, deformity or effusion. No tenderness.      Right forearm: No swelling, edema or tenderness.      Right wrist: No swelling, effusion or tenderness.      Right hand: No swelling or tenderness. Normal pulse.   Skin:     Findings: Rash (collection of vesicles on dorsal hand/wrist in multiple stages of healing; not in dermatomal distribution) present.         1. Irritant contact dermatitis, unspecified trigger  -     predniSONE (DELTASONE) 20 MG tablet; Take 1 tablet (20 mg total) by mouth once daily. for 7 days  Dispense: 7 tablet; Refill: 0    Return precautions given - migration of rash, development of fever, failure to resolve.    Follow up as needed.    Jose Cervantes MD  07/07/2023

## 2023-07-14 ENCOUNTER — HOSPITAL ENCOUNTER (OUTPATIENT)
Dept: RADIOLOGY | Facility: HOSPITAL | Age: 58
Discharge: HOME | End: 2023-07-14
Attending: INTERNAL MEDICINE
Payer: COMMERCIAL

## 2023-07-14 DIAGNOSIS — E04.1 THYROID NODULE: ICD-10-CM

## 2023-07-14 PROCEDURE — 76536 US SOFT TISSUE HEAD NECK THYROID: ICD-10-PCS | Mod: 26,,, | Performed by: RADIOLOGY

## 2023-07-14 PROCEDURE — 76536 US EXAM OF HEAD AND NECK: CPT | Mod: TC,PO

## 2023-07-14 PROCEDURE — 76536 US EXAM OF HEAD AND NECK: CPT | Mod: 26,,, | Performed by: RADIOLOGY

## 2023-07-17 DIAGNOSIS — I10 ESSENTIAL (PRIMARY) HYPERTENSION: ICD-10-CM

## 2023-07-17 RX ORDER — METOPROLOL SUCCINATE 50 MG/1
50 TABLET, EXTENDED RELEASE ORAL DAILY
Qty: 90 TABLET | Refills: 3 | Status: SHIPPED | OUTPATIENT
Start: 2023-07-17 | End: 2024-03-11 | Stop reason: SDUPTHER

## 2023-07-17 NOTE — TELEPHONE ENCOUNTER
No care due was identified.  [facility] Embedded Care Due Messages. Reference number: [id number].   7/17/2023 12:18:05 PM CDT

## 2023-07-19 DIAGNOSIS — E11.9 TYPE 2 DIABETES MELLITUS WITHOUT COMPLICATION: ICD-10-CM

## 2023-07-24 ENCOUNTER — PATIENT MESSAGE (OUTPATIENT)
Dept: ADMINISTRATIVE | Facility: HOSPITAL | Age: 58
End: 2023-07-24
Payer: COMMERCIAL

## 2023-08-07 ENCOUNTER — OFFICE VISIT (OUTPATIENT)
Dept: FAMILY MEDICINE | Facility: CLINIC | Age: 58
End: 2023-08-07
Payer: COMMERCIAL

## 2023-08-07 VITALS
DIASTOLIC BLOOD PRESSURE: 82 MMHG | RESPIRATION RATE: 18 BRPM | HEART RATE: 77 BPM | HEIGHT: 70 IN | WEIGHT: 270.94 LBS | BODY MASS INDEX: 38.79 KG/M2 | OXYGEN SATURATION: 98 % | SYSTOLIC BLOOD PRESSURE: 138 MMHG

## 2023-08-07 DIAGNOSIS — L24.89 IRRITANT CONTACT DERMATITIS DUE TO OTHER AGENTS: ICD-10-CM

## 2023-08-07 DIAGNOSIS — R61 EXCESSIVE SWEATING: ICD-10-CM

## 2023-08-07 DIAGNOSIS — R00.2 PALPITATIONS: ICD-10-CM

## 2023-08-07 DIAGNOSIS — Z86.39 HISTORY OF THYROID NODULE: ICD-10-CM

## 2023-08-07 DIAGNOSIS — R68.89 HEAT INTOLERANCE: ICD-10-CM

## 2023-08-07 DIAGNOSIS — R53.83 FATIGUE, UNSPECIFIED TYPE: Primary | ICD-10-CM

## 2023-08-07 PROCEDURE — 93005 ELECTROCARDIOGRAM TRACING: CPT | Mod: S$GLB,,,

## 2023-08-07 PROCEDURE — 93010 EKG 12-LEAD: ICD-10-PCS | Mod: S$GLB,,, | Performed by: INTERNAL MEDICINE

## 2023-08-07 PROCEDURE — 99214 OFFICE O/P EST MOD 30 MIN: CPT | Mod: S$GLB,,,

## 2023-08-07 PROCEDURE — 99214 PR OFFICE/OUTPT VISIT, EST, LEVL IV, 30-39 MIN: ICD-10-PCS | Mod: S$GLB,,,

## 2023-08-07 PROCEDURE — 99999 PR PBB SHADOW E&M-EST. PATIENT-LVL III: CPT | Mod: PBBFAC,,,

## 2023-08-07 PROCEDURE — 93010 ELECTROCARDIOGRAM REPORT: CPT | Mod: S$GLB,,, | Performed by: INTERNAL MEDICINE

## 2023-08-07 PROCEDURE — 93005 EKG 12-LEAD: ICD-10-PCS | Mod: S$GLB,,,

## 2023-08-07 PROCEDURE — 99999 PR PBB SHADOW E&M-EST. PATIENT-LVL III: ICD-10-PCS | Mod: PBBFAC,,,

## 2023-08-07 NOTE — PROGRESS NOTES
[facility]  [address]  [place] [address]    Subjective    Chief Complaint:   Chief Complaint   Patient presents with    Excessive Sweating    Fatigue    Palpitations     X2 weeks    Rash     X2 months       History of Present Illness:     García Tang is a(n) 57 y.o. male with past medical history as noted below who presents to the clinic today for symptoms above.    Reports 2 week onset of feeling run-down, heat intolerance with occasional palpitations.  Reports sweating throughout the day and night.  No fever, unintentional weight loss, chest pain, shortness of breath.  He is worried his thyroid levels might be causing this.    He also visited urgent care about a month ago for contact dermatitis of right arm.  Reports this area has almost completely healed but wondering what he can do to speed the healing process.  He was prescribed prednisone 20 mg x 7 days but did not take this out of fear or cause palpitations.    He does have a history of heart palpitations and is currently taking metoprolol succinate 50 mg daily for this.  Last visit with Cardiology (external) was about 9 months ago.  Reports hx thyroid nodules, had US 3 weeks ago (external) which he reports was normal.       Problem List:   Patient Active Problem List   Diagnosis    Morbid obesity    Type 2 diabetes mellitus without complication, without long-term current use of insulin    Fatty liver disease, nonalcoholic    Essential (primary) hypertension    Vertigo    Structural abnormality of bladder    Meniere's disease    Sensory ataxia    ASCVD (arteriosclerotic cardiovascular disease)    Chronic sialoadenitis    Gastroesophageal reflux disease without esophagitis    PVD (peripheral vascular disease)    Obesity (BMI 30-39.9)    Dyslipidemia with elevated low density lipoprotein (LDL) cholesterol and abnormally low high density lipoprotein cholesterol    Chest pain    Grade II diastolic  dysfunction    Tinnitus of both ears    Generalized anxiety disorder    Unspecified hearing loss, unspecified ear    Hepatic fibrosis, early fibrosis    Skin lump of arm, right    Dysthymia    Right leg pain    Cognitive changes    Tremor, unspecified    Cognitive communication deficit    Post-COVID chronic neurologic symptoms    Impaired functional mobility, balance, gait, and endurance    Generalized muscle weakness    COVID-19 long hauler    Other abnormalities of gait and mobility    Moderate episode of recurrent major depressive disorder    Excessive sweating    Thyroid nodule    Benign prostatic hyperplasia with urinary obstruction       Current Outpatient Medications:   Current Outpatient Medications   Medication Instructions    acetaminophen (TYLENOL) 1,000 mg, Oral, Every 6 hours PRN    acetylcysteine (NAC ORAL) Oral, Take 2 tablets daily    losartan (COZAAR) 100 mg, Oral, Daily    MELATONIN ORAL 5 mg, Oral, Nightly    metoprolol succinate (TOPROL-XL) 50 mg, Oral, Daily    naproxen (NAPROSYN) 250 mg, Oral, 2 times daily PRN    pantoprazole (PROTONIX) 40 MG tablet TAKE 1 TABLET(40 MG) BY MOUTH BEFORE BREAKFAST       Surgical History:   Past Surgical History:   Procedure Laterality Date    COLONOSCOPY  2008    diverticulosis per patient report    kidney stent      KIDNEY SURGERY  1996    right kidney obstruction requiring stent    NOSE SURGERY      UPPER GASTROINTESTINAL ENDOSCOPY  2021    [facility], [facility] per patient report       Family History:   Family History   Problem Relation Age of Onset    Liver disease Mother         d.    Cirrhosis Mother     Hypertension Mother     Hyperlipidemia Mother     Colon cancer Father 70    Cancer Father     Diabetes Father     Hypertension Father     Glaucoma Father     Hyperlipidemia Father     Prostate cancer Father     Diabetes Brother     Hypertension Brother     Thyroid cancer Brother     Heart attack Maternal Uncle     Heart attack Maternal Uncle      "Heart disease Neg Hx     Heart failure Neg Hx     Macular degeneration Neg Hx     Retinal detachment Neg Hx     Crohn's disease Neg Hx     Ulcerative colitis Neg Hx     Stomach cancer Neg Hx     Esophageal cancer Neg Hx        Allergies:   Review of patient's allergies indicates:   Allergen Reactions    Lisinopril Swelling    Iodinated contrast media Other (See Comments)     Spasms, tremor, and high blood pressure    Bactrim  [sulfamethoxazole-trimethoprim]      Other reaction(s): Unknown    Diflucan [fluconazole]      Heart racing    Hydroxyzine hcl      Other reaction(s): elevated bp    Kenalog [triamcinolone acetonide] Other (See Comments)     Heart flutters all day    Phenergan [promethazine] Anxiety    Zofran [ondansetron hcl (pf)] Anxiety       Tobacco Status:   Tobacco Use: Medium Risk (7/7/2023)    Patient History     Smoking Tobacco Use: Former     Smokeless Tobacco Use: Never     Passive Exposure: Not on file       Sexual Activity:   Social History     Substance and Sexual Activity   Sexual Activity Yes    Partners: Female       Alcohol Use:   Social History     Substance and Sexual Activity   Alcohol Use No    Comment: stopped in 1992         Objective       Vitals:    08/07/23 1023   BP: 138/82   BP Location: Left arm   Patient Position: Sitting   Pulse: 77   Resp: 18   SpO2: 98%   Weight: 122.9 kg (270 lb 15.1 oz)   Height: 5' 10" (1.778 m)       Review of Systems   Constitutional:  Positive for diaphoresis and malaise/fatigue. Negative for chills, fever and weight loss.   Respiratory:  Negative for sputum production.    Cardiovascular:  Positive for palpitations. Negative for chest pain and leg swelling.   Endo/Heme/Allergies:         + heat intolerance       Physical Exam  Constitutional:       General: He is not in acute distress.     Appearance: Normal appearance.   HENT:      Head: Normocephalic and atraumatic.   Cardiovascular:      Rate and Rhythm: Normal rate and regular rhythm.      Heart " sounds: Normal heart sounds. No murmur heard.  Pulmonary:      Effort: Pulmonary effort is normal. No respiratory distress.      Breath sounds: Normal breath sounds. No wheezing.   Skin:     General: Skin is warm.   Neurological:      Mental Status: He is alert and oriented to person, place, and time.   Psychiatric:         Mood and Affect: Mood normal.         Speech: Speech normal.         Behavior: Behavior normal.         Thought Content: Thought content normal.         Judgment: Judgment normal.           Assessment and Plan:    1. Fatigue, unspecified type  -     TSH; Future; Expected date: 08/07/2023  -     T4, FREE; Future; Expected date: 08/07/2023  -     CBC W/ AUTO DIFFERENTIAL; Future; Expected date: 08/07/2023  -     BASIC METABOLIC PANEL; Future; Expected date: 08/07/2023    2. Palpitations  -     IN OFFICE EKG 12-LEAD (to Muse)  -     TSH; Future; Expected date: 08/07/2023  -     T4, FREE; Future; Expected date: 08/07/2023    3. Excessive sweating  -     TSH; Future; Expected date: 08/07/2023  -     T4, FREE; Future; Expected date: 08/07/2023  -     CBC W/ AUTO DIFFERENTIAL; Future; Expected date: 08/07/2023  -     BASIC METABOLIC PANEL; Future; Expected date: 08/07/2023    4. History of thyroid nodule    5. Heat intolerance    6. Irritant contact dermatitis due to other agents        Visit summary:    García Tang presented today for fatigue, heat intolerance palpitations.    We will check thyroid levels as this could be causing this constellation of symptoms, although some appear to be chronic with acute worsening.  We will also check blood count and metabolic panel to rule out anemia or electrolyte disturbance causing symptoms.     EKG today without obvious etiology of palpitations, although this is not a new problem for this patient.  Discussed options going forward including Holter monitor or follow up with Cardiology, he opted for follow up with Cardiology.    Ultimately will need follow up  with PCP if symptoms do not improve or if labs are not suggestive    Patient was instructed to report to ER if symptoms become severe.    Follow up:  With PCP      Leeanna Claudio PA-C    This note was created partially with voice dictation software and is prone to errors. This note has been reviewed by me but some errors are inevitable.

## 2023-08-08 ENCOUNTER — LAB VISIT (OUTPATIENT)
Dept: LAB | Facility: HOSPITAL | Age: 58
End: 2023-08-08
Attending: FAMILY MEDICINE
Payer: COMMERCIAL

## 2023-08-08 DIAGNOSIS — R61 EXCESSIVE SWEATING: ICD-10-CM

## 2023-08-08 DIAGNOSIS — R00.2 PALPITATIONS: ICD-10-CM

## 2023-08-08 DIAGNOSIS — E11.9 TYPE 2 DIABETES MELLITUS WITHOUT COMPLICATION: ICD-10-CM

## 2023-08-08 DIAGNOSIS — R53.83 FATIGUE, UNSPECIFIED TYPE: ICD-10-CM

## 2023-08-08 LAB
ANION GAP SERPL CALC-SCNC: 8 MMOL/L (ref 8–16)
BASOPHILS # BLD AUTO: 0.04 K/UL (ref 0–0.2)
BASOPHILS NFR BLD: 1 % (ref 0–1.9)
BUN SERPL-MCNC: 15 MG/DL (ref 6–20)
CALCIUM SERPL-MCNC: 9.6 MG/DL (ref 8.7–10.5)
CHLORIDE SERPL-SCNC: 104 MMOL/L (ref 95–110)
CO2 SERPL-SCNC: 28 MMOL/L (ref 23–29)
CREAT SERPL-MCNC: 1 MG/DL (ref 0.5–1.4)
DIFFERENTIAL METHOD: ABNORMAL
EOSINOPHIL # BLD AUTO: 0.2 K/UL (ref 0–0.5)
EOSINOPHIL NFR BLD: 4.6 % (ref 0–8)
ERYTHROCYTE [DISTWIDTH] IN BLOOD BY AUTOMATED COUNT: 11.6 % (ref 11.5–14.5)
EST. GFR  (NO RACE VARIABLE): >60 ML/MIN/1.73 M^2
ESTIMATED AVG GLUCOSE: 120 MG/DL (ref 68–131)
GLUCOSE SERPL-MCNC: 119 MG/DL (ref 70–110)
HBA1C MFR BLD: 5.8 % (ref 4–5.6)
HCT VFR BLD AUTO: 43.2 % (ref 40–54)
HGB BLD-MCNC: 14.6 G/DL (ref 14–18)
IMM GRANULOCYTES # BLD AUTO: 0.01 K/UL (ref 0–0.04)
IMM GRANULOCYTES NFR BLD AUTO: 0.3 % (ref 0–0.5)
LYMPHOCYTES # BLD AUTO: 1.1 K/UL (ref 1–4.8)
LYMPHOCYTES NFR BLD: 28.5 % (ref 18–48)
MCH RBC QN AUTO: 29.9 PG (ref 27–31)
MCHC RBC AUTO-ENTMCNC: 33.8 G/DL (ref 32–36)
MCV RBC AUTO: 89 FL (ref 82–98)
MONOCYTES # BLD AUTO: 0.5 K/UL (ref 0.3–1)
MONOCYTES NFR BLD: 11.8 % (ref 4–15)
NEUTROPHILS # BLD AUTO: 2.1 K/UL (ref 1.8–7.7)
NEUTROPHILS NFR BLD: 53.8 % (ref 38–73)
NRBC BLD-RTO: 0 /100 WBC
PLATELET # BLD AUTO: 205 K/UL (ref 150–450)
PMV BLD AUTO: 9.8 FL (ref 9.2–12.9)
POTASSIUM SERPL-SCNC: 4.8 MMOL/L (ref 3.5–5.1)
RBC # BLD AUTO: 4.88 M/UL (ref 4.6–6.2)
SODIUM SERPL-SCNC: 140 MMOL/L (ref 136–145)
T4 FREE SERPL-MCNC: 0.88 NG/DL (ref 0.71–1.51)
TSH SERPL DL<=0.005 MIU/L-ACNC: 2.25 UIU/ML (ref 0.4–4)
WBC # BLD AUTO: 3.89 K/UL (ref 3.9–12.7)

## 2023-08-08 PROCEDURE — 80048 BASIC METABOLIC PNL TOTAL CA: CPT

## 2023-08-08 PROCEDURE — 83036 HEMOGLOBIN GLYCOSYLATED A1C: CPT | Performed by: FAMILY MEDICINE

## 2023-08-08 PROCEDURE — 84443 ASSAY THYROID STIM HORMONE: CPT

## 2023-08-08 PROCEDURE — 85025 COMPLETE CBC W/AUTO DIFF WBC: CPT

## 2023-08-08 PROCEDURE — 84439 ASSAY OF FREE THYROXINE: CPT

## 2023-08-08 PROCEDURE — 36415 COLL VENOUS BLD VENIPUNCTURE: CPT | Mod: PO

## 2023-08-09 DIAGNOSIS — D72.819 LEUKOPENIA, UNSPECIFIED TYPE: ICD-10-CM

## 2023-08-09 DIAGNOSIS — R53.83 FATIGUE, UNSPECIFIED TYPE: Primary | ICD-10-CM

## 2023-08-10 ENCOUNTER — OFFICE VISIT (OUTPATIENT)
Dept: CARDIOLOGY | Facility: CLINIC | Age: 58
End: 2023-08-10
Payer: COMMERCIAL

## 2023-08-10 VITALS
HEART RATE: 60 BPM | DIASTOLIC BLOOD PRESSURE: 83 MMHG | WEIGHT: 271.63 LBS | BODY MASS INDEX: 38.89 KG/M2 | SYSTOLIC BLOOD PRESSURE: 130 MMHG | HEIGHT: 70 IN

## 2023-08-10 DIAGNOSIS — I34.0 NONRHEUMATIC MITRAL VALVE REGURGITATION: Chronic | ICD-10-CM

## 2023-08-10 DIAGNOSIS — M79.605 PAIN OF LEFT LOWER EXTREMITY: ICD-10-CM

## 2023-08-10 DIAGNOSIS — E78.5 DYSLIPIDEMIA WITH ELEVATED LOW DENSITY LIPOPROTEIN (LDL) CHOLESTEROL AND ABNORMALLY LOW HIGH DENSITY LIPOPROTEIN CHOLESTEROL: Chronic | ICD-10-CM

## 2023-08-10 DIAGNOSIS — R73.03 PRE-DIABETES: Chronic | ICD-10-CM

## 2023-08-10 DIAGNOSIS — I51.89 GRADE II DIASTOLIC DYSFUNCTION: Chronic | ICD-10-CM

## 2023-08-10 DIAGNOSIS — R07.89 CHEST PAIN, ATYPICAL: Primary | ICD-10-CM

## 2023-08-10 DIAGNOSIS — R61 EXCESSIVE SWEATING: ICD-10-CM

## 2023-08-10 DIAGNOSIS — I10 ESSENTIAL (PRIMARY) HYPERTENSION: Chronic | ICD-10-CM

## 2023-08-10 DIAGNOSIS — R00.2 PALPITATIONS: ICD-10-CM

## 2023-08-10 DIAGNOSIS — I25.10 ASCVD (ARTERIOSCLEROTIC CARDIOVASCULAR DISEASE): Chronic | ICD-10-CM

## 2023-08-10 DIAGNOSIS — E66.01 SEVERE OBESITY (BMI 35.0-39.9) WITH COMORBIDITY: Chronic | ICD-10-CM

## 2023-08-10 DIAGNOSIS — R94.31 NONSPECIFIC ABNORMAL ELECTROCARDIOGRAM (ECG) (EKG): ICD-10-CM

## 2023-08-10 PROCEDURE — 99204 PR OFFICE/OUTPT VISIT, NEW, LEVL IV, 45-59 MIN: ICD-10-PCS | Mod: S$GLB,,, | Performed by: INTERNAL MEDICINE

## 2023-08-10 PROCEDURE — 99999 PR PBB SHADOW E&M-EST. PATIENT-LVL III: ICD-10-PCS | Mod: PBBFAC,,, | Performed by: INTERNAL MEDICINE

## 2023-08-10 PROCEDURE — 99999 PR PBB SHADOW E&M-EST. PATIENT-LVL III: CPT | Mod: PBBFAC,,, | Performed by: INTERNAL MEDICINE

## 2023-08-10 PROCEDURE — 99204 OFFICE O/P NEW MOD 45 MIN: CPT | Mod: S$GLB,,, | Performed by: INTERNAL MEDICINE

## 2023-08-10 NOTE — PROGRESS NOTES
Subjective:    Patient ID:  García Tang is a 57 y.o. male who presents for Heart Problem        HPI    NEW PATIENT EVALUATION, WAS TOLD IN THE [facility] BEFORE, ECHO AND STRESS ECHO IN 5/2021 SHOWED NORMAL LV FUNCTION MILD MR, RECENT PAP VISIT, FEELING BAD, HOT AT NIGHT, EKG ABNORMAL, TFT'S OK, HBA1C 5.8%,WAS ON INSULIN 3 YEARS AGO, LOST WEIGHT, BETTER OFF MEDS, GAINED SOME WEIGHT BACK,  HAD CATH AT [facility] 2 -3 YEARS AGO, MILD CAD, SEDENTARY WORK, RELATIVELY ACTIVE, LAST , INTERMITTENT CHEST DISCOMFORT NOT NECESSARILY EXERTIONAL, SEE ROS  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    Diverticulitis     diverticulitis    Essential hypertension 07/18/2016    Fatty liver disease, nonalcoholic 04/07/2016    [place], 2006     GERD (gastroesophageal reflux disease)     Headache     HLD (hyperlipidemia)     Kidney stone     requiring stent    Meniere's disease 01/09/2018    Morbid obesity 05/29/2014    NAFLD (nonalcoholic fatty liver disease)     Near syncope 05/23/2017    Obesity hypoventilation syndrome 02/22/2016    PAD (peripheral artery disease) 09/17/2019    Mild 9/2018    Prostatitis     Rectal bleeding     diverticulitis    Thyroid nodule 07/14/2022    Type 2 diabetes mellitus with hyperglycemia, with long-term current use of insulin 05/18/2017    Uncontrolled type 2 diabetes mellitus with peripheral neuropathy 05/29/2014     Past Surgical History:   Procedure Laterality Date    COLONOSCOPY  2008    diverticulosis per patient report    kidney stent      KIDNEY SURGERY  1996    right kidney obstruction requiring stent    NOSE SURGERY      UPPER GASTROINTESTINAL ENDOSCOPY  2021    [facility], gastritis per patient report     Family History   Problem Relation Age of Onset    Liver disease Mother         d.    Cirrhosis Mother     Hypertension Mother      Hyperlipidemia Mother     Colon cancer Father 70    Cancer Father     Diabetes Father     Hypertension Father     Glaucoma Father     Hyperlipidemia Father     Prostate cancer Father     Diabetes Brother     Hypertension Brother     Thyroid cancer Brother     Heart attack Maternal Uncle     Heart attack Maternal Uncle     Heart disease Neg Hx     Heart failure Neg Hx     Macular degeneration Neg Hx     Retinal detachment Neg Hx     Crohn's disease Neg Hx     Ulcerative colitis Neg Hx     Stomach cancer Neg Hx     Esophageal cancer Neg Hx      Social History     Socioeconomic History    Marital status:    Tobacco Use    Smoking status: Former     Current packs/day: 0.00     Types: Cigarettes     Quit date: 1990     Years since quittin.6    Smokeless tobacco: Never    Tobacco comments:     social   Substance and Sexual Activity    Alcohol use: No     Comment: stopped in     Drug use: No    Sexual activity: Yes     Partners: Female       Review of patient's allergies indicates:   Allergen Reactions    Lisinopril Swelling    Iodinated contrast media Other (See Comments)     Spasms, tremor, and high blood pressure    Bactrim  [sulfamethoxazole-trimethoprim]      Other reaction(s): Unknown    Diflucan [fluconazole]      Heart racing    Hydroxyzine hcl      Other reaction(s): elevated bp    Kenalog [triamcinolone acetonide] Other (See Comments)     Heart flutters all day    Phenergan [promethazine] Anxiety    Zofran [ondansetron hcl (pf)] Anxiety       Current Outpatient Medications:     acetaminophen (TYLENOL) 500 MG tablet, Take 1,000 mg by mouth every 6 (six) hours as needed for Pain., Disp: , Rfl:     acetylcysteine (NAC ORAL), Take by mouth. Take 2 tablets daily, Disp: , Rfl:     losartan (COZAAR) 100 MG tablet, Take 100 mg by mouth once daily., Disp: , Rfl:     MELATONIN ORAL, Take 5 mg by mouth nightly., Disp: , Rfl:     metoprolol succinate (TOPROL-XL) 50 MG 24 hr tablet, Take 1 tablet (50 mg  "total) by mouth once daily., Disp: 90 tablet, Rfl: 3    naproxen (NAPROSYN) 250 MG tablet, Take 250 mg by mouth 2 (two) times daily as needed., Disp: , Rfl:     pantoprazole (PROTONIX) 40 MG tablet, TAKE 1 TABLET(40 MG) BY MOUTH BEFORE BREAKFAST, Disp: 30 tablet, Rfl: 0    Review of Systems   Constitutional: Positive for night sweats (CLAMMY) and weight gain. Negative for chills, decreased appetite, diaphoresis, fever and malaise/fatigue.   HENT:  Negative for congestion and nosebleeds.    Eyes:  Negative for redness and visual disturbance.   Cardiovascular:  Positive for chest pain (SOMETIMES), irregular heartbeat (OCC) and palpitations. Negative for claudication, cyanosis, dyspnea on exertion, leg swelling, near-syncope, orthopnea, paroxysmal nocturnal dyspnea and syncope.   Respiratory:  Negative for cough, hemoptysis, shortness of breath, snoring and wheezing.    Endocrine: Negative for polyphagia and polyuria.   Hematologic/Lymphatic: Negative for adenopathy. Does not bruise/bleed easily.   Skin:  Positive for itching. Negative for color change and rash.   Musculoskeletal:  Negative for back pain and falls. Muscle cramps: L THIGH.  Gastrointestinal:  Negative for abdominal pain, dysphagia, jaundice, melena and nausea.   Genitourinary:  Negative for dysuria and flank pain.   Neurological:  Negative for brief paralysis, dizziness, focal weakness, paresthesias (LESS) and tremors.   Psychiatric/Behavioral:  The patient has insomnia.    Allergic/Immunologic: Negative for hives and persistent infections.        Objective:      Vitals:    08/10/23 1447   BP: 130/83   Pulse: 60   Weight: 123.2 kg (271 lb 9.7 oz)   Height: 5' 10" (1.778 m)   PainSc: 0-No pain     Body mass index is 38.97 kg/m².    Physical Exam  Constitutional:       Appearance: He is obese. He is not diaphoretic.   HENT:      Head: Normocephalic and atraumatic.   Eyes:      Extraocular Movements: Extraocular movements intact.      Conjunctiva/sclera: " Conjunctivae normal.   Neck:      Vascular: No carotid bruit.   Cardiovascular:      Rate and Rhythm: Normal rate and regular rhythm.      Pulses: Normal pulses.      Heart sounds: Normal heart sounds.      No friction rub. No gallop.   Pulmonary:      Effort: Pulmonary effort is normal.      Breath sounds: Normal breath sounds.   Abdominal:      Palpations: Abdomen is soft.      Tenderness: There is no abdominal tenderness.   Musculoskeletal:      Cervical back: Neck supple.      Right lower leg: Edema (TRACE) present.      Left lower leg: Edema (TRACE) present.   Skin:     Capillary Refill: Capillary refill takes less than 2 seconds.   Neurological:      General: No focal deficit present.      Mental Status: He is alert and oriented to person, place, and time.      Cranial Nerves: No cranial nerve deficit.   Psychiatric:         Mood and Affect: Mood normal.         Behavior: Behavior normal.                 ..    Chemistry        Component Value Date/Time     08/08/2023 0736    K 4.8 08/08/2023 0736     08/08/2023 0736    CO2 28 08/08/2023 0736    BUN 15 08/08/2023 0736    CREATININE 1.0 08/08/2023 0736     (H) 08/08/2023 0736        Component Value Date/Time    CALCIUM 9.6 08/08/2023 0736    ALKPHOS 48 02/20/2023 0907    AST 45 02/20/2023 0907    ALT 46 02/20/2023 0907    BILITOT 0.6 02/20/2023 0907    ESTGFRAFRICA >60.0 04/14/2022 0745    EGFRNONAA >60.0 04/14/2022 0745            ..  Lab Results   Component Value Date    CHOL 160 01/28/2023    CHOL 157 04/14/2022    CHOL 159 10/30/2020    CHOL 159 10/30/2020     Lab Results   Component Value Date    HDL 44 01/28/2023    HDL 41 04/14/2022    HDL 39 (L) 10/30/2020    HDL 37 (L) 10/30/2020     Lab Results   Component Value Date    LDLCALC 106.6 01/28/2023    LDLCALC 103.6 04/14/2022    LDLCALC 110.6 10/30/2020    LDLCALC 112.6 10/30/2020     Lab Results   Component Value Date    TRIG 47 01/28/2023    TRIG 62 04/14/2022    TRIG 47 10/30/2020     TRIG 47 10/30/2020     Lab Results   Component Value Date    CHOLHDL 27.5 01/28/2023    CHOLHDL 26.1 04/14/2022    CHOLHDL 24.5 10/30/2020    CHOLHDL 23.3 10/30/2020     ..  Lab Results   Component Value Date    WBC 3.89 (L) 08/08/2023    HGB 14.6 08/08/2023    HCT 43.2 08/08/2023    MCV 89 08/08/2023     08/08/2023       Test(s) Reviewed  I have reviewed the following in detail:  [x] Stress test   [] Angiography   [x] Echocardiogram   [x] Labs   [x] Other:       Assessment:         ICD-10-CM ICD-9-CM   1. Chest pain, atypical  R07.89 786.59   2. Nonspecific abnormal electrocardiogram (ECG) (EKG)  R94.31 794.31   3. Palpitations  R00.2 785.1   4. Dyslipidemia with elevated low density lipoprotein (LDL) cholesterol and abnormally low high density lipoprotein cholesterol  E78.5 272.4   5. Nonrheumatic mitral valve regurgitation  I34.0 424.0   6. Pain of left lower extremity  M79.605 729.5   7. Severe obesity (BMI 35.0-39.9) with comorbidity  E66.01 278.01   8. Grade II diastolic dysfunction  I51.89 429.9   9. Essential (primary) hypertension  I10 401.9   10. ASCVD (arteriosclerotic cardiovascular disease)  I25.10 429.2     440.9   11. Excessive sweating  R61 780.8   12. Pre-diabetes  R73.03 790.29     Problem List Items Addressed This Visit          Cardiac/Vascular    Palpitations (Chronic)    Relevant Orders    Echo    Nuclear Stress - Cardiology Interpreted    Holter monitor - 72 hour    ASCVD (arteriosclerotic cardiovascular disease) (Chronic)    Relevant Orders    Nuclear Stress - Cardiology Interpreted    Essential (primary) hypertension    Relevant Orders    Ankle Brachial Indices (MARTIN)    Dyslipidemia with elevated low density lipoprotein (LDL) cholesterol and abnormally low high density lipoprotein cholesterol    Grade II diastolic dysfunction    Nonspecific abnormal electrocardiogram (ECG) (EKG)    Relevant Orders    Echo    Nuclear Stress - Cardiology Interpreted    Nonrheumatic mitral valve  regurgitation    Relevant Orders    Echo    Nuclear Stress - Cardiology Interpreted       Endocrine    Severe obesity (BMI 35.0-39.9) with comorbidity    Relevant Orders    TESTOSTERONE    Pre-diabetes       Orthopedic    Pain of left lower extremity    Relevant Orders    Ankle Brachial Indices (MARTIN)       Other    Excessive sweating    Relevant Orders    TESTOSTERONE    Chest pain, atypical - Primary    Relevant Orders    Echo    Nuclear Stress - Cardiology Interpreted        Plan:     EKG SR, LAD, ST T CHANGES, WILL NEED FURTHER EVALUATION CHECK ECHO REASSESS LV FUNCTION MITRAL REGURGITATION NUCLEAR STRESS TEST ASSESS FOR ISCHEMIA WITH CHANGE IN EKG POSSIBLE CAD PROGRESSION AND SYMPTOMS, CHECK MARTIN CHECK FOR SLEEP APNEA SYMPTOMS NO DEFINITE SYMPTOMS NOW WATCH BLOOD PRESSURE, ALSO CHECK TESTOSTERONE LEVEL, NO OVERT HEART FAILURE NO TIA TYPE SYMPTOMS NO NEAR-SYNCOPE ASSESS ARRHYTHMIA WITH THE MONITOR, RETURN TO CLINIC IN FEW WEEKS AFTER TESTS NEEDS SIGNIFICANT WEIGHT LOSS, CONSIDER STATIN, PATIENT DECLINED OR WAS HESITANT BEFORE      Chest pain, atypical  -     Echo  -     Nuclear Stress - Cardiology Interpreted; Future    Nonspecific abnormal electrocardiogram (ECG) (EKG)  -     Echo  -     Nuclear Stress - Cardiology Interpreted; Future    Palpitations  -     Echo  -     Nuclear Stress - Cardiology Interpreted; Future  -     Holter monitor - 72 hour; Future    Dyslipidemia with elevated low density lipoprotein (LDL) cholesterol and abnormally low high density lipoprotein cholesterol    Nonrheumatic mitral valve regurgitation  -     Echo  -     Nuclear Stress - Cardiology Interpreted; Future    Pain of left lower extremity  -     Ankle Brachial Indices (MARTIN); Future    Severe obesity (BMI 35.0-39.9) with comorbidity  -     TESTOSTERONE; Future; Expected date: 08/10/2023    Grade II diastolic dysfunction    Essential (primary) hypertension  Comments:  CONTROLLED  Orders:  -     Ankle Brachial Indices (MARTIN);  Future    ASCVD (arteriosclerotic cardiovascular disease)  -     Nuclear Stress - Cardiology Interpreted; Future    Excessive sweating  -     TESTOSTERONE; Future; Expected date: 08/10/2023    Pre-diabetes    RTC Low level/low impact aerobic exercise 5x's/wk. Heart healthy diet and risk factor modification.    See labs and med orders.    Aerobic exercise 5x's/wk. Heart healthy diet and risk factor modification.    See labs and med orders.

## 2023-08-11 ENCOUNTER — TELEPHONE (OUTPATIENT)
Dept: CARDIOLOGY | Facility: CLINIC | Age: 58
End: 2023-08-11
Payer: COMMERCIAL

## 2023-08-11 NOTE — TELEPHONE ENCOUNTER
----- Message from Joce Webster RN sent at 8/11/2023 11:45 AM CDT -----  Regarding: Stress test pending. Needs rescheduling.  I tried to call the patient to reschedule, but had to leave a voicemail. His stress test is pending and pre-service states that it will probably not be authorized by Monday.

## 2023-08-11 NOTE — TELEPHONE ENCOUNTER
Attempt 3 to reschedule pt's stress test 8/14.   Pt has been called twice and sent a [employer] message 8/11.

## 2023-08-16 ENCOUNTER — TELEPHONE (OUTPATIENT)
Dept: FAMILY MEDICINE | Facility: CLINIC | Age: 58
End: 2023-08-16
Payer: COMMERCIAL

## 2023-08-16 NOTE — TELEPHONE ENCOUNTER
----- Message from Leeanna Claudio PA-C sent at 8/9/2023  8:38 AM CDT -----  Schedule labs in 2 weeks

## 2023-08-21 ENCOUNTER — LAB VISIT (OUTPATIENT)
Dept: LAB | Facility: HOSPITAL | Age: 58
End: 2023-08-21
Attending: INTERNAL MEDICINE
Payer: COMMERCIAL

## 2023-08-21 ENCOUNTER — TELEPHONE (OUTPATIENT)
Dept: FAMILY MEDICINE | Facility: CLINIC | Age: 58
End: 2023-08-21
Payer: COMMERCIAL

## 2023-08-21 DIAGNOSIS — E66.01 SEVERE OBESITY (BMI 35.0-39.9) WITH COMORBIDITY: Chronic | ICD-10-CM

## 2023-08-21 DIAGNOSIS — R53.83 FATIGUE, UNSPECIFIED TYPE: ICD-10-CM

## 2023-08-21 DIAGNOSIS — D72.819 LEUKOPENIA, UNSPECIFIED TYPE: ICD-10-CM

## 2023-08-21 DIAGNOSIS — R61 EXCESSIVE SWEATING: ICD-10-CM

## 2023-08-21 LAB
BASOPHILS # BLD AUTO: 0.07 K/UL (ref 0–0.2)
BASOPHILS NFR BLD: 1.5 % (ref 0–1.9)
DIFFERENTIAL METHOD: NORMAL
EOSINOPHIL # BLD AUTO: 0.2 K/UL (ref 0–0.5)
EOSINOPHIL NFR BLD: 4 % (ref 0–8)
ERYTHROCYTE [DISTWIDTH] IN BLOOD BY AUTOMATED COUNT: 11.9 % (ref 11.5–14.5)
HCT VFR BLD AUTO: 43.1 % (ref 40–54)
HGB BLD-MCNC: 14.7 G/DL (ref 14–18)
IMM GRANULOCYTES # BLD AUTO: 0.01 K/UL (ref 0–0.04)
IMM GRANULOCYTES NFR BLD AUTO: 0.2 % (ref 0–0.5)
LYMPHOCYTES # BLD AUTO: 1.7 K/UL (ref 1–4.8)
LYMPHOCYTES NFR BLD: 34.4 % (ref 18–48)
MCH RBC QN AUTO: 30.6 PG (ref 27–31)
MCHC RBC AUTO-ENTMCNC: 34.1 G/DL (ref 32–36)
MCV RBC AUTO: 90 FL (ref 82–98)
MONOCYTES # BLD AUTO: 0.6 K/UL (ref 0.3–1)
MONOCYTES NFR BLD: 12.3 % (ref 4–15)
NEUTROPHILS # BLD AUTO: 2.3 K/UL (ref 1.8–7.7)
NEUTROPHILS NFR BLD: 47.6 % (ref 38–73)
NRBC BLD-RTO: 0 /100 WBC
PATH REV BLD -IMP: NORMAL
PLATELET # BLD AUTO: 199 K/UL (ref 150–450)
PMV BLD AUTO: 11.7 FL (ref 9.2–12.9)
RBC # BLD AUTO: 4.8 M/UL (ref 4.6–6.2)
TESTOST SERPL-MCNC: 423 NG/DL (ref 304–1227)
WBC # BLD AUTO: 4.8 K/UL (ref 3.9–12.7)

## 2023-08-21 PROCEDURE — 85025 COMPLETE CBC W/AUTO DIFF WBC: CPT

## 2023-08-21 PROCEDURE — 85060 PATHOLOGIST REVIEW: ICD-10-PCS | Mod: ,,, | Performed by: PATHOLOGY

## 2023-08-21 PROCEDURE — 85060 BLOOD SMEAR INTERPRETATION: CPT | Mod: ,,, | Performed by: PATHOLOGY

## 2023-08-21 PROCEDURE — 36415 COLL VENOUS BLD VENIPUNCTURE: CPT | Mod: PO | Performed by: INTERNAL MEDICINE

## 2023-08-21 PROCEDURE — 84403 ASSAY OF TOTAL TESTOSTERONE: CPT | Performed by: INTERNAL MEDICINE

## 2023-08-21 NOTE — TELEPHONE ENCOUNTER
----- Message from Sarah Beth Hancock sent at 8/21/2023 10:58 AM CDT -----  Regarding: Referral for Endo   XIOMARA MARINO MRN: [id number]  needs  a referral for Endo for a specialist he wants to see. Can someone please give him a call.

## 2023-08-22 LAB — PATH REV BLD -IMP: NORMAL

## 2023-08-25 ENCOUNTER — CLINICAL SUPPORT (OUTPATIENT)
Dept: CARDIOLOGY | Facility: HOSPITAL | Age: 58
End: 2023-08-25
Attending: INTERNAL MEDICINE
Payer: COMMERCIAL

## 2023-08-25 VITALS
WEIGHT: 271 LBS | DIASTOLIC BLOOD PRESSURE: 83 MMHG | SYSTOLIC BLOOD PRESSURE: 130 MMHG | HEIGHT: 70 IN | BODY MASS INDEX: 38.8 KG/M2

## 2023-08-25 DIAGNOSIS — I10 ESSENTIAL (PRIMARY) HYPERTENSION: Primary | ICD-10-CM

## 2023-08-25 DIAGNOSIS — I10 ESSENTIAL (PRIMARY) HYPERTENSION: Chronic | ICD-10-CM

## 2023-08-25 DIAGNOSIS — R00.2 PALPITATIONS: ICD-10-CM

## 2023-08-25 DIAGNOSIS — M79.605 PAIN OF LEFT LOWER EXTREMITY: ICD-10-CM

## 2023-08-25 LAB
ASCENDING AORTA: 3.5 CM
AV INDEX (PROSTH): 0.78
AV MEAN GRADIENT: 4 MMHG
AV PEAK GRADIENT: 8 MMHG
AV VALVE AREA BY VELOCITY RATIO: 3.23 CM²
AV VALVE AREA: 3.32 CM²
AV VELOCITY RATIO: 0.76
BSA FOR ECHO PROCEDURE: 2.46 M2
CV ECHO LV RWT: 0.44 CM
DOP CALC AO PEAK VEL: 1.4 M/S
DOP CALC AO VTI: 31.7 CM
DOP CALC LVOT AREA: 4.3 CM2
DOP CALC LVOT DIAMETER: 2.33 CM
DOP CALC LVOT PEAK VEL: 1.06 M/S
DOP CALC LVOT STROKE VOLUME: 105.26 CM3
DOP CALCLVOT PEAK VEL VTI: 24.7 CM
E WAVE DECELERATION TIME: 234.77 MSEC
E/A RATIO: 1.2
E/E' RATIO: 6.15 M/S
ECHO LV POSTERIOR WALL: 1.14 CM (ref 0.6–1.1)
FRACTIONAL SHORTENING: 40 % (ref 28–44)
INTERVENTRICULAR SEPTUM: 1.08 CM (ref 0.6–1.1)
IVRT: 110.37 MSEC
LA MAJOR: 5.91 CM
LA MINOR: 5.68 CM
LA WIDTH: 3.6 CM
LEFT ABI: 1.28
LEFT ARM BP: 129 MMHG
LEFT ATRIUM SIZE: 3.87 CM
LEFT ATRIUM VOLUME INDEX: 28.9 ML/M2
LEFT ATRIUM VOLUME: 68.6 CM3
LEFT DORSALIS PEDIS: 162 MMHG
LEFT INTERNAL DIMENSION IN SYSTOLE: 3.15 CM (ref 2.1–4)
LEFT POSTERIOR TIBIAL: 171 MMHG
LEFT TBI: 0.7
LEFT TOE PRESSURE: 94 MMHG
LEFT VENTRICLE DIASTOLIC VOLUME INDEX: 55 ML/M2
LEFT VENTRICLE DIASTOLIC VOLUME: 130.34 ML
LEFT VENTRICLE MASS INDEX: 95 G/M2
LEFT VENTRICLE SYSTOLIC VOLUME INDEX: 16.7 ML/M2
LEFT VENTRICLE SYSTOLIC VOLUME: 39.47 ML
LEFT VENTRICULAR INTERNAL DIMENSION IN DIASTOLE: 5.21 CM (ref 3.5–6)
LEFT VENTRICULAR MASS: 224.2 G
LV LATERAL E/E' RATIO: 5.19 M/S
LV SEPTAL E/E' RATIO: 7.55 M/S
LVOT MG: 2.47 MMHG
LVOT MV: 0.74 CM/S
MV PEAK A VEL: 0.69 M/S
MV PEAK E VEL: 0.83 M/S
MV STENOSIS PRESSURE HALF TIME: 68.08 MS
MV VALVE AREA P 1/2 METHOD: 3.23 CM2
PISA TR MAX VEL: 1.78 M/S
PULM VEIN S/D RATIO: 1.1
PV PEAK D VEL: 0.49 M/S
PV PEAK S VEL: 0.54 M/S
RA MAJOR: 5.11 CM
RA PRESSURE ESTIMATED: 3 MMHG
RA WIDTH: 4.3 CM
RIGHT ABI: 1.32
RIGHT ARM BP: 134 MMHG
RIGHT DORSALIS PEDIS: 151 MMHG
RIGHT POSTERIOR TIBIAL: 177 MMHG
RIGHT TBI: 0.75
RIGHT TOE PRESSURE: 101 MMHG
RIGHT VENTRICULAR END-DIASTOLIC DIMENSION: 4.37 CM
RIGHT VENTRICULAR LENGTH IN DIASTOLE (APICAL 4-CHAMBER VIEW): 8.82 CM
RV MID DIAMA: 2.72 CM
RV TB RVSP: 5 MMHG
RV TISSUE DOPPLER FREE WALL SYSTOLIC VELOCITY 1 (APICAL 4 CHAMBER VIEW): 18.67 CM/S
SINUS: 3.27 CM
STJ: 3.5 CM
TDI LATERAL: 0.16 M/S
TDI SEPTAL: 0.11 M/S
TDI: 0.14 M/S
TR MAX PG: 13 MMHG
TRICUSPID ANNULAR PLANE SYSTOLIC EXCURSION: 2.58 CM
TV REST PULMONARY ARTERY PRESSURE: 16 MMHG
Z-SCORE OF LEFT VENTRICULAR DIMENSION IN END DIASTOLE: -6.76
Z-SCORE OF LEFT VENTRICULAR DIMENSION IN END SYSTOLE: -5.29

## 2023-08-25 PROCEDURE — 93242 EXT ECG>48HR<7D RECORDING: CPT | Mod: PO

## 2023-08-25 PROCEDURE — 93922 UPR/L XTREMITY ART 2 LEVELS: CPT | Mod: 26,,, | Performed by: INTERNAL MEDICINE

## 2023-08-25 PROCEDURE — 93306 TTE W/DOPPLER COMPLETE: CPT | Mod: PO

## 2023-08-25 PROCEDURE — 93244 EXT ECG>48HR<7D REV&INTERPJ: CPT | Mod: ,,, | Performed by: INTERNAL MEDICINE

## 2023-08-25 PROCEDURE — 93922 ANKLE BRACHIAL INDICES (ABI): ICD-10-PCS | Mod: 26,,, | Performed by: INTERNAL MEDICINE

## 2023-08-25 PROCEDURE — 93922 UPR/L XTREMITY ART 2 LEVELS: CPT | Mod: PO

## 2023-08-25 PROCEDURE — 93244 HOLTER MONITOR - 72 HOUR: ICD-10-PCS | Mod: ,,, | Performed by: INTERNAL MEDICINE

## 2023-08-31 LAB
OHS CV EVENT MONITOR DAY: 2
OHS CV HOLTER LENGTH DECIMAL HOURS: 71
OHS CV HOLTER LENGTH HOURS: 23
OHS CV HOLTER LENGTH MINUTES: 0
OHS CV HOLTER SINUS AVERAGE HR: 62
OHS CV HOLTER SINUS MAX HR: 134
OHS CV HOLTER SINUS MIN HR: 45

## 2023-09-25 ENCOUNTER — PATIENT MESSAGE (OUTPATIENT)
Dept: CARDIOLOGY | Facility: CLINIC | Age: 58
End: 2023-09-25
Payer: COMMERCIAL

## 2023-09-25 DIAGNOSIS — I70.1 RENAL ARTERY STENOSIS: Primary | ICD-10-CM

## 2023-09-25 DIAGNOSIS — I10 ESSENTIAL (PRIMARY) HYPERTENSION: ICD-10-CM

## 2023-09-25 RX ORDER — AMLODIPINE BESYLATE 2.5 MG/1
2.5 TABLET ORAL DAILY
Qty: 30 TABLET | Refills: 1 | Status: SHIPPED | OUTPATIENT
Start: 2023-09-25 | End: 2023-09-26 | Stop reason: DRUGHIGH

## 2023-09-26 RX ORDER — AMLODIPINE BESYLATE 5 MG/1
5 TABLET ORAL DAILY
Qty: 30 TABLET | Refills: 0 | Status: SHIPPED | OUTPATIENT
Start: 2023-09-26 | End: 2023-10-31

## 2023-09-29 ENCOUNTER — OFFICE VISIT (OUTPATIENT)
Dept: OPTOMETRY | Facility: CLINIC | Age: 58
End: 2023-09-29
Payer: COMMERCIAL

## 2023-09-29 DIAGNOSIS — H04.123 DRY EYE SYNDROME OF BILATERAL LACRIMAL GLANDS: Primary | ICD-10-CM

## 2023-09-29 DIAGNOSIS — E11.9 TYPE 2 DIABETES MELLITUS WITHOUT RETINOPATHY: ICD-10-CM

## 2023-09-29 DIAGNOSIS — I10 PRIMARY HYPERTENSION: ICD-10-CM

## 2023-09-29 DIAGNOSIS — H43.393 VITREOUS FLOATERS OF BOTH EYES: ICD-10-CM

## 2023-09-29 PROCEDURE — 92014 PR EYE EXAM, EST PATIENT,COMPREHESV: ICD-10-PCS | Mod: S$GLB,,,

## 2023-09-29 PROCEDURE — 92014 COMPRE OPH EXAM EST PT 1/>: CPT | Mod: S$GLB,,,

## 2023-09-29 PROCEDURE — 99999 PR PBB SHADOW E&M-EST. PATIENT-LVL III: CPT | Mod: PBBFAC,,,

## 2023-09-29 PROCEDURE — 99999 PR PBB SHADOW E&M-EST. PATIENT-LVL III: ICD-10-PCS | Mod: PBBFAC,,,

## 2023-09-29 RX ORDER — LOTEPREDNOL ETABONATE 5 MG/ML
1 SUSPENSION/ DROPS OPHTHALMIC 4 TIMES DAILY
Qty: 5 ML | Refills: 0 | Status: SHIPPED | OUTPATIENT
Start: 2023-09-29 | End: 2023-10-09

## 2023-09-29 NOTE — PROGRESS NOTES
HPI    Urgent - pain in OS    Pt complains of pain in OS, pain comes and goes, mainly in back of eye.   Symptoms started last week.  Pt also stated headaches occur w eye pain   along with increased BP. HTN being monitored by cardio. Pt started taking   meds 2 days ago. Pt denies flashes but stated seeing floaters. Pt stated   OD eyelid having difficulty opening as easy as OS eyelid when pain occurs    Hemoglobin A1C       Date                     Value               Ref Range             Status                08/08/2023               5.8 (H)             4.0 - 5.6 %           Final                  01/05/2023               5.4                 4.0 - 5.6 %           Final                  04/14/2022               5.4                 4.0 - 5.6 %           Final              Last edited by Joby Bond, OD on 9/29/2023  6:09 PM.            Assessment /Plan     For exam results, see Encounter Report.    Dry eye syndrome of bilateral lacrimal glands  -     loteprednol (LOTEMAX) 0.5 % ophthalmic suspension; Place 1 drop into both eyes 4 (four) times daily. for 10 days  Dispense: 5 mL; Refill: 0    Type 2 diabetes mellitus without retinopathy    Primary hypertension    Vitreous floaters of both eyes      SPK upon examination OS>>OD. Ed pt on findings, on nature/chronicity of dry eye, and  that dry eye likely contributing to pain behind the eye OS>>OD. Rx'd Lotemax to use QID OU x 10 days. Gave pt OTC artificial tear recommendations to begin using BID-QID. Ed pt to RTC if symptoms worsen or fail to resolve.  No diabetic retinopathy or macular edema evident on today's exam OD, OS. Ed pt on importance of maintaining strict BS control due to potential for visual fluctuations and/or vision loss. Monitor with yearly dilated exam.  Pt states blood pressure has recently been very elevated (systolic as high as 200s) and that he's been having associated headaches and eye pain. No signs of hypertensive retinopathy upon examination  today. Pt's medication dosage recently increased and BP has been steadily decreasing per pt. Emphasized importance of maintaining strict BP control. Ed pt to RTC asap if sudden changes in vision or other issues noted.  Ed pt on benign nature of vitreous floaters. Reviewed signs and symptoms of a retinal detachment thoroughly and ed pt to RTC asap if experienced.    RTC: ~6 months for annual with repeat glaucoma testing.

## 2023-10-04 ENCOUNTER — PATIENT MESSAGE (OUTPATIENT)
Dept: RADIOLOGY | Facility: HOSPITAL | Age: 58
End: 2023-10-04
Payer: COMMERCIAL

## 2023-10-06 ENCOUNTER — HOSPITAL ENCOUNTER (OUTPATIENT)
Dept: RADIOLOGY | Facility: HOSPITAL | Age: 58
Discharge: HOME | End: 2023-10-06
Attending: INTERNAL MEDICINE
Payer: COMMERCIAL

## 2023-10-06 ENCOUNTER — OFFICE VISIT (OUTPATIENT)
Dept: FAMILY MEDICINE | Facility: CLINIC | Age: 58
End: 2023-10-06
Payer: COMMERCIAL

## 2023-10-06 ENCOUNTER — CLINICAL SUPPORT (OUTPATIENT)
Dept: CARDIOLOGY | Facility: HOSPITAL | Age: 58
End: 2023-10-06
Attending: INTERNAL MEDICINE
Payer: COMMERCIAL

## 2023-10-06 VITALS
SYSTOLIC BLOOD PRESSURE: 124 MMHG | OXYGEN SATURATION: 97 % | BODY MASS INDEX: 39.45 KG/M2 | HEART RATE: 76 BPM | WEIGHT: 275.56 LBS | HEIGHT: 70 IN | DIASTOLIC BLOOD PRESSURE: 82 MMHG | TEMPERATURE: 98 F

## 2023-10-06 VITALS — WEIGHT: 275 LBS | HEIGHT: 70 IN | BODY MASS INDEX: 39.37 KG/M2

## 2023-10-06 DIAGNOSIS — R94.31 NONSPECIFIC ABNORMAL ELECTROCARDIOGRAM (ECG) (EKG): ICD-10-CM

## 2023-10-06 DIAGNOSIS — R07.89 CHEST PAIN, ATYPICAL: ICD-10-CM

## 2023-10-06 DIAGNOSIS — R00.2 PALPITATIONS: ICD-10-CM

## 2023-10-06 DIAGNOSIS — I25.10 ASCVD (ARTERIOSCLEROTIC CARDIOVASCULAR DISEASE): Chronic | ICD-10-CM

## 2023-10-06 DIAGNOSIS — I34.0 NONRHEUMATIC MITRAL VALVE REGURGITATION: Chronic | ICD-10-CM

## 2023-10-06 DIAGNOSIS — G62.9 NEUROPATHY: Primary | ICD-10-CM

## 2023-10-06 PROCEDURE — 93016 CV STRESS TEST SUPVJ ONLY: CPT | Mod: ,,, | Performed by: INTERNAL MEDICINE

## 2023-10-06 PROCEDURE — 93018 PR CARDIAC STRESS TST,INTERP/REPT ONLY: ICD-10-PCS | Mod: ,,, | Performed by: INTERNAL MEDICINE

## 2023-10-06 PROCEDURE — 93016 NUCLEAR STRESS - CARDIOLOGY INTERPRETED (CUPID ONLY): ICD-10-PCS | Mod: ,,, | Performed by: INTERNAL MEDICINE

## 2023-10-06 PROCEDURE — 99999 PR PBB SHADOW E&M-EST. PATIENT-LVL IV: CPT | Mod: PBBFAC,,, | Performed by: NURSE PRACTITIONER

## 2023-10-06 PROCEDURE — 93018 CV STRESS TEST I&R ONLY: CPT | Mod: ,,, | Performed by: INTERNAL MEDICINE

## 2023-10-06 PROCEDURE — 99214 PR OFFICE/OUTPT VISIT, EST, LEVL IV, 30-39 MIN: ICD-10-PCS | Mod: S$GLB,,, | Performed by: NURSE PRACTITIONER

## 2023-10-06 PROCEDURE — 78452 HT MUSCLE IMAGE SPECT MULT: CPT | Mod: 26,,, | Performed by: INTERNAL MEDICINE

## 2023-10-06 PROCEDURE — 99999 PR PBB SHADOW E&M-EST. PATIENT-LVL IV: ICD-10-PCS | Mod: PBBFAC,,, | Performed by: NURSE PRACTITIONER

## 2023-10-06 PROCEDURE — 99214 OFFICE O/P EST MOD 30 MIN: CPT | Mod: S$GLB,,, | Performed by: NURSE PRACTITIONER

## 2023-10-06 PROCEDURE — 78452 NUCLEAR STRESS - CARDIOLOGY INTERPRETED (CUPID ONLY): ICD-10-PCS | Mod: 26,,, | Performed by: INTERNAL MEDICINE

## 2023-10-06 PROCEDURE — A9502 TC99M TETROFOSMIN: HCPCS | Mod: PO

## 2023-10-06 PROCEDURE — 93017 CV STRESS TEST TRACING ONLY: CPT | Mod: PO

## 2023-10-06 RX ORDER — GABAPENTIN 100 MG/1
100 CAPSULE ORAL NIGHTLY PRN
Qty: 30 CAPSULE | Refills: 1 | Status: SHIPPED | OUTPATIENT
Start: 2023-10-06 | End: 2023-12-27 | Stop reason: CLARIF

## 2023-10-06 NOTE — PROGRESS NOTES
Subjective:       Patient ID: Gracía Tang is a 57 y.o. male.    Chief Complaint: Arm Pain (Pt reports that right arm, wrist, hand has been hurting.  Wrist is numb.  Pt reports that this has been going on for two months.  Pt states the pain kept him up all night last night)    HPI  Patient presents for evaluation of pain and numbness to right wrist    He reports a few months ago after working in garden he developed blistering rash to right dorsal wrist  Treated for contact dermatitis 2/2 plant exposure   Lesions eventually healed but hyperpigmentation/scarring remains, see picture below.     He denies pain or numbness to wrist prior to these lesions. Since lesions he has had numbness in area of lesions with surrounding aching pain. This has progressed in intensity    He does have a history of cervical DDD noted to be stable on CTA head/neck 01/2023. Denies neck pain or radiating pain from neck     Symptoms are not positional        Vitals:    10/06/23 0907   BP: 124/82   Pulse: 76   Temp: 97.8 °F (36.6 °C)     Review of Systems   Skin:  Positive for color change.   Neurological:  Positive for numbness.       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    Diverticulitis     diverticulitis    Essential hypertension 07/18/2016    Fatty liver disease, nonalcoholic 04/07/2016    [place], 2006     GERD (gastroesophageal reflux disease)     Headache     HLD (hyperlipidemia)     Kidney stone     requiring stent    Meniere's disease 01/09/2018    Morbid obesity 05/29/2014    NAFLD (nonalcoholic fatty liver disease)     Near syncope 05/23/2017    Obesity hypoventilation syndrome 02/22/2016    PAD (peripheral artery disease) 09/17/2019    Mild 9/2018    Prostatitis     Rectal bleeding     diverticulitis    Thyroid nodule 07/14/2022    Type 2 diabetes mellitus with hyperglycemia,  with long-term current use of insulin 05/18/2017    Uncontrolled type 2 diabetes mellitus with peripheral neuropathy 05/29/2014     Objective:      Physical Exam  Constitutional:       General: He is not in acute distress.     Appearance: He is well-developed. He is not ill-appearing, toxic-appearing or diaphoretic.   HENT:      Right Ear: Hearing normal.      Left Ear: Hearing normal.   Pulmonary:      Effort: No tachypnea or respiratory distress.   Musculoskeletal:      Right hand: No swelling, deformity or tenderness. Normal range of motion. Normal capillary refill. Normal pulse.      Comments: Negative Tinel and Phalen     Skin:     Coloration: Skin is not pale.   Neurological:      Mental Status: He is alert and oriented to person, place, and time.      Sensory: Sensory deficit present.   Psychiatric:         Speech: Speech normal.         Behavior: Behavior normal.         Thought Content: Thought content normal.         Judgment: Judgment normal.         Assessment:       1. Neuropathy        Plan:       Neuropathy  -     gabapentin (NEURONTIN) 100 MG capsule; Take 1 capsule (100 mg total) by mouth nightly as needed (neuropathy).  Dispense: 30 capsule; Refill: 1    Suspect post herpetic in nature  Trial gabapentin--OK to message me for dose adjustment    education provided on supportive care, symptom monitoring and return precautions           Follow up for further evaluation if s/s worsen, fail to improve, or new symptoms arise.    Medication List with Changes/Refills   New Medications    GABAPENTIN (NEURONTIN) 100 MG CAPSULE    Take 1 capsule (100 mg total) by mouth nightly as needed (neuropathy).   Current Medications    ACETAMINOPHEN (TYLENOL) 500 MG TABLET    Take 1,000 mg by mouth every 6 (six) hours as needed for Pain.    ACETYLCYSTEINE (NAC ORAL)    Take by mouth. Take 2 tablets daily    AMLODIPINE (NORVASC) 5 MG TABLET    Take 1 tablet (5 mg total) by mouth once daily.    LOSARTAN (COZAAR) 100 MG  TABLET    Take 100 mg by mouth once daily.    LOTEPREDNOL (LOTEMAX) 0.5 % OPHTHALMIC SUSPENSION    Place 1 drop into both eyes 4 (four) times daily. for 10 days    MELATONIN ORAL    Take 5 mg by mouth nightly.    METOPROLOL SUCCINATE (TOPROL-XL) 50 MG 24 HR TABLET    Take 1 tablet (50 mg total) by mouth once daily.    NAPROXEN (NAPROSYN) 250 MG TABLET    Take 250 mg by mouth 2 (two) times daily as needed.    PANTOPRAZOLE (PROTONIX) 40 MG TABLET    TAKE 1 TABLET(40 MG) BY MOUTH BEFORE BREAKFAST

## 2023-10-10 LAB
CV STRESS BASE HR: 70 BPM
DIASTOLIC BLOOD PRESSURE: 74 MMHG
NUC REST EJECTION FRACTION: 62
OHS CV CPX 1 MINUTE RECOVERY HEART RATE: 97 BPM
OHS CV CPX 85 PERCENT MAX PREDICTED HEART RATE MALE: 139
OHS CV CPX ESTIMATED METS: 13
OHS CV CPX MAX PREDICTED HEART RATE: 164
OHS CV CPX PATIENT IS FEMALE: 0
OHS CV CPX PATIENT IS MALE: 1
OHS CV CPX PEAK DIASTOLIC BLOOD PRESSURE: 87 MMHG
OHS CV CPX PEAK HEAR RATE: 162 BPM
OHS CV CPX PEAK RATE PRESSURE PRODUCT: NORMAL
OHS CV CPX PEAK SYSTOLIC BLOOD PRESSURE: 190 MMHG
OHS CV CPX PERCENT MAX PREDICTED HEART RATE ACHIEVED: 99
OHS CV CPX RATE PRESSURE PRODUCT PRESENTING: 9100
STRESS ECHO POST EXERCISE DUR MIN: 7 MINUTES
STRESS ECHO POST EXERCISE DUR SEC: 56 SECONDS
SYSTOLIC BLOOD PRESSURE: 130 MMHG

## 2023-10-13 ENCOUNTER — PATIENT MESSAGE (OUTPATIENT)
Dept: CARDIOLOGY | Facility: CLINIC | Age: 58
End: 2023-10-13
Payer: COMMERCIAL

## 2023-10-13 DIAGNOSIS — R94.39 ABNORMAL STRESS TEST: Primary | ICD-10-CM

## 2023-10-13 RX ORDER — SODIUM CHLORIDE 0.9 % (FLUSH) 0.9 %
10 SYRINGE (ML) INJECTION
Status: DISCONTINUED | OUTPATIENT
Start: 2023-10-13 | End: 2023-11-28

## 2023-10-13 RX ORDER — SODIUM CHLORIDE 9 MG/ML
INJECTION, SOLUTION INTRAVENOUS ONCE
Status: CANCELLED | OUTPATIENT
Start: 2023-10-13 | End: 2023-10-13

## 2023-10-13 NOTE — TELEPHONE ENCOUNTER
Angiogram    Arrive for procedure at: [facility] on 10/23/23 at 8 am. Your procedure is scheduled for 10 am. Enter through the main entrance and check in at the reception desk. They will direct you upstairs to the cath lab.    You will receive a phone call from [facility] Pre-Op Department with further instructions and exact arrival time prior to your scheduled procedure.    Notify the nurse if you are ALLERGIC TO IODINE.  You will be prescribed medication for your Iodine allergy. You will start it at 4 pm the day before your procedure.    FASTING: You MAY NOT have anything to eat or drink AFTER MIDNIGHT the day before your procedure.     MEDICATIONS: You may take your regular morning medications with water. If there are any medications that you should not take, you will be instructed to hold them for that morning.      WHAT TO EXPECT:    How long will the procedure take?  The procedure will take an average of 1 - 2 hours to perform.  After the procedure, you will need to lay flat for around 4 - 6 hours to minimize bleeding from the puncture site. If the wrist is accessed you will need to keep your arm still as instructed by the nurse.    When can I go home?  You may be able to be discharged home that same afternoon if there were no complications.  If you have one of the following: balloon; stent; pacemaker or defibrillator procedures, you may spend one night for observation.  Your doctor will determine your discharge based upon your progress.  The results of your procedure will be discussed with you before you are discharged.  Any further testing or procedures will be scheduled for you either before you leave or you will be instructed to call for a future appointment.      TRANSPORTATION:  PLEASE ARRANGE TO HAVE SOMEONE DRIVE YOU HOME FOLLOWING YOUR PROCEDURE, YOU WILL NOT BE ALLOWED TO DRIVE.

## 2023-10-17 RX ORDER — PREDNISONE 20 MG/1
TABLET ORAL
Qty: 9 TABLET | Refills: 0 | Status: SHIPPED | OUTPATIENT
Start: 2023-10-17 | End: 2023-10-30 | Stop reason: ALTCHOICE

## 2023-10-17 RX ORDER — CIMETIDINE 300 MG/1
TABLET, FILM COATED ORAL
Qty: 3 TABLET | Refills: 0 | Status: SHIPPED | OUTPATIENT
Start: 2023-10-17 | End: 2023-11-28

## 2023-10-17 RX ORDER — DIPHENHYDRAMINE HCL 50 MG
CAPSULE ORAL
Qty: 3 CAPSULE | Refills: 0 | Status: SHIPPED | OUTPATIENT
Start: 2023-10-17 | End: 2023-11-28

## 2023-10-23 ENCOUNTER — PATIENT MESSAGE (OUTPATIENT)
Dept: CARDIOLOGY | Facility: CLINIC | Age: 58
End: 2023-10-23
Payer: COMMERCIAL

## 2023-10-24 ENCOUNTER — OFFICE VISIT (OUTPATIENT)
Dept: CARDIOLOGY | Facility: CLINIC | Age: 58
End: 2023-10-24
Payer: COMMERCIAL

## 2023-10-24 VITALS
RESPIRATION RATE: 18 BRPM | HEIGHT: 70 IN | SYSTOLIC BLOOD PRESSURE: 149 MMHG | BODY MASS INDEX: 39.64 KG/M2 | HEART RATE: 63 BPM | DIASTOLIC BLOOD PRESSURE: 88 MMHG | WEIGHT: 276.88 LBS

## 2023-10-24 DIAGNOSIS — I25.10 ASCVD (ARTERIOSCLEROTIC CARDIOVASCULAR DISEASE): Chronic | ICD-10-CM

## 2023-10-24 DIAGNOSIS — R00.2 PALPITATIONS: Primary | Chronic | ICD-10-CM

## 2023-10-24 DIAGNOSIS — I10 ESSENTIAL (PRIMARY) HYPERTENSION: ICD-10-CM

## 2023-10-24 DIAGNOSIS — E78.5 DYSLIPIDEMIA WITH ELEVATED LOW DENSITY LIPOPROTEIN (LDL) CHOLESTEROL AND ABNORMALLY LOW HIGH DENSITY LIPOPROTEIN CHOLESTEROL: ICD-10-CM

## 2023-10-24 DIAGNOSIS — I73.9 PVD (PERIPHERAL VASCULAR DISEASE): ICD-10-CM

## 2023-10-24 DIAGNOSIS — R94.39 ABNORMAL CARDIOVASCULAR STRESS TEST: ICD-10-CM

## 2023-10-24 PROCEDURE — 99214 PR OFFICE/OUTPT VISIT, EST, LEVL IV, 30-39 MIN: ICD-10-PCS | Mod: S$GLB,,,

## 2023-10-24 PROCEDURE — 99999 PR PBB SHADOW E&M-EST. PATIENT-LVL III: CPT | Mod: PBBFAC,,,

## 2023-10-24 PROCEDURE — 99999 PR PBB SHADOW E&M-EST. PATIENT-LVL III: ICD-10-PCS | Mod: PBBFAC,,,

## 2023-10-24 PROCEDURE — 99214 OFFICE O/P EST MOD 30 MIN: CPT | Mod: S$GLB,,,

## 2023-10-24 NOTE — PROGRESS NOTES
Subjective:    Patient ID:  García Tang is a 58 y.o. male patient here for evaluation Chest Pain    History of Present Illness:     Mr. Salinas is a 58 year old M who follows with Dr. Salazar here today because his chest pains and fluttering have become worse in the past few days.   All day fluttering, even currently but only one PVC seen on EKG. NO acute ST changes.   BP has been better at home but still high.           Most Recent Echocardiogram Results  Results for orders placed in visit on 08/10/23    Echo    Interpretation Summary    Left Ventricle: The left ventricle is normal in size. Normal wall thickness. Normal wall motion. There is normal systolic function with a visually estimated ejection fraction of 65 - 70%. There is normal diastolic function.    Right Ventricle: Normal right ventricular cavity size. Systolic function is normal.    Aortic Valve: The aortic valve is a trileaflet valve. There is mild aortic valve sclerosis.    IVC/SVC: Normal venous pressure at 3 mmHg.  Estimated PA pressure less than 20 mm of mercury.      Most Recent Nuclear Stress Test Results  Results for orders placed during the hospital encounter of 10/06/23    Nuclear Stress - Cardiology Interpreted    Interpretation Summary    Abnormal myocardial perfusion scan.    There are two significant perfusion abnormalities.    Perfusion Abnormality #1 - There is a mild to moderate intensity, small to moderate sized, reversible perfusion abnormality that is consistent with ischemia in the anteroapical wall(s).    Perfusion Abnormality #2 - There is a small to moderate sized, moderate intensity, reversible perfusion abnormality that is consistent with ischemia in the basal to mid inferolateral wall(s).    There are no other significant perfusion abnormalities.    The gated perfusion images showed an ejection fraction of 62% at rest.    There is normal wall motion at rest.    The ECG portion of the study is negative for ischemia.    The  patient reported no chest pain during the stress test.    During stress, occasional PVCs are noted.    The exercise capacity was normal.      Most Recent Cardiac PET Stress Test Results  No results found for this or any previous visit.      Most Recent Cardiovascular Angiogram results  No results found for this or any previous visit.      Other Most Recent Cardiology Results  Results for orders placed during the hospital encounter of 09/27/23    CARDIAC MONITORING STRIPS      REVIEW OF SYSTEMS: As noted in HPI   CARDIOVASCULAR: No recent chest pain, palpitations, arm/neck/jaw pain, or edema.  RESPIRATORY: No recent fever, cough, SOB.  : No blood in the urine  GI: No reflux, nausea, vomiting, or blood in stool.   MUSCULOSKELETAL: No falls.   NEURO: No headaches, syncope, or dizziness.  EYES: No sudden changes in vision.     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    Diverticulitis     diverticulitis    Essential hypertension 07/18/2016    Fatty liver disease, nonalcoholic 04/07/2016    [place], 2006     GERD (gastroesophageal reflux disease)     Headache     HLD (hyperlipidemia)     Kidney stone     requiring stent    Meniere's disease 01/09/2018    Morbid obesity 05/29/2014    NAFLD (nonalcoholic fatty liver disease)     Near syncope 05/23/2017    Obesity hypoventilation syndrome 02/22/2016    PAD (peripheral artery disease) 09/17/2019    Mild 9/2018    Prostatitis     Rectal bleeding     diverticulitis    Thyroid nodule 07/14/2022    Type 2 diabetes mellitus with hyperglycemia, with long-term current use of insulin 05/18/2017    Uncontrolled type 2 diabetes mellitus with peripheral neuropathy 05/29/2014     Past Surgical History:   Procedure Laterality Date    COLONOSCOPY  2008    diverticulosis per patient report    kidney stent      KIDNEY SURGERY  1996    right  kidney obstruction requiring stent    NOSE SURGERY      UPPER GASTROINTESTINAL ENDOSCOPY      [facility], gastritis per patient report     Social History     Tobacco Use    Smoking status: Former     Current packs/day: 0.00     Types: Cigarettes     Quit date: 1990     Years since quittin.8    Smokeless tobacco: Never    Tobacco comments:     social   Substance Use Topics    Alcohol use: No     Comment: stopped in     Drug use: No         Objective      Vitals:    10/24/23 1446   BP: (!) 149/88   Pulse: 63   Resp: 18       LAST EKG  Results for orders placed or performed during the hospital encounter of 23   EKG 12-lead    Collection Time: 23  8:22 AM    Narrative    Test Reason : I10,    Vent. Rate : 072 BPM     Atrial Rate : 072 BPM     P-R Int : 184 ms          QRS Dur : 094 ms      QT Int : 384 ms       P-R-T Axes : 075 007 051 degrees     QTc Int : 420 ms    Normal sinus rhythm  Possible Inferior infarct ,age undetermined  Cannot rule out Anterior infarct ,age undetermined  Abnormal ECG  When compared with ECG of 07-AUG-2023 10:41,  The axis Shifted right  Borderline criteria for Inferior infarct are now Present  T wave inversion no longer evident in Lateral leads  Confirmed by FALGUNI BURK MD ([id number]) on 2023 12:00:15 PM    Referred By: [email or web address]   SELF           Confirmed By:FALGUNI BURK MD     LIPIDS - LAST 2   Lab Results   Component Value Date    CHOL 160 2023    CHOL 157 2022    HDL 44 2023    HDL 41 2022    LDLCALC 106.6 2023    LDLCALC 103.6 2022    TRIG 47 2023    TRIG 62 2022    CHOLHDL 27.5 2023    CHOLHDL 26.1 2022     CARDIAC PROFILE - LAST 2  Lab Results   Component Value Date    BNP <10 2019    BNP <10 2019     (H) 2021    CPK 71 2021    TROPONINI <0.012 2023    TROPONINI <0.012 11/10/2021      CBC - LAST 2  Lab Results   Component Value Date    WBC 3.70 (L)  09/27/2023    WBC 4.80 08/21/2023    RBC 4.86 09/27/2023    RBC 4.80 08/21/2023    HGB 15.5 09/27/2023    HGB 14.7 08/21/2023    HCT 43.1 09/27/2023    HCT 43.1 08/21/2023     09/27/2023     08/21/2023     Lab Results   Component Value Date    INR 1.0 07/19/2021    INR 0.9 09/16/2019    APTT 26.6 07/25/2010    APTT 28.4 03/24/2010     CHEMISTRY - LAST 2  Lab Results   Component Value Date     09/27/2023     08/08/2023    K 3.9 09/27/2023    K 4.8 08/08/2023     09/27/2023     08/08/2023    CO2 31 09/27/2023    CO2 28 08/08/2023    ANIONGAP 8 09/27/2023    ANIONGAP 8 08/08/2023    BUN 22 (H) 09/27/2023    BUN 15 08/08/2023    CREATININE 0.93 09/27/2023    CREATININE 1.0 08/08/2023     (H) 09/27/2023     (H) 08/08/2023    CALCIUM 9.0 09/27/2023    CALCIUM 9.6 08/08/2023    MG 1.8 11/30/2021    MG 2.0 11/23/2021    ALBUMIN 4.3 09/27/2023    ALBUMIN 4.8 02/20/2023    PROT 7.1 09/27/2023    PROT 7.9 02/20/2023    ALKPHOS 61 09/27/2023    ALKPHOS 48 02/20/2023    ALT 34 09/27/2023    ALT 46 02/20/2023    AST 38 09/27/2023    AST 45 02/20/2023    BILITOT 0.7 09/27/2023    BILITOT 0.6 02/20/2023      ENDOCRINE - LAST 2  Lab Results   Component Value Date    HGBA1C 5.8 (H) 08/08/2023    HGBA1C 5.4 01/05/2023    TSH 2.252 08/08/2023    TSH 1.504 02/07/2023        PHYSICAL EXAM  CONSTITUTIONAL: Well built, well nourished in no apparent distress  NECK: no carotid bruit, no JVD  LUNGS: CTA  CHEST WALL: no tenderness  HEART: regular rate and rhythm, S1, S2 normal, no murmur, click, rub or gallop   ABDOMEN: soft, non-tender; bowel sounds normal; no masses,  no organomegaly  EXTREMITIES: Extremities normal, no edema, no calf tenderness noted  NEURO: AAO X 3    I HAVE REVIEWED :    The vital signs, most recent cardiac testing, and most recent pertinent non-cardiology provider notes.    Current Outpatient Medications   Medication Instructions    acetaminophen (TYLENOL) 1,000 mg,  Oral, Every 6 hours PRN    acetylcysteine (NAC ORAL) Oral, Take 2 tablets daily    amLODIPine (NORVASC) 5 mg, Oral, Daily    cimetidine (TAGAMET) 300 MG tablet Take one tablet at 4 pm the day before your procedure. Take one tablet at 10 pm the night before your procedure. Take one tablet at 6 am the morning of your procedure.    diphenhydrAMINE (BENADRYL) 50 MG capsule Take one capsule at 4 pm the day before your procedure. Take one capsule at 10 pm the night before your procedure. Take one capsule at 6 am the morning of your procedure.    gabapentin (NEURONTIN) 100 mg, Oral, Nightly PRN    losartan (COZAAR) 100 mg, Oral, Daily    MELATONIN ORAL 5 mg, Oral, Nightly    metoprolol succinate (TOPROL-XL) 50 mg, Oral, Daily    naproxen (NAPROSYN) 250 mg, Oral, 2 times daily PRN    pantoprazole (PROTONIX) 40 MG tablet TAKE 1 TABLET(40 MG) BY MOUTH BEFORE BREAKFAST    predniSONE (DELTASONE) 20 MG tablet Take 3 tablets (60mg total) at 4 pm the day before your procedure. Take 3 tablets (60mg total) at 10 pm the night before your procedure. Take 3 tablets (60mg total) at 6 am the morning of your procedure.      Assessment & Plan     1. Palpitations  EKG today with one PVC  Holter unremarkable   Continue toprol 50 mg daily     2. ASCVD (arteriosclerotic cardiovascular disease)  [facility] by report 2021 negative, but will repeat in light of positive stress and continuing symptoms     3. Dyslipidemia with elevated low density lipoprotein (LDL) cholesterol and abnormally low high density lipoprotein cholesterol  Not on any statin medication     4. PVD (peripheral vascular disease)  No complaints     5. Essential (primary) hypertension  BP remains a bit elevated   Continue amlodipine 5 mg, consider increasing to BID   Continue losartan 100 mg daily   Continue toprol 50 mg daily     6. Abnormal cardiovascular stress test  Will move up [facility] per Dr. Salazar to this Thursday given progression of symptoms past few days   EKG in office today no  acute ST changes          F/u as previously scheduled.     Pao Cleveland PA-C   [facility]   Office: [phone]

## 2023-10-27 ENCOUNTER — NURSE TRIAGE (OUTPATIENT)
Dept: ADMINISTRATIVE | Facility: CLINIC | Age: 58
End: 2023-10-27
Payer: COMMERCIAL

## 2023-10-27 NOTE — TELEPHONE ENCOUNTER
His legs and arms hurting and burning.  Flushed feeling to face.  Feeling palpitations feeling missed beats  Allergic to contrast .  Also states he is continuing with the chest pain he has had but his greater concern is the pain and burning in his legs and arms and the skipped hear beats.  Reason for Disposition   Chest pain    Additional Information   Negative: Looks like a broken bone or dislocated joint (e.g., crooked or deformed)   Negative: Sounds like a life-threatening emergency to the triager   Negative: Followed a leg injury   Negative: Leg swelling is main symptom   Negative: Back pain radiating (shooting) into leg(s)   Negative: Knee pain is main symptom    Protocols used: Leg Pain-A-AH

## 2023-10-29 ENCOUNTER — NURSE TRIAGE (OUTPATIENT)
Dept: ADMINISTRATIVE | Facility: CLINIC | Age: 58
End: 2023-10-29
Payer: COMMERCIAL

## 2023-10-29 NOTE — TELEPHONE ENCOUNTER
Patient is 3 days post angiogram without stent placement. He has an iodine allergy and administered steroids prior to the procedure. Patient went to the ER the following day for chest pain, leg/arm pain, and palpitations. Patient was discharged and advised to hydrate and administer benadryl due to suspected response to steroids. Patient calling back because his symptoms persist. Currently c/o arm/leg tingling, insomnia, malaise, and soreness to bilateral thighs 3/10. Per protocol will contact the on call provider. Per Dr. Garcia, patient should return to the ER if he is experiencing SOB, chest pain, or if the patient is concerned. Patient verbalizes understanding. Advised the patient to call back with any further questions or if symptoms worsen.          Reason for Disposition   [1] Caller has URGENT question (includes prescribed medication questions) AND [2] triager unable to answer question    Additional Information   Negative: Shock suspected (e.g., cold/pale/clammy skin, too weak to stand, low BP, rapid pulse)   Negative: Difficult to awaken or acting confused (e.g., disoriented, slurred speech)   Negative: Sounds like a life-threatening emergency to the triager   Negative: [1] Drinking very little AND [2] dehydration suspected (e.g., no urine > 12 hours, very dry mouth, very lightheaded)   Negative: Patient sounds very sick or weak to the triager   Negative: Fever > 104 F (40 C)   Negative: [1] SEVERE pain (e.g., excruciating, pain scale 8-10) AND [2] not improved after pain medications   Negative: [1] Recent medical visit within 24 hours AND [2] condition / symptoms WORSE   Negative: [1] Recent medical visit within 24 hours AND [2] NEW symptom AND [3] that could be serious    Protocols used: Recent Medical Visit for Illness Follow-up Call-A-

## 2023-10-30 ENCOUNTER — LAB VISIT (OUTPATIENT)
Dept: LAB | Facility: HOSPITAL | Age: 58
End: 2023-10-30
Attending: INTERNAL MEDICINE
Payer: COMMERCIAL

## 2023-10-30 ENCOUNTER — OFFICE VISIT (OUTPATIENT)
Dept: CARDIOLOGY | Facility: CLINIC | Age: 58
End: 2023-10-30
Payer: COMMERCIAL

## 2023-10-30 ENCOUNTER — PATIENT MESSAGE (OUTPATIENT)
Dept: CARDIOLOGY | Facility: CLINIC | Age: 58
End: 2023-10-30

## 2023-10-30 VITALS
WEIGHT: 276 LBS | HEART RATE: 67 BPM | HEIGHT: 70 IN | DIASTOLIC BLOOD PRESSURE: 90 MMHG | BODY MASS INDEX: 39.51 KG/M2 | SYSTOLIC BLOOD PRESSURE: 142 MMHG

## 2023-10-30 DIAGNOSIS — R20.2 TINGLING IN EXTREMITIES: ICD-10-CM

## 2023-10-30 DIAGNOSIS — E66.01 SEVERE OBESITY (BMI 35.0-39.9) WITH COMORBIDITY: ICD-10-CM

## 2023-10-30 DIAGNOSIS — I20.9 ANGINA PECTORIS WITH NORMAL CORONARY ARTERIOGRAM: Chronic | ICD-10-CM

## 2023-10-30 DIAGNOSIS — E11.9 TYPE 2 DIABETES MELLITUS WITHOUT COMPLICATION, WITHOUT LONG-TERM CURRENT USE OF INSULIN: Chronic | ICD-10-CM

## 2023-10-30 DIAGNOSIS — I49.3 SYMPTOMATIC PVCS: ICD-10-CM

## 2023-10-30 DIAGNOSIS — R94.30 ELEVATED LEFT VENTRICULAR END-DIASTOLIC PRESSURE (LVEDP): Primary | ICD-10-CM

## 2023-10-30 DIAGNOSIS — I10 ESSENTIAL (PRIMARY) HYPERTENSION: Chronic | ICD-10-CM

## 2023-10-30 LAB
CRP SERPL-MCNC: <0.3 MG/L (ref 0–8.2)
ERYTHROCYTE [SEDIMENTATION RATE] IN BLOOD BY PHOTOMETRIC METHOD: <2 MM/HR (ref 0–23)

## 2023-10-30 PROCEDURE — 85652 RBC SED RATE AUTOMATED: CPT | Performed by: INTERNAL MEDICINE

## 2023-10-30 PROCEDURE — 86140 C-REACTIVE PROTEIN: CPT | Performed by: INTERNAL MEDICINE

## 2023-10-30 PROCEDURE — 99999 PR PBB SHADOW E&M-EST. PATIENT-LVL III: CPT | Mod: PBBFAC,,, | Performed by: INTERNAL MEDICINE

## 2023-10-30 PROCEDURE — 99214 OFFICE O/P EST MOD 30 MIN: CPT | Mod: S$GLB,,, | Performed by: INTERNAL MEDICINE

## 2023-10-30 PROCEDURE — 99214 PR OFFICE/OUTPT VISIT, EST, LEVL IV, 30-39 MIN: ICD-10-PCS | Mod: S$GLB,,, | Performed by: INTERNAL MEDICINE

## 2023-10-30 PROCEDURE — 36415 COLL VENOUS BLD VENIPUNCTURE: CPT | Mod: PO | Performed by: INTERNAL MEDICINE

## 2023-10-30 PROCEDURE — 99999 PR PBB SHADOW E&M-EST. PATIENT-LVL III: ICD-10-PCS | Mod: PBBFAC,,, | Performed by: INTERNAL MEDICINE

## 2023-10-30 RX ORDER — SPIRONOLACTONE 25 MG/1
12.5 TABLET ORAL DAILY
Qty: 45 TABLET | Refills: 0 | Status: SHIPPED | OUTPATIENT
Start: 2023-10-30 | End: 2024-02-05

## 2023-10-30 NOTE — PROGRESS NOTES
Subjective:    Patient ID:  García Tang is a 58 y.o. male who presents for Heart Problem        HPI  STATUS POST CARDIAC CATHETERIZATION NORMAL ANGIOGRAM AT 24 WHEN BACK THE EMERGENCY ROOM THE DAY AFTER AGAIN WITH PALPITATION TINGLING IN THE ARMS LEGS QUESTIONABLE ALLERGIC REACTION TO THE CONTRAST WAS GIVEN AGAIN BENADRYL NO RASH, , WARM SENSATION, TINGLING IN TOES, MILD PALP, SEE ROS    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    Diverticulitis     diverticulitis    Essential hypertension 07/18/2016    Fatty liver disease, nonalcoholic 04/07/2016    [place], 2006     GERD (gastroesophageal reflux disease)     Headache     HLD (hyperlipidemia)     Kidney stone     requiring stent    Meniere's disease 01/09/2018    Morbid obesity 05/29/2014    NAFLD (nonalcoholic fatty liver disease)     Near syncope 05/23/2017    Obesity hypoventilation syndrome 02/22/2016    PAD (peripheral artery disease) 09/17/2019    Mild 9/2018    Prostatitis     Rectal bleeding     diverticulitis    Thyroid nodule 07/14/2022     Past Surgical History:   Procedure Laterality Date    ANGIOGRAM, CORONARY, WITH LEFT HEART CATHETERIZATION  10/26/2023    Procedure: Left Heart Cath;  Surgeon: Sal Salazar MD;  [facility];  Service: Cardiology;;    CARDIAC CATHETERIZATION      COLONOSCOPY  2008    diverticulosis per patient report    CORONARY ANGIOGRAPHY  10/26/2023    Procedure: Coronary angiogram study;  Surgeon: Sal Salazar MD;  Location: [facility];  Service: Cardiology;;    kidney stent      KIDNEY SURGERY  1996    right kidney obstruction requiring stent-open    NOSE SURGERY      UPPER GASTROINTESTINAL ENDOSCOPY  2021    [facility], gastritis per patient report     Family History   Problem Relation Age of Onset    Liver disease Mother         d.    Cirrhosis Mother      Hypertension Mother     Hyperlipidemia Mother     Colon cancer Father 70    Cancer Father     Diabetes Father     Hypertension Father     Glaucoma Father     Hyperlipidemia Father     Prostate cancer Father     Diabetes Brother     Hypertension Brother     Thyroid cancer Brother     Heart attack Maternal Uncle     Heart attack Maternal Uncle     Heart disease Neg Hx     Heart failure Neg Hx     Macular degeneration Neg Hx     Retinal detachment Neg Hx     Crohn's disease Neg Hx     Ulcerative colitis Neg Hx     Stomach cancer Neg Hx     Esophageal cancer Neg Hx      Social History     Socioeconomic History    Marital status:    Tobacco Use    Smoking status: Former     Current packs/day: 0.00     Types: Cigarettes     Quit date: 1990     Years since quittin.8    Smokeless tobacco: Never    Tobacco comments:     social   Substance and Sexual Activity    Alcohol use: No     Comment: stopped in     Drug use: No    Sexual activity: Yes     Partners: Female       Review of patient's allergies indicates:   Allergen Reactions    Lisinopril Swelling    Iodinated contrast media Other (See Comments)     Spasms, tremor, and high blood pressure    Bactrim  [sulfamethoxazole-trimethoprim]      Other reaction(s): Unknown    Diflucan [fluconazole]      Heart racing    Hydroxyzine hcl      Other reaction(s): elevated bp    Kenalog [triamcinolone acetonide] Other (See Comments)     Heart flutters all day    Phenergan [promethazine] Anxiety    Zofran [ondansetron hcl (pf)] Anxiety       Current Outpatient Medications:     acetaminophen (TYLENOL) 500 MG tablet, Take 1,000 mg by mouth every 6 (six) hours as needed for Pain., Disp: , Rfl:     acetylcysteine (NAC ORAL), Take by mouth. Take 2 tablets daily, Disp: , Rfl:     amLODIPine (NORVASC) 5 MG tablet, Take 1 tablet (5 mg total) by mouth once daily., Disp: 30 tablet, Rfl: 0    cimetidine (TAGAMET) 300 MG tablet, Take one tablet at 4 pm the day before your  procedure. Take one tablet at 10 pm the night before your procedure. Take one tablet at 6 am the morning of your procedure., Disp: 3 tablet, Rfl: 0    diphenhydrAMINE (BENADRYL) 50 MG capsule, Take one capsule at 4 pm the day before your procedure. Take one capsule at 10 pm the night before your procedure. Take one capsule at 6 am the morning of your procedure., Disp: 3 capsule, Rfl: 0    gabapentin (NEURONTIN) 100 MG capsule, Take 1 capsule (100 mg total) by mouth nightly as needed (neuropathy)., Disp: 30 capsule, Rfl: 1    losartan (COZAAR) 100 MG tablet, Take 100 mg by mouth once daily., Disp: , Rfl:     MELATONIN ORAL, Take 5 mg by mouth nightly., Disp: , Rfl:     metoprolol succinate (TOPROL-XL) 50 MG 24 hr tablet, Take 1 tablet (50 mg total) by mouth once daily., Disp: 90 tablet, Rfl: 3    naproxen (NAPROSYN) 250 MG tablet, Take 250 mg by mouth 2 (two) times daily as needed., Disp: , Rfl:     pantoprazole (PROTONIX) 40 MG tablet, TAKE 1 TABLET(40 MG) BY MOUTH BEFORE BREAKFAST, Disp: 30 tablet, Rfl: 0    spironolactone (ALDACTONE) 25 MG tablet, Take 0.5 tablets (12.5 mg total) by mouth once daily., Disp: 45 tablet, Rfl: 0    Current Facility-Administered Medications:     sodium chloride 0.9% flush 10 mL, 10 mL, Intravenous, PRN, Sal Salazar MD    Review of Systems   Constitutional: Negative for chills, decreased appetite, diaphoresis, fever, malaise/fatigue and weight gain. Night sweats: CLAMMY.  HENT:  Negative for congestion and nosebleeds.    Eyes:  Negative for redness and visual disturbance.   Cardiovascular:  Positive for irregular heartbeat (OCC) and palpitations. Negative for claudication, cyanosis, dyspnea on exertion (MILD), leg swelling, near-syncope, orthopnea, paroxysmal nocturnal dyspnea and syncope. Chest pain: PRESSURE.  Respiratory:  Negative for cough, hemoptysis, shortness of breath, snoring and wheezing.    Endocrine: Negative for polyphagia and polyuria.   Hematologic/Lymphatic:  "Negative for adenopathy. Does not bruise/bleed easily.   Skin:  Negative for color change and rash.   Musculoskeletal:  Negative for back pain and falls. Muscle cramps: L THIGH.  Gastrointestinal:  Negative for abdominal pain, dysphagia, jaundice, melena and nausea.   Genitourinary:  Negative for dysuria and flank pain.   Neurological:  Positive for paresthesias. Negative for brief paralysis, dizziness and focal weakness.   Psychiatric/Behavioral:  The patient has insomnia and is nervous/anxious.    Allergic/Immunologic: Negative for hives and persistent infections.        Objective:      Vitals:    10/30/23 1309 10/30/23 1320   BP: (!) 145/91 (!) 142/90   Pulse: 67    Weight: 125.2 kg (276 lb 0.3 oz)    Height: 5' 10" (1.778 m)    PainSc: 0-No pain      Body mass index is 39.6 kg/m².    Physical Exam  Constitutional:       Appearance: He is obese. He is not diaphoretic.   HENT:      Head: Normocephalic and atraumatic.   Eyes:      Extraocular Movements: Extraocular movements intact.      Conjunctiva/sclera: Conjunctivae normal.   Neck:      Vascular: No carotid bruit.   Cardiovascular:      Rate and Rhythm: Normal rate and regular rhythm.      Pulses: Normal pulses.           Carotid pulses are 2+ on the right side and 2+ on the left side.       Radial pulses are 2+ on the right side and 2+ on the left side.        Femoral pulses are 2+ on the right side and 2+ on the left side.       Posterior tibial pulses are 2+ on the right side and 2+ on the left side.      Heart sounds: Normal heart sounds.      No friction rub. No gallop.   Pulmonary:      Effort: Pulmonary effort is normal.      Breath sounds: Normal breath sounds.   Abdominal:      Palpations: Abdomen is soft.      Tenderness: There is no abdominal tenderness.   Musculoskeletal:      Cervical back: Neck supple.      Right lower leg: No edema.      Left lower leg: No edema.   Skin:     Capillary Refill: Capillary refill takes less than 2 seconds.      " Comments: FEET WARM   Neurological:      General: No focal deficit present.      Mental Status: He is alert and oriented to person, place, and time.      Cranial Nerves: No cranial nerve deficit.   Psychiatric:         Mood and Affect: Mood normal.         Behavior: Behavior normal.                 ..    Chemistry        Component Value Date/Time     10/27/2023 0633    K 4.1 10/27/2023 0633     10/27/2023 0633    CO2 29 10/27/2023 0633    BUN 25 (H) 10/27/2023 0633    CREATININE 0.87 10/27/2023 0633     (H) 10/27/2023 0633        Component Value Date/Time    CALCIUM 9.1 10/27/2023 0633    ALKPHOS 90 10/27/2023 0633    AST 37 10/27/2023 0633    ALT 36 10/27/2023 0633    BILITOT 0.5 10/27/2023 0633    ESTGFRAFRICA >60.0 04/14/2022 0745    EGFRNONAA >60.0 04/14/2022 0745            ..  Lab Results   Component Value Date    CHOL 160 01/28/2023    CHOL 157 04/14/2022    CHOL 159 10/30/2020    CHOL 159 10/30/2020     Lab Results   Component Value Date    HDL 44 01/28/2023    HDL 41 04/14/2022    HDL 39 (L) 10/30/2020    HDL 37 (L) 10/30/2020     Lab Results   Component Value Date    LDLCALC 106.6 01/28/2023    LDLCALC 103.6 04/14/2022    LDLCALC 110.6 10/30/2020    LDLCALC 112.6 10/30/2020     Lab Results   Component Value Date    TRIG 47 01/28/2023    TRIG 62 04/14/2022    TRIG 47 10/30/2020    TRIG 47 10/30/2020     Lab Results   Component Value Date    CHOLHDL 27.5 01/28/2023    CHOLHDL 26.1 04/14/2022    CHOLHDL 24.5 10/30/2020    CHOLHDL 23.3 10/30/2020     ..  Lab Results   Component Value Date    WBC 16.55 (H) 10/27/2023    HGB 14.9 10/27/2023    HCT 43.1 10/27/2023    MCV 88 10/27/2023     10/27/2023       Test(s) Reviewed  I have reviewed the following in detail:  [] Stress test   [x] Angiography   [] Echocardiogram   [] Labs   [x] Other:       Assessment:         ICD-10-CM ICD-9-CM   1. Elevated left ventricular end-diastolic pressure (LVEDP)  R94.30 794.30   2. Tingling in extremities   R20.2 782.0   3. Angina pectoris with normal coronary arteriogram  I20.9 413.9   4. Symptomatic PVCs  I49.3 427.69   5. Severe obesity (BMI 35.0-39.9) with comorbidity  E66.01 278.01   6. Type 2 diabetes mellitus without complication, without long-term current use of insulin  E11.9 250.00   7. Essential (primary) hypertension  I10 401.9     Problem List Items Addressed This Visit          Cardiac/Vascular    Essential (primary) hypertension    Elevated left ventricular end-diastolic pressure (LVEDP) - Primary    Symptomatic PVCs       Endocrine    Severe obesity (BMI 35.0-39.9) with comorbidity    Type 2 diabetes mellitus without complication, without long-term current use of insulin     Other Visit Diagnoses       Tingling in extremities        Relevant Orders    Sedimentation rate    C-REACTIVE PROTEIN    Angina pectoris with normal coronary arteriogram  (Chronic)                Plan:     ADD SPIRONOLACTONE, CHECK SED RATE, CRP, NO PERIPHERAL ISCHEMIA NO OVERT HEART FAILURE QUESTIONABLY SYMPTOMS WERE WITH STEROIDS, HE DOES HAVE HISTORY OF NEUROPATHY ALL CV CLINICALLY STABLE, NO ANGINA, NO HF, NO TIA, NO CLINICAL ARRHYTHMIA,CONTINUE CURRENT MEDS, EDUCATION, DIET, EXERCISE , WEIGHT LOSS INCREASED CV RISK WITH DIABETES RETURN TO CLINIC IN 4 M WITH THE PATIENT AND HIS WIFE      Elevated left ventricular end-diastolic pressure (LVEDP)    Tingling in extremities  -     Sedimentation rate; Future; Expected date: 10/30/2023  -     C-REACTIVE PROTEIN; Future; Expected date: 10/30/2023    Angina pectoris with normal coronary arteriogram    Symptomatic PVCs  Comments:  RARE, BETA-BLOCKER    Severe obesity (BMI 35.0-39.9) with comorbidity    Type 2 diabetes mellitus without complication, without long-term current use of insulin    Essential (primary) hypertension    Other orders  -     spironolactone (ALDACTONE) 25 MG tablet; Take 0.5 tablets (12.5 mg total) by mouth once daily.  Dispense: 45 tablet; Refill: 0    RTC Low  level/low impact aerobic exercise 5x's/wk. Heart healthy diet and risk factor modification.    See labs and med orders.    Aerobic exercise 5x's/wk. Heart healthy diet and risk factor modification.    See labs and med orders.

## 2023-10-31 DIAGNOSIS — I70.1 RENAL ARTERY STENOSIS: ICD-10-CM

## 2023-10-31 DIAGNOSIS — I10 ESSENTIAL (PRIMARY) HYPERTENSION: ICD-10-CM

## 2023-10-31 RX ORDER — LOSARTAN POTASSIUM 100 MG/1
100 TABLET ORAL DAILY
Qty: 90 TABLET | Refills: 1 | Status: SHIPPED | OUTPATIENT
Start: 2023-10-31 | End: 2024-03-11 | Stop reason: SDUPTHER

## 2023-10-31 RX ORDER — AMLODIPINE BESYLATE 5 MG/1
5 TABLET ORAL
Qty: 90 TABLET | Refills: 0 | Status: ON HOLD | OUTPATIENT
Start: 2023-10-31 | End: 2023-12-28

## 2023-10-31 NOTE — TELEPHONE ENCOUNTER
----- Message from Rosie Licona MA sent at 10/31/2023  1:47 PM CDT -----  Type:  RX Refill Request    Who Called:  pt   Refill or New Rx:  refilllosartan (COZAAR) 100 MG tablet  amLODIPine (NORVASC) 5 MG tablet  RX Name and Strength:    How is the patient currently taking it? (ex. 1XDay):  as directed  Is this a 30 day or 90 day RX:  90 day  Preferred Pharmacy with phone number:    [id number] - [place] [id number] [place] [id number] AT [facility] [address] [address]  Phone: [phone] Fax: [phone]  Local or Mail Order:  local  Ordering Provider:  Martin Serra Call Back Number:  [phone]  Additional Information:  please advise

## 2023-11-01 ENCOUNTER — PATIENT MESSAGE (OUTPATIENT)
Dept: CARDIOLOGY | Facility: CLINIC | Age: 58
End: 2023-11-01
Payer: COMMERCIAL

## 2023-11-01 ENCOUNTER — PATIENT MESSAGE (OUTPATIENT)
Dept: FAMILY MEDICINE | Facility: CLINIC | Age: 58
End: 2023-11-01

## 2023-11-01 ENCOUNTER — OFFICE VISIT (OUTPATIENT)
Dept: FAMILY MEDICINE | Facility: CLINIC | Age: 58
End: 2023-11-01
Payer: COMMERCIAL

## 2023-11-01 ENCOUNTER — LAB VISIT (OUTPATIENT)
Dept: LAB | Facility: HOSPITAL | Age: 58
End: 2023-11-01
Attending: FAMILY MEDICINE
Payer: COMMERCIAL

## 2023-11-01 VITALS
BODY MASS INDEX: 39.26 KG/M2 | HEART RATE: 61 BPM | WEIGHT: 274.25 LBS | SYSTOLIC BLOOD PRESSURE: 120 MMHG | HEIGHT: 70 IN | OXYGEN SATURATION: 99 % | DIASTOLIC BLOOD PRESSURE: 86 MMHG

## 2023-11-01 DIAGNOSIS — R73.9 HYPERGLYCEMIA: ICD-10-CM

## 2023-11-01 DIAGNOSIS — D72.829 LEUKOCYTOSIS, UNSPECIFIED TYPE: Primary | ICD-10-CM

## 2023-11-01 DIAGNOSIS — D72.829 LEUKOCYTOSIS, UNSPECIFIED TYPE: ICD-10-CM

## 2023-11-01 LAB
ANION GAP SERPL CALC-SCNC: 11 MMOL/L (ref 8–16)
BASOPHILS # BLD AUTO: 0.06 K/UL (ref 0–0.2)
BASOPHILS NFR BLD: 1 % (ref 0–1.9)
BUN SERPL-MCNC: 16 MG/DL (ref 6–20)
CALCIUM SERPL-MCNC: 9.4 MG/DL (ref 8.7–10.5)
CHLORIDE SERPL-SCNC: 99 MMOL/L (ref 95–110)
CO2 SERPL-SCNC: 25 MMOL/L (ref 23–29)
CREAT SERPL-MCNC: 1 MG/DL (ref 0.5–1.4)
DIFFERENTIAL METHOD: ABNORMAL
EOSINOPHIL # BLD AUTO: 0.2 K/UL (ref 0–0.5)
EOSINOPHIL NFR BLD: 3.2 % (ref 0–8)
ERYTHROCYTE [DISTWIDTH] IN BLOOD BY AUTOMATED COUNT: 11.9 % (ref 11.5–14.5)
EST. GFR  (NO RACE VARIABLE): >60 ML/MIN/1.73 M^2
GLUCOSE SERPL-MCNC: 103 MG/DL (ref 70–110)
HCT VFR BLD AUTO: 46 % (ref 40–54)
HGB BLD-MCNC: 15.8 G/DL (ref 14–18)
IMM GRANULOCYTES # BLD AUTO: 0.04 K/UL (ref 0–0.04)
IMM GRANULOCYTES NFR BLD AUTO: 0.6 % (ref 0–0.5)
LYMPHOCYTES # BLD AUTO: 2.1 K/UL (ref 1–4.8)
LYMPHOCYTES NFR BLD: 34.4 % (ref 18–48)
MCH RBC QN AUTO: 29.8 PG (ref 27–31)
MCHC RBC AUTO-ENTMCNC: 34.3 G/DL (ref 32–36)
MCV RBC AUTO: 87 FL (ref 82–98)
MONOCYTES # BLD AUTO: 0.8 K/UL (ref 0.3–1)
MONOCYTES NFR BLD: 12.6 % (ref 4–15)
NEUTROPHILS # BLD AUTO: 3 K/UL (ref 1.8–7.7)
NEUTROPHILS NFR BLD: 48.2 % (ref 38–73)
NRBC BLD-RTO: 0 /100 WBC
PLATELET # BLD AUTO: 213 K/UL (ref 150–450)
PMV BLD AUTO: 9.8 FL (ref 9.2–12.9)
POTASSIUM SERPL-SCNC: 4.3 MMOL/L (ref 3.5–5.1)
RBC # BLD AUTO: 5.3 M/UL (ref 4.6–6.2)
SODIUM SERPL-SCNC: 135 MMOL/L (ref 136–145)
WBC # BLD AUTO: 6.17 K/UL (ref 3.9–12.7)

## 2023-11-01 PROCEDURE — 99999 PR PBB SHADOW E&M-EST. PATIENT-LVL IV: ICD-10-PCS | Mod: PBBFAC,,, | Performed by: FAMILY MEDICINE

## 2023-11-01 PROCEDURE — 99213 OFFICE O/P EST LOW 20 MIN: CPT | Mod: S$GLB,,, | Performed by: FAMILY MEDICINE

## 2023-11-01 PROCEDURE — 99213 PR OFFICE/OUTPT VISIT, EST, LEVL III, 20-29 MIN: ICD-10-PCS | Mod: S$GLB,,, | Performed by: FAMILY MEDICINE

## 2023-11-01 PROCEDURE — 80048 BASIC METABOLIC PNL TOTAL CA: CPT | Performed by: FAMILY MEDICINE

## 2023-11-01 PROCEDURE — 85025 COMPLETE CBC W/AUTO DIFF WBC: CPT | Performed by: FAMILY MEDICINE

## 2023-11-01 PROCEDURE — 36415 COLL VENOUS BLD VENIPUNCTURE: CPT | Mod: PO | Performed by: FAMILY MEDICINE

## 2023-11-01 PROCEDURE — 99999 PR PBB SHADOW E&M-EST. PATIENT-LVL IV: CPT | Mod: PBBFAC,,, | Performed by: FAMILY MEDICINE

## 2023-11-01 NOTE — PROGRESS NOTES
Subjective:       Patient ID: García Tang is a 58 y.o. male.    Chief Complaint: swollen armpit (left)    Pt is known to me.  The pt recently had an abnormal stress test and had subsequent angiogram--normal.  He had to have IV steroids during the procedure and po the day prior to the procedure.  His WBC and glucose were up after the steroids.  He is concerned.  He is feeling better since the effects of the steroids wore off.  He feels that his left axilla is swollen.  He had some mild pain there and palpated it and it felt swollen.      Review of Systems   Musculoskeletal:  Positive for joint swelling.   Skin:  Negative for color change.   Neurological:  Positive for numbness.   All other systems reviewed and are negative.      Objective:      Physical Exam  Constitutional:       Appearance: He is well-developed.   HENT:      Head: Normocephalic.   Eyes:      Conjunctiva/sclera: Conjunctivae normal.      Pupils: Pupils are equal, round, and reactive to light.   Neck:      Thyroid: No thyromegaly.   Cardiovascular:      Rate and Rhythm: Normal rate and regular rhythm.      Heart sounds: Normal heart sounds.   Pulmonary:      Effort: Pulmonary effort is normal.      Breath sounds: Normal breath sounds.   Abdominal:      General: Bowel sounds are normal.      Palpations: Abdomen is soft.      Tenderness: There is no abdominal tenderness.   Musculoskeletal:         General: No tenderness or deformity. Normal range of motion.      Cervical back: Normal range of motion and neck supple.      Comments: No palpable nodes nor masses left axilla, no redness nor heat   Lymphadenopathy:      Cervical: No cervical adenopathy.   Skin:     General: Skin is warm and dry.   Neurological:      Mental Status: He is alert and oriented to person, place, and time.      Cranial Nerves: No cranial nerve deficit.      Motor: No abnormal muscle tone.      Coordination: Coordination normal.      Deep Tendon Reflexes: Reflexes normal.    Psychiatric:         Behavior: Behavior normal.         Assessment:       1. Leukocytosis, unspecified type    2. Hyperglycemia        Plan:       García was seen today for swollen armpit.    Diagnoses and all orders for this visit:    Leukocytosis, unspecified type  -     CBC Auto Differential; Future    Hyperglycemia  -     Basic Metabolic Panel; Future      During this visit, I reviewed the pt's history, medications, allergies, and problem list.

## 2023-11-07 ENCOUNTER — PATIENT MESSAGE (OUTPATIENT)
Dept: CARDIOLOGY | Facility: CLINIC | Age: 58
End: 2023-11-07
Payer: COMMERCIAL

## 2023-11-07 ENCOUNTER — LAB VISIT (OUTPATIENT)
Dept: LAB | Facility: HOSPITAL | Age: 58
End: 2023-11-07
Attending: NURSE PRACTITIONER
Payer: COMMERCIAL

## 2023-11-07 DIAGNOSIS — I10 ESSENTIAL HYPERTENSION, MALIGNANT: ICD-10-CM

## 2023-11-07 DIAGNOSIS — Z13.220 SCREENING FOR LIPOID DISORDERS: ICD-10-CM

## 2023-11-07 DIAGNOSIS — R79.89 ELEVATED D-DIMER: ICD-10-CM

## 2023-11-07 DIAGNOSIS — R06.02 SHORTNESS OF BREATH: ICD-10-CM

## 2023-11-07 LAB
BASOPHILS # BLD AUTO: 0.06 K/UL (ref 0–0.2)
BASOPHILS NFR BLD: 1.3 % (ref 0–1.9)
BNP SERPL-MCNC: <10 PG/ML (ref 0–99)
CHOLEST SERPL-MCNC: 189 MG/DL (ref 120–199)
CHOLEST/HDLC SERPL: 4.4 {RATIO} (ref 2–5)
D DIMER PPP IA.FEU-MCNC: 0.29 MG/L FEU
DIFFERENTIAL METHOD: ABNORMAL
EOSINOPHIL # BLD AUTO: 0.2 K/UL (ref 0–0.5)
EOSINOPHIL NFR BLD: 4.2 % (ref 0–8)
ERYTHROCYTE [DISTWIDTH] IN BLOOD BY AUTOMATED COUNT: 11.6 % (ref 11.5–14.5)
ESTIMATED AVG GLUCOSE: 128 MG/DL (ref 68–131)
HBA1C MFR BLD: 6.1 % (ref 4–5.6)
HCT VFR BLD AUTO: 44.1 % (ref 40–54)
HDLC SERPL-MCNC: 43 MG/DL (ref 40–75)
HDLC SERPL: 22.8 % (ref 20–50)
HGB BLD-MCNC: 15.1 G/DL (ref 14–18)
IMM GRANULOCYTES # BLD AUTO: 0.02 K/UL (ref 0–0.04)
IMM GRANULOCYTES NFR BLD AUTO: 0.4 % (ref 0–0.5)
LDLC SERPL CALC-MCNC: 125 MG/DL (ref 63–159)
LYMPHOCYTES # BLD AUTO: 1.4 K/UL (ref 1–4.8)
LYMPHOCYTES NFR BLD: 30.1 % (ref 18–48)
MCH RBC QN AUTO: 30.1 PG (ref 27–31)
MCHC RBC AUTO-ENTMCNC: 34.2 G/DL (ref 32–36)
MCV RBC AUTO: 88 FL (ref 82–98)
MONOCYTES # BLD AUTO: 0.6 K/UL (ref 0.3–1)
MONOCYTES NFR BLD: 12.3 % (ref 4–15)
NEUTROPHILS # BLD AUTO: 2.5 K/UL (ref 1.8–7.7)
NEUTROPHILS NFR BLD: 51.7 % (ref 38–73)
NONHDLC SERPL-MCNC: 146 MG/DL
NRBC BLD-RTO: 0 /100 WBC
PLATELET # BLD AUTO: 181 K/UL (ref 150–450)
PMV BLD AUTO: 9.1 FL (ref 9.2–12.9)
RBC # BLD AUTO: 5.01 M/UL (ref 4.6–6.2)
TRIGL SERPL-MCNC: 105 MG/DL (ref 30–150)
WBC # BLD AUTO: 4.79 K/UL (ref 3.9–12.7)

## 2023-11-07 PROCEDURE — 85025 COMPLETE CBC W/AUTO DIFF WBC: CPT | Mod: PO | Performed by: NURSE PRACTITIONER

## 2023-11-07 PROCEDURE — 83036 HEMOGLOBIN GLYCOSYLATED A1C: CPT | Performed by: NURSE PRACTITIONER

## 2023-11-07 PROCEDURE — 83880 ASSAY OF NATRIURETIC PEPTIDE: CPT | Performed by: NURSE PRACTITIONER

## 2023-11-07 PROCEDURE — 85379 FIBRIN DEGRADATION QUANT: CPT | Performed by: NURSE PRACTITIONER

## 2023-11-07 PROCEDURE — 80061 LIPID PANEL: CPT | Performed by: NURSE PRACTITIONER

## 2023-11-07 PROCEDURE — 36415 COLL VENOUS BLD VENIPUNCTURE: CPT | Mod: PO | Performed by: NURSE PRACTITIONER

## 2023-11-08 ENCOUNTER — NURSE TRIAGE (OUTPATIENT)
Dept: ADMINISTRATIVE | Facility: CLINIC | Age: 58
End: 2023-11-08
Payer: COMMERCIAL

## 2023-11-08 NOTE — TELEPHONE ENCOUNTER
Reason for Disposition   Large swelling or bruise    Additional Information   Negative: [1] Major bleeding (e.g., spurting blood) AND [2] can't be stopped   Negative: Amputated finger   Negative: Skin is split open or gaping (or length > 1/2 inch or 12 mm)   Negative: [1] Bleeding AND [2] won't stop after 10 minutes of direct pressure (using correct technique)   Negative: [1] Dirt in the wound AND [2] not removed with 15 minutes of scrubbing   Negative: High pressure injection injury (e.g., from grease gun or paint gun, usually work-related)   Negative: Looks like a broken bone (e.g., crooked or deformed)   Negative: Looks like a dislocated joint (e.g., crooked or deformed)   Negative: [1] Fingernail is partially torn AND [2] from crush injury  (Exception: Torn nail from catching it on something.)   Negative: [1] Cut AND [2] numbness (loss of sensation) of finger   Negative: [1] Cut AND [2] finger joint can't be opened (straightened) or closed (bent) completely   Negative: Sounds like a serious injury to the triager   Negative: [1] SEVERE pain AND [2] not improved 2 hours after pain medicine/ice packs   Negative: [1] MODERATE-SEVERE pain AND [2] blood present under a nail   Negative: Fingernail is completely torn off (fingernail avulsion)   Negative: Base of fingernail has popped out of the skin fold (cuticle)   Negative: Suspicious history for the injury    Protocols used: Finger Injury-A-  Spoke with pt who reports that he injured index finger on right hand but unsure how injury occurred. States swelling noted to finger. Denies bleeding at this time. Advised to be seen by provider within 24 hours. States will go to .

## 2023-11-14 ENCOUNTER — OFFICE VISIT (OUTPATIENT)
Dept: GASTROENTEROLOGY | Facility: CLINIC | Age: 58
End: 2023-11-14
Payer: COMMERCIAL

## 2023-11-14 VITALS
BODY MASS INDEX: 39.61 KG/M2 | DIASTOLIC BLOOD PRESSURE: 82 MMHG | WEIGHT: 276.69 LBS | HEART RATE: 62 BPM | SYSTOLIC BLOOD PRESSURE: 126 MMHG | HEIGHT: 70 IN

## 2023-11-14 DIAGNOSIS — K21.9 GASTROESOPHAGEAL REFLUX DISEASE, UNSPECIFIED WHETHER ESOPHAGITIS PRESENT: ICD-10-CM

## 2023-11-14 DIAGNOSIS — R10.84 GENERALIZED ABDOMINAL PAIN: ICD-10-CM

## 2023-11-14 DIAGNOSIS — Z80.0 FAMILY HISTORY OF COLON CANCER: ICD-10-CM

## 2023-11-14 DIAGNOSIS — K76.0 FATTY LIVER DISEASE, NONALCOHOLIC: Chronic | ICD-10-CM

## 2023-11-14 DIAGNOSIS — K59.00 CONSTIPATION, UNSPECIFIED CONSTIPATION TYPE: ICD-10-CM

## 2023-11-14 DIAGNOSIS — R12 WATERBRASH: ICD-10-CM

## 2023-11-14 DIAGNOSIS — R19.8 PULSATILE ABDOMEN: ICD-10-CM

## 2023-11-14 DIAGNOSIS — Z87.898 HISTORY OF CHEST PAIN: ICD-10-CM

## 2023-11-14 DIAGNOSIS — R12 HEARTBURN: ICD-10-CM

## 2023-11-14 DIAGNOSIS — R10.13 EPIGASTRIC ABDOMINAL PAIN: Primary | ICD-10-CM

## 2023-11-14 DIAGNOSIS — R19.4 CHANGE IN BOWEL HABITS: ICD-10-CM

## 2023-11-14 PROCEDURE — 99999 PR PBB SHADOW E&M-EST. PATIENT-LVL V: ICD-10-PCS | Mod: PBBFAC,,,

## 2023-11-14 PROCEDURE — 99214 PR OFFICE/OUTPT VISIT, EST, LEVL IV, 30-39 MIN: ICD-10-PCS | Mod: S$GLB,,,

## 2023-11-14 PROCEDURE — 99214 OFFICE O/P EST MOD 30 MIN: CPT | Mod: S$GLB,,,

## 2023-11-14 PROCEDURE — 99999 PR PBB SHADOW E&M-EST. PATIENT-LVL V: CPT | Mod: PBBFAC,,,

## 2023-11-14 RX ORDER — DIPHENHYDRAMINE HCL 50 MG
CAPSULE ORAL
Qty: 1 CAPSULE | Refills: 0 | Status: SHIPPED | OUTPATIENT
Start: 2023-11-14 | End: 2023-12-27 | Stop reason: CLARIF

## 2023-11-14 RX ORDER — PANTOPRAZOLE SODIUM 40 MG/1
40 TABLET, DELAYED RELEASE ORAL DAILY
Qty: 90 TABLET | Refills: 1 | Status: SHIPPED | OUTPATIENT
Start: 2023-11-14 | End: 2024-05-12

## 2023-11-14 RX ORDER — SUCRALFATE 1 G/1
1 TABLET ORAL 4 TIMES DAILY
Qty: 40 TABLET | Refills: 0 | Status: SHIPPED | OUTPATIENT
Start: 2023-11-14 | End: 2023-11-24

## 2023-11-14 RX ORDER — PREDNISONE 50 MG/1
TABLET ORAL
Qty: 3 TABLET | Refills: 0 | Status: SHIPPED | OUTPATIENT
Start: 2023-11-14 | End: 2023-12-27 | Stop reason: CLARIF

## 2023-11-14 NOTE — PROGRESS NOTES
Subjective:       Patient ID: García Tang is a 58 y.o. male Body mass index is 39.7 kg/m².    Chief Complaint: Abdominal Pain    This patient is new to me.  Referring Provider: No ref. provider found for upper abdominal pain.  Established patient of Taty Cade NP GI.         Abdominal Pain  This is a recurrent problem. The problem occurs daily. The problem has been gradually worsening (about a week ago worsenened). The pain is located in the LUQ, RUQ and epigastric region. The quality of the pain is aching (states has felt his stomach pulse in the Left upper part of his abdomen states hx of palpatiations but HR normal when felt abdomen pulsate). The abdominal pain radiates to the chest, RLQ and LLQ (Patient recently went to ED for chest pain that was relieved by nitroglycerin, pain rates to chest and to generalized abdomen). Associated symptoms include constipation (typically has a bm 2x day, has not had a BM in 3 days, states never skips days, denies straining, rates stool type 4 on bristol stool scale takes magnesium for bowels) and nausea (c/o nausea with the abdominal pain). Pertinent negatives include no anorexia, arthralgias, belching, diarrhea, dysuria, fever, flatus, frequency, headaches, hematochezia, hematuria, melena, myalgias, vomiting or weight loss. Associated symptoms comments: -Pt went to ED on 11/6/23 due to shortness of breath and chest pain. EMS was called and patient was found to have an elevated blood pressure prior to arrival. Patient was also given nitroglycerin which he says helped his discomfort.  Patient had angiogram performed on 10/13/2023 by Dr. Salazar which did not show any significant CAD and did not require any stenting He was told he had changes on his EKG. Follow-up with cardiology. Patient recently had a Holter monitor and angiogram done d/t failed stress test. Patient will f/u with cardiology  -States has hx of diverticulitis and was Seen in urgent care and treated for  possible diverticulitis with flagyl x 10 days- no relief. Went to the ER and CT scan was normal. Few ascending colonic diverticula are present on CT scan    . The pain is aggravated by movement and eating (Eating in general and body movement makes symptoms worse). He has tried proton pump inhibitors (Tried taking naproxen prn once a day and takes pantoprazole intermittently started taking back daily a week ago) for the symptoms. The treatment provided mild relief. Prior diagnostic workup includes CT scan and ultrasound. His past medical history is significant for GERD. There is no history of abdominal surgery, colon cancer, Crohn's disease, gallstones, irritable bowel syndrome, pancreatitis, PUD or ulcerative colitis. Patient's medical history does not include kidney stones and UTI.   Gastroesophageal Reflux  He complains of abdominal pain, chest pain (denies current chest pain), heartburn, nausea (c/o nausea with the abdominal pain) and water brash. He reports no belching, no choking, no coughing, no dysphagia, no early satiety, no globus sensation or no hoarse voice. This is a chronic problem. The problem occurs frequently. The problem has been waxing and waning. The heartburn is located in the substernum. The heartburn wakes him from sleep. The heartburn does not limit his activity. The heartburn doesn't change with position. Nothing aggravates the symptoms. Pertinent negatives include no anemia, fatigue, melena, muscle weakness, orthopnea or weight loss. Risk factors include NSAIDs (takes naproxen prn). He has tried a PPI for the symptoms. The treatment provided moderate relief. Past procedures include an abdominal ultrasound. Past procedures do not include an EGD, esophageal manometry, esophageal pH monitoring, H. pylori antibody titer or a UGI. Past invasive treatments do not include gastroplasty, gastroplication or reflux surgery.       Review of Systems   Constitutional:  Negative for activity change,  appetite change, fatigue, fever, unexpected weight change and weight loss.   HENT:  Negative for hoarse voice.    Respiratory:  Negative for apnea, cough, choking, chest tightness, shortness of breath and stridor.    Cardiovascular:  Positive for chest pain (denies current chest pain).   Gastrointestinal:  Positive for abdominal pain, constipation (typically has a bm 2x day, has not had a BM in 3 days, states never skips days, denies straining, rates stool type 4 on bristol stool scale takes magnesium for bowels), heartburn and nausea (c/o nausea with the abdominal pain). Negative for abdominal distention, anal bleeding, anorexia, blood in stool, diarrhea, dysphagia, flatus, hematochezia, melena, rectal pain and vomiting.   Genitourinary:  Negative for dysuria, frequency and hematuria.   Musculoskeletal:  Negative for arthralgias, myalgias and muscle weakness.   Neurological:  Negative for headaches.         No LMP for male patient.  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    Diverticulitis     diverticulitis    Diverticulosis     Essential hypertension 07/18/2016    Fatty liver disease, nonalcoholic 04/07/2016    [place], 2006     GERD (gastroesophageal reflux disease)     Headache     HLD (hyperlipidemia)     Kidney stone     requiring stent    Meniere's disease 01/09/2018    Morbid obesity 05/29/2014    NAFLD (nonalcoholic fatty liver disease)     Near syncope 05/23/2017    Obesity hypoventilation syndrome 02/22/2016    PAD (peripheral artery disease) 09/17/2019    Mild 9/2018    Prostatitis     Rectal bleeding     diverticulitis    Thyroid nodule 07/14/2022     Past Surgical History:   Procedure Laterality Date    ANGIOGRAM, CORONARY, WITH LEFT HEART CATHETERIZATION  10/26/2023    Procedure: Left Heart Cath;  Surgeon: Sal Salazar MD;  Location: [facility] CATH;   Service: Cardiology;;    CARDIAC CATHETERIZATION      COLONOSCOPY  2008    diverticulosis per patient report    CORONARY ANGIOGRAPHY  10/26/2023    Procedure: Coronary angiogram study;  Surgeon: Sal Salazar MD;  Location: [facility];  Service: Cardiology;;    kidney stent      KIDNEY SURGERY      right kidney obstruction requiring stent-open    NOSE SURGERY      UPPER GASTROINTESTINAL ENDOSCOPY      [facility] per patient report     Family History   Problem Relation Age of Onset    Liver disease Mother         d.    Cirrhosis Mother     Hypertension Mother     Hyperlipidemia Mother     Colon cancer Father 70    Cancer Father     Diabetes Father     Hypertension Father     Glaucoma Father     Hyperlipidemia Father     Prostate cancer Father     Diabetes Brother     Hypertension Brother     Thyroid cancer Brother     Heart attack Maternal Uncle     Heart attack Maternal Uncle     Heart disease Neg Hx     Heart failure Neg Hx     Macular degeneration Neg Hx     Retinal detachment Neg Hx     Crohn's disease Neg Hx     Ulcerative colitis Neg Hx     Stomach cancer Neg Hx     Esophageal cancer Neg Hx      Social History     Tobacco Use    Smoking status: Former     Current packs/day: 0.00     Types: Cigarettes     Quit date: 1990     Years since quittin.8    Smokeless tobacco: Never    Tobacco comments:     social   Substance Use Topics    Alcohol use: No     Comment: stopped in     Drug use: No     Wt Readings from Last 10 Encounters:   23 125.5 kg (276 lb 10.8 oz)   23 124.4 kg (274 lb 4 oz)   23 124.4 kg (274 lb 4 oz)   10/30/23 125.2 kg (276 lb 0.3 oz)   10/27/23 127.4 kg (280 lb 13.9 oz)   10/26/23 124.4 kg (274 lb 4 oz)   10/24/23 125.6 kg (276 lb 14.4 oz)   10/06/23 124.7 kg (275 lb)   10/06/23 125 kg (275 lb 9.2 oz)   23 122 kg (268 lb 15.4 oz)     Lab Results   Component Value Date    WBC 4.79 2023    HGB 15.1 2023    HCT 44.1 2023     MCV 88 11/07/2023     11/07/2023     CMP  Sodium   Date Value Ref Range Status   11/06/2023 141 136 - 145 mmol/L Final     Potassium   Date Value Ref Range Status   11/06/2023 4.2 3.5 - 5.1 mmol/L Final     Comment:     Anion Gap reference range revised on 4/28/2023     Chloride   Date Value Ref Range Status   11/06/2023 101 95 - 110 mmol/L Final     CO2   Date Value Ref Range Status   11/06/2023 29 22 - 31 mmol/L Final     Glucose   Date Value Ref Range Status   11/06/2023 109 70 - 110 mg/dL Final     Comment:     The ADA recommends the following guidelines for fasting glucose:    Normal:       less than 100 mg/dL    Prediabetes:  100 mg/dL to 125 mg/dL    Diabetes:     126 mg/dL or higher       BUN   Date Value Ref Range Status   11/06/2023 17 9 - 21 mg/dL Final     Creatinine   Date Value Ref Range Status   11/06/2023 0.90 0.50 - 1.40 mg/dL Final     Calcium   Date Value Ref Range Status   11/06/2023 9.3 8.4 - 10.2 mg/dL Final     Total Protein   Date Value Ref Range Status   11/06/2023 7.6 6.0 - 8.4 g/dL Final     Albumin   Date Value Ref Range Status   11/06/2023 4.5 3.5 - 5.2 g/dL Final     Total Bilirubin   Date Value Ref Range Status   11/06/2023 0.5 0.2 - 1.3 mg/dL Final     Alkaline Phosphatase   Date Value Ref Range Status   11/06/2023 90 38 - 145 U/L Final     AST   Date Value Ref Range Status   11/06/2023 34 17 - 59 U/L Final     ALT   Date Value Ref Range Status   11/06/2023 32 0 - 50 U/L Final     Anion Gap   Date Value Ref Range Status   11/06/2023 11 5 - 12 mmol/L Final     Comment:     Anion Gap reference range revised on 4/28/2023     eGFR if    Date Value Ref Range Status   04/14/2022 >60.0 >60 mL/min/1.73 m^2 Final     eGFR if non    Date Value Ref Range Status   04/14/2022 >60.0 >60 mL/min/1.73 m^2 Final     Comment:     Calculation used to obtain the estimated glomerular filtration  rate (eGFR) is the CKD-EPI equation.        Lab Results   Component  Value Date    LIPASE 28 02/07/2023     Lab Results   Component Value Date    LIPASERES 142 02/20/2023     Lab Results   Component Value Date    TSH 1.220 11/06/2023       Reviewed prior medical records including office visit 02/27/2023 [facility] hospital encounter 11/06/2023 radiology report of CT abdomen pelvis 02/22/2023, CT abdomen pelvis 10/13/2022 & endoscopy history (see surgical history/procedures).    Objective:      Physical Exam  Vitals and nursing note reviewed.   Constitutional:       Appearance: Normal appearance. He is normal weight.   Cardiovascular:      Rate and Rhythm: Normal rate and regular rhythm.      Heart sounds: Normal heart sounds.   Pulmonary:      Breath sounds: Normal breath sounds.   Abdominal:      General: Bowel sounds are normal.      Palpations: Abdomen is soft.      Tenderness: There is generalized abdominal tenderness.   Skin:     General: Skin is warm and dry.      Coloration: Skin is not jaundiced.   Neurological:      Mental Status: He is alert and oriented to person, place, and time.   Psychiatric:         Mood and Affect: Mood normal.         Behavior: Behavior normal.         Assessment:       1. Epigastric abdominal pain    2. Generalized abdominal pain    3. Pulsatile abdomen    4. Gastroesophageal reflux disease, unspecified whether esophagitis present    5. Waterbrash    6. Heartburn    7. Family history of colon cancer    8. Constipation, unspecified constipation type    9. Change in bowel habits    10. History of chest pain    11. Fatty liver disease, nonalcoholic        Plan:       Epigastric abdominal pain  - CONTINUE    pantoprazole (PROTONIX) 40 MG tablet; Take 1 tablet (40 mg total) by mouth once daily.  Dispense: 90 tablet; Refill: 1  -    START sucralfate (CARAFATE) 1 gram tablet; Take 1 tablet (1 g total) by mouth 4 (four) times daily. for 10 days  Dispense: 40 tablet; Refill: 0  -     Lipase; Future; Expected date: 11/14/2023  - schedule EGD, discussed  procedure with patient, including risks and benefits, patient verbalized understanding  -follow GERD lifestyle modifications  -If no improvement in symptoms or symptoms worsen, call/follow-up at clinic or go to ER    Generalized abdominal pain  -     CT Abdomen Pelvis With IV Contrast; Future; Expected date: 11/14/2023  -     predniSONE (DELTASONE) 50 MG Tab; Take 50mg by mouth at 13 hours, 7 hours, and 1 hour before contrast administration.  Dispense: 3 tablet; Refill: 0  -     diphenhydrAMINE (BENADRYL) 50 MG capsule; Take 50mg by mouth 1 hour before contrast administration.  Dispense: 1 capsule; Refill: 0  - schedule Colonoscopy, discussed procedure with the patient, including risks and benefits, patient verbalized understanding  - schedule EGD, discussed procedure with patient, including risks and benefits, patient verbalized understanding    Pulsatile abdomen  -     CT Abdomen Pelvis With IV Contrast; Future; Expected date: 11/14/2023  - continue to follow up with cardiologist    Gastroesophageal reflux disease, unspecified whether esophagitis present, Waterbrash, Heartburn   -continue pantoprazole 40 mg orally daily   -Take PPI 30min-1hr before eating breakfast  -Educated patient on lifestyle modifications to help control/reduce reflux/abdominal pain including: avoid large meals, avoid eating within 2-3 hours of bedtime (avoid late night eating & lying down soon after eating), elevate head of bed if nocturnal symptoms are present, smoking cessation (if current smoker), & weight loss (if overweight).   -Educated to avoid known foods which trigger reflux symptoms & to minimize/avoid high-fat foods, chocolate, caffeine, citrus, alcohol, & tomato products.  -Advised to avoid/limit use of NSAID's, since they can cause GI upset, bleeding, and/or ulcers. If needed, take with food.     Family history of colon cancer   - schedule Colonoscopy, discussed procedure with the patient, including risks and benefits, patient  verbalized understanding    Constipation, unspecified constipation type   Recommend daily exercise as tolerated, adequate water intake (six 8-oz glasses of water daily), and high fiber diet. OTC fiber supplements are recommended if diet does not reach daily fiber goal (20-30 grams daily), such as Metamucil, Citrucel, or FiberCon (take as directed, separate from other oral medications by >2 hours).  -Recommend taking an OTC stool softener such as Colace as directed to avoid hard stools and straining with bowel movements PRN  -Recommend trying OTC MiraLax once daily (17g PO) as directed  - If no improvement with above recommendations, try intermittently dosed Dulcolax OTC as directed (every 3-4  days) PRN to facilitate bowel movements  -If still no improvement with these measures, call/follow-up    Change in bowel habits   - schedule Colonoscopy, discussed procedure with the patient, including risks and benefits, patient verbalized understanding    History of chest pain   - follow-up with PCP &/or cardiologist for continued evaluation and management   - if experiencing symptoms of headache, chest pain, shortness of breath, and/or blurred vision, recommend going to ER for further evaluation and management    A fatty liver disease, nonalcoholic   For fatty liver recommend: low fat, low cholesterol diet, maintain good control of blood sugars and cholesterol levels, exercise, weight loss (if overweight), minimize/avoid alcohol and tylenol products   -continue follow up with hepatologist     Follow up in about 4 weeks (around 12/12/2023), or if symptoms worsen or fail to improve.      If no improvement in symptoms or symptoms worsen, call/follow-up at clinic or go to ER.       [facility] - GASTROENTEROLOGY  [facility], [place]     Dictation software program was used for this note. Please expect some simple typographical  errors in this note.    Encounter includes face to face time and  non-face to face time preparing to see the patient (eg, review of tests), obtaining and/or reviewing separately obtained history, documenting clinical information in the electronic or other health record, independently interpreting results (not separately reported) and communicating results to the patient/family/caregiver, or care coordination (not separately reported).

## 2023-11-15 ENCOUNTER — TELEPHONE (OUTPATIENT)
Dept: GASTROENTEROLOGY | Facility: CLINIC | Age: 58
End: 2023-11-15
Payer: COMMERCIAL

## 2023-11-15 NOTE — TELEPHONE ENCOUNTER
----- Message from Julianna Egan sent at 11/15/2023 12:19 PM CST -----  Regarding: rt call  Contact: pt  Type:  Patient Returning Call    Who Called:pt  Who Left Message for Patient:Jazlyn  Does the patient know what this is regarding?:yes  Would the patient rather a call back or a response via [facility]? Call back  Best Call Back Number:[phone]    Additional Information: sts he is returning a call

## 2023-11-15 NOTE — TELEPHONE ENCOUNTER
----- Message from Katey Orlando sent at 11/15/2023  7:39 AM CST -----  Contact: patient    Type:  Needs Medical Advice    Who Called: patient     Would the patient rather a call back or a response via [facility]? Call     Best Call Back Number: [phone] (home)      Additional Information: Patient needs to cancel his procedure on Friday but will call back to reschedule.     Please call to advise

## 2023-11-15 NOTE — TELEPHONE ENCOUNTER
Call placed to Ms. Salinas in regards to message received. No answer, left message to return call.

## 2023-11-16 ENCOUNTER — PATIENT MESSAGE (OUTPATIENT)
Dept: GASTROENTEROLOGY | Facility: CLINIC | Age: 58
End: 2023-11-16
Payer: COMMERCIAL

## 2023-11-16 ENCOUNTER — NURSE TRIAGE (OUTPATIENT)
Dept: ADMINISTRATIVE | Facility: CLINIC | Age: 58
End: 2023-11-16
Payer: COMMERCIAL

## 2023-11-16 NOTE — TELEPHONE ENCOUNTER
Spoke with pt who reports that he has been having abdominal pain that has gotten progressively worse. Reports that he had not been able to have BM for 3 and a half days but was able to have small movement last night, and this morning. States that pain worsens after having BM. Pt advised to be seen in ED/UC. Pt verbalized understanding.      Reason for Disposition   MILD TO MODERATE constant pain lasting > 2 hours    Additional Information   Negative: Passed out (i.e., fainted, collapsed and was not responding)   Negative: Shock suspected (e.g., cold/pale/clammy skin, too weak to stand, low BP, rapid pulse)   Negative: Sounds like a life-threatening emergency to the triager   Negative: SEVERE abdominal pain (e.g., excruciating)   Negative: Vomiting red blood or black (coffee ground) material   Negative: Blood in bowel movements  (Exception: Blood on surface of BM with constipation.)   Negative: Black or tarry bowel movements  (Exception: Chronic-unchanged black-grey BMs AND is taking iron pills or Pepto-Bismol.)   Negative: Unable to urinate (or only a few drops) and bladder feels very full   Negative: Pain in scrotum persists > 1 hour    Protocols used: Abdominal Pain - Male-A-[place]

## 2023-11-24 ENCOUNTER — PATIENT MESSAGE (OUTPATIENT)
Dept: GASTROENTEROLOGY | Facility: CLINIC | Age: 58
End: 2023-11-24
Payer: COMMERCIAL

## 2023-11-28 ENCOUNTER — OFFICE VISIT (OUTPATIENT)
Dept: PULMONOLOGY | Facility: CLINIC | Age: 58
End: 2023-11-28
Payer: COMMERCIAL

## 2023-11-28 ENCOUNTER — OFFICE VISIT (OUTPATIENT)
Dept: FAMILY MEDICINE | Facility: CLINIC | Age: 58
End: 2023-11-28
Payer: COMMERCIAL

## 2023-11-28 VITALS
TEMPERATURE: 98 F | HEART RATE: 98 BPM | WEIGHT: 286.38 LBS | DIASTOLIC BLOOD PRESSURE: 86 MMHG | BODY MASS INDEX: 41 KG/M2 | SYSTOLIC BLOOD PRESSURE: 128 MMHG | HEIGHT: 70 IN | OXYGEN SATURATION: 99 %

## 2023-11-28 DIAGNOSIS — U09.9 COVID-19 LONG HAULER: ICD-10-CM

## 2023-11-28 DIAGNOSIS — E66.01 SEVERE OBESITY (BMI 35.0-39.9) WITH COMORBIDITY: ICD-10-CM

## 2023-11-28 DIAGNOSIS — K21.9 GASTROESOPHAGEAL REFLUX DISEASE, UNSPECIFIED WHETHER ESOPHAGITIS PRESENT: ICD-10-CM

## 2023-11-28 DIAGNOSIS — R06.02 SHORTNESS OF BREATH: Primary | ICD-10-CM

## 2023-11-28 DIAGNOSIS — G47.09 OTHER INSOMNIA: ICD-10-CM

## 2023-11-28 DIAGNOSIS — R06.02 SOB (SHORTNESS OF BREATH): Primary | ICD-10-CM

## 2023-11-28 DIAGNOSIS — R40.0 DAYTIME SOMNOLENCE: ICD-10-CM

## 2023-11-28 DIAGNOSIS — E66.01 MORBID OBESITY: ICD-10-CM

## 2023-11-28 PROCEDURE — 99214 OFFICE O/P EST MOD 30 MIN: CPT | Mod: S$GLB,,, | Performed by: NURSE PRACTITIONER

## 2023-11-28 PROCEDURE — 99204 OFFICE O/P NEW MOD 45 MIN: CPT | Mod: 95,,, | Performed by: PHYSICIAN ASSISTANT

## 2023-11-28 PROCEDURE — 99999 PR PBB SHADOW E&M-EST. PATIENT-LVL IV: CPT | Mod: PBBFAC,,, | Performed by: NURSE PRACTITIONER

## 2023-11-28 PROCEDURE — 99204 PR OFFICE/OUTPT VISIT, NEW, LEVL IV, 45-59 MIN: ICD-10-PCS | Mod: 95,,, | Performed by: PHYSICIAN ASSISTANT

## 2023-11-28 PROCEDURE — 99999 PR PBB SHADOW E&M-EST. PATIENT-LVL IV: ICD-10-PCS | Mod: PBBFAC,,, | Performed by: NURSE PRACTITIONER

## 2023-11-28 PROCEDURE — 99214 PR OFFICE/OUTPT VISIT, EST, LEVL IV, 30-39 MIN: ICD-10-PCS | Mod: S$GLB,,, | Performed by: NURSE PRACTITIONER

## 2023-11-28 NOTE — PROGRESS NOTES
Subjective:       Patient ID: García Tang is a 58 y.o. male.    Chief Complaint: Shortness of Breath    HPI  Patient presents for shortness of breath, developed 10/30/23    Abnormal stress test prompting angiogram completed 10/26/23, no CAD:    The ejection fraction was greater than 60% by visual estimate.    The left ventricular end diastolic pressure was elevated.    The pre-procedure left ventricular end diastolic pressure was 24.    The coronary arteries were normal..    Risk factor modification weight loss medical treatment.    Evaluated at ED 11/6/23 for CP and SOB:  CXR negative. D dimer minimally elevated at 0.54, otherwise labs unremarkable. Allergy to contrast. DCd home, thought related to anxiety     Evaluated at [facility] 11/7/12--D dimer normal 0.29  BNP WNL    Evaluated by gastro 11/14/23 for epigastric pain:   Hx GERD compliant with pantoprazole--continued, carafate added (notes improvement in CP but no improvement in SOB). EGD and colonscopy ordered. EGD scheduled 12/18/23 (notes occasional globus sensation, occasional choking--chronic), colonoscopy 1/8/24. CT abd pelvis completed 11/24/23--small hiatal hernia, moderate to large stool burden    He is scheduled with pulmonology for evaluation of SOB tomorrow 11/29/23  Social smoker >30 years ago   Denies history of asthma. Denies wheezing or cough   Was not feeling SOB prior to angiogram  Notes weight gain, last year ran marathon, hasn't worked out this year  Morbid obesity BMI 41  +daytime somnolence, poor sleep. Has never had sleep study     Denies anxiety or depression     Vitals:    11/28/23 0843   BP: 128/86   Pulse: 98   Temp: 97.7 °F (36.5 °C)     Review of Systems   Constitutional:  Positive for fatigue. Negative for fever.   Respiratory:  Positive for shortness of breath. Negative for cough, chest tightness and wheezing.    Cardiovascular:  Positive for chest pain.   Psychiatric/Behavioral:  Positive for sleep disturbance.  Negative for dysphoric mood. The patient is not nervous/anxious.        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    Diverticulitis     diverticulitis    Diverticulosis     Essential hypertension 07/18/2016    Fatty liver disease, nonalcoholic 04/07/2016    [place], 2006     GERD (gastroesophageal reflux disease)     Headache     HLD (hyperlipidemia)     Kidney stone     requiring stent    Meniere's disease 01/09/2018    Morbid obesity 05/29/2014    NAFLD (nonalcoholic fatty liver disease)     Near syncope 05/23/2017    Obesity hypoventilation syndrome 02/22/2016    PAD (peripheral artery disease) 09/17/2019    Mild 9/2018    Prostatitis     Rectal bleeding     diverticulitis    Thyroid nodule 07/14/2022     Objective:      Physical Exam  Vitals and nursing note reviewed.   Constitutional:       General: He is not in acute distress.     Appearance: He is not diaphoretic.   HENT:      Head: Normocephalic.   Eyes:      General: Lids are normal.         Right eye: No discharge.         Left eye: No discharge.   Neck:      Trachea: No tracheal deviation.   Cardiovascular:      Rate and Rhythm: Normal rate.   Pulmonary:      Effort: Pulmonary effort is normal.   Skin:     Coloration: Skin is not pale.   Neurological:      Mental Status: He is alert and oriented to person, place, and time.   Psychiatric:         Speech: Speech normal.         Behavior: Behavior normal.         Thought Content: Thought content normal.         Judgment: Judgment normal.         Assessment:       1. Shortness of breath    2. Morbid obesity    3. Daytime somnolence    4. Gastroesophageal reflux disease, unspecified whether esophagitis present        Plan:       Shortness of breath    Morbid obesity  -     Home Sleep Study; Future    Daytime somnolence  -     Home Sleep Study;  Future    Gastroesophageal reflux disease, unspecified whether esophagitis present      Continue with pulmonology eval as scheduled  Proceed with EGD as scheduled   Complete sleep study       education provided on supportive care, symptom monitoring and return precautions         Medication List with Changes/Refills   Current Medications    ACETAMINOPHEN (TYLENOL) 500 MG TABLET    Take 1,000 mg by mouth every 6 (six) hours as needed for Pain.    ACETYLCYSTEINE (NAC ORAL)    Take by mouth. Take 2 tablets daily    AMLODIPINE (NORVASC) 5 MG TABLET    TAKE 1 TABLET(5 MG) BY MOUTH EVERY DAY    DIPHENHYDRAMINE (BENADRYL) 50 MG CAPSULE    Take 50mg by mouth 1 hour before contrast administration.    GABAPENTIN (NEURONTIN) 100 MG CAPSULE    Take 1 capsule (100 mg total) by mouth nightly as needed (neuropathy).    LOSARTAN (COZAAR) 100 MG TABLET    Take 1 tablet (100 mg total) by mouth once daily.    MELATONIN ORAL    Take 5 mg by mouth nightly.    METOPROLOL SUCCINATE (TOPROL-XL) 50 MG 24 HR TABLET    Take 1 tablet (50 mg total) by mouth once daily.    NAPROXEN (NAPROSYN) 250 MG TABLET    Take 250 mg by mouth 2 (two) times daily as needed.    PANTOPRAZOLE (PROTONIX) 40 MG TABLET    Take 1 tablet (40 mg total) by mouth once daily.    PREDNISONE (DELTASONE) 50 MG TAB    Take 50mg by mouth at 13 hours, 7 hours, and 1 hour before contrast administration.    SPIRONOLACTONE (ALDACTONE) 25 MG TABLET    Take 0.5 tablets (12.5 mg total) by mouth once daily.   Discontinued Medications    CIMETIDINE (TAGAMET) 300 MG TABLET    Take one tablet at 4 pm the day before your procedure. Take one tablet at 10 pm the night before your procedure. Take one tablet at 6 am the morning of your procedure.    DIPHENHYDRAMINE (BENADRYL) 50 MG CAPSULE    Take one capsule at 4 pm the day before your procedure. Take one capsule at 10 pm the night before your procedure. Take one capsule at 6 am the morning of your procedure.    PANTOPRAZOLE (PROTONIX) 40  MG TABLET    TAKE 1 TABLET(40 MG) BY MOUTH BEFORE BREAKFAST

## 2023-11-28 NOTE — PROGRESS NOTES
Subjective:       Patient ID: García Tang is a 58 y.o. male.    Chief Complaint: SOB    The patient location is: home in [place]  The chief complaint leading to consultation is: SOB    Visit type: audiovisual    Face to Face time with patient: 21  35 minutes of total time spent on the encounter, which includes face to face time and non-face to face time preparing to see the patient (eg, review of tests), Obtaining and/or reviewing separately obtained history, Documenting clinical information in the electronic or other health record, Independently interpreting results (not separately reported) and communicating results to the patient/family/caregiver, or Care coordination (not separately reported).         Each patient to whom he or she provides medical services by telemedicine is:  (1) informed of the relationship between the physician and patient and the respective role of any other health care provider with respect to management of the patient; and (2) notified that he or she may decline to receive medical services by telemedicine and may withdraw from such care at any time.    Notes:       11/28/2023  Here for complaint of SOB  Getting out of breath with talking  Low endurance  6 months ago was very active without issues; then just started feeling bad and stopped exercising?  Burning in chest  Normal heart eval  When he lays down he feels heart racing    No history of lung problems  Has just not been feeling well  No fever or chills  Has a lot of fatigue, low energy  Has sleep study ordered by primary care today    Every time he eats he gets soreness in his chest  Has GI eval next month    Smoked and drank alcohol occasionally/socially but stopped this 31 years ago  Drinks coffee    Does have history of long covid but this improved about a year ago  Sleep problems, attributes this to tinnitus and covid in 2021  Last cup coffee 3:30pm  Gets in bed 9:00pm, wakes up all throughout the night, up 5:30am  Use  "to take melatonin but stopped this about 2 months ago    Works in [employer] industry from home    History of multiple neurologic issues, migraines; has seen neurology, recommended sleep study    HPI from primary care today:  "Patient presents for shortness of breath, developed 10/30/23     Abnormal stress test prompting angiogram completed 10/26/23, no CAD:    The ejection fraction was greater than 60% by visual estimate.    The left ventricular end diastolic pressure was elevated.    The pre-procedure left ventricular end diastolic pressure was 24.    The coronary arteries were normal..    Risk factor modification weight loss medical treatment.     Evaluated at ED 11/6/23 for CP and SOB:  CXR negative. D dimer minimally elevated at 0.54, otherwise labs unremarkable. Allergy to contrast. DCd home, thought related to anxiety      Evaluated at [facility] 11/7/12--D dimer normal 0.29  BNP WNL     Evaluated by gastro 11/14/23 for epigastric pain:   Hx GERD compliant with pantoprazole--continued, carafate added (notes improvement in CP but no improvement in SOB). EGD and colonscopy ordered. EGD scheduled 12/18/23 (notes occasional globus sensation, occasional choking--chronic), colonoscopy 1/8/24. CT abd pelvis completed 11/24/23--small hiatal hernia, moderate to large stool burden     He is scheduled with pulmonology for evaluation of SOB tomorrow 11/29/23  Social smoker >30 years ago   Denies history of asthma. Denies wheezing or cough   Was not feeling SOB prior to angiogram  Notes weight gain, last year ran marathon, hasn't worked out this year  Morbid obesity BMI 41  +daytime somnolence, poor sleep. Has never had sleep study      Denies anxiety or depression"       Immunization History   Administered Date(s) Administered    COVID-19, MRNA, LN-S, PF (Pfizer) (Gray Cap) 04/12/2022    COVID-19, MRNA, LN-S, PF (Pfizer) (Purple Cap) 03/24/2021, 04/14/2021, 10/16/2021    COVID-19, mRNA, LNP-S, bivalent booster, " PF (PFIZER OMICRON) 09/14/2022    Td (ADULT) 05/15/2009    Tdap 07/01/2016      Tobacco Use: Medium Risk (11/28/2023)    Patient History     Smoking Tobacco Use: Former     Smokeless Tobacco Use: Never     Passive Exposure: Not on file      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    Diverticulitis     diverticulitis    Diverticulosis     Essential hypertension 07/18/2016    Fatty liver disease, nonalcoholic 04/07/2016    [place], 2006     GERD (gastroesophageal reflux disease)     Headache     HLD (hyperlipidemia)     Kidney stone     requiring stent    Meniere's disease 01/09/2018    Morbid obesity 05/29/2014    NAFLD (nonalcoholic fatty liver disease)     Near syncope 05/23/2017    Obesity hypoventilation syndrome 02/22/2016    PAD (peripheral artery disease) 09/17/2019    Mild 9/2018    Prostatitis     Rectal bleeding     diverticulitis    Thyroid nodule 07/14/2022      Current Outpatient Medications on File Prior to Visit   Medication Sig Dispense Refill    acetaminophen (TYLENOL) 500 MG tablet Take 1,000 mg by mouth every 6 (six) hours as needed for Pain.      acetylcysteine (NAC ORAL) Take by mouth. Take 2 tablets daily      amLODIPine (NORVASC) 5 MG tablet TAKE 1 TABLET(5 MG) BY MOUTH EVERY DAY 90 tablet 0    diphenhydrAMINE (BENADRYL) 50 MG capsule Take 50mg by mouth 1 hour before contrast administration. 1 capsule 0    gabapentin (NEURONTIN) 100 MG capsule Take 1 capsule (100 mg total) by mouth nightly as needed (neuropathy). 30 capsule 1    losartan (COZAAR) 100 MG tablet Take 1 tablet (100 mg total) by mouth once daily. 90 tablet 1    MELATONIN ORAL Take 5 mg by mouth nightly.      metoprolol succinate (TOPROL-XL) 50 MG 24 hr tablet Take 1 tablet (50 mg total) by mouth once daily. 90 tablet 3    naproxen (NAPROSYN) 250 MG tablet Take 250 mg by mouth 2 (two)  times daily as needed.      pantoprazole (PROTONIX) 40 MG tablet Take 1 tablet (40 mg total) by mouth once daily. 90 tablet 1    predniSONE (DELTASONE) 50 MG Tab Take 50mg by mouth at 13 hours, 7 hours, and 1 hour before contrast administration. 3 tablet 0    spironolactone (ALDACTONE) 25 MG tablet Take 0.5 tablets (12.5 mg total) by mouth once daily. 45 tablet 0    [DISCONTINUED] cimetidine (TAGAMET) 300 MG tablet Take one tablet at 4 pm the day before your procedure. Take one tablet at 10 pm the night before your procedure. Take one tablet at 6 am the morning of your procedure. (Patient not taking: Reported on 11/28/2023) 3 tablet 0    [DISCONTINUED] diphenhydrAMINE (BENADRYL) 50 MG capsule Take one capsule at 4 pm the day before your procedure. Take one capsule at 10 pm the night before your procedure. Take one capsule at 6 am the morning of your procedure. (Patient not taking: Reported on 11/14/2023) 3 capsule 0    [DISCONTINUED] pantoprazole (PROTONIX) 40 MG tablet TAKE 1 TABLET(40 MG) BY MOUTH BEFORE BREAKFAST 30 tablet 0     Current Facility-Administered Medications on File Prior to Visit   Medication Dose Route Frequency Provider Last Rate Last Admin    [DISCONTINUED] sodium chloride 0.9% flush 10 mL  10 mL Intravenous PRN Sal Salazar MD            Review of Systems   Constitutional:  Positive for activity change, fatigue and weakness. Negative for fever, weight loss, appetite change and night sweats.   HENT:  Negative for congestion.         Tinnitus    Respiratory:  Positive for shortness of breath, dyspnea on extertion and somnolence. Negative for snoring, cough, sputum production and wheezing.    Cardiovascular:  Positive for palpitations. Negative for chest pain and leg swelling.   Gastrointestinal:  Positive for acid reflux.   Neurological:  Negative for dizziness and weakness.   Psychiatric/Behavioral:  Positive for sleep disturbance.    All other systems reviewed and are negative.      Objective:        There were no vitals filed for this visit.    Physical Exam   Constitutional: He is oriented to person, place, and time. He appears well-developed and well-nourished. No distress.   HENT:   Mouth/Throat: Oropharynx is clear and moist.   Pulmonary/Chest: Effort normal. No respiratory distress.   Musculoskeletal:         General: No edema.      Cervical back: Normal range of motion.   Neurological: He is alert and oriented to person, place, and time.   Skin: No rash noted.   Psychiatric: He has a normal mood and affect.   Vitals reviewed.    Personal Diagnostic Review    CT Abdomen Pelvis With IV Contrast Routine Oral Contrast  Narrative: EXAMINATION:  CT ABDOMEN PELVIS WITH IV CONTRAST    CLINICAL HISTORY:  Abdominal pain, acute, nonlocalized;  Generalized abdominal pain    TECHNIQUE:  Multiple cross-sectional images were obtained from the lung bases the pubic symphysis after the intravenous administration of 80 mL of Omnipaque 350.  Coronal and sagittal reformatted images were obtained.  An automated dose exposure technique was utilized.  This limits radiation does the patient.    COMPARISON:  02/20/2023    FINDINGS:  Dependent atelectatic changes lungs.  Heart size within normal limits.    The liver, spleen, adrenals, and pancreas are normal.  The kidneys are symmetric in size with likely extrarenal pelves bilaterally.  Prominence of the calices are noted.    Small hiatal hernia.  Small bowel is of normal caliber.  Colon is of normal caliber with scattered colonic diverticula.  Moderate to large stool burden is identified throughout the colon.  Normal appendix.    Bladder and prostate are normal.  No free fluid in the abdomen pelvis.  The course and caliber of the abdominal aorta is normal.  No free fluid in the abdomen pelvis.  No evidence for adenopathy.    No suspicious osseous lesions.  Spondylotic changes are identified.  Soft tissues are grossly.  Laxity of the right abdominal wall with thinning of the  musculature is not significantly changed.  Impression: Query for constipation.  Other secondary findings as noted.    Electronically signed by: Alonzo Cotton MD  Date:    11/24/2023  Time:    09:03            Assessment/Plan:       Problem List Items Addressed This Visit          Pulmonary    SOB (shortness of breath) - Primary    Relevant Orders    Complete PFT with bronchodilator    Stress test, pulmonary    PFT - related Arterial Blood Gas       ID    COVID-19 long hauler       Endocrine    Severe obesity (BMI 35.0-39.9) with comorbidity       Other    Other insomnia     Weight loss and exercise to improve overall health.  Sleep hygiene discussed  Agree needs sleep study, this is scheduled  Will get baseline pulmonary testing    Follow up for pft, walk, abg next available.    Discussed diagnosis, its evaluation, treatment and usual course. All questions answered.    Patient verbalized understanding of plan and left in no acute distress    Thank you for the courtesy of participating in the care of this patient    ISAAC Hern[facility]dezVal[facility]

## 2023-12-11 ENCOUNTER — PATIENT MESSAGE (OUTPATIENT)
Dept: DERMATOLOGY | Facility: CLINIC | Age: 58
End: 2023-12-11
Payer: COMMERCIAL

## 2023-12-13 ENCOUNTER — TELEPHONE (OUTPATIENT)
Dept: GASTROENTEROLOGY | Facility: CLINIC | Age: 58
End: 2023-12-13
Payer: COMMERCIAL

## 2023-12-15 ENCOUNTER — OFFICE VISIT (OUTPATIENT)
Dept: DERMATOLOGY | Facility: CLINIC | Age: 58
End: 2023-12-15
Payer: COMMERCIAL

## 2023-12-15 DIAGNOSIS — D22.9 MULTIPLE NEVI: ICD-10-CM

## 2023-12-15 DIAGNOSIS — I78.1 TELANGIECTASIA: ICD-10-CM

## 2023-12-15 DIAGNOSIS — L82.1 SEBORRHEIC KERATOSES: ICD-10-CM

## 2023-12-15 DIAGNOSIS — D18.01 CHERRY ANGIOMA: Primary | ICD-10-CM

## 2023-12-15 PROCEDURE — 99999 PR PBB SHADOW E&M-EST. PATIENT-LVL II: CPT | Mod: PBBFAC,,, | Performed by: DERMATOLOGY

## 2023-12-15 PROCEDURE — 99203 PR OFFICE/OUTPT VISIT, NEW, LEVL III, 30-44 MIN: ICD-10-PCS | Mod: S$GLB,,, | Performed by: DERMATOLOGY

## 2023-12-15 PROCEDURE — 99999 PR PBB SHADOW E&M-EST. PATIENT-LVL II: ICD-10-PCS | Mod: PBBFAC,,, | Performed by: DERMATOLOGY

## 2023-12-15 PROCEDURE — 99203 OFFICE O/P NEW LOW 30 MIN: CPT | Mod: S$GLB,,, | Performed by: DERMATOLOGY

## 2023-12-15 NOTE — PROGRESS NOTES
Dermatology Outpatient Clinic Progress Note    Patient Name: García Tang  Patient : 1965  Date of Service: 12/15/2023    CC/HPI: García Tang is a 58 y.o. year old male with history of  no skin cancers  who presents today for TBSE.  Patient reports lesion on the thigh that another provider was concerned about. Also has some lesions on the face and back.     ROS:   Dermatological ROS: positive for skin lesion changes    Physical Exam   Head   Head comments: Telangiectasias nose and cheeks       Back       Lower extremities         Diagram Legend     Erythematous scaling macule/papule c/w actinic keratosis       Vascular papule c/w angioma      Pigmented verrucoid papule/plaque c/w seborrheic keratosis      Yellow umbilicated papule c/w sebaceous hyperplasia      Irregularly shaped tan macule c/w lentigo     1-2 mm smooth white papules consistent with Milia      Movable subcutaneous cyst with punctum c/w epidermal inclusion cyst      Subcutaneous movable cyst c/w pilar cyst      Firm pink to brown papule c/w dermatofibroma      Pedunculated fleshy papule(s) c/w skin tag(s)      Evenly pigmented macule c/w junctional nevus     Mildly variegated pigmented, slightly irregular-bordered macule c/w mildly atypical nevus      Flesh colored to evenly pigmented papule c/w intradermal nevus       Pink pearly papule/plaque c/w basal cell carcinoma      Erythematous hyperkeratotic cursted plaque c/w SCC      Surgical scar with no sign of skin cancer recurrence      Open and closed comedones      Inflammatory papules and pustules      Verrucoid papule consistent consistent with wart     Erythematous eczematous patches and plaques     Dystrophic onycholytic nail with subungual debris c/w onychomycosis     Umbilicated papule    Erythematous-base heme-crusted tan verrucoid plaque consistent with inflamed seborrheic keratosis     Erythematous Silvery Scaling Plaque c/w Psoriasis     See  annotation    Assessment/Plan:    García was seen today for mole.    Diagnoses and all orders for this visit:    Cherry angioma  - Benign nature of lesion discussed, monitor for changes    Seborrheic keratoses  - Benign nature of lesion discussed, monitor for changes    Multiple nevi  - Benign nature of lesion discussed, monitor for changes    Telangiectasia  - Benign nature of lesion discussed, monitor for changes        No follow-ups on file.        Ankit Buitrago MD

## 2023-12-15 NOTE — PATIENT INSTRUCTIONS
DRY SKIN PRECAUTIONS:  Dry skin can occur at any age and for many reasons. In general, skin becomes drier as we age. It is drier in the winter months than in summer months and in low-humidity climates than in high humidity climates. Skin is not dry because it lacks oil, but because it lacks water. Therefore, all treatments are aimed at replacing water in the skin skin and the environment.    In some people, areas of seriously dry skin can lead to a condition called dermatitis in which the skin becomes inflamed. When dermatitis is present, your dermatologist may prescribe a corticosteroid cream or ointment. This cream is applied to the affected areas only. Stop using the corticosteroid cream when the dermatitis clears. The use of a moisturizing lotion, cream, or ointment should be continued to help prevent the recurrence of the dermatitis.    Dry skin can lead to itching, which can lead to scratching, which can then manifest into a rash. This itch-scratch-rash-itch cycle needs to be stopped by protecting the skin and preventing dryness.      Avoid hot baths and showers - hot water removes your natural skin oils more quickly.   Keep showers or baths brief (5-10 min).   Use a mild soap or cleanser (bar or liquid soap).   If your skin is extra-dry, you may only need to use soap daily to the underarms and groin. Use soap to the whole body only 2-3 times weekly.   When you get out of the bath or shower, PAT skin dry.  Use the 3-minute rule after you pat dry apply a moisturizer within 3 minutes of getting out of the bath.   In general ointments are better than creams, which are better than lotions.   You will need to reapply moisturizers 2-4 times a day.   Avoid irritants. Use a sensitive skin laundry detergent (Dreft, All free & clear). Run clothes through a second rinse cycle to remove any residual detergents and chemicals. Avoid wool, cigarette smoke, pet dander, grass, carpet.   Avoid perfumes and scented items.    "Avoid becoming too hot or sweating.  If you really like fragrances, the safest scent for dry/itchy/sensitive skin is coconut     Look for brands that are for "sensitive skin" or "fragrance free."   Some brands we recommend are:     SOAPS: Dove unscented, Vanicream, Cerave, Cetaphil, Aveeno, La Roche Posay   LAUNDRY POWDER/DETERGENTS: Tide Free & Gentle, All Free & Clear, DREFT  DISHES: Nahum Durán  MOISTURISERS: CeraVe, Vanicream, Cetaphil, Curel, Eucerin, Vaseline, Aquaphor      "

## 2023-12-26 ENCOUNTER — PATIENT MESSAGE (OUTPATIENT)
Dept: GASTROENTEROLOGY | Facility: CLINIC | Age: 58
End: 2023-12-26
Payer: COMMERCIAL

## 2023-12-27 ENCOUNTER — OFFICE VISIT (OUTPATIENT)
Dept: FAMILY MEDICINE | Facility: CLINIC | Age: 58
End: 2023-12-27
Payer: COMMERCIAL

## 2023-12-27 VITALS
OXYGEN SATURATION: 98 % | BODY MASS INDEX: 41.79 KG/M2 | DIASTOLIC BLOOD PRESSURE: 90 MMHG | SYSTOLIC BLOOD PRESSURE: 138 MMHG | HEART RATE: 68 BPM | WEIGHT: 291.25 LBS

## 2023-12-27 DIAGNOSIS — R47.81 SLURRED SPEECH: Primary | ICD-10-CM

## 2023-12-27 PROBLEM — I73.9 PVD (PERIPHERAL VASCULAR DISEASE): Chronic | Status: ACTIVE | Noted: 2019-09-17

## 2023-12-27 PROBLEM — G45.9 TIA (TRANSIENT ISCHEMIC ATTACK): Status: ACTIVE | Noted: 2023-12-27

## 2023-12-27 PROCEDURE — 99999 PR PBB SHADOW E&M-EST. PATIENT-LVL III: CPT | Mod: PBBFAC,,, | Performed by: INTERNAL MEDICINE

## 2023-12-27 PROCEDURE — 99214 PR OFFICE/OUTPT VISIT, EST, LEVL IV, 30-39 MIN: ICD-10-PCS | Mod: S$GLB,,, | Performed by: INTERNAL MEDICINE

## 2023-12-27 PROCEDURE — 99999 PR PBB SHADOW E&M-EST. PATIENT-LVL III: ICD-10-PCS | Mod: PBBFAC,,, | Performed by: INTERNAL MEDICINE

## 2023-12-27 PROCEDURE — 99214 OFFICE O/P EST MOD 30 MIN: CPT | Mod: S$GLB,,, | Performed by: INTERNAL MEDICINE

## 2023-12-27 NOTE — PROGRESS NOTES
Patient ID: García Tang is a 58 y.o. male.    Chief Complaint: Tremors (/)      Assessment and plan     1. Slurred speech     To ER for stroke/TIA workup     HPI     New to me.   Having muscle spasms, tongue feels swollen. Having some slurred speech which is new. Started yesterday. No weakness in arms or legs. No focal deficits besides speech which is monotone and slightly slurred on exam. Declines EMS. States he will wait in the lobby and call his wife to take him to [facility]. Discussed importance.      Review of Systems   Constitutional:  Negative for fever.   Respiratory:  Negative for shortness of breath.    Cardiovascular:  Negative for chest pain.   Gastrointestinal:  Negative for abdominal pain.   Neurological:  Positive for tremors.        Slurred speech           Objective     Vitals:    12/27/23 0945   BP: (!) 138/90   Pulse: 68     Wt Readings from Last 3 Encounters:   12/27/23 0945 132.1 kg (291 lb 3.6 oz)   12/18/23 0744 130.6 kg (287 lb 14.7 oz)   11/28/23 0843 129.9 kg (286 lb 6 oz)      Body mass index is 41.79 kg/m².     Physical Exam  Cardiovascular:      Rate and Rhythm: Normal rate and regular rhythm.      Heart sounds: No murmur heard.     No gallop.   Pulmonary:      Breath sounds: Normal breath sounds. No wheezing or rhonchi.   Abdominal:      Palpations: Abdomen is soft.      Tenderness: There is no abdominal tenderness.   Neurological:      Comments: Slurred speech            Hypertension Medications               amLODIPine (NORVASC) 5 MG tablet TAKE 1 TABLET(5 MG) BY MOUTH EVERY DAY    losartan (COZAAR) 100 MG tablet Take 1 tablet (100 mg total) by mouth once daily.    metoprolol succinate (TOPROL-XL) 50 MG 24 hr tablet Take 1 tablet (50 mg total) by mouth once daily.    spironolactone (ALDACTONE) 25 MG tablet Take 0.5 tablets (12.5 mg total) by mouth once daily.              Medication List with Changes/Refills   Current Medications    ACETAMINOPHEN (TYLENOL) 500 MG TABLET    Take  1,000 mg by mouth every 6 (six) hours as needed for Pain.    ACETYLCYSTEINE (NAC ORAL)    Take by mouth. Take 2 tablets daily    AMLODIPINE (NORVASC) 5 MG TABLET    TAKE 1 TABLET(5 MG) BY MOUTH EVERY DAY    DIPHENHYDRAMINE (BENADRYL) 50 MG CAPSULE    Take 50mg by mouth 1 hour before contrast administration.    GABAPENTIN (NEURONTIN) 100 MG CAPSULE    Take 1 capsule (100 mg total) by mouth nightly as needed (neuropathy).    LOSARTAN (COZAAR) 100 MG TABLET    Take 1 tablet (100 mg total) by mouth once daily.    MELATONIN ORAL    Take 5 mg by mouth nightly.    METOPROLOL SUCCINATE (TOPROL-XL) 50 MG 24 HR TABLET    Take 1 tablet (50 mg total) by mouth once daily.    NAPROXEN (NAPROSYN) 250 MG TABLET    Take 250 mg by mouth 2 (two) times daily as needed.    PANTOPRAZOLE (PROTONIX) 40 MG TABLET    Take 1 tablet (40 mg total) by mouth once daily.    PREDNISONE (DELTASONE) 50 MG TAB    Take 50mg by mouth at 13 hours, 7 hours, and 1 hour before contrast administration.    SPIRONOLACTONE (ALDACTONE) 25 MG TABLET    Take 0.5 tablets (12.5 mg total) by mouth once daily.       I personally reviewed past medical, family and social history.

## 2024-01-02 ENCOUNTER — TELEPHONE (OUTPATIENT)
Dept: GASTROENTEROLOGY | Facility: CLINIC | Age: 59
End: 2024-01-02
Payer: COMMERCIAL

## 2024-01-02 NOTE — TELEPHONE ENCOUNTER
----- Message from Atiya Molina sent at 1/2/2024  8:35 AM CST -----  Contact: self  Type: Sooner Appointment Request        Caller is requesting a sooner appointment. Caller declined first available appointment listed below. Caller will not accept being placed on the waitlist and is requesting a message be sent to doctor.        Name of Caller: Patient   Best Call Back Number: [phone]  Additional Information: Pt had a TIA recently going to be starting blood thinners might be a conflict. Thanks

## 2024-01-03 ENCOUNTER — TELEPHONE (OUTPATIENT)
Dept: GASTROENTEROLOGY | Facility: CLINIC | Age: 59
End: 2024-01-03
Payer: COMMERCIAL

## 2024-01-03 NOTE — TELEPHONE ENCOUNTER
Spoke with pt. Pt canceled his c-scope due to having TIA & not being able to hold plavix. Pt will call back to reschedule once he can hold blood thinner.

## 2024-01-04 ENCOUNTER — OFFICE VISIT (OUTPATIENT)
Dept: NEUROLOGY | Facility: CLINIC | Age: 59
End: 2024-01-04
Payer: COMMERCIAL

## 2024-01-04 ENCOUNTER — LAB VISIT (OUTPATIENT)
Dept: LAB | Facility: HOSPITAL | Age: 59
End: 2024-01-04
Attending: NURSE PRACTITIONER
Payer: COMMERCIAL

## 2024-01-04 VITALS
RESPIRATION RATE: 14 BRPM | SYSTOLIC BLOOD PRESSURE: 135 MMHG | WEIGHT: 290.69 LBS | DIASTOLIC BLOOD PRESSURE: 89 MMHG | HEART RATE: 64 BPM | BODY MASS INDEX: 41.61 KG/M2 | HEIGHT: 70 IN

## 2024-01-04 DIAGNOSIS — G47.09 OTHER INSOMNIA: ICD-10-CM

## 2024-01-04 DIAGNOSIS — M79.10 MYALGIA: ICD-10-CM

## 2024-01-04 DIAGNOSIS — R53.1 WEAKNESS: Primary | ICD-10-CM

## 2024-01-04 DIAGNOSIS — E11.9 TYPE 2 DIABETES MELLITUS WITHOUT COMPLICATION, WITHOUT LONG-TERM CURRENT USE OF INSULIN: ICD-10-CM

## 2024-01-04 DIAGNOSIS — M79.10 MYALGIA: Primary | ICD-10-CM

## 2024-01-04 DIAGNOSIS — I10 ESSENTIAL (PRIMARY) HYPERTENSION: ICD-10-CM

## 2024-01-04 DIAGNOSIS — G47.9 SLEEP DISORDER: ICD-10-CM

## 2024-01-04 DIAGNOSIS — R53.1 WEAKNESS: ICD-10-CM

## 2024-01-04 DIAGNOSIS — G47.10 HYPERSOMNIA: ICD-10-CM

## 2024-01-04 DIAGNOSIS — E78.5 DYSLIPIDEMIA WITH ELEVATED LOW DENSITY LIPOPROTEIN (LDL) CHOLESTEROL AND ABNORMALLY LOW HIGH DENSITY LIPOPROTEIN CHOLESTEROL: ICD-10-CM

## 2024-01-04 DIAGNOSIS — R29.818 TRANSIENT NEUROLOGICAL SYMPTOMS: ICD-10-CM

## 2024-01-04 LAB
ALBUMIN SERPL BCP-MCNC: 4.3 G/DL (ref 3.5–5.2)
ALP SERPL-CCNC: 71 U/L (ref 55–135)
ALT SERPL W/O P-5'-P-CCNC: 40 U/L (ref 10–44)
ANION GAP SERPL CALC-SCNC: 11 MMOL/L (ref 8–16)
AST SERPL-CCNC: 30 U/L (ref 10–40)
BILIRUB SERPL-MCNC: 0.6 MG/DL (ref 0.1–1)
BUN SERPL-MCNC: 17 MG/DL (ref 6–20)
CALCIUM SERPL-MCNC: 9.3 MG/DL (ref 8.7–10.5)
CHLORIDE SERPL-SCNC: 104 MMOL/L (ref 95–110)
CK SERPL-CCNC: 203 U/L (ref 20–200)
CO2 SERPL-SCNC: 26 MMOL/L (ref 23–29)
CREAT SERPL-MCNC: 1 MG/DL (ref 0.5–1.4)
CRP SERPL-MCNC: 0.6 MG/L (ref 0–8.2)
ERYTHROCYTE [SEDIMENTATION RATE] IN BLOOD BY PHOTOMETRIC METHOD: 8 MM/HR (ref 0–23)
EST. GFR  (NO RACE VARIABLE): >60 ML/MIN/1.73 M^2
GLUCOSE SERPL-MCNC: 123 MG/DL (ref 70–110)
POTASSIUM SERPL-SCNC: 4.8 MMOL/L (ref 3.5–5.1)
PROT SERPL-MCNC: 7.5 G/DL (ref 6–8.4)
SODIUM SERPL-SCNC: 141 MMOL/L (ref 136–145)

## 2024-01-04 PROCEDURE — 83519 RIA NONANTIBODY: CPT | Mod: 59 | Performed by: NURSE PRACTITIONER

## 2024-01-04 PROCEDURE — 99215 OFFICE O/P EST HI 40 MIN: CPT | Mod: S$GLB,,, | Performed by: NURSE PRACTITIONER

## 2024-01-04 PROCEDURE — 99999 PR PBB SHADOW E&M-EST. PATIENT-LVL IV: CPT | Mod: PBBFAC,,, | Performed by: NURSE PRACTITIONER

## 2024-01-04 PROCEDURE — 82550 ASSAY OF CK (CPK): CPT | Performed by: NURSE PRACTITIONER

## 2024-01-04 PROCEDURE — 36415 COLL VENOUS BLD VENIPUNCTURE: CPT | Mod: PO | Performed by: NURSE PRACTITIONER

## 2024-01-04 PROCEDURE — 80053 COMPREHEN METABOLIC PANEL: CPT | Performed by: NURSE PRACTITIONER

## 2024-01-04 PROCEDURE — 86140 C-REACTIVE PROTEIN: CPT | Performed by: NURSE PRACTITIONER

## 2024-01-04 PROCEDURE — 83519 RIA NONANTIBODY: CPT | Performed by: NURSE PRACTITIONER

## 2024-01-04 PROCEDURE — 85652 RBC SED RATE AUTOMATED: CPT | Performed by: NURSE PRACTITIONER

## 2024-01-04 NOTE — PATIENT INSTRUCTIONS
There is no indication for you to continue taking Plavix. Continue taking the aspirin 81 mg daily for now.     Some of your symptoms may be related to the new cholesterol medication (atorvastatin). We will check some labs today to ensure no signs of muscle damage or liver dysfunction from it. Stop taking it for now.     If these labs are normal, we can try switching you to a lower dose of a different cholesterol medication called rosuvastatin that is often better tolerated.     You need to have the sleep study done -- this could account for the intense fatigue that you have. Can also increase risk for high BP, heart issues, migraines, etc.     We will also get a nerve conduction study in the R leg to evaluate for causes of the sensory changes and weakness, such as a lower back issue.

## 2024-01-07 ENCOUNTER — PATIENT MESSAGE (OUTPATIENT)
Dept: NEUROLOGY | Facility: CLINIC | Age: 59
End: 2024-01-07
Payer: COMMERCIAL

## 2024-01-11 ENCOUNTER — OFFICE VISIT (OUTPATIENT)
Dept: FAMILY MEDICINE | Facility: CLINIC | Age: 59
End: 2024-01-11
Payer: COMMERCIAL

## 2024-01-11 VITALS
WEIGHT: 294.13 LBS | HEIGHT: 70 IN | DIASTOLIC BLOOD PRESSURE: 78 MMHG | SYSTOLIC BLOOD PRESSURE: 130 MMHG | BODY MASS INDEX: 42.11 KG/M2

## 2024-01-11 DIAGNOSIS — Z09 HOSPITAL DISCHARGE FOLLOW-UP: Primary | ICD-10-CM

## 2024-01-11 DIAGNOSIS — R53.83 FATIGUE, UNSPECIFIED TYPE: ICD-10-CM

## 2024-01-11 DIAGNOSIS — Z86.73 HX-TIA (TRANSIENT ISCHEMIC ATTACK): ICD-10-CM

## 2024-01-11 DIAGNOSIS — M62.81 GENERALIZED MUSCLE WEAKNESS: ICD-10-CM

## 2024-01-11 DIAGNOSIS — M79.10 MYALGIA: ICD-10-CM

## 2024-01-11 DIAGNOSIS — R20.0 RIGHT LEG NUMBNESS: ICD-10-CM

## 2024-01-11 DIAGNOSIS — I10 ESSENTIAL (PRIMARY) HYPERTENSION: ICD-10-CM

## 2024-01-11 LAB
ACHR BIND AB SER-SCNC: 0 NMOL/L
MG INTERPRETIVE COMMENTS: NORMAL
MUSK ANTIBODY TEST: 0 NMOL/L (ref 0–0.02)

## 2024-01-11 PROCEDURE — 99999 PR PBB SHADOW E&M-EST. PATIENT-LVL IV: CPT | Mod: PBBFAC,,, | Performed by: NURSE PRACTITIONER

## 2024-01-11 PROCEDURE — 99214 OFFICE O/P EST MOD 30 MIN: CPT | Mod: S$GLB,,, | Performed by: NURSE PRACTITIONER

## 2024-01-11 NOTE — PROGRESS NOTES
Assessment and Plan:  García was seen today for hospital follow-up.    Diagnoses and all orders for this visit:    Hospital discharge follow-up    Fatigue, unspecified type  Comments:  Sleep study scheduled on 2/22/24    Essential (primary) hypertension  Comments:  Continue medications as prescribed.  Follow low sodium diet.    Hx-TIA (transient ischemic attack)    Myalgia  Comments:  improving    Right leg numbness  Comments:  Nerve and muscle conduction study scheduled on 2/22/24    Generalized muscle weakness    Go to ER should you develop slurred speech, facial droop, chest pain, sob or confusion.    Follow up in about 4 weeks (around 2/8/2024) for with PCP.   ______________________________________________________________________  Subjective:    Chief Complaint:  Hospital follow-up    HPI:  García is a 58 y.o. year old male here for hospital follow-up Patient's PCP is Dr. Domingo.   Patient was admitted to [facility] on 12/27/23 and dx with a TIA after he presented to the ER  with slurred speech, tingling to his tongue, weakness/numbness  to his right leg and dizziness Ct, MRI, MRA was negative for acute stroke.  B12 and folic acid were within normal limits.  2D echo had a negative bubble study.     Followed up with neurologist on 1/4/24 - he was advised to stop taking Plavix and Lipitor due to muscle pain. myasthenia panel was negative.     Patient reports that he continues to have slight weakness and numbness to his right leg.   He reports muscle pain has improved since stopping statin.     He reports that he still feels very fatigued and has a feeling of heaviness to both legs.    He has EMG ordered by Dr. Wing scheduled for 2/22/24    Sleep study scheduled on 2/6/24    Current Outpatient Medications on File Prior to Visit   Medication Sig Dispense Refill    acetylcysteine (NAC ORAL) Take 2 tablets by mouth once daily.      amLODIPine (NORVASC) 5 MG tablet Take 1 tablet (5 mg total) by mouth every evening.       ascorbic acid (VITAMIN C ORAL) Take 2 tablets by mouth once daily.      aspirin (ECOTRIN) 81 MG EC tablet Take 1 tablet (81 mg total) by mouth once daily. for 21 days 21 tablet 0    cholecalciferol, vitamin D3, 125 mcg (5,000 unit) Tab Take 10,000 Units by mouth once daily.      losartan (COZAAR) 100 MG tablet Take 1 tablet (100 mg total) by mouth once daily. 90 tablet 1    magnesium oxide (MAG-OX) 400 mg (241.3 mg magnesium) tablet Take 400 mg by mouth once daily.      metoprolol succinate (TOPROL-XL) 50 MG 24 hr tablet Take 1 tablet (50 mg total) by mouth once daily. 90 tablet 3    naproxen (NAPROSYN) 250 MG tablet Take 250 mg by mouth 2 (two) times daily as needed (pain).      NIACIN ORAL Take 1 tablet by mouth once daily.      pantoprazole (PROTONIX) 40 MG tablet Take 1 tablet (40 mg total) by mouth once daily. 90 tablet 1    spironolactone (ALDACTONE) 25 MG tablet Take 0.5 tablets (12.5 mg total) by mouth once daily. (Patient taking differently: Take 12.5 mg by mouth every evening.) 45 tablet 0    zinc gluconate 50 mg tablet Take 50 mg by mouth once daily.      acetaminophen (TYLENOL) 500 MG tablet Take 1,000 mg by mouth every 6 (six) hours as needed for Pain.      Lactobacillus acidophilus (ACIDOPHILUS ORAL) Take 3 tablets by mouth once daily.       No current facility-administered medications on file prior to visit.       Review of Systems:  Review of Systems   Constitutional:  Positive for fatigue. Negative for chills and fever.   HENT:  Negative for congestion and rhinorrhea.    Respiratory:  Negative for cough and shortness of breath.    Cardiovascular:  Negative for chest pain, palpitations and leg swelling.   Gastrointestinal:  Negative for diarrhea, nausea and vomiting.   Musculoskeletal:  Positive for myalgias. Negative for arthralgias.   Skin:  Negative for rash.   Neurological:  Positive for weakness and numbness. Negative for dizziness, light-headedness and headaches.       Past Medical  "History:  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    Diverticulitis     diverticulitis    Diverticulosis     Essential hypertension 07/18/2016    Fatty liver disease, nonalcoholic 04/07/2016    [place], 2006     GERD (gastroesophageal reflux disease)     Headache     HLD (hyperlipidemia)     Kidney stone     requiring stent    Meniere's disease 01/09/2018    Morbid obesity 05/29/2014    NAFLD (nonalcoholic fatty liver disease)     Near syncope 05/23/2017    Obesity hypoventilation syndrome 02/22/2016    PAD (peripheral artery disease) 09/17/2019    Mild 9/2018    Prostatitis     Rectal bleeding     diverticulitis    Thyroid nodule 07/14/2022       Objective:    Vitals:  Vitals:    01/11/24 1319   BP: 130/78   Weight: 133.4 kg (294 lb 1.5 oz)   Height: 5' 10" (1.778 m)   PainSc:   7       Physical Exam  Vitals reviewed.   Constitutional:       Appearance: Normal appearance. He is obese.   HENT:      Head: Normocephalic and atraumatic.      Mouth/Throat:      Mouth: Mucous membranes are moist.      Pharynx: Oropharynx is clear.   Eyes:      General: No scleral icterus.  Cardiovascular:      Rate and Rhythm: Normal rate and regular rhythm.   Pulmonary:      Effort: Pulmonary effort is normal.      Breath sounds: Normal breath sounds.   Musculoskeletal:         General: Normal range of motion.      Cervical back: Normal range of motion.   Lymphadenopathy:      Cervical: No cervical adenopathy.   Skin:     General: Skin is warm and dry.   Neurological:      Mental Status: He is alert and oriented to person, place, and time.      Gait: Gait normal.   Psychiatric:         Mood and Affect: Mood normal.         Behavior: Behavior normal.         Data:    MRA neck wo 12/27/23:  Impression:     No flow-limiting stenosis, occlusion, or aneurysm identified noting limitations of " motion artifact.     MRA brain 12/27/23:  Impression:     No flow-limiting stenosis, occlusion, or aneurysm identified noting limitations from motion artifact.     MRI brain wo 12/27/23:  FINDINGS:  Diffusion-weighted imaging is negative for acute or subacute ischemia.     Mild chronic involutional changes are noted.  Mild chronic white matter microischemic changes are noted.  No acute intracranial hemorrhage, extra-axial fluid collection, hydrocephalus, mass effect, or midline shift is identified.  The visualized vascular flow voids appear intact.  The visualized paranasal sinuses demonstrate minimal scattered mucoperiosteal thickening.  The visualized mastoid air cells are clear.     Impression:     1. No acute intracranial abnormalities identified      Visit Summary:  Hospital discharge summary reviewed.   Discussed study and lab results in detail.  Chart updated.   Advised patient to keep follow-up appointments with specialists.   Nerve and muscle conduction study scheduled on 2/22/24  Sleep study scheduled on 2/6/24  Advised on emergent conditions.  Side effects and precautions of medication use reviewed with patient, expressed understanding. No questions or concerns   Patient to follow-up with PCP in 1 mth.       JUSTUS Guerrier-C  Family Medicine

## 2024-01-17 ENCOUNTER — OFFICE VISIT (OUTPATIENT)
Dept: FAMILY MEDICINE | Facility: CLINIC | Age: 59
End: 2024-01-17
Payer: COMMERCIAL

## 2024-01-17 ENCOUNTER — LAB VISIT (OUTPATIENT)
Dept: LAB | Facility: HOSPITAL | Age: 59
End: 2024-01-17
Attending: PHYSICIAN ASSISTANT
Payer: COMMERCIAL

## 2024-01-17 VITALS
SYSTOLIC BLOOD PRESSURE: 134 MMHG | HEIGHT: 70 IN | DIASTOLIC BLOOD PRESSURE: 84 MMHG | OXYGEN SATURATION: 96 % | BODY MASS INDEX: 42.86 KG/M2 | HEART RATE: 79 BPM | WEIGHT: 299.38 LBS

## 2024-01-17 DIAGNOSIS — R29.898 WEAKNESS OF BOTH LOWER EXTREMITIES: Primary | ICD-10-CM

## 2024-01-17 DIAGNOSIS — R29.898 WEAKNESS OF BOTH LOWER EXTREMITIES: ICD-10-CM

## 2024-01-17 DIAGNOSIS — R29.898 WEAKNESS OF BOTH ARMS: ICD-10-CM

## 2024-01-17 DIAGNOSIS — R42 DIZZINESS: ICD-10-CM

## 2024-01-17 PROBLEM — R47.81 SLURRED SPEECH: Status: RESOLVED | Noted: 2023-12-27 | Resolved: 2024-01-17

## 2024-01-17 PROBLEM — R25.1 TREMOR, UNSPECIFIED: Status: RESOLVED | Noted: 2021-11-30 | Resolved: 2024-01-17

## 2024-01-17 PROBLEM — R29.818 TRANSIENT NEUROLOGICAL SYMPTOMS: Status: ACTIVE | Noted: 2023-12-27

## 2024-01-17 LAB
ALBUMIN SERPL BCP-MCNC: 4.2 G/DL (ref 3.5–5.2)
ALP SERPL-CCNC: 70 U/L (ref 55–135)
ALT SERPL W/O P-5'-P-CCNC: 40 U/L (ref 10–44)
ANION GAP SERPL CALC-SCNC: 5 MMOL/L (ref 8–16)
AST SERPL-CCNC: 30 U/L (ref 10–40)
BASOPHILS # BLD AUTO: 0.06 K/UL (ref 0–0.2)
BASOPHILS NFR BLD: 1.3 % (ref 0–1.9)
BILIRUB SERPL-MCNC: 0.6 MG/DL (ref 0.1–1)
BUN SERPL-MCNC: 20 MG/DL (ref 6–20)
CALCIUM SERPL-MCNC: 9.6 MG/DL (ref 8.7–10.5)
CHLORIDE SERPL-SCNC: 105 MMOL/L (ref 95–110)
CO2 SERPL-SCNC: 29 MMOL/L (ref 23–29)
CREAT SERPL-MCNC: 1.1 MG/DL (ref 0.5–1.4)
CRP SERPL-MCNC: 0.6 MG/L (ref 0–8.2)
DIFFERENTIAL METHOD BLD: NORMAL
EOSINOPHIL # BLD AUTO: 0.2 K/UL (ref 0–0.5)
EOSINOPHIL NFR BLD: 3.3 % (ref 0–8)
ERYTHROCYTE [DISTWIDTH] IN BLOOD BY AUTOMATED COUNT: 11.6 % (ref 11.5–14.5)
ERYTHROCYTE [SEDIMENTATION RATE] IN BLOOD BY PHOTOMETRIC METHOD: <2 MM/HR (ref 0–23)
EST. GFR  (NO RACE VARIABLE): >60 ML/MIN/1.73 M^2
FERRITIN SERPL-MCNC: 118 NG/ML (ref 20–300)
GLUCOSE SERPL-MCNC: 125 MG/DL (ref 70–110)
HCT VFR BLD AUTO: 45.5 % (ref 40–54)
HGB BLD-MCNC: 15.3 G/DL (ref 14–18)
IMM GRANULOCYTES # BLD AUTO: 0.01 K/UL (ref 0–0.04)
IMM GRANULOCYTES NFR BLD AUTO: 0.2 % (ref 0–0.5)
IRON SERPL-MCNC: 135 UG/DL (ref 45–160)
LYMPHOCYTES # BLD AUTO: 1.3 K/UL (ref 1–4.8)
LYMPHOCYTES NFR BLD: 29.6 % (ref 18–48)
MCH RBC QN AUTO: 30.3 PG (ref 27–31)
MCHC RBC AUTO-ENTMCNC: 33.6 G/DL (ref 32–36)
MCV RBC AUTO: 90 FL (ref 82–98)
MONOCYTES # BLD AUTO: 0.6 K/UL (ref 0.3–1)
MONOCYTES NFR BLD: 13.2 % (ref 4–15)
NEUTROPHILS # BLD AUTO: 2.4 K/UL (ref 1.8–7.7)
NEUTROPHILS NFR BLD: 52.4 % (ref 38–73)
NRBC BLD-RTO: 0 /100 WBC
PLATELET # BLD AUTO: 211 K/UL (ref 150–450)
PMV BLD AUTO: 9.9 FL (ref 9.2–12.9)
POTASSIUM SERPL-SCNC: 4.6 MMOL/L (ref 3.5–5.1)
PROT SERPL-MCNC: 7.4 G/DL (ref 6–8.4)
RBC # BLD AUTO: 5.05 M/UL (ref 4.6–6.2)
SATURATED IRON: 38 % (ref 20–50)
SODIUM SERPL-SCNC: 139 MMOL/L (ref 136–145)
TOTAL IRON BINDING CAPACITY: 358 UG/DL (ref 250–450)
TRANSFERRIN SERPL-MCNC: 242 MG/DL (ref 200–375)
TSH SERPL DL<=0.005 MIU/L-ACNC: 1.49 UIU/ML (ref 0.4–4)
WBC # BLD AUTO: 4.53 K/UL (ref 3.9–12.7)

## 2024-01-17 PROCEDURE — 36415 COLL VENOUS BLD VENIPUNCTURE: CPT | Mod: PO | Performed by: PHYSICIAN ASSISTANT

## 2024-01-17 PROCEDURE — 99999 PR PBB SHADOW E&M-EST. PATIENT-LVL V: CPT | Mod: PBBFAC,,, | Performed by: PHYSICIAN ASSISTANT

## 2024-01-17 PROCEDURE — 84443 ASSAY THYROID STIM HORMONE: CPT | Performed by: PHYSICIAN ASSISTANT

## 2024-01-17 PROCEDURE — 83540 ASSAY OF IRON: CPT | Performed by: PHYSICIAN ASSISTANT

## 2024-01-17 PROCEDURE — 85652 RBC SED RATE AUTOMATED: CPT | Performed by: PHYSICIAN ASSISTANT

## 2024-01-17 PROCEDURE — 99214 OFFICE O/P EST MOD 30 MIN: CPT | Mod: S$GLB,,, | Performed by: PHYSICIAN ASSISTANT

## 2024-01-17 PROCEDURE — 86140 C-REACTIVE PROTEIN: CPT | Performed by: PHYSICIAN ASSISTANT

## 2024-01-17 PROCEDURE — 85025 COMPLETE CBC W/AUTO DIFF WBC: CPT | Performed by: PHYSICIAN ASSISTANT

## 2024-01-17 PROCEDURE — 80053 COMPREHEN METABOLIC PANEL: CPT | Performed by: PHYSICIAN ASSISTANT

## 2024-01-17 PROCEDURE — 82728 ASSAY OF FERRITIN: CPT | Performed by: PHYSICIAN ASSISTANT

## 2024-01-17 NOTE — PROGRESS NOTES
"Subjective:      Patient ID: García Tang is a 58 y.o. male.    Chief Complaint: Extremity Weakness    HPI  Patient has PMH of cerebral aneurysm with cognitive communication deficit, anxiety, dysthymia, meniere's disease, HTN, dyslipidemia, arteriosclerotic cardiovascular disease, Type 2 DM, and GERD.     Patient went to [facility] for TIA 12/27/2023 to 12/28/2023.  Started aspirin 81mg for 21 days and plavix 75mg daily.  During neurology follow up, discontinued plavix and atorvastatin and started aspirin 81mg indefinitely.  Recommended sleep study.    Today patient reports increased weakness of legs ("feels like weights in his legs when walking" and arms, and balance issues are worsening.  Fell Sunday.  Sleep issues due to leg soreness.  Sleep study scheduled-no known snoring.  Nerve conduction test scheduled.  Without statin, symptoms are worsening.    Type 2 DM-  Blood sugars have been 106 in the mornings.  Managed with diet.  Lab Results   Component Value Date    HGBA1C 6.2 (H) 12/27/2023      Review of Systems   Constitutional:  Positive for activity change (no energy) and fatigue. Negative for appetite change, chills and fever.   Respiratory:  Positive for shortness of breath (sometimes).    Cardiovascular:  Negative for chest pain.   Gastrointestinal:  Positive for nausea (sometimes). Negative for abdominal pain, blood in stool, constipation, diarrhea and vomiting.   Neurological:  Positive for dizziness, syncope (near sometimes) and headaches.   Psychiatric/Behavioral:  Positive for confusion, dysphoric mood and sleep disturbance.        Objective:   /84   Pulse 79   Ht 5' 10" (1.778 m)   Wt 135.8 kg (299 lb 6.2 oz)   SpO2 96%   BMI 42.96 kg/m²     Physical Exam  Vitals reviewed.   Constitutional:       Appearance: Normal appearance. He is well-developed.   HENT:      Head: Normocephalic and atraumatic.      Right Ear: External ear normal.      Left Ear: External ear normal.   Eyes:      " Conjunctiva/sclera: Conjunctivae normal.   Cardiovascular:      Rate and Rhythm: Normal rate and regular rhythm.      Heart sounds: Normal heart sounds. No murmur heard.     No friction rub. No gallop.   Pulmonary:      Effort: Pulmonary effort is normal. No respiratory distress.      Breath sounds: Normal breath sounds. No wheezing, rhonchi or rales.   Musculoskeletal:         General: Normal range of motion.   Skin:     General: Skin is warm and dry.      Findings: No rash.   Neurological:      General: No focal deficit present.      Mental Status: He is alert and oriented to person, place, and time.      Cranial Nerves: No dysarthria or facial asymmetry.      Motor: Weakness (more in bilateral legs) present.      Gait: Gait is intact.      Comments: Strength is noticeably decreased in legs.  Arms are average.  Facial symmetry.   Psychiatric:         Mood and Affect: Mood normal.         Behavior: Behavior normal.         Judgment: Judgment normal.       Assessment:      1. Weakness of both lower extremities    2. Weakness of both arms    3. Dizziness       Plan:   1. Weakness of both lower extremities  Due to patient's symptoms, looks like more of a stroke than TIA because his symptoms have not resolved.  Gave strict ED precautions for worsening symptoms.  - CBC Auto Differential; Future  - Comprehensive Metabolic Panel; Future  - Iron and TIBC; Future  - Ferritin; Future  - C-Reactive Protein; Future  - Sedimentation rate; Future  - TSH; Future  - Ambulatory referral/consult to Physical/Occupational Therapy; Future    2. Weakness of both arms  - CBC Auto Differential; Future  - Comprehensive Metabolic Panel; Future  - Iron and TIBC; Future  - Ferritin; Future  - C-Reactive Protein; Future  - Sedimentation rate; Future  - TSH; Future  - Ambulatory referral/consult to Physical/Occupational Therapy; Future    3. Dizziness  - Ambulatory referral/consult to Physical/Occupational Therapy; Future    Follow up in one  month with Dr. Domingo.  Patient agreed with plan and expressed understanding.    Thank you for allowing me to serve you,

## 2024-01-17 NOTE — ASSESSMENT & PLAN NOTE
He has had recurrent episodes of similar symptoms since 2021  Overall, not suggestive of vascular etiology given timeline and symptoms reported (muscle cramps, fatigue, malaise).   Has undergone extensive vascular evaluation, including DSA, in the past. Also consulted with vascular subspecialist, who did not suspect vascular etiology    Recent diagnostics reviewed with pt and wife   - MRI / MRA negative    - TTE with normal EF, negative for shunt  - normal EKG and recent Holter   - A1C 6.2      There is no indication for him to continue DAPT indefinitely. He will dc Plavix and continue on ASA monotherapy.   Some of his symptoms could be related to statin SE -- hold now. Check serologies.  Will check serologies for MG, although his symptoms are not typical for NM disorder  EMG of the RLE for further eval of sensory changes   Sleep study to evaluate for CHANELL

## 2024-01-22 ENCOUNTER — PATIENT MESSAGE (OUTPATIENT)
Dept: FAMILY MEDICINE | Facility: CLINIC | Age: 59
End: 2024-01-22
Payer: COMMERCIAL

## 2024-01-23 ENCOUNTER — TELEPHONE (OUTPATIENT)
Dept: NEUROLOGY | Facility: CLINIC | Age: 59
End: 2024-01-23
Payer: COMMERCIAL

## 2024-01-23 NOTE — TELEPHONE ENCOUNTER
Contacted pt to see what else was going on or to see how I could help him. Pt did sound very out of it. Slurred speech.let him know that if he felt any worse later on all I can recommend was to go to the ER. And they can do more testing on him. But as per abner she has done everything as in tests in a neurological standpoint. He gave verbal and stated he was a little embarrassed to even be calling since he knew that it has not been long ago since he came in. Requested to get a vv with abner to explain to her and see if she would be able to help him. Gave verbal and put him on for tomorrow 2/24/24 for 1230vv.

## 2024-01-23 NOTE — TELEPHONE ENCOUNTER
----- Message from Audrey Mendoza MA sent at 1/23/2024 11:51 AM CST -----  Contact: Pt    ----- Message -----  From: Azeb Carrasco, Patient Care Assistant  Sent: 1/23/2024   8:09 AM CST  To: [facility] Staff    Type: Same Day Appointment    Caller is requesting a same day appointment.  Caller declined first available appt listed below.    Name of caller:Pt  When is the first available appt:No avail appts  Symptoms:TIA's is getting worse  Best Call back number:[phone] (home)   Additional Info:Please advise-Thank you~

## 2024-01-23 NOTE — TELEPHONE ENCOUNTER
----- Message from Azeb Carrasco, Patient Care Assistant sent at 1/23/2024 12:34 PM CST -----  Contact: Pt  Type: Needs Medical Advice    Who Called: Pt  Best Call Back Number: [phone]  Inquiry/Question: Pt is calling to get advice on what to do for his TIA. Pt states he just want a call back declined the sooner appt on 01/25/24. Please advise Thank you~

## 2024-01-24 ENCOUNTER — TELEPHONE (OUTPATIENT)
Dept: NEUROLOGY | Facility: CLINIC | Age: 59
End: 2024-01-24
Payer: COMMERCIAL

## 2024-01-24 ENCOUNTER — TELEPHONE (OUTPATIENT)
Dept: FAMILY MEDICINE | Facility: CLINIC | Age: 59
End: 2024-01-24
Payer: COMMERCIAL

## 2024-01-24 ENCOUNTER — PATIENT MESSAGE (OUTPATIENT)
Dept: FAMILY MEDICINE | Facility: CLINIC | Age: 59
End: 2024-01-24
Payer: COMMERCIAL

## 2024-01-24 DIAGNOSIS — R53.1 WEAKNESS: Primary | ICD-10-CM

## 2024-01-24 DIAGNOSIS — R20.2 PARESTHESIA: ICD-10-CM

## 2024-01-24 DIAGNOSIS — R74.8 ABNORMAL CK: ICD-10-CM

## 2024-01-24 PROBLEM — K59.04 FUNCTIONAL CONSTIPATION: Status: ACTIVE | Noted: 2024-01-24

## 2024-01-24 PROBLEM — R26.9 GAIT DISTURBANCE: Status: ACTIVE | Noted: 2018-01-12

## 2024-01-24 NOTE — TELEPHONE ENCOUNTER
Spoke to patient, patient was recommended to go to the ER to be evaluated for his symptoms. Patient verbalized understanding stating that he will head over to the ED.

## 2024-01-24 NOTE — TELEPHONE ENCOUNTER
----- Message from Earnest Young sent at 1/24/2024  7:40 AM CST -----  Type: Needs Medical Advice  Who Called:  pt  Best Call Back Number: [phone]  Additional Information: pt is calling the office to speak with the nurse in regards to if he needs to be seen today for what he has going on. Please call back to advise Thanks!

## 2024-01-24 NOTE — TELEPHONE ENCOUNTER
----- Message from Alyssa Salinas NP sent at 1/24/2024  8:20 AM CST -----  See prior messages.   Please contact pt & check in with him  If he is having TIA/ CVA like symptoms or altered mental status, etc then he needs to go to the ED for evaluation

## 2024-01-24 NOTE — TELEPHONE ENCOUNTER
Messaged patient in result note.  He is currently at [facility] ER.    Carmen Villaseñor PA-C

## 2024-01-24 NOTE — TELEPHONE ENCOUNTER
Spoke to pt to inform him of labs ordered by Alyssa Salinas NP to be drawn non fasting next week. Pt v/u.

## 2024-01-24 NOTE — TELEPHONE ENCOUNTER
Spoke to pt this morning. Pt does not appear to be slurring speech. States that his main symptoms are weakness in legs, lightheaded, and tingling in fingers. States canceled virtual with Alyssa Salinas NP due to him waiting on primary to see if they can fit him in today or recommend he go to the ER. Explained to pt that if he is having worsening of these symptoms than we recommend he go to the ER. Pt states does not want to go to ER without a provider contacting the hospital on his behalf. Encouraged pt to go to ER own his own will but he would rather be seen in clinic. Please advise.

## 2024-01-26 PROBLEM — M50.30 DEGENERATIVE CERVICAL DISC: Status: ACTIVE | Noted: 2024-01-26

## 2024-01-27 PROBLEM — M47.12 CERVICAL SPONDYLOSIS WITH MYELOPATHY: Status: ACTIVE | Noted: 2024-01-26

## 2024-01-30 ENCOUNTER — TELEPHONE (OUTPATIENT)
Dept: NEUROLOGY | Facility: CLINIC | Age: 59
End: 2024-01-30
Payer: COMMERCIAL

## 2024-01-30 NOTE — TELEPHONE ENCOUNTER
Spoke with patient to schedule the EMG study.  Per patient, he was in the hospital,  Neurosurgery consulted wanting more the a one limb EMG.  Patient canceled the study and will discuss testing in the office with Neurosurgery.

## 2024-02-01 ENCOUNTER — OFFICE VISIT (OUTPATIENT)
Dept: NEUROSURGERY | Facility: CLINIC | Age: 59
End: 2024-02-01
Payer: COMMERCIAL

## 2024-02-01 ENCOUNTER — HOSPITAL ENCOUNTER (OUTPATIENT)
Dept: RADIOLOGY | Facility: HOSPITAL | Age: 59
Discharge: HOME | End: 2024-02-01
Attending: STUDENT IN AN ORGANIZED HEALTH CARE EDUCATION/TRAINING PROGRAM
Payer: COMMERCIAL

## 2024-02-01 VITALS
HEIGHT: 70 IN | WEIGHT: 293 LBS | DIASTOLIC BLOOD PRESSURE: 75 MMHG | RESPIRATION RATE: 18 BRPM | SYSTOLIC BLOOD PRESSURE: 120 MMHG | BODY MASS INDEX: 41.95 KG/M2 | HEART RATE: 63 BPM

## 2024-02-01 DIAGNOSIS — M54.12 CERVICAL RADICULOPATHY: Primary | ICD-10-CM

## 2024-02-01 DIAGNOSIS — M54.12 CERVICAL RADICULOPATHY: ICD-10-CM

## 2024-02-01 PROCEDURE — 72050 X-RAY EXAM NECK SPINE 4/5VWS: CPT | Mod: 26,,, | Performed by: RADIOLOGY

## 2024-02-01 PROCEDURE — 72050 X-RAY EXAM NECK SPINE 4/5VWS: CPT | Mod: TC,FY,PO

## 2024-02-01 PROCEDURE — 99215 OFFICE O/P EST HI 40 MIN: CPT | Mod: S$GLB,,, | Performed by: STUDENT IN AN ORGANIZED HEALTH CARE EDUCATION/TRAINING PROGRAM

## 2024-02-01 NOTE — PROGRESS NOTES
SUBJECTIVE:      Chief Complaint:        Chief Complaint   Patient presents with    Weakness         Hospital f/u    He continues to remain symptomatic.  He has using a rolling walker.  He has been noticing over the last few days intermittent electric-like pulses which will go down his arms and even his legs.  He has using a rolling walker he is weakness in the upper and lower extremities    Seems to fluctuate.       History of Present Illness:  García Tang is a 58 y.o. male who presents with      A complicated history.  He states that he has been symptomatic prolonged period of time with imbalance  He has had a history of question of TIAs.  However over the last month or so.  He is had new, development in his symptoms have progressed.        Some of the symptoms that have been occurring during this time or forms of cognitive deterioration as well as paresthesias in his jaw, as well as his mouth and tongue where he will get numbness and tingling.  He is seen Neurology about this over time.  We reviewed that this is unrelated to his cervical spine findings           However he has developed worsening of his balance and coordination.  He is now using a rolling walker for assist  He has weakness in the hands upper extremities some more difficulty with dexterity/dropping objects noticed some paresthesias numbness.  He knows proximal pain in the arm and the proximal arm and shoulder region  Feels like his right leg is weaker also.     I he is ambulating in rolling back and forth to the bathroom he does use a rolling walker assist        He has had extensive workup including brain imaging which did not reveal an abnormality he had imaging of his cervical thoracic and lumbar spine..        The only positive finding was cervical spondylosis with disc degeneration at C4-5 and at C5-6 at the C5-6 segment where there is disc osteophyte complex and moderate stenosis  There is a focal cord signal abnormality off to the  right at C5-6 just at the level stenosis, there has not severe disc herniation or but chronic moderate stenosis ligamentous buckling     Does not appear to be a discrete mass or tumor.  Radiology has read consistent with potential myelomalacia or cord signal change  The cord is not expanded it has not affecting the entire cord is off to the right           He is also undergone a lumbar puncture.  Been evaluated by Dr. Garvey and Dr. Smith  Discussed the case nothing diagnostic on early lumbar puncture results           Pertinent and Recent history, provider evaluations, imaging and data reviewed in EPIC         Impression:     1. Congenital AP narrowing of spinal canal with superimposed multilevel spondylosis, greatest at C5-6 where there is mild-moderate cord flattening with preserved CSF surrounding the cord.  Abnormal cord signal in the left cord at C5-6 with subtle associated enhancement.  This is favored to reflect either edema or myelomalacia.  2. Multilevel foraminal stenosis, greatest at C6-7 where it is moderate-severe bilaterally.  Epic message sent to Dr. Garvey at 13:20 on 1/26/2024.        Electronically signed by: Alonzo Stevens MD  Date:                                            01/26/2024  Time:                                           13:28           Diagnostic Results:  I have independently reviewed the following imaging:  All images reviewed see above                 Review of patient's allergies indicates:   Allergen Reactions    Lisinopril Swelling    Iodinated contrast media Other (See Comments)       Spasms, tremor, and high blood pressure    Bactrim  [sulfamethoxazole-trimethoprim]         Other reaction(s): Unknown    Diflucan [fluconazole]         Heart racing    Hydroxyzine hcl         Other reaction(s): elevated bp    Kenalog [triamcinolone acetonide] Other (See Comments)       Heart flutters all day    Phenergan [promethazine] Anxiety    Zofran [ondansetron hcl (pf)] Anxiety               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    Diverticulitis       diverticulitis    Diverticulosis      Essential hypertension 07/18/2016    Fatty liver disease, nonalcoholic 04/07/2016     [place], 2006     GERD (gastroesophageal reflux disease)      Headache      HLD (hyperlipidemia)      Kidney stone       requiring stent    Meniere's disease 01/09/2018    Morbid obesity 05/29/2014    NAFLD (nonalcoholic fatty liver disease)      Near syncope 05/23/2017    Obesity hypoventilation syndrome 02/22/2016    PAD (peripheral artery disease) 09/17/2019     Mild 9/2018    Prostatitis      Rectal bleeding       diverticulitis    Thyroid nodule 07/14/2022            Past Surgical History:   Procedure Laterality Date    ANGIOGRAM, CORONARY, WITH LEFT HEART CATHETERIZATION   10/26/2023     Procedure: Left Heart Cath;  Surgeon: Sal Salazar MD;  Location: [facility] CATH;  Service: Cardiology;;    CARDIAC CATHETERIZATION        COLONOSCOPY   2008     diverticulosis per patient report    CORONARY ANGIOGRAPHY   10/26/2023     Procedure: Coronary angiogram study;  Surgeon: Sal Salazar MD;  Location: [facility] CATH;  Service: Cardiology;;    ESOPHAGOGASTRODUODENOSCOPY N/A 12/18/2023     Procedure: EGD (ESOPHAGOGASTRODUODENOSCOPY);  Surgeon: Aram Bermudez MD;  Location: [facility];  Service: Gastroenterology;  Laterality: N/A;    kidney stent        KIDNEY SURGERY   1996     right kidney obstruction requiring stent-open    NOSE SURGERY        UPPER GASTROINTESTINAL ENDOSCOPY   2021     [facility] per patient report            Family History   Problem Relation Age of Onset    Liver disease Mother           d.    Cirrhosis Mother      Hypertension Mother      Hyperlipidemia Mother      Colon cancer Father 70    Cancer Father      Diabetes Father       Hypertension Father      Glaucoma Father      Hyperlipidemia Father      Prostate cancer Father      Diabetes Brother      Hypertension Brother      Thyroid cancer Brother      Heart attack Maternal Uncle      Heart attack Maternal Uncle      Heart disease Neg Hx      Heart failure Neg Hx      Macular degeneration Neg Hx      Retinal detachment Neg Hx      Crohn's disease Neg Hx      Ulcerative colitis Neg Hx      Stomach cancer Neg Hx      Esophageal cancer Neg Hx        Social History            Tobacco Use    Smoking status: Former       Current packs/day: 0.00       Types: Cigarettes       Quit date: 1990       Years since quittin.0    Smokeless tobacco: Never    Tobacco comments:       social   Substance Use Topics    Alcohol use: No       Comment: stopped in     Drug use: No         Review of Systems:     Constitutional: no fever, chills or night sweats. No abrupt changes in weight   Eyes: no diplopia, lid drooping, loss of vision   ENT: no nasal congestion, sore throat, discharge  Respiratory: no cough, shortness of breath, or pain  Cardiovascular: no chest pain or palpitations   Gastrointestinal: no nausea or vomiting  Genitourinary: no hematuria or dysuria   Integument/Breast: no rash or pruritis   Hematologic/Lymphatic: no easy bruising or lymphadenopathy   Neurological: no seizures or tremors   Behavioral/Psych: no auditory or visual hallucinations   All other systems reviewed and are negative.        OBJECTIVE:      Vital Signs (Most Recent):  Temp: 98.4 °F (36.9 °C) (24 1145)  Pulse: 78 (24 1145)  Resp: 16 (24 1145)  BP: 125/78 (24 1145)  SpO2: 97 % (24 1145)     Physical Exam:        General: well developed, well nourished, no distress. .  Mental Status: Awake, Alert, Oriented x 4  Language: No aphasia  Speech: No dysarthria  Head: normocephalic, atraumatic.  Neck: trachea midline, no JVD         Motor Strength:  No abnormal movements seen.      Strength    Deltoids Triceps Biceps Wrist Extension   Hand  Interossei       Upper: R 4/5 4-/5 4/5 4-/5   4-/5 4-/5         L 5/5 5/5 5/5 5/5   4-/5 4-/5           Iliopsoas Quadriceps   Tibialis  anterior Gastro- cnemius EHL   Foot Eversion Foot inversion   Lower: R 3/5 4+/5   2-3/5 4/5 2-3/5   4-/5 4-/5     L 3/5 5/5   3-/5 4-/5 3-/5   4-/5 4-/5              SILT,PP dec lt pp UE below elbows        DTR's: 2/3+ + and symmetric in UE and LE 3+ lE  Mahan:+  Clonus: absent  Babinski: absent     Gait: slow, RW assist                   ASSESSMENT/PLAN:         58-year-old gentleman  , with a very unusual presentation for cervical stenosis with myelopathy  Reviewed this in detail.  He is symptoms that certainly do not correlate.  He discusses over the last few years some cognitive decline.  He is episodes of facial paresthesias and numbness perioral involving his tongue which are intermittent will come and go.  He has had extensive workup.  He is seen Neurology as an outpatient he has been admitted as an inpatient.  He has had an extensive workup including spinal axis imaging with MRI as well as a lumbar puncture  He has had normal brain MRIs     However over the past month.  He feels that his balance has declined he has using a rolling walker he has decreased dexterity intermittent paresthesias in the upper extremities.  He is weakness on the right side more prominent in triceps wrist extension and hands.  He also has weakness in the right leg proximally and distally with non full antigravity motion in the right foot.  His thoracic and lumbar MRIs show no stenosis or abnormalities.  He is evaluated by Neurology and it was discussed with Dr. Smith  His only positive radiographic finding is moderate disc osteophyte complex stenosis foraminal at C4-5 and at C5-6 with moderate stenosis there has a right-sided abnormality in his cord, the cord is not expanded radiology has read this as potential myelomalacia or cord signal  change.  We reviewed this in detail.  They feel that this is correlating with his symptoms and recommend treating the stenosis.  Is not critical stenosis or obvious clinical picture.  But does cause spinal stenosis and the abnormality is located at the segmental segment.     However this does appear to be contributing, from a mechanical standpoint.  We reviewed this in detail.  As well as the risks and benefits of decompression which require an anterior cervical disc decompression instrumented fusion likely C4-6.  We discussed the fact that there could be an pathology missing here with regard to his unusual presentation but he is under the care Neurology for complete workup.  He also understands that I thinks its unlikely these symptoms resolve without surgery, has several that don't correlate with myelopathy;      I think the value of surgery would be to eliminate this factor spinal cord decompression stabilization at these levels and continue his rehabilitation and neurological workup with the hope to minimize progression and to optimize his maximal recovery.     Due to his decline we discussed this we offered inpatient surgery we discussed timing fall precautions risk of progression etc..  I he feels that he has been symptomatic for a long time worsened over the last month has a workup.  He has not want to proceed with inpatient surgery.  Discuss with Dr. Smith        He will need follow-up with Neurology a lab reviewed with in the next week or 2 as that is the time his labs  Should result, d/c with medrol dose pack for anti-inflammatory, they do non feel other meds are indicated     2. I will follow up in clinic within the next 2-4 weeks any progression changes requiring sooner medical evaluation reviewed with the patient     He verbalized adequate understanding is adamant that this is his preferred plan  For home neurology follow-up        Assessment and recommendations  We reviewed this in detail today in  the presence of his wife.  He is similar symptoms they seemed to fluctuate.  It is very atypical  He has signs and symptoms of cervical myelopathy.  This is ongoing for about 5 weeks poor dexterity hand weakness, however he has significant lower extremity weakness he is able to ambulate with a rolling walker which he feels is a little better however on exam he is proximal and distal lower extremity weakness.  He feels like this fluctuates but difficulty with antigravity movement    We again reviewed the same details the presence of he and his wife with some symptoms that just do not fit his degenerative cervical stenosis with suspicion of cord signal change.  Including his facial features cognitive decline etc..  However he does have a cervical canal she is proximally 7 mm at L4-5 and just less than 7 at 5 6 with a cord abnormality /cord signal change.  He has had an extensive workup.  MI summary is with segmental stenosis congenital and acquired in combination with this finding and suspicion of cervical myelopathy as a component of his treatment he was offered surgery including a C4-5 and 5 6 anterior cervical diskectomy and fusion to decrease spinal stenosis optimize his recovery and prevent progression      That being said he is follow-up with Neurology today for further discussion which I think is quite valuable  As I do think it is quite possible that more than 1 factor is ongoing and that the stenosis and segmental degenerative findings could be contributing and certainly not helping      Was quite kajal that I think it is unlikely with surgery that he will have an immediate recover and that with his spinal cord changes he is likely to have some incomplete recovery with residual symptoms.  The goal is to maximize his ability to recover as much as possible and prevent progression  While continuing his rehabilitation and neurological workup and management        He is considering and reviewed timing, chronic  injury all important factors  He was infored to let us know if he is proceeding with surgery , and if not surveillance and PT , neurology f/u rec               The diagnosis, goals, limitations, risks and benefits of surgery and alternative treatment options where discussed at length (pros/cons). All questions/concerns were addressed. The patient has verbalized a good understanding of the diagnosis, the potential procedure, anticipated post-operative course, overall expectations, and risks including but not limited to:  Dysphagia, dysphonia, Cervical palsy, nerve root injury/paralysis, death, nerve injury leading to pain or neurological deficit, csf leak, vascular injury or serious bleeding/need for blood product transfusion/stroke, wrong level surgery, hardware/instrumenteation misplacement, chronic pain/failure to improve or worsening of symptoms, infection, pseudoarthrosis, hardware failure, need for further surgery at the same or different levels; medical (eg Heart attack, blood clot, infection) and anesthetic complications.

## 2024-02-02 ENCOUNTER — TELEPHONE (OUTPATIENT)
Dept: NEUROSURGERY | Facility: CLINIC | Age: 59
End: 2024-02-02
Payer: COMMERCIAL

## 2024-02-02 DIAGNOSIS — M54.12 CERVICAL RADICULOPATHY: Primary | ICD-10-CM

## 2024-02-02 RX ORDER — LIDOCAINE HYDROCHLORIDE 10 MG/ML
1 INJECTION, SOLUTION EPIDURAL; INFILTRATION; INTRACAUDAL; PERINEURAL ONCE
Status: CANCELLED | OUTPATIENT
Start: 2024-02-02 | End: 2024-02-02

## 2024-02-02 RX ORDER — CEFAZOLIN SODIUM 2 G/50ML
2 SOLUTION INTRAVENOUS
Status: CANCELLED | OUTPATIENT
Start: 2024-02-02

## 2024-02-02 RX ORDER — SODIUM CHLORIDE, SODIUM LACTATE, POTASSIUM CHLORIDE, CALCIUM CHLORIDE 600; 310; 30; 20 MG/100ML; MG/100ML; MG/100ML; MG/100ML
INJECTION, SOLUTION INTRAVENOUS CONTINUOUS
Status: CANCELLED | OUTPATIENT
Start: 2024-02-02

## 2024-02-02 NOTE — TELEPHONE ENCOUNTER
Mr Tang will be having surgery on 2/12/24 with Dr. Zuniga, Neurosurgeon, at [facility]. We are reaching out to obtain a surgical clearance from Dr. Domingo to ensure the safety of our patient. The surgery is cervical fusion and will be under general anesthesia. This procedure typically lasts approximately 4 hours. We request that the patient hold all anticoagulant/antiinflammatory medications for 5-7 days prior to surgery. Please let us know if our office can be of further assistance.     Thank you,     Ewa Rodriguez LPN  P: [phone]  F: [phone]

## 2024-02-04 ENCOUNTER — E-CONSULT (OUTPATIENT)
Dept: CARDIOLOGY | Facility: CLINIC | Age: 59
End: 2024-02-04
Payer: COMMERCIAL

## 2024-02-04 DIAGNOSIS — I20.9 ANGINA PECTORIS WITH NORMAL CORONARY ARTERIOGRAM: Primary | ICD-10-CM

## 2024-02-04 DIAGNOSIS — I10 ESSENTIAL (PRIMARY) HYPERTENSION: ICD-10-CM

## 2024-02-04 DIAGNOSIS — E78.5 DYSLIPIDEMIA WITH ELEVATED LOW DENSITY LIPOPROTEIN (LDL) CHOLESTEROL AND ABNORMALLY LOW HIGH DENSITY LIPOPROTEIN CHOLESTEROL: Primary | ICD-10-CM

## 2024-02-04 PROCEDURE — 99451 NTRPROF PH1/NTRNET/EHR 5/>: CPT | Mod: S$GLB,,, | Performed by: INTERNAL MEDICINE

## 2024-02-04 NOTE — CONSULTS
[facility] - Cardiology  Response for E-Consult     Patient Name: García Tang  MRN: [id number]  Primary Care Provider: LORETTA Domingo MD   Requesting Provider: Wali Zuniga MD  Consults    Recommendation:  Acceptable CV risk okay to hold aspirin and all anticoagulants for 5-7 days prior to surgery    Contingency: chart review    Total time of Consultation: 5 minute    I did not speak to the requesting provider verbally about this.     *This eConsult is based on the clinical data available to me and is furnished without benefit of a physical examination. The eConsult will need to be interpreted in light of any clinical issues or changes in patient status not available to me at the time of filing this eConsults. Significant changes in patient condition or level of acuity should result in immediate formal consultation and reevaluation. Please alert me if you have further questions.    Thank you for this eConsult referral.     Sal Salazar MD  [facility] - Cardiology

## 2024-02-05 PROBLEM — R42 DIZZINESS: Status: ACTIVE | Noted: 2024-02-05

## 2024-02-05 PROBLEM — R29.898 WEAKNESS OF BOTH LOWER EXTREMITIES: Status: ACTIVE | Noted: 2024-02-05

## 2024-02-05 RX ORDER — SPIRONOLACTONE 25 MG/1
12.5 TABLET ORAL
Qty: 45 TABLET | Refills: 0 | Status: SHIPPED | OUTPATIENT
Start: 2024-02-05 | End: 2024-03-11 | Stop reason: SDUPTHER

## 2024-02-05 NOTE — TELEPHONE ENCOUNTER
As per Dr. Domingo has not seen him in a few months so will have to get him in for pre op exam.  Do you require labs, EKG, CXR?    RUBEN Domingo

## 2024-02-06 ENCOUNTER — TELEPHONE (OUTPATIENT)
Dept: NEUROSURGERY | Facility: CLINIC | Age: 59
End: 2024-02-06
Payer: COMMERCIAL

## 2024-02-06 NOTE — TELEPHONE ENCOUNTER
----- Message from Jaja Hastings sent at 2/6/2024 12:38 PM CST -----  Type: Need Medical Advice   Who Called: Pearl wife of patient   Best callback number:   Additional Information: called to ask if Zuniga office received her FMLA papers for her leave due to her  upcoming surgery it should have come from [employer] sent via fax  Also the patient FMLA papers should be faxed to your office as well, She ask for a callback once papers have been received   Please call to further assist, Thanks.

## 2024-02-08 ENCOUNTER — TELEPHONE (OUTPATIENT)
Dept: NEUROSURGERY | Facility: CLINIC | Age: 59
End: 2024-02-08
Payer: COMMERCIAL

## 2024-02-08 NOTE — TELEPHONE ENCOUNTER
"Peer to peer completed for upcoming surgery (2/12/24)    Per medical reviewer, [employer] does not allow "cages" in cervical fusions. They will approve a structural allograft. We can appeal this decision.   The C5-6 level is approved for anterior plating  The C4-5 level can be approved if radiology reporting states there is moderate spinal stenosis     The above discussion was relayed to Dr. Efrain Christian, PA-C  Neurosurgery - [facility]    "

## 2024-02-09 ENCOUNTER — PATIENT OUTREACH (OUTPATIENT)
Dept: ADMINISTRATIVE | Facility: HOSPITAL | Age: 59
End: 2024-02-09
Payer: COMMERCIAL

## 2024-02-09 ENCOUNTER — TELEPHONE (OUTPATIENT)
Dept: NEUROSURGERY | Facility: CLINIC | Age: 59
End: 2024-02-09

## 2024-02-09 ENCOUNTER — PATIENT MESSAGE (OUTPATIENT)
Dept: ADMINISTRATIVE | Facility: HOSPITAL | Age: 59
End: 2024-02-09
Payer: COMMERCIAL

## 2024-02-09 NOTE — PROGRESS NOTES
Population Health Chart Review & Patient Outreach Details    Outreach Performed: YES Portal    Additional [facility] Notes:           Updates Requested / Reviewed:      Updated Care Coordination Note, Care Everywhere, , External Sources: [facility] and [email or web address], and Immunizations Reconciliation Completed or Queried: [place]         Health Maintenance Topics Overdue:    Health Maintenance Due   Topic Date Due    Pneumococcal Vaccines (Age 0-64) (1 of 2 - PCV) Never done    Shingles Vaccine (1 of 2) Never done    Colorectal Cancer Screening  08/02/2020    Influenza Vaccine (1) 09/01/2023    COVID-19 Vaccine (6 - 2023-24 season) 09/01/2023    Foot Exam  01/05/2024    Diabetes Urine Screening  01/05/2024         Health Maintenance Topic(s) Outreach Outcomes & Actions Taken:    Colorectal Cancer Screening - Outreach Outcomes & Actions Taken  : Portal message sent.    Lab(s) - Outreach Outcomes & Actions Taken  : Portal message sent.

## 2024-02-12 ENCOUNTER — TELEPHONE (OUTPATIENT)
Dept: NEUROSURGERY | Facility: CLINIC | Age: 59
End: 2024-02-12
Payer: COMMERCIAL

## 2024-02-12 PROBLEM — G47.30 SLEEP DISORDER BREATHING: Status: ACTIVE | Noted: 2024-02-12

## 2024-02-12 NOTE — TELEPHONE ENCOUNTER
----- Message from Darion Quesada sent at 2/12/2024  3:02 PM CST -----  Regarding: paperwork  Contact: [phone] / wife  Type: Needs Medical Advice    Who Called:  wife / Pearl    [place] Call Back Number: [phone] / wife    Additional Information: pt had surgery today. Pt and wife needs FMLA to make sure paperwork for pt and wife have been submitted to their FMLA agents. Please call to discuss

## 2024-02-12 NOTE — PROGRESS NOTES
NEUROLOGY  Outpatient Visit     [facility]  [address] [facility], [place] [address]  [phone] (office) / [phone] (fax)    Patient Name:  García Tang  :  1965  MR #:  [id number]  Acct #:  [id number]    Date of  Visit: 2024    Other Physicians:  LORETTA Domingo MD (Primary Care Physician)      CHIEF COMPLAINT: Transient Ischemic Attack      Interval history:  García Tang is a 58 y.o. R-handed male seen in hospital follow up for TIA.  Dr. Garvey saw him at [facility]. I previously saw him in  for dizziness and tinnitus.     MH is significant for HTN, NAFL, HLD, GERD, DM2, sialodenitis, CKD2, PAD, history of COVID19 infection 2021    He presented to the ED with dysarthria. It felt like he had rocks in his mouth and his tongue was swollen. It got better during the day, but returned the next day. Telestroke eval in the ED, NIHSS 0. Also reported muscle cramps, fatigue.     He was not taking any AP agents at the time. He was dc on DAPT and a statin.     He has been feeling weak in his legs and having trouble walking. He has pain in his ankles, thighs and shins. It is a burning pain. He feels stiff. Its like he has weights on his ankles. Worse if he sits too long.     He has as sense of general malaise. He questioned if this could be related to some of the new medications that he was discharged on.     He also notes worsening of his chronic sense of imbalance.     Over the last few weeks, he feels more forgetful. He can't concentrate. He feels that he has had some recurrence of slurred speech at times. These things seem worse when he first wakes up.     Referred for sleep study as part of headache eval. Didn't have this done. Doesn't sleep well at night.     Vision not as good in the morning, gets better as day progresses. Describes this a blurred vision.     No dysphagia. Was told that he has a small wind pipe in the past and needs to take caution with swallowing.  Patient chose to have [facility] [id number] Non-Invasive Prenatal Screen [id number] PLUS, NO Gender.  Patient choose billed through insurance.     Patient given brochure and is aware [facility] will contact patient's insurance and coordinate coverage.  Provided [facility] contact information. General inquiries [phone], Cost estimates [phone] and [facility] Billing [phone]. Website [email or web address].     [facility] lab we requested patient notify MFM (via phone call or [employer] message) when blood collected so office can follow up on results.     Copy of lab order scanned to Epic media.     Maternal Fetal Medicine will have results in approximately 5-7 business days and will call patient or notify via [employer].  Patient aware viewing lab result online will reveal fetal sex if ordered.    Patient verbalized understanding of all instructions and no questions at this time.      "    After I last saw him in 6/2021, he was referred to vascular neurology, Dr. Steinberg, who did not suspect a vascular etiology of his transient neurological symptoms.  He underwent DSA to evaluate for possible aneurysm noted on vessel imaging, which only showed anatomical variants. He was referred to NP to evaluate his cognitive complaints, which were mostly attributed to anxiety, depression and stress. He was referred to ENT for tinnitus and otalgia.     Never filled sertraline prescribed by Dr. Steinberg for his depression. Does not feel that this is the underlying issue.     HISTORY OF PRESENT ILLNESS:  6/2021  García Tang is a 58 y.o. R-handed male seen in consultation for tinnitus, dizziness and HA per No ref. provider found    PMH is significant for HTN, fatty liver, HLD, GERD, DM (diet controlled), sialodenitis    He has had tinnitus in the R ear since January 2021. It sounds like a "rainforest in his ear." It has progressively worsened and is now constant. It now sounds more piercing in nature. He notes that 2 weeks after it started, he was diagnosed with COVID19. He has had hearing loss for a few years, but this is also worsening. Due to the tinnitus, he has trouble understanding what people are telling him. He feels like someone is poking him with something in both ears, worse on the R. Sometimes, he wakes up during the night and the R ear feels numb for a couple of minutes. He isn't sleeping well because the tinnitus wakes him up around 2-3 am.     He saw LORETTA Guadarrama NP in our ENT department in March. He was cleared for hearing aids, but didn't get them due to the cost.     He also reports balance issues. He feels "off." He hasn't had any falls. Sometimes, he has to slow down or hold on to a wall when he walks. It is a very vague sensation to him.     He does get some dizzy spells. They seem to happen daily. It feels like the room is spinning around him, but he is not moving. The spells may happen when he is " "sitting at rest or active. Had one over the weekend while playing golf. He also notes the spells occur when he lays down in bed and get worse when he turns on either side. The spells last for 30 sec to 2 minutes. He was given meclizine, but didn't fill it.     He reports a daily HA over the last 2 weeks. He used to get "cluster headaches" when he was younger, but this has been stable for many years. The pain is in the R forehead/temple and is a stabbing, pressure pain. He has been taking ibuprofen and Tylenol daily. These measures help the pain, but it recurs midday.     He reports increased salivation from the R side of his mouth. No facial drooping. No dysphagia. May also notice some increased rhinorrhea from the R nostril, lacrimation from both eyes.     He hasn't had any diplopia or vision loss. No pain in the eye. Feels that visual acuity is worse. Saw optometry recently and was referred to a retinal specialist but hasn't made it there yet.     He doesn't note any distinct weakness in the extremities. Sometimes, his legs feel heavy like they may give out on him, but they haven't. He has some tingling in his feet and toes. This has improved since he lost weight and got his diabetes under better control. It seems to be a bit worse in the R foot.     His PCP ordered a brain MRI. He had a reaction to iodinated contrast, so the brain MRI was non-contrasted. It was done at a facility in  due to his claustrophobia. He gave the disc to radiology, but was told that the software isn't compatible. He reports that the images were normal, but they told him that he was moving a lot.     He carries a diagnosis of Meniere's disease in the EMR, but hasn't ever been formally diagnosed with this per his report. Someone mentioned it to him in passing, but he wasn't evaluated for it.     He drank heavily during his 20s, but hasn't drank since 1992.     Allergies:  Review of patient's allergies indicates:   Allergen Reactions    " Lisinopril Swelling    Iodinated contrast media Other (See Comments)     Spasms, tremor, and high blood pressure    Bactrim  [sulfamethoxazole-trimethoprim]      Other reaction(s): Unknown    Diflucan [fluconazole]      Heart racing    Hydroxyzine hcl      Other reaction(s): elevated bp    Kenalog [triamcinolone acetonide] Other (See Comments)     Heart flutters all day    Phenergan [promethazine] Anxiety    Zofran [ondansetron hcl (pf)] Anxiety       Current Medications:  Current Outpatient Medications   Medication Sig Dispense Refill    acetaminophen (TYLENOL) 500 MG tablet Take 1,000 mg by mouth every 6 (six) hours as needed for Pain.      acetylcysteine (NAC ORAL) Take 2 tablets by mouth once daily.      amLODIPine (NORVASC) 5 MG tablet Take 1 tablet (5 mg total) by mouth every evening.      ascorbic acid (VITAMIN C ORAL) Take 2 tablets by mouth once daily.      aspirin (ECOTRIN) 81 MG EC tablet Take 1 tablet (81 mg total) by mouth once daily. for 21 days 21 tablet 0    cholecalciferol, vitamin D3, 125 mcg (5,000 unit) Tab Take 10,000 Units by mouth once daily.      Lactobacillus acidophilus (ACIDOPHILUS ORAL) Take 3 tablets by mouth once daily.      losartan (COZAAR) 100 MG tablet Take 1 tablet (100 mg total) by mouth once daily. 90 tablet 1    magnesium oxide (MAG-OX) 400 mg (241.3 mg magnesium) tablet Take 400 mg by mouth once daily.      metoprolol succinate (TOPROL-XL) 50 MG 24 hr tablet Take 1 tablet (50 mg total) by mouth once daily. 90 tablet 3    naproxen (NAPROSYN) 250 MG tablet Take 250 mg by mouth 2 (two) times daily as needed (pain).      NIACIN ORAL Take 1 tablet by mouth once daily.      pantoprazole (PROTONIX) 40 MG tablet Take 1 tablet (40 mg total) by mouth once daily. 90 tablet 1    spironolactone (ALDACTONE) 25 MG tablet Take 0.5 tablets (12.5 mg total) by mouth once daily. (Patient taking differently: Take 12.5 mg by mouth every evening.) 45 tablet 0    zinc gluconate 50 mg tablet Take 50 mg  by mouth once daily.       No current facility-administered medications for this visit.       Past Medical History:  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    Diverticulitis     diverticulitis    Diverticulosis     Essential hypertension 07/18/2016    Fatty liver disease, nonalcoholic 04/07/2016    [place], 2006     GERD (gastroesophageal reflux disease)     Headache     HLD (hyperlipidemia)     Kidney stone     requiring stent    Meniere's disease 01/09/2018    Morbid obesity 05/29/2014    NAFLD (nonalcoholic fatty liver disease)     Near syncope 05/23/2017    Obesity hypoventilation syndrome 02/22/2016    PAD (peripheral artery disease) 09/17/2019    Mild 9/2018    Prostatitis     Rectal bleeding     diverticulitis    Thyroid nodule 07/14/2022       Past Surgical History:  Past Surgical History:   Procedure Laterality Date    ANGIOGRAM, CORONARY, WITH LEFT HEART CATHETERIZATION  10/26/2023    Procedure: Left Heart Cath;  Surgeon: Sal Salazar MD;  Location: [facility] CATH;  Service: Cardiology;;    CARDIAC CATHETERIZATION      COLONOSCOPY  2008    diverticulosis per patient report    CORONARY ANGIOGRAPHY  10/26/2023    Procedure: Coronary angiogram study;  Surgeon: Sal Salazar MD;  Location: [facility] CATH;  Service: Cardiology;;    ESOPHAGOGASTRODUODENOSCOPY N/A 12/18/2023    Procedure: EGD (ESOPHAGOGASTRODUODENOSCOPY);  Surgeon: Aram Bermudez MD;  Location: [facility];  Service: Gastroenterology;  Laterality: N/A;    kidney stent      KIDNEY SURGERY  1996    right kidney obstruction requiring stent-open    NOSE SURGERY      UPPER GASTROINTESTINAL ENDOSCOPY  2021    [facility], [facility] per patient report       Family History:  family history includes Cancer in his father; Cirrhosis in his mother; Colon cancer (age of onset: 70) in his father; Diabetes  "in his brother and father; Glaucoma in his father; Heart attack in his maternal uncle and maternal uncle; Hyperlipidemia in his father and mother; Hypertension in his brother, father, and mother; Liver disease in his mother; Prostate cancer in his father; Thyroid cancer in his brother.    Social History:   reports that he quit smoking about 34 years ago. His smoking use included cigarettes. He has never used smokeless tobacco. He reports that he does not drink alcohol and does not use drugs.      REVIEW OF SYSTEMS:  As per HPI    PHYSICAL EXAM:  /89 (BP Location: Right arm, Patient Position: Sitting, BP Method: Large (Automatic))   Pulse 64   Resp 14   Ht 5' 10" (1.778 m)   Wt 131.8 kg (290 lb 10.8 oz)   BMI 41.71 kg/m²     General: Well groomed. No acute distress.  Pulmonary: Normal effort and rate.   Musculoskeletal: No obvious joint deformities, moves all extremities well.  Extremities: No clubbing, cyanosis or edema.     Neurological exam:  Mental status: Awake and alert.  Oriented to person, place, time and situation. Recent and remote memory appear to be intact.  Fund of knowledge seems normal.   Speech/Language: Fluent and appropriate. No dysarthria or aphasia. Monotone, nasal quality of speech. Able to follow complex commands.   Cranial nerves (II-XII): Visual fields full. Pupils equal round and reactive to light, extraocular movements intact, no ptosis or nystagmus. Facial sensation and symmetry intact bilaterally. Hearing grossly intact. Palate mobile and midline. Shoulder shrug normal bilaterally. Normal tongue protrusion.   Motor: 5 out of 5 strength throughout the upper and lower extremities except 4/5 in BLE (question effort, perhaps antalgic weakness). Normal bulk and tone. No abnormal movements noted. No drift appreciated.   Sensation: Decreased to pinprick in L fingertips and FA, R top of foot and R lateral foot.   DTR: 2+ at the knees and biceps bilaterally.   Coordination: " Finger-nose-finger testing intact bilaterally. No tremor.   Gait: Antalgic gait. [place] legged. Unable to tandem or stand on toes.     DIAGNOSTIC DATA:  I have personally reviewed provider notes, labs and imaging made available to me today.     Imaging:  MRA neck wo 12/27/23:  Impression:     No flow-limiting stenosis, occlusion, or aneurysm identified noting limitations of motion artifact.     MRA brain 12/27/23:  Impression:     No flow-limiting stenosis, occlusion, or aneurysm identified noting limitations from motion artifact.     MRI brain wo 12/27/23:  FINDINGS:  Diffusion-weighted imaging is negative for acute or subacute ischemia.     Mild chronic involutional changes are noted.  Mild chronic white matter microischemic changes are noted.  No acute intracranial hemorrhage, extra-axial fluid collection, hydrocephalus, mass effect, or midline shift is identified.  The visualized vascular flow voids appear intact.  The visualized paranasal sinuses demonstrate minimal scattered mucoperiosteal thickening.  The visualized mastoid air cells are clear.     Impression:     1. No acute intracranial abnormalities identified    Cerebral angiogram 9/2021:  Impression:  1. Normal cerebral angiogram - within normal limits anatomical variant of added vasculature arising from the left A1 A2 segment junction.  Negative concerns for any aneurysms or vascular malformations within the cerebral vasculature.    NP testing 10/2021 - Kris  Cognitive changes - Primary      Current Assessment & Plan       Assessment:  -Cognitive Testing:  Unfortunately, results are not interpretable due to variable effort (see report for details)  -Etiology/Cause:While the patient has vascular risk factors, primary issues impacting cognition at this time involve depression, anxiety and stress.  Addressing those issues are primary importance.          EEG 7/2021:  Normal     Cardiac testing:  EKG 12/2023:  NSR    TTE 12/2023:  Left Ventricle: The left  ventricle is normal in size. Normal wall thickness. Normal wall motion. There is normal systolic function with a visually estimated ejection fraction of 60 - 65%. There is normal diastolic function.    Right Ventricle: Normal right ventricular cavity size. Systolic function is normal.    Left Atrium: Agitated saline study of the atrial septum is negative after vasalva maneuver, suggesting absence of intracardiac shunt at the atrial level.    IVC/SVC: Normal venous pressure at 3 mmHg.    Holter 8/2023:  Patient monitored for 2d 23h, analyzable time was 2d 23h starting on 08/25/2023 12:00 am.    Primary rhythm was Sinus Rhythm. Average heart rate was 62 bpm, Minimum heart rate was 45 bpm, maximum heart rate was 134 beats per minute.    Very rare isolated PACs burden of 0.01%.    Rare isolated PVCs burden of 0.1%.    No atrial fibrillation, no ventricular tachycardia.    No heart block, no pauses.    Labs:  CBC:   Lab Results   Component Value Date    WBC 3.69 (L) 12/28/2023    HGB 13.7 (L) 12/28/2023    HCT 40.1 12/28/2023     12/28/2023    MCV 87 12/28/2023    RDW 11.6 12/28/2023     BMP:   Lab Results   Component Value Date     01/04/2024    K 4.8 01/04/2024     01/04/2024    CO2 26 01/04/2024    BUN 17 01/04/2024    CREATININE 1.0 01/04/2024     (H) 01/04/2024    CALCIUM 9.3 01/04/2024    MG 2.0 12/28/2023    PHOS 4.5 12/28/2023     LFTS;   Lab Results   Component Value Date    PROT 7.5 01/04/2024    ALBUMIN 4.3 01/04/2024    BILITOT 0.6 01/04/2024    AST 30 01/04/2024    ALKPHOS 71 01/04/2024    ALT 40 01/04/2024    GGT 31 03/07/2017     COAGS:   Lab Results   Component Value Date    INR 1.1 12/28/2023     FLP:   Lab Results   Component Value Date    CHOL 189 11/07/2023    HDL 43 11/07/2023    LDLCALC 125.0 11/07/2023    TRIG 105 11/07/2023    CHOLHDL 22.8 11/07/2023     Lab Results   Component Value Date    TSH 1.220 11/06/2023     Lab Results   Component Value Date    SEDRATE 8  01/04/2024     Lab Results   Component Value Date    CRP 0.6 01/04/2024     Lab Results   Component Value Date    HGBA1C 6.2 (H) 12/27/2023     Lab Results   Component Value Date    [id number] 12/28/2023     Lab Results   Component Value Date    FOLATE 11.1 12/28/2023     Component      Latest Ref Rng 12/27/2023   RPR      Non-reactive  Non-reactive        Component      Latest Ref Rng & Units 4/28/2021   RANI Screen      Negative <1:80 Negative <1:80   Rheumatoid Factor      0.0 - 15.0 IU/mL <10.0       ASSESSMENT & PLAN:  García Tang is a 58 y.o. R-handed male seen in hospital follow up for possible TIA.     Problem List Items Addressed This Visit          Neuro    Transient neurological symptoms    Current Assessment & Plan     He has had recurrent episodes of similar symptoms since 2021  Overall, not suggestive of vascular etiology given timeline and symptoms reported (muscle cramps, fatigue, malaise).   Has undergone extensive vascular evaluation, including DSA, in the past. Also consulted with vascular subspecialist, who did not suspect vascular etiology    Recent diagnostics reviewed with pt and wife   - MRI / MRA negative    - TTE with normal EF, negative for shunt  - normal EKG and recent Holter   - A1C 6.2      There is no indication for him to continue DAPT indefinitely. He will dc Plavix and continue on ASA monotherapy.   Some of his symptoms could be related to statin SE -- hold now. Check serologies.  Will check serologies for MG, although his symptoms are not typical for NM disorder  EMG of the RLE for further eval of sensory changes   Sleep study to evaluate for CHANELL              Cardiac/Vascular    Essential (primary) hypertension    Dyslipidemia with elevated low density lipoprotein (LDL) cholesterol and abnormally low high density lipoprotein cholesterol       Endocrine    Type 2 diabetes mellitus without complication, without long-term current use of insulin       Other    Other  insomnia     Other Visit Diagnoses       Myalgia    -  Primary    Sleep disorder        Hypersomnia        Weakness                  Follow up: 6-8 weeks after testing     No LOS data to display    This includes face to face time with the patient, as well as non-face to face time preparing for and completing the visit (review of prior diagnostic testing and clinical notes, obtaining or reviewing history, documenting clinical information in the EMR, independently interpreting and communicating results to the patient/family and coordinating ongoing care).       I appreciate the opportunity to participate in the care of this patient. Please feel free to contact me with any concerns or questions.       Alyssa Salinas, ACNPC-AG  [facility]  [address] [facility] [place] [address]       DISPLAY PLAN FREE TEXT

## 2024-02-16 ENCOUNTER — OFFICE VISIT (OUTPATIENT)
Dept: FAMILY MEDICINE | Facility: CLINIC | Age: 59
End: 2024-02-16
Payer: COMMERCIAL

## 2024-02-16 VITALS
SYSTOLIC BLOOD PRESSURE: 126 MMHG | DIASTOLIC BLOOD PRESSURE: 84 MMHG | HEIGHT: 70 IN | WEIGHT: 297.19 LBS | OXYGEN SATURATION: 97 % | BODY MASS INDEX: 42.55 KG/M2 | HEART RATE: 78 BPM | TEMPERATURE: 98 F

## 2024-02-16 DIAGNOSIS — Z92.89 HISTORY OF RECENT HOSPITALIZATION: Primary | ICD-10-CM

## 2024-02-16 DIAGNOSIS — Z98.890 H/O CERVICAL DISCECTOMY: ICD-10-CM

## 2024-02-16 DIAGNOSIS — R21 RASH/SKIN ERUPTION: ICD-10-CM

## 2024-02-16 DIAGNOSIS — E11.9 TYPE 2 DIABETES MELLITUS WITHOUT COMPLICATION, WITHOUT LONG-TERM CURRENT USE OF INSULIN: ICD-10-CM

## 2024-02-16 DIAGNOSIS — M43.22 CERVICAL VERTEBRAL FUSION: ICD-10-CM

## 2024-02-16 PROCEDURE — 99999 PR PBB SHADOW E&M-EST. PATIENT-LVL V: CPT | Mod: PBBFAC,,, | Performed by: NURSE PRACTITIONER

## 2024-02-16 PROCEDURE — 99214 OFFICE O/P EST MOD 30 MIN: CPT | Mod: S$GLB,,, | Performed by: NURSE PRACTITIONER

## 2024-02-16 NOTE — PATIENT INSTRUCTIONS
Warm moist heat to shoulder/heating pad while awake only  Follow with your PT person regarding any movements of your shoulder  This achiness can be due to your head/neck being immobilized and not much movement and the changes it has had on all of those muscles  This may be a slight reaction to the tape or product used   If you have itching you can use some over the counter claritin

## 2024-02-16 NOTE — PROGRESS NOTES
Subjective:       Patient ID: García Tang is a 58 y.o. male.    Chief Complaint: Hospital Follow Up (2/13/24 D/C'd from [facility].  Surgery- Dr Zuniga  2/12/24)  Last seen in primary care by ANNABEL Broussard on 01/17/2024  First time seeing me in the clinic.  HPI  He is here for hospital follow up after hospital and ED visit  States arm and shoulders are achy today  Vitals:    02/16/24 1003   BP: 126/84   Pulse: 78   Temp: 97.7 °F (36.5 °C)     Review of Systems      Below is his HPI and Hospital course from his discharge summary:  Patient Name: García Tang  MRN: [id number]  Admission Date: 2/12/2024  Hospital Length of Stay: 0 days  Discharge Date and Time: 2/13/2024  2:44 PM  Attending Physician: MD Efrain   Discharging Provider: Katey Christian PA-C  Primary Care Provider: LORETTA Domingo MD      HPI: García Tang is a 58 y.o. male who presents for C5-6 ACDF for treatment of cervical stenosis with myelopathy      Procedure(s) (LRB):  DISCECTOMY, SPINE, CERVICAL, ANTERIOR APPROACH, WITH FUSION C5-6 (N/A)      Hospital Course: García Tang presented to [facility] on 2/12/2024 for the above stated procedure. he tolerated the procedure well and there were no intra-operative complications. he recovered in PACU and was then transferred to the floor. his pain was controlled. Post-op xrays showed good placement of the hardware. he was evaluated by PT/OT who recommended discharge home with family support. EMILIANA removed POD1. On 2/13/2024, he was discharged home with pain medication and follow up appointments. Regular diet. Activity as tolerated. At the time of discharge, vital signs were stable, patient was afebrile and neurologically stable.      He did present tot he ED on 02/14/2024 and below is a copy of the chief complaint and HPI from that note:    [facility] - Emergency Dept  Neurocritical Care  Consult Note     Admit Date: 2/14/2024  Service Date: 02/15/2024  Length of Stay: 0    There are no preventive care reminders to display for this patient.    Patient is up to date, no discussion needed.           "  Consults  Subjective:      Chief Complaint: Cervical spondylosis with myelopathy     History of Present Illness: 58-year-old gentleman with past medical history of CKD, diverticulosis, HTN, CAD, recent diskectomy and cervical spinal fusion of C5-C6 on Monday by Dr. Zuniga presenting to the emergency department with worsening neck pain. Patient notes that since surgery he has been doing well and was in fact even doing well through the morning. It was not until this evening that he started to develop some pain in his chest. Described it as a skin tearing sensation and felt that it was from the bandages initially. Took a large bandage off and change the Band-Aid covering his drain site. No swelling of his neck. Notes that he was having some paresthesias of the chest area around his neck with some radiation up into his jaw. No new paresthesias or weakness of his lower extremities. No fevers.    The clinical impression on note was "infection of superficial incisional surgical site after procedure"    Transitional Care Note    Family and/or Caretaker present at visit?  No.  Diagnostic tests reviewed/disposition: No diagnosic tests pending after this hospitalization.  Disease/illness education: keeping collar on until instructed otherwise by has  Home health/community services discussion/referrals: Patient has home health established at [facility] .   Establishment or re-establishment of referral orders for community resources: No other necessary community resources.   Discussion with other health care providers: No discussion with other health care providers necessary.        Objective:      Physical Exam  Vitals and nursing note reviewed.   Constitutional:       Appearance: Normal appearance.      Comments: He has a cervical neck collar on and using a walker at this visit   Neck:      Comments: Hard cervical collar to neck, see phot of skin  Cardiovascular:      Rate and Rhythm: Normal rate.   Musculoskeletal:      Cervical " back: Decreased range of motion.   Skin:     Findings: Rash present.             Comments: Small red areas that appear to be at the site where he may have had tape or the soft material on the collar   Neurological:      Mental Status: He is alert.   Psychiatric:         Attention and Perception: Attention and perception normal.         Mood and Affect: Mood and affect normal.         Speech: Speech normal.         Behavior: Behavior normal. Behavior is cooperative.         Thought Content: Thought content normal.         Cognition and Memory: Cognition and memory normal.         Judgment: Judgment normal.               Assessment & Plan:       History of recent hospitalization    Rash/skin eruption    Type 2 diabetes mellitus without complication, without long-term current use of insulin    H/O cervical discectomy    Cervical vertebral fusion          Keep appt with neurosurgery on 02/23/2024  Warm moist heat to shoulder/heating pad while awake only  Follow with your PT person regarding any movements of your shoulder  This achiness can be due to your head/neck being immobilized and not much movement and the changes it has had on all of those muscles  This may be a slight reaction to the tape or product used   If you have itching you can use some over the counter claritin    DM controlled with diet whereas there are no med's he is currently taking for it  Lab Results   Component Value Date    HGBA1C 6.2 (H) 12/27/2023      Medication List with Changes/Refills   Current Medications    ACETAMINOPHEN (TYLENOL) 500 MG TABLET    Take 1,000 mg by mouth every 6 (six) hours as needed for Pain.    AMLODIPINE (NORVASC) 5 MG TABLET    Take 1 tablet by mouth every evening.    ASCORBIC ACID (VITAMIN C ORAL)    Take 2 tablets by mouth once daily.    BACLOFEN (LIORESAL) 5 MG TAB TABLET    Take 1 tablet (5 mg total) by mouth 2 (two) times daily as needed (muscle spasm).    CHOLECALCIFEROL, VITAMIN D3, 125 MCG (5,000 UNIT) TAB     Take 10,000 Units by mouth once daily.    FAMOTIDINE (PEPCID) 20 MG TABLET    Take 40 mg by mouth Daily.    LACTOBACILLUS ACIDOPHILUS (ACIDOPHILUS ORAL)    Take 3 tablets by mouth once daily.    LOSARTAN (COZAAR) 100 MG TABLET    Take 1 tablet (100 mg total) by mouth once daily.    MAGNESIUM OXIDE (MAG-OX) 400 MG (241.3 MG MAGNESIUM) TABLET    Take 400 mg by mouth once daily.    METOPROLOL SUCCINATE (TOPROL-XL) 50 MG 24 HR TABLET    Take 1 tablet (50 mg total) by mouth once daily.    NIACIN ORAL    Take 1 tablet by mouth once daily.    OXYCODONE-ACETAMINOPHEN (PERCOCET) 7.5-325 MG PER TABLET    Take 1 tablet by mouth every 6 (six) hours as needed for Pain.    PANTOPRAZOLE (PROTONIX) 40 MG TABLET    Take 1 tablet (40 mg total) by mouth once daily.    SPIRONOLACTONE (ALDACTONE) 25 MG TABLET    TAKE 1/2 TABLET(12.5 MG) BY MOUTH EVERY DAY    ZINC GLUCONATE 50 MG TABLET    Take 50 mg by mouth once daily.      Follow up in about 3 months (around 5/16/2024).

## 2024-02-19 ENCOUNTER — PATIENT MESSAGE (OUTPATIENT)
Dept: NEUROSURGERY | Facility: CLINIC | Age: 59
End: 2024-02-19
Payer: COMMERCIAL

## 2024-02-22 ENCOUNTER — OFFICE VISIT (OUTPATIENT)
Dept: FAMILY MEDICINE | Facility: CLINIC | Age: 59
End: 2024-02-22
Payer: COMMERCIAL

## 2024-02-22 VITALS
HEART RATE: 75 BPM | BODY MASS INDEX: 42.42 KG/M2 | SYSTOLIC BLOOD PRESSURE: 130 MMHG | OXYGEN SATURATION: 98 % | DIASTOLIC BLOOD PRESSURE: 88 MMHG | HEIGHT: 70 IN | WEIGHT: 296.31 LBS

## 2024-02-22 DIAGNOSIS — R30.0 DYSURIA: Primary | ICD-10-CM

## 2024-02-22 LAB
BILIRUBIN, UA POC OHS: NEGATIVE
BLOOD, UA POC OHS: ABNORMAL
CLARITY, UA POC OHS: ABNORMAL
COLOR, UA POC OHS: YELLOW
GLUCOSE, UA POC OHS: 100
KETONES, UA POC OHS: NEGATIVE
LEUKOCYTES, UA POC OHS: NEGATIVE
NITRITE, UA POC OHS: NEGATIVE
PH, UA POC OHS: 6
PROTEIN, UA POC OHS: NEGATIVE
SPECIFIC GRAVITY, UA POC OHS: >=1.03
UROBILINOGEN, UA POC OHS: 0.2

## 2024-02-22 PROCEDURE — 81003 URINALYSIS AUTO W/O SCOPE: CPT | Mod: QW,S$GLB,, | Performed by: FAMILY MEDICINE

## 2024-02-22 PROCEDURE — 99213 OFFICE O/P EST LOW 20 MIN: CPT | Mod: S$GLB,,, | Performed by: FAMILY MEDICINE

## 2024-02-22 PROCEDURE — 99999 PR PBB SHADOW E&M-EST. PATIENT-LVL IV: CPT | Mod: PBBFAC,,, | Performed by: FAMILY MEDICINE

## 2024-02-22 RX ORDER — NITROFURANTOIN 25; 75 MG/1; MG/1
100 CAPSULE ORAL 2 TIMES DAILY
Qty: 14 CAPSULE | Refills: 0 | Status: SHIPPED | OUTPATIENT
Start: 2024-02-22 | End: 2024-04-15

## 2024-02-22 NOTE — PROGRESS NOTES
Subjective:       Patient ID: García Tang is a 58 y.o. male.    Chief Complaint: Urinary Tract Infection (Burning when urinating)    Here for acute visit for possible uti. Had cervical surgery recently. Duration x 2 days. No fever.      Urinary Tract Infection   Pertinent negatives include no chills.     Review of Systems   Constitutional:  Negative for chills and fever.   Respiratory:  Negative for cough, chest tightness and shortness of breath.    Cardiovascular:  Negative for chest pain, palpitations and leg swelling.   Endocrine: Negative for cold intolerance and heat intolerance.   Genitourinary:  Positive for dysuria.   Psychiatric/Behavioral:  Negative for decreased concentration. The patient is not nervous/anxious.        Objective:      Physical Exam  Constitutional:       Appearance: Normal appearance.   Abdominal:      General: Abdomen is flat. Bowel sounds are normal.      Palpations: Abdomen is soft.   Neurological:      Mental Status: He is alert.   Psychiatric:         Mood and Affect: Mood normal.         Behavior: Behavior normal.         Assessment:       1. Dysuria        Plan:       Dysuria  -     POCT Urinalysis(Instrument)    Other orders  -     nitrofurantoin, macrocrystal-monohydrate, (MACROBID) 100 MG capsule; Take 1 capsule (100 mg total) by mouth 2 (two) times daily.  Dispense: 14 capsule; Refill: 0      Treat and monitor with return precautions given  Keep neurosurg f/u as planned  F/u with pcp for repeat ua at some point as needed    Follow up if symptoms worsen or fail to improve.

## 2024-02-23 ENCOUNTER — PATIENT MESSAGE (OUTPATIENT)
Dept: NEUROSURGERY | Facility: CLINIC | Age: 59
End: 2024-02-23

## 2024-02-23 ENCOUNTER — CLINICAL SUPPORT (OUTPATIENT)
Dept: NEUROSURGERY | Facility: CLINIC | Age: 59
End: 2024-02-23
Payer: COMMERCIAL

## 2024-02-23 DIAGNOSIS — Z98.1 S/P CERVICAL SPINAL FUSION: Primary | ICD-10-CM

## 2024-02-23 RX ORDER — TIZANIDINE 4 MG/1
4 TABLET ORAL EVERY 8 HOURS PRN
Qty: 60 TABLET | Refills: 0 | Status: SHIPPED | OUTPATIENT
Start: 2024-02-23

## 2024-02-23 NOTE — PROGRESS NOTES
POD11. INCISION CLEAN DRY AND INTACT. APPEARS WITH NO S/S OF INFECTION. CLEANED WITH CHLORAPREP NO COMPLICATIONS OR DRAINAGE. NO CONCERNS FOR PROVIDER.

## 2024-02-26 ENCOUNTER — PATIENT MESSAGE (OUTPATIENT)
Dept: NEUROSURGERY | Facility: CLINIC | Age: 59
End: 2024-02-26
Payer: COMMERCIAL

## 2024-02-28 ENCOUNTER — PATIENT MESSAGE (OUTPATIENT)
Dept: NEUROSURGERY | Facility: CLINIC | Age: 59
End: 2024-02-28
Payer: COMMERCIAL

## 2024-03-04 ENCOUNTER — PATIENT MESSAGE (OUTPATIENT)
Dept: NEUROSURGERY | Facility: CLINIC | Age: 59
End: 2024-03-04
Payer: COMMERCIAL

## 2024-03-09 DIAGNOSIS — I70.1 RENAL ARTERY STENOSIS: ICD-10-CM

## 2024-03-09 DIAGNOSIS — I10 ESSENTIAL (PRIMARY) HYPERTENSION: ICD-10-CM

## 2024-03-11 ENCOUNTER — OFFICE VISIT (OUTPATIENT)
Dept: CARDIOLOGY | Facility: CLINIC | Age: 59
End: 2024-03-11
Payer: COMMERCIAL

## 2024-03-11 VITALS
HEIGHT: 70 IN | WEIGHT: 305.56 LBS | DIASTOLIC BLOOD PRESSURE: 82 MMHG | SYSTOLIC BLOOD PRESSURE: 134 MMHG | BODY MASS INDEX: 43.74 KG/M2 | HEART RATE: 73 BPM

## 2024-03-11 DIAGNOSIS — R94.30 ELEVATED LEFT VENTRICULAR END-DIASTOLIC PRESSURE (LVEDP): Primary | Chronic | ICD-10-CM

## 2024-03-11 DIAGNOSIS — E78.5 DYSLIPIDEMIA WITH ELEVATED LOW DENSITY LIPOPROTEIN (LDL) CHOLESTEROL AND ABNORMALLY LOW HIGH DENSITY LIPOPROTEIN CHOLESTEROL: Chronic | ICD-10-CM

## 2024-03-11 DIAGNOSIS — E66.01 SEVERE OBESITY (BMI >= 40): Chronic | ICD-10-CM

## 2024-03-11 DIAGNOSIS — I70.1 RENAL ARTERY STENOSIS: ICD-10-CM

## 2024-03-11 DIAGNOSIS — I34.0 NONRHEUMATIC MITRAL VALVE REGURGITATION: Chronic | ICD-10-CM

## 2024-03-11 DIAGNOSIS — I10 ESSENTIAL (PRIMARY) HYPERTENSION: Chronic | ICD-10-CM

## 2024-03-11 PROCEDURE — 99999 PR PBB SHADOW E&M-EST. PATIENT-LVL III: CPT | Mod: PBBFAC,,, | Performed by: INTERNAL MEDICINE

## 2024-03-11 PROCEDURE — 99214 OFFICE O/P EST MOD 30 MIN: CPT | Mod: S$GLB,,, | Performed by: INTERNAL MEDICINE

## 2024-03-11 RX ORDER — SPIRONOLACTONE 25 MG/1
12.5 TABLET ORAL DAILY
Qty: 45 TABLET | Refills: 1 | Status: SHIPPED | OUTPATIENT
Start: 2024-03-11

## 2024-03-11 RX ORDER — LOSARTAN POTASSIUM 100 MG/1
100 TABLET ORAL DAILY
Qty: 90 TABLET | Refills: 1 | Status: SHIPPED | OUTPATIENT
Start: 2024-03-11

## 2024-03-11 RX ORDER — AMLODIPINE BESYLATE 5 MG/1
5 TABLET ORAL
Qty: 90 TABLET | OUTPATIENT
Start: 2024-03-11

## 2024-03-11 RX ORDER — METOPROLOL SUCCINATE 50 MG/1
50 TABLET, EXTENDED RELEASE ORAL DAILY
Qty: 90 TABLET | Refills: 3 | Status: SHIPPED | OUTPATIENT
Start: 2024-03-11

## 2024-03-11 RX ORDER — AMLODIPINE BESYLATE 5 MG/1
5 TABLET ORAL NIGHTLY
Qty: 90 TABLET | Refills: 1 | Status: SHIPPED | OUTPATIENT
Start: 2024-03-11

## 2024-03-11 NOTE — PROGRESS NOTES
Subjective:    Patient ID:  García Tang is a 58 y.o. male who presents for Follow-up (Without concerns )        Follow-up  Pertinent negatives include no abdominal pain, chest pain, chills, congestion, coughing, diaphoresis, fever, nausea or rash.     RECENT LABS CMP AND CBC OK,ACDF SURGERY, ECHO IN DECEMBER NEGATIVE FOR SHUNT . ? TIA,WAS CONFUSED,  WEAKNESS RELATED TO NECK,WALKING BETTER, DOING OK, HAD SLEEP STUDY OK, RECORDS REVIEWED, SEE ROS ,     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    Diverticulitis     diverticulitis    Diverticulosis     Essential hypertension 07/18/2016    Fatty liver disease, nonalcoholic 04/07/2016    [place], 2006     GERD (gastroesophageal reflux disease)     Headache     HLD (hyperlipidemia)     Kidney stone     requiring stent    Meniere's disease 01/09/2018    Morbid obesity 05/29/2014    NAFLD (nonalcoholic fatty liver disease)     Near syncope 05/23/2017    Obesity hypoventilation syndrome 02/22/2016    PAD (peripheral artery disease) 09/17/2019    Mild 9/2018    Prostatitis     Rectal bleeding     diverticulitis    Sleep disorder breathing 2/12/2024    Thyroid nodule 07/14/2022     Past Surgical History:   Procedure Laterality Date    ANGIOGRAM, CORONARY, WITH LEFT HEART CATHETERIZATION  10/26/2023    Procedure: Left Heart Cath;  Surgeon: Sal Salazar MD;  Location: [facility] CATH;  Service: Cardiology;;    ANTERIOR CERVICAL DISCECTOMY W/ FUSION N/A 2/12/2024    Procedure: DISCECTOMY, SPINE, CERVICAL, ANTERIOR APPROACH, WITH FUSION C5-6;  Surgeon: Wali Zuniga MD;  Location: [facility] OR;  Service: Neurosurgery;  Laterality: N/A;    CARDIAC CATHETERIZATION      COLONOSCOPY  2008    diverticulosis per patient report    CORONARY ANGIOGRAPHY  10/26/2023    Procedure: Coronary angiogram study;  Surgeon: Sal Salazar MD;  Location:  [facility] CATH;  Service: Cardiology;;    ESOPHAGOGASTRODUODENOSCOPY N/A 2023    Procedure: EGD (ESOPHAGOGASTRODUODENOSCOPY);  Surgeon: Aram Bermudez MD;  Location: [facility];  Service: Gastroenterology;  Laterality: N/A;    kidney stent      KIDNEY SURGERY      right kidney obstruction requiring stent-open    MAGNETIC RESONANCE IMAGING N/A 2024    Procedure: MRI (MAGNETIC RESONANCE IMAGING);  Surgeon: Provider, [facility];  Location: [facility] JIM;  Service: General;  Laterality: N/A;    NOSE SURGERY      UPPER GASTROINTESTINAL ENDOSCOPY      [facility], gastritis per patient report     Family History   Problem Relation Age of Onset    Liver disease Mother         d.    Cirrhosis Mother     Hypertension Mother     Hyperlipidemia Mother     Colon cancer Father 70    Cancer Father     Diabetes Father     Hypertension Father     Glaucoma Father     Hyperlipidemia Father     Prostate cancer Father     Diabetes Brother     Hypertension Brother     Thyroid cancer Brother     Heart attack Maternal Uncle     Heart attack Maternal Uncle     Heart disease Neg Hx     Heart failure Neg Hx     Macular degeneration Neg Hx     Retinal detachment Neg Hx     Crohn's disease Neg Hx     Ulcerative colitis Neg Hx     Stomach cancer Neg Hx     Esophageal cancer Neg Hx      Social History     Socioeconomic History    Marital status:    Tobacco Use    Smoking status: Former     Current packs/day: 0.00     Types: Cigarettes     Quit date: 1990     Years since quittin.2    Smokeless tobacco: Never    Tobacco comments:     social   Substance and Sexual Activity    Alcohol use: No     Comment: stopped in     Drug use: No    Sexual activity: Yes     Partners: Female     Social Determinants of Health     Financial Resource Strain: Patient Declined (2024)    Overall Financial Resource Strain (CARDIA)     Difficulty of Paying Living Expenses: Patient declined   Food Insecurity: Unknown  (2/22/2024)    Hunger Vital Sign     Worried About Running Out of Food in the Last Year: Never true     Ran Out of Food in the Last Year: Patient declined   Transportation Needs: Unknown (2/22/2024)    PRAPARE - Transportation     Lack of Transportation (Medical): No     Lack of Transportation (Non-Medical): Patient declined   Physical Activity: Unknown (2/22/2024)    Exercise Vital Sign     Days of Exercise per Week: Patient declined     Minutes of Exercise per Session: 0 min   Recent Concern: Physical Activity - Inactive (1/24/2024)    Exercise Vital Sign     Days of Exercise per Week: 0 days     Minutes of Exercise per Session: 0 min   Stress: Patient Declined (2/22/2024)    [employer] - Occupational Stress Questionnaire     Feeling of Stress : Patient declined   Social Connections: Unknown (2/22/2024)    Social Connection and Isolation Panel [NHANES]     Frequency of Communication with Friends and Family: Once a week     Frequency of Social Gatherings with Friends and Family: Patient declined     Attends Gnosticist Services: More than 4 times per year     Active Member of Clubs or Organizations: Patient declined     Attends Club or Organization Meetings: Patient declined     Marital Status: Patient declined   Recent Concern: Social Connections - Moderately Isolated (1/24/2024)    Social Connection and Isolation Panel [NHANES]     Frequency of Communication with Friends and Family: Once a week     Frequency of Social Gatherings with Friends and Family: Never     Attends Gnosticist Services: More than 4 times per year     Active Member of Clubs or Organizations: No     Attends Club or Organization Meetings: Never     Marital Status:    Housing Stability: Patient Declined (2/22/2024)    Housing Stability Vital Sign     Unable to Pay for Housing in the Last Year: Patient declined     Number of Places Lived in the Last Year: 1     Unstable Housing in the Last Year: Patient declined        Review of patient's allergies indicates:   Allergen Reactions    Lisinopril Swelling    Iodinated contrast media Other (See Comments)     Spasms, tremor, and high blood pressure    Bactrim  [sulfamethoxazole-trimethoprim]      Other reaction(s): Unknown    Diflucan [fluconazole]      Heart racing    Gabapentin Other (See Comments)     nightmares    Hydroxyzine hcl      Other reaction(s): elevated bp    Kenalog [triamcinolone acetonide] Other (See Comments)     Heart flutters all day    Phenergan [promethazine] Anxiety    Zofran [ondansetron hcl (pf)] Anxiety       Current Outpatient Medications:     acetaminophen (TYLENOL) 500 MG tablet, Take 1,000 mg by mouth every 6 (six) hours as needed for Pain., Disp: , Rfl:     ascorbic acid (VITAMIN C ORAL), Take 2 tablets by mouth once daily., Disp: , Rfl:     cholecalciferol, vitamin D3, 125 mcg (5,000 unit) Tab, Take 10,000 Units by mouth once daily., Disp: , Rfl:     famotidine (PEPCID) 20 MG tablet, Take 40 mg by mouth Daily., Disp: , Rfl:     Lactobacillus acidophilus (ACIDOPHILUS ORAL), Take 3 tablets by mouth once daily., Disp: , Rfl:     magnesium oxide (MAG-OX) 400 mg (241.3 mg magnesium) tablet, Take 400 mg by mouth once daily., Disp: , Rfl:     NIACIN ORAL, Take 1 tablet by mouth once daily., Disp: , Rfl:     oxyCODONE-acetaminophen (PERCOCET) 7.5-325 mg per tablet, Take 1 tablet by mouth every 6 (six) hours as needed for Pain., Disp: 28 tablet, Rfl: 0    pantoprazole (PROTONIX) 40 MG tablet, Take 1 tablet (40 mg total) by mouth once daily., Disp: 90 tablet, Rfl: 1    tiZANidine (ZANAFLEX) 4 MG tablet, Take 1 tablet (4 mg total) by mouth every 8 (eight) hours as needed (muscle spasms)., Disp: 60 tablet, Rfl: 0    zinc gluconate 50 mg tablet, Take 50 mg by mouth once daily., Disp: , Rfl:     amLODIPine (NORVASC) 5 MG tablet, Take 1 tablet (5 mg total) by mouth every evening., Disp: 90 tablet, Rfl: 1    losartan (COZAAR) 100 MG tablet, Take 1 tablet (100 mg  "total) by mouth once daily., Disp: 90 tablet, Rfl: 1    metoprolol succinate (TOPROL-XL) 50 MG 24 hr tablet, Take 1 tablet (50 mg total) by mouth once daily., Disp: 90 tablet, Rfl: 3    nitrofurantoin, macrocrystal-monohydrate, (MACROBID) 100 MG capsule, Take 1 capsule (100 mg total) by mouth 2 (two) times daily. (Patient not taking: Reported on 3/11/2024), Disp: 14 capsule, Rfl: 0    spironolactone (ALDACTONE) 25 MG tablet, Take 0.5 tablets (12.5 mg total) by mouth once daily., Disp: 45 tablet, Rfl: 1    Review of Systems   Constitutional: Positive for weight gain. Negative for chills, decreased appetite, diaphoresis, fever and malaise/fatigue.   HENT:  Negative for congestion and nosebleeds.    Eyes:  Negative for redness and visual disturbance.   Cardiovascular:  Negative for chest pain, claudication, cyanosis, dyspnea on exertion (MILD), irregular heartbeat, leg swelling, near-syncope, orthopnea, palpitations (RARE), paroxysmal nocturnal dyspnea and syncope.   Respiratory:  Negative for cough, hemoptysis, shortness of breath and wheezing.    Endocrine: Negative for polyphagia and polyuria.   Hematologic/Lymphatic: Negative for adenopathy. Does not bruise/bleed easily.   Skin:  Negative for color change and rash.   Musculoskeletal:  Positive for muscle cramps. Negative for back pain and falls.   Gastrointestinal:  Negative for abdominal pain, dysphagia, jaundice, melena and nausea.   Genitourinary:  Negative for dysuria and flank pain.   Neurological:  Negative for brief paralysis, dizziness, focal weakness, loss of balance (BETTER) and paresthesias (BETTER, SHOOTING PAIN).   Psychiatric/Behavioral:  Negative for altered mental status.    Allergic/Immunologic: Negative for hives and persistent infections.        Objective:      Vitals:    03/11/24 1009 03/11/24 1022   BP: (!) 158/93 134/82   Pulse: 73    Weight: (!) 138.6 kg (305 lb 8.9 oz)    Height: 5' 10" (1.778 m)    PainSc:   7    PainLoc: Neck      Body " mass index is 43.84 kg/m².    Physical Exam  Constitutional:       Appearance: He is morbidly obese. He is not diaphoretic.   HENT:      Head: Normocephalic and atraumatic.   Eyes:      General: No scleral icterus.     Extraocular Movements: Extraocular movements intact.   Neck:      Vascular: No carotid bruit.   Cardiovascular:      Rate and Rhythm: Normal rate and regular rhythm.      Pulses: Normal pulses.           Carotid pulses are 2+ on the right side and 2+ on the left side.       Radial pulses are 2+ on the right side and 2+ on the left side.        Femoral pulses are 2+ on the right side and 2+ on the left side.       Posterior tibial pulses are 2+ on the right side and 2+ on the left side.      Heart sounds: Normal heart sounds.      No friction rub. No gallop.   Pulmonary:      Effort: Pulmonary effort is normal.      Breath sounds: Normal breath sounds.   Abdominal:      Palpations: Abdomen is soft.      Tenderness: There is no abdominal tenderness.   Musculoskeletal:      Cervical back: Neck supple.      Right lower leg: No edema (TRACE).      Left lower leg: No edema (TRACE).   Skin:     Capillary Refill: Capillary refill takes less than 2 seconds.      Comments: FEET WARM   Neurological:      General: No focal deficit present.      Mental Status: He is alert and oriented to person, place, and time.   Psychiatric:         Mood and Affect: Mood normal.         Speech: Speech normal.         Behavior: Behavior normal.               ..    Chemistry        Component Value Date/Time     02/14/2024 2148    K 4.6 02/14/2024 2148     02/14/2024 2148    CO2 33 (H) 02/14/2024 2148    BUN 15 02/14/2024 2148    CREATININE 0.85 02/14/2024 2148     (H) 02/14/2024 2148        Component Value Date/Time    CALCIUM 9.7 02/14/2024 2148    ALKPHOS 71 02/14/2024 2148    AST 35 02/14/2024 2148    ALT 31 02/14/2024 2148    BILITOT 0.7 02/14/2024 2148    ESTGFRAFRICA >60.0 04/14/2022 0745    EGFRNONAA  >60.0 04/14/2022 0745            ..  Lab Results   Component Value Date    CHOL 189 11/07/2023    CHOL 160 01/28/2023    CHOL 157 04/14/2022     Lab Results   Component Value Date    HDL 43 11/07/2023    HDL 44 01/28/2023    HDL 41 04/14/2022     Lab Results   Component Value Date    LDLCALC 125.0 11/07/2023    LDLCALC 106.6 01/28/2023    LDLCALC 103.6 04/14/2022     Lab Results   Component Value Date    TRIG 105 11/07/2023    TRIG 47 01/28/2023    TRIG 62 04/14/2022     Lab Results   Component Value Date    CHOLHDL 22.8 11/07/2023    CHOLHDL 27.5 01/28/2023    CHOLHDL 26.1 04/14/2022     ..  Lab Results   Component Value Date    WBC 6.48 02/14/2024    HGB 14.5 02/14/2024    HCT 41.9 02/14/2024    MCV 87 02/14/2024     02/14/2024       Test(s) Reviewed  I have reviewed the following in detail:  [] Stress test   [] Angiography   [x] Echocardiogram   [x] Labs   [] Other:       Assessment:         ICD-10-CM ICD-9-CM   1. Elevated left ventricular end-diastolic pressure (LVEDP)  R94.30 794.30   2. Essential (primary) hypertension  I10 401.9   3. Nonrheumatic mitral valve regurgitation  I34.0 424.0   4. Severe obesity (BMI >= 40)  E66.01 278.01   5. Dyslipidemia with elevated low density lipoprotein (LDL) cholesterol and abnormally low high density lipoprotein cholesterol  E78.5 272.4   6. Renal artery stenosis  I70.1 440.1     Problem List Items Addressed This Visit          Cardiac/Vascular    Essential (primary) hypertension    Relevant Medications    spironolactone (ALDACTONE) 25 MG tablet    metoprolol succinate (TOPROL-XL) 50 MG 24 hr tablet    amLODIPine (NORVASC) 5 MG tablet    Dyslipidemia with elevated low density lipoprotein (LDL) cholesterol and abnormally low high density lipoprotein cholesterol    Relevant Medications    spironolactone (ALDACTONE) 25 MG tablet    Nonrheumatic mitral valve regurgitation    Elevated left ventricular end-diastolic pressure (LVEDP) - Primary       Endocrine    Severe  obesity (BMI >= 40)     Other Visit Diagnoses       Renal artery stenosis        Relevant Medications    amLODIPine (NORVASC) 5 MG tablet             Plan:     ALL CV CLINICALLY STABLE, NO ANGINA, NO HF, NO TIA, NO CLINICAL ARRHYTHMIA,CONTINUE CURRENT MEDS, EDUCATION, DIET, EXERCISE AS MUCH AS POSSIBLE, NEEDS SIGNIFICANT WEIGHT LOSS, RETURN TO CLINIC IN 6-8 MONTHS WITH LABS      Elevated left ventricular end-diastolic pressure (LVEDP)    Essential (primary) hypertension  -     spironolactone (ALDACTONE) 25 MG tablet; Take 0.5 tablets (12.5 mg total) by mouth once daily.  Dispense: 45 tablet; Refill: 1  -     metoprolol succinate (TOPROL-XL) 50 MG 24 hr tablet; Take 1 tablet (50 mg total) by mouth once daily.  Dispense: 90 tablet; Refill: 3  -     amLODIPine (NORVASC) 5 MG tablet; Take 1 tablet (5 mg total) by mouth every evening.  Dispense: 90 tablet; Refill: 1    Nonrheumatic mitral valve regurgitation    Severe obesity (BMI >= 40)    Dyslipidemia with elevated low density lipoprotein (LDL) cholesterol and abnormally low high density lipoprotein cholesterol  -     spironolactone (ALDACTONE) 25 MG tablet; Take 0.5 tablets (12.5 mg total) by mouth once daily.  Dispense: 45 tablet; Refill: 1    Renal artery stenosis  -     amLODIPine (NORVASC) 5 MG tablet; Take 1 tablet (5 mg total) by mouth every evening.  Dispense: 90 tablet; Refill: 1    Other orders  -     losartan (COZAAR) 100 MG tablet; Take 1 tablet (100 mg total) by mouth once daily.  Dispense: 90 tablet; Refill: 1    RTC Low level/low impact aerobic exercise 5x's/wk. Heart healthy diet and risk factor modification.    See labs and med orders.    Aerobic exercise 5x's/wk. Heart healthy diet and risk factor modification.    See labs and med orders.

## 2024-03-13 ENCOUNTER — TELEPHONE (OUTPATIENT)
Dept: NEUROSURGERY | Facility: CLINIC | Age: 59
End: 2024-03-13
Payer: COMMERCIAL

## 2024-03-13 DIAGNOSIS — R29.898 WEAKNESS OF BOTH LOWER EXTREMITIES: ICD-10-CM

## 2024-03-13 DIAGNOSIS — Z98.1 S/P CERVICAL SPINAL FUSION: Primary | ICD-10-CM

## 2024-03-13 NOTE — TELEPHONE ENCOUNTER
----- Message from Dino Nelson sent at 3/11/2024  1:17 PM CDT -----  Contact: pt  Type:  Needs Medical Advice    Who Called: the patient  Symptoms (please be specific):  How long has patient had these symptoms:    Pharmacy name and phone #:    Would the patient rather a call back or a response via [facility]? call back  Best Call Back Number: [phone]   Additional Information: Pt states he would like to speak to staff in regards to restrictions  Please advise   Thanks

## 2024-03-14 ENCOUNTER — CLINICAL SUPPORT (OUTPATIENT)
Dept: REHABILITATION | Facility: HOSPITAL | Age: 59
End: 2024-03-14
Attending: STUDENT IN AN ORGANIZED HEALTH CARE EDUCATION/TRAINING PROGRAM
Payer: COMMERCIAL

## 2024-03-14 DIAGNOSIS — E11.9 TYPE 2 DIABETES MELLITUS WITHOUT COMPLICATION: ICD-10-CM

## 2024-03-14 DIAGNOSIS — R29.898 WEAKNESS OF BOTH LOWER EXTREMITIES: ICD-10-CM

## 2024-03-14 DIAGNOSIS — Z98.1 S/P CERVICAL SPINAL FUSION: ICD-10-CM

## 2024-03-14 DIAGNOSIS — M53.82 DECREASED ROM OF INTERVERTEBRAL DISCS OF CERVICAL SPINE: Primary | ICD-10-CM

## 2024-03-14 DIAGNOSIS — R26.81 UNSTEADY GAIT: ICD-10-CM

## 2024-03-14 DIAGNOSIS — M62.81 GENERALIZED MUSCLE WEAKNESS: ICD-10-CM

## 2024-03-14 PROCEDURE — 97162 PT EVAL MOD COMPLEX 30 MIN: CPT | Mod: PO

## 2024-03-14 NOTE — PLAN OF CARE
"[facility]  Physical Therapy Initial Evaluation    Date: 3/14/2024   Name: García Tang  Clinic Number: [id number]    Therapy Diagnosis:   Encounter Diagnoses   Name Primary?    S/P cervical spinal fusion     Weakness of both lower extremities     Decreased ROM of intervertebral discs of cervical spine Yes    Generalized muscle weakness     Unsteady gait      Physician: Wali Zuniga MD    Physician Orders: PT Eval and Treat   Medical Diagnosis from Referral: S/P cervical spinal fusion [Z98.1], Weakness of both lower extremities   Evaluation Date: 3/14/2024  Authorization Period Expiration: 3/13/2025  Plan of Care Expiration: 6/14/2024  Progress Note Due: 5/14/2024  Visit # / Visits authorized: 1/ 1   FOTO: 1/3    Precautions: Standard and ACDF      Time In: 1400  Time Out: 1500  Total Appointment Time (timed & untimed codes): 60 minutes      SUBJECTIVE   Date of onset: 2/12/2024    History of current condition - Brad reports: he underwent an cervical 5/6 ACDF on 2/12/2024. Prior to surgery patient was experiencing severe nerve compression causing significant LE weakness-patient was using a walker up until two days ago.Primary complaints of neck stiffness and LE weakness which he would like to address. RTW estimated in 8 weeks.     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   Prior Level of Function: walking with RW  Current Level of Function: no AD with ambulation    Pain:  Current 6/10, worst 9/10, best 4/10   Location: posterior neck pain   Description: Aching and Dull  Aggravating Factors: Sitting  Easing Factors: pain medication    Patients goals: "to be able to " "walk better"     Medical History:   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    Diverticulitis     diverticulitis    Diverticulosis     Essential hypertension 07/18/2016    Fatty liver disease, nonalcoholic 04/07/2016    [place], 2006     GERD (gastroesophageal reflux disease)     Headache     HLD (hyperlipidemia)     Kidney stone     requiring stent    Meniere's disease 01/09/2018    Morbid obesity 05/29/2014    NAFLD (nonalcoholic fatty liver disease)     Near syncope 05/23/2017    Obesity hypoventilation syndrome 02/22/2016    PAD (peripheral artery disease) 09/17/2019    Mild 9/2018    Prostatitis     Rectal bleeding     diverticulitis    Sleep disorder breathing 2/12/2024    Thyroid nodule 07/14/2022       Surgical History:   García Tang  has a past surgical history that includes Nose surgery; kidney stent; Kidney surgery (1996); Colonoscopy (2008); Upper gastrointestinal endoscopy (2021); Cardiac catheterization; ANGIOGRAM, CORONARY, WITH LEFT HEART CATHETERIZATION (10/26/2023); Coronary angiography (10/26/2023); Esophagogastroduodenoscopy (N/A, 12/18/2023); Anterior cervical discectomy w/ fusion (N/A, 2/12/2024); and Magnetic resonance imaging (N/A, 1/26/2024).    Medications:   García has a current medication list which includes the following prescription(s): acetaminophen, amlodipine, ascorbic acid, cholecalciferol (vitamin d3), famotidine, lactobacillus acidophilus, losartan, magnesium oxide, metoprolol succinate, niacin, nitrofurantoin (macrocrystal-monohydrate), oxycodone-acetaminophen, pantoprazole, spironolactone, tizanidine, and zinc gluconate.    Allergies:   Review of patient's allergies indicates:   Allergen Reactions    Lisinopril Swelling    Iodinated contrast media Other (See Comments)     Spasms, tremor, and high blood pressure    Bactrim  " [sulfamethoxazole-trimethoprim]      Other reaction(s): Unknown    Diflucan [fluconazole]      Heart racing    Gabapentin Other (See Comments)     nightmares    Hydroxyzine hcl      Other reaction(s): elevated bp    Kenalog [triamcinolone acetonide] Other (See Comments)     Heart flutters all day    Phenergan [promethazine] Anxiety    Zofran [ondansetron hcl (pf)] Anxiety          Objective          Cervical Range of Motion:    % Limitation Pain   Flexion 75 +     Extension 75 +     Right Rotation 75 +     Left Rotation 75 +     Right Side Bending 75 +   Left Side Bending 75 +           Strength:  Cervical MMT   Flexion 2/5   Extension 2/5   Right Side Bend 2/5   Left Side Bend 2/5     Upper Extremity Strength  (R) UE  (L) UE    Shoulder flexion: 4-/5 Shoulder flexion: 4/5   Shoulder Abduction: 4-/5 Shoulder abduction: 4/5   Shoulder ER 4-/5 Shoulder ER 4/5   Shoulder IR 4-/5 Shoulder IR 4/5   Elbow flexion: 4-/5 Elbow flexion: 4/5   Elbow extension: 4-/5 Elbow extension: 4/5   Wrist flexion: 4-/5 Wrist flexion: 4/5   Wrist extension: 4-/5 Wrist extension: 4/5   Lower Trap 3/5 Lower Trap 3/5   Middle Trap 3+/5 Middle Trap 3+/5   Rhomboids 3+/5 Rhomboids 3+/5      (2nd notch)  Right:   Left: 60#     Reflexes:    Reflex Left Right   Bicep 2 2   Tricep 2 2   Mahan + +         Lower Extremity Strength  Right LE  Left LE    Knee extension: 2/5 Knee extension: 3-/5   Knee flexion: 2/5 Knee flexion: 3-/5   Hip flexion: 2/5 Hip flexion: 3-/5   Hip extension:  2/5 Hip extension: 3-/5   Hip abduction: 2/5 Hip abduction: 3-/5   Hip adduction: 3+/5 Hip adduction 3+/5   Ankle dorsiflexion: 3+/5 Ankle dorsiflexion: 3/5   Ankle plantarflexion: 3/5 Ankle plantarflexion: 3/5        Function:    - SLS R: 1s  - SLS L: 1s       Sensation: intact      CMS Impairment/Limitation/Restriction for FOTO cervical Survey    Therapist reviewed FOTO scores for García Tang on 3/14/2024.   FOTO documents entered into EPIC - see Media  section.    Limitation Score: 48%  Category: Mobility       TREATMENT        Home Exercises and Patient Education Provided    Education provided:   - Role of PT, PT POC    Written Home Exercises Provided:  nv .       Assessment   García is a 58 y.o. male referred to outpatient Physical Therapy with a medical diagnosis of S/P cervical spinal fusion [Z98.1], Weakness of both lower extremities . Physical exam is consistent with referring dx. Primary impairments include AROM, PROM, joint mobility, strength, soft tissue restrictions, and pain which limits functional mobility. This pt is an excellent candidate for skilled PT tx and stands to benefit from a combination of manual therapy including joint mobilizations with trigger point/myofacscial release, therapeutic exercise to establish core/joint stability, neuromuscular re-education, dry needling, and modalities Prn. The pt has been educated on their dx/POC and consents to further PT tx.    Pt prognosis is Good.   Pt will benefit from skilled outpatient Physical Therapy to address the deficits stated above and in the chart below, provide pt/family education, and to maximize pt's level of independence.     Plan of care discussed with patient: Yes  Pt's spiritual, cultural and educational needs considered and patient is agreeable to the plan of care and goals as stated below:     Anticipated Barriers for therapy: none       Medical Necessity is demonstrated by the following  History  Co-morbidities and personal factors that may impact the plan of care [] LOW: no personal factors / co-morbidities  [x] MODERATE: 1-2 personal factors / co-morbidities  [] HIGH: 3+ personal factors / co-morbidities    Moderate / High Support Documentation:   Co-morbidities affecting plan of care: n/a    Personal Factors:   no deficits     Examination  Body Structures and Functions, activity limitations and participation restrictions that may impact the plan of care [] LOW: addressing 1-2  elements  [x] MODERATE: 3+ elements  [] HIGH: 4+ elements (please support below)    Moderate / High Support Documentation: see  objective      Clinical Presentation [] LOW: stable  [x] MODERATE: Evolving  [] HIGH: Unstable     Decision Making/ Complexity Score: moderate       Goals:  Short Term Goals (4 Weeks):   1) Pt will demonstrate compliance with initial home exercise program as prescribed by physical therapist to improve independence with management of condition.  2) Pt to improve active range of motion in cervical spine by 25% to allow for improved functional mobility.  3) Pt to improve  strength by at least 25% in right hand.    Long Term Goals (12 Weeks):   1. Pt to achieve <40% limitation as measured by the FOTO to demonstrate decreased disability.  2) Pt to improve active range of motion in cervical spine by 50% to allow for improved functional mobility.  3) Pt to increase strength to at least 4+/5 of muscles tested to allow for improvement in functional activities.    PLAN   Plan of care Certification: 3/14/2024 to 6/14/2024.    Outpatient Physical Therapy 1-2 times weekly for 12 weeks to include the following interventions: Aquatic Therapy, Cervical/Lumbar Traction, Electrical Stimulation prn, Manual Therapy, Moist Heat/ Ice, Neuromuscular Re-ed, Patient Education, Self Care, Therapeutic Activities, Therapeutic Exercise, and Dry Needling .     Talon Andujar, PT, DPT      I CERTIFY THE NEED FOR THESE SERVICES FURNISHED UNDER THIS PLAN OF TREATMENT AND WHILE UNDER MY CARE   Physician's comments:     Physician's Signature: ___________________________________________________

## 2024-03-18 ENCOUNTER — PATIENT MESSAGE (OUTPATIENT)
Dept: NEUROSURGERY | Facility: CLINIC | Age: 59
End: 2024-03-18
Payer: COMMERCIAL

## 2024-03-19 NOTE — PROGRESS NOTES
[facility]   Physical Therapy Treatment Note     Name: García Tang  Clinic Number: [id number]    Therapy Diagnosis:   Encounter Diagnosis   Name Primary?    Decreased ROM of intervertebral discs of cervical spine Yes     Physician: Wali Zuniga MD    Visit Date: 3/20/2024    Physician Orders: PT Eval and Treat   Medical Diagnosis from Referral: S/P cervical spinal fusion [Z98.1], Weakness of both lower extremities   Evaluation Date: 3/14/2024  Authorization Period Expiration: 3/13/2025  Plan of Care Expiration: 6/14/2024  Progress Note Due: 5/14/2024  Visit # / Visits authorized: 1/ 1, 1/20  FOTO: 1/3       PTA Visit #: 0/5       Precautions: Standard     Time In: 0900  Time Out: 0955  Total Billable Time: 55 minutes      SUBJECTIVE     Pt reports: has been doing leg and arm exercises from home health PT.  He was compliant with home exercise program.  Response to previous treatment: TBD  Functional change: TBD    Pain: 2/10  Location: neck      OBJECTIVE     Objective Measures updated at progress report unless specified.     Treatment     Brad received the treatments listed below:      Leila received aquatic exercises to develop strength, endurance, ROM, flexibility, posture, and core stabilization for 60 minutes including:     AMB 3 min each  FWD 1.2 mph  BWD 0.8 mph  Side  0.7 mph     Stretches 3 x 30 sec  Hamstring   Hip flexor               LE exs 20x  Mini Squat with QS  Heel Raise with GS  Hip flexion/ext/abd  Lunges Side/FWD (platform)  Step-ups    HELD  SLS 3 x 30 sec  Tandem stance 3 x 30 sec     Endurance 3 min  Marching           Patient Education and Home Exercises     Home Exercises Provided and Patient Education Provided     Education provided:   - Plan of Care, HEP, Prognosis     Written Home Exercises Provided: yes. Exercises were reviewed and Brad was able to demonstrate them prior to the end of the session.  Brad demonstrated good  understanding of the education  provided. See EMR under Patient Instructions for exercises provided during therapy sessions    ASSESSMENT     Good tolerance to aquatic therapy today without complication. Subjective reports indicate an improvement in confidence and core activation post treatment. Continue with aquatic based intervention at this time.     Brad Is progressing well towards his goals.   Pt prognosis is Good.     Pt will continue to benefit from skilled outpatient physical therapy to address the deficits listed in the problem list box on initial evaluation, provide pt/family education and to maximize pt's level of independence in the home and community environment.     Pt's spiritual, cultural and educational needs considered and pt agreeable to plan of care and goals.     Anticipated barriers to physical therapy: chronicity    Goals:  Short Term Goals (4 Weeks):   1) Pt will demonstrate compliance with initial home exercise program as prescribed by physical therapist to improve independence with management of condition.  2) Pt to improve active range of motion in cervical spine by 25% to allow for improved functional mobility.  3) Pt to improve  strength by at least 25% in right hand.     Long Term Goals (12 Weeks):   1. Pt to achieve <40% limitation as measured by the FOTO to demonstrate decreased disability.  2) Pt to improve active range of motion in cervical spine by 50% to allow for improved functional mobility.  3) Pt to increase strength to at least 4+/5 of muscles tested to allow for improvement in functional activities.  PLAN     Progress as tolerated    Talon Andujar, PT, DPT

## 2024-03-20 ENCOUNTER — CLINICAL SUPPORT (OUTPATIENT)
Dept: REHABILITATION | Facility: HOSPITAL | Age: 59
End: 2024-03-20
Payer: COMMERCIAL

## 2024-03-20 DIAGNOSIS — M53.82 DECREASED ROM OF INTERVERTEBRAL DISCS OF CERVICAL SPINE: Primary | ICD-10-CM

## 2024-03-20 PROCEDURE — 97113 AQUATIC THERAPY/EXERCISES: CPT | Mod: PO

## 2024-03-22 ENCOUNTER — CLINICAL SUPPORT (OUTPATIENT)
Dept: REHABILITATION | Facility: HOSPITAL | Age: 59
End: 2024-03-22
Payer: COMMERCIAL

## 2024-03-22 DIAGNOSIS — M53.82 DECREASED ROM OF INTERVERTEBRAL DISCS OF CERVICAL SPINE: Primary | ICD-10-CM

## 2024-03-22 PROCEDURE — 97113 AQUATIC THERAPY/EXERCISES: CPT | Mod: PO,CQ

## 2024-03-22 NOTE — PROGRESS NOTES
[facility]   Physical Therapy  AquaticTreatment Note     Name: García Tang  Clinic Number: [id number]    Therapy Diagnosis:   Encounter Diagnosis   Name Primary?    Decreased ROM of intervertebral discs of cervical spine Yes     Physician: Wali Zuniga MD    Visit Date: 3/22/2024    Physician Orders: PT Eval and Treat   Medical Diagnosis from Referral: S/P cervical spinal fusion [Z98.1], Weakness of both lower extremities   Evaluation Date: 3/14/2024  Authorization Period Expiration: 3/13/2025  Plan of Care Expiration: 6/14/2024  Progress Note Due: 5/14/2024  Visit # / Visits authorized: 1/ 1, 3/20  FOTO: 1/3       PTA Visit #: 0/5       Precautions: Standard     Time In: 7:50 AM  Time Out: 9:00 AM  Total Billable Time: 70 minutes      SUBJECTIVE     Pt reports: that he had some gen mm soreness for ~ a day post last tx. He reports 5/10 neck pn upon arrival this AM.  He was compliant with home exercise program.  Response to previous treatment: TBD  Functional change: TBD    Pain: 5/10  Location: neck      OBJECTIVE     Objective Measures updated at progress report unless specified.     Treatment     Brad received the treatments listed below:      Leila received aquatic exercises to develop strength, endurance, ROM, flexibility, posture, and core stabilization for 70 minutes including:     AMB 3 min each  FWD 1.2 mph  BWD 0.8 mph  Side  0.7 mph     Stretches 3 x 30 sec  Hamstring   Hip flexor               LE exs 20x  Mini Squat with QS  Heel Raise with GS  Hip flexion/ext  Hip ABD/ADD  Lunges Side/FWD (platform)  Step-ups    SLS 3 x 30 sec  Tandem stance 3 x 30 sec     Endurance 3 min  Marching           Patient Education and Home Exercises     Home Exercises Provided and Patient Education Provided     Education provided:   - Plan of Care, HEP, Prognosis     Written Home Exercises Provided: yes. Exercises were reviewed and Brad was able to demonstrate them prior to the end of the  session.  Brad demonstrated good  understanding of the education provided. See EMR under Patient Instructions for exercises provided during therapy sessions    ASSESSMENT     Brad breezy today's tx with progression of aquatic ther ex well. He was able to add bal activities today with fair bal. Good tolerance to progression of aquatic therapy today without complication. He did require Mod VCs for posture and core stab tech with exs.  Continue with aquatic based intervention at this time.     Brad Is progressing well towards his goals.   Pt prognosis is Good.     Pt will continue to benefit from skilled outpatient physical therapy to address the deficits listed in the problem list box on initial evaluation, provide pt/family education and to maximize pt's level of independence in the home and community environment.     Pt's spiritual, cultural and educational needs considered and pt agreeable to plan of care and goals.     Anticipated barriers to physical therapy: chronicity    Goals:  Short Term Goals (4 Weeks):   1) Pt will demonstrate compliance with initial home exercise program as prescribed by physical therapist to improve independence with management of condition.  2) Pt to improve active range of motion in cervical spine by 25% to allow for improved functional mobility.  3) Pt to improve  strength by at least 25% in right hand.     Long Term Goals (12 Weeks):   1. Pt to achieve <40% limitation as measured by the FOTO to demonstrate decreased disability.  2) Pt to improve active range of motion in cervical spine by 50% to allow for improved functional mobility.  3) Pt to increase strength to at least 4+/5 of muscles tested to allow for improvement in functional activities.  PLAN     Progress as tolerated    Basilio Rand PTA

## 2024-03-25 ENCOUNTER — OFFICE VISIT (OUTPATIENT)
Dept: FAMILY MEDICINE | Facility: CLINIC | Age: 59
End: 2024-03-25
Payer: COMMERCIAL

## 2024-03-25 VITALS
HEIGHT: 70 IN | DIASTOLIC BLOOD PRESSURE: 86 MMHG | BODY MASS INDEX: 43.87 KG/M2 | TEMPERATURE: 98 F | HEART RATE: 82 BPM | WEIGHT: 306.44 LBS | OXYGEN SATURATION: 98 % | SYSTOLIC BLOOD PRESSURE: 124 MMHG

## 2024-03-25 DIAGNOSIS — I10 ESSENTIAL HYPERTENSION: ICD-10-CM

## 2024-03-25 DIAGNOSIS — R30.0 DYSURIA: Primary | ICD-10-CM

## 2024-03-25 LAB
BILIRUB UR QL STRIP: NEGATIVE
BILIRUBIN, UA POC OHS: NEGATIVE
BLOOD, UA POC OHS: NEGATIVE
CLARITY UR: CLEAR
CLARITY, UA POC OHS: CLEAR
COLOR UR: YELLOW
COLOR, UA POC OHS: YELLOW
GLUCOSE UR QL STRIP: NEGATIVE
GLUCOSE, UA POC OHS: NEGATIVE
HGB UR QL STRIP: NEGATIVE
KETONES UR QL STRIP: NEGATIVE
KETONES, UA POC OHS: NEGATIVE
LEUKOCYTE ESTERASE UR QL STRIP: NEGATIVE
LEUKOCYTES, UA POC OHS: NEGATIVE
NITRITE UR QL STRIP: NEGATIVE
NITRITE, UA POC OHS: NEGATIVE
PH UR STRIP: 6 [PH] (ref 5–8)
PH, UA POC OHS: 6
PROT UR QL STRIP: NEGATIVE
PROTEIN, UA POC OHS: NEGATIVE
SP GR UR STRIP: 1.02 (ref 1–1.03)
SPECIFIC GRAVITY, UA POC OHS: 1.02
URN SPEC COLLECT METH UR: NORMAL
UROBILINOGEN, UA POC OHS: 0.2

## 2024-03-25 PROCEDURE — 81003 URINALYSIS AUTO W/O SCOPE: CPT | Mod: QW,S$GLB,, | Performed by: INTERNAL MEDICINE

## 2024-03-25 PROCEDURE — 87086 URINE CULTURE/COLONY COUNT: CPT | Performed by: INTERNAL MEDICINE

## 2024-03-25 PROCEDURE — 99999 PR PBB SHADOW E&M-EST. PATIENT-LVL IV: CPT | Mod: PBBFAC,,, | Performed by: INTERNAL MEDICINE

## 2024-03-25 PROCEDURE — 81003 URINALYSIS AUTO W/O SCOPE: CPT | Mod: PO | Performed by: INTERNAL MEDICINE

## 2024-03-25 PROCEDURE — 99214 OFFICE O/P EST MOD 30 MIN: CPT | Mod: 25,S$GLB,, | Performed by: INTERNAL MEDICINE

## 2024-03-25 RX ORDER — CIPROFLOXACIN 500 MG/1
500 TABLET ORAL 2 TIMES DAILY
Qty: 14 TABLET | Refills: 0 | Status: SHIPPED | OUTPATIENT
Start: 2024-03-25 | End: 2024-04-01

## 2024-03-25 NOTE — PROGRESS NOTES
Subjective:       Patient ID: García Tang is a 58 y.o. male.  Chief Complaint: Urinary Tract Infection     HPI    Patient complains of 5/10 burning right after urinating for a couple days.  Had similar 1 month ago that improved on antibiotics even though his urine did not show infection.      Had cervical spine decompression 2/12/24 right before his last treatment for UTI.  He has ongoing burning pain in his legs and hands since the surgery.    HTN - controlled  DM - diet controlled     Assessment:       1. Dysuria    2. Essential hypertension        Plan:       Dysuria  -     POCT Urinalysis(Instrument)  -     ciprofloxacin HCl (CIPRO) 500 MG tablet; Take 1 tablet (500 mg total) by mouth 2 (two) times daily. for 7 days  Dispense: 14 tablet; Refill: 0  -     Urinalysis; Future; Expected date: 03/25/2024  -     Urine culture; Future; Expected date: 03/25/2024    Essential hypertension            UA dipstick negative.  Since his symptoms improved with Abx last month, I will treat with this as well.  I suspect this may be related to his compressed spine similar to what he is experiencing in his limbs.  If this does not work or returns, he should see urology.         Medication List with Changes/Refills   Current Medications    ACETAMINOPHEN (TYLENOL) 500 MG TABLET    Take 1,000 mg by mouth every 6 (six) hours as needed for Pain.    AMLODIPINE (NORVASC) 5 MG TABLET    Take 1 tablet (5 mg total) by mouth every evening.    ASCORBIC ACID (VITAMIN C ORAL)    Take 2 tablets by mouth once daily.    CHOLECALCIFEROL, VITAMIN D3, 125 MCG (5,000 UNIT) TAB    Take 10,000 Units by mouth once daily.    FAMOTIDINE (PEPCID) 20 MG TABLET    Take 40 mg by mouth Daily.    LACTOBACILLUS ACIDOPHILUS (ACIDOPHILUS ORAL)    Take 3 tablets by mouth once daily.    LOSARTAN (COZAAR) 100 MG TABLET    Take 1 tablet (100 mg total) by mouth once daily.    MAGNESIUM OXIDE (MAG-OX) 400 MG (241.3 MG MAGNESIUM) TABLET    Take 400 mg by mouth once  daily.    METOPROLOL SUCCINATE (TOPROL-XL) 50 MG 24 HR TABLET    Take 1 tablet (50 mg total) by mouth once daily.    NIACIN ORAL    Take 1 tablet by mouth once daily.    NITROFURANTOIN, MACROCRYSTAL-MONOHYDRATE, (MACROBID) 100 MG CAPSULE    Take 1 capsule (100 mg total) by mouth 2 (two) times daily.    OXYCODONE-ACETAMINOPHEN (PERCOCET) 7.5-325 MG PER TABLET    Take 1 tablet by mouth every 6 (six) hours as needed for Pain.    PANTOPRAZOLE (PROTONIX) 40 MG TABLET    Take 1 tablet (40 mg total) by mouth once daily.    SPIRONOLACTONE (ALDACTONE) 25 MG TABLET    Take 0.5 tablets (12.5 mg total) by mouth once daily.    TIZANIDINE (ZANAFLEX) 4 MG TABLET    Take 1 tablet (4 mg total) by mouth every 8 (eight) hours as needed (muscle spasms).    ZINC GLUCONATE 50 MG TABLET    Take 50 mg by mouth once daily.       BP Readings from Last 3 Encounters:   03/25/24 124/86   03/11/24 134/82   02/22/24 130/88     Hemoglobin A1C   Date Value Ref Range Status   12/27/2023 6.2 (H) 0.0 - 5.6 % Final     Comment:     Reference Interval:  5.0 - 5.6 Normal   5.7 - 6.4 High Risk   > 6.5 Diabetic      Hgb A1c results are standardized based on the (NGSP) National   Glycohemoglobin Standardization Program.      Hemoglobin A1C levels are related to mean serum/plasma glucose   during the preceding 2-3 months.        11/07/2023 6.1 (H) 4.0 - 5.6 % Final     Comment:     ADA Screening Guidelines:  5.7-6.4%  Consistent with prediabetes  >or=6.5%  Consistent with diabetes    High levels of fetal hemoglobin interfere with the HbA1C  assay. Heterozygous hemoglobin variants (HbS, HgC, etc)do  not significantly interfere with this assay.   However, presence of multiple variants may affect accuracy.     08/08/2023 5.8 (H) 4.0 - 5.6 % Final     Comment:     ADA Screening Guidelines:  5.7-6.4%  Consistent with prediabetes  >or=6.5%  Consistent with diabetes    High levels of fetal hemoglobin interfere with the HbA1C  assay. Heterozygous hemoglobin variants  (HbS, HgC, etc)do  not significantly interfere with this assay.   However, presence of multiple variants may affect accuracy.       Lab Results   Component Value Date    TSH 1.494 01/17/2024     Lab Results   Component Value Date    LDLCALC 125.0 11/07/2023    LDLCALC 106.6 01/28/2023    LDLCALC 103.6 04/14/2022     Lab Results   Component Value Date    TRIG 105 11/07/2023    TRIG 47 01/28/2023    TRIG 62 04/14/2022     Wt Readings from Last 3 Encounters:   03/25/24 (!) 139 kg (306 lb 7 oz)   03/11/24 (!) 138.6 kg (305 lb 8.9 oz)   02/22/24 134.4 kg (296 lb 4.8 oz)     Lab Results   Component Value Date    HGB 14.5 02/14/2024    HCT 41.9 02/14/2024    WBC 6.48 02/14/2024    ALT 31 02/14/2024    AST 35 02/14/2024     02/14/2024    K 4.6 02/14/2024    CREATININE 0.85 02/14/2024    PSA 2.1 02/23/2023           Review of Systems        Objective:      Vitals:    03/25/24 1315   BP: 124/86   Pulse: 82   Temp: 98.2 °F (36.8 °C)     Physical Exam

## 2024-03-26 ENCOUNTER — CLINICAL SUPPORT (OUTPATIENT)
Dept: REHABILITATION | Facility: HOSPITAL | Age: 59
End: 2024-03-26
Payer: COMMERCIAL

## 2024-03-26 DIAGNOSIS — M53.82 DECREASED ROM OF INTERVERTEBRAL DISCS OF CERVICAL SPINE: Primary | ICD-10-CM

## 2024-03-26 LAB — BACTERIA UR CULT: NO GROWTH

## 2024-03-26 PROCEDURE — 97113 AQUATIC THERAPY/EXERCISES: CPT | Mod: PO,CQ

## 2024-03-26 NOTE — PROGRESS NOTES
[facility]   Physical Therapy  AquaticTreatment Note     Name: García Tang  Clinic Number: [id number]    Therapy Diagnosis:   Encounter Diagnosis   Name Primary?    Decreased ROM of intervertebral discs of cervical spine Yes     Physician: Wali Zuniga MD    Visit Date: 3/26/2024    Physician Orders: PT Eval and Treat   Medical Diagnosis from Referral: S/P cervical spinal fusion [Z98.1], Weakness of both lower extremities   Evaluation Date: 3/14/2024  Authorization Period Expiration: 3/13/2025  Plan of Care Expiration: 6/14/2024  Progress Note Due: 5/14/2024  Visit # / Visits authorized: 1/ 1, 3/20  FOTO: 1/3       PTA Visit #: 0/5       Precautions: Standard     Time In: 10:00 AM  Time Out: 11:00 AM  Total Billable Time: 60 minutes      SUBJECTIVE     Pt reports: that he had some gen mm soreness and increased neck pn for 2 days  post last tx. He reports 6/10 neck pn upon arrival this AM.  He was compliant with home exercise program.  Response to previous treatment: TBD  Functional change: TBD    Pain: 6/10  Location: neck      OBJECTIVE     Objective Measures updated at progress report unless specified.     Treatment     Brad received the treatments listed below:      Leila received aquatic exercises to develop strength, endurance, ROM, flexibility, posture, and core stabilization for 70 minutes including:     AMB 3 min each  FWD 1.2 mph  BWD 0.8 mph  Side  0.7 mph     Stretches 3 x 30 sec  Hamstring   Hip flexor               LE exs 20x  Mini Squat with QS  Heel Raise with GS  Hip flexion/ext  Hip ABD/ADD  Lunges Side/FWD (platform)  Step-ups    SLS 3 x 30 sec  Tandem stance 3 x 30 sec     Endurance 3 min  Marching           Patient Education and Home Exercises     Home Exercises Provided and Patient Education Provided     Education provided:   - Plan of Care, HEP, Prognosis     Written Home Exercises Provided: yes. Exercises were reviewed and Brad was able to demonstrate them  prior to the end of the session.  Brad demonstrated good  understanding of the education provided. See EMR under Patient Instructions for exercises provided during therapy sessions    ASSESSMENT     Brad breezy today's tx with aquatic ther ex well. He was able to perform all activities today w/o increase in neck pn sx. Good tolerance to  aquatic therapy today without complication. He did require Mod VCs for posture and core stab tech with exs.  Continue with aquatic based intervention at this time.     Brad Is progressing well towards his goals.   Pt prognosis is Good.     Pt will continue to benefit from skilled outpatient physical therapy to address the deficits listed in the problem list box on initial evaluation, provide pt/family education and to maximize pt's level of independence in the home and community environment.     Pt's spiritual, cultural and educational needs considered and pt agreeable to plan of care and goals.     Anticipated barriers to physical therapy: chronicity    Goals:  Short Term Goals (4 Weeks):   1) Pt will demonstrate compliance with initial home exercise program as prescribed by physical therapist to improve independence with management of condition.  2) Pt to improve active range of motion in cervical spine by 25% to allow for improved functional mobility.  3) Pt to improve  strength by at least 25% in right hand.     Long Term Goals (12 Weeks):   1. Pt to achieve <40% limitation as measured by the FOTO to demonstrate decreased disability.  2) Pt to improve active range of motion in cervical spine by 50% to allow for improved functional mobility.  3) Pt to increase strength to at least 4+/5 of muscles tested to allow for improvement in functional activities.  PLAN     Progress as tolerated    Basilio Rand PTA

## 2024-03-28 ENCOUNTER — HOSPITAL ENCOUNTER (OUTPATIENT)
Dept: RADIOLOGY | Facility: HOSPITAL | Age: 59
Discharge: HOME | End: 2024-03-28
Attending: STUDENT IN AN ORGANIZED HEALTH CARE EDUCATION/TRAINING PROGRAM
Payer: COMMERCIAL

## 2024-03-28 ENCOUNTER — OFFICE VISIT (OUTPATIENT)
Dept: NEUROSURGERY | Facility: CLINIC | Age: 59
End: 2024-03-28
Payer: COMMERCIAL

## 2024-03-28 VITALS
WEIGHT: 306.44 LBS | HEIGHT: 70 IN | HEART RATE: 75 BPM | RESPIRATION RATE: 18 BRPM | DIASTOLIC BLOOD PRESSURE: 91 MMHG | SYSTOLIC BLOOD PRESSURE: 132 MMHG | BODY MASS INDEX: 43.87 KG/M2

## 2024-03-28 DIAGNOSIS — M54.12 CERVICAL RADICULOPATHY: ICD-10-CM

## 2024-03-28 DIAGNOSIS — Z98.1 S/P CERVICAL SPINAL FUSION: Primary | ICD-10-CM

## 2024-03-28 DIAGNOSIS — R29.898 WEAKNESS OF BOTH LOWER EXTREMITIES: ICD-10-CM

## 2024-03-28 PROCEDURE — 72040 X-RAY EXAM NECK SPINE 2-3 VW: CPT | Mod: TC,FY,PO

## 2024-03-28 PROCEDURE — 72040 X-RAY EXAM NECK SPINE 2-3 VW: CPT | Mod: 26,,, | Performed by: RADIOLOGY

## 2024-03-28 PROCEDURE — 99024 POSTOP FOLLOW-UP VISIT: CPT | Mod: S$GLB,,, | Performed by: STUDENT IN AN ORGANIZED HEALTH CARE EDUCATION/TRAINING PROGRAM

## 2024-03-28 NOTE — PROGRESS NOTES
REFERRING PHYSICIAN:    No ref. provider found  N/A    Postoperative visit     CLINICAL PROGRESS:   García Tang is now     6 weeks status post ACDF   I he does feel that he had some benefit with his balance he has no longer using a rolling walker.  But still has unsteadiness with his gait   I he is residual extremity symptoms were he will get some intermittent pains shocking he is slow gait and weakness in his legs   Feels like his feet are slow to react and weak     I he has followed with Dr. Garvey with Neurology and plans on an EMG nerve conduction study    Pre op       Assessment and recommendations  We reviewed this in detail today in the presence of his wife.  He is similar symptoms they seemed to fluctuate.  It is very atypical  He has signs and symptoms of cervical myelopathy.  This is ongoing for about 5 weeks poor dexterity hand weakness, however he has significant lower extremity weakness he is able to ambulate with a rolling walker which he feels is a little better however on exam he is proximal and distal lower extremity weakness.  He feels like this fluctuates but difficulty with antigravity movement     We again reviewed the same details the presence of he and his wife with some symptoms that just do not fit his degenerative cervical stenosis with suspicion of cord signal change.  Including his facial features cognitive decline etc..  However he does have a cervical canal she is proximally 7 mm at L4-5 and just less than 7 at 5 6 with a cord abnormality /cord signal change.  He has had an extensive workup.  MI summary is with segmental stenosis congenital and acquired in combination with this finding and suspicion of cervical myelopathy as a component of his treatment he was offered surgery including a C4-5 and 5 6 anterior cervical diskectomy and fusion to decrease spinal stenosis optimize his recovery and prevent progression        That being said he is follow-up with Neurology today for  further discussion which I think is quite valuable  As I do think it is quite possible that more than 1 factor is ongoing and that the stenosis and segmental degenerative findings could be contributing and certainly not helping       MEDICATIONS:                   List reviewed, see chart for dosing details.    ALLERGIES:       Review of patient's allergies indicates:   Allergen Reactions    Lisinopril Swelling    Iodinated contrast media Other (See Comments)     Spasms, tremor, and high blood pressure    Bactrim  [sulfamethoxazole-trimethoprim]      Other reaction(s): Unknown    Diflucan [fluconazole]      Heart racing    Gabapentin Other (See Comments)     nightmares    Hydroxyzine hcl      Other reaction(s): elevated bp    Kenalog [triamcinolone acetonide] Other (See Comments)     Heart flutters all day    Phenergan [promethazine] Anxiety    Zofran [ondansetron hcl (pf)] Anxiety       PHYSICAL EXAMINATION:          VITAL SIGNS:   There were no vitals taken for this visit.    GENERAL:  Patient is well developed, well nourished, calm, collected, and in no apparent distress.  Incision is healed    NEUROLOGICAL:     Motor Strength:  No abnormal movements seen.      Strength   Deltoids Triceps Biceps Wrist Extension   Hand  Interossei       Upper: R 455 5/5 5/5 5/5   4-/5 4-/5         L 5/5 5/5 5/5 5/5   4-/5 4-/5           Iliopsoas Quadriceps   Tibialis  anterior Gastro- cnemius EHL   Foot Eversion Foot inversion   Lower: R 3/5 4+/5   2-3/5 4/5 2-3/5   4-/5 4-/5     L 3/5 5/5   3-/5 4-/5 3-/5   4-/5 4-/5                  SILT,PP dec lt pp UE below elbows        DTR's: 2/3+ + and symmetric in UE and LE 3+ lE  Mahan:+  Clonus: absent  Babinski: absent     Gait: slow, independent                Gait is     IMAGING DATA:  Stable alignment, intact hardware without subsidence             ASSESSMENT/PLAN:     6 weeks status post uncomplicated ACDF   He would some improvement certainly over time he has no longer using  a rolling walker.   Is some improvement upper extremities however overall his exam is pretty stable wise.   We discussed the role of surgery at this segment for spinal cord decompression and stability.  The x-rays look good there was no more motion at this segment   Surgery we went well was uncomplicated.      He  has not unusual syndrome   Uncertain etiology of intrinsic cord abnormality   However prominent leg weakness, including bilateral foot weakness.  Very slow gait   Not typical from a cervical myelopathy   However, he sees Dr. Zeng he will have follow up with Neurology.  They are planning an EMG nerve conduction study which 6 weeks postop I think it is safe when he is ready to proceed   Any further diagnostic workup     Otherwise recommend physical therapy for balance gait and optimizing his mobility   With his history mobility concerns and questions of weakness in spasticity is working with Neurology [facility] to optimize recovery    3 mo f/u  6 mo xray  Reviewed in detail              Advised to call the office Iif any questions or concerns arise.    This note was partially dictated using voice recognition software, so please excuse any errors that were not corrected.

## 2024-04-03 ENCOUNTER — PATIENT MESSAGE (OUTPATIENT)
Dept: PULMONOLOGY | Facility: CLINIC | Age: 59
End: 2024-04-03
Payer: COMMERCIAL

## 2024-04-03 ENCOUNTER — CLINICAL SUPPORT (OUTPATIENT)
Dept: REHABILITATION | Facility: HOSPITAL | Age: 59
End: 2024-04-03
Payer: COMMERCIAL

## 2024-04-03 DIAGNOSIS — M53.82 DECREASED ROM OF INTERVERTEBRAL DISCS OF CERVICAL SPINE: Primary | ICD-10-CM

## 2024-04-03 PROCEDURE — 97113 AQUATIC THERAPY/EXERCISES: CPT | Mod: PO,CQ

## 2024-04-03 NOTE — PROGRESS NOTES
[facility]   Physical Therapy  AquaticTreatment Note     Name: García Tang  Clinic Number: [id number]    Therapy Diagnosis:   Encounter Diagnosis   Name Primary?    Decreased ROM of intervertebral discs of cervical spine Yes     Physician: Wali Zuniga MD    Visit Date: 4/3/2024    Physician Orders: PT Eval and Treat   Medical Diagnosis from Referral: S/P cervical spinal fusion [Z98.1], Weakness of both lower extremities   Evaluation Date: 3/14/2024  Authorization Period Expiration: 3/13/2025  Plan of Care Expiration: 6/14/2024  Progress Note Due: 5/14/2024  Visit # / Visits authorized: 1/ 1, 4/20  FOTO: 1/3       PTA Visit #: 0/5       Precautions: Standard     Time In: 11:00 AM  Time Out: 12:00 PM  Total Billable Time: 60 minutes      SUBJECTIVE     Pt reports: that he had some gen mm soreness and i neck pn for 2 days  post last tx. He reports 5/10 neck pn upon arrival this AM.  He was compliant with home exercise program.  Response to previous treatment: TBD  Functional change: TBD    Pain: 5/10  Location: neck      OBJECTIVE     Objective Measures updated at progress report unless specified.     Treatment     Brad received the treatments listed below:      Leila received aquatic exercises to develop strength, endurance, ROM, flexibility, posture, and core stabilization for 60 minutes including:     AMB 3 min each  FWD 1.2 mph  BWD 0.8 mph  Side  0.7 mph     Stretches 3 x 30 sec  Hamstring   Hip flexor               LE exs 20x 1.5#  Mini Squat with QS  Heel Raise with GS  Hip flexion/ext  Hip ABD/ADD  Lunges Side/FWD (platform)  Step-ups    SLS 3 x 30 sec  Tandem stance 3 x 30 sec     Endurance 3 min  Marching           Patient Education and Home Exercises     Home Exercises Provided and Patient Education Provided     Education provided:   - Plan of Care, HEP, Prognosis     Written Home Exercises Provided: yes. Exercises were reviewed and Brad was able to demonstrate them prior to  the end of the session.  Brad demonstrated good  understanding of the education provided. See EMR under Patient Instructions for exercises provided during therapy sessions    ASSESSMENT     Brad breezy today's tx with progression of aquatic ther ex well. He was able to perform all activities with increased resistance today w/o increase in neck pn sx. Good tolerance to  aquatic therapy today without complication. He did require Mod VCs for posture and core stab tech with exs.  Continue with aquatic based intervention at this time.     Brad Is progressing well towards his goals.   Pt prognosis is Good.     Pt will continue to benefit from skilled outpatient physical therapy to address the deficits listed in the problem list box on initial evaluation, provide pt/family education and to maximize pt's level of independence in the home and community environment.     Pt's spiritual, cultural and educational needs considered and pt agreeable to plan of care and goals.     Anticipated barriers to physical therapy: chronicity    Goals:  Short Term Goals (4 Weeks):   1) Pt will demonstrate compliance with initial home exercise program as prescribed by physical therapist to improve independence with management of condition.  2) Pt to improve active range of motion in cervical spine by 25% to allow for improved functional mobility.  3) Pt to improve  strength by at least 25% in right hand.     Long Term Goals (12 Weeks):   1. Pt to achieve <40% limitation as measured by the FOTO to demonstrate decreased disability.  2) Pt to improve active range of motion in cervical spine by 50% to allow for improved functional mobility.  3) Pt to increase strength to at least 4+/5 of muscles tested to allow for improvement in functional activities.  PLAN     Progress as tolerated    Basilio Rand PTA

## 2024-04-04 ENCOUNTER — TELEPHONE (OUTPATIENT)
Dept: PHYSICAL MEDICINE AND REHAB | Facility: CLINIC | Age: 59
End: 2024-04-04
Payer: COMMERCIAL

## 2024-04-05 ENCOUNTER — CLINICAL SUPPORT (OUTPATIENT)
Dept: REHABILITATION | Facility: HOSPITAL | Age: 59
End: 2024-04-05
Payer: COMMERCIAL

## 2024-04-05 DIAGNOSIS — M53.82 DECREASED ROM OF INTERVERTEBRAL DISCS OF CERVICAL SPINE: Primary | ICD-10-CM

## 2024-04-05 PROCEDURE — 97113 AQUATIC THERAPY/EXERCISES: CPT | Mod: PO,CQ

## 2024-04-05 NOTE — PROGRESS NOTES
[facility]   Physical Therapy  AquaticTreatment Note     Name: García Tang  Clinic Number: [id number]    Therapy Diagnosis:   Encounter Diagnosis   Name Primary?    Decreased ROM of intervertebral discs of cervical spine Yes     Physician: Wali Zuniga MD    Visit Date: 4/5/2024    Physician Orders: PT Eval and Treat   Medical Diagnosis from Referral: S/P cervical spinal fusion [Z98.1], Weakness of both lower extremities   Evaluation Date: 3/14/2024  Authorization Period Expiration: 3/13/2025  Plan of Care Expiration: 6/14/2024  Progress Note Due: 5/14/2024  Visit # / Visits authorized: 1/ 1, 4/20  FOTO: 1/3       PTA Visit #: 0/5       Precautions: Standard     Time In: 10:00 AM  Time Out: 11:00 AM  Total Billable Time: 60 minutes      SUBJECTIVE     Pt reports: that his neck is really hurting with what he describes as incision pn. He states that B LE are stiff.  He was compliant with home exercise program.  Response to previous treatment: TBD  Functional change: TBD    Pain: 8/10  Location: neck      OBJECTIVE     Objective Measures updated at progress report unless specified.     Treatment     Brad received the treatments listed below:      Leila received aquatic exercises to develop strength, endurance, ROM, flexibility, posture, and core stabilization for 60 minutes including:     AMB 3 min each  FWD 1.2 mph  BWD 0.8 mph  Side  0.7 mph     Stretches 3 x 30 sec  Hamstring   Hip flexor       LE exs 20x 1.5#  Mini Squat with QS  Heel Raise with GS  Hip flexion/ext  Hip ABD/ADD  Lunges Side/FWD (platform)  Step-ups    SLS 3 x 30 sec  Tandem stance 3 x 30 sec     Endurance 3 min  Marching           Patient Education and Home Exercises     Home Exercises Provided and Patient Education Provided     Education provided:   - Plan of Care, HEP, Prognosis     Written Home Exercises Provided: yes. Exercises were reviewed and Brad was able to demonstrate them prior to the end of the session.   Brad demonstrated good  understanding of the education provided. See EMR under Patient Instructions for exercises provided during therapy sessions    ASSESSMENT     Brad breezy today's tx with aquatic ther ex well. He was able to perform all activities  w/o increase in neck pn sx. Good tolerance to  aquatic therapy today without complication. He did require Mod VCs for posture and core stab tech with exs.  Continue with aquatic based intervention at this time.     Brad Is progressing well towards his goals.   Pt prognosis is Good.     Pt will continue to benefit from skilled outpatient physical therapy to address the deficits listed in the problem list box on initial evaluation, provide pt/family education and to maximize pt's level of independence in the home and community environment.     Pt's spiritual, cultural and educational needs considered and pt agreeable to plan of care and goals.     Anticipated barriers to physical therapy: chronicity    Goals:  Short Term Goals (4 Weeks):   1) Pt will demonstrate compliance with initial home exercise program as prescribed by physical therapist to improve independence with management of condition.  2) Pt to improve active range of motion in cervical spine by 25% to allow for improved functional mobility.  3) Pt to improve  strength by at least 25% in right hand.     Long Term Goals (12 Weeks):   1. Pt to achieve <40% limitation as measured by the FOTO to demonstrate decreased disability.  2) Pt to improve active range of motion in cervical spine by 50% to allow for improved functional mobility.  3) Pt to increase strength to at least 4+/5 of muscles tested to allow for improvement in functional activities.  PLAN     Progress as tolerated    Basilio Rand PTA

## 2024-04-09 ENCOUNTER — CLINICAL SUPPORT (OUTPATIENT)
Dept: REHABILITATION | Facility: HOSPITAL | Age: 59
End: 2024-04-09
Payer: COMMERCIAL

## 2024-04-09 DIAGNOSIS — M53.82 DECREASED ROM OF INTERVERTEBRAL DISCS OF CERVICAL SPINE: Primary | ICD-10-CM

## 2024-04-09 PROCEDURE — 97113 AQUATIC THERAPY/EXERCISES: CPT | Mod: PO,CQ

## 2024-04-09 NOTE — PROGRESS NOTES
[facility] OUTPATIENT THERAPY AND WELLNESS   Physical Therapy  AquaticTreatment Note     Name: García Tang  Clinic Number: [id number]    Therapy Diagnosis:   Encounter Diagnosis   Name Primary?    Decreased ROM of intervertebral discs of cervical spine Yes     Physician: Wali Zuniga MD    Visit Date: 4/9/2024    Physician Orders: PT Eval and Treat   Medical Diagnosis from Referral: S/P cervical spinal fusion [Z98.1], Weakness of both lower extremities   Evaluation Date: 3/14/2024  Authorization Period Expiration: 3/13/2025  Plan of Care Expiration: 6/14/2024  Progress Note Due: 5/14/2024  Visit # / Visits authorized: 1/ 1, 4/20  FOTO: 1/3       PTA Visit #: 0/5       Precautions: Standard     Time In: 10:00 AM  Time Out: 11:00 AM  Total Billable Time: 60 minutes      SUBJECTIVE     Pt reports: that his neck is feeling better today only 5/10 today. He states that B LE are stiff.  He was compliant with home exercise program.  Response to previous treatment: TBD  Functional change: TBD    Pain: 5/10  Location: neck      OBJECTIVE     Objective Measures updated at progress report unless specified.     Treatment     Brad received the treatments listed below:      Leila received aquatic exercises to develop strength, endurance, ROM, flexibility, posture, and core stabilization for 60 minutes including:     AMB 3 min each  FWD 1.2 mph  BWD 0.8 mph  Side  0.7 mph     Stretches 3 x 30 sec  Hamstring   Hip flexor       LE exs 20x 2#  Mini Squat with QS  Heel Raise with GS  Hip flexion/ext  Hip ABD/ADD  Lunges Side/FWD (platform)  Step-ups    SLS 3 x 30 sec  Tandem stance 3 x 30 sec     Endurance 3 min  Marching           Patient Education and Home Exercises     Home Exercises Provided and Patient Education Provided     Education provided:   - Plan of Care, HEP, Prognosis     Written Home Exercises Provided: yes. Exercises were reviewed and Brad was able to demonstrate them prior to the end of the session.  Brad demonstrated  good  understanding of the education provided. See EMR under Patient Instructions for exercises provided during therapy sessions    ASSESSMENT     Brad tijerina today's tx with progression of aquatic ther ex well. He was able to progress with resistance with strengthening exs  w/o increase in neck pn sx. Good tolerance to  aquatic therapy today without complication. He did require Mod VCs for posture and core stab tech with exs.  Continue with aquatic based intervention at this time.     Brad Is progressing well towards his goals.   Pt prognosis is Good.     Pt will continue to benefit from skilled outpatient physical therapy to address the deficits listed in the problem list box on initial evaluation, provide pt/family education and to maximize pt's level of independence in the home and community environment.     Pt's spiritual, cultural and educational needs considered and pt agreeable to plan of care and goals.     Anticipated barriers to physical therapy: chronicity    Goals:  Short Term Goals (4 Weeks):   1) Pt will demonstrate compliance with initial home exercise program as prescribed by physical therapist to improve independence with management of condition.  2) Pt to improve active range of motion in cervical spine by 25% to allow for improved functional mobility.  3) Pt to improve  strength by at least 25% in right hand.     Long Term Goals (12 Weeks):   1. Pt to achieve <40% limitation as measured by the FOTO to demonstrate decreased disability.  2) Pt to improve active range of motion in cervical spine by 50% to allow for improved functional mobility.  3) Pt to increase strength to at least 4+/5 of muscles tested to allow for improvement in functional activities.  PLAN     Progress as tolerated    Basilio Rand, IRENA

## 2024-04-10 ENCOUNTER — TELEPHONE (OUTPATIENT)
Dept: NEUROLOGY | Facility: CLINIC | Age: 59
End: 2024-04-10
Payer: COMMERCIAL

## 2024-04-10 NOTE — TELEPHONE ENCOUNTER
Spoke with the pt, advised we received the EMG order and that our EMG tech would be reaching out to schedule appt.

## 2024-04-10 NOTE — TELEPHONE ENCOUNTER
----- Message from Jason Nielsen sent at 4/10/2024 11:24 AM CDT -----  Type: Needs Medical Advice  Who Called:  pt  Best Call Back Number: [phone]    Additional Information: Pt is calling the office pt has an outside provide that has sent test over for EMG trying to see if it was received.Please call back and advise.

## 2024-04-11 ENCOUNTER — TELEPHONE (OUTPATIENT)
Dept: NEUROLOGY | Facility: CLINIC | Age: 59
End: 2024-04-11
Payer: COMMERCIAL

## 2024-04-12 NOTE — TELEPHONE ENCOUNTER
----- Message from Atiya Molina sent at 4/12/2024  9:19 AM CDT -----  Contact: self  Type: Sooner Appointment Request        Caller is requesting a sooner appointment. Caller declined first available appointment listed below. Caller will not accept being placed on the waitlist and is requesting a message be sent to doctor.        Name of Caller: Patient     Best Call Back Number: [phone]  Additional Information: Plz call to get the pt scheduled for his EMG . Thanks

## 2024-04-15 ENCOUNTER — PATIENT MESSAGE (OUTPATIENT)
Dept: NEUROSURGERY | Facility: CLINIC | Age: 59
End: 2024-04-15
Payer: COMMERCIAL

## 2024-04-15 ENCOUNTER — OFFICE VISIT (OUTPATIENT)
Dept: URGENT CARE | Facility: CLINIC | Age: 59
End: 2024-04-15
Payer: COMMERCIAL

## 2024-04-15 ENCOUNTER — TELEPHONE (OUTPATIENT)
Dept: PHYSICAL MEDICINE AND REHAB | Facility: CLINIC | Age: 59
End: 2024-04-15
Payer: COMMERCIAL

## 2024-04-15 VITALS
HEART RATE: 87 BPM | WEIGHT: 306 LBS | DIASTOLIC BLOOD PRESSURE: 75 MMHG | OXYGEN SATURATION: 95 % | SYSTOLIC BLOOD PRESSURE: 138 MMHG | HEIGHT: 70 IN | BODY MASS INDEX: 43.81 KG/M2 | TEMPERATURE: 98 F | RESPIRATION RATE: 17 BRPM

## 2024-04-15 DIAGNOSIS — G95.9 DISEASE OF SPINAL CORD: Primary | ICD-10-CM

## 2024-04-15 DIAGNOSIS — R30.0 DYSURIA: ICD-10-CM

## 2024-04-15 DIAGNOSIS — R35.0 FREQUENCY OF URINATION: Primary | ICD-10-CM

## 2024-04-15 LAB
BILIRUB UR QL STRIP: NEGATIVE
GLUCOSE UR QL STRIP: NEGATIVE
KETONES UR QL STRIP: NEGATIVE
LEUKOCYTE ESTERASE UR QL STRIP: NEGATIVE
PH, POC UA: 5.5 (ref 5–8)
POC BLOOD, URINE: NEGATIVE
POC NITRATES, URINE: NEGATIVE
PROT UR QL STRIP: NEGATIVE
SP GR UR STRIP: 1 (ref 1–1.03)
URINE-COLOR: NORMAL
UROBILINOGEN UR STRIP-ACNC: NORMAL (ref 0.3–2.2)

## 2024-04-15 PROCEDURE — 87086 URINE CULTURE/COLONY COUNT: CPT | Performed by: EMERGENCY MEDICINE

## 2024-04-15 PROCEDURE — 99213 OFFICE O/P EST LOW 20 MIN: CPT | Mod: 25,S$GLB,, | Performed by: EMERGENCY MEDICINE

## 2024-04-15 PROCEDURE — 81003 URINALYSIS AUTO W/O SCOPE: CPT | Mod: QW,S$GLB,, | Performed by: EMERGENCY MEDICINE

## 2024-04-15 RX ORDER — CIPROFLOXACIN 500 MG/1
500 TABLET ORAL EVERY 12 HOURS
Qty: 14 TABLET | Refills: 0 | Status: SHIPPED | OUTPATIENT
Start: 2024-04-15 | End: 2024-04-22

## 2024-04-15 NOTE — PROGRESS NOTES
"Subjective:      Patient ID: García Tang is a 58 y.o. male.    Vitals:  height is 5' 10" (1.778 m) and weight is 138.8 kg (306 lb) (abnormal). His oral temperature is 97.8 °F (36.6 °C). His blood pressure is 138/75 and his pulse is 87. His respiration is 17 and oxygen saturation is 95%.     Chief Complaint: Dysuria    Pt came in today with complaints of frequency of urination and burning that started today. He stated that he has not taken any medications for his symptoms. Patient had cervical spine ACDF for spinal cord decompression in January 2024 - has ongoing issues with paresthesias/burning and leg weakness. Intermittently has difficulty urinating. Today he says he had urinary frequency then a period of time today where he felt he couldn't urinate but then was able to go again. C/o burning after urination. Normal bowel movements. No incontinence. No saddle anesthesia. No fever/chills. Has had this happen before and takes Cipro which helps.    Dysuria   This is a new problem. The current episode started today. The problem occurs every urination. The problem has been gradually worsening. The quality of the pain is described as burning. The pain is at a severity of 6/10. The pain is moderate. There has been no fever. He is Not sexually active. There is No history of pyelonephritis. Associated symptoms include frequency and urgency. Pertinent negatives include no chills. He has tried nothing for the symptoms. The treatment provided no relief. His past medical history is significant for diabetes mellitus and recurrent UTIs.     Constitution: Negative for chills and fever.   Genitourinary:  Positive for dysuria, frequency and urgency.   Neurological:  Positive for loss of balance, numbness and tingling. Negative for disorientation.   Psychiatric/Behavioral:  Negative for disorientation.       Objective:     Physical Exam   Constitutional:  Non-toxic appearance. He does not appear ill. No distress.   HENT: "   Head: Normocephalic and atraumatic.   Cardiovascular: Normal rate and regular rhythm.   Pulmonary/Chest: Effort normal.   Abdominal: Normal appearance.   Neurological: He is alert.   Skin: Skin is warm and dry.   Nursing note and vitals reviewed.    Results for orders placed or performed in visit on 04/15/24   POCT Urinalysis, Dipstick, Automated, W/O Scope   Result Value Ref Range    POC Blood, Urine Negative Negative    POC Bilirubin, Urine Negative Negative    POC Urobilinogen, Urine normal 0.3 - 2.2    POC Ketones, Urine Negative Negative    POC Protein, Urine Negative Negative    POC Nitrates, Urine Negative Negative    POC Glucose, Urine Negative Negative    pH, UA 5.5 5 - 8    POC Specific Gravity, Urine 1.005 1.003 - 1.029    POC Leukocytes, Urine Negative Negative    Urine-Color LT. Yellow      *Note: Due to a large number of results and/or encounters for the requested time period, some results have not been displayed. A complete set of results can be found in Results Review.        Assessment:     1. Frequency of urination    2. Dysuria        Plan:     Urinalysis appears within normal limits.  However, patient has urinary frequency and some dysuria.  He declined STD testing as he has no concern for that.  We will send a urine culture to get a definitive answer on whether this is UTI.  Patient would feel more comfortable being covered with Cipro in the meantime while we wait for culture.  I did inform him that this could be another symptom of his ongoing spinal cord issues and that he needs to go to hospital ER should he not be able to urinate or for any bowel dysfunction or saddle anesthesia.  Can also discuss with his neurologist or neurosurgeon whether he needs a postvoid residual to see if he is retaining.  No history of prostate issues in the past and patient did decline my offer of Flomax on a temporary basis.    Frequency of urination  -     POCT Urinalysis, Dipstick, Automated, W/O Scope  -      CULTURE, URINE  -     ciprofloxacin HCl (CIPRO) 500 MG tablet; Take 1 tablet (500 mg total) by mouth every 12 (twelve) hours. for 7 days  Dispense: 14 tablet; Refill: 0    Dysuria        Patient Instructions   We will send a urine culture and will call with results.     Increase water intake.     Please keep your Neurology and Neurosurgery team posted on your urinary symptoms to see if they need to get any type of testing such as a postvoid residual to see if there is any cause of this coming from your ongoing spinal issues.    Go to hospital ER for inability to urinate after several hours or any significant change in leg weakness, numbness of your perineum or bowel dysfunction.

## 2024-04-15 NOTE — PATIENT INSTRUCTIONS
We will send a urine culture and will call with results.     Increase water intake.     Please keep your Neurology and Neurosurgery team posted on your urinary symptoms to see if they need to get any type of testing such as a postvoid residual to see if there is any cause of this coming from your ongoing spinal issues.    Go to hospital ER for inability to urinate after several hours or any significant change in leg weakness, numbness of your perineum or bowel dysfunction.    16.65

## 2024-04-15 NOTE — TELEPHONE ENCOUNTER
Spoke with patient regarding a sooner EMG appt.  Explained at this time we are scheduling in May, but there may be a cancellation tomorrow and I will call him.  Have hard copy of EMG for bilateral lower extremities from Dr. Garvey's office.

## 2024-04-16 ENCOUNTER — PROCEDURE VISIT (OUTPATIENT)
Dept: NEUROLOGY | Facility: CLINIC | Age: 59
End: 2024-04-16
Payer: COMMERCIAL

## 2024-04-16 ENCOUNTER — TELEPHONE (OUTPATIENT)
Dept: PHYSICAL MEDICINE AND REHAB | Facility: CLINIC | Age: 59
End: 2024-04-16
Payer: COMMERCIAL

## 2024-04-16 DIAGNOSIS — G95.9 DISEASE OF SPINAL CORD: ICD-10-CM

## 2024-04-16 PROCEDURE — 95886 MUSC TEST DONE W/N TEST COMP: CPT | Mod: S$GLB,,, | Performed by: PSYCHIATRY & NEUROLOGY

## 2024-04-16 PROCEDURE — 95910 NRV CNDJ TEST 7-8 STUDIES: CPT | Mod: S$GLB,,, | Performed by: PSYCHIATRY & NEUROLOGY

## 2024-04-16 NOTE — PROCEDURES
[facility]  [address] [address]  [place] [address]  [phone]      Full Name: García Tang Gender: Male  Patient ID: [id number] YOB: 1965      Visit Date: 4/16/2024 8:22 AM  Age: 58 Years  Examining Physician: Cassandra Wing MD   Referring Physician: Virgilio Garvey MD  Technologist: ANTELMO Palacio   Height: 5 feet 10 inch  History: Diabetic patient complaining of imbalance, lower extremity weakness and low back pain.  Also, generalized numbness, tingling and burining pain.  Hx TIA, neck surgery.          Sensory NCS      Nerve / Sites Rec. Site Onset Lat Peak Lat NP Amp Segments Distance Velocity Temp.     ms ms µV  cm m/s °C   L Sural - Ankle (Calf)      Calf Ankle NR NR NR Calf - Ankle 14 NR       Ref.   ?4.60 ?4.0 Ref.  ?40    R Sural - Ankle (Calf)      Calf Ankle NR NR NR Calf - Ankle 14 NR 31.5      Ref.   ?4.60 ?4.0 Ref.  ?40    L Superficial peroneal - Ankle      Lat leg Ankle NR NR NR Lat leg - Ankle 12 NR       Ref.   ?4.50 ?4.0 Ref.      R Superficial peroneal - Ankle      Lat leg Ankle NR NR NR Lat leg - Ankle 12 NR 31.5      Ref.   ?4.50 ?4.0 Ref.          Motor NCS      Nerve / Sites Muscle Latency Ref. Amplitude Ref. Amp % Duration Segments Distance Lat Diff Velocity Ref. Temp.     ms ms mV mV % ms  cm ms m/s m/s °C   L Peroneal - EDB      Ankle EDB 4.38 ?6.00 6.8 ?2.5 100 6.10 Ankle - EDB           Fib head EDB 11.98  6.8  100 7.56 Fib head - Ankle 34 7.60 45 ?40       Pop fossa EDB 14.67  6.2  91.7 7.10 Pop fossa - Fib head 12 2.69 45     R Peroneal - EDB      Ankle EDB 4.15 ?6.00 6.1 ?2.5 100 6.23 Ankle - EDB     31.5      Fib head EDB 11.90  5.0  81.8 7.23 Fib head - Ankle 34 7.75 44 ?40 31.5      Pop fossa EDB 14.40  4.6  75.8 7.10 Pop fossa - Fib head 11 2.50 44  31.5   L Tibial - AH      Ankle AH 4.58 ?6.00 1.1 ?4.0 100 5.56 Ankle - AH           Pop fossa AH 16.15  0.7  65.6 5.83 Pop fossa - Ankle 39 11.56 34 ?40    R  Tibial - AH      Ankle AH 4.23 ?6.00 1.0 ?4.0 100 5.25 Ankle - AH     31.5      Pop fossa AH 14.77  0.7  66.7 10.33 Pop fossa - Ankle 42 10.54 40 ?40 31.5       F  Wave      Nerve Fmin Ref.    ms ms   L Peroneal - EDB 56.77 ?56.00   L Tibial - AH NR ?56.00   R Peroneal - EDB 60.52 ?56.00   R Tibial - AH 61.98 ?56.00       EMG Summary Table     Spontaneous Recruitment Activation Duration Amplitude Polyphasia Comment   Muscle Ins Act Fib Fasc Pattern - - - - -   R. Tibialis anterior Normal 0 0 Sl Dec Poor +1 +1 Normal Normal   R. Gastrocnemius (Medial head) Normal 0 0 Normal Poor Normal Normal Normal Normal   R. Vastus medialis Normal 0 0 Normal Poor Normal Normal Normal Normal   R. Gastrocnemius (Lateral head) Normal 0 0 Normal Poor Normal Normal Normal Normal   R. Abductor hallucis Inc +1 0 Mod Dec Normal +2 +2 Normal Normal   L. Tibialis anterior Normal 0 0 Sl Dec Poor +1 +1 Normal Normal   L. Gastrocnemius (Lateral head) Normal 0 0 Normal Fair Normal Normal Normal Normal   L. Vastus medialis Normal 0 0 Normal Fair Normal Normal Normal Normal   L. Abductor hallucis Normal 0 0      No activation   L. Biceps femoris (short head) Normal 0 0 Normal Fair Normal Normal Normal Normal       Summary: Left lower extremity nerve conduction studies show absent peroneal and sural sensory responses. The peroneal motor response is normal and the tibial motor response is low amplitude and with mild slowing of the conduction velocity. Peroneal F waves are prolonged and tibial was absent.     Left lower extremity needle examination shows mild neurogenic motor unit potentials in the tibialis anterior. Further assessment of motor unit potentials was limited due to diffuse poor to fair activation but no other abnormalities noted in the left lower extremity.     Right lower extremity nerve conduction studies show absent peroneal and sural sensory responses. The peroneal motor response is normal and the tibial motor response is low  amplitude and with mild slowing of the conduction velocity. F waves are prolonged.     Right lower extremity needle examination shows mild neurogenic MUPs in the tibialis anterior and moderate neurogenic changes in the abductor hallucis. There were fibrillation potentials in the foot muscle. Otherwise there is marked poor activation throughout with only a few motor unit potentials seen in each other muscle studied.     Impression: Abnormal EMG. 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     The diffuse poor activation could be secondary to pain, poor patient effort, or central neurologic process. Clinical correlation advised.     Given the poor activation, exam is limited and a lumbosacral radiculoapthy cannot be entirely excluded though given the limited motor units studied in each muscle. However, no clear evidence of superimposed radiculopathy was seen.     Thank you for referring to the [facility] in [place]. Please feel free to contact the clinic if you have any further questions regarding this study or report.    _____________________________  Cassandra Wing MD

## 2024-04-17 ENCOUNTER — CLINICAL SUPPORT (OUTPATIENT)
Dept: REHABILITATION | Facility: HOSPITAL | Age: 59
End: 2024-04-17
Payer: COMMERCIAL

## 2024-04-17 ENCOUNTER — TELEPHONE (OUTPATIENT)
Dept: NEUROLOGY | Facility: CLINIC | Age: 59
End: 2024-04-17
Payer: COMMERCIAL

## 2024-04-17 DIAGNOSIS — Z98.1 S/P CERVICAL SPINAL FUSION: ICD-10-CM

## 2024-04-17 DIAGNOSIS — M53.82 DECREASED ROM OF INTERVERTEBRAL DISCS OF CERVICAL SPINE: Primary | ICD-10-CM

## 2024-04-17 DIAGNOSIS — R29.898 WEAKNESS OF BOTH LOWER EXTREMITIES: ICD-10-CM

## 2024-04-17 LAB — BACTERIA UR CULT: NO GROWTH

## 2024-04-17 PROCEDURE — 97110 THERAPEUTIC EXERCISES: CPT | Mod: PO

## 2024-04-17 NOTE — TELEPHONE ENCOUNTER
----- Message from Aurea Noonan sent at 4/17/2024  3:33 PM CDT -----  Regarding: advice  Contact: patient  Type: Needs Medical Advice  Who Called:  patient  Symptoms (please be specific):    How long has patient had these symptoms:    Pharmacy name and phone #:    Best Call Back Number: [phone] (home)     Additional Information: Patient states Dr. Garvey has not received his EMG results.  Please call patient to advise.  Thanks!

## 2024-04-17 NOTE — PROGRESS NOTES
[facility]   Physical Therapy  AquaticTreatment Note     Name: García Tang  Clinic Number: [id number]    Therapy Diagnosis:   Encounter Diagnoses   Name Primary?    S/P cervical spinal fusion     Weakness of both lower extremities     Decreased ROM of intervertebral discs of cervical spine Yes       Physician: Wali Zuniga MD    Visit Date: 4/17/2024    Physician Orders: PT Eval and Treat   Medical Diagnosis from Referral: S/P cervical spinal fusion [Z98.1], Weakness of both lower extremities   Evaluation Date: 3/14/2024  Authorization Period Expiration: 3/13/2025  Plan of Care Expiration: 6/14/2024  Progress Note Due: 5/14/2024  Visit # / Visits authorized: 1/ 1, 4/20  FOTO: 1/3       PTA Visit #: 0/5       Precautions: Standard     Time In: 10:35 AM  Time Out: 11:15 AM  Total Billable Time: 40 minutes (1:1 PT/tech)       SUBJECTIVE     Pt reports: that he feels that aquatic therapy is helping his strength but he would like to start working on his neck also.  He was compliant with home exercise program.  Response to previous treatment: TBD  Functional change: TBD    Pain: 5/10  Location: neck      OBJECTIVE     Cervical Range of Motion:     % Limitation Pain   Flexion 50 +      Extension 50 +      Right Rotation 50 +      Left Rotation 50 +      Right Side Bending 50 +   Left Side Bending 50 +            Strength:  Cervical MMT   Flexion 2/5   Extension 2/5   Right Side Bend 2/5   Left Side Bend 2/5      Upper Extremity Strength  (R) UE   (L) UE     Shoulder flexion: 4-/5 Shoulder flexion: 4/5   Shoulder Abduction: 4-/5 Shoulder abduction: 4/5   Shoulder ER 4-/5 Shoulder ER 4/5   Shoulder IR 4-/5 Shoulder IR 4/5   Elbow flexion: 4-/5 Elbow flexion: 4/5   Elbow extension: 4-/5 Elbow extension: 4/5   Wrist flexion: 4-/5 Wrist flexion: 4/5   Wrist extension: 4-/5 Wrist extension: 4/5   Lower Trap 3/5 Lower Trap 3/5   Middle Trap 3+/5 Middle Trap 3+/5   Rhomboids 3+/5 Rhomboids 3+/5        (2nd notch)  Right: 50#  Left: 80#      Reflexes:     Reflex Left Right   Bicep 2 2   Tricep 2 2   Mahan + +            Lower Extremity Strength  Right LE   Left LE     Knee extension: 2/5 Knee extension: 3-/5   Knee flexion: 2/5 Knee flexion: 3-/5   Hip flexion: 2/5 Hip flexion: 3-/5   Hip extension:  2/5 Hip extension: 3-/5   Hip abduction: 2/5 Hip abduction: 3-/5   Hip adduction: 3+/5 Hip adduction 3+/5   Ankle dorsiflexion: 3+/5 Ankle dorsiflexion: 3/5   Ankle plantarflexion: 3/5 Ankle plantarflexion: 3/5         Function:     - SLS R: 1s  - SLS L: 1s        Sensation: intact      CMS Impairment/Limitation/Restriction for FOTO cervical Survey     Therapist reviewed FOTO scores for García Tang on 3/14/2024.   FOTO documents entered into [employer] - see Media section.     Limitation Score: 50%  Category: Mobility        Treatment     Brad received the treatments listed below:      Reassessments and Education billed as JANNET Dee received aquatic exercises to develop strength, endurance, ROM, flexibility, posture, and core stabilization for 00 minutes including:     AMB 3 min each  FWD 1.2 mph  BWD 0.8 mph  Side  0.7 mph     Stretches 3 x 30 sec  Hamstring   Hip flexor       LE exs 20x 2#  Mini Squat with QS  Heel Raise with GS  Hip flexion/ext  Hip ABD/ADD  Lunges Side/FWD (platform)  Step-ups    SLS 3 x 30 sec  Tandem stance 3 x 30 sec     Endurance 3 min  Marching           Patient Education and Home Exercises     Home Exercises Provided and Patient Education Provided     Education provided:   - Plan of Care, HEP, Prognosis     Written Home Exercises Provided: yes. Exercises were reviewed and Brad was able to demonstrate them prior to the end of the session.  Brad demonstrated good  understanding of the education provided. See EMR under Patient Instructions for exercises provided during therapy sessions    ASSESSMENT     Improvements in cervical rom, UE/LE strength, and  strength. Although  improvements were made patient still lacks full function because of the deficits he possess in the following test and measures. At this time, patient will greatly benefit from land based upper, lower, and spinal strengthening.      Brad Is progressing well towards his goals.   Pt prognosis is Good.     Pt will continue to benefit from skilled outpatient physical therapy to address the deficits listed in the problem list box on initial evaluation, provide pt/family education and to maximize pt's level of independence in the home and community environment.     Pt's spiritual, cultural and educational needs considered and pt agreeable to plan of care and goals.     Anticipated barriers to physical therapy: chronicity    Goals:  Short Term Goals (4 Weeks):   1) Pt will demonstrate compliance with initial home exercise program as prescribed by physical therapist to improve independence with management of condition.  2) Pt to improve active range of motion in cervical spine by 25% to allow for improved functional mobility.  3) Pt to improve  strength by at least 25% in right hand.     Long Term Goals (12 Weeks):   1. Pt to achieve <40% limitation as measured by the FOTO to demonstrate decreased disability.  2) Pt to improve active range of motion in cervical spine by 50% to allow for improved functional mobility.  3) Pt to increase strength to at least 4+/5 of muscles tested to allow for improvement in functional activities.  PLAN     Progress as tolerated    Talon Andujar, PT, DPT

## 2024-04-23 ENCOUNTER — CLINICAL SUPPORT (OUTPATIENT)
Dept: REHABILITATION | Facility: HOSPITAL | Age: 59
End: 2024-04-23
Attending: STUDENT IN AN ORGANIZED HEALTH CARE EDUCATION/TRAINING PROGRAM
Payer: COMMERCIAL

## 2024-04-23 DIAGNOSIS — M53.82 DECREASED ROM OF INTERVERTEBRAL DISCS OF CERVICAL SPINE: Primary | ICD-10-CM

## 2024-04-23 PROCEDURE — 97110 THERAPEUTIC EXERCISES: CPT | Mod: PO

## 2024-04-23 PROCEDURE — 97112 NEUROMUSCULAR REEDUCATION: CPT | Mod: PO

## 2024-04-23 NOTE — PROGRESS NOTES
[facility]   Physical Therapy  AquaticTreatment Note     Name: García Tang  Clinic Number: [id number]    Therapy Diagnosis:   Encounter Diagnosis   Name Primary?    Decreased ROM of intervertebral discs of cervical spine Yes         Physician: Wali Zuniga MD    Visit Date: 4/23/2024    Physician Orders: PT Eval and Treat   Medical Diagnosis from Referral: S/P cervical spinal fusion [Z98.1], Weakness of both lower extremities   Evaluation Date: 3/14/2024  Authorization Period Expiration: 3/13/2025  Plan of Care Expiration: 6/14/2024  Progress Note Due: 5/14/2024  Visit # / Visits authorized: 1/ 1, 4/20, 1/20  FOTO: 1/3       PTA Visit #: 0/5       Precautions: Standard     Time In: 10:40 AM  Time Out: 11:35 AM  Total Billable Time: 55 minutes (1:1 PT/tech)       SUBJECTIVE     Pt reports: no significant changes.   He was compliant with home exercise program.  Response to previous treatment: TBD  Functional change: TBD    Pain: 5/10  Location: neck      OBJECTIVE     Cervical Range of Motion:     % Limitation Pain   Flexion 50 +      Extension 50 +      Right Rotation 50 +      Left Rotation 50 +      Right Side Bending 50 +   Left Side Bending 50 +            Strength:  Cervical MMT   Flexion 2/5   Extension 2/5   Right Side Bend 2/5   Left Side Bend 2/5      Upper Extremity Strength  (R) UE   (L) UE     Shoulder flexion: 4-/5 Shoulder flexion: 4/5   Shoulder Abduction: 4-/5 Shoulder abduction: 4/5   Shoulder ER 4-/5 Shoulder ER 4/5   Shoulder IR 4-/5 Shoulder IR 4/5   Elbow flexion: 4-/5 Elbow flexion: 4/5   Elbow extension: 4-/5 Elbow extension: 4/5   Wrist flexion: 4-/5 Wrist flexion: 4/5   Wrist extension: 4-/5 Wrist extension: 4/5   Lower Trap 3/5 Lower Trap 3/5   Middle Trap 3+/5 Middle Trap 3+/5   Rhomboids 3+/5 Rhomboids 3+/5       (2nd notch)  Right: 50#  Left: 80#      Reflexes:     Reflex Left Right   Bicep 2 2   Tricep 2 2   Mahan + +            Lower Extremity  Strength  Right LE   Left LE     Knee extension: 2/5 Knee extension: 3-/5   Knee flexion: 2/5 Knee flexion: 3-/5   Hip flexion: 2/5 Hip flexion: 3-/5   Hip extension:  2/5 Hip extension: 3-/5   Hip abduction: 2/5 Hip abduction: 3-/5   Hip adduction: 3+/5 Hip adduction 3+/5   Ankle dorsiflexion: 3+/5 Ankle dorsiflexion: 3/5   Ankle plantarflexion: 3/5 Ankle plantarflexion: 3/5         Function:     - SLS R: 1s  - SLS L: 1s        Sensation: intact      CMS Impairment/Limitation/Restriction for FOTO cervical Survey     Therapist reviewed FOTO scores for García Tang on 3/14/2024.   FOTO documents entered into [employer] - see Media section.     Limitation Score: 50%  Category: Mobility        Treatment     Brad received the treatments listed below:         therapeutic exercises to develop strength for 30 minutes including:  UBE 3/3  Recumbent bike 8'   Seated march 2x10  LAQ 2x10  Bilateral shoulder flexion 2x10  Bilateral shoulder abduction 2x10  Bilateral bicep curl 2x10    neuromuscular re-education activities to improve: Sense, Proprioception, and Posture for 25 minutes. The following activities were included:  Standing heel raise 2x10  Standing toe raise 2x10  Hand intrinsic strengthening with foam   Hand gripper 2x10   Seated posture correction x20, 3s iso       therapeutic activities to improve functional performance for 00  minutes, including:  STS        Patient Education and Home Exercises     Home Exercises Provided and Patient Education Provided     Education provided:   - Plan of Care, HEP, Prognosis     Written Home Exercises Provided: yes. Exercises were reviewed and Brad was able to demonstrate them prior to the end of the session.  Brad demonstrated good  understanding of the education provided. See EMR under Patient Instructions for exercises provided during therapy sessions    ASSESSMENT   Introduced upper and lower extremity strengthening/activation intervention with good tolerance. Training effect  easily achieved and patient moderately fatigues post session.  At this time, patient will greatly benefit from land based upper, lower, and spinal stability intervention.      Brad Is progressing well towards his goals.   Pt prognosis is Good.     Pt will continue to benefit from skilled outpatient physical therapy to address the deficits listed in the problem list box on initial evaluation, provide pt/family education and to maximize pt's level of independence in the home and community environment.     Pt's spiritual, cultural and educational needs considered and pt agreeable to plan of care and goals.     Anticipated barriers to physical therapy: chronicity    Goals:  Short Term Goals (4 Weeks):   1) Pt will demonstrate compliance with initial home exercise program as prescribed by physical therapist to improve independence with management of condition.  2) Pt to improve active range of motion in cervical spine by 25% to allow for improved functional mobility.  3) Pt to improve  strength by at least 25% in right hand.     Long Term Goals (12 Weeks):   1. Pt to achieve <40% limitation as measured by the FOTO to demonstrate decreased disability.  2) Pt to improve active range of motion in cervical spine by 50% to allow for improved functional mobility.  3) Pt to increase strength to at least 4+/5 of muscles tested to allow for improvement in functional activities.  PLAN     Progress as tolerated    Talon Andujar, PT, DPT

## 2024-04-25 ENCOUNTER — CLINICAL SUPPORT (OUTPATIENT)
Dept: REHABILITATION | Facility: HOSPITAL | Age: 59
End: 2024-04-25
Attending: STUDENT IN AN ORGANIZED HEALTH CARE EDUCATION/TRAINING PROGRAM
Payer: COMMERCIAL

## 2024-04-25 DIAGNOSIS — M53.82 DECREASED ROM OF INTERVERTEBRAL DISCS OF CERVICAL SPINE: Primary | ICD-10-CM

## 2024-04-25 PROCEDURE — 97110 THERAPEUTIC EXERCISES: CPT | Mod: PO

## 2024-04-25 PROCEDURE — 97112 NEUROMUSCULAR REEDUCATION: CPT | Mod: PO

## 2024-04-25 NOTE — PROGRESS NOTES
[facility]   Physical Therapy  AquaticTreatment Note     Name: García Tang  Clinic Number: [id number]    Therapy Diagnosis:   Encounter Diagnosis   Name Primary?    Decreased ROM of intervertebral discs of cervical spine Yes         Physician: Wali Zuniga MD    Visit Date: 4/25/2024    Physician Orders: PT Eval and Treat   Medical Diagnosis from Referral: S/P cervical spinal fusion [Z98.1], Weakness of both lower extremities   Evaluation Date: 3/14/2024  Authorization Period Expiration: 3/13/2025  Plan of Care Expiration: 6/14/2024  Progress Note Due: 5/14/2024  Visit # / Visits authorized: 1/ 1, 4/20, 2/20  FOTO: 1/3       PTA Visit #: 0/5       Precautions: Standard     Time In: 0750 AM  Time Out: 0845 AM  Total Billable Time: 55 minutes (1:1 PT/tech)       SUBJECTIVE     Pt reports:  severe soreness and burning pains the day before that have gone away.  He was compliant with home exercise program.  Response to previous treatment: TBD  Functional change: TBD    Pain: 5/10  Location: neck      OBJECTIVE     Cervical Range of Motion:     % Limitation Pain   Flexion 50 +      Extension 50 +      Right Rotation 50 +      Left Rotation 50 +      Right Side Bending 50 +   Left Side Bending 50 +            Strength:  Cervical MMT   Flexion 2/5   Extension 2/5   Right Side Bend 2/5   Left Side Bend 2/5      Upper Extremity Strength  (R) UE   (L) UE     Shoulder flexion: 4-/5 Shoulder flexion: 4/5   Shoulder Abduction: 4-/5 Shoulder abduction: 4/5   Shoulder ER 4-/5 Shoulder ER 4/5   Shoulder IR 4-/5 Shoulder IR 4/5   Elbow flexion: 4-/5 Elbow flexion: 4/5   Elbow extension: 4-/5 Elbow extension: 4/5   Wrist flexion: 4-/5 Wrist flexion: 4/5   Wrist extension: 4-/5 Wrist extension: 4/5   Lower Trap 3/5 Lower Trap 3/5   Middle Trap 3+/5 Middle Trap 3+/5   Rhomboids 3+/5 Rhomboids 3+/5       (2nd notch)  Right: 50#  Left: 80#      Reflexes:     Reflex Left Right   Bicep 2 2   Tricep 2 2    Mahan + +            Lower Extremity Strength  Right LE   Left LE     Knee extension: 2/5 Knee extension: 3-/5   Knee flexion: 2/5 Knee flexion: 3-/5   Hip flexion: 2/5 Hip flexion: 3-/5   Hip extension:  2/5 Hip extension: 3-/5   Hip abduction: 2/5 Hip abduction: 3-/5   Hip adduction: 3+/5 Hip adduction 3+/5   Ankle dorsiflexion: 3+/5 Ankle dorsiflexion: 3/5   Ankle plantarflexion: 3/5 Ankle plantarflexion: 3/5         Function:     - SLS R: 1s  - SLS L: 1s        Sensation: intact      CMS Impairment/Limitation/Restriction for FOTO cervical Survey     Therapist reviewed FOTO scores for García Tang on 3/14/2024.   FOTO documents entered into EPIC - see Media section.     Limitation Score: 50%  Category: Mobility        Treatment     Brad received the treatments listed below:         therapeutic exercises to develop strength for 25 minutes including:  Nu step 8'   Seated march 3x10  LAQ 3x10  Bilateral shoulder flexion 3x10  Bilateral shoulder abduction 3x10  Bilateral bicep curl 3x10  Wrist 4 way 1# 3x10      neuromuscular re-education activities to improve: Sense, Proprioception, and Posture for 30 minutes. The following activities were included:  Standing heel raise 2x10  Standing toe raise 2x10  Hand intrinsic strengthening with foam   Hand gripper 2x10   Seated posture correction x20, 3s iso   6 way cervical iso with self resistance 5x5s      therapeutic activities to improve functional performance for 00  minutes, including:  STS        Patient Education and Home Exercises     Home Exercises Provided and Patient Education Provided     Education provided:   - Plan of Care, HEP, Prognosis     Written Home Exercises Provided: yes. Exercises were reviewed and Brad was able to demonstrate them prior to the end of the session.  Brad demonstrated good  understanding of the education provided. See EMR under Patient Instructions for exercises provided during therapy sessions    ASSESSMENT   Progressed load  with good tolerance. Due to patient's continue hand weakness recommending OT.   At this time, patient will greatly benefit from land based upper, lower, and spinal stability intervention.      Brad Is progressing well towards his goals.   Pt prognosis is Good.     Pt will continue to benefit from skilled outpatient physical therapy to address the deficits listed in the problem list box on initial evaluation, provide pt/family education and to maximize pt's level of independence in the home and community environment.     Pt's spiritual, cultural and educational needs considered and pt agreeable to plan of care and goals.     Anticipated barriers to physical therapy: chronicity    Goals:  Short Term Goals (4 Weeks):   1) Pt will demonstrate compliance with initial home exercise program as prescribed by physical therapist to improve independence with management of condition.  2) Pt to improve active range of motion in cervical spine by 25% to allow for improved functional mobility.  3) Pt to improve  strength by at least 25% in right hand.     Long Term Goals (12 Weeks):   1. Pt to achieve <40% limitation as measured by the FOTO to demonstrate decreased disability.  2) Pt to improve active range of motion in cervical spine by 50% to allow for improved functional mobility.  3) Pt to increase strength to at least 4+/5 of muscles tested to allow for improvement in functional activities.  PLAN     Progress as tolerated    Talon Andujar, PT, DPT

## 2024-04-29 ENCOUNTER — OFFICE VISIT (OUTPATIENT)
Dept: FAMILY MEDICINE | Facility: CLINIC | Age: 59
End: 2024-04-29
Payer: COMMERCIAL

## 2024-04-29 ENCOUNTER — TELEPHONE (OUTPATIENT)
Dept: PAIN MEDICINE | Facility: CLINIC | Age: 59
End: 2024-04-29
Payer: COMMERCIAL

## 2024-04-29 ENCOUNTER — LAB VISIT (OUTPATIENT)
Dept: LAB | Facility: HOSPITAL | Age: 59
End: 2024-04-29
Attending: FAMILY MEDICINE
Payer: COMMERCIAL

## 2024-04-29 ENCOUNTER — PATIENT MESSAGE (OUTPATIENT)
Dept: NEUROSURGERY | Facility: CLINIC | Age: 59
End: 2024-04-29
Payer: COMMERCIAL

## 2024-04-29 VITALS
OXYGEN SATURATION: 98 % | BODY MASS INDEX: 44.41 KG/M2 | HEIGHT: 70 IN | WEIGHT: 310.19 LBS | HEART RATE: 65 BPM | DIASTOLIC BLOOD PRESSURE: 100 MMHG | SYSTOLIC BLOOD PRESSURE: 140 MMHG

## 2024-04-29 DIAGNOSIS — E11.9 TYPE 2 DIABETES MELLITUS WITHOUT COMPLICATION, WITHOUT LONG-TERM CURRENT USE OF INSULIN: ICD-10-CM

## 2024-04-29 DIAGNOSIS — Z00.00 WELLNESS EXAMINATION: ICD-10-CM

## 2024-04-29 DIAGNOSIS — E66.01 SEVERE OBESITY (BMI >= 40): ICD-10-CM

## 2024-04-29 DIAGNOSIS — Z00.00 WELLNESS EXAMINATION: Primary | ICD-10-CM

## 2024-04-29 DIAGNOSIS — Z12.11 COLON CANCER SCREENING: ICD-10-CM

## 2024-04-29 LAB
ALBUMIN SERPL BCP-MCNC: 4.3 G/DL (ref 3.5–5.2)
ALP SERPL-CCNC: 64 U/L (ref 55–135)
ALT SERPL W/O P-5'-P-CCNC: 34 U/L (ref 10–44)
ANION GAP SERPL CALC-SCNC: 10 MMOL/L (ref 8–16)
AST SERPL-CCNC: 35 U/L (ref 10–40)
BASOPHILS # BLD AUTO: 0.04 K/UL (ref 0–0.2)
BASOPHILS NFR BLD: 1 % (ref 0–1.9)
BILIRUB SERPL-MCNC: 0.7 MG/DL (ref 0.1–1)
BUN SERPL-MCNC: 13 MG/DL (ref 6–20)
CALCIUM SERPL-MCNC: 9.6 MG/DL (ref 8.7–10.5)
CHLORIDE SERPL-SCNC: 106 MMOL/L (ref 95–110)
CHOLEST SERPL-MCNC: 189 MG/DL (ref 120–199)
CHOLEST/HDLC SERPL: 4.7 {RATIO} (ref 2–5)
CO2 SERPL-SCNC: 25 MMOL/L (ref 23–29)
COMPLEXED PSA SERPL-MCNC: 0.52 NG/ML (ref 0–4)
CREAT SERPL-MCNC: 1 MG/DL (ref 0.5–1.4)
DIFFERENTIAL METHOD BLD: NORMAL
EOSINOPHIL # BLD AUTO: 0.1 K/UL (ref 0–0.5)
EOSINOPHIL NFR BLD: 2.7 % (ref 0–8)
ERYTHROCYTE [DISTWIDTH] IN BLOOD BY AUTOMATED COUNT: 12 % (ref 11.5–14.5)
EST. GFR  (NO RACE VARIABLE): >60 ML/MIN/1.73 M^2
ESTIMATED AVG GLUCOSE: 146 MG/DL (ref 68–131)
GLUCOSE SERPL-MCNC: 109 MG/DL (ref 70–110)
HBA1C MFR BLD: 6.7 % (ref 4–5.6)
HCT VFR BLD AUTO: 43.5 % (ref 40–54)
HDLC SERPL-MCNC: 40 MG/DL (ref 40–75)
HDLC SERPL: 21.2 % (ref 20–50)
HGB BLD-MCNC: 14.7 G/DL (ref 14–18)
IMM GRANULOCYTES # BLD AUTO: 0.01 K/UL (ref 0–0.04)
IMM GRANULOCYTES NFR BLD AUTO: 0.2 % (ref 0–0.5)
LDLC SERPL CALC-MCNC: 125.6 MG/DL (ref 63–159)
LYMPHOCYTES # BLD AUTO: 1.4 K/UL (ref 1–4.8)
LYMPHOCYTES NFR BLD: 34.4 % (ref 18–48)
MCH RBC QN AUTO: 29.9 PG (ref 27–31)
MCHC RBC AUTO-ENTMCNC: 33.8 G/DL (ref 32–36)
MCV RBC AUTO: 89 FL (ref 82–98)
MONOCYTES # BLD AUTO: 0.6 K/UL (ref 0.3–1)
MONOCYTES NFR BLD: 13.8 % (ref 4–15)
NEUTROPHILS # BLD AUTO: 2 K/UL (ref 1.8–7.7)
NEUTROPHILS NFR BLD: 47.9 % (ref 38–73)
NONHDLC SERPL-MCNC: 149 MG/DL
NRBC BLD-RTO: 0 /100 WBC
PLATELET # BLD AUTO: 223 K/UL (ref 150–450)
PMV BLD AUTO: 10 FL (ref 9.2–12.9)
POTASSIUM SERPL-SCNC: 5 MMOL/L (ref 3.5–5.1)
PROT SERPL-MCNC: 7.4 G/DL (ref 6–8.4)
RBC # BLD AUTO: 4.91 M/UL (ref 4.6–6.2)
SODIUM SERPL-SCNC: 141 MMOL/L (ref 136–145)
TRIGL SERPL-MCNC: 117 MG/DL (ref 30–150)
TSH SERPL DL<=0.005 MIU/L-ACNC: 1.68 UIU/ML (ref 0.4–4)
WBC # BLD AUTO: 4.07 K/UL (ref 3.9–12.7)

## 2024-04-29 PROCEDURE — 85025 COMPLETE CBC W/AUTO DIFF WBC: CPT | Performed by: FAMILY MEDICINE

## 2024-04-29 PROCEDURE — 84153 ASSAY OF PSA TOTAL: CPT | Performed by: FAMILY MEDICINE

## 2024-04-29 PROCEDURE — 99396 PREV VISIT EST AGE 40-64: CPT | Mod: S$GLB,,, | Performed by: FAMILY MEDICINE

## 2024-04-29 PROCEDURE — 83036 HEMOGLOBIN GLYCOSYLATED A1C: CPT | Performed by: FAMILY MEDICINE

## 2024-04-29 PROCEDURE — 99999 PR PBB SHADOW E&M-EST. PATIENT-LVL IV: CPT | Mod: PBBFAC,,, | Performed by: FAMILY MEDICINE

## 2024-04-29 PROCEDURE — 36415 COLL VENOUS BLD VENIPUNCTURE: CPT | Mod: PO | Performed by: FAMILY MEDICINE

## 2024-04-29 PROCEDURE — 84443 ASSAY THYROID STIM HORMONE: CPT | Performed by: FAMILY MEDICINE

## 2024-04-29 PROCEDURE — 80053 COMPREHEN METABOLIC PANEL: CPT | Performed by: FAMILY MEDICINE

## 2024-04-29 PROCEDURE — 80061 LIPID PANEL: CPT | Performed by: FAMILY MEDICINE

## 2024-04-29 NOTE — PROGRESS NOTES
Subjective:       Patient ID: García Tang is a 58 y.o. male.    Chief Complaint: Annual Exam    Pt is known to me.  The pt presents for annual wellness exam.  The pt is seeing neurology for weakness of arms and legs.  He had spinal surgery (decompression) that went well per Dr. Zuniga--unrelated problem.  He is in PT.  He has gained a lot of weight.  He is seeing an endocrinologist (Dr. Bearden)--he has recommended thyroid replacement (as reported by pt).  He does not check his glucoses at home.  He does not often check BP at home.  He had his medication about 5 hours ago.    He sees Dr. Bernabe for CAD, HLD, diastolic dysfunction and mitral regurg.  The pt is stable and no med changes were made at his most recent appt.  The pt's father had colon cancer.  The pt is past due for colonoscopy.  Discussed health maintenance with pt and will schedule appropriate studies and/or immunizations.              Review of Systems    Objective:      Physical Exam  Vitals and nursing note reviewed.   Constitutional:       Appearance: Normal appearance. He is well-developed. He is obese. He is not ill-appearing.   HENT:      Head: Normocephalic.   Eyes:      Conjunctiva/sclera: Conjunctivae normal.      Pupils: Pupils are equal, round, and reactive to light.   Neck:      Thyroid: No thyromegaly.   Cardiovascular:      Rate and Rhythm: Normal rate and regular rhythm.      Pulses:           Dorsalis pedis pulses are 2+ on the right side and 2+ on the left side.        Posterior tibial pulses are 2+ on the right side and 2+ on the left side.      Heart sounds: Normal heart sounds.   Pulmonary:      Effort: Pulmonary effort is normal.      Breath sounds: Normal breath sounds.   Abdominal:      General: Bowel sounds are normal.      Palpations: Abdomen is soft.      Tenderness: There is no abdominal tenderness.   Musculoskeletal:         General: No tenderness or deformity. Normal range of motion.      Cervical back: Normal range of  "motion and neck supple.      Right lower leg: Edema present.      Left lower leg: Edema present.      Right foot: Normal range of motion. No deformity.      Left foot: Normal range of motion. No deformity.   Feet:      Right foot:      Protective Sensation: 7 sites tested.  7 sites sensed.      Skin integrity: No ulcer.      Left foot:      Protective Sensation: 7 sites tested.  7 sites sensed.      Skin integrity: No ulcer.   Lymphadenopathy:      Cervical: No cervical adenopathy.   Skin:     General: Skin is warm and dry.   Neurological:      Mental Status: He is alert and oriented to person, place, and time.      Cranial Nerves: No cranial nerve deficit.      Motor: No abnormal muscle tone.      Coordination: Coordination normal.      Deep Tendon Reflexes: Reflexes normal.   Psychiatric:         Behavior: Behavior normal.         Assessment:       1. Wellness examination    2. Colon cancer screening    3. Type 2 diabetes mellitus without complication, without long-term current use of insulin    4. Severe obesity (BMI >= 40)        Plan:       García Escudero" was seen today for annual exam.    Diagnoses and all orders for this visit:    Wellness examination  -     CBC Auto Differential; Future  -     Comprehensive Metabolic Panel; Future  -     Lipid Panel; Future  -     TSH; Future  -     PSA, Screening; Future    Colon cancer screening  -     Case Request Endoscopy: COLONOSCOPY    Type 2 diabetes mellitus without complication, without long-term current use of insulin  -     Microalbumin/Creatinine Ratio, Urine; Future  -     Hemoglobin A1C; Future    Severe obesity (BMI >= 40)  Comments:  discussed healthy options for weight loss      During this visit, I reviewed the pt's history, medications, allergies, and problem list.        "

## 2024-04-29 NOTE — TELEPHONE ENCOUNTER
"Pt left a messaged stating " he needed an apt this week due to him hurting after a spinal cord inj and surgery. I have attempted to contact pt 3 time with his phone giving a busy signal.   "

## 2024-04-29 NOTE — TELEPHONE ENCOUNTER
He isn't answering his phone, but I moved him to 5/7 at 2pm. That is the best I can do. With it being a new pt appointment, it's hard to find hour slots in Dr. Peres's schedule.     ABDIEL Pedraza

## 2024-04-30 ENCOUNTER — CLINICAL SUPPORT (OUTPATIENT)
Dept: REHABILITATION | Facility: HOSPITAL | Age: 59
End: 2024-04-30
Attending: STUDENT IN AN ORGANIZED HEALTH CARE EDUCATION/TRAINING PROGRAM
Payer: COMMERCIAL

## 2024-04-30 DIAGNOSIS — M53.82 DECREASED ROM OF INTERVERTEBRAL DISCS OF CERVICAL SPINE: Primary | ICD-10-CM

## 2024-04-30 DIAGNOSIS — R29.898 HAND WEAKNESS: Primary | ICD-10-CM

## 2024-04-30 PROCEDURE — 97110 THERAPEUTIC EXERCISES: CPT | Mod: PO

## 2024-04-30 PROCEDURE — 97112 NEUROMUSCULAR REEDUCATION: CPT | Mod: PO

## 2024-04-30 PROCEDURE — 97530 THERAPEUTIC ACTIVITIES: CPT | Mod: PO

## 2024-04-30 NOTE — PROGRESS NOTES
[facility] OUTPATIENT THERAPY AND WELLNESS   Physical Therapy Treatment Note     Name: García Tang  [facility] Number: [id number]    Therapy Diagnosis:   Encounter Diagnosis   Name Primary?    Decreased ROM of intervertebral discs of cervical spine Yes         Physician: Wali Zuniga MD    Visit Date: 4/30/2024    Physician Orders: PT Eval and Treat   Medical Diagnosis from Referral: S/P cervical spinal fusion [Z98.1], Weakness of both lower extremities   Evaluation Date: 3/14/2024  Authorization Period Expiration: 3/13/2025  Plan of Care Expiration: 6/14/2024  Progress Note Due: 5/14/2024  Visit # / Visits authorized: 1/ 1, 4/20, 2/20  FOTO: 1/3       PTA Visit #: 0/5       Precautions: Standard     Time In: 0747 AM  Time Out: 0847 AM  Total Billable Time: 60 minutes (1:1 PT/tech)       SUBJECTIVE     Pt reports:  overall very sore but no complications   He was compliant with home exercise program.  Response to previous treatment: TBD  Functional change: TBD    Pain: 5/10  Location: neck      OBJECTIVE     Cervical Range of Motion:     % Limitation Pain   Flexion 50 +      Extension 50 +      Right Rotation 50 +      Left Rotation 50 +      Right Side Bending 50 +   Left Side Bending 50 +            Strength:  Cervical MMT   Flexion 2/5   Extension 2/5   Right Side Bend 2/5   Left Side Bend 2/5      Upper Extremity Strength  (R) UE   (L) UE     Shoulder flexion: 4-/5 Shoulder flexion: 4/5   Shoulder Abduction: 4-/5 Shoulder abduction: 4/5   Shoulder ER 4-/5 Shoulder ER 4/5   Shoulder IR 4-/5 Shoulder IR 4/5   Elbow flexion: 4-/5 Elbow flexion: 4/5   Elbow extension: 4-/5 Elbow extension: 4/5   Wrist flexion: 4-/5 Wrist flexion: 4/5   Wrist extension: 4-/5 Wrist extension: 4/5   Lower Trap 3/5 Lower Trap 3/5   Middle Trap 3+/5 Middle Trap 3+/5   Rhomboids 3+/5 Rhomboids 3+/5       (2nd notch)  Right: 50#  Left: 80#      Reflexes:     Reflex Left Right   Bicep 2 2   Tricep 2 2   Mahan + +            Lower  Extremity Strength  Right LE   Left LE     Knee extension: 2/5 Knee extension: 3-/5   Knee flexion: 2/5 Knee flexion: 3-/5   Hip flexion: 2/5 Hip flexion: 3-/5   Hip extension:  2/5 Hip extension: 3-/5   Hip abduction: 2/5 Hip abduction: 3-/5   Hip adduction: 3+/5 Hip adduction 3+/5   Ankle dorsiflexion: 3+/5 Ankle dorsiflexion: 3/5   Ankle plantarflexion: 3/5 Ankle plantarflexion: 3/5         Function:     - SLS R: 1s  - SLS L: 1s        Sensation: intact      CMS Impairment/Limitation/Restriction for FOTO cervical Survey     Therapist reviewed FOTO scores for García Tang on 3/14/2024.   FOTO documents entered into EPIC - see Media section.     Limitation Score: 50%  Category: Mobility        Treatment     Brad received the treatments listed below:         therapeutic exercises to develop strength for 20 minutes including:  Nu step 10'   Seated march 3x10  LAQ 3x10  Bilateral shoulder flexion 3x10  Bilateral shoulder abduction 3x10  Bilateral bicep curl 3x10        neuromuscular re-education activities to improve: Sense, Proprioception, and Posture for 30 minutes. The following activities were included:  Standing heel raise 3x10  Standing toe raise 3x10  Standing hip abduction 3x10  Standing hip ext 3x10  Hand gripper 2x10   Seated posture correction 3x10, 3s iso   6 way cervical iso with self resistance 5x5s      therapeutic activities to improve functional performance for 10  minutes, including:  STS 3x6        Patient Education and Home Exercises     Home Exercises Provided and Patient Education Provided     Education provided:   - Plan of Care, HEP, Prognosis     Written Home Exercises Provided: yes. Exercises were reviewed and Brad was able to demonstrate them prior to the end of the session.  Brad demonstrated good  understanding of the education provided. See EMR under Patient Instructions for exercises provided during therapy sessions    ASSESSMENT   Introduced STS-frequent and longer rest breaks  needed with said intervention. Due to patient's continue hand weakness recommending OT. Global weakness and balance deficit primary impairments.   At this time, patient will greatly benefit from land based upper, lower, and spinal stability intervention.      Brad Is progressing well towards his goals.   Pt prognosis is Good.     Pt will continue to benefit from skilled outpatient physical therapy to address the deficits listed in the problem list box on initial evaluation, provide pt/family education and to maximize pt's level of independence in the home and community environment.     Pt's spiritual, cultural and educational needs considered and pt agreeable to plan of care and goals.     Anticipated barriers to physical therapy: chronicity    Goals:  Short Term Goals (4 Weeks):   1) Pt will demonstrate compliance with initial home exercise program as prescribed by physical therapist to improve independence with management of condition.  2) Pt to improve active range of motion in cervical spine by 25% to allow for improved functional mobility.  3) Pt to improve  strength by at least 25% in right hand.     Long Term Goals (12 Weeks):   1. Pt to achieve <40% limitation as measured by the FOTO to demonstrate decreased disability.  2) Pt to improve active range of motion in cervical spine by 50% to allow for improved functional mobility.  3) Pt to increase strength to at least 4+/5 of muscles tested to allow for improvement in functional activities.  PLAN     Progress as tolerated    Talon Andujar, PT, DPT

## 2024-05-02 ENCOUNTER — CLINICAL SUPPORT (OUTPATIENT)
Dept: REHABILITATION | Facility: HOSPITAL | Age: 59
End: 2024-05-02
Payer: COMMERCIAL

## 2024-05-02 ENCOUNTER — PATIENT MESSAGE (OUTPATIENT)
Dept: FAMILY MEDICINE | Facility: CLINIC | Age: 59
End: 2024-05-02
Payer: COMMERCIAL

## 2024-05-02 ENCOUNTER — PATIENT MESSAGE (OUTPATIENT)
Dept: NEUROSURGERY | Facility: CLINIC | Age: 59
End: 2024-05-02
Payer: COMMERCIAL

## 2024-05-02 ENCOUNTER — CLINICAL SUPPORT (OUTPATIENT)
Dept: REHABILITATION | Facility: HOSPITAL | Age: 59
End: 2024-05-02
Attending: STUDENT IN AN ORGANIZED HEALTH CARE EDUCATION/TRAINING PROGRAM
Payer: COMMERCIAL

## 2024-05-02 DIAGNOSIS — R20.2 PARESTHESIAS: Primary | ICD-10-CM

## 2024-05-02 DIAGNOSIS — R27.8 SENSORY ATAXIA: Primary | ICD-10-CM

## 2024-05-02 DIAGNOSIS — R41.89 COGNITIVE CHANGES: ICD-10-CM

## 2024-05-02 DIAGNOSIS — R29.898 HAND WEAKNESS: ICD-10-CM

## 2024-05-02 DIAGNOSIS — Z78.9 DECREASED ACTIVITIES OF DAILY LIVING (ADL): ICD-10-CM

## 2024-05-02 DIAGNOSIS — M53.82 DECREASED ROM OF INTERVERTEBRAL DISCS OF CERVICAL SPINE: Primary | ICD-10-CM

## 2024-05-02 PROCEDURE — 97110 THERAPEUTIC EXERCISES: CPT | Mod: PO

## 2024-05-02 PROCEDURE — 97530 THERAPEUTIC ACTIVITIES: CPT | Mod: PO

## 2024-05-02 PROCEDURE — 97112 NEUROMUSCULAR REEDUCATION: CPT | Mod: PO

## 2024-05-02 PROCEDURE — 97165 OT EVAL LOW COMPLEX 30 MIN: CPT | Mod: PO

## 2024-05-02 NOTE — PROGRESS NOTES
[facility] OUTPATIENT THERAPY AND WELLNESS   Physical Therapy Treatment Note     Name: García Tang  [facility] Number: [id number]    Therapy Diagnosis:   Encounter Diagnosis   Name Primary?    Decreased ROM of intervertebral discs of cervical spine Yes     Physician: Wali Zuniga MD    Visit Date: 5/2/2024    Physician Orders: PT Eval and Treat   Medical Diagnosis from Referral: S/P cervical spinal fusion [Z98.1], Weakness of both lower extremities   Evaluation Date: 3/14/2024  Authorization Period Expiration: 3/13/2025  Plan of Care Expiration: 6/14/2024  Progress Note Due: 5/14/2024  Visit # / Visits authorized: 1/ 1, 4/20, 2/20  FOTO: 1/3       PTA Visit #: 0/5       Precautions: Standard     Time In: 0846 AM  Time Out: 0836 AM  Total Billable Time: 50 minutes (1:1 PT/tech)       SUBJECTIVE     Pt reports: no significant change.  He was compliant with home exercise program.  Response to previous treatment: TBD  Functional change: TBD    Pain: 5/10  Location: neck      OBJECTIVE     Cervical Range of Motion:     % Limitation Pain   Flexion 50 +      Extension 50 +      Right Rotation 50 +      Left Rotation 50 +      Right Side Bending 50 +   Left Side Bending 50 +            Strength:  Cervical MMT   Flexion 2/5   Extension 2/5   Right Side Bend 2/5   Left Side Bend 2/5      Upper Extremity Strength  (R) UE   (L) UE     Shoulder flexion: 4-/5 Shoulder flexion: 4/5   Shoulder Abduction: 4-/5 Shoulder abduction: 4/5   Shoulder ER 4-/5 Shoulder ER 4/5   Shoulder IR 4-/5 Shoulder IR 4/5   Elbow flexion: 4-/5 Elbow flexion: 4/5   Elbow extension: 4-/5 Elbow extension: 4/5   Wrist flexion: 4-/5 Wrist flexion: 4/5   Wrist extension: 4-/5 Wrist extension: 4/5   Lower Trap 3/5 Lower Trap 3/5   Middle Trap 3+/5 Middle Trap 3+/5   Rhomboids 3+/5 Rhomboids 3+/5       (2nd notch)  Right: 50#  Left: 80#      Reflexes:     Reflex Left Right   Bicep 2 2   Tricep 2 2   Mahan + +            Lower Extremity Strength  Right LE   " Left LE     Knee extension: 2/5 Knee extension: 3-/5   Knee flexion: 2/5 Knee flexion: 3-/5   Hip flexion: 2/5 Hip flexion: 3-/5   Hip extension:  2/5 Hip extension: 3-/5   Hip abduction: 2/5 Hip abduction: 3-/5   Hip adduction: 3+/5 Hip adduction 3+/5   Ankle dorsiflexion: 3+/5 Ankle dorsiflexion: 3/5   Ankle plantarflexion: 3/5 Ankle plantarflexion: 3/5         Function:     - SLS R: 1s  - SLS L: 1s        Sensation: intact      CMS Impairment/Limitation/Restriction for FOTO cervical Survey     Therapist reviewed FOTO scores for García Tang on 3/14/2024.   FOTO documents entered into EPIC - see Media section.     Limitation Score: 50%  Category: Mobility        Treatment     Brad received the treatments listed below:         therapeutic exercises to develop strength for 20 minutes including:  Nu step 10'   Seated march 3x10  LAQ 3x10  Bilateral shoulder flexion 3x10  Bilateral shoulder abduction 3x10  Bilateral bicep curl 3x10        neuromuscular re-education activities to improve: Sense, Proprioception, and Posture for 15 minutes. The following activities were included:  Standing heel raise 3x10  Standing toe raise 3x10  Standing hip abduction 3x10  Standing hip ext 3x10  Seated posture correction 3x10, 3s iso   6 way cervical iso with self resistance 5x5s      therapeutic activities to improve functional performance for 15  minutes, including:  STS 3x6  Step 6" 3x6      Patient Education and Home Exercises     Home Exercises Provided and Patient Education Provided     Education provided:   - Plan of Care, HEP, Prognosis     Written Home Exercises Provided: yes. Exercises were reviewed and Brad was able to demonstrate them prior to the end of the session.  Brad demonstrated good  understanding of the education provided. See EMR under Patient Instructions for exercises provided during therapy sessions    ASSESSMENT   Increased load via 6 inch step up-good tolerance noted. Due to patient's continue hand " weakness recommending OT. Global weakness and balance deficit primary impairments.   At this time, patient will greatly benefit from land based upper, lower, and spinal stability intervention.      Brad Is progressing well towards his goals.   Pt prognosis is Good.     Pt will continue to benefit from skilled outpatient physical therapy to address the deficits listed in the problem list box on initial evaluation, provide pt/family education and to maximize pt's level of independence in the home and community environment.     Pt's spiritual, cultural and educational needs considered and pt agreeable to plan of care and goals.     Anticipated barriers to physical therapy: chronicity    Goals:  Short Term Goals (4 Weeks):   1) Pt will demonstrate compliance with initial home exercise program as prescribed by physical therapist to improve independence with management of condition.  2) Pt to improve active range of motion in cervical spine by 25% to allow for improved functional mobility.  3) Pt to improve  strength by at least 25% in right hand.     Long Term Goals (12 Weeks):   1. Pt to achieve <40% limitation as measured by the FOTO to demonstrate decreased disability.  2) Pt to improve active range of motion in cervical spine by 50% to allow for improved functional mobility.  3) Pt to increase strength to at least 4+/5 of muscles tested to allow for improvement in functional activities.  PLAN     Progress as tolerated    Talon Andujar, PT, DPT

## 2024-05-02 NOTE — PLAN OF CARE
[facility]  Occupational Therapy Initial Evaluation     Name: García Tang  Clinic Number: [id number]    Therapy Diagnosis:   Paresthesias, hand weakness, decreased ADL  Physician: LORETTA Domingo MD    Physician Orders: Eval and treat   Medical Diagnosis: R29.898 (ICD-10-CM) - Hand weakness     Evaluation Date: 5/2/2024  Insurance Authorization Period Expiration: 4/30/2025  Plan of Care Certification Period: 7/31/2024  Date of Return to MD: TBD  Visit # / Visits authorized: 1 / 1  FOTO: Initial/50    Time In:1000  Time Out: 1053  Total Appointment Time (timed & untimed codes): 53 minutes    Surgical Procedure:   DISCECTOMY, SPINE, CERVICAL, ANTERIOR APPROACH, WITH FUSION C5-6     Precautions: Standard and overfatigue    Date of Surgery: 2/12/2024   S/P: 11 weeks on 4/29/24    Subjective     Date of Onset: 12/2023    History of Current Condition/Mechanism of Injury:  Pt first experienced weakness in [place] in December and ER thought he had a TIA. Progressed however to difficulty walking.  Pt had ACDF C5-6 and is experiencing weakness in B hands and arms. Pt is planning to return to work next week. Pt does have shocking sensations at times in his hands, but reports more frequently is numbness and fatigue.     Falls: n/a    Involved Side: Bilateral  Dominant Side: Right     Imaging: X rays: Straightening of the normal cervical lordosis.  No significant spondylolisthesis.  Interval postsurgical changes of C5-C6 ACDF.  No evidence of hardware complication.  No acute fracture or osseous destructive process identified.  Anterior vertebral body osteophytes noted at C4-C5.  Mild disc height loss noted at C4-C5 and C6-C7.  Muscle of the prevertebral soft tissues.     CT: ACDF changes are identified at C5-C6 level with anterior plate screw fixation and disc implant present.  There is expected prevertebral edema.  Hardware appears intact.  Vertebral body heights are preserved.  There is minimal  "grade 1 anterolisthesis C2 on C3, C3 on C4.  There is no acute displaced fracture identified.  Degenerative spondylosis and facet arthropathy changes are noted.  The atlantoaxial articulation appears intact.  To the extent visualized, no epidural hematoma is appreciated.  Included lung apices are clear.       Prior Therapy: currently receiving PT     Pain:  Functional Pain Scale Rating 0-10:   0/10 now, "weakness and tired" sensation   0/10 at best  5/10 at worst hands   Location: B hands  Description: shocking sensations    Aggravating Factors: movement   Easing Factors: rest    Occupation:  Insurance  Working presently: employed  Duties: computer use     Functional Limitations/Social History:    Previous functional status includes: Independent with all ADLs.     Current Functional Status   Home/Living environment: lives with their spouse         Limitation of Functional Status as follows:      ADLs/IADLs: Frequent dropping of objects. Difficulty with opening jars, carrying,  lifting.     - Feeding:  Difficulty with cutting food, holding a glass    - Bathing:  Difficulty washing hair, washing body    - Dressing/Grooming:  Difficulty managing fasteners, tieing shoes, pulling clothing/socks/shoes,     - Driving: ,  difficulty turning wheel/gripping wheel, starting ignition     Leisure:     Patient's Goals for Therapy: improve UE function     Past Medical History/Physical Systems Review:   García Tang  has a past medical history of ASCVD (arteriosclerotic cardiovascular disease), Chronic sialoadenitis, CKD stage 2 due to type 2 diabetes mellitus, Diverticulitis, Diverticulosis, Essential hypertension, Fatty liver disease, nonalcoholic, GERD (gastroesophageal reflux disease), Headache, HLD (hyperlipidemia), Kidney stone, Meniere's disease, Morbid obesity, NAFLD (nonalcoholic fatty liver disease), Near syncope, Obesity hypoventilation syndrome, PAD (peripheral artery disease), Prostatitis, Rectal bleeding, Sleep " disorder breathing, and Thyroid nodule.    García Tang  has a past surgical history that includes Nose surgery; kidney stent; Kidney surgery (1996); Colonoscopy (2008); Upper gastrointestinal endoscopy (2021); Cardiac catheterization; ANGIOGRAM, CORONARY, WITH LEFT HEART CATHETERIZATION (10/26/2023); Coronary angiography (10/26/2023); Esophagogastroduodenoscopy (N/A, 12/18/2023); Anterior cervical discectomy w/ fusion (N/A, 2/12/2024); and Magnetic resonance imaging (N/A, 1/26/2024).    García has a current medication list which includes the following prescription(s): acetaminophen, amlodipine, ascorbic acid, cholecalciferol (vitamin d3), famotidine, lactobacillus acidophilus, losartan, magnesium oxide, metoprolol succinate, niacin, oxycodone-acetaminophen, pantoprazole, spironolactone, tizanidine, and zinc gluconate.    Review of patient's allergies indicates:   Allergen Reactions    Lisinopril Swelling    Iodinated contrast media Other (See Comments)     Spasms, tremor, and high blood pressure    Bactrim  [sulfamethoxazole-trimethoprim]      Other reaction(s): Unknown    Diflucan [fluconazole]      Heart racing    Gabapentin Other (See Comments)     nightmares    Hydroxyzine hcl      Other reaction(s): elevated bp    Kenalog [triamcinolone acetonide] Other (See Comments)     Heart flutters all day    Phenergan [promethazine] Anxiety    Zofran [ondansetron hcl (pf)] Anxiety        Objective     Observation/Appearance:   unremarkable     Sensation: Intact to light touch but decreased throughout R UE vs L.  Hypersensitivity reported in various spots B Ues. Fabrics brushing against UE or light touch makes his skin tingle.  Paresthesias reported in L hand every morning. R dorsal wrist is numb, left ring finger. Intermittent in R UE, location in both changes frequently.         Edema:  none observed    Range of Motion:  Pt with slow and labored quality of movement, > on R than L. Reports fluctuating paresthesias  with motion.   FA supination L 60 degrees L 55 degrees      Pt is able to make a full fist, extend fingers, perform hook fist, oppose to SF. Well WFL  R thumb IP flexion decreased Vs L     Manual Muscle Test:   Shoulder flexion and abd 4/5 B  ER  L 3+/5  R 4/5  IR 4/5 B                               Elbow extension 4/5 B  Elbow flexion R 3+/5  L 4/5  FA Pronation 4/5  L 3+/5  FA Supination R 3+/5  L 4/5  wrist extension R 3+/5  L 4/5  wrist flexion R 3+/5   L 3+/5     and Pinch Strength (in pounds, psi's):   Left Right    II      III 63 62   Lateral 17 16   Tripod 17.5 11.5   Tip 12.5 10     9 Hole Peg Test: R 23 seconds L 25 second   Special Tests: none performed      CMS Impairment/Limitation/Restriction for FOTO  Survey    Therapist reviewed FOTO scores for García Tang on 5/2/2024.   FOTO documents entered into [employer] - see Media section.    Initial Score: 50  Predicted Score: 66 at visit 11.          Treatment     Total Treatment time separate from Evaluation time: 20 min        Brad received therapeutic exercises to increase ROM, endurance, and/or strength for 20 minutes including:  AROM: AROM x 5-10 reps each:   wave, hook, fist, straight fist, abd/add, lifts  Wrist flex/ext, RD/UD  FA S/P  Thumb AROM: IP/MPJ blocks, composite flexion, circumduction, palmar and radial abduction, retroposition, opposition with slide,   Elbow AROM, palm up, palm down, neutral        Patient Education and Home Exercises      Education provided:   -role of OT, goals for OT,   -Additional Education provided: avoiding overfatigue of musculature     Written Home Exercises Provided:   Exercises were reviewed and Brad was able to demonstrate them prior to the end of the session.    Brad demonstrated good understanding of the education provided.     Pt was advised to perform these exercises free of pain, and to stop performing them if pain occurs.    See EMR under Patient Instructions for exercises provided  5/2/2024.    Assessment     García Tang is a 58 y.o. male referred to outpatient occupational therapy and presents with a medical diagnosis of B LUE weakness after spinal fusion,  resulting in Paresthesias, pain, edema, decreased A/PROM, strength, and functional use of B  UEs.    Following medical record review it is determined that pt will benefit from occupational therapy services in order to maximize pain free and/or functional use of bilateral UE.  The following goals were discussed with the patient and patient is in agreement with them as to be addressed in the treatment plan. The patient's rehab potential is Good.    Anticipated barriers to occupational therapy: diabetes,     Plan of care discussed with patient: Yes  Patient's spiritual, cultural and educational needs considered and patient is agreeable to the plan of care and goals as stated below:     Medical Necessity is demonstrated by the following  Occupational Profile/History  Co-morbidities and personal factors that may impact the plan of care [x] LOW: Brief chart review  [] MODERATE: Expanded chart review   [] HIGH: Extensive chart review    Moderate / High Support Documentation: reviewed referral,  Imaging, op report     Examination  Performance deficits relating to physical, cognitive or psychosocial skills that result in activity limitations and/or participation restrictions  [] LOW: addressing 1-3 Performance deficits  [] MODERATE: 3-5 Performance deficits  [x] HIGH: 5+ Performance deficits (please support below)    Moderate / High Support Documentation:     Physical:  Joint Mobility  Muscle Power/Strength  Muscle Endurance   Strength  Pinch Strength  Fine Motor Coordination  Tactile Functions  Pain  paresthesias    Cognitive:  No Deficits    Psychosocial:    No Deficits     Treatment Options [] LOW: Limited options  [x] MODERATE: Several options  [] HIGH: Multiple options      Decision Making/ Complexity Score: low       The following  goals were discussed with the patient and patient is in agreement with them as to be addressed in the treatment plan.     Goals:   Short Term Goals: (4 weeks)  1. Pt will be independent with HEP in 2 visits.   2. Pt will smoother quality of motion when performing AROM exercises, evidencing improved muscle strength and endurance.      Long Term Goals: (by discharge)  1. Pt will report decreased pain to 1-2/10 with ADLs.    2. Pt will exhibit 4+/5 MMT throughout L UE to enable return to work tasks   3. Pt will exhibit R  strength of 68-75# of R hand to allow a firm grasp on tools, enable lifting, etc.  4. Pt will increase R functional pinch strength by 5-10#  to allow writing,opening containers, and turning keys.  5. Pt will exhibit a significant increase (16+ points) in the FOTO assessment.   6. Pt will return to PLOF. m      Plan   Certification Period/Plan of care expiration: 5/2/2024 to 7/31/24.    Outpatient Occupational Therapy 2 times weekly for 8 weeks to include the following interventions:     Modalities to decrease pain (moist heat, paraffin, fluidotherapy, US ),  soft tissue mobilization, manual therapy/joint mobilizations,A/PROM, therapeutic exercises/activities, strengthening, desensitization/sensory re-education, orthotic fitting/fabrication/training PRN, joint protections/energry conservation/adaptive equipment/activity modification  HEP/education as well as any other treatments deemed necessary based on the patient's needs or progress.    Updates Next Visit: review HEP, initiate light functional exercises,     CHANDRIKA Garcia, WILFRIDOT

## 2024-05-03 ENCOUNTER — OFFICE VISIT (OUTPATIENT)
Dept: OPTOMETRY | Facility: CLINIC | Age: 59
End: 2024-05-03
Payer: COMMERCIAL

## 2024-05-03 ENCOUNTER — LAB VISIT (OUTPATIENT)
Dept: LAB | Facility: HOSPITAL | Age: 59
End: 2024-05-03
Attending: FAMILY MEDICINE
Payer: COMMERCIAL

## 2024-05-03 DIAGNOSIS — M35.01 KERATITIS SICCA, LEFT EYE: Primary | ICD-10-CM

## 2024-05-03 DIAGNOSIS — R41.89 COGNITIVE CHANGES: ICD-10-CM

## 2024-05-03 DIAGNOSIS — R27.8 SENSORY ATAXIA: ICD-10-CM

## 2024-05-03 DIAGNOSIS — H04.123 DRY EYE SYNDROME OF BILATERAL LACRIMAL GLANDS: ICD-10-CM

## 2024-05-03 LAB — VIT B12 SERPL-MCNC: 821 PG/ML (ref 210–950)

## 2024-05-03 PROCEDURE — 82652 VIT D 1 25-DIHYDROXY: CPT | Performed by: FAMILY MEDICINE

## 2024-05-03 PROCEDURE — 82607 VITAMIN B-12: CPT | Performed by: FAMILY MEDICINE

## 2024-05-03 PROCEDURE — 99213 OFFICE O/P EST LOW 20 MIN: CPT | Mod: S$GLB,,,

## 2024-05-03 PROCEDURE — 84630 ASSAY OF ZINC: CPT | Performed by: FAMILY MEDICINE

## 2024-05-03 PROCEDURE — 99999 PR PBB SHADOW E&M-EST. PATIENT-LVL III: CPT | Mod: PBBFAC,,,

## 2024-05-03 RX ORDER — PREDNISOLONE ACETATE 10 MG/ML
1 SUSPENSION/ DROPS OPHTHALMIC 3 TIMES DAILY
Qty: 5 ML | Refills: 0 | Status: SHIPPED | OUTPATIENT
Start: 2024-05-03 | End: 2024-05-10

## 2024-05-03 NOTE — PROGRESS NOTES
HPI    Pain OS    Pt sts he woke up this am and feels FBS in inner corner OS. Pt sts it   makes VA blurry and is not feeling right. Pt does have dry eyes but feels   different. Pt denies any use of gtt this am.   Last edited by Erin Wilks on 5/3/2024  8:42 AM.            Assessment /Plan     For exam results, see Encounter Report.    Keratitis sicca, left eye  -     prednisoLONE acetate (PRED FORTE) 1 % DrpS; Place 1 drop into the left eye 3 (three) times daily. for 7 days  Dispense: 5 mL; Refill: 0    Dry eye syndrome of bilateral lacrimal glands      1-2. Mild keratitis OS with 2+ SPK nasal/inferior nasal. No foreign body on lid eversion. Mild SPK present OD. Ed pt on all findings and recommended use of artificial tears TID-QID daily along with gel drop/kortney QHS. Caution on ceiling fans and vents. Rx'd Pred Forte to use TID OS x 7 days. Ed pt that ocular steroids cannot be used long term due to potential for ocular complications. Ed pt to RTC if symptoms worsen or fail to resolve.    RTC: as scheduled for annual, sooner prn.

## 2024-05-06 LAB
1,25(OH)2D3 SERPL-MCNC: 38 PG/ML (ref 20–79)
ZINC SERPL-MCNC: 96 UG/DL (ref 60–130)

## 2024-05-07 ENCOUNTER — OFFICE VISIT (OUTPATIENT)
Dept: PHYSICAL MEDICINE AND REHAB | Facility: CLINIC | Age: 59
End: 2024-05-07
Payer: COMMERCIAL

## 2024-05-07 ENCOUNTER — PATIENT MESSAGE (OUTPATIENT)
Dept: PHYSICAL MEDICINE AND REHAB | Facility: CLINIC | Age: 59
End: 2024-05-07

## 2024-05-07 ENCOUNTER — TELEPHONE (OUTPATIENT)
Dept: GASTROENTEROLOGY | Facility: CLINIC | Age: 59
End: 2024-05-07
Payer: COMMERCIAL

## 2024-05-07 VITALS
HEART RATE: 75 BPM | SYSTOLIC BLOOD PRESSURE: 142 MMHG | DIASTOLIC BLOOD PRESSURE: 88 MMHG | WEIGHT: 307.44 LBS | BODY MASS INDEX: 44.11 KG/M2

## 2024-05-07 DIAGNOSIS — R26.9 NEUROLOGIC GAIT DYSFUNCTION: ICD-10-CM

## 2024-05-07 DIAGNOSIS — R47.01 EXPRESSIVE APHASIA: ICD-10-CM

## 2024-05-07 DIAGNOSIS — N31.9 NEUROGENIC BLADDER: ICD-10-CM

## 2024-05-07 DIAGNOSIS — R29.898 WEAKNESS OF BOTH LOWER EXTREMITIES: ICD-10-CM

## 2024-05-07 DIAGNOSIS — S14.105A C5-C7 LEVEL SPINAL CORD INJURY, INITIAL ENCOUNTER: Primary | ICD-10-CM

## 2024-05-07 DIAGNOSIS — R53.82 CHRONIC FATIGUE: ICD-10-CM

## 2024-05-07 DIAGNOSIS — G82.50 SPASTIC QUADRIPARESIS: ICD-10-CM

## 2024-05-07 DIAGNOSIS — Z98.1 S/P CERVICAL SPINAL FUSION: ICD-10-CM

## 2024-05-07 DIAGNOSIS — F34.1 PERSISTENT DEPRESSIVE DISORDER: ICD-10-CM

## 2024-05-07 PROCEDURE — 99417 PROLNG OP E/M EACH 15 MIN: CPT | Mod: S$GLB,,, | Performed by: PHYSICAL MEDICINE & REHABILITATION

## 2024-05-07 PROCEDURE — 99205 OFFICE O/P NEW HI 60 MIN: CPT | Mod: S$GLB,,, | Performed by: PHYSICAL MEDICINE & REHABILITATION

## 2024-05-07 PROCEDURE — 99999 PR PBB SHADOW E&M-EST. PATIENT-LVL V: CPT | Mod: PBBFAC,,, | Performed by: PHYSICAL MEDICINE & REHABILITATION

## 2024-05-07 RX ORDER — BACLOFEN 5 MG/1
5 TABLET ORAL 4 TIMES DAILY
Qty: 120 TABLET | Refills: 11 | Status: SHIPPED | OUTPATIENT
Start: 2024-05-07 | End: 2024-05-13

## 2024-05-07 NOTE — PROGRESS NOTES
"[facility] VISIT     Consulting Provider: Dr. Wali Zuniga  Primary Care Provider: LORETTA Domingo MD     CHIEF COMPLAINT:   1.   Chief Complaint   Patient presents with    Spinal Cord Injury      Hurting in his legs and arms      2. Mobility and function management recommendations.     HISTORY OF PRESENT ILLNESS: García Tang is a 58 y.o.-year-old male with a history of spinal cord injury due to cervical myelopathy s/p C5-6 ACDF with late effect spastic quadriparesis, neurogenic bowel, gait dysfunction, fatigue who presents today for evaluation and recommendations.     Reports,   Mobility/function: limited ambulation due to difficulty with response of legs and fatigues/exhausts easily, feels like his leg are heavier than normal   Dysphagia: chokes with thin liquids at times  Pain: electrical shocks down arms and legs, neck (anterior and posterior)  Skin: no concerns  Bowel: BM daily, constipation; manages with probiotics   - no GI planned follow ups  Bladder: burning after urination, has issues emptying her bladder at times especially in the morning time   - no Urology planned follow ups    Medications: tylenol  - trialed gabapentin but had night terrors  Injections: none  Surgical procedures:   - C5/6 ACDF 02/012/2024    EQUIPMENT:  Braces: none  Wheelchair: none  Walker: rolling, does use    MOBILITY/TRANSFERS:  Walking: Distance can walk 1/2 mile but is limited by pain and instability in legs     COGNITION:  Speech: expressive, word finding  Memory: short term issues  Processing: "looses train of thought"  Mood: depressed, pre-morbid managed with exercise/jogging    ACTIVITIES OF DAILY LIVING:  Upper extremity dressing: ind  Lower extremity dressing: ind  Bathe: ind  Groom: ind  Toilet: ind    THERAPY/LOCATION:  PT: outpatient 2x/wk  OT: none currently  Speech: none currently    EDUCATION/VOCATION:  Highest level of education: some college  Occupation status: " insurance, 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    RECREATION: exercise/jogging, basketball    LIVING SITUATION: lives with wife, daughter (total of 4 kids); single story home threshold to enter, walk in shower and tub shower combo with rail    PAST MEDICAL HISTORY:  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    Diverticulitis     diverticulitis    Diverticulosis     Essential hypertension 07/18/2016    Fatty liver disease, nonalcoholic 04/07/2016    [place], 2006     GERD (gastroesophageal reflux disease)     Headache     HLD (hyperlipidemia)     Kidney stone     requiring stent    Meniere's disease 01/09/2018    Morbid obesity 05/29/2014    NAFLD (nonalcoholic fatty liver disease)     Near syncope 05/23/2017    Obesity hypoventilation syndrome 02/22/2016    PAD (peripheral artery disease) 09/17/2019    Mild 9/2018    Prostatitis     Rectal bleeding     diverticulitis    Sleep disorder breathing 2/12/2024    Thyroid nodule 07/14/2022        PAST SURGICAL HISTORY:   Past Surgical History:   Procedure Laterality Date    ANGIOGRAM, CORONARY, WITH LEFT HEART CATHETERIZATION  10/26/2023    Procedure: Left Heart Cath;  Surgeon: Sal Salazar MD;  Location: [facility] CATH;  Service: Cardiology;;    ANTERIOR CERVICAL DISCECTOMY W/ FUSION N/A 2/12/2024    Procedure: DISCECTOMY, SPINE, CERVICAL, ANTERIOR APPROACH, WITH FUSION C5-6;  Surgeon: Wali Zuniga MD;  Location: [facility] OR;  Service: Neurosurgery;  Laterality: N/A;    CARDIAC CATHETERIZATION      COLONOSCOPY  2008    diverticulosis per patient report    CORONARY ANGIOGRAPHY  10/26/2023    Procedure: Coronary angiogram study;  Surgeon: Sal Salazar MD;  Location: [facility] CATH;  Service: Cardiology;;    ESOPHAGOGASTRODUODENOSCOPY N/A 12/18/2023    Procedure: EGD (ESOPHAGOGASTRODUODENOSCOPY);  Surgeon: Aram Bermudez MD;  Location: [facility]  ENDO;  Service: Gastroenterology;  Laterality: N/A;    kidney stent      KIDNEY SURGERY      right kidney obstruction requiring stent-open    MAGNETIC RESONANCE IMAGING N/A 2024    Procedure: MRI (MAGNETIC RESONANCE IMAGING);  Surgeon: Provider, [facility];  Location: [facility];  Service: General;  Laterality: N/A;    NOSE SURGERY      UPPER GASTROINTESTINAL ENDOSCOPY      [facility], gastritis per patient report        FAMILY HISTORY:   Family History   Problem Relation Name Age of Onset    Liver disease Mother          d.    Cirrhosis Mother      Hypertension Mother      Hyperlipidemia Mother      Colon cancer Father  70    Cancer Father      Diabetes Father      Hypertension Father      Glaucoma Father      Hyperlipidemia Father      Prostate cancer Father      Diabetes Brother      Hypertension Brother      Thyroid cancer Brother      Heart attack Maternal Uncle      Heart attack Maternal Uncle      Heart disease Neg Hx      Heart failure Neg Hx      Macular degeneration Neg Hx      Retinal detachment Neg Hx      Crohn's disease Neg Hx      Ulcerative colitis Neg Hx      Stomach cancer Neg Hx      Esophageal cancer Neg Hx         SOCIAL HISTORY:    Social History     Socioeconomic History    Marital status:    Tobacco Use    Smoking status: Former     Current packs/day: 0.00     Types: Cigarettes     Quit date: 1990     Years since quittin.3    Smokeless tobacco: Never    Tobacco comments:     social   Substance and Sexual Activity    Alcohol use: No     Comment: stopped in     Drug use: No    Sexual activity: Yes     Partners: Female     Social Determinants of Health     Financial Resource Strain: Patient Declined (2024)    Overall Financial Resource Strain (CARDIA)     Difficulty of Paying Living Expenses: Patient declined   Food Insecurity: Unknown (2024)    Hunger Vital Sign     Worried About Running Out of Food in the Last Year: Never true     Ran Out  of Food in the Last Year: Patient declined   Transportation Needs: Unknown (2/22/2024)    PRAPARE - Transportation     Lack of Transportation (Medical): No     Lack of Transportation (Non-Medical): Patient declined   Physical Activity: Unknown (2/22/2024)    Exercise Vital Sign     Days of Exercise per Week: Patient declined     Minutes of Exercise per Session: 0 min   Recent Concern: Physical Activity - Inactive (1/24/2024)    Exercise Vital Sign     Days of Exercise per Week: 0 days     Minutes of Exercise per Session: 0 min   Stress: Patient Declined (2/22/2024)    [employer] - Occupational Stress Questionnaire     Feeling of Stress : Patient declined   Housing Stability: Patient Declined (2/22/2024)    Housing Stability Vital Sign     Unable to Pay for Housing in the Last Year: Patient declined     Number of Places Lived in the Last Year: 1     Unstable Housing in the Last Year: Patient declined       MEDICATIONS:     Current Outpatient Medications:     acetaminophen (TYLENOL) 500 MG tablet, Take 1,000 mg by mouth every 6 (six) hours as needed for Pain., Disp: , Rfl:     amLODIPine (NORVASC) 5 MG tablet, Take 1 tablet (5 mg total) by mouth every evening., Disp: 90 tablet, Rfl: 1    ascorbic acid (VITAMIN C ORAL), Take 2 tablets by mouth once daily., Disp: , Rfl:     baclofen (LIORESAL) 5 mg Tab tablet, Take 1 tablet (5 mg total) by mouth 4 (four) times daily., Disp: 120 tablet, Rfl: 11    cholecalciferol, vitamin D3, 125 mcg (5,000 unit) Tab, Take 10,000 Units by mouth once daily., Disp: , Rfl:     famotidine (PEPCID) 20 MG tablet, Take 40 mg by mouth Daily., Disp: , Rfl:     Lactobacillus acidophilus (ACIDOPHILUS ORAL), Take 3 tablets by mouth once daily., Disp: , Rfl:     losartan (COZAAR) 100 MG tablet, Take 1 tablet (100 mg total) by mouth once daily., Disp: 90 tablet, Rfl: 1    magnesium oxide (MAG-OX) 400 mg (241.3 mg magnesium) tablet, Take 400 mg by mouth once daily., Disp:  , Rfl:     metoprolol succinate (TOPROL-XL) 50 MG 24 hr tablet, Take 1 tablet (50 mg total) by mouth once daily., Disp: 90 tablet, Rfl: 3    NIACIN ORAL, Take 1 tablet by mouth once daily., Disp: , Rfl:     oxyCODONE-acetaminophen (PERCOCET) 7.5-325 mg per tablet, Take 1 tablet by mouth every 6 (six) hours as needed for Pain., Disp: 28 tablet, Rfl: 0    pantoprazole (PROTONIX) 40 MG tablet, Take 1 tablet (40 mg total) by mouth once daily., Disp: 90 tablet, Rfl: 1    prednisoLONE acetate (PRED FORTE) 1 % DrpS, Place 1 drop into the left eye 3 (three) times daily. for 7 days, Disp: 5 mL, Rfl: 0    spironolactone (ALDACTONE) 25 MG tablet, Take 0.5 tablets (12.5 mg total) by mouth once daily., Disp: 45 tablet, Rfl: 1    tiZANidine (ZANAFLEX) 4 MG tablet, Take 1 tablet (4 mg total) by mouth every 8 (eight) hours as needed (muscle spasms)., Disp: 60 tablet, Rfl: 0    zinc gluconate 50 mg tablet, Take 50 mg by mouth once daily., Disp: , Rfl:      ALLERGIES:   Review of patient's allergies indicates:   Allergen Reactions    Lisinopril Swelling    Iodinated contrast media Other (See Comments)     Spasms, tremor, and high blood pressure    Bactrim  [sulfamethoxazole-trimethoprim]      Other reaction(s): Unknown    Diflucan [fluconazole]      Heart racing    Gabapentin Other (See Comments)     nightmares    Hydroxyzine hcl      Other reaction(s): elevated bp    Kenalog [triamcinolone acetonide] Other (See Comments)     Heart flutters all day    Phenergan [promethazine] Anxiety    Zofran [ondansetron hcl (pf)] Anxiety       REVIEW OF SYSTEMS:   Denies coughs, colds, fevers, chills. + constipation. Bowel movements are regular. + dysphagia. Poor sleep. + depression. No drooling or difficulty handling oral secretions. No skin lesions.     PHYSICAL EXAMINATION:   VITALS:   Vitals:    05/07/24 1417   BP: (!) 142/88   Pulse: 75       GENERAL: The patient is awake, alert, cooperative, and in no acute distress. Flat affect  HEENT:  Normocephalic, atraumatic. Tracking is in all 4 quadrants. No facial asymmetry. Uvula is midline.   NECK: Supple. No lymphadenopathy. No masses. Full range of motion. No torticollis.   HEART: Appears well perfused. No lower extremity edema.   LUNGS: No increased work of breath.   ABDOMEN: Benign.   NEURO: Cranial nerves II-XII are grossly intact by observation.   EXTREMITIES: Warm, capillary refill less than 2 seconds. No clubbing, cyanosis or edema.   MUSCULOSKELETAL: No focal muscular/limb atrophy/hypertrophy. No leg length discrepancy. No gross deformity.     NEUROMUSCULAR:   Modified Neptali Scale:  1: justin wrist flexors  1+: justin knee flexors, justin ankle plantarflexors  2:   3:  4:     Manual muscle testing:      Right  Left  Elbow flexion   4/5  4/5  Elbow extension  5/5  4/5  Wrist extension  5/5  5/5  Finger flexion   5/5  5/5  Finger abduction  5/5  4/5    Hip flexion   3/5  3/5  Knee extension  4/5  3/5  Ankle dorsiflexion  4/5  3/5  Ankle plantarflexion  4/5  4/5  Great toe extension  4/5  3/5    Appropriate sitting balance.   No dyskinetic or dystonic movements appreciated.   Muscle stretch reflexes are 2+ throughout both upper and lower extremities.   Catch and release clonus at bilateral ankles. No wrist clonus    GAIT/DYNAMIC: deliberate short steppage gait at first with stride lengthening with progression, poor BUE arm swing.     Transfers from supine to sit and sit to stand are independent.       EDX 04/16/2024 with noted: length-dependent axonal sensorimotor peripheral neuropathy.     ASSESSMENT:   1. C5-C7 level spinal cord injury, initial encounter        2. Spastic quadriparesis  Ambulatory referral/consult to Physical/Occupational Therapy    Ambulatory referral/consult to Physical/Occupational Therapy    Ambulatory referral/consult to Speech Therapy    Ambulatory referral/consult to Psychology    Ambulatory referral/consult to Urology      3. S/P cervical spinal fusion  Ambulatory  referral/consult to Physical Medicine Rehab    Ambulatory referral/consult to Physical/Occupational Therapy    Ambulatory referral/consult to Physical/Occupational Therapy    Ambulatory referral/consult to Speech Therapy    Ambulatory referral/consult to Psychology    Ambulatory referral/consult to Urology      4. Neurologic gait dysfunction        5. Weakness of both lower extremities  Ambulatory referral/consult to Physical Medicine Rehab    Ambulatory referral/consult to Physical/Occupational Therapy    Ambulatory referral/consult to Physical/Occupational Therapy      6. Neurogenic bladder  Ambulatory referral/consult to Urology      7. Persistent depressive disorder  Ambulatory referral/consult to Psychology      8. Expressive aphasia  Ambulatory referral/consult to Speech Therapy      9. Chronic fatigue  TESTOSTERONE PANEL        García Tang is a 58 y.o.-year-old male with a history of spinal cord injury due to cervical myelopathy s/p C5-6 ACDF with late effect spastic quadriparesis, neurogenic bowel, gait dysfunction, fatigue. The following recommendations and plan were discussed in depth with the patient who voiced understanding and was in agreement.     PLAN:   Spasticity:   - - we discussed options for management at this time are with oral medication and intrathecal baclofen therapy as follows:   - trialed oral baclofen 5 mg three times daily (4th dose written he can use to double dose to 10 mg at night); REPORTED INCREASED LETHARGY; D/C AND STARTED ON DANTROLENE 25MG THREE TIMES DAILY  - we will see how he responds to oral baclofen prior to considering ITB vs focal treatment options.     Pain (MSK/Neuropathic):   - poor response to previous trial to gabapentin  - we discussed considerations for trial of Lyrica, for which we will wait and see his response to baclofen first    Fatigue:  - work up previously without noted thyroid dysfunction  - we will get testosterone panel to assess for levels to help  explain chronic fatigue  - consider repeating sleep study, previous did not show CHANELL but this was prior to his SCI    Mental health:  - pre-morbid depression with exacerbation since SCI, referral placed for Psychology evaluation and management    Bracing:    - no needs at this time    Equipment:   - no needs at this time    Bowel:  - chronic constipation, no concerns at this time    Bladder:   - neurogenic bladder, referral placed for urology with need for yearly U/S renal studies    Therapy:   - PT: order placed for neuro at [facility]  - OT: order placed for neuro at [facility]  - ST: order placed for neuro at [facility]    I would like to have him return to clinic in 1 month.     A copy of today's visit will be made available to Dr. Wali Zuniga, consulting provider.     90 minutes of total time spent on the encounter, which includes face to face time and non-face to face time preparing to see the patient (eg, review of tests), obtaining and/or reviewing separately obtained history, documenting clinical information in the electronic or other health record, independently interpreting results (not separately reported), communicating results to the patient/family/caregiver, and/or care coordination (not separately reported).

## 2024-05-08 ENCOUNTER — HOSPITAL ENCOUNTER (OUTPATIENT)
Dept: RADIOLOGY | Facility: HOSPITAL | Age: 59
Discharge: HOME | End: 2024-05-08
Attending: FAMILY MEDICINE
Payer: COMMERCIAL

## 2024-05-08 ENCOUNTER — TELEPHONE (OUTPATIENT)
Dept: FAMILY MEDICINE | Facility: CLINIC | Age: 59
End: 2024-05-08

## 2024-05-08 ENCOUNTER — OFFICE VISIT (OUTPATIENT)
Dept: FAMILY MEDICINE | Facility: CLINIC | Age: 59
End: 2024-05-08
Payer: COMMERCIAL

## 2024-05-08 ENCOUNTER — OFFICE VISIT (OUTPATIENT)
Dept: UROLOGY | Facility: CLINIC | Age: 59
End: 2024-05-08
Payer: COMMERCIAL

## 2024-05-08 VITALS — BODY MASS INDEX: 44.25 KG/M2 | WEIGHT: 309.06 LBS | HEIGHT: 70 IN

## 2024-05-08 VITALS
BODY MASS INDEX: 44.53 KG/M2 | DIASTOLIC BLOOD PRESSURE: 98 MMHG | HEIGHT: 70 IN | HEART RATE: 82 BPM | WEIGHT: 311.06 LBS | OXYGEN SATURATION: 96 % | SYSTOLIC BLOOD PRESSURE: 130 MMHG

## 2024-05-08 DIAGNOSIS — Z98.1 S/P CERVICAL SPINAL FUSION: ICD-10-CM

## 2024-05-08 DIAGNOSIS — G82.50 SPASTIC QUADRIPARESIS: ICD-10-CM

## 2024-05-08 DIAGNOSIS — R06.02 SOB (SHORTNESS OF BREATH): Primary | ICD-10-CM

## 2024-05-08 DIAGNOSIS — R06.02 SOB (SHORTNESS OF BREATH): ICD-10-CM

## 2024-05-08 DIAGNOSIS — N31.9 NEUROGENIC BLADDER: ICD-10-CM

## 2024-05-08 LAB
BILIRUBIN, UA POC OHS: NEGATIVE
BLOOD, UA POC OHS: NEGATIVE
CLARITY, UA POC OHS: CLEAR
COLOR, UA POC OHS: YELLOW
GLUCOSE, UA POC OHS: NEGATIVE
KETONES, UA POC OHS: NEGATIVE
LEUKOCYTES, UA POC OHS: NEGATIVE
NITRITE, UA POC OHS: NEGATIVE
PH, UA POC OHS: 5
POC RESIDUAL URINE VOLUME: 53 ML (ref 0–100)
PROTEIN, UA POC OHS: NEGATIVE
SPECIFIC GRAVITY, UA POC OHS: >=1.03
UROBILINOGEN, UA POC OHS: 0.2

## 2024-05-08 PROCEDURE — 51798 US URINE CAPACITY MEASURE: CPT | Mod: S$GLB,,, | Performed by: UROLOGY

## 2024-05-08 PROCEDURE — 71046 X-RAY EXAM CHEST 2 VIEWS: CPT | Mod: 26,,, | Performed by: STUDENT IN AN ORGANIZED HEALTH CARE EDUCATION/TRAINING PROGRAM

## 2024-05-08 PROCEDURE — 99213 OFFICE O/P EST LOW 20 MIN: CPT | Mod: S$GLB,,, | Performed by: FAMILY MEDICINE

## 2024-05-08 PROCEDURE — 81003 URINALYSIS AUTO W/O SCOPE: CPT | Mod: QW,S$GLB,, | Performed by: UROLOGY

## 2024-05-08 PROCEDURE — 99214 OFFICE O/P EST MOD 30 MIN: CPT | Mod: 25,S$GLB,, | Performed by: UROLOGY

## 2024-05-08 PROCEDURE — 99999 PR PBB SHADOW E&M-EST. PATIENT-LVL IV: CPT | Mod: PBBFAC,,, | Performed by: FAMILY MEDICINE

## 2024-05-08 PROCEDURE — 71046 X-RAY EXAM CHEST 2 VIEWS: CPT | Mod: TC,FY,PO

## 2024-05-08 PROCEDURE — 99999 PR PBB SHADOW E&M-EST. PATIENT-LVL III: CPT | Mod: PBBFAC,,, | Performed by: UROLOGY

## 2024-05-08 RX ORDER — TAMSULOSIN HYDROCHLORIDE 0.4 MG/1
0.4 CAPSULE ORAL DAILY
Qty: 30 CAPSULE | Refills: 11 | Status: SHIPPED | OUTPATIENT
Start: 2024-05-08 | End: 2025-05-08

## 2024-05-08 NOTE — TELEPHONE ENCOUNTER
A PFT test was ordered by Dr. Domingo today. Can someone please contact the patient to schedule please?

## 2024-05-08 NOTE — PROGRESS NOTES
Subjective:       Patient ID: García Tang is a 58 y.o. male.    Chief Complaint: Shortness of Breath    Pt is known to me.  The pt reports recent MORAN--he has been sedentary for the last several months before and after spine surgery.  He had a clear cardiac angiogram last fall as well as a normal echo.  His most recent chest xray was clear.  He did pool therapy for 6 weeks after surgery then he started PT here.  He does not get significantly SOB with those exercises.  The last few days the SOB is usually after walking even short distances.  He had same last year and had a VV with pulmonary--he did not get the studies done.    Shortness of Breath      Review of Systems   Respiratory:  Positive for shortness of breath.        Objective:      Physical Exam  Constitutional:       Appearance: He is well-developed. He is obese.   HENT:      Head: Normocephalic.   Eyes:      Conjunctiva/sclera: Conjunctivae normal.      Pupils: Pupils are equal, round, and reactive to light.   Neck:      Thyroid: No thyromegaly.   Cardiovascular:      Rate and Rhythm: Normal rate and regular rhythm.      Heart sounds: Normal heart sounds.   Pulmonary:      Effort: Pulmonary effort is normal.      Breath sounds: Normal breath sounds.   Abdominal:      General: Bowel sounds are normal.      Palpations: Abdomen is soft.      Tenderness: There is no abdominal tenderness.   Musculoskeletal:         General: No tenderness or deformity. Normal range of motion.      Cervical back: Normal range of motion and neck supple.   Lymphadenopathy:      Cervical: No cervical adenopathy.   Skin:     General: Skin is warm and dry.   Neurological:      Mental Status: He is alert and oriented to person, place, and time.      Cranial Nerves: No cranial nerve deficit.      Motor: No abnormal muscle tone.      Coordination: Coordination normal.      Deep Tendon Reflexes: Reflexes normal.   Psychiatric:         Behavior: Behavior normal.         Assessment:     "   1. SOB (shortness of breath)        Plan:       García Escudero" was seen today for shortness of breath.    Diagnoses and all orders for this visit:    SOB (shortness of breath)  -     Six Minute Walk Test to qualify for Home Oxygen; Future  -     Complete PFT w/ bronchodilator; Future  -     X-Ray Chest PA And Lateral; Future      During this visit, I reviewed the pt's history, medications, allergies, and problem list.        "

## 2024-05-08 NOTE — PROGRESS NOTES
Subjective:       Patient ID: García Tang is a 58 y.o. male.    Chief Complaint: neurogenic bladder    HPI    58-year-old with a history of BPH.  He is recently being evaluated by Dr. Richardson.  He underwent cystoscopy last year which was unremarkable.  Recently had back surgery and had some residual neuropathy and paresthesia and he has been complaining of dysuria.  No other change in his urinary symptoms.  He says he has occasional weak flow but no change in frequency urgency or nocturia.  He denies urinary incontinence.  He denies gross hematuria.  He has been treated with antibiotics and his symptoms have persisted.  His urinalysis is clear today except for glucose.  Urine dipstick shows negative for all components except glucose.  PVR 53 ml    Review of Systems   Constitutional:  Negative for fever.   Genitourinary:  Negative for dysuria and hematuria.       Objective:      Physical Exam  Vitals reviewed.   Constitutional:       Appearance: He is well-developed.   Pulmonary:      Effort: Pulmonary effort is normal.   Skin:     Findings: No rash.   Neurological:      Mental Status: He is alert and oriented to person, place, and time.         Assessment:       1. Spastic quadriparesis    2. S/P cervical spinal fusion    3. Neurogenic bladder        Plan:       Spastic quadriparesis  -     Ambulatory referral/consult to Urology    S/P cervical spinal fusion  -     Ambulatory referral/consult to Urology    Neurogenic bladder  -     Ambulatory referral/consult to Urology  -     POCT Urinalysis(Instrument)    Other orders  -     tamsulosin (FLOMAX) 0.4 mg Cap; Take 1 capsule (0.4 mg total) by mouth once daily.  Dispense: 30 capsule; Refill: 11      Bladder function mostly unchanged since back surgery.  He empties adequately.  I suspect his symptoms are due to BPH.  I recommend a trial of Flomax.  If he continues to have bothersome dysuria, he will follow-up for repeat cystoscopy.

## 2024-05-13 ENCOUNTER — PATIENT MESSAGE (OUTPATIENT)
Dept: PHYSICAL MEDICINE AND REHAB | Facility: CLINIC | Age: 59
End: 2024-05-13
Payer: COMMERCIAL

## 2024-05-13 RX ORDER — DANTROLENE SODIUM 25 MG/1
25 CAPSULE ORAL 3 TIMES DAILY
Qty: 90 CAPSULE | Refills: 2 | Status: SHIPPED | OUTPATIENT
Start: 2024-05-13 | End: 2024-08-11

## 2024-05-13 NOTE — TELEPHONE ENCOUNTER
Please change prescription d/t pt feeling groggy while on baclofen and needing to work throughout the day.     Keila

## 2024-05-14 ENCOUNTER — PATIENT MESSAGE (OUTPATIENT)
Dept: FAMILY MEDICINE | Facility: CLINIC | Age: 59
End: 2024-05-14
Payer: COMMERCIAL

## 2024-05-16 ENCOUNTER — PATIENT MESSAGE (OUTPATIENT)
Dept: NEUROSURGERY | Facility: CLINIC | Age: 59
End: 2024-05-16
Payer: COMMERCIAL

## 2024-05-16 ENCOUNTER — NURSE TRIAGE (OUTPATIENT)
Dept: ADMINISTRATIVE | Facility: CLINIC | Age: 59
End: 2024-05-16
Payer: COMMERCIAL

## 2024-05-16 NOTE — TELEPHONE ENCOUNTER
Pt calling with c/o right leg pain and swelling and pt said that he made an appt but wasn't sure if he needed to go or the ED Pain has been there for 4 days but thought that it would get better and it hasn't. Pt triaged and care advice to go to the ED now. Pt told to call back once back home if any other questions concerns or worsening.Pt will drive himself to the ED.            Reason for Disposition   [1] Can't walk or can barely walk AND [2] new-onset    Additional Information   Negative: SEVERE difficulty breathing (e.g., struggling for each breath, speaks in single words)   Negative: Looks like a broken bone or dislocated joint (e.g., crooked or deformed)   Negative: Sounds like a life-threatening emergency to the triager   Negative: Difficulty breathing at rest   Negative: Entire foot is cool or blue in comparison to other side    Protocols used: Leg Swelling and Edema-A-AH

## 2024-05-17 ENCOUNTER — PATIENT MESSAGE (OUTPATIENT)
Dept: PHYSICAL MEDICINE AND REHAB | Facility: CLINIC | Age: 59
End: 2024-05-17
Payer: COMMERCIAL

## 2024-05-20 ENCOUNTER — OFFICE VISIT (OUTPATIENT)
Dept: FAMILY MEDICINE | Facility: CLINIC | Age: 59
End: 2024-05-20
Payer: COMMERCIAL

## 2024-05-20 ENCOUNTER — HOSPITAL ENCOUNTER (OUTPATIENT)
Dept: RADIOLOGY | Facility: HOSPITAL | Age: 59
Discharge: HOME | End: 2024-05-20
Attending: PHYSICIAN ASSISTANT
Payer: COMMERCIAL

## 2024-05-20 VITALS
WEIGHT: 310.44 LBS | DIASTOLIC BLOOD PRESSURE: 82 MMHG | BODY MASS INDEX: 44.54 KG/M2 | HEART RATE: 71 BPM | SYSTOLIC BLOOD PRESSURE: 130 MMHG | OXYGEN SATURATION: 98 %

## 2024-05-20 DIAGNOSIS — E11.9 TYPE 2 DIABETES MELLITUS WITHOUT COMPLICATION, WITHOUT LONG-TERM CURRENT USE OF INSULIN: ICD-10-CM

## 2024-05-20 DIAGNOSIS — M25.571 ACUTE RIGHT ANKLE PAIN: ICD-10-CM

## 2024-05-20 DIAGNOSIS — M25.571 ACUTE RIGHT ANKLE PAIN: Primary | ICD-10-CM

## 2024-05-20 PROCEDURE — 99999 PR PBB SHADOW E&M-EST. PATIENT-LVL V: CPT | Mod: PBBFAC,,, | Performed by: PHYSICIAN ASSISTANT

## 2024-05-20 PROCEDURE — 73610 X-RAY EXAM OF ANKLE: CPT | Mod: TC,FY,PO,RT

## 2024-05-20 PROCEDURE — 73610 X-RAY EXAM OF ANKLE: CPT | Mod: 26,RT,, | Performed by: RADIOLOGY

## 2024-05-20 PROCEDURE — 99213 OFFICE O/P EST LOW 20 MIN: CPT | Mod: S$GLB,,, | Performed by: PHYSICIAN ASSISTANT

## 2024-05-20 NOTE — PROGRESS NOTES
Subjective:      Patient ID: García Tang is a 58 y.o. male.    Chief Complaint: Ankle Pain (Right ankle pain and swelling for about a week)    HPI  Patient has PMH of cerebral aneurysm with cognitive communication deficit, anxiety, dysthymia, meniere's disease, HTN, dyslipidemia, arteriosclerotic cardiovascular disease, Type 2 DM, and GERD.  Had spinal cord compression    Patient went to [facility] ED [facility]/16/2024 with RLE pain and swelling.  Had clear ultrasound of right LE veins.    Patient reports right ankle pain that worsens after first walking for two weeks after walking dog.  Used compression sleeve and tylenol without relief.    Patient is trying to manage this with diet.  Lab Results   Component Value Date    HGBA1C 6.7 (H) 04/29/2024      Review of Systems   Respiratory:  Negative for shortness of breath.    Cardiovascular:  Negative for chest pain.   Musculoskeletal:  Positive for arthralgias.       Objective:   /82   Pulse 71   Wt (!) 140.8 kg (310 lb 6.5 oz)   SpO2 98%   BMI 44.54 kg/m²     Physical Exam  Vitals reviewed.   Constitutional:       Appearance: Normal appearance. He is well-developed.   HENT:      Right Ear: External ear normal.      Left Ear: External ear normal.   Eyes:      Conjunctiva/sclera: Conjunctivae normal.   Cardiovascular:      Rate and Rhythm: Normal rate and regular rhythm.      Heart sounds: Normal heart sounds. No murmur heard.     No friction rub. No gallop.   Pulmonary:      Effort: Pulmonary effort is normal. No respiratory distress.      Breath sounds: Normal breath sounds. No wheezing, rhonchi or rales.   Musculoskeletal:      Right ankle: No tenderness. Decreased range of motion.   Skin:     General: Skin is warm and dry.      Findings: No rash.   Neurological:      General: No focal deficit present.      Mental Status: He is alert and oriented to person, place, and time.   Psychiatric:         Mood and Affect: Mood normal.         Behavior: Behavior normal.          Judgment: Judgment normal.       Assessment:      1. Acute right ankle pain    2. Type 2 diabetes mellitus without complication, without long-term current use of insulin       Plan:   1. Acute right ankle pain  - X-Ray Ankle Complete Right; Future  - Ambulatory referral/consult to Podiatry; Future    2. Type 2 diabetes mellitus without complication, without long-term current use of insulin  - Hemoglobin A1C; Future  - Comprehensive Metabolic Panel; Future    Follow up in 3 months with Dr. Domingo with bloodwork prior to this visit.  Patient agreed with plan and expressed understanding.    Thank you for allowing me to serve you,

## 2024-05-27 ENCOUNTER — PATIENT MESSAGE (OUTPATIENT)
Dept: NEUROSURGERY | Facility: CLINIC | Age: 59
End: 2024-05-27
Payer: COMMERCIAL

## 2024-05-27 ENCOUNTER — PATIENT MESSAGE (OUTPATIENT)
Dept: PHYSICAL MEDICINE AND REHAB | Facility: CLINIC | Age: 59
End: 2024-05-27
Payer: COMMERCIAL

## 2024-05-27 DIAGNOSIS — Z98.1 S/P CERVICAL SPINAL FUSION: Primary | ICD-10-CM

## 2024-05-30 ENCOUNTER — OFFICE VISIT (OUTPATIENT)
Dept: OTOLARYNGOLOGY | Facility: CLINIC | Age: 59
End: 2024-05-30
Payer: COMMERCIAL

## 2024-05-30 VITALS — BODY MASS INDEX: 44.57 KG/M2 | WEIGHT: 311.31 LBS | HEIGHT: 70 IN

## 2024-05-30 DIAGNOSIS — R13.10 DYSPHAGIA, UNSPECIFIED TYPE: Primary | ICD-10-CM

## 2024-05-30 DIAGNOSIS — Z98.1 S/P CERVICAL SPINAL FUSION: ICD-10-CM

## 2024-05-30 DIAGNOSIS — Z98.890 PERSONAL HISTORY OF SPINE SURGERY: ICD-10-CM

## 2024-05-30 DIAGNOSIS — R49.0 DYSPHONIA: ICD-10-CM

## 2024-05-30 DIAGNOSIS — K21.9 LARYNGOPHARYNGEAL REFLUX (LPR): ICD-10-CM

## 2024-05-30 PROCEDURE — 31575 DIAGNOSTIC LARYNGOSCOPY: CPT | Mod: S$GLB,,, | Performed by: STUDENT IN AN ORGANIZED HEALTH CARE EDUCATION/TRAINING PROGRAM

## 2024-05-30 PROCEDURE — 99999 PR PBB SHADOW E&M-EST. PATIENT-LVL III: CPT | Mod: PBBFAC,,, | Performed by: STUDENT IN AN ORGANIZED HEALTH CARE EDUCATION/TRAINING PROGRAM

## 2024-05-30 PROCEDURE — 99214 OFFICE O/P EST MOD 30 MIN: CPT | Mod: 25,S$GLB,, | Performed by: STUDENT IN AN ORGANIZED HEALTH CARE EDUCATION/TRAINING PROGRAM

## 2024-05-30 RX ORDER — OMEPRAZOLE 40 MG/1
40 CAPSULE, DELAYED RELEASE ORAL DAILY
Qty: 90 CAPSULE | Refills: 3 | Status: SHIPPED | OUTPATIENT
Start: 2024-05-30 | End: 2025-05-30

## 2024-05-30 NOTE — PROGRESS NOTES
Otolaryngology Clinic Note    Subjective:       Patient ID: García Tang is a 58 y.o. male.    Chief Complaint: Dysphagia, Neck Swelling, and Hoarse      History of Present Illness: García Tang is a 58 y.o. male presenting with ACDF 2/12/24. After surgery, was having issues with throat, then got better. 1,5 weeks ago started having pressure on left side of throat, hesistant to swallow, lots of pressure to lay down. Dr. Zuniga office referred here. Last night, started to feel like food was getting stuck in his throat. Was ok this morning but still feels tight. No fevers, no neck swelling. Has been back to work and could be adding pressure to his neck. No ear pain. A couple of times while talking, feels like he is whistling through his mouth. No stridor. Having some short coughs past month. He has decent neck ROM since surgery, has needed to restart muscle relaxant. He has been having hoarseness, woke us this am with hoarseness. No reflux in past few months. He has not been taking protonix.       Past Surgical History:   Procedure Laterality Date    ANGIOGRAM, CORONARY, WITH LEFT HEART CATHETERIZATION  10/26/2023    Procedure: Left Heart Cath;  Surgeon: Sal Salazar MD;  Location: [facility] CATH;  Service: Cardiology;;    ANTERIOR CERVICAL DISCECTOMY W/ FUSION N/A 2/12/2024    Procedure: DISCECTOMY, SPINE, CERVICAL, ANTERIOR APPROACH, WITH FUSION C5-6;  Surgeon: Wali Zuniga MD;  Location: [facility] OR;  Service: Neurosurgery;  Laterality: N/A;    CARDIAC CATHETERIZATION      COLONOSCOPY  2008    diverticulosis per patient report    CORONARY ANGIOGRAPHY  10/26/2023    Procedure: Coronary angiogram study;  Surgeon: Sal Salazar MD;  Location: [facility] CATH;  Service: Cardiology;;    ESOPHAGOGASTRODUODENOSCOPY N/A 12/18/2023    Procedure: EGD (ESOPHAGOGASTRODUODENOSCOPY);  Surgeon: Aram Bermudez MD;  Location: [facility] ENDO;  Service: Gastroenterology;  Laterality: N/A;    kidney stent      KIDNEY SURGERY  1996     right kidney obstruction requiring stent-open    MAGNETIC RESONANCE IMAGING N/A 1/26/2024    Procedure: MRI (MAGNETIC RESONANCE IMAGING);  Surgeon: Provider, [facility];  Location: [facility];  Service: General;  Laterality: N/A;    NOSE SURGERY      UPPER GASTROINTESTINAL ENDOSCOPY  2021    [facility], gastritis per patient report     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    Diverticulitis     diverticulitis    Diverticulosis     Essential hypertension 07/18/2016    Fatty liver disease, nonalcoholic 04/07/2016    [place], 2006     GERD (gastroesophageal reflux disease)     Headache     HLD (hyperlipidemia)     Kidney stone     requiring stent    Meniere's disease 01/09/2018    Morbid obesity 05/29/2014    NAFLD (nonalcoholic fatty liver disease)     Near syncope 05/23/2017    Obesity hypoventilation syndrome 02/22/2016    PAD (peripheral artery disease) 09/17/2019    Mild 9/2018    Prostatitis     Rectal bleeding     diverticulitis    Sleep disorder breathing 2/12/2024    Thyroid nodule 07/14/2022     Social Determinants of Health     Tobacco Use: Medium Risk (5/30/2024)    Patient History     Smoking Tobacco Use: Former     Smokeless Tobacco Use: Never     Passive Exposure: Not on file   Alcohol Use: Patient Declined (2/22/2024)    AUDIT-C     Frequency of Alcohol Consumption: Patient declined     Average Number of Drinks: Patient declined     Frequency of Binge Drinking: Patient declined   Financial Resource Strain: Patient Declined (2/22/2024)    Overall Financial Resource Strain (CARDIA)     Difficulty of Paying Living Expenses: Patient declined   Food Insecurity: Unknown (2/22/2024)    Hunger Vital Sign     Worried About Running Out of Food in the Last Year: Never true     Ran Out of Food in the Last Year: Patient declined   Transportation Needs:  Unknown (2/22/2024)    PRAPARE - Transportation     Lack of Transportation (Medical): No     Lack of Transportation (Non-Medical): Patient declined   Physical Activity: Unknown (2/22/2024)    Exercise Vital Sign     Days of Exercise per Week: Patient declined     Minutes of Exercise per Session: 0 min   Recent Concern: Physical Activity - Inactive (1/24/2024)    Exercise Vital Sign     Days of Exercise per Week: 0 days     Minutes of Exercise per Session: 0 min   Stress: Patient Declined (2/22/2024)    [employer] [place] [employer] - Occupational Stress Questionnaire     Feeling of Stress : Patient declined   Housing Stability: Patient Declined (2/22/2024)    Housing Stability Vital Sign     Unable to Pay for Housing in the Last Year: Patient declined     Number of Places Lived in the Last Year: 1     Unstable Housing in the Last Year: Patient declined   Depression: Low Risk  (2/16/2024)    Depression     Last PHQ-4: Flowsheet Data: 0   Utilities: Not on file   Health Literacy: Not on file   Social Isolation: Not on file     Review of patient's allergies indicates:   Allergen Reactions    Lisinopril Swelling    Iodinated contrast media Other (See Comments)     Spasms, tremor, and high blood pressure    Bactrim  [sulfamethoxazole-trimethoprim]      Other reaction(s): Unknown    Diflucan [fluconazole]      Heart racing    Gabapentin Other (See Comments)     nightmares    Hydroxyzine hcl      Other reaction(s): elevated bp    Kenalog [triamcinolone acetonide] Other (See Comments)     Heart flutters all day    Phenergan [promethazine] Anxiety    Zofran [ondansetron hcl (pf)] Anxiety     Current Outpatient Medications   Medication Instructions    acetaminophen (TYLENOL) 1,000 mg, Oral, Every 6 hours PRN    amLODIPine (NORVASC) 5 mg, Oral, Nightly    ascorbic acid (VITAMIN C ORAL) 2 tablets, Oral, Daily    cholecalciferol (vitamin D3) 10,000 Units, Oral, Daily    dantrolene (DANTRIUM) 25 mg, Oral, 3 times daily     famotidine (PEPCID) 40 mg, Oral, Daily    Lactobacillus acidophilus (ACIDOPHILUS ORAL) 3 tablets, Oral, Daily    losartan (COZAAR) 100 mg, Oral, Daily    magnesium oxide (MAG-OX) 400 mg, Oral, Daily    metoprolol succinate (TOPROL-XL) 50 mg, Oral, Daily    NIACIN ORAL 1 tablet, Oral, Daily    omeprazole (PRILOSEC) 40 mg, Oral, Daily    oxyCODONE-acetaminophen (PERCOCET) 7.5-325 mg per tablet 1 tablet, Oral, Every 6 hours PRN    spironolactone (ALDACTONE) 12.5 mg, Oral, Daily    tamsulosin (FLOMAX) 0.4 mg, Oral, Daily    tiZANidine (ZANAFLEX) 4 mg, Oral, Every 8 hours PRN    zinc gluconate 50 mg, Oral, Daily         ENT ROS negative except as stated above.     Patient answers are not available for this visit.            Objective:      There were no vitals filed for this visit.    General: NAD, well appearing  Eyes: Normal conjunctiva and lids  Face: symmetric, nerve intact  Nose: The nose is without any evidence of any deformity. The nasal mucosa is moist. The septum is midline. There is no evidence of septal hematoma. The turbinates are without abnormality.   Ears: The ears are with normal-appearing pinna. Examination of the canals is normal appearing bilaterally. There is no drainage or erythema noted. The tympanic membranes are normal appearing with pearly color, normal-appearing landmarks and normal light reflex. Hearing is grossly intact.  Mouth: No obvious abnormalities to the lips. The teeth are unremarkable. The gingivae are without any obvious evidence of infection or lesion. The oral mucosa is moist and pink. There are no obvious masses to the hard or soft palate.   Oropharynx: The uvula is midline.  The tongue is midline. The posterior pharynx is without erythema or exudate. The tonsils are normal appearing.  Salivary glands: The salivary glands are symmetric and not enlarged, no masses  Neck: No lymphadenopathy, trachea midline, thryoid not enlarged.  Psych: Normal mood and affect.   Neuro: Grossly  intact  Speech: fluent    Procedure: Flexible laryngoscopy  Indications: dysphagia  Verbal consent obtained  Anesthesia: lidocaine and phenylephrine nasal spray  Procedure: Scope was passed into right or left nare, to nasopharynx and down to visualize the glottis. Findings below. Patient tolerated procedure well.     - Nose WNL  - Nasopharynx- WNL, no masses  - Oropharynx- BOT symmetric, no masses, lingual tonsils 1+. cobblestoning  - Hypopharynx and piriform sinuses- epiglottis rotated to right, no masses on trumpet maneuver, post cricoid edema  - Supraglottis- Arytenoids intact, no masses, + edematous and erythematous  - Glottis- Bilateral vocal folds intact with full motion, no masses, some edema  - Glottic closure- complete    Assessment and Plan:       1. Dysphagia, unspecified type    2. Dysphonia    3. Laryngopharyngeal reflux (LPR)    4. Personal history of spine surgery          Cannot rule out hardware issue or fluid. Will get US, has contrast allergy. If any concerns, will pre med and do CT  Suspect silent reflux is issue as well with scope and hoarseness, off protonix lately.   Will start him on omeprazole for at least next month and see if this helps, may need to continue.     RTC: 6 weeks    Plan of care was discussed in detail with the patient, who agreed with the plan as above. All questions were answered in detail.     Kristi Deluca MD  Otolaryngology

## 2024-05-31 ENCOUNTER — PATIENT MESSAGE (OUTPATIENT)
Dept: OTOLARYNGOLOGY | Facility: CLINIC | Age: 59
End: 2024-05-31
Payer: COMMERCIAL

## 2024-05-31 ENCOUNTER — TELEPHONE (OUTPATIENT)
Dept: OTOLARYNGOLOGY | Facility: CLINIC | Age: 59
End: 2024-05-31
Payer: COMMERCIAL

## 2024-05-31 DIAGNOSIS — Z98.1 S/P CERVICAL SPINAL FUSION: ICD-10-CM

## 2024-05-31 DIAGNOSIS — R49.0 DYSPHONIA: ICD-10-CM

## 2024-05-31 DIAGNOSIS — R13.10 DYSPHAGIA, UNSPECIFIED TYPE: Primary | ICD-10-CM

## 2024-05-31 RX ORDER — DIPHENHYDRAMINE HCL 50 MG
CAPSULE ORAL
Qty: 1 CAPSULE | Refills: 0 | Status: SHIPPED | OUTPATIENT
Start: 2024-05-31

## 2024-05-31 RX ORDER — PREDNISONE 50 MG/1
TABLET ORAL
Qty: 3 TABLET | Refills: 0 | Status: SHIPPED | OUTPATIENT
Start: 2024-05-31

## 2024-05-31 NOTE — TELEPHONE ENCOUNTER
----- Message from Kristi Deluca MD sent at 5/31/2024  7:48 AM CDT -----  Regarding: ct  Please help him book CT neck ASAP, but needs pre med. I sent pre med to his pharmacy. Starts 13 hours before the scan.   Thanks.

## 2024-05-31 NOTE — TELEPHONE ENCOUNTER
Pt scheduled for 6/3 at 145, reviewed premed instructions with wife, she verbalized understanding.

## 2024-05-31 NOTE — TELEPHONE ENCOUNTER
Pre med sent to pharmacy. He has done before. Will get CT ASAP but will have to do this beforehand.

## 2024-06-03 ENCOUNTER — HOSPITAL ENCOUNTER (OUTPATIENT)
Dept: RADIOLOGY | Facility: HOSPITAL | Age: 59
Discharge: HOME | End: 2024-06-03
Attending: STUDENT IN AN ORGANIZED HEALTH CARE EDUCATION/TRAINING PROGRAM
Payer: COMMERCIAL

## 2024-06-03 ENCOUNTER — DOCUMENTATION ONLY (OUTPATIENT)
Dept: REHABILITATION | Facility: HOSPITAL | Age: 59
End: 2024-06-03
Payer: COMMERCIAL

## 2024-06-03 ENCOUNTER — TELEPHONE (OUTPATIENT)
Dept: PHYSICAL MEDICINE AND REHAB | Facility: CLINIC | Age: 59
End: 2024-06-03
Payer: COMMERCIAL

## 2024-06-03 DIAGNOSIS — Z98.1 S/P CERVICAL SPINAL FUSION: ICD-10-CM

## 2024-06-03 DIAGNOSIS — R49.0 DYSPHONIA: ICD-10-CM

## 2024-06-03 DIAGNOSIS — R13.10 DYSPHAGIA, UNSPECIFIED TYPE: ICD-10-CM

## 2024-06-03 PROCEDURE — 70491 CT SOFT TISSUE NECK W/DYE: CPT | Mod: TC,PO

## 2024-06-03 PROCEDURE — 25500020 PHARM REV CODE 255: Mod: PO | Performed by: STUDENT IN AN ORGANIZED HEALTH CARE EDUCATION/TRAINING PROGRAM

## 2024-06-03 PROCEDURE — 70491 CT SOFT TISSUE NECK W/DYE: CPT | Mod: 26,,, | Performed by: RADIOLOGY

## 2024-06-03 RX ADMIN — IOHEXOL 75 ML: 350 INJECTION, SOLUTION INTRAVENOUS at 12:06

## 2024-06-03 NOTE — TELEPHONE ENCOUNTER
NABOR regarding lab results. Encouraged to call his PCP as Dr. Peres confirmed that his PCP will follow him and treat him for his testosterone levels. No further needs at this time.   Keila Craig RN     ----- Message from Lelia Peres DO sent at 6/3/2024  7:58 AM CDT -----  Regarding: FW:  Please let him know that his PCP will manage this for him.     TC  ----- Message -----  From: LORETTA Domingo MD  Sent: 5/29/2024   8:23 PM CDT  To: Lelia Peres DO  Subject: FW:                                              Ill be happy to take manage for him.    Vera  ----- Message -----  From: Lelia Peres DO  Sent: 5/20/2024   8:24 AM CDT  To: LORETTA Domingo MD    Good morning. Do you typically manage treat patients with testosterone dysfunction? If not, I can refer to Endo.     TC  ----- Message -----  From: Jasson [facility] Lab Interface  Sent: 5/14/2024  10:20 PM CDT  To: Lelia Peres DO

## 2024-06-03 NOTE — PROGRESS NOTES
Occupational Therapy Discharge Note    Patient:  García MONTOYA [facility] #:  [id number]   Date of Note: 06/03/2024       Pt was referred by Dr. Vera Domingo and seen for initial evaluation on 5/2/2024 for R29.898 (ICD-10-CM) - Hand weakness . Pt was seen for OT eval only for treatment of Paresthesias, hand weakness, decreased ADL . Please see evaluation  for last objective measures as well as goal achievements/modifications. Pt has not returned to therapy. Current status is not known.     Discharge OT services to [facility]    CHANDRIKA Garcia, WILFRIDOT       Patient Seen in: [facility] Emergency Department    History   Patient presents with:   Allergic Rxn Allergies (immune)    Stated Complaint: allergic reaction    HPI    18-year-old female presents emergency room with chief complaint of allergic reacti • Status post thyroidectomy    • Thyroid ca [facility]) 6/27/2013   • Thyroid cancer ([facility] [address]     localized, s/p resection   • Visual impairment     GLASSES       Past Surgical History:  No date: APPENDECTOMY  No date: BENIGN BIOPSY LEFT  No date: BIOPSY OF BREAS Comment: vocal cord polyps  4/1/2015: KIRAN BY PRABHJOT PHYS PERFRMG SVC 5+ YR N/A      Comment: Procedure: LUMBAR EPIDURAL;  Surgeon:                Jonathon Hernandez MD;  Location: [place] [address] MANAGEMENT  4/22/2015: KIRAN BY PRABHJOT PHYS reviewed and negative except as noted above.     Physical Exam   ED Triage Vitals [05/28/18 1231]  BP: 140/77  Pulse: 78  Resp: 18  Temp: 98 °F (36.7 °C)  Temp src: n/a  SpO2: 95 %  O2 Device: None (Room air)    Current:/76   Pulse 76   Temp 98 °F (36 appointment. Patient to use over-the-counter Benadryl, given prescription for prednisone, return to ER if any change worsening symptoms and was discharged good condition.       Disposition and Plan     Clinical Impression:  Angioedema, initial encounter  (

## 2024-06-03 NOTE — TELEPHONE ENCOUNTER
----- Message from Lelia Peres DO sent at 6/3/2024  7:58 AM CDT -----  Regarding: FW:  Please let him know that his PCP will manage this for him.     TC  ----- Message -----  From: LORETTA Domingo MD  Sent: 5/29/2024   8:23 PM CDT  To: Lelia Peres DO  Subject: FW:                                              Ill be happy to take manage for him.    Vera  ----- Message -----  From: Lelia Peres DO  Sent: 5/20/2024   8:24 AM CDT  To: LORETTA Domingo MD    Good morning. Do you typically manage treat patients with testosterone dysfunction? If not, I can refer to Endo.     TC  ----- Message -----  From: Jasson [employer] Lab Interface  Sent: 5/14/2024  10:20 PM CDT  To: Lelia Peres DO

## 2024-06-07 DIAGNOSIS — I10 ESSENTIAL (PRIMARY) HYPERTENSION: Chronic | ICD-10-CM

## 2024-06-08 RX ORDER — METOPROLOL SUCCINATE 50 MG/1
50 TABLET, EXTENDED RELEASE ORAL
Qty: 90 TABLET | Refills: 3 | OUTPATIENT
Start: 2024-06-08

## 2024-06-08 NOTE — TELEPHONE ENCOUNTER
No care due was identified.  [facility] Embedded Care Due Messages. Reference number: [id number].   6/07/2024 8:00:11 PM CDT

## 2024-06-10 ENCOUNTER — PATIENT MESSAGE (OUTPATIENT)
Dept: OTOLARYNGOLOGY | Facility: CLINIC | Age: 59
End: 2024-06-10
Payer: COMMERCIAL

## 2024-06-17 ENCOUNTER — TELEPHONE (OUTPATIENT)
Dept: GASTROENTEROLOGY | Facility: CLINIC | Age: 59
End: 2024-06-17
Payer: COMMERCIAL

## 2024-06-17 NOTE — TELEPHONE ENCOUNTER
Vmail left asking pt to return call to schedule scope. Call back provided. Several attempts have been made to contact pt to schedule ordered procedure. Case request canceled. Letter placed in mail.

## 2024-06-19 ENCOUNTER — PATIENT MESSAGE (OUTPATIENT)
Dept: PHYSICAL MEDICINE AND REHAB | Facility: CLINIC | Age: 59
End: 2024-06-19
Payer: COMMERCIAL

## 2024-06-24 ENCOUNTER — OFFICE VISIT (OUTPATIENT)
Dept: PHYSICAL MEDICINE AND REHAB | Facility: CLINIC | Age: 59
End: 2024-06-24
Payer: COMMERCIAL

## 2024-06-24 VITALS — HEART RATE: 69 BPM | DIASTOLIC BLOOD PRESSURE: 80 MMHG | SYSTOLIC BLOOD PRESSURE: 149 MMHG

## 2024-06-24 DIAGNOSIS — G82.50 SPASTIC QUADRIPARESIS: ICD-10-CM

## 2024-06-24 DIAGNOSIS — R53.82 CHRONIC FATIGUE: ICD-10-CM

## 2024-06-24 DIAGNOSIS — Z98.1 S/P CERVICAL SPINAL FUSION: ICD-10-CM

## 2024-06-24 DIAGNOSIS — R47.01 EXPRESSIVE APHASIA: ICD-10-CM

## 2024-06-24 DIAGNOSIS — N31.9 NEUROGENIC BLADDER: ICD-10-CM

## 2024-06-24 DIAGNOSIS — R29.898 WEAKNESS OF BOTH LOWER EXTREMITIES: ICD-10-CM

## 2024-06-24 DIAGNOSIS — F34.1 PERSISTENT DEPRESSIVE DISORDER: ICD-10-CM

## 2024-06-24 DIAGNOSIS — R26.9 NEUROLOGIC GAIT DYSFUNCTION: ICD-10-CM

## 2024-06-24 DIAGNOSIS — S14.105A C5-C7 LEVEL SPINAL CORD INJURY, INITIAL ENCOUNTER: Primary | ICD-10-CM

## 2024-06-24 PROCEDURE — 99999 PR PBB SHADOW E&M-EST. PATIENT-LVL III: CPT | Mod: PBBFAC,,, | Performed by: PHYSICAL MEDICINE & REHABILITATION

## 2024-06-24 PROCEDURE — 99215 OFFICE O/P EST HI 40 MIN: CPT | Mod: S$GLB,,, | Performed by: PHYSICAL MEDICINE & REHABILITATION

## 2024-06-24 RX ORDER — BACLOFEN 5 MG/1
5 TABLET ORAL 4 TIMES DAILY
Qty: 120 TABLET | Refills: 11 | Status: SHIPPED | OUTPATIENT
Start: 2024-06-24 | End: 2025-06-24

## 2024-06-24 NOTE — PROGRESS NOTES
[facility] VISIT     Primary Care Provider: LORETTA Domingo MD     CHIEF COMPLAINT:   1.   Chief Complaint   Patient presents with    Neck Pain     2. Mobility and function management recommendations.     HISTORY OF PRESENT ILLNESS: García Tang is a 58 y.o.-year-old male with a history of spinal cord injury due to cervical myelopathy s/p C5-6 ACDF with late effect spastic quadriparesis, neurogenic bowel, gait dysfunction, fatigue who presents today for follow up evaluation and recommendations.     Main complaint today is: continued soreness/tightness in neck.     F/u baclofen: taking three times daily; was concerned about long term liver issues with dantrolene as such never filled that rx and has continued with baclofen and is seeing good results with use although still with neck upper back tightness/pain/spasms  F/u consider lyrica: not interested in adding meds at this time, wants to continue with baclofen  F/u fatigue and testosterone lab work: is considering hormone replacement therapy, will discuss with PCP   F/u psych eval for depression: waiting to schedule  F/u urology referral: released without significant concerns   F/u Pt/OT/ST referral to [facility]: has not gotten evaluated     Reports,   Mobility/function: limited ambulation due to difficulty with response of legs and fatigues/exhausts easily, feels like his leg are heavier than normal   Dysphagia: chokes with thin liquids at times; seen ENT without concern, seen endocrine without concern for thyroid dysfunction  Pain: electrical shocks down arms and legs, neck (anterior and posterior)  Skin: no concerns  Bowel: BM daily, constipation; manages with probiotics   - no GI planned follow ups  Bladder: burning after urination, has issues emptying her bladder at times especially in the morning time   - seen by urology    Medications: tylenol, oral baclofen  - not interested in dantrolene due to liver concerns  -  "trialed gabapentin but had night terrors  Injections: none  Surgical procedures:   - C5/6 ACDF 02/012/2024    EQUIPMENT:  Braces: none  Wheelchair: none  Walker: rolling, does use    MOBILITY/TRANSFERS:  Walking: Distance can walk 1/2 mile but is limited by pain and instability in legs     COGNITION:  Speech: expressive, word finding  Memory: short term issues  Processing: "looses train of thought"  Mood: depressed, pre-morbid managed with exercise/jogging    ACTIVITIES OF DAILY LIVING:  Upper extremity dressing: ind  Lower extremity dressing: ind  Bathe: ind  Groom: ind  Toilet: ind    THERAPY/LOCATION:  PT: outpatient 2x/wk  OT: none currently  Speech: none currently    EDUCATION/VOCATION:  Highest level of education: some college  Occupation status: insurance, 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    RECREATION: exercise/jogging, basketball    LIVING SITUATION: lives with wife, daughter (total of 4 kids); single story home threshold to enter, walk in shower and tub shower combo with rail    PAST MEDICAL HISTORY:  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    Diverticulitis     diverticulitis    Diverticulosis     Essential hypertension 07/18/2016    Fatty liver disease, nonalcoholic 04/07/2016    [place], 2006     GERD (gastroesophageal reflux disease)     Headache     HLD (hyperlipidemia)     Kidney stone     requiring stent    Meniere's disease 01/09/2018    Morbid obesity 05/29/2014    NAFLD (nonalcoholic fatty liver disease)     Near syncope 05/23/2017    Obesity hypoventilation syndrome 02/22/2016    PAD (peripheral artery disease) 09/17/2019    Mild 9/2018    Prostatitis     Rectal bleeding     diverticulitis    Sleep disorder breathing 2/12/2024    Thyroid nodule 07/14/2022        PAST SURGICAL HISTORY:   Past Surgical History:   Procedure Laterality Date    ANGIOGRAM, CORONARY, " WITH LEFT HEART CATHETERIZATION  10/26/2023    Procedure: Left Heart Cath;  Surgeon: Sal Salazar MD;  Location: [facility] CATH;  Service: Cardiology;;    ANTERIOR CERVICAL DISCECTOMY W/ FUSION N/A 2/12/2024    Procedure: DISCECTOMY, SPINE, CERVICAL, ANTERIOR APPROACH, WITH FUSION C5-6;  Surgeon: Wali Zuniga MD;  Location: [facility] OR;  Service: Neurosurgery;  Laterality: N/A;    CARDIAC CATHETERIZATION      COLONOSCOPY  2008    diverticulosis per patient report    CORONARY ANGIOGRAPHY  10/26/2023    Procedure: Coronary angiogram study;  Surgeon: Sal Slaazar MD;  Location: [facility] CATH;  Service: Cardiology;;    ESOPHAGOGASTRODUODENOSCOPY N/A 12/18/2023    Procedure: EGD (ESOPHAGOGASTRODUODENOSCOPY);  Surgeon: Aram Bermudez MD;  Location: [facility] ENDO;  Service: Gastroenterology;  Laterality: N/A;    kidney stent      KIDNEY SURGERY  1996    right kidney obstruction requiring stent-open    MAGNETIC RESONANCE IMAGING N/A 1/26/2024    Procedure: MRI (MAGNETIC RESONANCE IMAGING);  Surgeon: Su Galvan Diagnostic;  Location: [facility];  Service: General;  Laterality: N/A;    NOSE SURGERY      UPPER GASTROINTESTINAL ENDOSCOPY  2021    [facility], gastritis per patient report        FAMILY HISTORY:   Family History   Problem Relation Name Age of Onset    Liver disease Mother          d.    Cirrhosis Mother      Hypertension Mother      Hyperlipidemia Mother      Colon cancer Father  70    Cancer Father      Diabetes Father      Hypertension Father      Glaucoma Father      Hyperlipidemia Father      Prostate cancer Father      Diabetes Brother      Hypertension Brother      Thyroid cancer Brother      Heart attack Maternal Uncle      Heart attack Maternal Uncle      Heart disease Neg Hx      Heart failure Neg Hx      Macular degeneration Neg Hx      Retinal detachment Neg Hx      Crohn's disease Neg Hx      Ulcerative colitis Neg Hx      Stomach cancer Neg Hx      Esophageal cancer Neg Hx         SOCIAL  HISTORY:    Social History     Socioeconomic History    Marital status:    Tobacco Use    Smoking status: Former     Current packs/day: 0.00     Types: Cigarettes     Quit date: 1990     Years since quittin.5    Smokeless tobacco: Never    Tobacco comments:     social   Substance and Sexual Activity    Alcohol use: No     Comment: stopped in     Drug use: No    Sexual activity: Yes     Partners: Female     Social Determinants of Health     Financial Resource Strain: Patient Declined (2024)    Overall Financial Resource Strain (CARDIA)     Difficulty of Paying Living Expenses: Patient declined   Food Insecurity: Unknown (2024)    Hunger Vital Sign     Worried About Running Out of Food in the Last Year: Never true     Ran Out of Food in the Last Year: Patient declined   Transportation Needs: Unknown (2024)    PRAPARE - Transportation     Lack of Transportation (Medical): No     Lack of Transportation (Non-Medical): Patient declined   Physical Activity: Unknown (2024)    Exercise Vital Sign     Days of Exercise per Week: Patient declined     Minutes of Exercise per Session: 0 min   Recent Concern: Physical Activity - Inactive (2024)    Exercise Vital Sign     Days of Exercise per Week: 0 days     Minutes of Exercise per Session: 0 min   Stress: Patient Declined (2024)    [employer] - Occupational Stress Questionnaire     Feeling of Stress : Patient declined   Housing Stability: Patient Declined (2024)    Housing Stability Vital Sign     Unable to Pay for Housing in the Last Year: Patient declined     Number of Places Lived in the Last Year: 1     Unstable Housing in the Last Year: Patient declined       MEDICATIONS:     Current Outpatient Medications:     acetaminophen (TYLENOL) 500 MG tablet, Take 1,000 mg by mouth every 6 (six) hours as needed for Pain., Disp: , Rfl:     amLODIPine (NORVASC) 5 MG tablet, Take 1 tablet (5 mg total) by  mouth every evening., Disp: 90 tablet, Rfl: 1    ascorbic acid (VITAMIN C ORAL), Take 2 tablets by mouth once daily., Disp: , Rfl:     baclofen (LIORESAL) 5 mg Tab tablet, Take 1 tablet (5 mg total) by mouth 4 (four) times daily., Disp: 120 tablet, Rfl: 11    cholecalciferol, vitamin D3, 125 mcg (5,000 unit) Tab, Take 10,000 Units by mouth once daily., Disp: , Rfl:     diphenhydrAMINE (BENADRYL) 50 MG capsule, Take 50mg by mouth 1 hour before contrast administration., Disp: 1 capsule, Rfl: 0    famotidine (PEPCID) 20 MG tablet, Take 40 mg by mouth Daily., Disp: , Rfl:     Lactobacillus acidophilus (ACIDOPHILUS ORAL), Take 3 tablets by mouth once daily., Disp: , Rfl:     losartan (COZAAR) 100 MG tablet, Take 1 tablet (100 mg total) by mouth once daily., Disp: 90 tablet, Rfl: 1    magnesium oxide (MAG-OX) 400 mg (241.3 mg magnesium) tablet, Take 400 mg by mouth once daily., Disp: , Rfl:     metoprolol succinate (TOPROL-XL) 50 MG 24 hr tablet, Take 1 tablet (50 mg total) by mouth once daily., Disp: 90 tablet, Rfl: 3    NIACIN ORAL, Take 1 tablet by mouth once daily., Disp: , Rfl:     omeprazole (PRILOSEC) 40 MG capsule, Take 1 capsule (40 mg total) by mouth once daily., Disp: 90 capsule, Rfl: 3    oxyCODONE-acetaminophen (PERCOCET) 7.5-325 mg per tablet, Take 1 tablet by mouth every 6 (six) hours as needed for Pain. (Patient not taking: Reported on 5/8/2024), Disp: 28 tablet, Rfl: 0    predniSONE (DELTASONE) 50 MG Tab, Take 50mg by mouth at 13 hours, 7 hours, and 1 hour before contrast administration., Disp: 3 tablet, Rfl: 0    spironolactone (ALDACTONE) 25 MG tablet, Take 0.5 tablets (12.5 mg total) by mouth once daily., Disp: 45 tablet, Rfl: 1    tamsulosin (FLOMAX) 0.4 mg Cap, Take 1 capsule (0.4 mg total) by mouth once daily., Disp: 30 capsule, Rfl: 11    tiZANidine (ZANAFLEX) 4 MG tablet, Take 1 tablet (4 mg total) by mouth every 8 (eight) hours as needed (muscle spasms). (Patient not taking: Reported on  5/8/2024), Disp: 60 tablet, Rfl: 0    zinc gluconate 50 mg tablet, Take 50 mg by mouth once daily., Disp: , Rfl:      ALLERGIES:   Review of patient's allergies indicates:   Allergen Reactions    Lisinopril Swelling    Iodinated contrast media Other (See Comments)     Spasms, tremor, and high blood pressure    Bactrim  [sulfamethoxazole-trimethoprim]      Other reaction(s): Unknown    Diflucan [fluconazole]      Heart racing    Gabapentin Other (See Comments)     nightmares    Hydroxyzine hcl      Other reaction(s): elevated bp    Kenalog [triamcinolone acetonide] Other (See Comments)     Heart flutters all day    Phenergan [promethazine] Anxiety    Zofran [ondansetron hcl (pf)] Anxiety       REVIEW OF SYSTEMS:   Denies coughs, colds, fevers, chills. + constipation. Bowel movements are regular. + dysphagia. Poor sleep. + depression. No drooling or difficulty handling oral secretions. No skin lesions.     PHYSICAL EXAMINATION:   VITALS:   Vitals:    06/24/24 1438   BP: (!) 149/80   Pulse: 69   GENERAL: The patient is awake, alert, cooperative, and in no acute distress. Flat affect  HEENT: Normocephalic, atraumatic. Tracking is in all 4 quadrants. No facial asymmetry. Uvula is midline.   NECK: Supple. No lymphadenopathy. No masses. Full range of motion. No torticollis.   HEART: Appears well perfused. No lower extremity edema.   LUNGS: No increased work of breath.   ABDOMEN: Benign.   NEURO: Cranial nerves II-XII are grossly intact by observation.   EXTREMITIES: Warm, capillary refill less than 2 seconds. No clubbing, cyanosis or edema.   MUSCULOSKELETAL: No focal muscular/limb atrophy/hypertrophy. No leg length discrepancy. No gross deformity.     NEUROMUSCULAR:   Modified Neptali Scale:  1: justin wrist flexors  1+: justin knee flexors, justin ankle plantarflexors  2:   3:  4:     Manual muscle testing:      Right  Left  Elbow flexion   4/5  4/5  Elbow extension  5/5  4/5  Wrist extension  5/5  5/5  Finger  flexion   5/5  5/5  Finger abduction  5/5  4/5    Hip flexion   3/5  3/5  Knee extension  4/5  3/5  Ankle dorsiflexion  4/5  3/5  Ankle plantarflexion  4/5  4/5  Great toe extension  4/5  3/5    Appropriate sitting balance.   No dyskinetic or dystonic movements appreciated.   Muscle stretch reflexes are 2+ throughout both upper and lower extremities.   Catch and release clonus at bilateral ankles. No wrist clonus    GAIT/DYNAMIC: deliberate short steppage gait at first with stride lengthening with progression, poor BUE arm swing.     Transfers from supine to sit and sit to stand are independent.       EDX 04/16/2024 with noted: length-dependent axonal sensorimotor peripheral neuropathy.     ASSESSMENT:   1. C5-C7 level spinal cord injury, initial encounter        2. Spastic quadriparesis        3. S/P cervical spinal fusion        4. Neurologic gait dysfunction        5. Weakness of both lower extremities        6. Neurogenic bladder        7. Persistent depressive disorder        8. Expressive aphasia        9. Chronic fatigue            García Tang is a 58 y.o.-year-old male with a history of spinal cord injury due to cervical myelopathy s/p C5-6 ACDF with late effect spastic quadriparesis, neurogenic bowel, gait dysfunction, fatigue. The following recommendations and plan were discussed in depth with the patient who voiced understanding and was in agreement.     PLAN:   Spasticity:   - - we discussed options for management at this time are with oral medication and intrathecal baclofen therapy as follows:   - increase oral baclofen to 5 mg four times daily   - we will see how he responds to titration oforal baclofen prior to considering ITB vs focal treatment options.     Pain ([facility]/Neuropathic):   - poor response to previous trial to gabapentin  - we discussed considerations for trial of Lyrica, for which we will wait and see his response to baclofen first    Fatigue:  - work up previously without noted  thyroid dysfunction  - lab work reveals low free T, recommend hormone replacement therapy with PCP, he will consider  - consider repeating sleep study, previous did not show CHANELL but this was prior to his SCI    Mental health:  - pre-morbid depression with exacerbation since SCI, referral placed for Psychology evaluation and management, awaiting eval    Bracing:    - no needs at this time    Equipment:   - no needs at this time    Bowel:  - chronic constipation, no concerns at this time    Bladder:   - neurogenic bladder, recommend yearly U/S renal studies    Therapy:   - PT: order placed for neuro at [facility]  - OT: order placed for neuro at [facility]  - ST: order placed for neuro at [facility]    I would like to have him return to clinic in 6 months.     42 minutes of total time spent on the encounter, which includes face to face time and non-face to face time preparing to see the patient (eg, review of tests), obtaining and/or reviewing separately obtained history, documenting clinical information in the electronic or other health record, independently interpreting results (not separately reported), communicating results to the patient/family/caregiver, and/or care coordination (not separately reported).

## 2024-06-27 ENCOUNTER — HOSPITAL ENCOUNTER (OUTPATIENT)
Dept: RADIOLOGY | Facility: HOSPITAL | Age: 59
Discharge: HOME | End: 2024-06-27
Attending: NURSE PRACTITIONER
Payer: COMMERCIAL

## 2024-06-27 ENCOUNTER — OFFICE VISIT (OUTPATIENT)
Dept: FAMILY MEDICINE | Facility: CLINIC | Age: 59
End: 2024-06-27
Payer: COMMERCIAL

## 2024-06-27 DIAGNOSIS — R06.02 SHORTNESS OF BREATH: ICD-10-CM

## 2024-06-27 DIAGNOSIS — R10.32 LEFT LOWER QUADRANT ABDOMINAL PAIN: ICD-10-CM

## 2024-06-27 DIAGNOSIS — R39.9 UTI SYMPTOMS: ICD-10-CM

## 2024-06-27 DIAGNOSIS — R36.1 HEMATOSPERMIA: Primary | ICD-10-CM

## 2024-06-27 LAB
BILIRUBIN, UA POC OHS: NEGATIVE
BLOOD, UA POC OHS: NEGATIVE
CLARITY, UA POC OHS: CLEAR
COLOR, UA POC OHS: YELLOW
GLUCOSE, UA POC OHS: NEGATIVE
KETONES, UA POC OHS: NEGATIVE
LEUKOCYTES, UA POC OHS: NEGATIVE
NITRITE, UA POC OHS: NEGATIVE
PH, UA POC OHS: 5.5
PROTEIN, UA POC OHS: NEGATIVE
SPECIFIC GRAVITY, UA POC OHS: >=1.03
UROBILINOGEN, UA POC OHS: 0.2

## 2024-06-27 PROCEDURE — 71046 X-RAY EXAM CHEST 2 VIEWS: CPT | Mod: TC,FY,PO

## 2024-06-27 PROCEDURE — 71046 X-RAY EXAM CHEST 2 VIEWS: CPT | Mod: 26,,, | Performed by: STUDENT IN AN ORGANIZED HEALTH CARE EDUCATION/TRAINING PROGRAM

## 2024-06-27 PROCEDURE — 81003 URINALYSIS AUTO W/O SCOPE: CPT | Mod: QW,S$GLB,, | Performed by: NURSE PRACTITIONER

## 2024-06-27 PROCEDURE — 99213 OFFICE O/P EST LOW 20 MIN: CPT | Mod: S$GLB,,, | Performed by: NURSE PRACTITIONER

## 2024-06-27 NOTE — PROGRESS NOTES
Subjective:       Patient ID: aGrcía Tang is a 58 y.o. male.    Chief Complaint: Abdominal Pain and Bleeding/Bruising    HPI here for urgent care visit. Having abdominal pain, bleeding. States having blood from his penis. ACDF surgery in February. Pain after he urinates on and off. About a month ago had blood in his semen. Yesterday had blood coming out of his penis and was also in his semen. Feels more SOB. Was dripping blood. Was dark brown. Pain and numbness to penis at times, told it was due to his back.  States woke up this morning with lower abdomen hurting. Kidney stones years ago. States he is passing his urine fine. Denies fever. Feels he is hydrating well. See ROS    The following portion of the patients history was reviewed and updated as appropriate: allergies, current medications, past medical and surgical history. Past social history and problem list reviewed. Family PMH and Past social history reviewed. Tobacco, Illicit drug use reviewed.      Review of patient's allergies indicates:   Allergen Reactions    Lisinopril Swelling    Iodinated contrast media Other (See Comments)     Spasms, tremor, and high blood pressure    Bactrim  [sulfamethoxazole-trimethoprim]      Other reaction(s): Unknown    Diflucan [fluconazole]      Heart racing    Gabapentin Other (See Comments)     nightmares    Hydroxyzine hcl      Other reaction(s): elevated bp    Kenalog [triamcinolone acetonide] Other (See Comments)     Heart flutters all day    Phenergan [promethazine] Anxiety    Zofran [ondansetron hcl (pf)] Anxiety         Current Outpatient Medications:     acetaminophen (TYLENOL) 500 MG tablet, Take 1,000 mg by mouth every 6 (six) hours as needed for Pain., Disp: , Rfl:     amLODIPine (NORVASC) 5 MG tablet, Take 1 tablet (5 mg total) by mouth every evening., Disp: 90 tablet, Rfl: 1    ascorbic acid (VITAMIN C ORAL), Take 2 tablets by mouth once daily., Disp: , Rfl:     baclofen (LIORESAL) 5 mg Tab tablet, Take  1 tablet (5 mg total) by mouth 4 (four) times daily., Disp: 120 tablet, Rfl: 11    cholecalciferol, vitamin D3, 125 mcg (5,000 unit) Tab, Take 10,000 Units by mouth once daily., Disp: , Rfl:     famotidine (PEPCID) 20 MG tablet, Take 40 mg by mouth Daily., Disp: , Rfl:     Lactobacillus acidophilus (ACIDOPHILUS ORAL), Take 3 tablets by mouth once daily., Disp: , Rfl:     losartan (COZAAR) 100 MG tablet, Take 1 tablet (100 mg total) by mouth once daily., Disp: 90 tablet, Rfl: 1    magnesium oxide (MAG-OX) 400 mg (241.3 mg magnesium) tablet, Take 400 mg by mouth once daily., Disp: , Rfl:     metoprolol succinate (TOPROL-XL) 50 MG 24 hr tablet, Take 1 tablet (50 mg total) by mouth once daily., Disp: 90 tablet, Rfl: 3    NIACIN ORAL, Take 1 tablet by mouth once daily., Disp: , Rfl:     omeprazole (PRILOSEC) 40 MG capsule, Take 1 capsule (40 mg total) by mouth once daily., Disp: 90 capsule, Rfl: 3    spironolactone (ALDACTONE) 25 MG tablet, Take 0.5 tablets (12.5 mg total) by mouth once daily., Disp: 45 tablet, Rfl: 1    zinc gluconate 50 mg tablet, Take 50 mg by mouth once daily., Disp: , Rfl:     diphenhydrAMINE (BENADRYL) 50 MG capsule, Take 50mg by mouth 1 hour before contrast administration., Disp: 1 capsule, Rfl: 0    oxyCODONE-acetaminophen (PERCOCET) 7.5-325 mg per tablet, Take 1 tablet by mouth every 6 (six) hours as needed for Pain. (Patient not taking: Reported on 5/8/2024), Disp: 28 tablet, Rfl: 0    predniSONE (DELTASONE) 50 MG Tab, Take 50mg by mouth at 13 hours, 7 hours, and 1 hour before contrast administration., Disp: 3 tablet, Rfl: 0    tamsulosin (FLOMAX) 0.4 mg Cap, Take 1 capsule (0.4 mg total) by mouth once daily., Disp: 30 capsule, Rfl: 11    tiZANidine (ZANAFLEX) 4 MG tablet, Take 1 tablet (4 mg total) by mouth every 8 (eight) hours as needed (muscle spasms). (Patient not taking: Reported on 5/8/2024), Disp: 60 tablet, Rfl: 0    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    Diverticulitis     diverticulitis    Diverticulosis     Essential hypertension 07/18/2016    Fatty liver disease, nonalcoholic 04/07/2016    [place], 2006     GERD (gastroesophageal reflux disease)     Headache     HLD (hyperlipidemia)     Kidney stone     requiring stent    Meniere's disease 01/09/2018    Morbid obesity 05/29/2014    NAFLD (nonalcoholic fatty liver disease)     Near syncope 05/23/2017    Obesity hypoventilation syndrome 02/22/2016    PAD (peripheral artery disease) 09/17/2019    Mild 9/2018    Prostatitis     Rectal bleeding     diverticulitis    Sleep disorder breathing 2/12/2024    Thyroid nodule 07/14/2022       Past Surgical History:   Procedure Laterality Date    ANGIOGRAM, CORONARY, WITH LEFT HEART CATHETERIZATION  10/26/2023    Procedure: Left Heart Cath;  Surgeon: Sal Salazar MD;  Location: [facility] CATH;  Service: Cardiology;;    ANTERIOR CERVICAL DISCECTOMY W/ FUSION N/A 2/12/2024    Procedure: DISCECTOMY, SPINE, CERVICAL, ANTERIOR APPROACH, WITH FUSION C5-6;  Surgeon: Wali Zuniga MD;  Location: [facility] OR;  Service: Neurosurgery;  Laterality: N/A;    CARDIAC CATHETERIZATION      COLONOSCOPY  2008    diverticulosis per patient report    CORONARY ANGIOGRAPHY  10/26/2023    Procedure: Coronary angiogram study;  Surgeon: Sal Salazar MD;  Location: [facility] CATH;  Service: Cardiology;;    ESOPHAGOGASTRODUODENOSCOPY N/A 12/18/2023    Procedure: EGD (ESOPHAGOGASTRODUODENOSCOPY);  Surgeon: Aram Bermudez MD;  Location: [facility] ENDO;  Service: Gastroenterology;  Laterality: N/A;    kidney stent      KIDNEY SURGERY  1996    right kidney obstruction requiring stent-open    MAGNETIC RESONANCE IMAGING N/A 1/26/2024    Procedure: MRI (MAGNETIC RESONANCE IMAGING);  Surgeon: Su Galvan Diagnostic;  Location: [facility] JIM;  Service: General;  Laterality: N/A;    NOSE SURGERY       UPPER GASTROINTESTINAL ENDOSCOPY      [facility], gastritis per patient report       Social History     Socioeconomic History    Marital status:    Tobacco Use    Smoking status: Former     Current packs/day: 0.00     Types: Cigarettes     Quit date: 1990     Years since quittin.5    Smokeless tobacco: Never    Tobacco comments:     social   Substance and Sexual Activity    Alcohol use: No     Comment: stopped in     Drug use: No    Sexual activity: Yes     Partners: Female     Social Determinants of Health     Financial Resource Strain: Patient Declined (2024)    Overall Financial Resource Strain (CARDIA)     Difficulty of Paying Living Expenses: Patient declined   Food Insecurity: Unknown (2024)    Hunger Vital Sign     Worried About Running Out of Food in the Last Year: Never true     Ran Out of Food in the Last Year: Patient declined   Transportation Needs: Unknown (2024)    PRAPARE - Transportation     Lack of Transportation (Medical): No     Lack of Transportation (Non-Medical): Patient declined   Physical Activity: Unknown (2024)    Exercise Vital Sign     Days of Exercise per Week: Patient declined     Minutes of Exercise per Session: 0 min   Recent Concern: Physical Activity - Inactive (2024)    Exercise Vital Sign     Days of Exercise per Week: 0 days     Minutes of Exercise per Session: 0 min   Stress: Patient Declined (2024)    [employer] - Occupational Stress Questionnaire     Feeling of Stress : Patient declined   Housing Stability: Patient Declined (2024)    Housing Stability Vital Sign     Unable to Pay for Housing in the Last Year: Patient declined     Number of Places Lived in the Last Year: 1     Unstable Housing in the Last Year: Patient declined     Review of Systems   Constitutional:  Negative for fatigue and fever.   HENT: Negative.     Eyes:  Negative for visual disturbance.   Respiratory:   "Positive for shortness of breath. Negative for cough, chest tightness and wheezing.    Cardiovascular:  Negative for chest pain, palpitations and leg swelling.   Gastrointestinal:  Positive for abdominal pain (pressure over bladder area). Negative for blood in stool, diarrhea, nausea and vomiting.   Genitourinary:  Positive for hematuria and penile discharge (bloody. see HPI).   Musculoskeletal:  Positive for arthralgias. Negative for back pain and gait problem.   Skin: Negative.    Neurological:  Negative for headaches.   Psychiatric/Behavioral:  Negative for dysphoric mood and sleep disturbance. The patient is not nervous/anxious.        Objective:      BP (!) 142/84 (BP Location: Left arm, Patient Position: Sitting, BP Method: Large (Manual))   Pulse 77   Temp 97.7 °F (36.5 °C)   Ht 5' 10" (1.778 m)   Wt (!) 144.2 kg (317 lb 14.5 oz)   SpO2 96%   BMI 45.61 kg/m²      Physical Exam  Constitutional:       Appearance: Normal appearance. He is morbidly obese.   HENT:      Head: Normocephalic.   Eyes:      Pupils: Pupils are equal, round, and reactive to light.   Cardiovascular:      Rate and Rhythm: Normal rate and regular rhythm.      Pulses: Normal pulses.      Heart sounds: Normal heart sounds. No murmur heard.  Pulmonary:      Effort: Pulmonary effort is normal.      Breath sounds: Normal breath sounds. No decreased breath sounds or wheezing.   Abdominal:      General: Bowel sounds are normal.      Tenderness: There is no abdominal tenderness.   Musculoskeletal:         General: Normal range of motion.      Cervical back: Normal range of motion.      Comments: Gait normal.    Skin:     General: Skin is warm and dry.      Capillary Refill: Capillary refill takes less than 2 seconds.   Neurological:      General: No focal deficit present.      Mental Status: He is alert.   Psychiatric:         Attention and Perception: Attention and perception normal.         Mood and Affect: Mood and affect normal.         " Speech: Speech normal.         Behavior: Behavior normal.         Assessment:       1. Hematospermia    2. UTI symptoms    3. Left lower quadrant abdominal pain    4. Shortness of breath        Plan:       Hematospermia: will schedule him with Urology. Could be prostate related. He is not taking the flomax that was given to him by Urology    UTI symptoms: urine did not show any indication of infection  -     POCT Urinalysis(Instrument)    Left lower quadrant abdominal pain: will get CT.  -     CT Renal Stone Study ABD Pelvis WO; Future; Expected date: 06/27/2024    Shortness of breath: schedule CXR.  -     X-Ray Chest PA And Lateral; Future; Expected date: 06/27/2024       Continue current medication  Take medications only as prescribed  Healthy diet, exercise  Adequate rest  Adequate hydration  Avoid allergens  Avoid excessive caffeine     Follow up with PCP

## 2024-06-28 ENCOUNTER — TELEPHONE (OUTPATIENT)
Dept: UROLOGY | Facility: CLINIC | Age: 59
End: 2024-06-28
Payer: COMMERCIAL

## 2024-06-28 NOTE — TELEPHONE ENCOUNTER
Pt appt for 6/28/2024 cx, pt scheduled for ct scan on Monday, will f/u after. Called to and pt verbalized understanding. QR

## 2024-06-29 ENCOUNTER — PATIENT MESSAGE (OUTPATIENT)
Dept: FAMILY MEDICINE | Facility: CLINIC | Age: 59
End: 2024-06-29
Payer: COMMERCIAL

## 2024-07-01 ENCOUNTER — HOSPITAL ENCOUNTER (OUTPATIENT)
Dept: RADIOLOGY | Facility: HOSPITAL | Age: 59
Discharge: HOME | End: 2024-07-01
Attending: NURSE PRACTITIONER
Payer: COMMERCIAL

## 2024-07-01 ENCOUNTER — PATIENT MESSAGE (OUTPATIENT)
Dept: FAMILY MEDICINE | Facility: CLINIC | Age: 59
End: 2024-07-01
Payer: COMMERCIAL

## 2024-07-01 DIAGNOSIS — R10.32 LEFT LOWER QUADRANT ABDOMINAL PAIN: ICD-10-CM

## 2024-07-01 PROCEDURE — 74176 CT ABD & PELVIS W/O CONTRAST: CPT | Mod: TC,PO

## 2024-07-01 PROCEDURE — 74176 CT ABD & PELVIS W/O CONTRAST: CPT | Mod: 26,,, | Performed by: STUDENT IN AN ORGANIZED HEALTH CARE EDUCATION/TRAINING PROGRAM

## 2024-07-02 ENCOUNTER — PATIENT MESSAGE (OUTPATIENT)
Dept: ADMINISTRATIVE | Facility: HOSPITAL | Age: 59
End: 2024-07-02
Payer: COMMERCIAL

## 2024-07-02 ENCOUNTER — PATIENT MESSAGE (OUTPATIENT)
Dept: FAMILY MEDICINE | Facility: CLINIC | Age: 59
End: 2024-07-02
Payer: COMMERCIAL

## 2024-07-05 ENCOUNTER — OFFICE VISIT (OUTPATIENT)
Dept: UROLOGY | Facility: CLINIC | Age: 59
End: 2024-07-05
Payer: COMMERCIAL

## 2024-07-05 VITALS — HEIGHT: 70 IN | WEIGHT: 315 LBS | BODY MASS INDEX: 45.1 KG/M2

## 2024-07-05 DIAGNOSIS — R31.0 GROSS HEMATURIA: ICD-10-CM

## 2024-07-05 DIAGNOSIS — N41.0 ACUTE BACTERIAL PROSTATITIS: Primary | ICD-10-CM

## 2024-07-05 DIAGNOSIS — R36.1 HEMATOSPERMIA: ICD-10-CM

## 2024-07-05 LAB
BILIRUBIN, UA POC OHS: NEGATIVE
BLOOD, UA POC OHS: NEGATIVE
CLARITY, UA POC OHS: CLEAR
COLOR, UA POC OHS: YELLOW
GLUCOSE, UA POC OHS: 500
KETONES, UA POC OHS: NEGATIVE
LEUKOCYTES, UA POC OHS: NEGATIVE
NITRITE, UA POC OHS: NEGATIVE
PH, UA POC OHS: 6
PROTEIN, UA POC OHS: NEGATIVE
SPECIFIC GRAVITY, UA POC OHS: >=1.03
UROBILINOGEN, UA POC OHS: 0.2

## 2024-07-05 PROCEDURE — 99999 PR PBB SHADOW E&M-EST. PATIENT-LVL III: CPT | Mod: PBBFAC,,,

## 2024-07-05 NOTE — PROGRESS NOTES
[facility] Note  Staff: KUSUM Wallace    PCP: MD Jose L    Chief Complaint: Hematospermia    Subjective:        HPI: García Tang is a 58 y.o. male new patient to me presents today for evaluation of hematospermia. He states this has happened a few times within the past month. He also states at one point there was blood dripping from his penis. He denies seeing blood in his urine but states the blood was coming from his penis prior to seeing blood in his sperm. He denies any other symptoms such as dysuria, fever, flank pain, abd pain, and straining to urinate.    He has known BPH confirmed with cystoscopy 02/2023. At his last OV he was advised to trial flomax but he states he never started this medication and prefers not to take meds.     CT RSS done 7/1/2024 showed No focal renal abnormality.  No renal stone, hydronephrosis, or ureteral dilatation.     Bladder and prostate are unremarkable.    I reviewed all imaging with him today.     UA done 6/27/2024 from PCP was negative for all components.     Questions asked the pt during ov today:  Urgency: No, urge incontinence? No  Dysuria: No  Gross Hematuria:Yes - ??  Straining:No, Hesistancy:No, Intermittency:No, Weak stream:No    Last PSA Screening:   Lab Results   Component Value Date    PSA 0.52 04/29/2024    PSA 2.1 02/23/2023    PSA 7.8 (H) 01/28/2023    PSADIAG 0.43 04/24/2019    PSADIAG 0.38 02/06/2019       History of Kidney Stones?:  No    Constipation issues?:  No    REVIEW OF SYSTEMS:  Review of Systems   Constitutional: Negative.  Negative for chills and fever.   HENT: Negative.     Eyes: Negative.    Respiratory: Negative.     Cardiovascular: Negative.    Gastrointestinal: Negative.  Negative for abdominal pain, constipation, diarrhea, nausea and vomiting.   Genitourinary: Negative.  Negative for dysuria, flank pain, frequency, hematuria and urgency.   Musculoskeletal: Negative.  Negative for back pain.   Skin: Negative.     Neurological: Negative.    Endo/Heme/Allergies: Negative.    Psychiatric/Behavioral: Negative.         PMHx:  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    Diverticulitis     diverticulitis    Diverticulosis     Essential hypertension 07/18/2016    Fatty liver disease, nonalcoholic 04/07/2016    [place], 2006     GERD (gastroesophageal reflux disease)     Headache     HLD (hyperlipidemia)     Kidney stone     requiring stent    Meniere's disease 01/09/2018    Morbid obesity 05/29/2014    NAFLD (nonalcoholic fatty liver disease)     Near syncope 05/23/2017    Obesity hypoventilation syndrome 02/22/2016    PAD (peripheral artery disease) 09/17/2019    Mild 9/2018    Prostatitis     Rectal bleeding     diverticulitis    Sleep disorder breathing 2/12/2024    Thyroid nodule 07/14/2022       PSHx:  Past Surgical History:   Procedure Laterality Date    ANGIOGRAM, CORONARY, WITH LEFT HEART CATHETERIZATION  10/26/2023    Procedure: Left Heart Cath;  Surgeon: Sal Salazar MD;  Location: [facility] CATH;  Service: Cardiology;;    ANTERIOR CERVICAL DISCECTOMY W/ FUSION N/A 2/12/2024    Procedure: DISCECTOMY, SPINE, CERVICAL, ANTERIOR APPROACH, WITH FUSION C5-6;  Surgeon: Wali Zuniga MD;  Location: [facility] OR;  Service: Neurosurgery;  Laterality: N/A;    CARDIAC CATHETERIZATION      COLONOSCOPY  2008    diverticulosis per patient report    CORONARY ANGIOGRAPHY  10/26/2023    Procedure: Coronary angiogram study;  Surgeon: Sal Salazar MD;  Location: [facility] CATH;  Service: Cardiology;;    ESOPHAGOGASTRODUODENOSCOPY N/A 12/18/2023    Procedure: EGD (ESOPHAGOGASTRODUODENOSCOPY);  Surgeon: Aram Bermudez MD;  Location: [facility] ENDO;  Service: Gastroenterology;  Laterality: N/A;    kidney stent      KIDNEY SURGERY  1996    right kidney obstruction requiring stent-open    MAGNETIC  RESONANCE IMAGING N/A 1/26/2024    Procedure: MRI (MAGNETIC RESONANCE IMAGING);  Surgeon: Provider, Dosc Diagnostic;  Location: [facility];  Service: General;  Laterality: N/A;    NOSE SURGERY      UPPER GASTROINTESTINAL ENDOSCOPY  2021    [facility], gastritis per patient report       Fam Hx:   malignancies: Yes - father prostate cancer    kidney stones: No     Soc Hx:  , lives in [place]    Allergies:  Lisinopril, Iodinated contrast media, Bactrim  [sulfamethoxazole-trimethoprim], Diflucan [fluconazole], Gabapentin, Hydroxyzine hcl, Kenalog [triamcinolone acetonide], Phenergan [promethazine], and Zofran [ondansetron hcl (pf)]    Medications: reviewed     Objective:   There were no vitals filed for this visit.    Physical Exam  Constitutional:       Appearance: Normal appearance.   HENT:      Mouth/Throat:      Mouth: Mucous membranes are moist.   Eyes:      Conjunctiva/sclera: Conjunctivae normal.   Pulmonary:      Effort: Pulmonary effort is normal.   Abdominal:      General: There is no distension.      Palpations: Abdomen is soft.      Tenderness: There is no abdominal tenderness. There is no right CVA tenderness or left CVA tenderness.   Musculoskeletal:         General: Normal range of motion.      Cervical back: Normal range of motion.   Skin:     General: Skin is warm.   Neurological:      Mental Status: He is oriented to person, place, and time.          EXAM performed by me in office today:  deferred by pt    LABS REVIEW:  UA today:  urinated prior to OV    Assessment:       1. Acute bacterial prostatitis    2. Gross hematuria    3. Hematospermia          Plan:     Cystoscopy ordered and scheduled for further evaluation- gross hematuria??    F/u as needed per treatment plan    [facility]: Active    KUSUM Wallace

## 2024-07-06 ENCOUNTER — LAB VISIT (OUTPATIENT)
Dept: LAB | Facility: HOSPITAL | Age: 59
End: 2024-07-06
Attending: INTERNAL MEDICINE
Payer: COMMERCIAL

## 2024-07-06 DIAGNOSIS — I10 ESSENTIAL HYPERTENSION, MALIGNANT: ICD-10-CM

## 2024-07-06 DIAGNOSIS — E04.1 NONTOXIC UNINODULAR GOITER: Primary | ICD-10-CM

## 2024-07-06 DIAGNOSIS — E11.40 DIABETIC NEUROPATHY: ICD-10-CM

## 2024-07-06 LAB
ALBUMIN SERPL BCP-MCNC: 3.9 G/DL (ref 3.5–5.2)
ALP SERPL-CCNC: 68 U/L (ref 55–135)
ALT SERPL W/O P-5'-P-CCNC: 29 U/L (ref 10–44)
AMYLASE SERPL-CCNC: 33 U/L (ref 20–110)
ANION GAP SERPL CALC-SCNC: 9 MMOL/L (ref 8–16)
AST SERPL-CCNC: 24 U/L (ref 10–40)
BASOPHILS # BLD AUTO: 0.05 K/UL (ref 0–0.2)
BASOPHILS NFR BLD: 1.3 % (ref 0–1.9)
BILIRUB SERPL-MCNC: 0.7 MG/DL (ref 0.1–1)
BUN SERPL-MCNC: 13 MG/DL (ref 6–20)
CALCIUM SERPL-MCNC: 9.5 MG/DL (ref 8.7–10.5)
CHLORIDE SERPL-SCNC: 104 MMOL/L (ref 95–110)
CO2 SERPL-SCNC: 26 MMOL/L (ref 23–29)
CREAT SERPL-MCNC: 0.9 MG/DL (ref 0.5–1.4)
DIFFERENTIAL METHOD BLD: ABNORMAL
EOSINOPHIL # BLD AUTO: 0.1 K/UL (ref 0–0.5)
EOSINOPHIL NFR BLD: 3.1 % (ref 0–8)
ERYTHROCYTE [DISTWIDTH] IN BLOOD BY AUTOMATED COUNT: 11.9 % (ref 11.5–14.5)
EST. GFR  (NO RACE VARIABLE): >60 ML/MIN/1.73 M^2
ESTIMATED AVG GLUCOSE: 160 MG/DL (ref 68–131)
GLUCOSE SERPL-MCNC: 168 MG/DL (ref 70–110)
HBA1C MFR BLD: 7.2 % (ref 4–5.6)
HCT VFR BLD AUTO: 43 % (ref 40–54)
HGB BLD-MCNC: 14.3 G/DL (ref 14–18)
IMM GRANULOCYTES # BLD AUTO: 0.01 K/UL (ref 0–0.04)
IMM GRANULOCYTES NFR BLD AUTO: 0.3 % (ref 0–0.5)
LIPASE SERPL-CCNC: 30 U/L (ref 4–60)
LYMPHOCYTES # BLD AUTO: 1.2 K/UL (ref 1–4.8)
LYMPHOCYTES NFR BLD: 31.5 % (ref 18–48)
MCH RBC QN AUTO: 29.9 PG (ref 27–31)
MCHC RBC AUTO-ENTMCNC: 33.3 G/DL (ref 32–36)
MCV RBC AUTO: 90 FL (ref 82–98)
MONOCYTES # BLD AUTO: 0.5 K/UL (ref 0.3–1)
MONOCYTES NFR BLD: 12.2 % (ref 4–15)
NEUTROPHILS # BLD AUTO: 2 K/UL (ref 1.8–7.7)
NEUTROPHILS NFR BLD: 51.6 % (ref 38–73)
NRBC BLD-RTO: 0 /100 WBC
PLATELET # BLD AUTO: 224 K/UL (ref 150–450)
PMV BLD AUTO: 9.9 FL (ref 9.2–12.9)
POTASSIUM SERPL-SCNC: 4.5 MMOL/L (ref 3.5–5.1)
PROT SERPL-MCNC: 7.1 G/DL (ref 6–8.4)
RBC # BLD AUTO: 4.79 M/UL (ref 4.6–6.2)
SODIUM SERPL-SCNC: 139 MMOL/L (ref 136–145)
T3FREE SERPL-MCNC: 3.4 PG/ML (ref 2.3–4.2)
T4 FREE SERPL-MCNC: 0.86 NG/DL (ref 0.71–1.51)
TSH SERPL DL<=0.005 MIU/L-ACNC: 1.35 UIU/ML (ref 0.4–4)
WBC # BLD AUTO: 3.84 K/UL (ref 3.9–12.7)

## 2024-07-06 PROCEDURE — 84443 ASSAY THYROID STIM HORMONE: CPT | Performed by: INTERNAL MEDICINE

## 2024-07-06 PROCEDURE — 84481 FREE ASSAY (FT-3): CPT | Performed by: INTERNAL MEDICINE

## 2024-07-06 PROCEDURE — 80053 COMPREHEN METABOLIC PANEL: CPT | Performed by: INTERNAL MEDICINE

## 2024-07-06 PROCEDURE — 82150 ASSAY OF AMYLASE: CPT | Performed by: INTERNAL MEDICINE

## 2024-07-06 PROCEDURE — 84439 ASSAY OF FREE THYROXINE: CPT | Performed by: INTERNAL MEDICINE

## 2024-07-06 PROCEDURE — 83690 ASSAY OF LIPASE: CPT | Performed by: INTERNAL MEDICINE

## 2024-07-06 PROCEDURE — 83036 HEMOGLOBIN GLYCOSYLATED A1C: CPT | Performed by: INTERNAL MEDICINE

## 2024-07-06 PROCEDURE — 85025 COMPLETE CBC W/AUTO DIFF WBC: CPT | Performed by: INTERNAL MEDICINE

## 2024-07-06 PROCEDURE — 36415 COLL VENOUS BLD VENIPUNCTURE: CPT | Mod: PO | Performed by: INTERNAL MEDICINE

## 2024-07-08 ENCOUNTER — PATIENT MESSAGE (OUTPATIENT)
Dept: PSYCHIATRY | Facility: CLINIC | Age: 59
End: 2024-07-08
Payer: COMMERCIAL

## 2024-07-08 VITALS
TEMPERATURE: 98 F | DIASTOLIC BLOOD PRESSURE: 84 MMHG | WEIGHT: 315 LBS | OXYGEN SATURATION: 96 % | HEART RATE: 77 BPM | HEIGHT: 70 IN | SYSTOLIC BLOOD PRESSURE: 142 MMHG | BODY MASS INDEX: 45.1 KG/M2

## 2024-07-15 ENCOUNTER — OFFICE VISIT (OUTPATIENT)
Dept: PHYSICAL MEDICINE AND REHAB | Facility: CLINIC | Age: 59
End: 2024-07-15
Payer: COMMERCIAL

## 2024-07-15 VITALS — DIASTOLIC BLOOD PRESSURE: 75 MMHG | SYSTOLIC BLOOD PRESSURE: 130 MMHG | HEART RATE: 72 BPM

## 2024-07-15 DIAGNOSIS — S14.105A C5-C7 LEVEL SPINAL CORD INJURY, INITIAL ENCOUNTER: Primary | ICD-10-CM

## 2024-07-15 DIAGNOSIS — M54.2 ACUTE NECK PAIN: ICD-10-CM

## 2024-07-15 DIAGNOSIS — Z98.1 S/P CERVICAL SPINAL FUSION: ICD-10-CM

## 2024-07-15 PROCEDURE — 99215 OFFICE O/P EST HI 40 MIN: CPT | Mod: S$GLB,,, | Performed by: PHYSICAL MEDICINE & REHABILITATION

## 2024-07-15 PROCEDURE — 99999 PR PBB SHADOW E&M-EST. PATIENT-LVL III: CPT | Mod: PBBFAC,,, | Performed by: PHYSICAL MEDICINE & REHABILITATION

## 2024-07-15 RX ORDER — PREDNISONE 50 MG/1
TABLET ORAL
Qty: 3 TABLET | Refills: 0 | Status: SHIPPED | OUTPATIENT
Start: 2024-07-15

## 2024-07-15 RX ORDER — DIPHENHYDRAMINE HCL 50 MG
CAPSULE ORAL
Qty: 1 CAPSULE | Refills: 0 | Status: SHIPPED | OUTPATIENT
Start: 2024-07-15

## 2024-07-15 NOTE — PROGRESS NOTES
[facility] VISIT     Primary Care Provider: LORETTA Domingo MD     CHIEF COMPLAINT:   1.   Chief Complaint   Patient presents with    Consult for Neuro PT      2. Mobility and function management recommendations.     HISTORY OF PRESENT ILLNESS: García Tang is a 58 y.o.-year-old male with a history of spinal cord injury due to cervical myelopathy s/p C5-6 ACDF with late effect spastic quadriparesis, neurogenic bowel, gait dysfunction, fatigue who presents today for follow up evaluation and recommendations.     Main complaint today is: acute left sided neck  and jaw pain with radiation down left arm and left leg since a fall hitting his head on Friday, no loss of consciousness. Was on uneven ground and fell backwards into ditch and hit head.     F/u baclofen: taking four times daily  F/u fatigue and testosterone lab work: has not reached out to PCP, planning to make appt  F/u psychology eval for depression: has been called but has not  F/u Pt/OT/ST referral to [facility]: has not gotten evaluated but is now ready; plans to call    Reports,   Mobility/function: limited ambulation due to difficulty with response of legs and fatigues/exhausts easily, feels like his leg are heavier than normal   Dysphagia: chokes with thin liquids at times; seen ENT without concern, seen endocrine without concern for thyroid dysfunction  Pain: electrical shocks down arms and legs, neck (anterior and posterior)  Skin: no concerns  Bowel: BM daily, constipation; manages with probiotics   - no GI planned follow ups  Bladder: burning after urination, has issues emptying her bladder at times especially in the morning time   - seen by urology    Medications: tylenol, oral baclofen  - not interested in dantrolene due to liver concerns  - trialed gabapentin but had night terrors  Injections: none  Surgical procedures:   - C5/6 ACDF 02/012/2024    EQUIPMENT:  Braces: none  Wheelchair: none  Walker:  "rolling, does use    MOBILITY/TRANSFERS:  Walking: Distance can walk 1/2 mile but is limited by pain and instability in legs     COGNITION:  Speech: expressive, word finding  Memory: short term issues  Processing: "looses train of thought"  Mood: depressed, pre-morbid managed with exercise/jogging    ACTIVITIES OF DAILY LIVING:  Upper extremity dressing: ind  Lower extremity dressing: ind  Bathe: ind  Groom: ind  Toilet: ind    THERAPY/LOCATION:  PT: outpatient 2x/wk  OT: none currently  Speech: none currently    EDUCATION/VOCATION:  Highest level of education: some college  Occupation status: insurance, 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    RECREATION: exercise/jogging, basketball    LIVING SITUATION: lives with wife, daughter (total of 4 kids); single story home threshold to enter, walk in shower and tub shower combo with rail    PAST MEDICAL HISTORY:  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    Diverticulitis     diverticulitis    Diverticulosis     Essential hypertension 07/18/2016    Fatty liver disease, nonalcoholic 04/07/2016    [place], 2006     GERD (gastroesophageal reflux disease)     Headache     HLD (hyperlipidemia)     Kidney stone     requiring stent    Meniere's disease 01/09/2018    Morbid obesity 05/29/2014    NAFLD (nonalcoholic fatty liver disease)     Near syncope 05/23/2017    Obesity hypoventilation syndrome 02/22/2016    PAD (peripheral artery disease) 09/17/2019    Mild 9/2018    Prostatitis     Rectal bleeding     diverticulitis    Sleep disorder breathing 2/12/2024    Thyroid nodule 07/14/2022        PAST SURGICAL HISTORY:   Past Surgical History:   Procedure Laterality Date    ANGIOGRAM, CORONARY, WITH LEFT HEART CATHETERIZATION  10/26/2023    Procedure: Left Heart Cath;  Surgeon: Sal Salazar MD;  Location: [facility] CATH;  Service: Cardiology;;    ANTERIOR " CERVICAL DISCECTOMY W/ FUSION N/A 2/12/2024    Procedure: DISCECTOMY, SPINE, CERVICAL, ANTERIOR APPROACH, WITH FUSION C5-6;  Surgeon: Wali Zuniga MD;  Location: [facility] OR;  Service: Neurosurgery;  Laterality: N/A;    CARDIAC CATHETERIZATION      COLONOSCOPY  2008    diverticulosis per patient report    CORONARY ANGIOGRAPHY  10/26/2023    Procedure: Coronary angiogram study;  Surgeon: Sal Salazar MD;  Location: [facility] CATH;  Service: Cardiology;;    ESOPHAGOGASTRODUODENOSCOPY N/A 12/18/2023    Procedure: EGD (ESOPHAGOGASTRODUODENOSCOPY);  Surgeon: Aram Bermudez MD;  Location: [facility] ENDO;  Service: Gastroenterology;  Laterality: N/A;    kidney stent      KIDNEY SURGERY  1996    right kidney obstruction requiring stent-open    MAGNETIC RESONANCE IMAGING N/A 1/26/2024    Procedure: MRI (MAGNETIC RESONANCE IMAGING);  Surgeon: Provider, Dos[facility];  Location: [facility];  Service: General;  Laterality: N/A;    NOSE SURGERY      UPPER GASTROINTESTINAL ENDOSCOPY  2021    [facility], gastritis per patient report        FAMILY HISTORY:   Family History   Problem Relation Name Age of Onset    Liver disease Mother          d.    Cirrhosis Mother      Hypertension Mother      Hyperlipidemia Mother      Colon cancer Father  70    Cancer Father      Diabetes Father      Hypertension Father      Glaucoma Father      Hyperlipidemia Father      Prostate cancer Father      Diabetes Brother      Hypertension Brother      Thyroid cancer Brother      Heart attack Maternal Uncle      Heart attack Maternal Uncle      Heart disease Neg Hx      Heart failure Neg Hx      Macular degeneration Neg Hx      Retinal detachment Neg Hx      Crohn's disease Neg Hx      Ulcerative colitis Neg Hx      Stomach cancer Neg Hx      Esophageal cancer Neg Hx         SOCIAL HISTORY:    Social History     Socioeconomic History    Marital status:    Tobacco Use    Smoking status: Former     Current packs/day: 0.00     Types:  Cigarettes     Quit date: 1990     Years since quittin.5    Smokeless tobacco: Never    Tobacco comments:     social   Substance and Sexual Activity    Alcohol use: No     Comment: stopped in     Drug use: No    Sexual activity: Yes     Partners: Female     Social Determinants of Health     Financial Resource Strain: Patient Declined (2024)    Overall Financial Resource Strain (CARDIA)     Difficulty of Paying Living Expenses: Patient declined   Food Insecurity: Unknown (2024)    Hunger Vital Sign     Worried About Running Out of Food in the Last Year: Never true     Ran Out of Food in the Last Year: Patient declined   Transportation Needs: Unknown (2024)    PRAPARE - Transportation     Lack of Transportation (Medical): No     Lack of Transportation (Non-Medical): Patient declined   Physical Activity: Unknown (2024)    Exercise Vital Sign     Days of Exercise per Week: Patient declined     Minutes of Exercise per Session: 0 min   Recent Concern: Physical Activity - Inactive (2024)    Exercise Vital Sign     Days of Exercise per Week: 0 days     Minutes of Exercise per Session: 0 min   Stress: Patient Declined (2024)    [employer] [place] [employer] - Occupational Stress Questionnaire     Feeling of Stress : Patient declined   Housing Stability: Patient Declined (2024)    Housing Stability Vital Sign     Unable to Pay for Housing in the Last Year: Patient declined     Number of Places Lived in the Last Year: 1     Unstable Housing in the Last Year: Patient declined       MEDICATIONS:     Current Outpatient Medications:     acetaminophen (TYLENOL) 500 MG tablet, Take 1,000 mg by mouth every 6 (six) hours as needed for Pain., Disp: , Rfl:     amLODIPine (NORVASC) 5 MG tablet, Take 1 tablet (5 mg total) by mouth every evening., Disp: 90 tablet, Rfl: 1    ascorbic acid (VITAMIN C ORAL), Take 2 tablets by mouth once daily., Disp: , Rfl:     baclofen (LIORESAL) 5  mg Tab tablet, Take 1 tablet (5 mg total) by mouth 4 (four) times daily., Disp: 120 tablet, Rfl: 11    cholecalciferol, vitamin D3, 125 mcg (5,000 unit) Tab, Take 10,000 Units by mouth once daily., Disp: , Rfl:     diphenhydrAMINE (BENADRYL) 50 MG capsule, Take 50mg by mouth 1 hour before contrast administration., Disp: 1 capsule, Rfl: 0    famotidine (PEPCID) 20 MG tablet, Take 40 mg by mouth Daily., Disp: , Rfl:     Lactobacillus acidophilus (ACIDOPHILUS ORAL), Take 3 tablets by mouth once daily., Disp: , Rfl:     losartan (COZAAR) 100 MG tablet, Take 1 tablet (100 mg total) by mouth once daily., Disp: 90 tablet, Rfl: 1    magnesium oxide (MAG-OX) 400 mg (241.3 mg magnesium) tablet, Take 400 mg by mouth once daily., Disp: , Rfl:     metoprolol succinate (TOPROL-XL) 50 MG 24 hr tablet, Take 1 tablet (50 mg total) by mouth once daily., Disp: 90 tablet, Rfl: 3    NIACIN ORAL, Take 1 tablet by mouth once daily., Disp: , Rfl:     omeprazole (PRILOSEC) 40 MG capsule, Take 1 capsule (40 mg total) by mouth once daily., Disp: 90 capsule, Rfl: 3    predniSONE (DELTASONE) 50 MG Tab, Take 50mg by mouth at 13 hours, 7 hours, and 1 hour before contrast administration., Disp: 3 tablet, Rfl: 0    spironolactone (ALDACTONE) 25 MG tablet, Take 0.5 tablets (12.5 mg total) by mouth once daily., Disp: 45 tablet, Rfl: 1    zinc gluconate 50 mg tablet, Take 50 mg by mouth once daily., Disp: , Rfl:      ALLERGIES:   Review of patient's allergies indicates:   Allergen Reactions    Lisinopril Swelling    Iodinated contrast media Other (See Comments)     Spasms, tremor, and high blood pressure    Bactrim  [sulfamethoxazole-trimethoprim]      Other reaction(s): Unknown    Diflucan [fluconazole]      Heart racing    Gabapentin Other (See Comments)     nightmares    Hydroxyzine hcl      Other reaction(s): elevated bp    Kenalog [triamcinolone acetonide] Other (See Comments)     Heart flutters all day    Phenergan [promethazine] Anxiety     Zofran [ondansetron hcl (pf)] Anxiety       REVIEW OF SYSTEMS:   Denies coughs, colds, fevers, chills. + constipation. Bowel movements are regular. + dysphagia. Poor sleep. + depression. No drooling or difficulty handling oral secretions. No skin lesions.     PHYSICAL EXAMINATION:   VITALS:   Vitals:    07/15/24 1131   BP: 130/75   Pulse: 72   GENERAL: The patient is awake, alert, cooperative, and in no acute distress. Flat affect  HEENT: Normocephalic, atraumatic. Tracking is in all 4 quadrants. No facial asymmetry. Uvula is midline.   NECK: Supple. No lymphadenopathy. No masses. Full range of motion with pain  HEART: Appears well perfused. No lower extremity edema.   LUNGS: No increased work of breath.   ABDOMEN: Benign.   NEURO: Cranial nerves II-XII are grossly intact by observation.   EXTREMITIES: Warm, capillary refill less than 2 seconds. No clubbing, cyanosis or edema.   MUSCULOSKELETAL: No focal muscular/limb atrophy/hypertrophy. No leg length discrepancy. No gross deformity.     NEUROMUSCULAR:   Modified Neptali Scale:  1: justin wrist flexors  1+: justin knee flexors, justin ankle plantarflexors  2:   3:  4:     Manual muscle testing:      Right  Left  Elbow flexion   4/5  4/5  Elbow extension  5/5  4/5  Wrist extension  5/5  5/5  Finger flexion   5/5  5/5  Finger abduction  5/5  4/5    Hip flexion   3/5  3/5  Knee extension  4/5  3/5  Ankle dorsiflexion  4/5  3/5  Ankle plantarflexion  4/5  4/5  Great toe extension  4/5  3/5    Appropriate sitting balance.   No dyskinetic or dystonic movements appreciated.   Muscle stretch reflexes are 2+ throughout both upper and lower extremities.   Catch and release clonus at bilateral ankles. No wrist clonus  - bilateral Spurling's    GAIT/DYNAMIC: deliberate short steppage gait at first with stride lengthening with progression, poor BUE arm swing.     Transfers from supine to sit and sit to stand are independent.       EDX 04/16/2024 with noted: length-dependent axonal  sensorimotor peripheral neuropathy.     ASSESSMENT:   1. C5-C7 level spinal cord injury, initial encounter  CT Soft Tissue Neck With Contrast      2. S/P cervical spinal fusion  CT Soft Tissue Neck With Contrast      3. Acute neck pain  CT Soft Tissue Neck With Contrast            García Tang is a 58 y.o.-year-old male with a history of spinal cord injury due to cervical myelopathy s/p C5-6 ACDF with late effect spastic quadriparesis, neurogenic bowel, gait dysfunction, fatigue. The following recommendations and plan were discussed in depth with the patient who voiced understanding and was in agreement.     PLAN:   Spasticity:   - - we discussed options for management at this time are with oral medication and intrathecal baclofen therapy as follows:   - increase oral baclofen to 5 mg four times daily   - we will see how he responds to titration of oral baclofen prior to considering ITB vs focal treatment options.     Pain (MSK/Neuropathic):   - acute on chronic neck pain with radicular symptoms s/p fusion; will update CT neck with contrast to evaluate hardware; pending results will consider need for neurosurgery evaluation  - poor response to previous trial to gabapentin  - we discussed considerations for trial of Lyrica, for which we will wait and see his response to baclofen first    Fatigue:  - work up previously without noted thyroid dysfunction  - lab work reveals low free T, recommend hormone replacement therapy with PCP, he will consider  - consider repeating sleep study, previous did not show CHANELL but this was prior to his SCI    Mental health:  - pre-morbid depression with exacerbation since SCI, referral placed for Psychology evaluation and management, awaiting eval    Bracing:    - no needs at this time    Equipment:   - no needs at this time    Bowel:  - chronic constipation, no concerns at this time    Bladder:   - neurogenic bladder, recommend yearly U/S renal studies    Therapy:   - PT: order  placed for neuro at [facility]  - OT: order placed for neuro at ST  - ST: order placed for neuro at [facility]    RTC as needed, waiting on CT results to decide on next steps for management.      42 minutes of total time spent on the encounter, which includes face to face time and non-face to face time preparing to see the patient (eg, review of tests), obtaining and/or reviewing separately obtained history, documenting clinical information in the electronic or other health record, independently interpreting results (not separately reported), communicating results to the patient/family/caregiver, and/or care coordination (not separately reported).

## 2024-07-16 ENCOUNTER — HOSPITAL ENCOUNTER (OUTPATIENT)
Dept: RADIOLOGY | Facility: HOSPITAL | Age: 59
Discharge: HOME | End: 2024-07-16
Attending: PHYSICAL MEDICINE & REHABILITATION
Payer: COMMERCIAL

## 2024-07-16 DIAGNOSIS — S14.105A C5-C7 LEVEL SPINAL CORD INJURY, INITIAL ENCOUNTER: ICD-10-CM

## 2024-07-16 DIAGNOSIS — M54.2 ACUTE NECK PAIN: ICD-10-CM

## 2024-07-16 DIAGNOSIS — Z98.1 S/P CERVICAL SPINAL FUSION: ICD-10-CM

## 2024-07-16 PROCEDURE — 70491 CT SOFT TISSUE NECK W/DYE: CPT | Mod: TC,PO

## 2024-07-16 PROCEDURE — 70491 CT SOFT TISSUE NECK W/DYE: CPT | Mod: 26,,, | Performed by: RADIOLOGY

## 2024-07-16 PROCEDURE — 25500020 PHARM REV CODE 255: Mod: PO | Performed by: PHYSICAL MEDICINE & REHABILITATION

## 2024-07-16 RX ADMIN — IOHEXOL 75 ML: 350 INJECTION, SOLUTION INTRAVENOUS at 03:07

## 2024-07-17 ENCOUNTER — PATIENT MESSAGE (OUTPATIENT)
Dept: PHYSICAL MEDICINE AND REHAB | Facility: CLINIC | Age: 59
End: 2024-07-17
Payer: COMMERCIAL

## 2024-07-17 DIAGNOSIS — G82.50 SPASTIC QUADRIPARESIS: Primary | ICD-10-CM

## 2024-07-17 DIAGNOSIS — Z98.1 S/P CERVICAL SPINAL FUSION: ICD-10-CM

## 2024-07-17 DIAGNOSIS — S14.105A C5-C7 LEVEL SPINAL CORD INJURY, INITIAL ENCOUNTER: ICD-10-CM

## 2024-07-19 PROBLEM — Z98.1 S/P CERVICAL SPINAL FUSION: Status: ACTIVE | Noted: 2024-07-19

## 2024-07-19 PROBLEM — M53.82 NECK MUSCLE WEAKNESS: Status: ACTIVE | Noted: 2024-07-19

## 2024-07-19 PROBLEM — G82.50 SPASTIC QUADRIPARESIS: Status: ACTIVE | Noted: 2024-07-19

## 2024-07-19 PROBLEM — R29.898 WEAKNESS OF BOTH LOWER EXTREMITIES: Status: ACTIVE | Noted: 2024-07-19

## 2024-07-30 ENCOUNTER — TELEPHONE (OUTPATIENT)
Dept: FAMILY MEDICINE | Facility: CLINIC | Age: 59
End: 2024-07-30

## 2024-07-30 ENCOUNTER — OFFICE VISIT (OUTPATIENT)
Dept: FAMILY MEDICINE | Facility: CLINIC | Age: 59
End: 2024-07-30
Payer: COMMERCIAL

## 2024-07-30 VITALS
DIASTOLIC BLOOD PRESSURE: 82 MMHG | HEIGHT: 70 IN | OXYGEN SATURATION: 97 % | SYSTOLIC BLOOD PRESSURE: 136 MMHG | WEIGHT: 315 LBS | BODY MASS INDEX: 45.1 KG/M2 | HEART RATE: 64 BPM

## 2024-07-30 DIAGNOSIS — R51.9 ACUTE INTRACTABLE HEADACHE, UNSPECIFIED HEADACHE TYPE: Primary | ICD-10-CM

## 2024-07-30 DIAGNOSIS — E66.01 CLASS 3 SEVERE OBESITY WITH SERIOUS COMORBIDITY AND BODY MASS INDEX (BMI) OF 45.0 TO 49.9 IN ADULT, UNSPECIFIED OBESITY TYPE: ICD-10-CM

## 2024-07-30 DIAGNOSIS — R36.1 HEMATOSPERMIA: ICD-10-CM

## 2024-07-30 DIAGNOSIS — I10 ESSENTIAL (PRIMARY) HYPERTENSION: ICD-10-CM

## 2024-07-30 DIAGNOSIS — R10.32 LEFT LOWER QUADRANT PAIN: ICD-10-CM

## 2024-07-30 LAB
CTP QC/QA: YES
SARS-COV-2 RDRP RESP QL NAA+PROBE: NEGATIVE

## 2024-07-30 PROCEDURE — 87491 CHLMYD TRACH DNA AMP PROBE: CPT | Performed by: NURSE PRACTITIONER

## 2024-07-30 PROCEDURE — 87086 URINE CULTURE/COLONY COUNT: CPT | Performed by: NURSE PRACTITIONER

## 2024-07-30 PROCEDURE — 99214 OFFICE O/P EST MOD 30 MIN: CPT | Mod: S$GLB,,, | Performed by: NURSE PRACTITIONER

## 2024-07-30 PROCEDURE — 87591 N.GONORRHOEAE DNA AMP PROB: CPT | Performed by: NURSE PRACTITIONER

## 2024-07-30 PROCEDURE — 87661 TRICHOMONAS VAGINALIS AMPLIF: CPT | Performed by: NURSE PRACTITIONER

## 2024-07-30 PROCEDURE — 99999 PR PBB SHADOW E&M-EST. PATIENT-LVL IV: CPT | Mod: PBBFAC,,, | Performed by: NURSE PRACTITIONER

## 2024-07-30 PROCEDURE — 87635 SARS-COV-2 COVID-19 AMP PRB: CPT | Mod: QW,S$GLB,, | Performed by: NURSE PRACTITIONER

## 2024-07-30 RX ORDER — NAPROXEN 500 MG/1
500 TABLET ORAL 2 TIMES DAILY PRN
Qty: 20 TABLET | Refills: 0 | Status: SHIPPED | OUTPATIENT
Start: 2024-07-30

## 2024-07-30 RX ORDER — AMOXICILLIN AND CLAVULANATE POTASSIUM 875; 125 MG/1; MG/1
1 TABLET, FILM COATED ORAL EVERY 8 HOURS
Qty: 30 TABLET | Refills: 0 | Status: SHIPPED | OUTPATIENT
Start: 2024-07-30 | End: 2024-08-09

## 2024-07-30 NOTE — TELEPHONE ENCOUNTER
Called the pt to let him know that we were going to change the orders to run here in [facility] instead of [facility]. I apologized to him for wasting his time since I sent him to [facility] to get further test done thorough lab alesha. He mentioned that it was not a problem and we went ahead and sent the orders to our lab here at [facility] lab.

## 2024-07-30 NOTE — PATIENT INSTRUCTIONS
A few reminders from today:    Start augmentin TID with meals  Soft bland diet  Increase fluids  Naproxen as needed for headaches  TO ED for severe abdominal pain with fever, or Headaches with neurological changes.    Do not hesitate to get in touch with me should you have any further questions or concerns. Notify provider if symptoms worsen or do not improve.    Thank you for trusting me with your care!  I wish you health and happiness.      -LAWRENCE OhC

## 2024-07-30 NOTE — PROGRESS NOTES
"Subjective     Patient ID: García Tang is a 58 y.o. male.    Chief Complaint: Headache (Pt states his headache has been going on since Sunday 07/28.), Urinary Tract Infection (Pt states he's been burning while urinating on and off for a few months.), and Leg Pain      HPI      Patient is new to me. PCP Dr. Domingo.     58 y.o.-year-old male with a history of spinal cord injury due to cervical myelopathy s/p C5-6 ACDF with late effect spastic quadriparesis, neurogenic bowel, gait dysfunction, fatigue, DM2, fatty liver, BPH, HTN, MDD, anxiety, Meniere's presents to clinic for c/o burning with urination and headaches.  Woke up this morning with burning after urination. He has been having blood in his semen for several months. Saw urology, was supposed to have a cystoscopy but cancelled it. He also is complaining of left lower quadrant abdominal pain for about 2 weeks. Feels like a dull ache, that is constant moves from lower left abdomen up toward the belly button. Has a hx of diverticulitis. Occasional mucous in the stool. Denies any blood in the stool, diarrhea or fever. Small fat containing left inguinal hernia noted on CT scan from June. Headaches started about 3 days ago, frontal, worse at night. Has been feeling very tired, fatigued. Denies nausea or vomiting with headaches. No acute vision changes. Denies any URI symptoms.    Review of Systems   All other systems reviewed and are negative.         Objective   Vitals:    07/30/24 0814   BP: 136/82   Pulse: 64   SpO2: 97%   Weight: (!) 143.4 kg (316 lb 2.2 oz)   Height: 5' 10" (1.778 m)      Physical Exam  Vitals and nursing note reviewed.   Constitutional:       General: He is not in acute distress.     Appearance: Normal appearance. He is obese. He is not ill-appearing, toxic-appearing or diaphoretic.   HENT:      Head: Normocephalic and atraumatic.      Right Ear: Tympanic membrane, ear canal and external ear normal. There is no impacted cerumen.      Left " Ear: Tympanic membrane, ear canal and external ear normal. There is no impacted cerumen.      Nose: No congestion or rhinorrhea.      Mouth/Throat:      Pharynx: No oropharyngeal exudate or posterior oropharyngeal erythema.   Eyes:      General: No scleral icterus.        Right eye: No discharge.         Left eye: No discharge.      Conjunctiva/sclera: Conjunctivae normal.      Pupils: Pupils are equal, round, and reactive to light.   Cardiovascular:      Rate and Rhythm: Normal rate and regular rhythm.      Pulses: Normal pulses.      Heart sounds: Normal heart sounds. No murmur heard.     No friction rub. No gallop.   Pulmonary:      Effort: Pulmonary effort is normal. No respiratory distress.      Breath sounds: Normal breath sounds. No stridor. No wheezing, rhonchi or rales.   Abdominal:      General: Abdomen is flat. Bowel sounds are normal. There is no distension.      Palpations: Abdomen is soft. There is no mass.      Tenderness: There is abdominal tenderness (Left lower quadrant). There is no right CVA tenderness, left CVA tenderness, guarding or rebound.      Hernia: No hernia (No palpable hernia noted) is present.   Musculoskeletal:         General: No deformity or signs of injury. Normal range of motion.      Cervical back: Neck supple. No muscular tenderness.      Right lower leg: No edema.      Left lower leg: No edema.   Lymphadenopathy:      Cervical: No cervical adenopathy.   Skin:     General: Skin is warm.      Coloration: Skin is not jaundiced or pale.      Findings: No rash.   Neurological:      General: No focal deficit present.      Mental Status: He is alert and oriented to person, place, and time. Mental status is at baseline.      Cranial Nerves: Cranial nerves 2-12 are intact.   Psychiatric:         Mood and Affect: Mood normal.         Behavior: Behavior normal.         Thought Content: Thought content normal.         Judgment: Judgment normal.         1. Acute intractable headache,  unspecified headache type  - naproxen (NAPROSYN) 500 MG tablet; Take 1 tablet (500 mg total) by mouth 2 (two) times daily as needed (headache).  Dispense: 20 tablet; Refill: 0  - POCT COVID-19 Rapid Screening  Increase fluid intake, limit screen time, naproxen prn, discussed avoiding medication overuse.     2. Hematospermia  - Urine culture  - CT/NG, T. vaginalis; Future  - CT/NG, T. vaginalis  Need to schedule cystoscopy. Encouraged pt to followup with urology.     3. Left lower quadrant pain  - amoxicillin-clavulanate 875-125mg (AUGMENTIN) 875-125 mg per tablet; Take 1 tablet by mouth every 8 (eight) hours. for 10 days  Dispense: 30 tablet; Refill: 0  Concern for possible diverticulitis, start Augmentin, discussed soft bland diet and increasing fluids. Close followup in 1 week. Needs colonoscopy.    4. Essential (primary) hypertension  Controlled on Metoprolol, Amlodipine, and losartan    5. Class 3 severe obesity with serious comorbidity and body mass index (BMI) of 45.0 to 49.9 in adult, unspecified obesity type   Recommend exercise, weight loss, balanced diet rich in fruits/vegetables and lean protein.    RTC/ER precautions given. F/U in 1 week.        KUSUM Oh

## 2024-07-30 NOTE — TELEPHONE ENCOUNTER
----- Message from Blanca Le sent at 7/30/2024 10:54 AM CDT -----  Regarding: advice  Type:  Needs Medical Advice    Who Called: pt    Best Call Back Number: [phone]      Additional Information: pt st that he is still waiting for his orders to be sent to lab [facility] in [facility] [place] [facility]. He has been waiting for thirty mins now. please call to discuss.

## 2024-07-31 ENCOUNTER — PATIENT MESSAGE (OUTPATIENT)
Dept: FAMILY MEDICINE | Facility: CLINIC | Age: 59
End: 2024-07-31
Payer: COMMERCIAL

## 2024-07-31 LAB
BACTERIA UR CULT: NO GROWTH
C TRACH DNA SPEC QL NAA+PROBE: NOT DETECTED
N GONORRHOEA DNA SPEC QL NAA+PROBE: NOT DETECTED

## 2024-08-01 LAB
SPECIMEN SOURCE: NORMAL
T VAGINALIS RRNA SPEC QL NAA+PROBE: NEGATIVE

## 2024-08-09 ENCOUNTER — OFFICE VISIT (OUTPATIENT)
Dept: FAMILY MEDICINE | Facility: CLINIC | Age: 59
End: 2024-08-09
Payer: COMMERCIAL

## 2024-08-09 VITALS
OXYGEN SATURATION: 98 % | HEIGHT: 70 IN | HEART RATE: 75 BPM | DIASTOLIC BLOOD PRESSURE: 78 MMHG | WEIGHT: 315 LBS | BODY MASS INDEX: 45.1 KG/M2 | SYSTOLIC BLOOD PRESSURE: 132 MMHG

## 2024-08-09 DIAGNOSIS — E11.69 TYPE 2 DIABETES MELLITUS WITH OTHER SPECIFIED COMPLICATION, WITHOUT LONG-TERM CURRENT USE OF INSULIN: ICD-10-CM

## 2024-08-09 DIAGNOSIS — I10 ESSENTIAL (PRIMARY) HYPERTENSION: ICD-10-CM

## 2024-08-09 DIAGNOSIS — R51.9 CHRONIC INTRACTABLE HEADACHE, UNSPECIFIED HEADACHE TYPE: ICD-10-CM

## 2024-08-09 DIAGNOSIS — R10.32 LLQ PAIN: Primary | ICD-10-CM

## 2024-08-09 DIAGNOSIS — G89.29 CHRONIC INTRACTABLE HEADACHE, UNSPECIFIED HEADACHE TYPE: ICD-10-CM

## 2024-08-09 DIAGNOSIS — Z12.11 SCREENING FOR COLON CANCER: ICD-10-CM

## 2024-08-09 PROCEDURE — 99999 PR PBB SHADOW E&M-EST. PATIENT-LVL V: CPT | Mod: PBBFAC,,, | Performed by: NURSE PRACTITIONER

## 2024-08-26 ENCOUNTER — TELEPHONE (OUTPATIENT)
Dept: GASTROENTEROLOGY | Facility: CLINIC | Age: 59
End: 2024-08-26
Payer: COMMERCIAL

## 2024-08-26 NOTE — TELEPHONE ENCOUNTER
Attempted to contact pt to schedule colonoscopy. No answer. Vmail left and phone number provided for call back.

## 2024-09-04 ENCOUNTER — OFFICE VISIT (OUTPATIENT)
Dept: FAMILY MEDICINE | Facility: CLINIC | Age: 59
End: 2024-09-04
Payer: COMMERCIAL

## 2024-09-04 VITALS
BODY MASS INDEX: 45.1 KG/M2 | OXYGEN SATURATION: 100 % | HEART RATE: 69 BPM | WEIGHT: 315 LBS | HEIGHT: 70 IN | DIASTOLIC BLOOD PRESSURE: 84 MMHG | SYSTOLIC BLOOD PRESSURE: 146 MMHG

## 2024-09-04 DIAGNOSIS — I10 ESSENTIAL (PRIMARY) HYPERTENSION: ICD-10-CM

## 2024-09-04 DIAGNOSIS — R10.32 LEFT LOWER QUADRANT ABDOMINAL PAIN: Primary | ICD-10-CM

## 2024-09-04 LAB
BILIRUB UR QL STRIP: NEGATIVE
CLARITY UR: CLEAR
COLOR UR: YELLOW
GLUCOSE UR QL STRIP: NEGATIVE
HGB UR QL STRIP: NEGATIVE
KETONES UR QL STRIP: NEGATIVE
LEUKOCYTE ESTERASE UR QL STRIP: NEGATIVE
NITRITE UR QL STRIP: NEGATIVE
PH UR STRIP: 6 [PH] (ref 5–8)
PROT UR QL STRIP: NEGATIVE
SP GR UR STRIP: 1.01 (ref 1–1.03)
URN SPEC COLLECT METH UR: NORMAL

## 2024-09-04 PROCEDURE — 99999 PR PBB SHADOW E&M-EST. PATIENT-LVL III: CPT | Mod: PBBFAC,,, | Performed by: NURSE PRACTITIONER

## 2024-09-04 PROCEDURE — 99213 OFFICE O/P EST LOW 20 MIN: CPT | Mod: S$GLB,,, | Performed by: NURSE PRACTITIONER

## 2024-09-04 PROCEDURE — 81003 URINALYSIS AUTO W/O SCOPE: CPT | Mod: PO | Performed by: NURSE PRACTITIONER

## 2024-09-04 NOTE — PROGRESS NOTES
"Subjective     Patient ID: García Tang is a 58 y.o. male.    Chief Complaint: Abdominal Pain (PT states he has been having stomach issues. Pt started taking tylenol on and off for the last few months.)      HPI      Patient is known to me. PCP is Dr. Domingo.     58 y.o.-year-old male with a history of spinal cord injury due to cervical myelopathy s/p C5-6 ACDF with late effect spastic quadriparesis, neurogenic bowel, gait dysfunction, fatigue, DM2, fatty liver, BPH, HTN, MDD, anxiety, Meniere's presents to clinic for c/o abdominal pain for the last 3 weeks off and on, worse after eating. Denies nausea, vomiting, diarrhea, bloody or black stools. No fever. Denies any urinary symptoms. Hx of diverticulosis, was treated for suspected diverticulitis about a month ago. Felt better with antibiotics and then a week later started again.     Review of Systems   All other systems reviewed and are negative.         Objective   Vitals:    09/04/24 1544   BP: (!) 146/84   Pulse: 69   SpO2: 100%   Weight: (!) 144.3 kg (318 lb 2 oz)   Height: 5' 10" (1.778 m)      Physical Exam  Constitutional:       General: He is not in acute distress.     Appearance: Normal appearance. He is obese. He is not ill-appearing, toxic-appearing or diaphoretic.   HENT:      Head: Normocephalic and atraumatic.   Cardiovascular:      Heart sounds: Normal heart sounds. No murmur heard.     No friction rub. No gallop.   Pulmonary:      Effort: No respiratory distress.      Breath sounds: Normal breath sounds. No stridor. No wheezing, rhonchi or rales.   Chest:      Chest wall: No tenderness.   Abdominal:      General: Bowel sounds are normal. There is no distension.      Palpations: Abdomen is soft. There is no mass.      Tenderness: There is abdominal tenderness (LLQ). There is no right CVA tenderness, left CVA tenderness, guarding or rebound.   Musculoskeletal:      Right lower leg: No edema.      Left lower leg: No edema.   Neurological:      " General: No focal deficit present.      Mental Status: He is alert and oriented to person, place, and time. Mental status is at baseline.   Psychiatric:         Mood and Affect: Mood normal.         Behavior: Behavior normal.         Thought Content: Thought content normal.         Judgment: Judgment normal.         1. Left lower quadrant abdominal pain  - CT Abdomen Pelvis  Without Contrast; Future  - Urinalysis  Suspect diverticulitis, recommend clear liquid diet for a few days, will confirm with CT and treat accordingly.     2. Essential (primary) hypertension   Monitor at home, notify provider if consistently >140/90    RTC/ER precautions given.     Counseled on regular exercise, maintenance of a healthy weight, balanced diet rich in fruits/vegetables and lean protein, and avoidance of unhealthy habits like smoking and excessive alcohol intake.    LAWRENCE OhC

## 2024-09-05 ENCOUNTER — PATIENT MESSAGE (OUTPATIENT)
Dept: FAMILY MEDICINE | Facility: CLINIC | Age: 59
End: 2024-09-05
Payer: COMMERCIAL

## 2024-09-05 ENCOUNTER — TELEPHONE (OUTPATIENT)
Dept: GASTROENTEROLOGY | Facility: CLINIC | Age: 59
End: 2024-09-05
Payer: COMMERCIAL

## 2024-09-05 ENCOUNTER — TELEPHONE (OUTPATIENT)
Dept: FAMILY MEDICINE | Facility: CLINIC | Age: 59
End: 2024-09-05
Payer: COMMERCIAL

## 2024-09-05 NOTE — TELEPHONE ENCOUNTER
Attempted to contact pt to schedule colonoscopy. No answer. Vmail left and phone number provided for call back.  Letter mailed to pts home. [employer] msg sent. Case request at this time. Pt will be scheduled from current order upon call back. No new order needed. PCP informed.

## 2024-09-06 ENCOUNTER — TELEPHONE (OUTPATIENT)
Dept: FAMILY MEDICINE | Facility: CLINIC | Age: 59
End: 2024-09-06
Payer: COMMERCIAL

## 2024-09-06 DIAGNOSIS — K57.92 DIVERTICULITIS: Primary | ICD-10-CM

## 2024-09-06 RX ORDER — MOXIFLOXACIN HYDROCHLORIDE 400 MG/1
400 TABLET ORAL DAILY
Qty: 10 TABLET | Refills: 0 | Status: SHIPPED | OUTPATIENT
Start: 2024-09-06 | End: 2024-09-16

## 2024-09-06 NOTE — TELEPHONE ENCOUNTER
Called pt & offered an CT Scan that was available in [place] @ multi times and pt refused offered for tomorrow in [place] pt refused. Ursula Clement, LISA said pt can either go to [place] for CT or ER. PT ERIK.

## 2024-09-17 ENCOUNTER — LAB VISIT (OUTPATIENT)
Dept: LAB | Facility: HOSPITAL | Age: 59
End: 2024-09-17
Attending: INTERNAL MEDICINE
Payer: COMMERCIAL

## 2024-09-17 ENCOUNTER — OFFICE VISIT (OUTPATIENT)
Dept: FAMILY MEDICINE | Facility: CLINIC | Age: 59
End: 2024-09-17
Payer: COMMERCIAL

## 2024-09-17 VITALS
OXYGEN SATURATION: 97 % | DIASTOLIC BLOOD PRESSURE: 84 MMHG | HEIGHT: 70 IN | WEIGHT: 315 LBS | BODY MASS INDEX: 45.1 KG/M2 | SYSTOLIC BLOOD PRESSURE: 122 MMHG | HEART RATE: 71 BPM | RESPIRATION RATE: 18 BRPM

## 2024-09-17 DIAGNOSIS — R10.32 LEFT LOWER QUADRANT PAIN: ICD-10-CM

## 2024-09-17 DIAGNOSIS — K57.92 DIVERTICULITIS: ICD-10-CM

## 2024-09-17 DIAGNOSIS — K92.1 HEMATOCHEZIA: ICD-10-CM

## 2024-09-17 DIAGNOSIS — E11.65 TYPE 2 DIABETES MELLITUS WITH HYPERGLYCEMIA, WITHOUT LONG-TERM CURRENT USE OF INSULIN: ICD-10-CM

## 2024-09-17 DIAGNOSIS — I10 ESSENTIAL (PRIMARY) HYPERTENSION: ICD-10-CM

## 2024-09-17 DIAGNOSIS — R42 LIGHTHEADED: ICD-10-CM

## 2024-09-17 DIAGNOSIS — E66.01 CLASS 3 SEVERE OBESITY WITH SERIOUS COMORBIDITY AND BODY MASS INDEX (BMI) OF 45.0 TO 49.9 IN ADULT, UNSPECIFIED OBESITY TYPE: ICD-10-CM

## 2024-09-17 DIAGNOSIS — R53.83 FATIGUE, UNSPECIFIED TYPE: Primary | ICD-10-CM

## 2024-09-17 DIAGNOSIS — R53.83 FATIGUE, UNSPECIFIED TYPE: ICD-10-CM

## 2024-09-17 DIAGNOSIS — R10.32 LLQ ABDOMINAL PAIN: ICD-10-CM

## 2024-09-17 LAB
ALBUMIN SERPL BCP-MCNC: 4.2 G/DL (ref 3.5–5.2)
ALP SERPL-CCNC: 72 U/L (ref 55–135)
ALT SERPL W/O P-5'-P-CCNC: 25 U/L (ref 10–44)
ANION GAP SERPL CALC-SCNC: 5 MMOL/L (ref 8–16)
AST SERPL-CCNC: 21 U/L (ref 10–40)
BILIRUB SERPL-MCNC: 0.4 MG/DL (ref 0.1–1)
BUN SERPL-MCNC: 12 MG/DL (ref 6–20)
CALCIUM SERPL-MCNC: 9.5 MG/DL (ref 8.7–10.5)
CHLORIDE SERPL-SCNC: 104 MMOL/L (ref 95–110)
CO2 SERPL-SCNC: 29 MMOL/L (ref 23–29)
CREAT SERPL-MCNC: 1 MG/DL (ref 0.5–1.4)
ERYTHROCYTE [DISTWIDTH] IN BLOOD BY AUTOMATED COUNT: 12.4 % (ref 11.5–14.5)
EST. GFR  (NO RACE VARIABLE): >60 ML/MIN/1.73 M^2
GLUCOSE SERPL-MCNC: 136 MG/DL (ref 70–110)
HCT VFR BLD AUTO: 43.2 % (ref 40–54)
HGB BLD-MCNC: 14.6 G/DL (ref 14–18)
MCH RBC QN AUTO: 30.4 PG (ref 27–31)
MCHC RBC AUTO-ENTMCNC: 33.8 G/DL (ref 32–36)
MCV RBC AUTO: 90 FL (ref 82–98)
PLATELET # BLD AUTO: 225 K/UL (ref 150–450)
PMV BLD AUTO: 10 FL (ref 9.2–12.9)
POTASSIUM SERPL-SCNC: 4.5 MMOL/L (ref 3.5–5.1)
PROT SERPL-MCNC: 7.2 G/DL (ref 6–8.4)
RBC # BLD AUTO: 4.8 M/UL (ref 4.6–6.2)
SODIUM SERPL-SCNC: 138 MMOL/L (ref 136–145)
WBC # BLD AUTO: 5 K/UL (ref 3.9–12.7)

## 2024-09-17 PROCEDURE — 80053 COMPREHEN METABOLIC PANEL: CPT | Performed by: INTERNAL MEDICINE

## 2024-09-17 PROCEDURE — 85027 COMPLETE CBC AUTOMATED: CPT | Performed by: INTERNAL MEDICINE

## 2024-09-17 PROCEDURE — 36415 COLL VENOUS BLD VENIPUNCTURE: CPT | Mod: PN | Performed by: INTERNAL MEDICINE

## 2024-09-17 PROCEDURE — 99999 PR PBB SHADOW E&M-EST. PATIENT-LVL IV: CPT | Mod: PBBFAC,,, | Performed by: INTERNAL MEDICINE

## 2024-09-17 PROCEDURE — 99215 OFFICE O/P EST HI 40 MIN: CPT | Mod: S$GLB,,, | Performed by: INTERNAL MEDICINE

## 2024-09-17 RX ORDER — METRONIDAZOLE 500 MG/1
500 TABLET ORAL EVERY 8 HOURS
Qty: 21 TABLET | Refills: 0 | Status: SHIPPED | OUTPATIENT
Start: 2024-09-17 | End: 2024-09-24

## 2024-09-17 RX ORDER — MOXIFLOXACIN HYDROCHLORIDE 400 MG/1
400 TABLET ORAL DAILY
Qty: 5 TABLET | Refills: 0 | Status: SHIPPED | OUTPATIENT
Start: 2024-09-17

## 2024-09-17 NOTE — PROGRESS NOTES
Subjective:       Patient ID: García Tang is a 58 y.o. male.    Chief Complaint: Dizziness (Patient stated his dizzy spells come and go. His PT wasn't bothering him and suddenly he started experiencing dizziness. His stomach pain is on the lower left side, he started taking antibiotics and has a colonoscopy scheduled. It's been active for the last couple of months. He states he needs a CT Scan. ) and Abdominal Pain   Pt unknown to me.  PCP Dr Domingo.  Here for same day urgent care visit.  Various vague complaints related to presumed diverticulitis flare    Seen by NP 1 wk ago for left lower quadrant abdominal pain treated for suspected diverticulitis.  Started Avelox still has few pills left may have missed couple of days.   Also had episode in Aug tx w antibx.  Patient feels symptoms have not greatly improved with Avelox.   Still has constant dull fullness & pressure left lower quadrant abdominal pain.  CT scan was ordered was not able to complete due to scheduling conflicts.  Appears NP recommend ER visit if worse symptoms.  Which patient has deferred.  And presented here today for an evaluation.    Old CT scan for renal protocol does show diverticula noninflamed July.    Report normal appetite, having normal bowel movements.  No constipation diarrhea.  One episode blood in the stool after wiping.  Never happened again.  To avoid constipation takes daily probiotic.  Denies fever nausea vomiting        Past medical issues include February status post s/p C5-6 ACDF.  Slowly improving completing PT back to work.     Review previous lab.  Type 2 diabetes A1c 7.2 patient reports he is diet-controlled diabetic.  Although was previously treated with insulin before cervical fusion   Patient not on any oral medications.  Patient does complain of increased fatigue no energy brain fog.  Not testing sugars often at home.   HTN controlled    Colonoscopy is getting scheduled soon    Lightheadedness does have history of  vertigo in the past.  Reports yesterday 3-4 times while working on the computer had episodes of lightheadedness.  Lasted few seconds resolves.  Has been staying hydrated.  Did not check blood pressure  Today while driving had another episode seconds lightheadedness.  Has not had since.     Also complains of chronic fatigue no energy      Dizziness:   Chronicity:  Recurrent  Onset:  Yesterday  Progression since onset:  Waxing and waning  Frequency - weeks/days included: few seconds.  Severity:  Mild  Duration:  Very brief  Dizziness characteristics:  Sensation of movement and off-balance   Associated symptoms: light-headedness.no fever, no headaches, no tinnitus, no nausea, no vomiting, no diaphoresis, no visual disturbances, no syncope and no palpitations.  Aggravated by:  Nothing  Treatments tried:  Nothing  Abdominal Pain  This is a new problem. Episode onset: 2 wks. The problem occurs intermittently. The problem has been waxing and waning. The pain is located in the LLQ. The pain is mild. The quality of the pain is aching, a sensation of fullness and dull. The abdominal pain does not radiate. Associated symptoms include hematochezia. Pertinent negatives include no anorexia, belching, constipation, diarrhea, dysuria, fever, headaches, nausea or vomiting. The pain is aggravated by certain positions and palpation. The pain is relieved by Certain positions. He has tried proton pump inhibitors and antibiotics for the symptoms. The treatment provided mild relief. Prior workup: CT ordered not done.         ____________________________________________________________________________________________________  Assessment & Plan:  1. Fatigue, unspecified type  - CBC Without Differential; Future  - Comprehensive Metabolic Panel; Future    2. LLQ abdominal pain  - metroNIDAZOLE (FLAGYL) 500 MG tablet; Take 1 tablet (500 mg total) by mouth every 8 (eight) hours. for 7 days  Dispense: 21 tablet; Refill: 0  - moxifloxacin  (AVELOX) 400 mg tablet; Take 1 tablet (400 mg total) by mouth once daily.  Dispense: 5 tablet; Refill: 0  - CT Abdomen Pelvis  Without Contrast; Future    3. Hematochezia  - CT Abdomen Pelvis  Without Contrast; Future    4. Diverticulitis  - metroNIDAZOLE (FLAGYL) 500 MG tablet; Take 1 tablet (500 mg total) by mouth every 8 (eight) hours. for 7 days  Dispense: 21 tablet; Refill: 0  - moxifloxacin (AVELOX) 400 mg tablet; Take 1 tablet (400 mg total) by mouth once daily.  Dispense: 5 tablet; Refill: 0    5. Type 2 diabetes mellitus with hyperglycemia, without long-term current use of insulin    6. Lightheaded    7. Class 3 severe obesity with serious comorbidity and body mass index (BMI) of 45.0 to 49.9 in adult, unspecified obesity type    8. Left lower quadrant pain  - CT Abdomen Pelvis  Without Contrast; Future     Fatigue, unspecified type  -     CBC Without Differential; Future; Expected date: 09/17/2024  -     Comprehensive Metabolic Panel; Future; Expected date: 09/17/2024    LLQ abdominal pain  -     metroNIDAZOLE (FLAGYL) 500 MG tablet; Take 1 tablet (500 mg total) by mouth every 8 (eight) hours. for 7 days  Dispense: 21 tablet; Refill: 0  -     moxifloxacin (AVELOX) 400 mg tablet; Take 1 tablet (400 mg total) by mouth once daily.  Dispense: 5 tablet; Refill: 0  -     CT Abdomen Pelvis  Without Contrast; Future; Expected date: 09/17/2024    Hematochezia  Comments:  Check CBC  Orders:  -     CT Abdomen Pelvis  Without Contrast; Future; Expected date: 09/17/2024    Diverticulitis  Comments:  Extend antibiotics add metronidazole.  Attempt to get CT scheduled today.  Patient could also do CT if available on a outside facility [facility] or [place]  Orders:  -     metroNIDAZOLE (FLAGYL) 500 MG tablet; Take 1 tablet (500 mg total) by mouth every 8 (eight) hours. for 7 days  Dispense: 21 tablet; Refill: 0  -     moxifloxacin (AVELOX) 400 mg tablet; Take 1 tablet (400 mg total) by mouth once daily.  Dispense: 5  tablet; Refill: 0    Type 2 diabetes mellitus with hyperglycemia, without long-term current use of insulin  Comments:  Check glucose recommend check glucose daily fasting.  Consider adding diabetes medication    Lightheaded    Class 3 severe obesity with serious comorbidity and body mass index (BMI) of 45.0 to 49.9 in adult, unspecified obesity type    Left lower quadrant pain  -     CT Abdomen Pelvis  Without Contrast; Future; Expected date: 09/17/2024        Continue to work on maintain healthy weight, balanced diet. Avoid unhealthy habits: smoking/vaping, excessive alcohol intake.       Disclaimer: This note was partly generated using dictation software which may occasionally result in transcription errors  ____________________________________________________________________________________________________  Review of Systems:  Review of Systems   Constitutional:  Positive for fatigue. Negative for diaphoresis and fever.   HENT:  Negative for tinnitus.    Cardiovascular:  Negative for palpitations and syncope.   Gastrointestinal:  Positive for abdominal pain, blood in stool and hematochezia. Negative for anorexia, constipation, diarrhea, nausea and vomiting.   Genitourinary:  Negative for dysuria.   Neurological:  Positive for dizziness and light-headedness. Negative for headaches.           Objective:     Wt Readings from Last 3 Encounters:   09/17/24 (!) 144.2 kg (317 lb 12.7 oz)   09/04/24 (!) 144.3 kg (318 lb 2 oz)   08/09/24 (!) 143.8 kg (316 lb 14.6 oz)     BP Readings from Last 3 Encounters:   09/17/24 122/84   09/04/24 (!) 146/84   08/09/24 132/78       Lab Results   Component Value Date    WBC 3.84 (L) 07/06/2024    HGB 14.3 07/06/2024    HCT 43.0 07/06/2024     07/06/2024     07/06/2024    K 4.5 07/06/2024     07/06/2024    ALT 29 07/06/2024    AST 24 07/06/2024    CO2 26 07/06/2024    CREATININE 0.9 07/06/2024    BUN 13 07/06/2024    PSA 0.52 04/29/2024     (H) 07/06/2024       Hemoglobin A1C   Date Value Ref Range Status   07/06/2024 7.2 (H) 4.0 - 5.6 % Final     Comment:     ADA Screening Guidelines:  5.7-6.4%  Consistent with prediabetes  >or=6.5%  Consistent with diabetes    High levels of fetal hemoglobin interfere with the HbA1C  assay. Heterozygous hemoglobin variants (HbS, HgC, etc)do  not significantly interfere with this assay.   However, presence of multiple variants may affect accuracy.     04/29/2024 6.7 (H) 4.0 - 5.6 % Final     Comment:     ADA Screening Guidelines:  5.7-6.4%  Consistent with prediabetes  >or=6.5%  Consistent with diabetes    High levels of fetal hemoglobin interfere with the HbA1C  assay. Heterozygous hemoglobin variants (HbS, HgC, etc)do  not significantly interfere with this assay.   However, presence of multiple variants may affect accuracy.     12/27/2023 6.2 (H) 0.0 - 5.6 % Final     Comment:     Reference Interval:  5.0 - 5.6 Normal   5.7 - 6.4 High Risk   > 6.5 Diabetic      Hgb A1c results are standardized based on the (NGSP) National   Glycohemoglobin Standardization Program.      Hemoglobin A1C levels are related to mean serum/plasma glucose   during the preceding 2-3 months.           Lab Results   Component Value Date    TSH 1.346 07/06/2024    TSH 1.682 04/29/2024    TSH 1.494 01/17/2024     Lab Results   Component Value Date    FREET4 0.86 07/06/2024    FREET4 0.88 08/08/2023    FREET4 0.97 01/28/2023     Lab Results   Component Value Date    LDLCALC 125.6 04/29/2024    LDLCALC 125.0 11/07/2023    LDLCALC 106.6 01/28/2023     Lab Results   Component Value Date    TRIG 117 04/29/2024    TRIG 105 11/07/2023    TRIG 47 01/28/2023            Physical Exam  Constitutional:       Appearance: Normal appearance.   HENT:      Head: Normocephalic and atraumatic.   Eyes:      Extraocular Movements: Extraocular movements intact.      Conjunctiva/sclera: Conjunctivae normal.      Pupils: Pupils are equal, round, and reactive to light.   Cardiovascular:       Rate and Rhythm: Normal rate and regular rhythm.   Pulmonary:      Effort: Pulmonary effort is normal.      Breath sounds: Normal breath sounds.   Abdominal:      General: Bowel sounds are normal. There is no distension.      Palpations: There is no mass.      Tenderness: There is abdominal tenderness. There is no rebound.       Musculoskeletal:      Right lower leg: No edema.      Left lower leg: No edema.   Neurological:      Mental Status: He is alert and oriented to person, place, and time.      Cranial Nerves: No cranial nerve deficit.      Motor: No tremor.      Coordination: Coordination is intact.      Gait: Gait is intact.   Psychiatric:         Mood and Affect: Mood normal.               Medication List with Changes/Refills   New Medications    METRONIDAZOLE (FLAGYL) 500 MG TABLET    Take 1 tablet (500 mg total) by mouth every 8 (eight) hours. for 7 days    MOXIFLOXACIN (AVELOX) 400 MG TABLET    Take 1 tablet (400 mg total) by mouth once daily.   Current Medications    ACETAMINOPHEN (TYLENOL) 500 MG TABLET    Take 1,000 mg by mouth every 6 (six) hours as needed for Pain.    AMLODIPINE (NORVASC) 5 MG TABLET    Take 1 tablet (5 mg total) by mouth every evening.    ASCORBIC ACID (VITAMIN C ORAL)    Take 2 tablets by mouth once daily.    BACLOFEN (LIORESAL) 5 MG TAB TABLET    Take 1 tablet (5 mg total) by mouth 4 (four) times daily.    CHOLECALCIFEROL, VITAMIN D3, 125 MCG (5,000 UNIT) TAB    Take 10,000 Units by mouth once daily.    FAMOTIDINE (PEPCID) 20 MG TABLET    Take 40 mg by mouth Daily.    LACTOBACILLUS ACIDOPHILUS (ACIDOPHILUS ORAL)    Take 3 tablets by mouth once daily.    LOSARTAN (COZAAR) 100 MG TABLET    Take 1 tablet (100 mg total) by mouth once daily.    MAGNESIUM OXIDE (MAG-OX) 400 MG (241.3 MG MAGNESIUM) TABLET    Take 400 mg by mouth once daily.    METOPROLOL SUCCINATE (TOPROL-XL) 50 MG 24 HR TABLET    Take 1 tablet (50 mg total) by mouth once daily.    MOXIFLOXACIN (AVELOX) 400 MG  TABLET    Take 1 tablet (400 mg total) by mouth once daily. for 10 days    NAPROXEN (NAPROSYN) 500 MG TABLET    Take 1 tablet (500 mg total) by mouth 2 (two) times daily as needed (headache).    NIACIN ORAL    Take 1 tablet by mouth once daily.    OMEPRAZOLE (PRILOSEC) 40 MG CAPSULE    Take 1 capsule (40 mg total) by mouth once daily.    SPIRONOLACTONE (ALDACTONE) 25 MG TABLET    Take 0.5 tablets (12.5 mg total) by mouth once daily.    ZINC GLUCONATE 50 MG TABLET    Take 50 mg by mouth once daily.

## 2024-09-18 ENCOUNTER — PATIENT MESSAGE (OUTPATIENT)
Dept: NEUROSURGERY | Facility: CLINIC | Age: 59
End: 2024-09-18
Payer: COMMERCIAL

## 2024-09-18 DIAGNOSIS — Z98.1 S/P CERVICAL SPINAL FUSION: Primary | ICD-10-CM

## 2024-09-23 ENCOUNTER — OFFICE VISIT (OUTPATIENT)
Dept: FAMILY MEDICINE | Facility: CLINIC | Age: 59
End: 2024-09-23
Payer: COMMERCIAL

## 2024-09-23 ENCOUNTER — HOSPITAL ENCOUNTER (OUTPATIENT)
Dept: RADIOLOGY | Facility: HOSPITAL | Age: 59
Discharge: HOME | End: 2024-09-23
Attending: STUDENT IN AN ORGANIZED HEALTH CARE EDUCATION/TRAINING PROGRAM
Payer: COMMERCIAL

## 2024-09-23 VITALS
WEIGHT: 313.06 LBS | DIASTOLIC BLOOD PRESSURE: 88 MMHG | SYSTOLIC BLOOD PRESSURE: 124 MMHG | HEART RATE: 69 BPM | BODY MASS INDEX: 44.82 KG/M2 | OXYGEN SATURATION: 99 % | HEIGHT: 70 IN

## 2024-09-23 DIAGNOSIS — R10.32 LLQ ABDOMINAL PAIN: ICD-10-CM

## 2024-09-23 DIAGNOSIS — R29.898 WEAKNESS OF BOTH LOWER EXTREMITIES: ICD-10-CM

## 2024-09-23 DIAGNOSIS — Z98.1 S/P CERVICAL SPINAL FUSION: ICD-10-CM

## 2024-09-23 DIAGNOSIS — R07.89 INTERMITTENT LEFT-SIDED CHEST PAIN: Primary | ICD-10-CM

## 2024-09-23 LAB
OHS QRS DURATION: 94 MS
OHS QTC CALCULATION: 419 MS

## 2024-09-23 PROCEDURE — 99214 OFFICE O/P EST MOD 30 MIN: CPT | Mod: S$GLB,,, | Performed by: PHYSICIAN ASSISTANT

## 2024-09-23 PROCEDURE — 72040 X-RAY EXAM NECK SPINE 2-3 VW: CPT | Mod: TC,FY,PO

## 2024-09-23 PROCEDURE — 72040 X-RAY EXAM NECK SPINE 2-3 VW: CPT | Mod: 26,,, | Performed by: RADIOLOGY

## 2024-09-23 PROCEDURE — 99999 PR PBB SHADOW E&M-EST. PATIENT-LVL IV: CPT | Mod: PBBFAC,,, | Performed by: PHYSICIAN ASSISTANT

## 2024-09-23 PROCEDURE — 93010 ELECTROCARDIOGRAM REPORT: CPT | Mod: S$GLB,,, | Performed by: INTERNAL MEDICINE

## 2024-09-23 PROCEDURE — 93005 ELECTROCARDIOGRAM TRACING: CPT | Mod: S$GLB,,, | Performed by: PHYSICIAN ASSISTANT

## 2024-09-23 NOTE — PROGRESS NOTES
"Subjective:      Patient ID: García Tang is a 58 y.o. male.    Chief Complaint: Follow-up    HPI  Patient has PMH of cerebral aneurysm with cognitive communication deficit, anxiety, dysthymia, meniere's disease, HTN, dyslipidemia, arteriosclerotic cardiovascular disease, Type 2 DM, and GERD.  Had spinal cord compression.    Patient saw Dr. Velázquez 9/17/2024 and treated for diverticulitis with moxifloxacin and metronidazole.  CT abdomen pelvis without contrast did not show definitive diverticulitis.  Today he reports he has not been taking these medications yet.  Having intermittent LLQ pain after eating, does not go away after a bowel movement.    Today patient reports two weeks of weakness in legs with burning.  He has been in PT with improvement of these symptoms since cervical fusion for two months.  Legs are burning while laying down trying to sleep.  No changes on xray cervical spine today.    Reports intermittent and stabbing left chest pain for a couple minutes for a week.  Lifting small weights with PT.  Also, reports diaphoresis and shortness of breath.  He is not concerned about these symptoms as they have happened before.    Review of Systems   Constitutional:  Positive for diaphoresis.   Respiratory:  Positive for shortness of breath.    Cardiovascular:  Positive for chest pain and leg swelling.   Gastrointestinal:  Positive for abdominal pain. Negative for constipation and diarrhea.   Musculoskeletal:  Positive for arthralgias.       Objective:   /88   Pulse 69   Ht 5' 10" (1.778 m)   Wt (!) 142 kg (313 lb 0.9 oz)   SpO2 99%   BMI 44.92 kg/m²     Physical Exam  Vitals reviewed.   Constitutional:       Appearance: Normal appearance. He is well-developed. He is obese.   HENT:      Head: Normocephalic and atraumatic.      Right Ear: External ear normal.      Left Ear: External ear normal.   Eyes:      Conjunctiva/sclera: Conjunctivae normal.   Cardiovascular:      Rate and Rhythm: " Normal rate and regular rhythm.      Heart sounds: Normal heart sounds. No murmur heard.     No friction rub. No gallop.   Pulmonary:      Effort: Pulmonary effort is normal. No respiratory distress.      Breath sounds: Normal breath sounds. No wheezing, rhonchi or rales.   Musculoskeletal:         General: Normal range of motion.      Right lower leg: No edema.      Left lower leg: No edema.   Skin:     General: Skin is warm and dry.      Findings: No rash.   Neurological:      General: No focal deficit present.      Mental Status: He is alert and oriented to person, place, and time.   Psychiatric:         Mood and Affect: Mood normal.         Behavior: Behavior normal.         Judgment: Judgment normal.       Assessment:      1. Intermittent left-sided chest pain    2. LLQ abdominal pain    3. Weakness of both lower extremities       Plan:   1. Intermittent left-sided chest pain  Gave strict ER precautions for intense chest pain and/or shortness of breath.  - IN OFFICE EKG 12-LEAD (to Muse)-NSR    2. LLQ abdominal pain  Advised to not start antibiotics unless pain becomes intense.  - Ambulatory referral/consult to Gastroenterology; Future    3. Weakness of both lower extremities  Continue to monitor.    Follow up in one month with Dr. Domingo.  Patient agreed with plan and expressed understanding.    Thank you for allowing me to serve you,

## 2024-09-25 ENCOUNTER — PATIENT MESSAGE (OUTPATIENT)
Dept: NEUROSURGERY | Facility: CLINIC | Age: 59
End: 2024-09-25
Payer: COMMERCIAL

## 2024-09-25 ENCOUNTER — PATIENT MESSAGE (OUTPATIENT)
Dept: PHYSICAL MEDICINE AND REHAB | Facility: CLINIC | Age: 59
End: 2024-09-25
Payer: COMMERCIAL

## 2024-09-27 ENCOUNTER — OFFICE VISIT (OUTPATIENT)
Dept: NEUROSURGERY | Facility: CLINIC | Age: 59
End: 2024-09-27
Payer: COMMERCIAL

## 2024-09-27 VITALS
BODY MASS INDEX: 44.82 KG/M2 | SYSTOLIC BLOOD PRESSURE: 145 MMHG | WEIGHT: 313.06 LBS | HEART RATE: 69 BPM | RESPIRATION RATE: 18 BRPM | DIASTOLIC BLOOD PRESSURE: 88 MMHG | HEIGHT: 70 IN

## 2024-09-27 DIAGNOSIS — Z98.1 ARTHRODESIS STATUS: Primary | ICD-10-CM

## 2024-09-27 PROBLEM — R29.898 HAND WEAKNESS: Status: RESOLVED | Noted: 2024-05-02 | Resolved: 2024-09-27

## 2024-09-27 PROBLEM — M62.81 GENERALIZED MUSCLE WEAKNESS: Status: RESOLVED | Noted: 2021-12-14 | Resolved: 2024-09-27

## 2024-09-27 PROBLEM — R29.898 WEAKNESS OF BOTH ARMS: Status: RESOLVED | Noted: 2024-02-05 | Resolved: 2024-09-27

## 2024-09-27 PROBLEM — M53.82 NECK MUSCLE WEAKNESS: Status: RESOLVED | Noted: 2024-07-19 | Resolved: 2024-09-27

## 2024-09-27 PROBLEM — M53.82 DECREASED ROM OF INTERVERTEBRAL DISCS OF CERVICAL SPINE: Status: RESOLVED | Noted: 2024-03-14 | Resolved: 2024-09-27

## 2024-09-27 PROCEDURE — 99214 OFFICE O/P EST MOD 30 MIN: CPT | Mod: S$GLB,,, | Performed by: PHYSICIAN ASSISTANT

## 2024-09-27 RX ORDER — DIAZEPAM 5 MG/1
5 TABLET ORAL ONCE AS NEEDED
Qty: 1 TABLET | Refills: 0 | Status: SHIPPED | OUTPATIENT
Start: 2024-09-27 | End: 2024-09-27

## 2024-09-30 NOTE — PROGRESS NOTES
[place] - Neurosurgery - [facility]  Clinic Progress Note       PCP: LORETTA Domingo MD    SUBJECTIVE:     Chief Complaint:   Chief Complaint   Patient presents with    Follow-up     Cervical pain f/u       History of Present Illness:  García Tang is a 58 y.o. male who presents for follow up. Patient is s/p C5-6 ACDF for treatment of cervical cord compression with myelomalacia.  Patient has a complicated history.  He had combination of symptoms including complaints of cognitive decline, facial paresthesias and limb paresthesias.  His symptoms fluctuated over time.  He also complained poor dexterity with hand weakness.  MRI revealed cervical spinal stenosis with cord signal change.  He subsequently underwent ACDF with improvement in his balance, but he still had of steadiness in his gait.  He was referred to PM&R and initiated on baclofen therapy.  He reports the baclofen has significantly helped.  Patient reports he began experiencing burning pain in his limbs.  He states this was present prior to his surgery and he is concerned.  He did complete x-rays without evidence of failure.    Pertinent and recent history, provider evaluations, imaging and data reviewed in EPIC        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    Diverticulitis     diverticulitis    Diverticulosis     Essential hypertension 07/18/2016    Fatty liver disease, nonalcoholic 04/07/2016    [place], 2006     GERD (gastroesophageal reflux disease)     Headache     HLD (hyperlipidemia)     Kidney stone     requiring stent    Meniere's disease 01/09/2018    Morbid obesity 05/29/2014    NAFLD (nonalcoholic fatty liver disease)     Near syncope 05/23/2017    Obesity hypoventilation syndrome 02/22/2016    PAD (peripheral artery disease) 09/17/2019    Mild 9/2018    Prostatitis      Rectal bleeding     diverticulitis    Sleep disorder breathing 2/12/2024    Thyroid nodule 07/14/2022     Past Surgical History:   Procedure Laterality Date    ANGIOGRAM, CORONARY, WITH LEFT HEART CATHETERIZATION  10/26/2023    Procedure: Left Heart Cath;  Surgeon: Sal Salazar MD;  Location: [facility] CATH;  Service: Cardiology;;    ANTERIOR CERVICAL DISCECTOMY W/ FUSION N/A 2/12/2024    Procedure: DISCECTOMY, SPINE, CERVICAL, ANTERIOR APPROACH, WITH FUSION C5-6;  Surgeon: Wali Zuniga MD;  Location: [facility] OR;  Service: Neurosurgery;  Laterality: N/A;    CARDIAC CATHETERIZATION      COLONOSCOPY  2008    diverticulosis per patient report    CORONARY ANGIOGRAPHY  10/26/2023    Procedure: Coronary angiogram study;  Surgeon: Sal Salazar MD;  Location: [facility] CATH;  Service: Cardiology;;    ESOPHAGOGASTRODUODENOSCOPY N/A 12/18/2023    Procedure: EGD (ESOPHAGOGASTRODUODENOSCOPY);  Surgeon: Aram Bermudez MD;  Location: [facility] ENDO;  Service: Gastroenterology;  Laterality: N/A;    kidney stent      KIDNEY SURGERY  1996    right kidney obstruction requiring stent-open    MAGNETIC RESONANCE IMAGING N/A 1/26/2024    Procedure: MRI (MAGNETIC RESONANCE IMAGING);  Surgeon: Provider, [facility];  Location: [facility];  Service: General;  Laterality: N/A;    NOSE SURGERY      UPPER GASTROINTESTINAL ENDOSCOPY  2021    [facility] per patient report     Family History   Problem Relation Name Age of Onset    Liver disease Mother Stephanie         d.    Cirrhosis Mother Stephanie     Hypertension Mother Stephanie     Hyperlipidemia Mother Stephanie     Colon cancer Father Eureka 70    Cancer Father John     Diabetes Father Eureka     Hypertension Father John     Glaucoma Father John     Hyperlipidemia Father John     Prostate cancer Father John     Diabetes Brother Hayder     Hypertension Brother Hayder     Thyroid cancer Brother Hayder     Heart attack Maternal Uncle      Heart attack Maternal Uncle       Heart disease Neg Hx      Heart failure Neg Hx      Macular degeneration Neg Hx      Retinal detachment Neg Hx      Crohn's disease Neg Hx      Ulcerative colitis Neg Hx      Stomach cancer Neg Hx      Esophageal cancer Neg Hx       Social History     Tobacco Use    Smoking status: Former     Current packs/day: 0.00     Types: Cigarettes     Quit date: 1990     Years since quittin.7    Smokeless tobacco: Never    Tobacco comments:     Patient stopped smoking years ago, back in the s   Substance Use Topics    Alcohol use: No     Comment: stopped in     Drug use: No      Review of patient's allergies indicates:   Allergen Reactions    Lisinopril Swelling    Iodinated contrast media Other (See Comments)     Spasms, tremor, and high blood pressure    Bactrim  [sulfamethoxazole-trimethoprim]      Other reaction(s): Unknown    Diflucan [fluconazole]      Heart racing    Gabapentin Other (See Comments)     nightmares    Hydroxyzine hcl      Other reaction(s): elevated bp    Kenalog [triamcinolone acetonide] Other (See Comments)     Heart flutters all day    Phenergan [promethazine] Anxiety    Zofran [ondansetron hcl (pf)] Anxiety       Current Outpatient Medications:     acetaminophen (TYLENOL) 500 MG tablet, Take 1,000 mg by mouth every 6 (six) hours as needed for Pain., Disp: , Rfl:     amLODIPine (NORVASC) 5 MG tablet, Take 1 tablet (5 mg total) by mouth every evening., Disp: 90 tablet, Rfl: 1    ascorbic acid (VITAMIN C ORAL), Take 2 tablets by mouth once daily., Disp: , Rfl:     baclofen (LIORESAL) 5 mg Tab tablet, Take 1 tablet (5 mg total) by mouth 4 (four) times daily., Disp: 120 tablet, Rfl: 11    cholecalciferol, vitamin D3, 125 mcg (5,000 unit) Tab, Take 10,000 Units by mouth once daily., Disp: , Rfl:     famotidine (PEPCID) 20 MG tablet, Take 40 mg by mouth Daily., Disp: , Rfl:     Lactobacillus acidophilus (ACIDOPHILUS ORAL), Take 3 tablets by mouth once daily., Disp: , Rfl:     losartan  "(COZAAR) 100 MG tablet, Take 1 tablet (100 mg total) by mouth once daily., Disp: 90 tablet, Rfl: 1    magnesium oxide (MAG-OX) 400 mg (241.3 mg magnesium) tablet, Take 400 mg by mouth once daily., Disp: , Rfl:     metoprolol succinate (TOPROL-XL) 50 MG 24 hr tablet, Take 1 tablet (50 mg total) by mouth once daily., Disp: 90 tablet, Rfl: 3    moxifloxacin (AVELOX) 400 mg tablet, Take 1 tablet (400 mg total) by mouth once daily., Disp: 5 tablet, Rfl: 0    NIACIN ORAL, Take 1 tablet by mouth once daily., Disp: , Rfl:     omeprazole (PRILOSEC) 40 MG capsule, Take 1 capsule (40 mg total) by mouth once daily., Disp: 90 capsule, Rfl: 3    spironolactone (ALDACTONE) 25 MG tablet, Take 0.5 tablets (12.5 mg total) by mouth once daily., Disp: 45 tablet, Rfl: 1    zinc gluconate 50 mg tablet, Take 50 mg by mouth once daily., Disp: , Rfl:     diazePAM (VALIUM) 5 MG tablet, Take 1 tablet (5 mg total) by mouth once as needed for Anxiety. Take 30 minutes prior to MRI, Disp: 1 tablet, Rfl: 0    Review of Systems:   Constitutional: no fever, chills or night sweats. No changes in weight   Eyes: no visual changes   ENT: no nasal congestion or sore throat   Respiratory: no cough or shortness of breath   Cardiovascular: no chest pain or palpitations   Gastrointestinal: no nausea or vomiting   Genitourinary: no hematuria or dysuria   Integument/Breast: no rash or pruritis   Hematologic/Lymphatic: no easy bruising or lymphadenopathy   Musculoskeletal: no arthralgias or myalgias  Neurological: no seizures or tremors +paresthesias  Behavioral/Psych: no auditory or visual hallucinations   Endocrine: no heat or cold intolerance         OBJECTIVE:     Vital Signs (Most Recent):  Pulse: 69 (09/27/24 1058)  Resp: 18 (09/27/24 1058)  BP: (!) 145/88 (09/27/24 1058)  Estimated body mass index is 44.92 kg/m² as calculated from the following:    Height as of this encounter: 5' 10" (1.778 m).    Weight as of this encounter: 142 kg (313 lb 0.9 " oz).    Physical Exam:   General: well developed, well nourished, no distress   Neurologic: Alert and oriented. Thought content appropriate. GCS 15.   Head: normocephalic, atraumatic  Eyes: EOMI  Neck: trachea midline, no JVD   Cardiovascular: no LE edema  Pulmonary: normal respirations, no signs of respiratory distress  Abdomen: non-distended  Sensory: intact to light touch throughout  Skin: Skin is warm, dry and intact    Motor Strength: Moves all extremities spontaneously with good tone. No abnormal movements seen.       Deltoids Triceps Biceps Wrist Extension Wrist Flexion Hand  Interossei   Upper: R 5/5 5/5 5/5 5/5 5/5 5/5 5/5    L 5/5 5/5 5/5 5/5 5/5 5/5 5/5     Iliopsoas Quadriceps Knee  Flexion Tibialis  anterior Gastro- cnemius EHL    Lower: R 5/5 5/5 5/5 5/5 5/5 5/5     L 5/5 5/5 5/5 5/5 5/5 5/5      DTR's: 3+  Mahan:  Present  Gait:  Independent              Diagnostic Results:  I have independently reviewed the following imaging:  X-ray cervical spine with intact fusion hardware.  No evidence of failure.    ASSESSMENT/PLAN:     Arthrodesis status  -     MRI Cervical Spine Without Contrast; Future; Expected date: 09/27/2024    Other orders  -     diazePAM (VALIUM) 5 MG tablet; Take 1 tablet (5 mg total) by mouth once as needed for Anxiety. Take 30 minutes prior to MRI  Dispense: 1 tablet; Refill: 0        García Tang is a 58 y.o. male with history of spinal cord compression with myelomalacia.  Patient is now experiencing symptoms similar to symptoms he had preoperatively. Dr. Peres (PMR) recommended that he be evaluated by our office.  We will obtain MRI cervical spine to rule out adjacent segment disease and new neural compression.  This is likely a sequelae of his myelomalacia.  Pending results of MRI, PMR is likely most appropriate to treat his symptoms going forward.  I will review results of MRI with Dr. Zuniga once complete.    Patient verbalized understanding of plan. Encouraged to call  with any questions or concerns.     This note was partially dictated using voice recognition software, so please excuse any errors that were not corrected.

## 2024-10-02 ENCOUNTER — PATIENT MESSAGE (OUTPATIENT)
Dept: NEUROSURGERY | Facility: CLINIC | Age: 59
End: 2024-10-02
Payer: COMMERCIAL

## 2024-10-07 ENCOUNTER — OFFICE VISIT (OUTPATIENT)
Dept: FAMILY MEDICINE | Facility: CLINIC | Age: 59
End: 2024-10-07
Payer: COMMERCIAL

## 2024-10-07 VITALS
WEIGHT: 315 LBS | DIASTOLIC BLOOD PRESSURE: 82 MMHG | BODY MASS INDEX: 45.1 KG/M2 | SYSTOLIC BLOOD PRESSURE: 130 MMHG | HEART RATE: 90 BPM | OXYGEN SATURATION: 98 % | HEIGHT: 70 IN

## 2024-10-07 DIAGNOSIS — A08.4 VIRAL GASTROENTERITIS: Primary | ICD-10-CM

## 2024-10-07 PROCEDURE — 99213 OFFICE O/P EST LOW 20 MIN: CPT | Mod: S$GLB,,, | Performed by: PHYSICIAN ASSISTANT

## 2024-10-07 PROCEDURE — 99999 PR PBB SHADOW E&M-EST. PATIENT-LVL IV: CPT | Mod: PBBFAC,,, | Performed by: PHYSICIAN ASSISTANT

## 2024-10-07 NOTE — PROGRESS NOTES
"Subjective:      Patient ID: García Tang is a 58 y.o. male.    Chief Complaint: Diarrhea    HPI  Patient has PMH of cerebral aneurysm with cognitive communication deficit, anxiety, dysthymia, meniere's disease, HTN, dyslipidemia, arteriosclerotic cardiovascular disease, Type 2 DM, and GERD.  Had spinal cord compression.  Vaccinated against covid, not flu.    Patient reports that he had [employer] yesterday afternoon.  Started with fatigue, nausea, and about 10 bouts of diarrhea since yesterday an hour after eating.  No sick contacts.  Not taken any medications for this.  Reports continued LLQ pain.  Has not seen gastroenterology yet.    Review of Systems   Constitutional:  Positive for appetite change. Negative for chills and fever.   Respiratory:  Negative for shortness of breath.    Cardiovascular:  Negative for chest pain.   Gastrointestinal:  Positive for abdominal pain (generalized), diarrhea and nausea. Negative for constipation and vomiting.   Neurological:  Positive for headaches (posterior-baseline).       Objective:   /82 (Patient Position: Sitting)   Pulse 90   Ht 5' 10" (1.778 m)   Wt (!) 143.6 kg (316 lb 9.3 oz)   SpO2 98%   BMI 45.42 kg/m²     Physical Exam  Vitals reviewed.   Constitutional:       Appearance: Normal appearance. He is well-developed. He is ill-appearing.   HENT:      Head: Normocephalic and atraumatic.      Right Ear: External ear normal.      Left Ear: External ear normal.   Eyes:      Conjunctiva/sclera: Conjunctivae normal.   Cardiovascular:      Rate and Rhythm: Normal rate and regular rhythm.      Heart sounds: Normal heart sounds. No murmur heard.     No friction rub. No gallop.   Pulmonary:      Effort: Pulmonary effort is normal. No respiratory distress.      Breath sounds: Normal breath sounds. No wheezing, rhonchi or rales.   Abdominal:      Palpations: Abdomen is soft.      Tenderness: There is no abdominal tenderness.   Musculoskeletal:         General: " Normal range of motion.   Skin:     General: Skin is warm and dry.      Findings: No rash.   Neurological:      General: No focal deficit present.      Mental Status: He is alert and oriented to person, place, and time.   Psychiatric:         Mood and Affect: Mood normal.         Behavior: Behavior normal.         Judgment: Judgment normal.       Assessment:      1. Viral gastroenteritis       Plan:   1. Viral gastroenteritis (Primary)  Advised supportive care.  Increase hydration.  Advised to see gastroenterology due to continued LLQ pain. Offered covid and flu testing, but patient declined.    Follow up as needed.  Patient agreed with plan and expressed understanding.    Thank you for allowing me to serve you,

## 2024-10-09 ENCOUNTER — LAB VISIT (OUTPATIENT)
Dept: LAB | Facility: HOSPITAL | Age: 59
End: 2024-10-09
Payer: COMMERCIAL

## 2024-10-09 ENCOUNTER — NURSE TRIAGE (OUTPATIENT)
Dept: ADMINISTRATIVE | Facility: CLINIC | Age: 59
End: 2024-10-09
Payer: COMMERCIAL

## 2024-10-09 ENCOUNTER — OFFICE VISIT (OUTPATIENT)
Dept: GASTROENTEROLOGY | Facility: CLINIC | Age: 59
End: 2024-10-09
Payer: COMMERCIAL

## 2024-10-09 VITALS — HEIGHT: 70 IN | BODY MASS INDEX: 44.81 KG/M2 | WEIGHT: 313 LBS

## 2024-10-09 DIAGNOSIS — Z87.898 HISTORY OF DIARRHEA: ICD-10-CM

## 2024-10-09 DIAGNOSIS — K62.5 RECTAL BLEEDING: ICD-10-CM

## 2024-10-09 DIAGNOSIS — K57.90 DIVERTICULOSIS: ICD-10-CM

## 2024-10-09 DIAGNOSIS — R10.32 LLQ PAIN: Primary | ICD-10-CM

## 2024-10-09 DIAGNOSIS — R19.4 CHANGE IN BOWEL HABITS: ICD-10-CM

## 2024-10-09 DIAGNOSIS — K21.9 GASTROESOPHAGEAL REFLUX DISEASE WITHOUT ESOPHAGITIS: ICD-10-CM

## 2024-10-09 PROCEDURE — 87045 FECES CULTURE AEROBIC BACT: CPT

## 2024-10-09 PROCEDURE — 87338 HPYLORI STOOL AG IA: CPT

## 2024-10-09 PROCEDURE — 99214 OFFICE O/P EST MOD 30 MIN: CPT | Mod: S$GLB,,,

## 2024-10-09 PROCEDURE — 87449 NOS EACH ORGANISM AG IA: CPT

## 2024-10-09 PROCEDURE — 99999 PR PBB SHADOW E&M-EST. PATIENT-LVL IV: CPT | Mod: PBBFAC,,,

## 2024-10-09 PROCEDURE — 87449 NOS EACH ORGANISM AG IA: CPT | Mod: 91

## 2024-10-09 PROCEDURE — 87329 GIARDIA AG IA: CPT

## 2024-10-09 PROCEDURE — 87209 SMEAR COMPLEX STAIN: CPT

## 2024-10-09 PROCEDURE — 87427 SHIGA-LIKE TOXIN AG IA: CPT

## 2024-10-09 PROCEDURE — 87324 CLOSTRIDIUM AG IA: CPT

## 2024-10-09 PROCEDURE — 87046 STOOL CULTR AEROBIC BACT EA: CPT

## 2024-10-09 RX ORDER — DICYCLOMINE HYDROCHLORIDE 10 MG/1
10 CAPSULE ORAL 4 TIMES DAILY PRN
Qty: 120 CAPSULE | Refills: 0 | Status: SHIPPED | OUTPATIENT
Start: 2024-10-09 | End: 2024-11-08

## 2024-10-09 NOTE — PROGRESS NOTES
Subjective:       Patient ID: García Tang is a 58 y.o. male Body mass index is 44.91 kg/m².    Chief Complaint: Diarrhea and Abdominal Pain    This patient is new to me.  Established patient of Dr. Bermudez, Taty Cade NP, & Faviola Dumont NP.     Abdominal Pain  This is a recurrent problem. The current episode started more than 1 year ago. The onset quality is undetermined. The problem occurs constantly. The problem has been gradually worsening (reoccurred 06/2024). The pain is located in the LLQ. The pain is at a severity of 8/10. The pain is severe. The quality of the pain is aching. The abdominal pain does not radiate. Associated symptoms include diarrhea (diarrhea occurred Sunday - had 10 BMs rated 7 on [place] scale, which resolved Monday; currently having 2 BMs daily rated stool 5 on [place] scale), a fever (Reports feeling warm last week and took his temp; T-max: 98.9 axillary), hematochezia (Intermittently notices small amounts of BRBPR in toilet bowl and on tissue paper with BMs) and nausea (improved). Pertinent negatives include no belching, constipation, dysuria, flatus, frequency, hematuria, melena, vomiting or weight loss. The pain is aggravated by eating, certain positions, movement and palpation. The pain is relieved by Nothing. He has tried antibiotics (Patient saw Dr. Velázquez 9/17/2024 and treated for diverticulitis with moxifloxacin and metronidazole, which improved pain; has also dry Pepto-Bismol p.r.n. with mild relief) for the symptoms. Prior diagnostic workup includes CT scan, GI consult and upper endoscopy. His past medical history is significant for GERD (Well-controlled taking omeprazole 40 mg daily p.r.n. and famotidine 40 mg p.r.n.). There is no history of abdominal surgery, colon cancer, Crohn's disease, gallstones, irritable bowel syndrome, pancreatitis, PUD or ulcerative colitis. Patient's medical history does not include kidney stones and UTI.     Review of Systems    Constitutional:  Positive for activity change, appetite change, chills and fever (Reports feeling warm last week and took his temp; T-max: 98.9 axillary). Negative for diaphoresis, fatigue, unexpected weight change and weight loss.   HENT:  Negative for sore throat and trouble swallowing.    Respiratory:  Negative for cough, choking and shortness of breath.    Cardiovascular:  Negative for chest pain.   Gastrointestinal:  Positive for abdominal pain, anal bleeding, blood in stool, diarrhea (diarrhea occurred Sunday - had 10 BMs rated 7 on [place] scale, which resolved Monday; currently having 2 BMs daily rated stool 5 on [place] scale), hematochezia (Intermittently notices small amounts of BRBPR in toilet bowl and on tissue paper with BMs) and nausea (improved). Negative for abdominal distention, constipation, flatus, melena, rectal pain and vomiting.   Genitourinary:  Negative for dysuria, frequency and hematuria.       No LMP for male patient.  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    Diverticulitis     diverticulitis    Diverticulosis     Essential hypertension 07/18/2016    Fatty liver disease, nonalcoholic 04/07/2016    [place], 2006     GERD (gastroesophageal reflux disease)     Headache     HLD (hyperlipidemia)     Kidney stone     requiring stent    Meniere's disease 01/09/2018    Morbid obesity 05/29/2014    NAFLD (nonalcoholic fatty liver disease)     Near syncope 05/23/2017    Obesity hypoventilation syndrome 02/22/2016    PAD (peripheral artery disease) 09/17/2019    Mild 9/2018    Prostatitis     Rectal bleeding     diverticulitis    Sleep disorder breathing 2/12/2024    Thyroid nodule 07/14/2022     Past Surgical History:   Procedure Laterality Date    ANGIOGRAM, CORONARY, WITH LEFT HEART CATHETERIZATION  10/26/2023    Procedure: Left Heart Cath;   Surgeon: Sal Salazar MD;  Location: [facility] CATH;  Service: Cardiology;;    ANTERIOR CERVICAL DISCECTOMY W/ FUSION N/A 2/12/2024    Procedure: DISCECTOMY, SPINE, CERVICAL, ANTERIOR APPROACH, WITH FUSION C5-6;  Surgeon: Wali Zuniga MD;  Location: [facility] OR;  Service: Neurosurgery;  Laterality: N/A;    CARDIAC CATHETERIZATION      COLONOSCOPY  2008    diverticulosis per patient report    CORONARY ANGIOGRAPHY  10/26/2023    Procedure: Coronary angiogram study;  Surgeon: Sal Salazar MD;  Location: [facility] CATH;  Service: Cardiology;;    ESOPHAGOGASTRODUODENOSCOPY N/A 12/18/2023    Procedure: EGD (ESOPHAGOGASTRODUODENOSCOPY);  Surgeon: Aram Bermudez MD;  Location: [facility] ENDO;  Service: Gastroenterology;  Laterality: N/A;    kidney stent      KIDNEY SURGERY  1996    right kidney obstruction requiring stent-open    MAGNETIC RESONANCE IMAGING N/A 1/26/2024    Procedure: MRI (MAGNETIC RESONANCE IMAGING);  Surgeon: Provider, [facility];  Location: [facility];  Service: General;  Laterality: N/A;    NOSE SURGERY      UPPER GASTROINTESTINAL ENDOSCOPY  2021    [facility], gastritis per patient report     Family History   Problem Relation Name Age of Onset    Liver disease Mother Stephanie         d.    Cirrhosis Mother Stephanie     Hypertension Mother Stephanie     Hyperlipidemia Mother Stephanie     Colon cancer Father John 70    Cancer Father Guthrie     Diabetes Father John     Hypertension Father John     Glaucoma Father Guthrie     Hyperlipidemia Father John     Prostate cancer Father John     Diabetes Brother Hayder     Hypertension Brother Hayder     Thyroid cancer Brother Hayder     Heart attack Maternal Uncle      Heart attack Maternal Uncle      Heart disease Neg Hx      Heart failure Neg Hx      Macular degeneration Neg Hx      Retinal detachment Neg Hx      Crohn's disease Neg Hx      Ulcerative colitis Neg Hx      Stomach cancer Neg Hx      Esophageal cancer Neg Hx       Social History      Tobacco Use    Smoking status: Former     Current packs/day: 0.00     Types: Cigarettes     Quit date: 1990     Years since quittin.7    Smokeless tobacco: Never    Tobacco comments:     Patient stopped smoking years ago, back in the s   Substance Use Topics    Alcohol use: No     Comment: stopped in     Drug use: No     Wt Readings from Last 10 Encounters:   10/09/24 (!) 142 kg (313 lb)   10/09/24 (!) 143 kg (315 lb 4.1 oz)   10/07/24 (!) 143.6 kg (316 lb 9.3 oz)   24 (!) 142 kg (313 lb 0.9 oz)   24 (!) 142 kg (313 lb 0.9 oz)   24 (!) 144.2 kg (317 lb 12.7 oz)   24 (!) 144.3 kg (318 lb 2 oz)   24 (!) 143.8 kg (316 lb 14.6 oz)   24 (!) 143.4 kg (316 lb 2.2 oz)   24 (!) 144.4 kg (318 lb 5.5 oz)     Lab Results   Component Value Date    WBC 3.76 (L) 10/09/2024    HGB 14.5 10/09/2024    HCT 42.4 10/09/2024    MCV 86 10/09/2024     10/09/2024     CMP  Sodium   Date Value Ref Range Status   10/09/2024 139 136 - 145 mmol/L Final     Potassium   Date Value Ref Range Status   10/09/2024 3.9 3.5 - 5.1 mmol/L Final     Comment:     Anion Gap reference range revised on 2023     Chloride   Date Value Ref Range Status   10/09/2024 101 95 - 110 mmol/L Final     CO2   Date Value Ref Range Status   10/09/2024 29 22 - 31 mmol/L Final     Glucose   Date Value Ref Range Status   10/09/2024 163 (H) 70 - 110 mg/dL Final     Comment:     The ADA recommends the following guidelines for fasting glucose:    Normal:       less than 100 mg/dL    Prediabetes:  100 mg/dL to 125 mg/dL    Diabetes:     126 mg/dL or higher       BUN   Date Value Ref Range Status   10/09/2024 12 9 - 21 mg/dL Final     Creatinine   Date Value Ref Range Status   10/09/2024 0.91 0.50 - 1.40 mg/dL Final     Calcium   Date Value Ref Range Status   10/09/2024 9.0 8.4 - 10.2 mg/dL Final     Total Protein   Date Value Ref Range Status   10/09/2024 7.5 6.0 - 8.4 g/dL Final     Albumin   Date Value Ref  Range Status   10/09/2024 4.5 3.5 - 5.2 g/dL Final   05/08/2024 4.5 3.6 - 5.1 g/dL Final     Comment:     Test Performed at:  [employer] [facility]  [id number] [place] [address]  [id number]     JAN Smyth MD, PhD, ANUJ       Total Bilirubin   Date Value Ref Range Status   10/09/2024 0.6 0.2 - 1.3 mg/dL Final     Alkaline Phosphatase   Date Value Ref Range Status   10/09/2024 83 38 - 145 U/L Final     AST   Date Value Ref Range Status   10/09/2024 34 17 - 59 U/L Final     ALT   Date Value Ref Range Status   10/09/2024 32 0 - 50 U/L Final     Anion Gap   Date Value Ref Range Status   10/09/2024 9 5 - 12 mmol/L Final     Comment:     Anion Gap reference range revised on 4/28/2023     eGFR if    Date Value Ref Range Status   04/14/2022 >60.0 >60 mL/min/1.73 m^2 Final     eGFR if non    Date Value Ref Range Status   04/14/2022 >60.0 >60 mL/min/1.73 m^2 Final     Comment:     Calculation used to obtain the estimated glomerular filtration  rate (eGFR) is the CKD-EPI equation.        Lab Results   Component Value Date    AMYLASE 33 07/06/2024     Lab Results   Component Value Date    LIPASE 30 07/06/2024     Lab Results   Component Value Date    LIPASERES 106 10/09/2024     Lab Results   Component Value Date    TSH 1.346 07/06/2024       Reviewed prior medical records including radiology report of CT abdomen and pelvis 10/09/2024, CT renal stone study 07/01/2024, CT abdomen and pelvis 11/24/2023, CT abdomen and pelvis 02/20/2023 & endoscopy history (see surgical history).    Objective:      Physical Exam  Vitals and nursing note reviewed.   Constitutional:       General: He is not in acute distress.     Appearance: Normal appearance. He is obese. He is not ill-appearing.   HENT:      Mouth/Throat:      Lips: Pink. No lesions.   Cardiovascular:      Rate and Rhythm: Normal rate and regular rhythm.      Heart sounds: Normal heart sounds.   Pulmonary:      Effort:  Pulmonary effort is normal. No respiratory distress.      Breath sounds: Normal breath sounds.   Abdominal:      General: Abdomen is flat. Bowel sounds are normal. There is no distension or abdominal bruit. There are no signs of injury.      Palpations: Abdomen is soft. There is no shifting dullness, fluid wave, hepatomegaly, splenomegaly or mass.      Tenderness: There is abdominal tenderness in the left lower quadrant. There is no guarding or rebound. Negative signs include Almazan's sign, Rovsing's sign and McBurney's sign.   Skin:     General: Skin is warm and dry.      Coloration: Skin is not jaundiced or pale.   Neurological:      Mental Status: He is alert and oriented to person, place, and time.   Psychiatric:         Attention and Perception: Attention normal.         Mood and Affect: Mood normal.         Speech: Speech normal.         Behavior: Behavior normal.         Assessment:       1. LLQ pain    2. Diverticulosis    3. History of diarrhea    4. Change in bowel habits    5. Rectal bleeding    6. Gastroesophageal reflux disease without esophagitis        Plan:       LLQ pain & Diverticulosis   - schedule Colonoscopy to be done in 4-6 weeks, discussed procedure with the patient, verbalized understanding  -Recommended high fiber diet after episode has completely resolved. Recommended daily exercise, adequate water intake (six 8-oz glasses of water daily), and high fiber diet. OTC fiber supplements are recommended if diet does not reach daily fiber goal (25 grams daily), such as Metamucil, Citrucel, or FiberCon (take as directed, separate from other oral medications by >2 hours).  - instructed patient that if symptoms do not resolve or if worsen prior to colonoscopy to contact us or go to ER, patient verbalized understanding  -START: dicyclomine (BENTYL) 10 MG capsule; Take 1 capsule (10 mg total) by mouth 4 (four) times daily as needed (abdominal pain).  Dispense: 120 capsule; Refill: 0    History of  diarrhea & Change in bowel habits  - schedule Colonoscopy to be done in 4-6 weeks, discussed procedure with the patient, verbalized understanding  - recommend OTC probiotic, such as Florastor or Culturelle, taken as directed on packaging  - avoid lactose, alcohol, & caffeine  - avoid known triggers  -START: dicyclomine (BENTYL) 10 MG capsule; Take 1 capsule (10 mg total) by mouth 4 (four) times daily as needed (abdominal pain).  Dispense: 120 capsule; Refill: 0  -     H. pylori antigen, stool; Future; Expected date: 10/09/2024  -     Giardia / Cryptosporidum, EIA; Future; Expected date: 10/09/2024  -     Stool Exam-Ova,Cysts,Parasites; Future; Expected date: 10/09/2024  -     Clostridium difficile EIA; Future; Expected date: 10/09/2024  -     Stool culture; Future; Expected date: 10/09/2024    Rectal bleeding  - schedule Colonoscopy to be done in 4-6 weeks, discussed procedure with the patient, verbalized understanding  - discussed about different etiologies that can cause rectal bleeding, such as but not limited to diverticulosis, polyps, colon mass, colon inflammation or infection, anal fissure or hemorrhoids.   - You may resume normal activity as long as you feel well.  - Avoid/minimize NSAIDs such as ibuprofen (Advil, Motrin) and naproxen (Aleve and Naprosyn).  - Avoid alcohol.    Gastroesophageal reflux disease without esophagitis  -Avoid large meals, avoid eating within 2-3 hours of bedtime (avoid late night eating & lying down soon after eating), elevate head of bed if nocturnal symptoms are present, smoking cessation (if current smoker), & weight loss (if overweight).   -Avoid known foods which trigger reflux symptoms & to minimize/avoid high-fat foods, chocolate, caffeine, citrus, alcohol, & tomato products.  -Avoid/limit use of NSAID's, since they can cause GI upset, bleeding, and/or ulcers. If needed, take with food.  - continue omeprazole 40 mg once daily p.r.n. and Pepcid 40 mg nightly p.r.n.    Follow  up in about 4 weeks (around 11/6/2024), or if symptoms worsen or fail to improve.      If no improvement in symptoms or symptoms worsen, call/follow-up at clinic or go to ER.        Total time spent on the encounter includes face to face time and non-face to face time preparing to see the patient (eg, review of tests), Obtaining and/or reviewing separately obtained history, Documenting clinical information in the electronic or other health record, Independently interpreting results (not separately reported) and communicating results to the patient/family/caregiver, or Care coordination (not separately reported).     A dictation software program was used for this note. Please expect some simple typographical  errors in this note.

## 2024-10-09 NOTE — TELEPHONE ENCOUNTER
"Caller states he was seen per provider x 2 days. Pt c/o LLQ abd pain that worsen with movement. Pt states pain is 7/10 at this time.  Pt advised to see provider per protocol and verbalized understanding.    Reason for Disposition   [1] MILD-MODERATE pain AND [2] constant AND [3] present > 2 hours    Additional Information   Negative: Shock suspected (e.g., cold/pale/clammy skin, too weak to stand, low BP, rapid pulse)   Negative: Difficult to awaken or acting confused (e.g., disoriented, slurred speech)   Negative: Passed out (e.g., fainted, lost consciousness, blacked out and was not responding)   Negative: Sounds like a life-threatening emergency to the triager   Negative: [1] SEVERE pain (e.g., excruciating) AND [2] present > 1 hour   Negative: [1] SEVERE pain AND [2] age > 60 years   Negative: [1] Vomiting AND [2] contains red blood or black ("coffee ground") material  (Exception: Few red streaks in vomit that only happened once.)   Negative: Blood in bowel movements  (Exception: Blood on surface of BM with constipation.)   Negative: Black or tarry bowel movements  (Exception: Chronic-unchanged black-grey BMs AND is taking iron pills or Pepto-Bismol.)   Negative: [1] Unable to urinate (or only a few drops) > 4 hours AND [2] bladder feels very full (e.g., palpable bladder or strong urge to urinate)   Negative: [1] Vomiting AND [2] contains bile (green color)   Negative: [1] Pain in the scrotum or testicle AND [2] present > 1 hour   Negative: Patient sounds very sick or weak to the triager    Protocols used: Abdominal Pain - Male-A-AH    "

## 2024-10-10 LAB
C DIFF GDH STL QL: NEGATIVE
C DIFF TOX A+B STL QL IA: NEGATIVE
CRYPTOSP AG STL QL IA: NEGATIVE
E COLI SXT1 STL QL IA: NEGATIVE
E COLI SXT2 STL QL IA: NEGATIVE
G LAMBLIA AG STL QL IA: NEGATIVE

## 2024-10-12 LAB — BACTERIA STL CULT: NORMAL

## 2024-10-15 ENCOUNTER — PATIENT MESSAGE (OUTPATIENT)
Dept: NEUROSURGERY | Facility: CLINIC | Age: 59
End: 2024-10-15
Payer: COMMERCIAL

## 2024-10-16 LAB — O+P STL MICRO: NORMAL

## 2024-10-17 ENCOUNTER — OFFICE VISIT (OUTPATIENT)
Dept: OTOLARYNGOLOGY | Facility: CLINIC | Age: 59
End: 2024-10-17
Payer: COMMERCIAL

## 2024-10-17 VITALS — HEIGHT: 70 IN | BODY MASS INDEX: 45.1 KG/M2 | WEIGHT: 315 LBS

## 2024-10-17 DIAGNOSIS — K21.9 LPRD (LARYNGOPHARYNGEAL REFLUX DISEASE): Primary | ICD-10-CM

## 2024-10-17 DIAGNOSIS — R68.89 THROAT CONGESTION: ICD-10-CM

## 2024-10-17 LAB — H PYLORI AG STL QL IA: NOT DETECTED

## 2024-10-17 PROCEDURE — 31575 DIAGNOSTIC LARYNGOSCOPY: CPT | Mod: S$GLB,,, | Performed by: NURSE PRACTITIONER

## 2024-10-17 PROCEDURE — 99214 OFFICE O/P EST MOD 30 MIN: CPT | Mod: 25,S$GLB,, | Performed by: NURSE PRACTITIONER

## 2024-10-17 PROCEDURE — 99999 PR PBB SHADOW E&M-EST. PATIENT-LVL IV: CPT | Mod: PBBFAC,,, | Performed by: NURSE PRACTITIONER

## 2024-10-17 RX ORDER — FAMOTIDINE 40 MG/1
40 TABLET, FILM COATED ORAL NIGHTLY
Qty: 30 TABLET | Refills: 11 | Status: SHIPPED | OUTPATIENT
Start: 2024-10-17 | End: 2025-10-17

## 2024-10-17 NOTE — PROGRESS NOTES
"Subjective     Patient ID: García Tang is a 59 y.o. male.    Chief Complaint: No chief complaint on file.    HPI  Patient saw Dr. Deluca 4 months ago for dysphagia, neck tightness, and hoarseness: "Cannot rule out hardware issue or fluid. Will get US, has contrast allergy. If any concerns, will pre med and do CT. Suspect silent reflux is issue as well with scope and hoarseness, off protonix lately. Will start him on omeprazole for at least next month and see if this helps, may need to continue. Return in 6 weeks."      IMAGING reviewed:  Ultrasound done in May 2024: "Two left thyroid lobe solid nodules. One of the nodule is considered TI-RADS category 4, moderately suspicious. Recommend follow-up sonogram at 1, 2, 3, and 5 years."    CT STN done in June 2024: " No evidence of acute abnormality.  No discrete soft tissue mass or pathologically enlarged cervical lymph nodes. Routine appearance of postoperative changes of ACDF at C5-6.  No evidence of acute hardware complication."  CT STN done in July 2024: "No evidence of an acute abnormality or significant interval change as compared to the prior exam on 06/03/2024. Stable retained postoperative appearance of ACDF at C5-6.  No evidence of acute hardware complication.  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 Endorses (+)dysphonia, dysphagia, chokes easily and globus without allergic, sinusitis, or infectious symptoms. States he wakes up at night with pressure or tightness in his throat. Woke him up at 3 AM today. Never any pain or scratchiness. Just pressure. Started after his spinal surgery earlier this year. EGD done 10 months ago by Dr. Bermudez was negative.     Review of Systems   Constitutional: Negative.  Negative for fever.   HENT:  Positive for trouble swallowing and voice change. Negative for nasal congestion, dental problem, facial swelling, " postnasal drip, rhinorrhea, sinus pressure/congestion, sneezing and sore throat.         Globus sensation (sensation of lump or swelling in throat)   Eyes: Negative.    Respiratory: Negative.     Cardiovascular: Negative.    Gastrointestinal: Negative.    Musculoskeletal: Negative.    Integumentary:  Negative.   Neurological: Negative.  Negative for headaches.   Hematological: Negative.    Psychiatric/Behavioral: Negative.            Objective     Physical Exam  Vitals and nursing note reviewed.   Constitutional:       General: He is not in acute distress.     Appearance: He is well-developed. He is not ill-appearing.   HENT:      Head: Normocephalic and atraumatic.      Right Ear: Hearing, tympanic membrane, ear canal and external ear normal. No middle ear effusion. Tympanic membrane is not erythematous.      Left Ear: Hearing, tympanic membrane, ear canal and external ear normal.  No middle ear effusion. Tympanic membrane is not erythematous.      Nose: Nose normal. No congestion or rhinorrhea.      Right Sinus: No maxillary sinus tenderness or frontal sinus tenderness.      Left Sinus: No maxillary sinus tenderness or frontal sinus tenderness.      Mouth/Throat:      Mouth: Mucous membranes are moist. No oral lesions.      Tongue: No lesions.      Palate: No lesions.      Pharynx: Oropharynx is clear. Uvula midline. No pharyngeal swelling, oropharyngeal exudate, posterior oropharyngeal erythema or uvula swelling.   Eyes:      General: Lids are normal. No scleral icterus.        Right eye: No discharge.         Left eye: No discharge.   Neck:      Trachea: Trachea normal. No tracheal deviation.   Cardiovascular:      Rate and Rhythm: Normal rate.   Pulmonary:      Effort: Pulmonary effort is normal. No respiratory distress.      Breath sounds: No stridor. No wheezing.   Musculoskeletal:         General: Normal range of motion.      Cervical back: Normal range of motion.   Lymphadenopathy:      Cervical: No  cervical adenopathy.   Skin:     General: Skin is warm and dry.   Neurological:      Mental Status: He is alert and oriented to person, place, and time.      Coordination: Coordination is intact.      Gait: Gait normal.   Psychiatric:         Attention and Perception: Attention normal.         Mood and Affect: Mood normal.         Speech: Speech normal.         Behavior: Behavior normal. Behavior is cooperative.     Procedure: Flexible laryngoscopy    In order to fully examine the upper aerodigestive tract, including the larynx, in a patient with a hyperactive gag reflex, and suboptimal visualization with indirect mirror exam,  flexible endoscopy is required.   After explaining the procedure and obtaining verbal consent, a timeout was performed with the patient's participation according to the universal protocol. Both nasal cavities were anesthetized with 4% Xylocaine spray mixed with Ezio-Synephrine. The flexible laryngoscope  was inserted into the nasal cavity and advanced to visualize the nasal cavity, nasopharynx, the posterior oropharynx, hypopharynx, and the endolarynx with the  findings noted. The scope was removed and the procedure terminated. The patient tolerated this procedure well without apparent complication.     OVERALL FINDINGS  Nasopharynx - the torus is clear. There are no lesions of the posterior wall.   Oropharynx - no lesions of the tongue base. There is no obvious fullness or asymmetry.  Hypopharynx - there are no lesions of the pyriform sinuses or postcricoid region   Larynx - there are no lesions of the supraglottic or glottic larynx.  Vocal fold mobility is normal.     SPECIFIC FINDINGS  Adenoid tissue - normal   Nasopharynx & eustachian tube orifices - normal   Posterior pharyngeal wall - normal   Base of tongue - normal   Epiglottis - normal   Valleculae - normal   Pyriform sinuses - normal   False vocal cords - normal   True vocal cords - normal  Arytenoids - normal   Interarytenoid space  - edema  Pharynx    Larynx    Larynx       Assessment and Plan     1. LPRD (laryngopharyngeal reflux disease)  -     famotidine (PEPCID) 40 MG tablet; Take 1 tablet (40 mg total) by mouth nightly.  Dispense: 30 tablet; Refill: 11    2. Throat congestion      Advised/Cautioned: The results of today's ENT exam and flexible endoscopy were detailed to the patient and all questions were answered. Patient education centered around GERD, known exacerbants and contemporary treatment options. Laryngoscope photos were given and reviewed with the patient in detail. Handouts given on LPRD and GERD were given to the patient. After review of these, patient elected to be placed on PPI 40 mg QAM on an empty stomach for the next 6-8 weeks, and H2-blocker QHS. I encouraged the patient once he has completed the evening meal to not snack or consume any other food products or caffeinated beverages for at least  minutes before retiring. Finally, I encouraged the patient to sleep about 30 degrees above horizontal, and this can be facilitated by using 2-3 pillows or a wedge foam product.   F/u with Dr. Deluca in 6 weeks if no better       No follow-ups on file.

## 2024-10-17 NOTE — PATIENT INSTRUCTIONS
"LARYNGOPHARYNGEAL REFLUX  (SILENT OR ATYPICAL REFLUX)  If you have any of the following symptoms you may have laryngopharyngeal reflux (LPR):  frequent sore throat, thick or too much mucus, chronic throat clearing, voice changes, "lump" sensation, chronic cough, especially cough that wake you up from sleep, chronic "postnasal drip" without the need to blow your nose.      LPR is also called extra esophageal reflux. This means that if you have reflux into the tissues above the esophagus (into the voicebox,) you may experience throat issues as above.      Many people with LPR do not have symptoms of heartburn. Compared to the esophagus, the voice box and the back of the throat are significantly more sensitive to the effects of acid and digestive enzymes on surrounding tissue. Acid passing quickly through the esophagus does not have a chance to irritate the area for too long.  However acid that pools in the throat or voice box can cause prolonged irritation resulting in the symptoms of LPR.     The symptoms of LPR can consist of a dry cough and the sensation of something being stuck in the throat.  Some people will complain of heartburn while others may have intermittent hoarseness or loss of voice.  Another major symptom of LPR is "postnasal drip."  Patients are often told symptoms are due to abnormal nasal drainage or sinus infection; however this is rarely the cause of chronic throat irritation. For post nasal drip to cause the complaints described, signs and symptoms of an active nasal infection should be present.      Treatments for LPR include: postural changes (sleeping or laying with an incline), weight reduction, diet modification, medication to reduce stomach acid and promote normal motility, and rarely: surgery to prevent reflux. Most patients will begin to notice some relief in her symptoms about 2-4 weeks after starting the medication; however it is generally recommended the medication should be continued " for at least 2 months. If the symptoms completely resolve, the medication can then be tapered.  Some people will remain symptom free while others may have relapses which required treatment again.     Things you may be able to do to prevent reflux.  1. Do not smoke.  Smoking will worsen reflux.    2. Avoid eating at least 2-3 hours prior to bedtime.  Try to avoid very large meals at night.    3. Weight loss.  For patient's with recent weight gain, shedding a few pounds is all that is required to improve reflux.    4. Avoid reflux triggers. These can worsen acid in the stomach or even cause the esophagus muscles to relax: caffeine, carbonated water or soft drinks, acidic foods (tomato, fruit), mints, alcoholic beverages, particularly at night, cheese, fried foods, spicy foods, eggs, and chocolate.    5. Sleep with the head of bed elevated at least 6 inches.  6. You may also take over-the-counter acid reducing medications.     Omeprazole 40 mg in the morning on an empty stomach at least 30-60 minutes before eating.   Famotidine 40 mg in the evening or at bedtime.     Gaviscon can be considered as an added medication for reflux.  Gaviscon is made up of sodium alginate, a natural substance derived from seaweed.  This substance, when swallowed, forms a barrier or raft on the surface of the stomach contents to prevent reflux of stomach contents into the esophagus or throat.  It can be an effective way to manage heartburn or gastroesophageal reflux (GERD).  The benefit is also that it is minimally absorbed into the rest of the body, so you do not have to worry about side effects outside of the stomach.  This can be taken long-term without any issues as long as tolerated well. There are two types of Gaviscon available:  Gaviscon Original can be purchased through most pharmacies or online  Gaviscon Advance is twice as strong as original (without side effects), but can only be purchased online, most easily accessible through  [employer]. I find the Advance works far better than the Original.   How to take:  Read the instructions to confirm dosing, but generally it is 10 ml taken 2-3 times daily, or 1 chewable tablet 2-3 times daily.     See a Gastro doctor (GI) for refractory symptoms and continued management.

## 2024-10-22 ENCOUNTER — PATIENT MESSAGE (OUTPATIENT)
Dept: NEUROSURGERY | Facility: CLINIC | Age: 59
End: 2024-10-22
Payer: COMMERCIAL

## 2024-10-24 ENCOUNTER — PATIENT MESSAGE (OUTPATIENT)
Dept: GASTROENTEROLOGY | Facility: CLINIC | Age: 59
End: 2024-10-24
Payer: COMMERCIAL

## 2024-11-05 ENCOUNTER — OFFICE VISIT (OUTPATIENT)
Dept: PHYSICAL MEDICINE AND REHAB | Facility: CLINIC | Age: 59
End: 2024-11-05
Payer: COMMERCIAL

## 2024-11-05 VITALS — HEART RATE: 72 BPM | DIASTOLIC BLOOD PRESSURE: 82 MMHG | SYSTOLIC BLOOD PRESSURE: 160 MMHG

## 2024-11-05 DIAGNOSIS — M54.41 CHRONIC BILATERAL LOW BACK PAIN WITH BILATERAL SCIATICA: ICD-10-CM

## 2024-11-05 DIAGNOSIS — S14.105D C5-C7 LEVEL SPINAL CORD INJURY, SUBSEQUENT ENCOUNTER: ICD-10-CM

## 2024-11-05 DIAGNOSIS — G89.29 CHRONIC BILATERAL LOW BACK PAIN WITH BILATERAL SCIATICA: ICD-10-CM

## 2024-11-05 DIAGNOSIS — Z98.1 S/P CERVICAL SPINAL FUSION: ICD-10-CM

## 2024-11-05 DIAGNOSIS — G82.50 SPASTIC QUADRIPARESIS: ICD-10-CM

## 2024-11-05 DIAGNOSIS — M54.42 CHRONIC BILATERAL LOW BACK PAIN WITH BILATERAL SCIATICA: ICD-10-CM

## 2024-11-05 DIAGNOSIS — M54.9 DORSALGIA, UNSPECIFIED: ICD-10-CM

## 2024-11-05 DIAGNOSIS — M79.2 NEUROPATHIC PAIN: Primary | ICD-10-CM

## 2024-11-05 PROCEDURE — 99215 OFFICE O/P EST HI 40 MIN: CPT | Mod: S$GLB,,, | Performed by: PHYSICAL MEDICINE & REHABILITATION

## 2024-11-05 PROCEDURE — 99999 PR PBB SHADOW E&M-EST. PATIENT-LVL IV: CPT | Mod: PBBFAC,,, | Performed by: PHYSICAL MEDICINE & REHABILITATION

## 2024-11-05 RX ORDER — BACLOFEN 10 MG/1
10 TABLET ORAL 3 TIMES DAILY
Qty: 90 TABLET | Refills: 11 | Status: SHIPPED | OUTPATIENT
Start: 2024-11-05 | End: 2025-11-05

## 2024-11-05 RX ORDER — PREGABALIN 25 MG/1
25 CAPSULE ORAL 2 TIMES DAILY
Qty: 60 CAPSULE | Refills: 1 | Status: SHIPPED | OUTPATIENT
Start: 2024-11-05 | End: 2025-01-04

## 2024-11-05 NOTE — PROGRESS NOTES
[facility] VISIT     Primary Care Provider: LORETTA Domingo MD     CHIEF COMPLAINT:   1.   Chief Complaint   Patient presents with    Leg Pain     Both but mostly Left      2. Mobility and function management recommendations.     HISTORY OF PRESENT ILLNESS: García Tang is a 59 y.o.-year-old male with a history of spinal cord injury due to cervical myelopathy s/p C5-6 ACDF with late effect spastic quadriparesis, neurogenic bowel, gait dysfunction, fatigue who presents today for follow up evaluation and recommendations.     Main complaint today is: low back pain with radicular nerve pain down both legs left > right. He also has increased tightness to the right arm/hand. He is taking the baclofen 3 times daily which seems to help the tightness but the nerve pain is uncontrolled. He is not getting good sleep secondary to the nerve pains. Denies trauma.    Seen by NSGY with pending MRI c-spine.     Reports,   Mobility/function: limited ambulation due to difficulty with response of legs and fatigues/exhausts easily, feels like his leg are heavier than normal   Dysphagia: chokes with thin liquids at times; seen ENT without concern, seen endocrine without concern for thyroid dysfunction  Pain: electrical shocks down arms and legs, neck (anterior and posterior)  Skin: no concerns  Bowel: BM daily, constipation; manages with probiotics   - follows with GI  Bladder: burning after urination, has issues emptying her bladder at times especially in the morning time   - seen by urology    Medications: tylenol, oral baclofen   - not interested in dantrolene due to liver concerns  - trialed gabapentin but had night terrors  Injections: none  Surgical procedures:   - C5/6 ACDF 02/012/2024    EQUIPMENT:  Braces: none  Wheelchair: none  Walker: rolling, does use    MOBILITY/TRANSFERS:  Walking: Distance can walk 1/2 mile but is limited by pain and instability in legs  "    COGNITION:  Speech: expressive, word finding  Memory: short term issues  Processing: "looses train of thought"  Mood: depressed, pre-morbid managed with exercise/jogging    ACTIVITIES OF DAILY LIVING:  Upper extremity dressing: ind  Lower extremity dressing: ind  Bathe: ind  Groom: ind  Toilet: ind    THERAPY/LOCATION:  PT: outpatient 2x/wk  OT: none currently  Speech: none currently    EDUCATION/VOCATION:  Highest level of education: some college  Occupation status: insurance, 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    RECREATION: exercise/jogging, basketball    LIVING SITUATION: lives with wife, daughter (total of 4 kids); single story home threshold to enter, walk in shower and tub shower combo with rail    PAST MEDICAL HISTORY:  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    Diverticulitis     diverticulitis    Diverticulosis     Essential hypertension 07/18/2016    Fatty liver disease, nonalcoholic 04/07/2016    [place], 2006     GERD (gastroesophageal reflux disease)     Headache     HLD (hyperlipidemia)     Kidney stone     requiring stent    Meniere's disease 01/09/2018    Morbid obesity 05/29/2014    NAFLD (nonalcoholic fatty liver disease)     Near syncope 05/23/2017    Obesity hypoventilation syndrome 02/22/2016    PAD (peripheral artery disease) 09/17/2019    Mild 9/2018    Prostatitis     Rectal bleeding     diverticulitis    Sleep disorder breathing 2/12/2024    Thyroid nodule 07/14/2022        PAST SURGICAL HISTORY:   Past Surgical History:   Procedure Laterality Date    ANGIOGRAM, CORONARY, WITH LEFT HEART CATHETERIZATION  10/26/2023    Procedure: Left Heart Cath;  Surgeon: Sal Salazar MD;  [facility];  Service: Cardiology;;    ANTERIOR CERVICAL DISCECTOMY W/ FUSION N/A 2/12/2024    Procedure: DISCECTOMY, SPINE, CERVICAL, ANTERIOR APPROACH, WITH FUSION C5-6;  " Surgeon: Wali Zuniga MD;  Location: [facility] OR;  Service: Neurosurgery;  Laterality: N/A;    CARDIAC CATHETERIZATION      COLONOSCOPY  2008    diverticulosis per patient report    CORONARY ANGIOGRAPHY  10/26/2023    Procedure: Coronary angiogram study;  Surgeon: Sal Salazar MD;  Location: [facility] CATH;  Service: Cardiology;;    ESOPHAGOGASTRODUODENOSCOPY N/A 12/18/2023    Procedure: EGD (ESOPHAGOGASTRODUODENOSCOPY);  Surgeon: Aram Bermudez MD;  Location: [facility] ENDO;  Service: Gastroenterology;  Laterality: N/A;    kidney stent      KIDNEY SURGERY  1996    right kidney obstruction requiring stent-open    MAGNETIC RESONANCE IMAGING N/A 1/26/2024    Procedure: MRI (MAGNETIC RESONANCE IMAGING);  Surgeon: Provider, [facility];  Location: [facility];  Service: General;  Laterality: N/A;    NOSE SURGERY      UPPER GASTROINTESTINAL ENDOSCOPY  2021    [facility], gastritis per patient report        FAMILY HISTORY:   Family History   Problem Relation Name Age of Onset    Liver disease Mother Stephanie         d.    Cirrhosis Mother Stephanie     Hypertension Mother Stephanie     Hyperlipidemia Mother Stephanie     Colon cancer Father John 70    Cancer Father John     Diabetes Father [place]     Hypertension Father John     Glaucoma Father John     Hyperlipidemia Father [place]     Prostate cancer Father [place]     Diabetes Brother Hayder     Hypertension Brother Hayder     Thyroid cancer Brother Hayder     Heart attack Maternal Uncle      Heart attack Maternal Uncle      Heart disease Neg Hx      Heart failure Neg Hx      Macular degeneration Neg Hx      Retinal detachment Neg Hx      Crohn's disease Neg Hx      Ulcerative colitis Neg Hx      Stomach cancer Neg Hx      Esophageal cancer Neg Hx         SOCIAL HISTORY:    Social History     Socioeconomic History    Marital status:    Tobacco Use    Smoking status: Former     Current packs/day: 0.00     Types: Cigarettes     Quit date: 1/1/1990     Years  since quittin.8    Smokeless tobacco: Never    Tobacco comments:     Patient stopped smoking years ago, back in the s   Substance and Sexual Activity    Alcohol use: No     Comment: stopped in     Drug use: No    Sexual activity: Yes     Partners: Female     Social Drivers of Health     Financial Resource Strain: Patient Declined (2024)    Overall Financial Resource Strain (CARDIA)     Difficulty of Paying Living Expenses: Patient declined   Food Insecurity: Unknown (2024)    Hunger Vital Sign     Worried About Running Out of Food in the Last Year: Never true     Ran Out of Food in the Last Year: Patient declined   Transportation Needs: Unknown (2024)    PRAPARE - Transportation     Lack of Transportation (Medical): No     Lack of Transportation (Non-Medical): Patient declined   Physical Activity: Unknown (2024)    Exercise Vital Sign     Days of Exercise per Week: Patient declined     Minutes of Exercise per Session: 0 min   Recent Concern: Physical Activity - Inactive (2024)    Exercise Vital Sign     Days of Exercise per Week: 0 days     Minutes of Exercise per Session: 0 min   Stress: Patient Declined (2024)    [employer] - Occupational Stress Questionnaire     Feeling of Stress : Patient declined   Housing Stability: Patient Declined (2024)    Housing Stability Vital Sign     Unable to Pay for Housing in the Last Year: Patient declined     Number of Places Lived in the Last Year: 1     Unstable Housing in the Last Year: Patient declined       MEDICATIONS:     Current Outpatient Medications:     acetaminophen (TYLENOL) 500 MG tablet, Take 1,000 mg by mouth every 6 (six) hours as needed for Pain., Disp: , Rfl:     amLODIPine (NORVASC) 5 MG tablet, Take 1 tablet (5 mg total) by mouth every evening., Disp: 90 tablet, Rfl: 1    ascorbic acid (VITAMIN C ORAL), Take 2 tablets by mouth once daily., Disp: , Rfl:     baclofen (LIORESAL) 10 MG  tablet, Take 1 tablet (10 mg total) by mouth 3 (three) times daily., Disp: 90 tablet, Rfl: 11    carvediloL (COREG) 25 MG tablet, Take 1 tablet (25 mg total) by mouth 2 (two) times daily with meals., Disp: 180 tablet, Rfl: 1    cholecalciferol, vitamin D3, 125 mcg (5,000 unit) Tab, Take 10,000 Units by mouth once daily., Disp: , Rfl:     diazePAM (VALIUM) 5 MG tablet, Take 1 tablet (5 mg total) by mouth once as needed for Anxiety. Take 30 minutes prior to MRI (Patient not taking: Reported on 10/23/2024), Disp: 1 tablet, Rfl: 0    dicyclomine (BENTYL) 10 MG capsule, Take 1 capsule (10 mg total) by mouth 4 (four) times daily as needed (abdominal pain)., Disp: 120 capsule, Rfl: 0    famotidine (PEPCID) 40 MG tablet, Take 1 tablet (40 mg total) by mouth nightly., Disp: 30 tablet, Rfl: 11    Lactobacillus acidophilus (ACIDOPHILUS ORAL), Take 3 tablets by mouth once daily., Disp: , Rfl:     losartan (COZAAR) 100 MG tablet, Take 1 tablet (100 mg total) by mouth once daily., Disp: 90 tablet, Rfl: 1    magnesium oxide (MAG-OX) 400 mg (241.3 mg magnesium) tablet, Take 400 mg by mouth once daily., Disp: , Rfl:     moxifloxacin (AVELOX) 400 mg tablet, Take 1 tablet (400 mg total) by mouth once daily. (Patient not taking: Reported on 10/9/2024), Disp: 5 tablet, Rfl: 0    NIACIN ORAL, Take 1 tablet by mouth once daily., Disp: , Rfl:     omeprazole (PRILOSEC) 40 MG capsule, Take 1 capsule (40 mg total) by mouth once daily., Disp: 90 capsule, Rfl: 3    oxyCODONE (ROXICODONE) 5 MG immediate release tablet, Take 1 tablet (5 mg total) by mouth every 4 (four) hours as needed. (Patient not taking: Reported on 10/23/2024), Disp: 6 tablet, Rfl: 0    pregabalin (LYRICA) 25 MG capsule, Take 1 capsule (25 mg total) by mouth 2 (two) times daily., Disp: 60 capsule, Rfl: 1    spironolactone (ALDACTONE) 25 MG tablet, Take 0.5 tablets (12.5 mg total) by mouth once daily., Disp: 45 tablet, Rfl: 1    zinc gluconate 50 mg tablet, Take 50 mg by mouth  once daily., Disp: , Rfl:      ALLERGIES:   Review of patient's allergies indicates:   Allergen Reactions    Lisinopril Swelling    Iodinated contrast media Other (See Comments)     Spasms, tremor, and high blood pressure    Bactrim  [sulfamethoxazole-trimethoprim]      Other reaction(s): Unknown    Diflucan [fluconazole]      Heart racing    Gabapentin Other (See Comments)     nightmares    Hydroxyzine hcl      Other reaction(s): elevated bp    Kenalog [triamcinolone acetonide] Other (See Comments)     Heart flutters all day    Phenergan [promethazine] Anxiety    Zofran [ondansetron hcl (pf)] Anxiety       REVIEW OF SYSTEMS:   Denies coughs, colds, fevers, chills. + constipation. Bowel movements are regular. + dysphagia. Poor sleep. + depression. No drooling or difficulty handling oral secretions. No skin lesions.     PHYSICAL EXAMINATION:   VITALS:   Vitals:    11/05/24 0832   BP: (!) 160/82   Pulse: 72     GENERAL: The patient is awake, alert, cooperative, and in no acute distress. Flat affect  HEENT: Normocephalic, atraumatic. Tracking is in all 4 quadrants. No facial asymmetry. Uvula is midline.   NECK: Supple. No lymphadenopathy. No masses. Full range of motion with pain  HEART: Appears well perfused. No lower extremity edema.   LUNGS: No increased work of breath.   ABDOMEN: Benign.   NEURO: Cranial nerves II-XII are grossly intact by observation.   EXTREMITIES: Warm, capillary refill less than 2 seconds. No clubbing, cyanosis or edema.   MUSCULOSKELETAL: No focal muscular/limb atrophy/hypertrophy. No leg length discrepancy. No gross deformity.     NEUROMUSCULAR:   Modified Neptali Scale:  1: justin wrist flexors  1+: justin knee flexors, justin ankle plantarflexors  2:   3:  4:     Manual muscle testing:      Right  Left  Elbow flexion   4/5  4/5  Elbow extension  4/5  4/5  Wrist extension  4/5  5/5  Finger flexion   5/5  5/5  Finger abduction  5/5  4/5    Hip flexion   4/5  4/5  Knee extension  4/5  4/5  Ankle  dorsiflexion  4/5  4/5  Ankle plantarflexion  4/5  4/5  Great toe extension  4/5  4/5    Appropriate sitting balance.   No dyskinetic or dystonic movements appreciated.   Muscle stretch reflexes are 2+ throughout both upper and lower extremities.   Catch and release clonus at bilateral ankles. No wrist clonus  - bilateral Spurling's    GAIT/DYNAMIC: deliberate short steppage gait at first with stride lengthening with progression, poor BUE arm swing.     Transfers from supine to sit and sit to stand are independent.       EDX 04/16/2024 with noted: length-dependent axonal sensorimotor peripheral neuropathy.     ASSESSMENT:   1. Neuropathic pain  pregabalin (LYRICA) 25 MG capsule    CANCELED: MRI Lumbar Spine Without Contrast      2. Spastic quadriparesis  pregabalin (LYRICA) 25 MG capsule    CANCELED: MRI Lumbar Spine Without Contrast      3. Dorsalgia, unspecified  MRI Lumbar Spine Without Contrast    MRI Lumbar Spine Without Contrast    CANCELED: MRI Lumbar Spine Without Contrast      4. Chronic bilateral low back pain with bilateral sciatica  CANCELED: MRI Lumbar Spine Without Contrast      5. S/P cervical spinal fusion        6. C5-C7 level spinal cord injury, subsequent encounter                García Tang is a 59 y.o.-year-old male with a history of spinal cord injury due to cervical myelopathy s/p C5-6 ACDF with late effect spastic quadriparesis, neurogenic bowel, gait dysfunction, fatigue. The following recommendations and plan were discussed in depth with the patient who voiced understanding and was in agreement.     PLAN:   Spasticity:   - increase oral baclofen to 10 mg three times daily     Pain (MSK/Neuropathic):   - acute on chronic neck and low back pain with radicular symptoms.   - complete pending MRI c-spine  - order placed for update MRI l-spine  - increase baclofen to 10 mg three times daily  - start lyrica 25 mg twice daily, benefits and risk discussed    Fatigue:  - work up previously  without noted thyroid dysfunction  - lab work reveals low free T, recommend hormone replacement therapy with PCP, he will consider  - consider repeating sleep study, previous did not show CHANELL but this was prior to his SCI    Mental health:  - pre-morbid depression with exacerbation since SCI    Bracing:    - no needs at this time    Equipment:   - no needs at this time    Bowel:  - chronic constipation, follows with GI    Bladder:   - neurogenic bladder, recommend yearly U/S renal studies    Therapy:   - PT pending    RTC after completion of the MRI c-spine and l-spine to discuss management.      40 minutes of total time spent on the encounter, which includes face to face time and non-face to face time preparing to see the patient (eg, review of tests), obtaining and/or reviewing separately obtained history, documenting clinical information in the electronic or other health record, independently interpreting results (not separately reported), communicating results to the patient/family/caregiver, and/or care coordination (not separately reported).

## 2024-11-11 ENCOUNTER — TELEPHONE (OUTPATIENT)
Dept: PHYSICAL MEDICINE AND REHAB | Facility: CLINIC | Age: 59
End: 2024-11-11
Payer: COMMERCIAL

## 2024-11-11 ENCOUNTER — OFFICE VISIT (OUTPATIENT)
Dept: FAMILY MEDICINE | Facility: CLINIC | Age: 59
End: 2024-11-11
Payer: COMMERCIAL

## 2024-11-11 VITALS
SYSTOLIC BLOOD PRESSURE: 132 MMHG | BODY MASS INDEX: 44.97 KG/M2 | WEIGHT: 314.13 LBS | HEART RATE: 69 BPM | DIASTOLIC BLOOD PRESSURE: 90 MMHG | OXYGEN SATURATION: 98 % | HEIGHT: 70 IN

## 2024-11-11 DIAGNOSIS — G89.29 CHRONIC BILATERAL LOW BACK PAIN WITH BILATERAL SCIATICA: Primary | ICD-10-CM

## 2024-11-11 DIAGNOSIS — I10 ESSENTIAL (PRIMARY) HYPERTENSION: ICD-10-CM

## 2024-11-11 DIAGNOSIS — E11.65 TYPE 2 DIABETES MELLITUS WITH HYPERGLYCEMIA, WITHOUT LONG-TERM CURRENT USE OF INSULIN: ICD-10-CM

## 2024-11-11 DIAGNOSIS — M54.42 CHRONIC BILATERAL LOW BACK PAIN WITH BILATERAL SCIATICA: Primary | ICD-10-CM

## 2024-11-11 DIAGNOSIS — M54.41 CHRONIC BILATERAL LOW BACK PAIN WITH BILATERAL SCIATICA: Primary | ICD-10-CM

## 2024-11-11 PROCEDURE — 99214 OFFICE O/P EST MOD 30 MIN: CPT | Mod: S$GLB,,, | Performed by: PHYSICIAN ASSISTANT

## 2024-11-11 PROCEDURE — 99999 PR PBB SHADOW E&M-EST. PATIENT-LVL III: CPT | Mod: PBBFAC,,, | Performed by: PHYSICIAN ASSISTANT

## 2024-11-11 RX ORDER — METOPROLOL SUCCINATE 50 MG/1
50 TABLET, EXTENDED RELEASE ORAL DAILY
Qty: 90 TABLET | Refills: 3 | Status: SHIPPED | OUTPATIENT
Start: 2024-11-11 | End: 2025-11-11

## 2024-11-11 NOTE — TELEPHONE ENCOUNTER
Called and spoke with someone in Imaging at number provided, she stated that Destiny leaves at 3. Will call back tomorrow 11/12/24.    ----- Message from Dae sent at 11/11/2024  3:00 PM CST -----  Regarding: order  Contact: Destiny with Imaging  Type:  Patient Returning Call    Who Called:Destiny with [facility]  Who Left Message for Patient:nurse  Does the patient know what this is regarding?:signed orders Lumbar MRI order that was received is not a complete order signed by MD  Would the patient rather a call back or a response via [facility]? email  Best Call Back Number:[phone]  Additional Information: please email to: [email or web address]

## 2024-11-11 NOTE — PROGRESS NOTES
"Subjective:      Patient ID: García Tang is a 59 y.o. male.    Chief Complaint: Back Pain    HPI  Patient has PMH of cerebral aneurysm with cognitive communication deficit, anxiety, dysthymia, meniere's disease, HTN, dyslipidemia, arteriosclerotic cardiovascular disease, Type 2 DM, and GERD.  Had spinal cord compression.  Vaccinated against covid, not flu.     Patient reports flares of low back pain with bilateral sciatica for three months.  Reports gait instability associated with this.  Taking baclofen with some relief.  Saw Dr. Ng 10/23/2024 for this.  Saw Dr. Peres 11/5/2024 with baclofen 10mg TID and pregabalin 25mg BID.  Denies urinary incontinence or fecal incontinence.  No new injury known.  Waiting on open MRI at [facility].    Lab Results   Component Value Date    HGBA1C 7.2 (H) 07/06/2024      Review of Systems   Respiratory:  Negative for shortness of breath.    Cardiovascular:  Negative for chest pain.   Genitourinary:  Positive for dysuria. Negative for frequency and hematuria.   Musculoskeletal:  Positive for back pain.       Objective:   BP (!) 132/90   Pulse 69   Ht 5' 10" (1.778 m)   Wt (!) 142.5 kg (314 lb 2.5 oz)   SpO2 98%   BMI 45.08 kg/m²     Physical Exam  Constitutional:       Appearance: Normal appearance. He is well-developed.   HENT:      Right Ear: External ear normal.      Left Ear: External ear normal.   Eyes:      Conjunctiva/sclera: Conjunctivae normal.   Cardiovascular:      Rate and Rhythm: Normal rate and regular rhythm.      Heart sounds: Normal heart sounds. No murmur heard.     No friction rub. No gallop.   Pulmonary:      Effort: Pulmonary effort is normal. No respiratory distress.      Breath sounds: Normal breath sounds. No wheezing, rhonchi or rales.   Musculoskeletal:      Cervical back: No bony tenderness.      Thoracic back: No bony tenderness.      Lumbar back: Tenderness (bilateral) and bony tenderness present. Positive right straight leg raise test " and positive left straight leg raise test.   Skin:     General: Skin is warm and dry.      Findings: No rash.   Neurological:      General: No focal deficit present.      Mental Status: He is alert and oriented to person, place, and time.   Psychiatric:         Mood and Affect: Mood normal.         Behavior: Behavior normal.         Judgment: Judgment normal.       Assessment:      1. Chronic bilateral low back pain with bilateral sciatica    2. Type 2 diabetes mellitus with hyperglycemia, without long-term current use of insulin    3. Essential (primary) hypertension       Plan:   1. Chronic bilateral low back pain with bilateral sciatica (Primary)  Continued.  Dr. Peres is managing this.  Waiting on open MRI to evaluate this.    2. Type 2 diabetes mellitus with hyperglycemia, without long-term current use of insulin  Uncontrolled.    3. Essential (primary) hypertension  - metoprolol succinate (TOPROL-XL) 50 MG 24 hr tablet; Take 1 tablet (50 mg total) by mouth once daily.  Dispense: 90 tablet; Refill: 3    Follow up as scheduled.  Patient agreed with plan and expressed understanding.    Thank you for allowing me to serve you,

## 2024-11-13 ENCOUNTER — NURSE TRIAGE (OUTPATIENT)
Dept: ADMINISTRATIVE | Facility: CLINIC | Age: 59
End: 2024-11-13
Payer: COMMERCIAL

## 2024-11-13 ENCOUNTER — PATIENT MESSAGE (OUTPATIENT)
Dept: FAMILY MEDICINE | Facility: CLINIC | Age: 59
End: 2024-11-13
Payer: COMMERCIAL

## 2024-11-13 NOTE — TELEPHONE ENCOUNTER
Pt called x2  with no contact made. VM left  Reason for Disposition   Message left on unidentified voice mail. Phone number verified.    Protocols used: No Contact or Duplicate Contact Call-A-OH

## 2024-11-15 ENCOUNTER — PATIENT MESSAGE (OUTPATIENT)
Dept: NEUROSURGERY | Facility: CLINIC | Age: 59
End: 2024-11-15
Payer: COMMERCIAL

## 2024-11-17 PROBLEM — M54.41 CHRONIC BILATERAL LOW BACK PAIN WITH BILATERAL SCIATICA: Status: ACTIVE | Noted: 2024-11-17

## 2024-11-17 PROBLEM — M54.42 CHRONIC BILATERAL LOW BACK PAIN WITH BILATERAL SCIATICA: Status: ACTIVE | Noted: 2024-11-17

## 2024-11-17 PROBLEM — G89.29 CHRONIC BILATERAL LOW BACK PAIN WITH BILATERAL SCIATICA: Status: ACTIVE | Noted: 2024-11-17

## 2024-11-18 ENCOUNTER — PATIENT MESSAGE (OUTPATIENT)
Dept: PHYSICAL MEDICINE AND REHAB | Facility: CLINIC | Age: 59
End: 2024-11-18
Payer: COMMERCIAL

## 2024-12-06 ENCOUNTER — TELEPHONE (OUTPATIENT)
Dept: GASTROENTEROLOGY | Facility: CLINIC | Age: 59
End: 2024-12-06
Payer: COMMERCIAL

## 2024-12-06 NOTE — TELEPHONE ENCOUNTER
Attempted to reach the patient, left a message asking for a call back to the clinic to reschedule procedure, clinic number provided.

## 2024-12-06 NOTE — TELEPHONE ENCOUNTER
----- Message from Cecilia sent at 12/6/2024  8:14 AM CST -----  non-specific  Pt is cx his colonoscopy for the 11th   Pt will call back to reschedule

## 2024-12-16 ENCOUNTER — PATIENT MESSAGE (OUTPATIENT)
Dept: PHYSICAL MEDICINE AND REHAB | Facility: CLINIC | Age: 59
End: 2024-12-16
Payer: COMMERCIAL

## 2024-12-17 ENCOUNTER — PATIENT MESSAGE (OUTPATIENT)
Dept: PHYSICAL MEDICINE AND REHAB | Facility: CLINIC | Age: 59
End: 2024-12-17
Payer: COMMERCIAL

## 2024-12-17 ENCOUNTER — OFFICE VISIT (OUTPATIENT)
Dept: PAIN MEDICINE | Facility: CLINIC | Age: 59
End: 2024-12-17
Payer: COMMERCIAL

## 2024-12-17 VITALS
WEIGHT: 315 LBS | HEIGHT: 70 IN | BODY MASS INDEX: 45.1 KG/M2 | DIASTOLIC BLOOD PRESSURE: 100 MMHG | HEART RATE: 71 BPM | SYSTOLIC BLOOD PRESSURE: 154 MMHG

## 2024-12-17 DIAGNOSIS — M54.16 LUMBAR RADICULOPATHY: ICD-10-CM

## 2024-12-17 DIAGNOSIS — M54.16 LUMBAR RADICULOPATHY, CHRONIC: ICD-10-CM

## 2024-12-17 DIAGNOSIS — M54.9 DORSALGIA, UNSPECIFIED: ICD-10-CM

## 2024-12-17 DIAGNOSIS — Z98.1 HISTORY OF FUSION OF CERVICAL SPINE: ICD-10-CM

## 2024-12-17 DIAGNOSIS — M54.50 LUMBAR BACK PAIN: ICD-10-CM

## 2024-12-17 DIAGNOSIS — M54.2 CERVICALGIA: Primary | ICD-10-CM

## 2024-12-17 PROCEDURE — 99204 OFFICE O/P NEW MOD 45 MIN: CPT | Mod: S$GLB,,, | Performed by: PHYSICIAN ASSISTANT

## 2024-12-17 PROCEDURE — 99999 PR PBB SHADOW E&M-EST. PATIENT-LVL V: CPT | Mod: PBBFAC,,, | Performed by: PHYSICIAN ASSISTANT

## 2024-12-18 NOTE — PROGRESS NOTES
[facility] New Patient Evaluation      Referred by: Dr. Destiny Rivas    PCP:  Vera Domingo MD    CC:   Chief Complaint   Patient presents with    Back Pain    Neck Pain          HPI:   García Tang is a 59 y.o. 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 He presents in referral from Dr. Destiny Rivas for neck and back pain.    Patient is s/p 2/12/24 C5-6 ACDF for treatment of cervical cord compression with myelomalacia.  He has a complicated history.  He had combination of symptoms including complaints of cognitive decline, facial paresthesias and limb paresthesias.  His symptoms fluctuated over time.  He also complained poor dexterity with hand weakness.  MRI revealed cervical spinal stenosis with cord signal change and he subsequently underwent ACDF with improvement in his balance.  However he still had unsteadiness in his gait.  And has felt pain, spasms, numbness, tingling, stiffness in the posterior neck to the arms.  Spams can occur anywhere in the body.  He also has lower back pain with bialteral anterior thigh, pain, numbness, tingling for several months.  He was referred to PM&R and initiated on baclofen therapy, which did help some.  Symtpoms increasewith transitioning from sitting to standing. Gabapentin did not help.  He was prescribed but did not try lyrica.  He went to PT for neck and back and extremities from July 2024 to October 2024.    Denies bowel/  bladder incontinence.    Past and current medications:  Antineuropathics: (tried gabapentin, but no benefit; prescribed lyrica but did not take)  NSAIDs: naproxen  Antidepressants:  Muscle relaxers: baclofen  Opioids:  Antiplatelets/Anticoagulants:  Others:  tylenol    Physical Therapy/ Chiropractic care:  PT - 7/18/24 to 10/18/24    Pain Intervention History:  none    Past Spine Surgical History:  2/12/24 C5/6 ACDF by Dr. Zuniga for cervical cord compression.         History:    Current Outpatient Medications:     acetaminophen (TYLENOL) 500 MG tablet, Take 1,000 mg by mouth every 6 (six) hours as needed for Pain., Disp: , Rfl:     amLODIPine (NORVASC) 5 MG tablet, Take 1 tablet (5 mg total) by mouth every evening., Disp: 90 tablet, Rfl: 1    ascorbic acid (VITAMIN C ORAL), Take 2 tablets by mouth once daily., Disp: , Rfl:     baclofen (LIORESAL) 10 MG tablet, Take 1 tablet (10 mg total) by mouth 3 (three) times daily., Disp: 90 tablet, Rfl: 11    cholecalciferol, vitamin D3, 125 mcg (5,000 unit) Tab, Take 10,000 Units by mouth once daily., Disp: , Rfl:     famotidine (PEPCID) 40 MG tablet, Take 1 tablet (40 mg total) by mouth nightly., Disp: 30 tablet, Rfl: 11    Lactobacillus acidophilus (ACIDOPHILUS ORAL), Take 3 tablets by mouth once daily., Disp: , Rfl:     losartan (COZAAR) 100 MG tablet, Take 1 tablet (100 mg total) by mouth once daily., Disp: 90 tablet, Rfl: 1    magnesium oxide (MAG-OX) 400 mg (241.3 mg magnesium) tablet, Take 400 mg by mouth once daily., Disp: , Rfl:     metoprolol succinate (TOPROL-XL) 50 MG 24 hr tablet, Take 1 tablet (50 mg total) by mouth once daily., Disp: 90 tablet, Rfl: 3    NIACIN ORAL, Take 1 tablet by mouth once daily., Disp: , Rfl:     omeprazole (PRILOSEC) 40 MG capsule, Take 1 capsule (40 mg total) by mouth once daily., Disp: 90 capsule, Rfl: 3    spironolactone (ALDACTONE) 25 MG tablet, Take 0.5 tablets (12.5 mg total) by mouth once daily., Disp: 45 tablet, Rfl: 1    zinc  gluconate 50 mg tablet, Take 50 mg by mouth once daily., Disp: , Rfl:     moxifloxacin (AVELOX) 400 mg tablet, Take 1 tablet (400 mg total) by mouth once daily. 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    Diverticulitis     diverticulitis    Diverticulosis     Essential hypertension 07/18/2016    Fatty liver disease, nonalcoholic 04/07/2016    [place], 2006     GERD (gastroesophageal reflux disease)     Headache     HLD (hyperlipidemia)     Kidney stone     requiring stent    Meniere's disease 01/09/2018    Morbid obesity 05/29/2014    NAFLD (nonalcoholic fatty liver disease)     Near syncope 05/23/2017    Obesity hypoventilation syndrome 02/22/2016    PAD (peripheral artery disease) 09/17/2019    Mild 9/2018    Prostatitis     Rectal bleeding     diverticulitis    Sleep disorder breathing 2/12/2024    Thyroid nodule 07/14/2022       Past Surgical History:   Procedure Laterality Date    ANGIOGRAM, CORONARY, WITH LEFT HEART CATHETERIZATION  10/26/2023    Procedure: Left Heart Cath;  Surgeon: Sal Salazar MD;  Location: [facility] CATH;  Service: Cardiology;;    ANTERIOR CERVICAL DISCECTOMY W/ FUSION N/A 2/12/2024    Procedure: DISCECTOMY, SPINE, CERVICAL, ANTERIOR APPROACH, WITH FUSION C5-6;  Surgeon: Wali Zuniga MD;  Location: [facility] OR;  Service: Neurosurgery;  Laterality: N/A;    CARDIAC CATHETERIZATION      COLONOSCOPY  2008    diverticulosis per patient report    CORONARY ANGIOGRAPHY  10/26/2023    Procedure: Coronary angiogram study;  Surgeon: Sal Salazar MD;  Location: [facility] CATH;  Service: Cardiology;;    ESOPHAGOGASTRODUODENOSCOPY N/A 12/18/2023    Procedure: EGD  (ESOPHAGOGASTRODUODENOSCOPY);  Surgeon: Aram Bermudez MD;  Location: [facility];  Service: Gastroenterology;  Laterality: N/A;    kidney stent      KIDNEY SURGERY      right kidney obstruction requiring stent-open    MAGNETIC RESONANCE IMAGING N/A 2024    Procedure: MRI (MAGNETIC RESONANCE IMAGING);  Surgeon: Provider, Su Diagnostic;  Location: [facility];  Service: General;  Laterality: N/A;    NOSE SURGERY      UPPER GASTROINTESTINAL ENDOSCOPY      [facility], gastritis per patient report       Family History   Problem Relation Name Age of Onset    Liver disease Mother Stephanie         d.    Cirrhosis Mother Stephanie     Hypertension Mother Stephanie     Hyperlipidemia Mother Stephanie     Colon cancer Father [place] 70    Cancer Father [place]     Diabetes Father [place]     Hypertension Father [place]     Glaucoma Father [place]     Hyperlipidemia Father John     Prostate cancer Father [place]     Diabetes Brother [place]     Hypertension Brother [place]     Thyroid cancer Brother Hayder     Heart attack Maternal Uncle      Heart attack Maternal Uncle      Heart disease Neg Hx      Heart failure Neg Hx      Macular degeneration Neg Hx      Retinal detachment Neg Hx      Crohn's disease Neg Hx      Ulcerative colitis Neg Hx      Stomach cancer Neg Hx      Esophageal cancer Neg Hx         Social History     Socioeconomic History    Marital status:    Tobacco Use    Smoking status: Former     Current packs/day: 0.00     Types: Cigarettes     Quit date: 1990     Years since quittin.9    Smokeless tobacco: Never    Tobacco comments:     Patient stopped smoking years ago, back in the s   Substance and Sexual Activity    Alcohol use: No     Comment: stopped in     Drug use: No    Sexual activity: Yes     Partners: Female     Social Drivers of Health     Financial Resource Strain: Patient Declined (2024)    Overall Financial Resource Strain (CARDIA)     Difficulty of Paying  Living Expenses: Patient declined   Food Insecurity: Unknown (2/22/2024)    Hunger Vital Sign     Worried About Running Out of Food in the Last Year: Never true     Ran Out of Food in the Last Year: Patient declined   Transportation Needs: Unknown (2/22/2024)    PRAPARE - Transportation     Lack of Transportation (Medical): No     Lack of Transportation (Non-Medical): Patient declined   Physical Activity: Unknown (2/22/2024)    Exercise Vital Sign     Days of Exercise per Week: Patient declined     Minutes of Exercise per Session: 0 min   Recent Concern: Physical Activity - Inactive (1/24/2024)    Exercise Vital Sign     Days of Exercise per Week: 0 days     Minutes of Exercise per Session: 0 min   Stress: Patient Declined (2/22/2024)    [employer] - Occupational Stress Questionnaire     Feeling of Stress : Patient declined   Housing Stability: Patient Declined (2/22/2024)    Housing Stability Vital Sign     Unable to Pay for Housing in the Last Year: Patient declined     Number of Places Lived in the Last Year: 1     Unstable Housing in the Last Year: Patient declined       Review of patient's allergies indicates:   Allergen Reactions    Lisinopril Swelling    Iodinated contrast media Other (See Comments)     Spasms, tremor, and high blood pressure    Bactrim  [sulfamethoxazole-trimethoprim]      Other reaction(s): Unknown    Diflucan [fluconazole]      Heart racing    Gabapentin Other (See Comments)     nightmares    Hydroxyzine hcl      Other reaction(s): elevated bp    Kenalog [triamcinolone acetonide] Other (See Comments)     Heart flutters all day    Carvedilol Other (See Comments)     Burning nose and bad taste in mouth    Phenergan [promethazine] Anxiety    Zofran [ondansetron hcl (pf)] Anxiety       Labs:  Lab Results   Component Value Date    HGBA1C 7.2 (H) 07/06/2024       Lab Results   Component Value Date    WBC 4.43 12/16/2024    HGB 14.8 12/16/2024    HCT 43.4 12/16/2024  "   MCV 85 12/16/2024     12/16/2024           Review of Systems:  Neck pain.  Back pain.  Extremity pain and spasms..  Balance of review of systems is negative.    Physical Exam:  Vitals:    12/17/24 1520   BP: (!) 154/100   Pulse: 71   Weight: (!) 144.2 kg (317 lb 12.7 oz)   Height: 5' 10" (1.778 m)   PainSc:   7   PainLoc: Back     Body mass index is 45.6 kg/m².    Pain disability index:      12/17/2024     3:19 PM   Last 3 PDI Scores   Pain Disability Index (PDI) 44       Gen: NAD  Psych: mood appropriate for given condition  HEENT: eyes anicteric   CV: RRR, 2+ radial pulse  Respiratory: non-labored, no signs of respiratory distress  Abd: non-distended  Skin: warm, dry and intact.  Gait: Able to heel walk, toe walk. No antalgic gait.     Coordination:   Tandem walking coordination: normal    Cervical spine: ROM is full in flexion, extension and lateral rotation without increased pain.  Spurling's maneuver causes no neck pain to either side.  Myofascial exam: No Tenderness to palpation across cervical paraspinous region bilaterally.    Lumbar spine:  Lumbar spine: ROM is full with flexion extension and oblique extension with no increased pain.    Tima's test causes no increased pain on either side.    Supine straight leg raise is negative bilaterally.    Internal and external rotation of the hip causes no increased pain on either side.  Myofascial exam: No tenderness to palpation across lumbar paraspinous muscles. No tenderness to palpation over the bilateral greater trochanters and bilateral SI joint    Sensory:  Intact and symmetrical to light touch in C4-T1 dermatomes bilaterally. Intact and symmetrical to light touch in L1-S1 dermatomes bilaterally.    Motor:    Right Left   C4 Shoulder Abduction  5  5   C5 Elbow Flexion    5  5   C6 Wrist Extension  5  5   C7 Elbow Extension   5  5   C8/T1 Hand Intrinsics   5  5        Right Left   L2/3 Iliacus Hip flexion  5  5   L3/4 Qudratus Femoris Knee " Extension  5  5   L4/5 Tib Anterior Ankle Dorsiflexion   5  5   L5/S1 Extensor Hallicus Longus Great toe extension  5  5   S1/S2 Gastroc/Soleus Plantar Flexion  5  5      Right Left   Triceps DTR 2+ 2+   Biceps DTR 2+ 2+   Brachioradialis DTR 2+ 2+   Patellar DTR 2+ 2+   Achilles DTR 2+ 2+   Mahan Absent  Absent   Clonus Absent Absent   Babinski Absent Absent       Imaging:  CT cervical spine 12-16-24:  FINDINGS:  No acute fracture or traumatic subluxation.  ACDF changes are at C5-6. Alignment is maintained.  Vertebral body heights are normal.  Prevertebral soft tissues are normal.  Lung apices are clear.  Impression:  No CT evidence of acute cervical spine injury     MRI of the neck and lower back were ordered by other physicians, but he did not compelte.     Assessment:   García Tang is a 59 y.o. 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 He presents in referral from Dr. Destiny Rivas for neck and back pain.    Neck and back pain with radicualr pain in the upper and lower extremities.  Symptoms may be residual myelopathy from cord compression prior to surgery vs new cervical spine neural compression or lumbar spine neural compression.  No current neurological deficits.    Plan:  - recommend proceeding with MRI cervical and lubmar spine to assess for any current spinal cord or neural compression contributing to symptoms.  - will ask Dr. Nolan for valium to help with MRI  anxisty.      Problem List Items Addressed This Visit    None  Visit Diagnoses       Cervicalgia    -  Primary    Relevant Orders    MRI Cervical Spine Without Contrast    Dorsalgia, unspecified        Lumbar back pain        Relevant Orders    MRI Lumbar Spine Without Contrast    Lumbar radiculopathy        Relevant Orders    MRI Lumbar Spine Without Contrast    History of fusion of cervical spine        Relevant Orders    MRI Cervical Spine Without Contrast    Lumbar radiculopathy, chronic        Relevant Orders    MRI Lumbar Spine Without Contrast            Follow Up: RTC after imaging    : Reviewed and consistent with medication use as prescribed.    Thank you for referring this interesting patient, and I look forward to continuing to collaborate in his care.        Lise Melgar PA-C  [facility]

## 2024-12-23 ENCOUNTER — PATIENT MESSAGE (OUTPATIENT)
Dept: FAMILY MEDICINE | Facility: CLINIC | Age: 59
End: 2024-12-23
Payer: COMMERCIAL

## 2025-01-02 ENCOUNTER — PATIENT MESSAGE (OUTPATIENT)
Dept: ADMINISTRATIVE | Facility: HOSPITAL | Age: 60
End: 2025-01-02
Payer: COMMERCIAL

## 2025-01-07 ENCOUNTER — LAB VISIT (OUTPATIENT)
Dept: LAB | Facility: HOSPITAL | Age: 60
End: 2025-01-07
Attending: FAMILY MEDICINE
Payer: COMMERCIAL

## 2025-01-07 ENCOUNTER — OFFICE VISIT (OUTPATIENT)
Dept: FAMILY MEDICINE | Facility: CLINIC | Age: 60
End: 2025-01-07
Payer: COMMERCIAL

## 2025-01-07 VITALS
HEIGHT: 70 IN | HEART RATE: 66 BPM | WEIGHT: 315 LBS | DIASTOLIC BLOOD PRESSURE: 92 MMHG | BODY MASS INDEX: 45.1 KG/M2 | SYSTOLIC BLOOD PRESSURE: 130 MMHG | OXYGEN SATURATION: 99 %

## 2025-01-07 DIAGNOSIS — I70.1 RENAL ARTERY STENOSIS: ICD-10-CM

## 2025-01-07 DIAGNOSIS — I10 ESSENTIAL (PRIMARY) HYPERTENSION: Primary | Chronic | ICD-10-CM

## 2025-01-07 DIAGNOSIS — F33.0 MILD EPISODE OF RECURRENT MAJOR DEPRESSIVE DISORDER: ICD-10-CM

## 2025-01-07 DIAGNOSIS — R73.9 HYPERGLYCEMIA: ICD-10-CM

## 2025-01-07 DIAGNOSIS — Z91.148 NONCOMPLIANCE WITH MEDICATION REGIMEN: ICD-10-CM

## 2025-01-07 LAB
ESTIMATED AVG GLUCOSE: 169 MG/DL (ref 68–131)
HBA1C MFR BLD: 7.5 % (ref 4–5.6)

## 2025-01-07 PROCEDURE — 99214 OFFICE O/P EST MOD 30 MIN: CPT | Mod: S$GLB,,, | Performed by: FAMILY MEDICINE

## 2025-01-07 PROCEDURE — 99999 PR PBB SHADOW E&M-EST. PATIENT-LVL IV: CPT | Mod: PBBFAC,,, | Performed by: FAMILY MEDICINE

## 2025-01-07 PROCEDURE — 83036 HEMOGLOBIN GLYCOSYLATED A1C: CPT | Performed by: FAMILY MEDICINE

## 2025-01-07 PROCEDURE — 36415 COLL VENOUS BLD VENIPUNCTURE: CPT | Mod: PO | Performed by: FAMILY MEDICINE

## 2025-01-07 RX ORDER — AMLODIPINE BESYLATE 5 MG/1
5 TABLET ORAL DAILY
Qty: 90 TABLET | Refills: 3 | Status: SHIPPED | OUTPATIENT
Start: 2025-01-07

## 2025-01-07 RX ORDER — LOSARTAN POTASSIUM 100 MG/1
100 TABLET ORAL DAILY
Qty: 90 TABLET | Refills: 3 | Status: SHIPPED | OUTPATIENT
Start: 2025-01-07

## 2025-01-07 NOTE — PROGRESS NOTES
Subjective:       Patient ID: García Tang is a 59 y.o. male.    Chief Complaint: Hypertension, Fatigue, and Headache    Pt is known to me. The pt has had high BP in the last couple of months.  It gets up to 150s systolic and he gets a headache.  He self d/c'd the losartan several months ago and he can not give an answer as to why.  He only sporadically takes the amlodipine.  He has regained a lot of weight.  He gets 140s when he occasionally checks his glucose.  The pt is having some depression due to his health changes since COVID.  He agrees to seeing a psychologist but he still wants to defer an antidepressant.   He is irritable and has less patience.  He denies suicidal ideation.    Hypertension  Associated symptoms include headaches.   Fatigue  Associated symptoms include fatigue and headaches.   Headache       Review of Systems   Constitutional:  Positive for fatigue.   Neurological:  Positive for headaches.   Psychiatric/Behavioral:  Positive for dysphoric mood. Negative for self-injury and suicidal ideas.        Objective:      Physical Exam  Vitals and nursing note reviewed.   Constitutional:       Appearance: He is well-developed.   HENT:      Head: Normocephalic.   Eyes:      Conjunctiva/sclera: Conjunctivae normal.      Pupils: Pupils are equal, round, and reactive to light.   Neck:      Thyroid: No thyromegaly.      Vascular: No carotid bruit.   Cardiovascular:      Rate and Rhythm: Normal rate and regular rhythm.      Heart sounds: Normal heart sounds.   Pulmonary:      Effort: Pulmonary effort is normal.      Breath sounds: Normal breath sounds.   Abdominal:      General: Bowel sounds are normal.      Palpations: Abdomen is soft.      Tenderness: There is no abdominal tenderness.   Musculoskeletal:         General: No tenderness or deformity. Normal range of motion.      Cervical back: Normal range of motion and neck supple.   Lymphadenopathy:      Cervical: No cervical adenopathy.   Skin:      "General: Skin is warm and dry.   Neurological:      Mental Status: He is alert and oriented to person, place, and time.      Cranial Nerves: No cranial nerve deficit.      Motor: No abnormal muscle tone.      Coordination: Coordination normal.      Deep Tendon Reflexes: Reflexes normal.   Psychiatric:         Behavior: Behavior normal.         Assessment:       1. Essential (primary) hypertension    2. Renal artery stenosis    3. Hyperglycemia    4. Noncompliance with medication regimen    5. Mild episode of recurrent major depressive disorder        Plan:       García Escudero" was seen today for hypertension, fatigue and headache.    Diagnoses and all orders for this visit:    Essential (primary) hypertension  -     amLODIPine (NORVASC) 5 MG tablet; Take 1 tablet (5 mg total) by mouth once daily.  -     losartan (COZAAR) 100 MG tablet; Take 1 tablet (100 mg total) by mouth once daily.    Renal artery stenosis  -     amLODIPine (NORVASC) 5 MG tablet; Take 1 tablet (5 mg total) by mouth once daily.    Hyperglycemia  -     Hemoglobin A1C; Future    Noncompliance with medication regimen  -     Ambulatory referral/consult to Primary Care Behavioral Health (Non-Opioids); Future    Mild episode of recurrent major depressive disorder  -     Ambulatory referral/consult to Primary Care Behavioral Health (Non-Opioids); Future      During this visit, I reviewed the pt's history, medications, allergies, and problem list.        "

## 2025-01-08 ENCOUNTER — TELEPHONE (OUTPATIENT)
Dept: PSYCHIATRY | Facility: CLINIC | Age: 60
End: 2025-01-08
Payer: COMMERCIAL

## 2025-01-08 ENCOUNTER — PATIENT MESSAGE (OUTPATIENT)
Dept: PSYCHIATRY | Facility: CLINIC | Age: 60
End: 2025-01-08
Payer: COMMERCIAL

## 2025-01-09 ENCOUNTER — PATIENT MESSAGE (OUTPATIENT)
Dept: FAMILY MEDICINE | Facility: CLINIC | Age: 60
End: 2025-01-09
Payer: COMMERCIAL

## 2025-01-10 ENCOUNTER — OFFICE VISIT (OUTPATIENT)
Dept: URGENT CARE | Facility: CLINIC | Age: 60
End: 2025-01-10
Payer: COMMERCIAL

## 2025-01-10 VITALS
HEART RATE: 103 BPM | WEIGHT: 315 LBS | RESPIRATION RATE: 18 BRPM | TEMPERATURE: 100 F | HEIGHT: 70 IN | DIASTOLIC BLOOD PRESSURE: 90 MMHG | SYSTOLIC BLOOD PRESSURE: 147 MMHG | OXYGEN SATURATION: 98 % | BODY MASS INDEX: 45.1 KG/M2

## 2025-01-10 DIAGNOSIS — R50.9 FEVER, UNSPECIFIED FEVER CAUSE: ICD-10-CM

## 2025-01-10 DIAGNOSIS — J10.1 INFLUENZA A: Primary | ICD-10-CM

## 2025-01-10 LAB
CTP QC/QA: YES
CTP QC/QA: YES
POC MOLECULAR INFLUENZA A AGN: POSITIVE
POC MOLECULAR INFLUENZA B AGN: NEGATIVE
SARS-COV-2 AG RESP QL IA.RAPID: NEGATIVE

## 2025-01-10 RX ORDER — BALOXAVIR MARBOXIL 80 MG/1
80 TABLET, FILM COATED ORAL ONCE
Qty: 1 TABLET | Refills: 0 | Status: SHIPPED | OUTPATIENT
Start: 2025-01-10 | End: 2025-01-10

## 2025-01-10 NOTE — PROGRESS NOTES
"Subjective:      Patient ID: García Tang is a 59 y.o. male.    Vitals:  height is 5' 10" (1.778 m) and weight is 145.6 kg (321 lb) (abnormal). His oral temperature is 99.7 °F (37.6 °C). His blood pressure is 147/90 (abnormal) and his pulse is 103. His respiration is 18 and oxygen saturation is 98%.     Chief Complaint: Cough and Fever    Pt c/o Cough and Fever with Sore Throat. OTC Cold Com for cough Onset yesterday. Exposure to Flu at home.     Cough  This is a new problem. The current episode started in the past 7 days. The problem has been gradually worsening. The problem occurs every few minutes. The cough is Non-productive. Associated symptoms include chills, a fever, myalgias and a sore throat. The symptoms are aggravated by lying down. He has tried OTC cough suppressant for the symptoms. The treatment provided no relief.   Fever   Associated symptoms include coughing and a sore throat.       Constitution: Positive for chills and fever.   HENT:  Positive for sore throat.    Respiratory:  Positive for cough.    Musculoskeletal:  Positive for muscle ache.      Objective:     Physical Exam   Constitutional: He is oriented to person, place, and time. He appears well-developed. He is cooperative.  Non-toxic appearance. He appears ill. No distress.   HENT:   Head: Normocephalic and atraumatic.   Ears:   Right Ear: Hearing, tympanic membrane, external ear and ear canal normal.   Left Ear: Hearing, tympanic membrane, external ear and ear canal normal.   Nose: Nose normal. No mucosal edema, rhinorrhea or nasal deformity. No epistaxis. Right sinus exhibits no maxillary sinus tenderness and no frontal sinus tenderness. Left sinus exhibits no maxillary sinus tenderness and no frontal sinus tenderness.   Mouth/Throat: Uvula is midline, oropharynx is clear and moist and mucous membranes are normal. No trismus in the jaw. Normal dentition. No uvula swelling. No oropharyngeal exudate, posterior oropharyngeal edema or " posterior oropharyngeal erythema.   Eyes: Conjunctivae and lids are normal. No scleral icterus.   Neck: Trachea normal and phonation normal. Neck supple. No edema present. No erythema present. No neck rigidity present.   Cardiovascular: Normal rate, regular rhythm, normal heart sounds and normal pulses.   Pulmonary/Chest: Effort normal and breath sounds normal. No respiratory distress. He has no decreased breath sounds. He has no rhonchi.   Abdominal: Normal appearance.   Musculoskeletal: Normal range of motion.         General: No deformity. Normal range of motion.   Neurological: He is alert and oriented to person, place, and time. He exhibits normal muscle tone. Coordination normal.   Skin: Skin is warm, dry, intact, not diaphoretic and not pale.   Psychiatric: His speech is normal and behavior is normal. Judgment and thought content normal.   Nursing note and vitals reviewed.      Assessment:     1. Influenza A    2. Fever, unspecified fever cause        Plan:       Results for orders placed or performed in visit on 01/10/25   POCT Influenza A/B MOLECULAR    Collection Time: 01/10/25  8:39 AM   Result Value Ref Range    POC Molecular Influenza A Ag Positive (A) Negative    POC Molecular Influenza B Ag Negative Negative     Acceptable Yes    SARS Coronavirus 2 Antigen, POCT Manual Read    Collection Time: 01/10/25  8:39 AM   Result Value Ref Range    SARS Coronavirus 2 Antigen Negative Negative     Acceptable Yes      *Note: Due to a large number of results and/or encounters for the requested time period, some results have not been displayed. A complete set of results can be found in Results Review.       Informed patient of flu positive results in the clinic today.  Discussed treatment options.  Antiviral is prescribed.  Advised to follow up with primary care doctor.  ER precautions red flag warnings reviewed and discussed.      Influenza A  -     baloxavir marboxiL (XOFLUZA) 80 mg  tablet; Take 1 tablet (80 mg total) by mouth once. for 1 dose  Dispense: 1 tablet; Refill: 0    Fever, unspecified fever cause  -     POCT Influenza A/B MOLECULAR  -     SARS Coronavirus 2 Antigen, POCT Manual Read

## 2025-01-10 NOTE — PATIENT INSTRUCTIONS
Influenza     Viruses that cause influenza spread through the air in droplets when someone who has the flu coughs, sneezes, laughs, or talks.   Influenza (the flu) is an infection that affects your respiratory tract. This tract is made up of your mouth, nose, and lungs, and the passages between them. Unlike a cold, the flu can make you very ill. And it can lead to pneumonia, a serious lung infection. The flu can have serious complications and even be fatal for some people. These include older adults, young children, and people with certain chronic conditions.  Who is at risk for the flu?  Anyone can get the flu. But you are more likely to become infected if you:  Have a weakened immune system  Work in a healthcare setting where you may be exposed to flu germs  Live or work with someone who has the flu  Havent had an annual flu shot  How does the flu spread?  The flu is caused by viruses. The viruses spread through the air in droplets when someone who has the flu coughs, sneezes, laughs, or talks. You can become infected when you inhale these viruses directly. You can also become infected when you touch a surface on which the droplets have landed and then transfer the germs to your eyes, nose, or mouth. Touching used tissues, or sharing utensils, drinking glasses, or a toothbrush with an infected person can expose you to flu viruses, too.  What are the symptoms of the flu?  Flu symptoms tend to come on quickly and may last a few days to a few weeks. They include:  Fever usually higher than 100.4°F  (38°C) and chills  Sore throat and headache  Dry cough  Runny nose  Tiredness and weakness  Muscle aches  Things that make the flu worse  For some people, the flu can be very serious. The risk for complications is greater for:  Children younger than age 5  Adults ages 65 and older  People with a chronic illness such as diabetes or heart, kidney, or lung disease  People who live in a nursing home or long-term care  facility   How is the flu treated?  The flu usually gets better after 7 days or so. In some cases, your healthcare provider may prescribe an antiviral medicine. This may help you get well sooner. For the medicine to help, you need to take it as soon as possible (ideally within 48 hours) after your symptoms start. If you develop pneumonia or other serious illness, you may need to stay in the hospital.  Easing flu symptoms  Drink lots of fluids such as water, juice, and warm soup. A good rule is to drink enough so that you urinate your normal amount.  Get plenty of rest.  Ask your healthcare provider what to take for fever and pain.  Call your provider if your fever is 100.4°F (38°C) or higher, or you become dizzy, lightheaded, or short of breath.  Taking steps to protect others  Wash your hands often, especially after coughing or sneezing. Or clean your hands with an alcohol-based hand  containing at least 60% alcohol.  Cough or sneeze into a tissue. Then throw the tissue away and wash your hands. If you dont have a tissue, cough and sneeze into the crook of your elbow.  Stay home until at least 24 hours after you no longer have a fever or chills. Be sure the fever isnt being hidden by fever-reducing medicine.  Dont share food, utensils, drinking glasses, or a toothbrush with others.  Ask your healthcare provider if others in your household should get antiviral medicine to help them avoid infection.  How can the flu be prevented?  One of the best ways to avoid the flu is to get a flu vaccine each year. Viruses that cause the flu change from year to year. For that reason, doctors recommend getting the flu vaccine each year, as soon as it's available in your area. The vaccine may be given as a shot or as a nasal spray. Your healthcare provider can tell you which vaccine is right for you. The nasal spray is not recommended for the 9703-6743 flu season. The CDC says this is because the nasal spray did not seem  to protect against the flu over the last several flu seasons. In the past, it was meant for people ages 2 to 49.  Wash your hands often. Frequent handwashing is a proven way to help prevent infection.  Carry an alcohol-based hand gel containing at least 60% alcohol. Use it when you can't use soap and water. Then wash your hands as soon as you can.  Avoid touching your eyes, nose, and mouth.  At home and work, clean phones, computer keyboards, and toys often with disinfectant wipes.  If possible, avoid close contact with others who have the flu or symptoms of the flu.  Handwashing tips  Handwashing is one of the best ways to prevent many common infections. If you are caring for or visiting someone with the flu, wash your hands each time you enter and leave the room. Follow these steps:  Use warm water and plenty of soap. Rub your hands together well.  Clean the whole hand, under your nails, between your fingers, and up the wrists.  Wash for at least 15 seconds.  Rinse, letting the water run down your fingers, not up your wrists.  Dry your hands well. Use a paper towel to turn off the faucet and open the door.  Using alcohol-based hand   Alcohol-based hand  are also a good choice. Use them when you can't use soap and water. Follow these steps:  Squeeze about a tablespoon of gel into the palm of one hand.  Rub your hands together briskly, cleaning the backs of your hands, the palms, between your fingers, and up the wrists.  Rub until the gel is gone and your hands are completely dry.  Preventing influenza in healthcare settings  The flu is a special concern for people in hospitals and long-term care facilities. To help prevent the spread of flu, many hospitals and nursing homes take these steps:  Healthcare providers wash their hands or use an alcohol-based hand  before and after treating each patient.  People with the flu have private rooms and bathrooms or share a room with someone with the  same infection.  People at high-risk for the flu but don't have it are encouraged to get the flu and pneumonia vaccines.  All healthcare workers are encouraged or required to get flu shots.   Date Last Reviewed: 8/27/2014 © 2000-2016 [employer]. [address] [place] [address]. All rights reserved. This information is not intended as a substitute for professional medical care. Always follow your healthcare professional's instructions.        Please drink plenty of fluids.  Please get plenty of rest.  Please return here or go to the Emergency Department for any concerns or worsening of condition.  If you do not have Hypertension or any history of palpitations, it is ok to take over the counter Sudafed or Mucinex D or Allegra-D or Claritin-D or Zyrtec-D.  If you do take one of the above, it is ok to combine that with plain over the counter Mucinex or Allegra or Claritin or Zyrtec.  If for example you are taking Zyrtec -D, you can combine that with Mucinex, but not Mucinex-D.  If you are taking Mucinex-D, you can combine that with plain Allegra or Claritin or Zyrtec.   If you do have Hypertension or palpitations, it is safe to take Coricidin HBP for relief of sinus symptoms.  If not allergic, please take over the counter Tylenol (Acetaminophen) and/or Motrin (Ibuprofen) as directed for control of pain and/or fever.  Please follow up with your primary care doctor or specialist as needed.    If you  smoke, please stop smoking.

## 2025-01-12 ENCOUNTER — NURSE TRIAGE (OUTPATIENT)
Dept: ADMINISTRATIVE | Facility: CLINIC | Age: 60
End: 2025-01-12
Payer: COMMERCIAL

## 2025-01-12 ENCOUNTER — OCHSNER VIRTUAL EMERGENCY DEPARTMENT (OUTPATIENT)
Facility: CLINIC | Age: 60
End: 2025-01-12
Payer: COMMERCIAL

## 2025-01-12 NOTE — TELEPHONE ENCOUNTER
Reason for Disposition   Patient sounds very sick or weak to the triager    Additional Information   Negative: SEVERE difficulty breathing (e.g., struggling for each breath, speaks in single words)   Negative: Bluish (or gray) lips or face now   Negative: Shock suspected (e.g., cold/pale/clammy skin, too weak to stand, low BP, rapid pulse)   Negative: Sounds like a life-threatening emergency to the triager   Negative: Chest pain  (Exception: MILD central chest pain, present only when coughing.)   Negative: Headache and stiff neck (can't touch chin to chest)   Negative: [1] Difficulty breathing AND [2] not severe AND [3] not from stuffy nose (e.g., not relieved by cleaning out the nose)   Negative: Fever > 104 F (40 C)    Protocols used: Influenza (Flu) Follow-up Call-A-  Pt states had fever and chills of 101 and felt weak so he went to Urgent Care Friday 1/10/25. He tested negative for COVID and positive for Flu A. States he refused Tamiful due to cost of $200 and side effects. He takes Tylenol ES every 6 hrs and Naproxen 200 mg every 12hrs. Also takes Cold Calm. States his cough(non productive) has improved. States he was told he should begin to improve, but he feels much worse since his Urgent Care visit. States his temp reduced to 99, but he has deep bone and muscle pain. States he has extreme weakness. He is drinking lots of fluids and electrolyte drinks. He is concerned about the increasing weakness. He can still walk without support but extremely weak. Denies difficulty breathing, chest pain, or sob.     Consulted with [facility] Provider on call Dr. Tejeda. Pt advised to take 650 of tylenol ever 6 hours as needed and up to 500mg of naproxen twice a day as needed, and continue to stay hydrated. MD also recommends a PCP follow up in the next 2-3 days and if he feels he too weak to walk or develops CP/SOB to report to the ED. Pt verbalized understanding and has no further questions/concerns.

## 2025-01-12 NOTE — PLAN OF CARE-OVED
[facility] Emergency Department Plan of Care Note    Referral source: Nurse On-Call      Reason for consult: Weakness      Additional History: 59 year old male since the past medical history as noted, recent diagnosis of flu a on 1/10:  Not on Tamiflu for cost reasons, calling in nursing line for concern for generalized weakness.  He says that his fever has improved, his cough has improved, but he is having diffuse muscle and bone pain and generalized weakness.  He is drinking lots of fluids and electrolytes.  He is still able to walk.  He denies chest pain, shortness of breath or difficulty breathing.  No vomiting.    He has been taking Tylenol and naproxen for symptoms.      Disposition recommended: Treat in place      Additional Recommendations:    Suspect symptoms are consistent with flu, given recent flu a diagnosis, advise continue Tylenol and naproxen, good oral hydration, and expected management for the next 1-2 days.  Recommend follow up with his PCP in the next 2-3 days once the week starts.    If he develops worsening weakness where he is being difficulty walking or develops any chest pain, shortness of breath or inability to tolerate p.o., or any other new or worsening symptoms can presents to the emergency department for an evaluation.   [] : Resident

## 2025-01-13 ENCOUNTER — OFFICE VISIT (OUTPATIENT)
Dept: FAMILY MEDICINE | Facility: CLINIC | Age: 60
End: 2025-01-13
Payer: COMMERCIAL

## 2025-01-13 VITALS
HEIGHT: 70 IN | HEART RATE: 73 BPM | WEIGHT: 315 LBS | BODY MASS INDEX: 45.1 KG/M2 | DIASTOLIC BLOOD PRESSURE: 88 MMHG | SYSTOLIC BLOOD PRESSURE: 130 MMHG | OXYGEN SATURATION: 98 %

## 2025-01-13 DIAGNOSIS — J10.1 INFLUENZA A: Primary | ICD-10-CM

## 2025-01-13 DIAGNOSIS — E11.9 TYPE 2 DIABETES MELLITUS WITHOUT COMPLICATION, WITHOUT LONG-TERM CURRENT USE OF INSULIN: ICD-10-CM

## 2025-01-13 PROCEDURE — 99213 OFFICE O/P EST LOW 20 MIN: CPT | Mod: S$GLB,,, | Performed by: FAMILY MEDICINE

## 2025-01-13 PROCEDURE — 99999 PR PBB SHADOW E&M-EST. PATIENT-LVL IV: CPT | Mod: PBBFAC,,, | Performed by: FAMILY MEDICINE

## 2025-01-13 NOTE — PROGRESS NOTES
Subjective:       Patient ID: García Tang is a 59 y.o. male.    Chief Complaint: Influenza    Pt is known to me.   The pt was diagnosed 3 days with flu and was given Xofluza which he did not get due to cost.  He is no longer having fever.  He is no longer coughing.  He is still having muscle pain, headache and fatigue.  He is staying well hydrated.  He is not short of breath.  He says he is weak but he is not having trouble walking and he drove himself here.  He called into nurse triage yesterday and he says he was told he should be feeling better and he came in today.  He has had some mild nausea and his appetite is not normal.  He has had no diarrhea nor vomiting.  We discussed the pt's recent labs.     Influenza    Review of Systems    Objective:      Physical Exam  Vitals and nursing note reviewed.   Constitutional:       General: He is not in acute distress.     Appearance: He is well-developed. He is obese. He is not ill-appearing.   HENT:      Head: Normocephalic.   Eyes:      Conjunctiva/sclera: Conjunctivae normal.      Pupils: Pupils are equal, round, and reactive to light.   Neck:      Thyroid: No thyromegaly.   Cardiovascular:      Rate and Rhythm: Normal rate and regular rhythm.      Heart sounds: Normal heart sounds.   Pulmonary:      Effort: Pulmonary effort is normal.      Breath sounds: Normal breath sounds.   Abdominal:      General: Bowel sounds are normal.      Palpations: Abdomen is soft.      Tenderness: There is no abdominal tenderness.   Musculoskeletal:         General: No tenderness or deformity. Normal range of motion.      Cervical back: Normal range of motion and neck supple.   Lymphadenopathy:      Cervical: No cervical adenopathy.   Skin:     General: Skin is warm and dry.   Neurological:      General: No focal deficit present.      Mental Status: He is alert and oriented to person, place, and time.      Cranial Nerves: No cranial nerve deficit.      Motor: No abnormal muscle  "tone.      Coordination: Coordination normal.      Deep Tendon Reflexes: Reflexes normal.      Comments: Can walk and get up on exam table without assistance.   Psychiatric:         Behavior: Behavior normal.         Assessment:       1. Influenza A    2. Type 2 diabetes mellitus without complication, without long-term current use of insulin        Plan:       García Escudero" was seen today for influenza.    Diagnoses and all orders for this visit:    Influenza A  Comments:  Continue rest, hydration, use Tylenol and ibuprofen for pain.  Pt instructed to go to ER for CP, SOB or worsening weakness.    Type 2 diabetes mellitus without complication, without long-term current use of insulin  Comments:  Previously diet controlled.  Pt wants to get back to healty eating and weight loss to control.  Will recheck in 6 months.      During this visit, I reviewed the pt's history, medications, allergies, and problem list.        "

## 2025-01-14 ENCOUNTER — TELEPHONE (OUTPATIENT)
Dept: FAMILY MEDICINE | Facility: CLINIC | Age: 60
End: 2025-01-14
Payer: COMMERCIAL

## 2025-01-14 ENCOUNTER — PATIENT MESSAGE (OUTPATIENT)
Dept: FAMILY MEDICINE | Facility: CLINIC | Age: 60
End: 2025-01-14
Payer: COMMERCIAL

## 2025-01-14 NOTE — TELEPHONE ENCOUNTER
Spoke with the patient informed him that I will type up a letter for him and he states he will access it through his mychart.

## 2025-01-14 NOTE — LETTER
January 16, 2025      [facility]  [address] [place] [address]  Phone: [phone]  Fax: [phone]       Patient: García Tang   YOB: 1965  Date of Visit: 01/13/2025    To Whom It May Concern:    Mak Tang  was at [facility] on 01/13/2025. The patient is currently being treated for influenza. If you have any questions or concerns, or if I can be of further assistance, please do not hesitate to contact me.    Sincerely,    Baylee Raygoza MA

## 2025-01-14 NOTE — TELEPHONE ENCOUNTER
----- Message from Cyndy sent at 1/14/2025  1:14 PM CST -----  Type: Needs Medical Advice  Who Called:  pt  Pharmacy name and phone #:    Best Call Back Number: [phone]  Additional Information: pt is calling the office for a call back , he sent a msg earlier about a letter to show that he is being treated for the flu , he has to give it to his job please call back pt

## 2025-01-15 ENCOUNTER — PATIENT MESSAGE (OUTPATIENT)
Dept: FAMILY MEDICINE | Facility: CLINIC | Age: 60
End: 2025-01-15
Payer: COMMERCIAL

## 2025-01-23 ENCOUNTER — PATIENT MESSAGE (OUTPATIENT)
Dept: FAMILY MEDICINE | Facility: CLINIC | Age: 60
End: 2025-01-23
Payer: COMMERCIAL

## 2025-01-25 ENCOUNTER — TELEPHONE (OUTPATIENT)
Dept: FAMILY MEDICINE | Facility: CLINIC | Age: 60
End: 2025-01-25
Payer: COMMERCIAL

## 2025-01-27 ENCOUNTER — LAB VISIT (OUTPATIENT)
Dept: LAB | Facility: HOSPITAL | Age: 60
End: 2025-01-27
Attending: FAMILY MEDICINE
Payer: COMMERCIAL

## 2025-01-27 ENCOUNTER — OFFICE VISIT (OUTPATIENT)
Dept: FAMILY MEDICINE | Facility: CLINIC | Age: 60
End: 2025-01-27
Payer: COMMERCIAL

## 2025-01-27 ENCOUNTER — NURSE TRIAGE (OUTPATIENT)
Dept: ADMINISTRATIVE | Facility: CLINIC | Age: 60
End: 2025-01-27
Payer: COMMERCIAL

## 2025-01-27 VITALS
SYSTOLIC BLOOD PRESSURE: 138 MMHG | DIASTOLIC BLOOD PRESSURE: 82 MMHG | BODY MASS INDEX: 45.1 KG/M2 | HEIGHT: 70 IN | OXYGEN SATURATION: 99 % | HEART RATE: 66 BPM | WEIGHT: 315 LBS

## 2025-01-27 DIAGNOSIS — H69.92 EUSTACHIAN TUBE DYSFUNCTION, LEFT: Primary | ICD-10-CM

## 2025-01-27 DIAGNOSIS — R29.898 LEG WEAKNESS, BILATERAL: ICD-10-CM

## 2025-01-27 DIAGNOSIS — R53.83 FATIGUE, UNSPECIFIED TYPE: ICD-10-CM

## 2025-01-27 DIAGNOSIS — E11.42 TYPE 2 DIABETES MELLITUS WITH DIABETIC POLYNEUROPATHY, WITHOUT LONG-TERM CURRENT USE OF INSULIN: ICD-10-CM

## 2025-01-27 LAB
ANION GAP SERPL CALC-SCNC: 11 MMOL/L (ref 8–16)
BASOPHILS # BLD AUTO: 0.05 K/UL (ref 0–0.2)
BASOPHILS NFR BLD: 1 % (ref 0–1.9)
BUN SERPL-MCNC: 13 MG/DL (ref 6–20)
CALCIUM SERPL-MCNC: 9.4 MG/DL (ref 8.7–10.5)
CHLORIDE SERPL-SCNC: 101 MMOL/L (ref 95–110)
CO2 SERPL-SCNC: 26 MMOL/L (ref 23–29)
CREAT SERPL-MCNC: 0.9 MG/DL (ref 0.5–1.4)
DIFFERENTIAL METHOD BLD: ABNORMAL
EOSINOPHIL # BLD AUTO: 0.1 K/UL (ref 0–0.5)
EOSINOPHIL NFR BLD: 2.4 % (ref 0–8)
ERYTHROCYTE [DISTWIDTH] IN BLOOD BY AUTOMATED COUNT: 12 % (ref 11.5–14.5)
EST. GFR  (NO RACE VARIABLE): >60 ML/MIN/1.73 M^2
GLUCOSE SERPL-MCNC: 219 MG/DL (ref 70–110)
HCT VFR BLD AUTO: 41.6 % (ref 40–54)
HGB BLD-MCNC: 13.8 G/DL (ref 14–18)
IMM GRANULOCYTES # BLD AUTO: 0.02 K/UL (ref 0–0.04)
IMM GRANULOCYTES NFR BLD AUTO: 0.4 % (ref 0–0.5)
LYMPHOCYTES # BLD AUTO: 1.2 K/UL (ref 1–4.8)
LYMPHOCYTES NFR BLD: 23.9 % (ref 18–48)
MCH RBC QN AUTO: 29.7 PG (ref 27–31)
MCHC RBC AUTO-ENTMCNC: 33.2 G/DL (ref 32–36)
MCV RBC AUTO: 90 FL (ref 82–98)
MONOCYTES # BLD AUTO: 0.6 K/UL (ref 0.3–1)
MONOCYTES NFR BLD: 11.5 % (ref 4–15)
NEUTROPHILS # BLD AUTO: 3.1 K/UL (ref 1.8–7.7)
NEUTROPHILS NFR BLD: 60.8 % (ref 38–73)
NRBC BLD-RTO: 0 /100 WBC
PLATELET # BLD AUTO: 298 K/UL (ref 150–450)
PMV BLD AUTO: 9.7 FL (ref 9.2–12.9)
POTASSIUM SERPL-SCNC: 4.6 MMOL/L (ref 3.5–5.1)
RBC # BLD AUTO: 4.64 M/UL (ref 4.6–6.2)
SODIUM SERPL-SCNC: 138 MMOL/L (ref 136–145)
TSH SERPL DL<=0.005 MIU/L-ACNC: 1.61 UIU/ML (ref 0.4–4)
WBC # BLD AUTO: 5.06 K/UL (ref 3.9–12.7)

## 2025-01-27 PROCEDURE — 36415 COLL VENOUS BLD VENIPUNCTURE: CPT | Mod: PO | Performed by: NURSE PRACTITIONER

## 2025-01-27 PROCEDURE — 80048 BASIC METABOLIC PNL TOTAL CA: CPT | Performed by: NURSE PRACTITIONER

## 2025-01-27 PROCEDURE — 99999 PR PBB SHADOW E&M-EST. PATIENT-LVL IV: CPT | Mod: PBBFAC,,, | Performed by: NURSE PRACTITIONER

## 2025-01-27 PROCEDURE — 84443 ASSAY THYROID STIM HORMONE: CPT | Performed by: NURSE PRACTITIONER

## 2025-01-27 PROCEDURE — 99214 OFFICE O/P EST MOD 30 MIN: CPT | Mod: S$GLB,,, | Performed by: NURSE PRACTITIONER

## 2025-01-27 PROCEDURE — 85025 COMPLETE CBC W/AUTO DIFF WBC: CPT | Mod: 91 | Performed by: NURSE PRACTITIONER

## 2025-01-27 PROCEDURE — 82306 VITAMIN D 25 HYDROXY: CPT | Performed by: NURSE PRACTITIONER

## 2025-01-27 RX ORDER — TIRZEPATIDE 2.5 MG/.5ML
2.5 INJECTION, SOLUTION SUBCUTANEOUS
Qty: 4 PEN | Refills: 0 | Status: SHIPPED | OUTPATIENT
Start: 2025-01-27

## 2025-01-27 RX ORDER — FLUTICASONE PROPIONATE 50 MCG
2 SPRAY, SUSPENSION (ML) NASAL DAILY
Qty: 16 G | Refills: 0 | Status: SHIPPED | OUTPATIENT
Start: 2025-01-27

## 2025-01-27 RX ORDER — CETIRIZINE HYDROCHLORIDE 10 MG/1
10 TABLET ORAL DAILY
Qty: 7 TABLET | Refills: 0 | Status: SHIPPED | OUTPATIENT
Start: 2025-01-27 | End: 2026-01-27

## 2025-01-27 NOTE — PROGRESS NOTES
Subjective     Patient ID: García Tang is a 59 y.o. male.    Chief Complaint: Fatigue (Pt states he has been having weakness in legs and arms for about 3 weeks since having the flu. Pt states he still does not feel well. No fever. )      Fatigue  Associated symptoms include arthralgias, fatigue, headaches, joint swelling, neck pain and weakness. Pertinent negatives include no chest pain or vomiting.         Patient is known to me. PCP is Dr. Domingo.     59 y.o. year old male patient who has a past medical history of ASCVD (arteriosclerotic cardiovascular disease), Chronic sialoadenitis, CKD stage 2 due to type 2 diabetes mellitus, Diverticulitis, Diverticulosis, Essential hypertension, Fatty liver disease, nonalcoholic, GERD (gastroesophageal reflux disease), Headache, HLD (hyperlipidemia), Kidney stone, Meniere's disease, Morbid obesity, NAFLD (nonalcoholic fatty liver disease), Near syncope, Obesity hypoventilation syndrome, PAD (peripheral artery disease), Prostatitis, Rectal bleeding, Sleep disorder breathing, and Thyroid nodule presents to clinic for followup. Had the flu a few weeks ago, since then has been having fatigue and feeling weak. Denies saddle anesthesia or bowel/bladder incontinence. He was started on augmentin for a sinus infection a few days ago. He has had slight improvement since. He is still having some nasal congestion, pain on the left ear with swallowing. He has an MRI of the lumbar and cervical spine ordered per PM but has not had it done yet. His A1C is 7.5%, not on any DM meds. Has tried Metformin in the past but due to history of kidney stones with obstruction he reports endocrine recommended he stay away from Metformin. He was also on insulin in the past but it has been a while since he was on anything.     Review of Systems   Constitutional:  Positive for activity change and fatigue. Negative for unexpected weight change.   HENT:  Positive for hearing loss. Negative for rhinorrhea  "and trouble swallowing.    Eyes:  Positive for visual disturbance. Negative for discharge.   Respiratory:  Negative for chest tightness and wheezing.    Cardiovascular:  Negative for chest pain and palpitations.   Gastrointestinal:  Negative for blood in stool, constipation, diarrhea and vomiting.   Endocrine: Positive for polyuria. Negative for polydipsia.   Genitourinary:  Negative for difficulty urinating, hematuria and urgency.   Musculoskeletal:  Positive for arthralgias, joint swelling and neck pain.   Neurological:  Positive for weakness and headaches.   Psychiatric/Behavioral:  Positive for dysphoric mood. Negative for confusion.           Objective   Vitals:    01/27/25 0823   BP: 138/82   Pulse: 66   SpO2: 99%   Weight: (!) 144.4 kg (318 lb 7.3 oz)   Height: 5' 10" (1.778 m)      Physical Exam  Vitals and nursing note reviewed.   Constitutional:       General: He is not in acute distress.     Appearance: Normal appearance. He is obese. He is not ill-appearing, toxic-appearing or diaphoretic.   HENT:      Head: Normocephalic and atraumatic.      Right Ear: Tympanic membrane, ear canal and external ear normal. There is no impacted cerumen.      Left Ear: Tympanic membrane, ear canal and external ear normal. There is no impacted cerumen.      Nose: Congestion present. No rhinorrhea.      Mouth/Throat:      Pharynx: Posterior oropharyngeal erythema present. No oropharyngeal exudate.   Eyes:      General: No scleral icterus.        Right eye: No discharge.         Left eye: No discharge.      Conjunctiva/sclera: Conjunctivae normal.      Pupils: Pupils are equal, round, and reactive to light.   Cardiovascular:      Rate and Rhythm: Normal rate and regular rhythm.      Pulses: Normal pulses.      Heart sounds: Normal heart sounds. No murmur heard.     No friction rub. No gallop.   Pulmonary:      Effort: Pulmonary effort is normal. No respiratory distress.      Breath sounds: Normal breath sounds. No stridor. No " wheezing, rhonchi or rales.   Abdominal:      General: Abdomen is flat. Bowel sounds are normal. There is no distension.      Palpations: Abdomen is soft. There is no mass.      Tenderness: There is no abdominal tenderness. There is no guarding or rebound.   Musculoskeletal:         General: No deformity or signs of injury. Normal range of motion.      Cervical back: Neck supple. No muscular tenderness.      Right lower leg: No edema.      Left lower leg: No edema.   Lymphadenopathy:      Cervical: No cervical adenopathy.   Skin:     General: Skin is warm.      Coloration: Skin is not jaundiced or pale.      Findings: No rash.   Neurological:      General: No focal deficit present.      Mental Status: He is alert and oriented to person, place, and time. Mental status is at baseline.      Motor: Weakness: able to get on and off exam table..      Gait: Gait abnormal.   Psychiatric:         Mood and Affect: Mood normal.         Behavior: Behavior normal.         Thought Content: Thought content normal.         Judgment: Judgment normal.         1. Eustachian tube dysfunction, left  - fluticasone propionate (FLONASE) 50 mcg/actuation nasal spray; 2 sprays (100 mcg total) by Each Nostril route once daily.  Dispense: 16 g; Refill: 0  - cetirizine (ZYRTEC) 10 MG tablet; Take 1 tablet (10 mg total) by mouth once daily.  Dispense: 7 tablet; Refill: 0    2. Fatigue, unspecified type  - Misc Sendout Test, Blood vit d 25 hydroxy; Future  - CBC W/ AUTO DIFFERENTIAL; Future  - TSH; Future  - BASIC METABOLIC PANEL; Future    3. Type 2 diabetes mellitus with diabetic polyneuropathy, without long-term current use of insulin  - tirzepatide (MOUNJARO) 2.5 mg/0.5 mL PnIj; Inject 2.5 mg into the skin every 7 days.  Dispense: 4 Pen; Refill: 0    4. Leg weakness, bilateral   Need to get MRI scheduled and followup with back and spine clinic.    RTC/ER precautions given. F/U 1 mo virtually to eval for increase dose on Mounjaro.    Counseled  on regular exercise, maintenance of a healthy weight, balanced diet rich in fruits/vegetables and lean protein, and avoidance of unhealthy habits like smoking and excessive alcohol intake.    Ursula Clement, PRIETOP-C

## 2025-01-28 LAB — 25(OH)D3+25(OH)D2 SERPL-MCNC: 67 NG/ML (ref 30–96)

## 2025-01-29 ENCOUNTER — PATIENT MESSAGE (OUTPATIENT)
Dept: FAMILY MEDICINE | Facility: CLINIC | Age: 60
End: 2025-01-29
Payer: COMMERCIAL

## 2025-01-30 ENCOUNTER — PATIENT MESSAGE (OUTPATIENT)
Dept: FAMILY MEDICINE | Facility: CLINIC | Age: 60
End: 2025-01-30
Payer: COMMERCIAL

## 2025-01-31 ENCOUNTER — NURSE TRIAGE (OUTPATIENT)
Dept: ADMINISTRATIVE | Facility: CLINIC | Age: 60
End: 2025-01-31
Payer: COMMERCIAL

## 2025-02-01 NOTE — TELEPHONE ENCOUNTER
Pt reports elevated at 146/118, Pt states he had the same thing happen on Monday and was told to go to the ED, so he did. Pt states the ED did not really do anything for him beyond rule out a stroke (all tests were negative) and advised him to follow up with his PCP. Pt states he has taken all of his BP meds today as prescribed and it has remained high today. Pt advised to see a MD within 24 hours per protocol via ED/UC/ODVV/PCP office (if Saturday morning clinic is available), Pt states he might try to do an ODVV tonight. Pt encouraged to call back with any worsening symptoms or questions. He verbalized understanding.    Reason for Disposition   Systolic BP  >= 180 OR Diastolic >= 110    Additional Information   Negative: Difficult to awaken or acting confused (e.g., disoriented, slurred speech)   Negative: SEVERE difficulty breathing (e.g., struggling for each breath, speaks in single words)   Negative: [1] Weakness of the face, arm or leg on one side of the body AND [2] new-onset   Negative: [1] Numbness (i.e., loss of sensation) of the face, arm or leg on one side of the body AND [2] new-onset   Negative: [1] Chest pain lasts > 5 minutes AND [2] history of heart disease (i.e., heart attack, bypass surgery, angina, angioplasty, CHF)   Negative: [1] Chest pain AND [2] took nitroglycerin AND [3] pain was not relieved   Negative: Sounds like a life-threatening emergency to the triager   Negative: [1] Systolic BP  >= 160 OR Diastolic >= 100 AND [2] cardiac (e.g., breathing difficulty, chest pain) or neurologic symptoms (e.g., new-onset blurred or double vision, unsteady gait)   Negative: [1] Systolic BP  >= 200 OR Diastolic >= 120 AND [2] having NO cardiac or neurologic symptoms   Negative: [1] Systolic BP  >= 180 OR Diastolic >= 110 AND [2] missed most recent dose of blood pressure medication    Protocols used: Blood [facility] - [facility]

## 2025-02-10 ENCOUNTER — OFFICE VISIT (OUTPATIENT)
Dept: FAMILY MEDICINE | Facility: CLINIC | Age: 60
End: 2025-02-10
Payer: COMMERCIAL

## 2025-02-10 ENCOUNTER — HOSPITAL ENCOUNTER (OUTPATIENT)
Dept: CARDIOLOGY | Facility: HOSPITAL | Age: 60
Discharge: HOME | End: 2025-02-10
Attending: NURSE PRACTITIONER
Payer: COMMERCIAL

## 2025-02-10 VITALS
OXYGEN SATURATION: 98 % | HEART RATE: 83 BPM | SYSTOLIC BLOOD PRESSURE: 122 MMHG | WEIGHT: 311.31 LBS | HEIGHT: 70 IN | BODY MASS INDEX: 44.57 KG/M2 | DIASTOLIC BLOOD PRESSURE: 82 MMHG

## 2025-02-10 VITALS — HEIGHT: 70 IN | BODY MASS INDEX: 44.52 KG/M2 | WEIGHT: 311 LBS

## 2025-02-10 DIAGNOSIS — R61 DIAPHORESIS: ICD-10-CM

## 2025-02-10 DIAGNOSIS — R53.83 FATIGUE, UNSPECIFIED TYPE: ICD-10-CM

## 2025-02-10 DIAGNOSIS — I10 ESSENTIAL (PRIMARY) HYPERTENSION: ICD-10-CM

## 2025-02-10 DIAGNOSIS — R00.2 PALPITATIONS: Primary | Chronic | ICD-10-CM

## 2025-02-10 DIAGNOSIS — E11.42 TYPE 2 DIABETES MELLITUS WITH DIABETIC POLYNEUROPATHY, WITHOUT LONG-TERM CURRENT USE OF INSULIN: ICD-10-CM

## 2025-02-10 LAB
GLUCOSE SERPL-MCNC: 96 MG/DL (ref 70–110)
OHS QRS DURATION: 72 MS
OHS QTC CALCULATION: 434 MS

## 2025-02-10 PROCEDURE — 99214 OFFICE O/P EST MOD 30 MIN: CPT | Mod: S$GLB,,, | Performed by: NURSE PRACTITIONER

## 2025-02-10 PROCEDURE — 82962 GLUCOSE BLOOD TEST: CPT | Mod: S$GLB,,, | Performed by: NURSE PRACTITIONER

## 2025-02-10 PROCEDURE — 93306 TTE W/DOPPLER COMPLETE: CPT | Mod: 26,,, | Performed by: INTERNAL MEDICINE

## 2025-02-10 PROCEDURE — 93005 ELECTROCARDIOGRAM TRACING: CPT | Mod: S$GLB,,, | Performed by: NURSE PRACTITIONER

## 2025-02-10 PROCEDURE — 99999 PR PBB SHADOW E&M-EST. PATIENT-LVL IV: CPT | Mod: PBBFAC,,, | Performed by: NURSE PRACTITIONER

## 2025-02-10 PROCEDURE — 93306 TTE W/DOPPLER COMPLETE: CPT | Mod: PO

## 2025-02-10 PROCEDURE — 93010 ELECTROCARDIOGRAM REPORT: CPT | Mod: S$GLB,,, | Performed by: INTERNAL MEDICINE

## 2025-02-10 NOTE — PROGRESS NOTES
Subjective     Patient ID: García Tang is a 59 y.o. male.    Chief Complaint: Medication Problem, Excessive Sweating, Tachycardia, and Numbness (Left leg, below the knee, recurring episodes of numbness x2Weeks)      Excessive Sweating  Associated symptoms include diaphoresis, fatigue and neck pain. Pertinent negatives include no abdominal pain, congestion, coughing or sore throat.         Patient is known to me. PCP is Dr. Domingo.     59 y.o. year old male patient who has a past medical history of ASCVD (arteriosclerotic cardiovascular disease), Chronic sialoadenitis, CKD stage 2 due to type 2 diabetes mellitus, Diverticulitis, Diverticulosis, Essential hypertension, Fatty liver disease, nonalcoholic, GERD (gastroesophageal reflux disease), Headache, HLD (hyperlipidemia), Kidney stone, Meniere's disease, Morbid obesity, NAFLD (nonalcoholic fatty liver disease), Near syncope, Obesity hypoventilation syndrome, PAD (peripheral artery disease), Prostatitis, Rectal bleeding, Sleep disorder breathing, and Thyroid nodule presents to clinic for  complaints of elevated HR and sweating over the past week. Started after his second dose of Mounjaro. He has been eating less carbs, doing mostly protein. His sugar was 103 yesterday morning when he felt these symptoms. He reports HR is usually 60-70s. Lately has been 90s-100s. He has felt what he is describing as reflux, a heaviness in his chest, worse after eating. He reports he has been staying hydrated. He has been exercising. Over the weekend he was walking in [employer] and his left leg from the knee down went numb for  few minutes. He has an MRI ordered but has not scheduled yet due to cost. He has been consistently fatigued for several weeks now ever since having the flu. He has needed to sleep more than usual.     Review of Systems   Constitutional:  Positive for diaphoresis and fatigue.   HENT:  Negative for nasal congestion and sore throat.    Respiratory:  Negative for  "cough and shortness of breath.    Cardiovascular:  Positive for palpitations. Negative for leg swelling.   Gastrointestinal:  Positive for reflux. Negative for abdominal pain.   Genitourinary:  Negative for dysuria.   Musculoskeletal:  Positive for back pain and neck pain.          Objective   Vitals:    02/10/25 1023   BP: 122/82   Pulse: 83   SpO2: 98%   Weight: (!) 141.2 kg (311 lb 4.6 oz)   Height: 5' 10" (1.778 m)      Physical Exam  Constitutional:       General: He is not in acute distress.     Appearance: Normal appearance. He is obese. He is not ill-appearing, toxic-appearing or diaphoretic.   HENT:      Head: Normocephalic and atraumatic.   Cardiovascular:      Rate and Rhythm: Normal rate and regular rhythm.      Heart sounds: Normal heart sounds. No murmur heard.     No friction rub. No gallop.   Pulmonary:      Effort: No respiratory distress.      Breath sounds: Normal breath sounds. No stridor. No wheezing, rhonchi or rales.   Chest:      Chest wall: No tenderness.   Musculoskeletal:      Right lower leg: No edema.      Left lower leg: No edema.   Skin:     General: Skin is warm and dry.      Capillary Refill: Capillary refill takes less than 2 seconds.   Neurological:      General: No focal deficit present.      Mental Status: He is alert and oriented to person, place, and time. Mental status is at baseline.   Psychiatric:         Mood and Affect: Mood normal.         Behavior: Behavior normal.         Thought Content: Thought content normal.         Judgment: Judgment normal.         1. Palpitations  - IN OFFICE EKG 12-LEAD (to Muse)    2. Diaphoresis  - POCT Glucose, Hand-Held Device    3. Fatigue, unspecified type  - Echo; Future    4. Essential (primary) hypertension    5. Type 2 diabetes mellitus with diabetic polyneuropathy, without long-term current use of insulin   Discussed symptoms may be related to his blood sugar being now being in normal range. He may be feeling symptomatic in the 90s. " Suggest adding some carbs to his meals vs no carbs. Consider stopping Mounjaro to see if symptoms improve.     RTC/ER precautions given. F/U 2 weeks.    Counseled on regular exercise, maintenance of a healthy weight, balanced diet rich in fruits/vegetables and lean protein, and avoidance of unhealthy habits like smoking and excessive alcohol intake.    Ursula Clement, PRIETOP-C

## 2025-02-11 ENCOUNTER — OCHSNER VIRTUAL EMERGENCY DEPARTMENT (OUTPATIENT)
Facility: CLINIC | Age: 60
End: 2025-02-11
Payer: COMMERCIAL

## 2025-02-11 ENCOUNTER — PATIENT MESSAGE (OUTPATIENT)
Dept: FAMILY MEDICINE | Facility: CLINIC | Age: 60
End: 2025-02-11
Payer: COMMERCIAL

## 2025-02-11 ENCOUNTER — PATIENT OUTREACH (OUTPATIENT)
Facility: OTHER | Age: 60
End: 2025-02-11
Payer: COMMERCIAL

## 2025-02-11 ENCOUNTER — NURSE TRIAGE (OUTPATIENT)
Dept: ADMINISTRATIVE | Facility: CLINIC | Age: 60
End: 2025-02-11
Payer: COMMERCIAL

## 2025-02-11 DIAGNOSIS — H53.8 BLURRED VISION, BILATERAL: Primary | ICD-10-CM

## 2025-02-11 LAB
ASCENDING AORTA: 3.55 CM
AV INDEX (PROSTH): 0.84
AV MEAN GRADIENT: 3 MMHG
AV PEAK GRADIENT: 4 MMHG
AV VALVE AREA BY VELOCITY RATIO: 3.6 CM²
AV VALVE AREA: 3.8 CM²
AV VELOCITY RATIO: 0.8
BSA FOR ECHO PROCEDURE: 2.64 M2
CV ECHO LV RWT: 0.41 CM
DOP CALC AO PEAK VEL: 1 M/S
DOP CALC AO VTI: 20 CM
DOP CALC LVOT AREA: 4.5 CM2
DOP CALC LVOT DIAMETER: 2.4 CM
DOP CALC LVOT PEAK VEL: 0.8 M/S
DOP CALC LVOT STROKE VOLUME: 75.5 CM3
DOP CALCLVOT PEAK VEL VTI: 16.7 CM
E WAVE DECELERATION TIME: 164 MSEC
E/A RATIO: 0.86
E/E' RATIO: 6 M/S
ECHO LV POSTERIOR WALL: 1 CM (ref 0.6–1.1)
FRACTIONAL SHORTENING: 34.7 % (ref 28–44)
INTERVENTRICULAR SEPTUM: 1.3 CM (ref 0.6–1.1)
IVRT: 100 MSEC
LEFT ATRIUM AREA SYSTOLIC (APICAL 2 CHAMBER): 14.79 CM2
LEFT ATRIUM AREA SYSTOLIC (APICAL 4 CHAMBER): 12.55 CM2
LEFT ATRIUM SIZE: 3.7 CM
LEFT ATRIUM VOLUME INDEX MOD: 12 ML/M2
LEFT ATRIUM VOLUME MOD: 31 ML
LEFT INTERNAL DIMENSION IN SYSTOLE: 3.2 CM (ref 2.1–4)
LEFT VENTRICLE DIASTOLIC VOLUME INDEX: 45 ML/M2
LEFT VENTRICLE DIASTOLIC VOLUME: 113.39 ML
LEFT VENTRICLE END SYSTOLIC VOLUME APICAL 2 CHAMBER: 37.07 ML
LEFT VENTRICLE END SYSTOLIC VOLUME APICAL 4 CHAMBER: 22.87 ML
LEFT VENTRICLE MASS INDEX: 84.6 G/M2
LEFT VENTRICLE SYSTOLIC VOLUME INDEX: 16.3 ML/M2
LEFT VENTRICLE SYSTOLIC VOLUME: 41.14 ML
LEFT VENTRICULAR INTERNAL DIMENSION IN DIASTOLE: 4.9 CM (ref 3.5–6)
LEFT VENTRICULAR MASS: 213.3 G
LV LATERAL E/E' RATIO: 5.5 M/S
LV SEPTAL E/E' RATIO: 7.5 M/S
LVED V (TEICH): 113.39 ML
LVES V (TEICH): 41.14 ML
LVOT MG: 1.44 MMHG
LVOT MV: 0.56 CM/S
MV PEAK A VEL: 0.7 M/S
MV PEAK E VEL: 0.6 M/S
MV STENOSIS PRESSURE HALF TIME: 47.64 MS
MV VALVE AREA P 1/2 METHOD: 4.62 CM2
OHS CV RV/LV RATIO: 0.73 CM
PISA TR MAX VEL: 1.6 M/S
PULM VEIN S/D RATIO: 1.12
PV PEAK D VEL: 0.41 M/S
PV PEAK S VEL: 0.46 M/S
RA PRESSURE ESTIMATED: 3 MMHG
RIGHT VENTRICLE DIASTOLIC BASEL DIMENSION: 3.6 CM
RIGHT VENTRICLE DIASTOLIC LENGTH: 6 CM
RIGHT VENTRICLE DIASTOLIC MID DIMENSION: 2.1 CM
RIGHT VENTRICULAR END-DIASTOLIC DIMENSION: 3.56 CM
RIGHT VENTRICULAR LENGTH IN DIASTOLE (APICAL 4-CHAMBER VIEW): 6.02 CM
RV MID DIAMA: 2.08 CM
RV TB RVSP: 5 MMHG
RV TISSUE DOPPLER FREE WALL SYSTOLIC VELOCITY 1 (APICAL 4 CHAMBER VIEW): 15.61 CM/S
SINUS: 3.35 CM
STJ: 3.21 CM
TDI LATERAL: 0.11 M/S
TDI SEPTAL: 0.08 M/S
TDI: 0.1 M/S
TR MAX PG: 10 MMHG
TRICUSPID ANNULAR PLANE SYSTOLIC EXCURSION: 2.27 CM
TV REST PULMONARY ARTERY PRESSURE: 13 MMHG
Z-SCORE OF LEFT VENTRICULAR DIMENSION IN END DIASTOLE: -10.98
Z-SCORE OF LEFT VENTRICULAR DIMENSION IN END SYSTOLE: -7.9

## 2025-02-11 NOTE — TELEPHONE ENCOUNTER
Speaking with patient, states his Vision is blurry, started this morning, and it has gotten worse. Close up vision, nauseated when looks up. BP is perfect today. No loss of vision, no eye pain. Tirage done- dispo ED or office with approval Advised will reach out to Reginaldo.     Reginaldo    this morning, noted vision started to become blurry 9 am. Wear glasses to only to see far away. computer do not wear glasses No H/A or pain in my eye, slight H/A this am but went away. ED or Office Nauseated when he looks up. No loss of sight, no pain in eye, equal blurred in both eyes.     Per Reginaldo    Ok to be seen tomorrow, referral put in for optometry and urgent request for call back in am.     Patient advised, strict monitoring, any loss of vision or pain or any other worsening S/S. Go to ED. Advised of call back 24/7 for OOC line. Verb understanding.       Reason for Disposition   Blurred vision or visual changes and present now and sudden onset or new (e.g., minutes, hours, days)  (Exception: Seeing floaters / black specks OR previously diagnosed migraine headaches with same symptoms.)    Additional Information   Negative: Complete loss of vision in one or both eyes   Negative: Severe eye pain   Negative: Severe headache   Negative: Double vision    Protocols used: Vision Loss or Change-A-OH

## 2025-02-11 NOTE — PLAN OF CARE-OVED
[facility] Plan of Care Note  Referral Source: Nurse On-Call                               Chief Complaint   Patient presents with    Blurred Vision     Blurred vision that started at 9 AM, had mild headache that resolved.  Wears glasses to see far.   No eye pain, vision loss, flashers, floaters, current headache or other symptoms  Does not want to go to the ED  Referral to ophthalmology placed.   Advised to go to ED if floaters, flasher, or vision loss        Recommendation: Specialist Referral         Specialty: Opthalmology                  Encounter Diagnosis   Name Primary?    Blurred vision, bilateral Yes

## 2025-02-12 ENCOUNTER — OFFICE VISIT (OUTPATIENT)
Dept: FAMILY MEDICINE | Facility: CLINIC | Age: 60
End: 2025-02-12
Payer: COMMERCIAL

## 2025-02-12 ENCOUNTER — OFFICE VISIT (OUTPATIENT)
Dept: OPTOMETRY | Facility: CLINIC | Age: 60
End: 2025-02-12
Payer: COMMERCIAL

## 2025-02-12 ENCOUNTER — NURSE TRIAGE (OUTPATIENT)
Dept: ADMINISTRATIVE | Facility: CLINIC | Age: 60
End: 2025-02-12
Payer: COMMERCIAL

## 2025-02-12 VITALS
OXYGEN SATURATION: 98 % | SYSTOLIC BLOOD PRESSURE: 118 MMHG | WEIGHT: 306.44 LBS | HEIGHT: 70 IN | HEART RATE: 84 BPM | DIASTOLIC BLOOD PRESSURE: 80 MMHG | BODY MASS INDEX: 43.87 KG/M2

## 2025-02-12 DIAGNOSIS — H52.03 HYPEROPIA WITH ASTIGMATISM AND PRESBYOPIA, BILATERAL: ICD-10-CM

## 2025-02-12 DIAGNOSIS — R20.0 NUMBNESS AND TINGLING OF LEFT LOWER EXTREMITY: Primary | ICD-10-CM

## 2025-02-12 DIAGNOSIS — H40.023 OAG (OPEN ANGLE GLAUCOMA) SUSPECT, HIGH RISK, BILATERAL: ICD-10-CM

## 2025-02-12 DIAGNOSIS — H52.4 HYPEROPIA WITH ASTIGMATISM AND PRESBYOPIA, BILATERAL: ICD-10-CM

## 2025-02-12 DIAGNOSIS — H52.203 HYPEROPIA WITH ASTIGMATISM AND PRESBYOPIA, BILATERAL: ICD-10-CM

## 2025-02-12 DIAGNOSIS — R42 LIGHTHEADED: ICD-10-CM

## 2025-02-12 DIAGNOSIS — H16.223 KERATOCONJUNCTIVITIS SICCA OF BOTH EYES: ICD-10-CM

## 2025-02-12 DIAGNOSIS — H53.9 VISION CHANGES: ICD-10-CM

## 2025-02-12 DIAGNOSIS — R00.2 PALPITATIONS: ICD-10-CM

## 2025-02-12 DIAGNOSIS — E11.9 TYPE 2 DIABETES MELLITUS WITHOUT RETINOPATHY: Primary | ICD-10-CM

## 2025-02-12 DIAGNOSIS — M79.605 LEG PAIN, LATERAL, LEFT: ICD-10-CM

## 2025-02-12 DIAGNOSIS — I10 PRIMARY HYPERTENSION: ICD-10-CM

## 2025-02-12 DIAGNOSIS — R20.2 NUMBNESS AND TINGLING OF LEFT LOWER EXTREMITY: Primary | ICD-10-CM

## 2025-02-12 PROCEDURE — 99214 OFFICE O/P EST MOD 30 MIN: CPT | Mod: S$GLB,,,

## 2025-02-12 PROCEDURE — 99214 OFFICE O/P EST MOD 30 MIN: CPT | Mod: S$GLB,,, | Performed by: NURSE PRACTITIONER

## 2025-02-12 PROCEDURE — 99999 PR PBB SHADOW E&M-EST. PATIENT-LVL V: CPT | Mod: PBBFAC,,, | Performed by: NURSE PRACTITIONER

## 2025-02-12 PROCEDURE — 99999 PR PBB SHADOW E&M-EST. PATIENT-LVL III: CPT | Mod: PBBFAC,,,

## 2025-02-12 RX ORDER — PANTOPRAZOLE SODIUM 40 MG/1
TABLET, DELAYED RELEASE ORAL
COMMUNITY

## 2025-02-12 RX ORDER — PREDNISOLONE ACETATE 10 MG/ML
SUSPENSION/ DROPS OPHTHALMIC
Qty: 5 ML | Refills: 0 | Status: SHIPPED | OUTPATIENT
Start: 2025-02-12 | End: 2025-02-26

## 2025-02-12 NOTE — TELEPHONE ENCOUNTER
Pt calling and has multiple issues going on he has HA and numbness to the leg from knee down to foot and has been seen multiple times in the last 2 weeks. Thet did stroke workup and he has appt for the visual changes this afternoon but woke up with pain to the ankle and its throbbing. Pt triaged and care advice to go to the office now and appt made. Pt to call back if any other questions or concerns or worsening                 Reason for Disposition   Neurologic deficit of gradual onset (e.g., days to weeks), ANY of the following: * Weakness of the face, arm, or leg on one side of the body* Numbness of the face, arm, or leg on one side of the body* Loss of speech or garbled speech    Additional Information   Negative: SEVERE weakness (e.g., unable to walk or barely able to walk, requires support) and new-onset or getting worse   Negative: Difficult to awaken or acting confused (e.g., disoriented, slurred speech)   Negative: Sudden onset of weakness of the face, arm or leg on one side of the body and present now (Exception: Bell's palsy suspected: weakness on one side of the face developing over hours to days, with no other symptoms.)   Negative: Sudden onset of numbness of the face, arm or leg on one side of the body and present now   Negative: Sudden onset of loss of speech or garbled speech and present now   Negative: Sounds like a life-threatening emergency to the triager   Negative: Headache (with neurologic deficit)   Negative: Unable to urinate (or only a few drops) and bladder feels very full   Negative: Loss of bladder or bowel control (urine or bowel incontinence; wetting self, leaking stool) of new-onset   Negative: Back pain with numbness (loss of sensation) in groin or rectal area   Negative: Neurologic deficit that was brief (now gone), ANY of the following: * Weakness of the face, arm, or leg on one side of the body * Numbness of the face, arm, or leg on one side of the body * Loss of speech or  garbled speech   Negative: Patient sounds very sick or weak to the triager    Protocols used: Neurologic Deficit-A-OH

## 2025-02-12 NOTE — PROGRESS NOTES
Subjective     Patient ID: García Tang is a 59 y.o. male.    Chief Complaint: Leg Pain (Pt states he has been having throbbing and numbness below left knee cap. )      HPI      Patient is known to me. PCP is Dr. Domingo.     59 y.o. 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 We discussed this is likely radicular symptoms from his spine. He has still not done the MRI of his Lspine that was ordered due to cost. He denies any bowel or bladder incontinence or saddle anesthesia. He has also noticed some changes in his vision within the last few days. Feels like when he is looking at the computer screen it appears more fuzzy than usual. He has an appt today to get his eyes checked right after this. He also notices his HR has been a little higher than usual since starting the Mounjaro. His resting HR typically is now in the 80 and goes up to 115 with walking. He denies CP or SOB. EKG last visit was normal. He is down 5lbs since last visit, he has changed his diet. He is drinking about 40oz of fluids daily.     Review of Systems   Constitutional:  Positive for fatigue.   Eyes:  Positive for visual disturbance.   Cardiovascular:  Positive for palpitations. Negative for chest pain.   Musculoskeletal:  Positive for back pain, gait problem, leg pain and myalgias.   Neurological:  Positive for dizziness,  "weakness and numbness.   Psychiatric/Behavioral:  Positive for dysphoric mood.           Objective   Vitals:    02/12/25 0826   BP: 118/80   Pulse: 84   SpO2: 98%   Weight: (!) 139 kg (306 lb 7 oz)   Height: 5' 10" (1.778 m)      Physical Exam  Constitutional:       General: He is not in acute distress.     Appearance: Normal appearance. He is obese. He is not ill-appearing, toxic-appearing or diaphoretic.   HENT:      Head: Normocephalic and atraumatic.   Cardiovascular:      Rate and Rhythm: Normal rate and regular rhythm.      Heart sounds: Normal heart sounds. No murmur heard.     No friction rub. No gallop.   Pulmonary:      Effort: No respiratory distress.      Breath sounds: Normal breath sounds. No stridor. No wheezing, rhonchi or rales.   Chest:      Chest wall: No tenderness.   Musculoskeletal:      Right lower leg: No edema.      Left lower leg: No edema.   Neurological:      General: No focal deficit present.      Mental Status: He is alert and oriented to person, place, and time. Mental status is at baseline.      Cranial Nerves: No cranial nerve deficit.   Psychiatric:         Mood and Affect: Mood normal.         Behavior: Behavior normal.         Thought Content: Thought content normal.         Judgment: Judgment normal.         1. Numbness and tingling of left lower extremity  - US Lower Extremity Veins Left; Future  -Discussed symptoms consistent with radiculopathy but pt is concerned so will r/o DVT    2. Leg pain, lateral, left  - US Lower Extremity Veins Left; Future    3. Palpitations  -HR is normal, regular rhythm, increase water intake    4. Vision changes  -has appt this morning for eye exam    5. Lightheaded   -increase fluid intake to 64oz daily. Monitor HR, BP and glucose, ED precautions advised.     RTC/ER precautions given. F/U as scheduled    Counseled on regular exercise, maintenance of a healthy weight, balanced diet rich in fruits/vegetables and lean protein, and avoidance of " unhealthy habits like smoking and excessive alcohol intake.    Ursula Clement, PRIETOP-C

## 2025-02-12 NOTE — PROGRESS NOTES
HPI    Pt c/o blurry VA near and pain upon movement    Pt sts he feels nauseated and blur up close. Pt has pain with eye movement   OU when looking directly left or right. Superior and inferior fine. LBS:   123 this morning, 99 yesterday.  Last edited by Joby Bond, OD on 2/12/2025  9:45 AM.            Assessment /Plan     For exam results, see Encounter Report.    Type 2 diabetes mellitus without retinopathy    Primary hypertension    Keratoconjunctivitis sicca of both eyes  -     prednisoLONE acetate (PRED FORTE) 1 % DrpS; Place 1 drop into both eyes 4 (four) times daily for 7 days, THEN 1 drop 2 (two) times daily for 7 days.  Dispense: 5 mL; Refill: 0    OAG (open angle glaucoma) suspect, high risk, bilateral    Hyperopia with astigmatism and presbyopia, bilateral      1-2. No diabetic retinopathy, hypertensive retinopathy, or macular edema evident on today's exam OD, OS. Ed pt on importance of maintaining strict BS and BP control due to potential for visual fluctuations and/or vision loss. Monitor with yearly dilated exam, sooner prn.    3. Longstanding history of dry eye flares. Moderate SPK present OD, OS. Ed pt that photophobia, worsening blur, and pain on eye movement (more of a soreness/pulling sensation) are likely secondary to dry eyes. Rx'd Pred Forte to use QID OU x 7 days, then BID OU x 7 days. Recommended pt use OTC artificial tears BID-QID OU daily for relief, as steroids cannot be used long term. Ed pt to RTC if any worsening signs or symptoms.    4. Moderate CD ratio with IOP in low 20s. Angles open. (+)Family history: father. Pt due for updated testing at next annual exam. Ed pt that IOP not contributing to pt's symptoms.    5. Ed pt that blur at near secondary to worsening presbyopia in conjunction with dry eyes. Recommended OTC readers for intermediate/near work. Pt deferred new spec rx today. Will recheck at pt's annual exam.    *Pt complaining of motion sickness/vertigo associated with  near blur and eye pain. I don't believe that vertigo is from any ocular origin, as all ocular health is WNL. No optic disc edema, EOM restrictions, or other abnormalities to explain pt's symptoms. Discussed with pt that sinuses may be contributing to symptoms. Pt did note that his last CT (01/2025) was notable for pansinusitis. Advised continued follow-up with PCP. Ed pt to RTC if any worsening signs or symptoms.    RTC: pt due for annual, sooner prn.

## 2025-02-18 ENCOUNTER — HOSPITAL ENCOUNTER (OUTPATIENT)
Dept: RADIOLOGY | Facility: HOSPITAL | Age: 60
Discharge: HOME | End: 2025-02-18
Attending: NURSE PRACTITIONER
Payer: COMMERCIAL

## 2025-02-18 DIAGNOSIS — M79.605 LEG PAIN, LATERAL, LEFT: ICD-10-CM

## 2025-02-18 DIAGNOSIS — R20.2 NUMBNESS AND TINGLING OF LEFT LOWER EXTREMITY: ICD-10-CM

## 2025-02-18 DIAGNOSIS — R20.0 NUMBNESS AND TINGLING OF LEFT LOWER EXTREMITY: ICD-10-CM

## 2025-02-18 PROCEDURE — 93971 EXTREMITY STUDY: CPT | Mod: TC,PO,LT

## 2025-02-19 ENCOUNTER — RESULTS FOLLOW-UP (OUTPATIENT)
Dept: FAMILY MEDICINE | Facility: CLINIC | Age: 60
End: 2025-02-19

## 2025-02-25 ENCOUNTER — OFFICE VISIT (OUTPATIENT)
Dept: FAMILY MEDICINE | Facility: CLINIC | Age: 60
End: 2025-02-25
Payer: COMMERCIAL

## 2025-02-25 ENCOUNTER — TELEPHONE (OUTPATIENT)
Dept: NEUROLOGY | Facility: CLINIC | Age: 60
End: 2025-02-25
Payer: COMMERCIAL

## 2025-02-25 ENCOUNTER — TELEPHONE (OUTPATIENT)
Dept: FAMILY MEDICINE | Facility: CLINIC | Age: 60
End: 2025-02-25

## 2025-02-25 VITALS
OXYGEN SATURATION: 99 % | WEIGHT: 303.25 LBS | SYSTOLIC BLOOD PRESSURE: 128 MMHG | HEART RATE: 80 BPM | HEIGHT: 70 IN | DIASTOLIC BLOOD PRESSURE: 86 MMHG | BODY MASS INDEX: 43.41 KG/M2

## 2025-02-25 DIAGNOSIS — I10 ESSENTIAL (PRIMARY) HYPERTENSION: ICD-10-CM

## 2025-02-25 DIAGNOSIS — R00.2 PALPITATIONS: Primary | ICD-10-CM

## 2025-02-25 DIAGNOSIS — E11.42 TYPE 2 DIABETES MELLITUS WITH DIABETIC POLYNEUROPATHY, WITHOUT LONG-TERM CURRENT USE OF INSULIN: ICD-10-CM

## 2025-02-25 PROCEDURE — 99214 OFFICE O/P EST MOD 30 MIN: CPT | Mod: S$GLB,,, | Performed by: NURSE PRACTITIONER

## 2025-02-25 PROCEDURE — 99999 PR PBB SHADOW E&M-EST. PATIENT-LVL IV: CPT | Mod: PBBFAC,,, | Performed by: NURSE PRACTITIONER

## 2025-02-25 RX ORDER — TIRZEPATIDE 5 MG/.5ML
5 INJECTION, SOLUTION SUBCUTANEOUS
Qty: 12 PEN | Refills: 0 | Status: SHIPPED | OUTPATIENT
Start: 2025-02-25

## 2025-02-25 NOTE — TELEPHONE ENCOUNTER
----- Message from Joaquinaon sent at 2/25/2025  9:15 AM CST -----  Type:  Appointment RequestCaller is requesting a  appointment.  Name of Caller:ptSymptoms:Needs Holter MonitorWould the patient rather a call back or a response via [facility]? Call Carie Call Back Number: [phone] Information: Tried calling [facility] to have pt see them but they did not answer and there was no VM option.   Please call back to advise. Thanks!

## 2025-02-25 NOTE — TELEPHONE ENCOUNTER
----- Message from Helios sent at 2/25/2025  3:05 PM CST -----  Type: Needs Medical AdviceWho Called:  Laurie Call Back Number: [phone] Information: nancy states he needs to talk to nurse about which medication he can take with , please call to further assist thank you .

## 2025-02-25 NOTE — PROGRESS NOTES
"Subjective     Patient ID: García Tang is a 59 y.o. male.    Chief Complaint: Follow-up      Follow-up  Associated symptoms include numbness (to left leg). Pertinent negatives include no chest pain or coughing.         Patient is known to me. PCP is Dr. Domingo.     59 y.o. year old male patient who has a past medical history of ASCVD (arteriosclerotic cardiovascular disease), Chronic sialoadenitis, CKD stage 2 due to type 2 diabetes mellitus, Diverticulitis, Diverticulosis, Essential hypertension, Fatty liver disease, nonalcoholic, GERD (gastroesophageal reflux disease), Headache, HLD (hyperlipidemia), Kidney stone, Meniere's disease, Morbid obesity, NAFLD (nonalcoholic fatty liver disease), Near syncope, Obesity hypoventilation syndrome, PAD (peripheral artery disease), Prostatitis, Rectal bleeding, Sleep disorder breathing, and Thyroid nodule presents to clinic for  complaints of intermittent palpitations and fluttering sensations for the past few weeks. Notes that it usually occurs with exercise or walking around. HR gets to 115-120s. Denies CP or SOB. Has been feeling more tired than usual lately. Previously seen by cards. Had an angiogram in 2023 with Dr. Salazar which was normal.    Review of Systems   Respiratory:  Negative for cough and shortness of breath.    Cardiovascular:  Positive for palpitations. Negative for chest pain.   Neurological:  Positive for numbness (to left leg).          Objective   Vitals:    02/25/25 0748   BP: 128/86   Pulse: 80   SpO2: 99%   Weight: (!) 137.5 kg (303 lb 3.9 oz)   Height: 5' 10" (1.778 m)      Physical Exam  Constitutional:       General: He is not in acute distress.     Appearance: Normal appearance. He is obese. He is not ill-appearing, toxic-appearing or diaphoretic.   HENT:      Head: Normocephalic and atraumatic.   Cardiovascular:      Rate and Rhythm: Normal rate and regular rhythm.      Heart sounds: Normal heart sounds. No murmur heard.     No friction rub. " No gallop.   Pulmonary:      Effort: No respiratory distress.      Breath sounds: Normal breath sounds. No stridor. No wheezing, rhonchi or rales.   Chest:      Chest wall: No tenderness.   Musculoskeletal:      Right lower leg: No edema.      Left lower leg: No edema.   Neurological:      General: No focal deficit present.      Mental Status: He is alert and oriented to person, place, and time. Mental status is at baseline.   Psychiatric:         Mood and Affect: Mood normal.         Behavior: Behavior normal.         Thought Content: Thought content normal.         Judgment: Judgment normal.         1. Palpitations  - Holter monitor - 48 hour; Future  - Ambulatory referral/consult to Cardiology; Future    2. Essential (primary) hypertension  -controlled, cont meds.     3. Type 2 diabetes mellitus with diabetic polyneuropathy, without long-term current use of insulin  - tirzepatide (MOUNJARO) 5 mg/0.5 mL PnIj; Inject 5 mg into the skin every 7 days.  Dispense: 12 Pen; Refill: 0   -increase to 5mg weekly    RTC/ER precautions given. F/U 3mo.     Counseled on regular exercise, maintenance of a healthy weight, balanced diet rich in fruits/vegetables and lean protein, and avoidance of unhealthy habits like smoking and excessive alcohol intake.    KUSUM Oh

## 2025-03-03 ENCOUNTER — HOSPITAL ENCOUNTER (OUTPATIENT)
Dept: CARDIOLOGY | Facility: HOSPITAL | Age: 60
Discharge: HOME | End: 2025-03-03
Attending: NURSE PRACTITIONER
Payer: COMMERCIAL

## 2025-03-03 DIAGNOSIS — R00.2 PALPITATIONS: ICD-10-CM

## 2025-03-03 PROCEDURE — 93225 XTRNL ECG REC<48 HRS REC: CPT | Mod: PO

## 2025-03-06 ENCOUNTER — OFFICE VISIT (OUTPATIENT)
Dept: CARDIOLOGY | Facility: CLINIC | Age: 60
End: 2025-03-06
Payer: COMMERCIAL

## 2025-03-06 ENCOUNTER — NURSE TRIAGE (OUTPATIENT)
Dept: ADMINISTRATIVE | Facility: CLINIC | Age: 60
End: 2025-03-06
Payer: COMMERCIAL

## 2025-03-06 VITALS
DIASTOLIC BLOOD PRESSURE: 86 MMHG | HEIGHT: 70 IN | SYSTOLIC BLOOD PRESSURE: 122 MMHG | BODY MASS INDEX: 43.62 KG/M2 | HEART RATE: 96 BPM | WEIGHT: 304.69 LBS

## 2025-03-06 DIAGNOSIS — R94.30 ELEVATED LEFT VENTRICULAR END-DIASTOLIC PRESSURE (LVEDP): ICD-10-CM

## 2025-03-06 DIAGNOSIS — I10 ESSENTIAL (PRIMARY) HYPERTENSION: Primary | ICD-10-CM

## 2025-03-06 DIAGNOSIS — I73.9 PVD (PERIPHERAL VASCULAR DISEASE): Chronic | ICD-10-CM

## 2025-03-06 DIAGNOSIS — I25.10 ASCVD (ARTERIOSCLEROTIC CARDIOVASCULAR DISEASE): Chronic | ICD-10-CM

## 2025-03-06 DIAGNOSIS — I49.3 SYMPTOMATIC PVCS: ICD-10-CM

## 2025-03-06 DIAGNOSIS — E78.5 DYSLIPIDEMIA WITH ELEVATED LOW DENSITY LIPOPROTEIN (LDL) CHOLESTEROL AND ABNORMALLY LOW HIGH DENSITY LIPOPROTEIN CHOLESTEROL: ICD-10-CM

## 2025-03-06 DIAGNOSIS — I34.0 NONRHEUMATIC MITRAL VALVE REGURGITATION: ICD-10-CM

## 2025-03-06 PROBLEM — R07.9 CHEST PAIN: Status: RESOLVED | Noted: 2021-05-16 | Resolved: 2025-03-06

## 2025-03-06 PROBLEM — R94.39 ABNORMAL CARDIOVASCULAR STRESS TEST: Status: RESOLVED | Noted: 2023-10-24 | Resolved: 2025-03-06

## 2025-03-06 PROBLEM — R94.31 NONSPECIFIC ABNORMAL ELECTROCARDIOGRAM (ECG) (EKG): Status: RESOLVED | Noted: 2023-08-10 | Resolved: 2025-03-06

## 2025-03-06 PROBLEM — R00.2 PALPITATIONS: Chronic | Status: RESOLVED | Noted: 2017-05-23 | Resolved: 2025-03-06

## 2025-03-06 PROBLEM — I51.89 GRADE II DIASTOLIC DYSFUNCTION: Status: RESOLVED | Noted: 2021-06-15 | Resolved: 2025-03-06

## 2025-03-06 PROCEDURE — 99999 PR PBB SHADOW E&M-EST. PATIENT-LVL III: CPT | Mod: PBBFAC,,,

## 2025-03-06 RX ORDER — LOSARTAN POTASSIUM 50 MG/1
50 TABLET ORAL DAILY
Qty: 90 TABLET | Refills: 3 | Status: SHIPPED | OUTPATIENT
Start: 2025-03-06

## 2025-03-06 NOTE — TELEPHONE ENCOUNTER
Reason for Disposition   Neck pain after dangerous injury (e.g., MVA, diving, trampoline, contact sports, fall > 10 feet or 3 meters)  (Exception: Neck pain began > 1 hour after injury.)    Additional Information   Negative: ACUTE NEURO SYMPTOM and symptom present now (Definition: Difficult to awaken OR confused thinking and talking OR slurred speech OR weakness of arms or legs OR unsteady walking.)   Negative: Knocked out (unconscious) > 1 minute   Negative: Seizure (convulsion) occurred  (Exception: Prior history of seizures and now alert and without Acute Neuro Symptoms.)    Protocols used: Head Injury-A-OH

## 2025-03-06 NOTE — TELEPHONE ENCOUNTER
OOC RN  Head injury and ear pressure.   LM on    Saw cardiologist today     Today about 8:00 hit his head on frame of care,  left side of head Pain  excruciating to head and pressure to had,   5/10  and numb where he hit heat.  on the way to see cardiologist.  It started with pressure to left side of heart..Says he does not feel like he is thinking right.    Denies LOC.  Unwitnessed.  Hit on door frame like a head butt.   Care advise is to call 911  Head hurts extreme pressure to left ear.

## 2025-03-06 NOTE — TELEPHONE ENCOUNTER
Contacted patient to ensure that he goes to the ER. Patient stated he will have his wife to take him.

## 2025-03-06 NOTE — PROGRESS NOTES
Subjective:    Patient ID:  García Tang is a 59 y.o. male patient here for evaluation Tachycardia (SOB and sweating a lot)    History of Present Illness:     Patient of Dr. Salazar's   2 months fluctuating heart rate with diaphoresis. PCP ordered monitor which he completed yesterday. Start taking Monjura 2 months ago. 20-30 carbs daily, losing weight.   Last echo 2/2025 normal systolic and diastolic function.   Normal coronaries 10/2023.         Most Recent Echocardiogram Results  Results for orders placed during the hospital encounter of 02/10/25    Echo    Interpretation Summary    Left Ventricle: The left ventricle is normal in size. There is normal systolic function with a visually estimated ejection fraction of 60 - 65%. There is normal diastolic function.    Right Ventricle: Normal right ventricular cavity size. Systolic function is normal.    Pulmonary Artery: The estimated pulmonary artery systolic pressure is 13 mmHg.    IVC/SVC: Normal venous pressure at 3 mmHg.      Most Recent Nuclear Stress Test Results  Results for orders placed during the hospital encounter of 10/06/23    Nuclear Stress - Cardiology Interpreted    Interpretation Summary    Abnormal myocardial perfusion scan.    There are two significant perfusion abnormalities.    Perfusion Abnormality #1 - There is a mild to moderate intensity, small to moderate sized, reversible perfusion abnormality that is consistent with ischemia in the anteroapical wall(s).    Perfusion Abnormality #2 - There is a small to moderate sized, moderate intensity, reversible perfusion abnormality that is consistent with ischemia in the basal to mid inferolateral wall(s).    There are no other significant perfusion abnormalities.    The gated perfusion images showed an ejection fraction of 62% at rest.    There is normal wall motion at rest.    The ECG portion of the study is negative for ischemia.    The patient reported no chest pain during the stress test.     During stress, occasional PVCs are noted.    The exercise capacity was normal.      Most Recent Cardiac PET Stress Test Results  No results found for this or any previous visit.      Most Recent Cardiovascular Angiogram results  Results for orders placed during the hospital encounter of 10/26/23    Cardiac catheterization    Conclusion    The ejection fraction was greater than 60% by visual estimate.    The left ventricular end diastolic pressure was elevated.    The pre-procedure left ventricular end diastolic pressure was 24.    The coronary arteries were normal..    Risk factor modification weight loss medical treatment.    The procedure log was documented by Documenter: Malu Hernandez RN and verified by Sal Salazar MD.    Date: 10/27/2023  Time: 7:56 AM      Other Most Recent Cardiology Results  Results for orders placed during the hospital encounter of 01/28/25    Cardiac monitoring strips      REVIEW OF SYSTEMS: As noted in HPI   CARDIOVASCULAR: No recent chest pain, arm/neck/jaw pain, or edema.  RESPIRATORY: No recent fever, cough, SOB.  : No blood in the urine  GI: No reflux, nausea, vomiting, or blood in stool.   MUSCULOSKELETAL: No falls.   NEURO: No headaches, syncope, or dizziness.  EYES: No sudden changes in vision.     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    Diverticulitis     diverticulitis    Diverticulosis     Essential hypertension 07/18/2016    Fatty liver disease, nonalcoholic 04/07/2016    [place], 2006     GERD (gastroesophageal reflux disease)     Headache     HLD (hyperlipidemia)     Kidney stone     requiring stent    Meniere's disease 01/09/2018    Morbid obesity 05/29/2014    NAFLD (nonalcoholic fatty liver disease)     Near syncope 05/23/2017    Obesity hypoventilation syndrome 02/22/2016    PAD (peripheral artery disease)  09/17/2019    Mild 9/2018    Prostatitis     Rectal bleeding     diverticulitis    Sleep disorder breathing 2/12/2024    Thyroid nodule 07/14/2022     Past Surgical History:   Procedure Laterality Date    ANGIOGRAM, CORONARY, WITH LEFT HEART CATHETERIZATION  10/26/2023    Procedure: Left Heart Cath;  Surgeon: Sal Salazar MD;  Location: [facility] CATH;  Service: Cardiology;;    ANTERIOR CERVICAL DISCECTOMY W/ FUSION N/A 2/12/2024    Procedure: DISCECTOMY, SPINE, CERVICAL, ANTERIOR APPROACH, WITH FUSION C5-6;  Surgeon: Wali Zuniga MD;  Location: [facility] OR;  Service: Neurosurgery;  Laterality: N/A;    CARDIAC CATHETERIZATION      COLONOSCOPY  2008    diverticulosis per patient report    CORONARY ANGIOGRAPHY  10/26/2023    Procedure: Coronary angiogram study;  Surgeon: Sal Salazar MD;  Location: [facility] CATH;  Service: Cardiology;;    ESOPHAGOGASTRODUODENOSCOPY N/A 12/18/2023    Procedure: EGD (ESOPHAGOGASTRODUODENOSCOPY);  Surgeon: Aram Bermudez MD;  Location: [facility] ENDO;  Service: Gastroenterology;  Laterality: N/A;    kidney stent      KIDNEY SURGERY  1996    right kidney obstruction requiring stent-open    MAGNETIC RESONANCE IMAGING N/A 1/26/2024    Procedure: MRI (MAGNETIC RESONANCE IMAGING);  Surgeon: Su Galvan [facility];  Location: [facility];  Service: General;  Laterality: N/A;    NOSE SURGERY      UPPER GASTROINTESTINAL ENDOSCOPY  2021    [facility], gastritis per patient report     Social History[1]      Objective      Vitals:    03/06/25 0834   BP: 122/86   Pulse: 96       LAST EKG  Results for orders placed or performed in visit on 02/10/25   IN OFFICE EKG 12-LEAD (to [place])    Collection Time: 02/10/25 12:16 PM   Result Value Ref Range    QRS Duration 72 ms    [facility] QTC Calculation 434 ms    Narrative    Test Reason : R00.2,    Vent. Rate :  82 BPM     Atrial Rate :  82 BPM     P-R Int : 166 ms          QRS Dur :  72 ms      QT Int : 372 ms       P-R-T Axes :  81 -13  40 degrees    QTcB  Int : 434 ms    Normal sinus rhythm  Junctional ST depression, probably normal  Borderline Abnormal ECG  When compared with ECG of 27-Jan-2025 21:43,  Criteria for Inferior infarct are no longer Present  Nonspecific T wave abnormality now evident in Lateral leads  Confirmed by Sal Salazar ([id number]) on 2/10/2025 2:20:26 PM    Referred By: YASMIN BOOGIE           Confirmed By: Sal Salazar     LIPIDS - LAST 2   Lab Results   Component Value Date    CHOL 189 04/29/2024    CHOL 189 11/07/2023    HDL 40 04/29/2024    HDL 43 11/07/2023    LDLCALC 125.6 04/29/2024    LDLCALC 125.0 11/07/2023    TRIG 117 04/29/2024    TRIG 105 11/07/2023    CHOLHDL 21.2 04/29/2024    CHOLHDL 22.8 11/07/2023     CARDIAC PROFILE - LAST 2  Lab Results   Component Value Date    BNP <10 11/07/2023    BNP <10 12/09/2019     01/24/2024     (H) 01/04/2024    TROPONINI <0.012 11/13/2024    TROPONINI <0.012 11/13/2024      CBC - LAST 2  Lab Results   Component Value Date    WBC 4.96 01/27/2025    WBC 5.06 01/27/2025    HGB 13.9 (L) 01/27/2025    HGB 13.8 (L) 01/27/2025    HCT 40.7 01/27/2025    HCT 41.6 01/27/2025     01/27/2025     01/27/2025     Lab Results   Component Value Date    LABPT 12.9 11/13/2024    LABPT 12.4 02/06/2024    INR 1.0 11/13/2024    INR 0.9 02/06/2024    APTT 27.3 11/13/2024    APTT 26.4 02/06/2024     CHEMISTRY - LAST 2  Lab Results   Component Value Date     01/27/2025     01/27/2025    K 4.1 01/27/2025    K 4.6 01/27/2025    CO2 26 01/27/2025    CO2 26 01/27/2025    BUN 17 01/27/2025    BUN 13 01/27/2025    CREATININE 0.79 01/27/2025    CREATININE 0.9 01/27/2025     (H) 01/27/2025     (H) 01/27/2025    CALCIUM 9.5 01/27/2025    CALCIUM 9.4 01/27/2025    MG 1.9 12/16/2024    MG 2.1 11/13/2024    ALBUMIN 4.5 01/27/2025    ALBUMIN 4.6 12/16/2024    ALT 35 01/27/2025    ALT 36 12/16/2024    AST 29 01/27/2025    AST 46 (H) 12/16/2024      ENDOCRINE - LAST 2  Lab Results   Component  Value Date    HGBA1C 7.5 (H) 01/07/2025    HGBA1C 7.2 (H) 07/06/2024    TSH 1.607 01/27/2025    TSH 1.346 07/06/2024        PHYSICAL EXAM  CONSTITUTIONAL: Well built, well nourished in no apparent distress  NECK: no carotid bruit, no JVD  LUNGS: CTA  CHEST WALL: no tenderness  HEART: regular rate and rhythm, S1, S2 normal, no murmur, click, rub or gallop   ABDOMEN: soft, non-tender; bowel sounds normal; no masses,  no organomegaly  EXTREMITIES: Extremities normal, no edema, no calf tenderness noted  NEURO: AAO X 3    I HAVE REVIEWED :    The vital signs, most recent cardiac testing, and most recent pertinent non-cardiology provider notes.    Current Outpatient Medications   Medication Instructions    acetaminophen (TYLENOL) 1,000 mg, Every 6 hours PRN    amLODIPine (NORVASC) 5 mg, Oral, Daily    ascorbic acid (VITAMIN C ORAL) 2 tablets, Daily    baclofen (LIORESAL) 10 mg, Oral, 3 times daily    cetirizine (ZYRTEC) 10 mg, Oral, Daily    cholecalciferol (vitamin D3) 10,000 Units, Daily    famotidine (PEPCID) 40 mg, Oral, Nightly    fluticasone propionate (FLONASE) 100 mcg, Each Nostril, Daily    Lactobacillus acidophilus (ACIDOPHILUS ORAL) 3 tablets, Daily    losartan (COZAAR) 100 mg, Oral, Daily    magnesium oxide (MAG-OX) 400 mg, Daily    metoprolol succinate (TOPROL-XL) 50 mg, Oral, Daily    MOUNJARO 5 mg, Subcutaneous, Every 7 days    NIACIN ORAL 1 tablet, Daily    omeprazole (PRILOSEC) 40 mg, Oral, Daily    pantoprazole (PROTONIX) 40 MG tablet Take by mouth.    zinc gluconate 50 mg, Daily        Assessment & Plan   1. Essential (primary) hypertension (Primary)  BP well controlled   Will reduce losartan to 50 mg daily to see if this helps his symptoms   Continue amlodipine 5 mg daily   Continue toprol 50 mg daily     2. Symptomatic PVCs  Monitor pending     3. ASCVD (arteriosclerotic cardiovascular disease)  Normal coronary arteries by [facility] 2023   Given symptoms will do exercise stress EKG     4. PVD (peripheral  vascular disease)  ?Stable     5. Dyslipidemia with elevated low density lipoprotein (LDL) cholesterol and abnormally low high density lipoprotein cholesterol  Continue  weight loss     6. Nonrheumatic mitral valve regurgitation  Not seen on most recent echo     7. Elevated left ventricular end-diastolic pressure (LVEDP)  During Cath    Normal diastolic and systolic function on most recent echo      Question if all of these symptoms are from strict dieting, weight loss, and GLP1-i. If holter, exercise stress EKG, and cutting back on anti-HTN does not solve his symptoms, skip one week of injection and monitor response.     6 mo Dr. Martin Cleveland PA-C   [facility]   Office: [phone]         [1]   Social History  Tobacco Use    Smoking status: Former     Current packs/day: 0.00     Types: Cigarettes     Quit date: 1990     Years since quittin.2    Smokeless tobacco: Never    Tobacco comments:     Patient stopped smoking years ago, back in the 90s   Substance Use Topics    Alcohol use: No     Comment: stopped in     Drug use: No

## 2025-03-10 ENCOUNTER — NURSE TRIAGE (OUTPATIENT)
Dept: ADMINISTRATIVE | Facility: CLINIC | Age: 60
End: 2025-03-10
Payer: COMMERCIAL

## 2025-03-10 ENCOUNTER — OFFICE VISIT (OUTPATIENT)
Dept: FAMILY MEDICINE | Facility: CLINIC | Age: 60
End: 2025-03-10
Payer: COMMERCIAL

## 2025-03-10 ENCOUNTER — PATIENT MESSAGE (OUTPATIENT)
Dept: FAMILY MEDICINE | Facility: CLINIC | Age: 60
End: 2025-03-10

## 2025-03-10 VITALS
DIASTOLIC BLOOD PRESSURE: 80 MMHG | OXYGEN SATURATION: 98 % | BODY MASS INDEX: 43.59 KG/M2 | HEART RATE: 70 BPM | SYSTOLIC BLOOD PRESSURE: 126 MMHG | WEIGHT: 303.81 LBS

## 2025-03-10 DIAGNOSIS — E11.42 TYPE 2 DIABETES MELLITUS WITH DIABETIC POLYNEUROPATHY, WITHOUT LONG-TERM CURRENT USE OF INSULIN: ICD-10-CM

## 2025-03-10 DIAGNOSIS — S09.90XD CLOSED HEAD INJURY, SUBSEQUENT ENCOUNTER: ICD-10-CM

## 2025-03-10 DIAGNOSIS — Z12.11 SCREEN FOR COLON CANCER: ICD-10-CM

## 2025-03-10 DIAGNOSIS — I25.10 ASCVD (ARTERIOSCLEROTIC CARDIOVASCULAR DISEASE): Chronic | ICD-10-CM

## 2025-03-10 DIAGNOSIS — S06.0X0D CONCUSSION WITHOUT LOSS OF CONSCIOUSNESS, SUBSEQUENT ENCOUNTER: Primary | ICD-10-CM

## 2025-03-10 PROCEDURE — 99999 PR PBB SHADOW E&M-EST. PATIENT-LVL III: CPT | Mod: PBBFAC,,, | Performed by: NURSE PRACTITIONER

## 2025-03-10 PROCEDURE — 99214 OFFICE O/P EST MOD 30 MIN: CPT | Mod: S$GLB,,, | Performed by: NURSE PRACTITIONER

## 2025-03-10 RX ORDER — ASPIRIN 81 MG/1
81 TABLET ORAL DAILY
Qty: 90 TABLET | Refills: 3 | Status: SHIPPED | OUTPATIENT
Start: 2025-03-10 | End: 2026-03-10

## 2025-03-10 RX ORDER — ROSUVASTATIN CALCIUM 10 MG/1
10 TABLET, COATED ORAL DAILY
Qty: 90 TABLET | Refills: 3 | Status: SHIPPED | OUTPATIENT
Start: 2025-03-10 | End: 2026-03-10

## 2025-03-10 NOTE — PROGRESS NOTES
Subjective     Patient ID: García Tang is a 59 y.o. male.    Chief Complaint: Concussion      HPI      Patient is known to me. PCP is Dr. Domingo.     59-year-old male with CKD stage II, GERD, hyperlipidemia, peripheral arterial disease, hypertension, obesity hypoventilation syndrome, nonalcoholic fatty liver disease presents for followup after ED visit on 3/6/25. He presented to ED with c/o head injury after hitting his head on a car door. He was having a left sided headache and feeling woozy. His head CT was negative for any acute changes. Still having a little bit of feeling off and as if he took sinus meds.       Review of Systems   Neurological:  Positive for headaches.          Objective   Vitals:    03/10/25 1420   BP: 126/80   BP Location: Right arm   Patient Position: Sitting   Pulse: 70   SpO2: 98%   Weight: (!) 137.8 kg (303 lb 12.7 oz)      Physical Exam  Constitutional:       General: He is not in acute distress.     Appearance: Normal appearance. He is obese. He is not ill-appearing, toxic-appearing or diaphoretic.   HENT:      Head: Normocephalic and atraumatic.      Comments: No contusion  Cardiovascular:      Heart sounds: Normal heart sounds. No murmur heard.     No friction rub. No gallop.   Pulmonary:      Effort: No respiratory distress.      Breath sounds: Normal breath sounds. No stridor. No wheezing, rhonchi or rales.   Chest:      Chest wall: No tenderness.   Musculoskeletal:      Right lower leg: No edema.      Left lower leg: No edema.   Neurological:      General: No focal deficit present.      Mental Status: He is alert and oriented to person, place, and time. Mental status is at baseline.      Cranial Nerves: No cranial nerve deficit.   Psychiatric:         Mood and Affect: Mood normal.         Behavior: Behavior normal.         Thought Content: Thought content normal.         Judgment: Judgment normal.         1. Concussion without loss of consciousness, subsequent  encounter  -Discussed brain rest,   Concussion precautions advised.    2. Closed head injury, subsequent encounter    3. ASCVD (arteriosclerotic cardiovascular disease)  - aspirin (ECOTRIN) 81 MG EC tablet; Take 1 tablet (81 mg total) by mouth once daily.  Dispense: 90 tablet; Refill: 3  - rosuvastatin (CRESTOR) 10 MG tablet; Take 1 tablet (10 mg total) by mouth once daily.  Dispense: 90 tablet; Refill: 3    4. Screen for colon cancer  - Case Request Endoscopy: COLONOSCOPY    5. Type 2 diabetes mellitus with diabetic polyneuropathy, without long-term current use of insulin   -controlled with Mounjaro, tolerating well    RTC/ER precautions given. F/U as scheduled or sooner if concussive symptoms persist beyond 2 weeks.    Counseled on regular exercise, maintenance of a healthy weight, balanced diet rich in fruits/vegetables and lean protein, and avoidance of unhealthy habits like smoking and excessive alcohol intake.    LAWRENCE [facility]

## 2025-03-10 NOTE — TELEPHONE ENCOUNTER
Pt reports he was dx with a concussion on 3/6/25. Pt reports persisting HA, pressure to left ear, neck pain/stiffness, right ear pain, intermittent dizziness. Pt denies AMS. Current pain of 5/10. Care advice to be seen within 3 days in office. Pt verbalizes understanding and reports he has a PCP appointment scheduled for today.   Reason for Disposition   Concussion symptoms staying SAME (not worse or better) and present > 3 days    Additional Information   Negative: Weakness (i.e., paralysis, loss of muscle strength) of the face, arm or leg on one side of the body   Negative: Loss of speech or garbled speech   Negative: Difficult to awaken or acting confused (e.g., disoriented, slurred speech)   Negative: Sounds like a life-threatening emergency to the triager   Negative: Concussion symptoms are getting WORSE   Negative: Knocked out (unconscious) > 1 minute and no head CT Scan or MRI has been performed   Negative: Vomiting once or more and no head CT Scan or MRI has been performed   Negative: Unsteady on feet (e.g., unable to stand or requires support to walk) and no head CT Scan or MRI has been performed   Negative: Neck pain after dangerous injury (e.g., MVA, diving, trampoline, contact sports, fall > 10 feet or 3 meters) and no neck xray has been performed (e.g., c-spine xray or CT)   Negative: Patient sounds very sick or weak to the triager   Negative: SEVERE headache (e.g., excruciating, pain scale 8-10) and not improved after pain medications   Negative: Concussion symptoms SAME (not worse or better) AND taking Coumadin (warfarin) or other strong blood thinner   Negative: Concussion symptoms SAME (not worse or better) and known bleeding disorder (e.g., thrombocytopenia)   Negative: Concussion symptoms staying SAME (not worse or better) and age > 60 years   Negative: One or two 'black eyes' (bruising, purple color of eyelids), over 24 hours after head injury   Negative: Patient wants to be seen    Protocols  used: Concussion (mTBI) Less Than 14 Days Ago Follow-Up Call-[facility]

## 2025-03-11 ENCOUNTER — PATIENT MESSAGE (OUTPATIENT)
Dept: FAMILY MEDICINE | Facility: CLINIC | Age: 60
End: 2025-03-11
Payer: COMMERCIAL

## 2025-03-14 ENCOUNTER — HOSPITAL ENCOUNTER (OUTPATIENT)
Dept: CARDIOLOGY | Facility: HOSPITAL | Age: 60
Discharge: HOME | End: 2025-03-14
Payer: COMMERCIAL

## 2025-03-14 VITALS — BODY MASS INDEX: 43.38 KG/M2 | WEIGHT: 303 LBS | HEIGHT: 70 IN

## 2025-03-14 DIAGNOSIS — I49.3 SYMPTOMATIC PVCS: ICD-10-CM

## 2025-03-14 LAB
CV STRESS BASE HR: 92 BPM
DIASTOLIC BLOOD PRESSURE: 8 MMHG
OHS CV CPX 1 MINUTE RECOVERY HEART RATE: 141 BPM
OHS CV CPX 85 PERCENT MAX PREDICTED HEART RATE MALE: 138
OHS CV CPX ESTIMATED METS: 10
OHS CV CPX MAX PREDICTED HEART RATE: 162
OHS CV CPX PATIENT IS FEMALE: 0
OHS CV CPX PATIENT IS MALE: 1
OHS CV CPX PEAK DIASTOLIC BLOOD PRESSURE: 88 MMHG
OHS CV CPX PEAK HEAR RATE: 173 BPM
OHS CV CPX PEAK RATE PRESSURE PRODUCT: NORMAL
OHS CV CPX PEAK SYSTOLIC BLOOD PRESSURE: 157 MMHG
OHS CV CPX PERCENT MAX PREDICTED HEART RATE ACHIEVED: 107
OHS CV CPX RATE PRESSURE PRODUCT PRESENTING: NORMAL
STRESS ECHO POST EXERCISE DUR MIN: 5 MINUTES
STRESS ECHO POST EXERCISE DUR SEC: 33 SECONDS
SYSTOLIC BLOOD PRESSURE: 129 MMHG

## 2025-03-14 PROCEDURE — 93018 CV STRESS TEST I&R ONLY: CPT | Mod: ,,, | Performed by: INTERNAL MEDICINE

## 2025-03-14 PROCEDURE — 93016 CV STRESS TEST SUPVJ ONLY: CPT | Mod: ,,, | Performed by: INTERNAL MEDICINE

## 2025-03-14 PROCEDURE — 93017 CV STRESS TEST TRACING ONLY: CPT | Mod: PO

## 2025-03-17 ENCOUNTER — RESULTS FOLLOW-UP (OUTPATIENT)
Dept: CARDIOLOGY | Facility: CLINIC | Age: 60
End: 2025-03-17
Payer: COMMERCIAL

## 2025-03-17 ENCOUNTER — TELEPHONE (OUTPATIENT)
Dept: GASTROENTEROLOGY | Facility: CLINIC | Age: 60
End: 2025-03-17
Payer: COMMERCIAL

## 2025-03-18 ENCOUNTER — PATIENT MESSAGE (OUTPATIENT)
Dept: FAMILY MEDICINE | Facility: CLINIC | Age: 60
End: 2025-03-18
Payer: COMMERCIAL

## 2025-03-18 DIAGNOSIS — S06.0X0D CONCUSSION WITHOUT LOSS OF CONSCIOUSNESS, SUBSEQUENT ENCOUNTER: Primary | ICD-10-CM

## 2025-03-21 ENCOUNTER — TELEPHONE (OUTPATIENT)
Facility: CLINIC | Age: 60
End: 2025-03-21
Payer: COMMERCIAL

## 2025-03-21 NOTE — TELEPHONE ENCOUNTER
Called Pt to schedule an appt for their concussion. I was unable to speak with them but left a vm.      ----- Message from Michael Vazquez MD sent at 3/21/2025 10:26 AM CDT -----  Contact: PT  Regular slot with myself or Sharday please. Thanks!  ----- Message -----  From: Mini Laws MA  Sent: 3/21/2025  10:23 AM CDT  To: Michael Vazquez MD    Please advise. Thank you  ----- Message -----  From: Ishan Randolph  Sent: 3/21/2025  10:22 AM CDT  To: Verna Osborne; Taylor Rodriguez Staff    Dr. Lemus is no longer at [facility]. This encounter will be sent to in basket to Dr. Vazquez.  ----- Message -----  From: Verna Osborne  Sent: 3/21/2025  10:06 AM CDT  To: Allan Corbin Staff    Type:  Apoointment RequestName of Caller:PT When is the first available appointment?N/ASymptoms:S06.0X0D (ICD-10-CM) - Concussion without loss of consciousness, subsequent [id number] (ICD-10-CM) - Concussion without loss of consciousness, initial [id number] (ICD-10-CM) - Closed head injury, initial encounter Would the patient rather a call back or a response via [facility]? CALL Best Call Back Number:[phone] Information: THANK YOU

## 2025-03-24 ENCOUNTER — PATIENT MESSAGE (OUTPATIENT)
Dept: FAMILY MEDICINE | Facility: CLINIC | Age: 60
End: 2025-03-24
Payer: COMMERCIAL

## 2025-03-24 ENCOUNTER — TELEPHONE (OUTPATIENT)
Facility: CLINIC | Age: 60
End: 2025-03-24
Payer: COMMERCIAL

## 2025-03-24 DIAGNOSIS — E11.42 TYPE 2 DIABETES MELLITUS WITH DIABETIC POLYNEUROPATHY, WITHOUT LONG-TERM CURRENT USE OF INSULIN: ICD-10-CM

## 2025-03-24 NOTE — TELEPHONE ENCOUNTER
Spoke to Pt about scheduling an appt for his concussion. Pt stated he will call back when his wife is available since [place] drive [place].     ----- Message from Tatiana sent at 3/24/2025  7:56 AM CDT -----  Contact: pt  Type: Needs Medical Advice Who Called:Dinesh Call Back Number:[phone] Information: Requesting a call back regarding  pt returning office call and asking for a call back to be seen Please Advise- Thank you

## 2025-03-25 ENCOUNTER — TELEPHONE (OUTPATIENT)
Facility: CLINIC | Age: 60
End: 2025-03-25
Payer: COMMERCIAL

## 2025-03-25 RX ORDER — TIRZEPATIDE 5 MG/.5ML
5 INJECTION, SOLUTION SUBCUTANEOUS
Qty: 2 ML | Refills: 2 | Status: CANCELLED | OUTPATIENT
Start: 2025-03-25 | End: 2025-06-23

## 2025-03-25 NOTE — TELEPHONE ENCOUNTER
Called Pt but was unable to speak with him. LVM    ----- Message from Marybel sent at 3/25/2025  9:10 AM CDT -----  Regarding: Pt returned call for Mini regarding an appt  Contact: [phone]  Type:  Patient Returning Call  Who Called:XIOMARA MARINO [[id number]]  Who Left Message for Patient:Mini  Does the patient know what this is regarding?Pt returned call for Mini regarding an appt. Please advise   Best Call Back Number:Telephone Information:Mobile          [phone]  Additional Information:

## 2025-03-27 ENCOUNTER — PATIENT MESSAGE (OUTPATIENT)
Dept: PHYSICAL MEDICINE AND REHAB | Facility: CLINIC | Age: 60
End: 2025-03-27
Payer: COMMERCIAL

## 2025-03-27 ENCOUNTER — HOSPITAL ENCOUNTER (OUTPATIENT)
Dept: RADIOLOGY | Facility: HOSPITAL | Age: 60
Discharge: HOME | End: 2025-03-27
Attending: NURSE PRACTITIONER
Payer: COMMERCIAL

## 2025-03-27 ENCOUNTER — TELEPHONE (OUTPATIENT)
Dept: PHYSICAL MEDICINE AND REHAB | Facility: CLINIC | Age: 60
End: 2025-03-27
Payer: COMMERCIAL

## 2025-03-27 ENCOUNTER — RESULTS FOLLOW-UP (OUTPATIENT)
Dept: FAMILY MEDICINE | Facility: CLINIC | Age: 60
End: 2025-03-27

## 2025-03-27 ENCOUNTER — OFFICE VISIT (OUTPATIENT)
Dept: FAMILY MEDICINE | Facility: CLINIC | Age: 60
End: 2025-03-27
Payer: COMMERCIAL

## 2025-03-27 VITALS
HEART RATE: 79 BPM | HEIGHT: 70 IN | OXYGEN SATURATION: 99 % | SYSTOLIC BLOOD PRESSURE: 138 MMHG | BODY MASS INDEX: 42.55 KG/M2 | DIASTOLIC BLOOD PRESSURE: 90 MMHG | TEMPERATURE: 98 F | WEIGHT: 297.19 LBS

## 2025-03-27 DIAGNOSIS — M54.12 CERVICAL RADICULOPATHY: Primary | ICD-10-CM

## 2025-03-27 DIAGNOSIS — M54.12 CERVICAL RADICULOPATHY: ICD-10-CM

## 2025-03-27 PROCEDURE — 72050 X-RAY EXAM NECK SPINE 4/5VWS: CPT | Mod: TC,FY,PO

## 2025-03-27 PROCEDURE — 99213 OFFICE O/P EST LOW 20 MIN: CPT | Mod: S$GLB,,, | Performed by: NURSE PRACTITIONER

## 2025-03-27 PROCEDURE — 99999 PR PBB SHADOW E&M-EST. PATIENT-LVL IV: CPT | Mod: PBBFAC,,, | Performed by: NURSE PRACTITIONER

## 2025-03-27 PROCEDURE — 72050 X-RAY EXAM NECK SPINE 4/5VWS: CPT | Mod: 26,,, | Performed by: RADIOLOGY

## 2025-03-27 RX ORDER — METHYLPREDNISOLONE 4 MG/1
TABLET ORAL
Qty: 21 EACH | Refills: 0 | Status: SHIPPED | OUTPATIENT
Start: 2025-03-27 | End: 2025-04-17

## 2025-03-27 NOTE — PROGRESS NOTES
Subjective:       Patient ID: García Tang is a 59 y.o. male.    Chief Complaint: Neck Pain (Patient stated about on today about 7am (03/27/2025)he felt stiffness, pain, tingling, numbness, and limited mobility  ), Shoulder Pain (Patient stated spasms in his left shoulder ), and Dizziness    HPI  Started with tingling sensation to the back of the neck this morning. Has tightness around the neck. Noticed spasms and weakness to left arm this morning. Pt states symptoms feel similar to the way he felt prior to neck surgery 6 months. Currently followed by PMR. Afraid to have the MRI that they ordered. Had concussion, which was evaluated in the ER,  on 3/6/25 but did not have neck pain.    Tried baclofen and tylenol with little relief.  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    Diverticulitis     diverticulitis    Diverticulosis     Essential hypertension 07/18/2016    Fatty liver disease, nonalcoholic 04/07/2016    [place], 2006     GERD (gastroesophageal reflux disease)     Headache     HLD (hyperlipidemia)     Kidney stone     requiring stent    Meniere's disease 01/09/2018    Morbid obesity 05/29/2014    NAFLD (nonalcoholic fatty liver disease)     Near syncope 05/23/2017    Obesity hypoventilation syndrome 02/22/2016    PAD (peripheral artery disease) 09/17/2019    Mild 9/2018    Prostatitis     Rectal bleeding     diverticulitis    Sleep disorder breathing 2/12/2024    Thyroid nodule 07/14/2022       Past Surgical History:   Procedure Laterality Date    ANGIOGRAM, CORONARY, WITH LEFT HEART CATHETERIZATION  10/26/2023    Procedure: Left Heart Cath;  Surgeon: Sal Salazar MD;  Location: [facility] CATH;  Service: Cardiology;;    ANTERIOR CERVICAL DISCECTOMY W/ FUSION N/A 2/12/2024    Procedure: DISCECTOMY, SPINE, CERVICAL, ANTERIOR APPROACH, WITH FUSION C5-6;   Surgeon: Wali Zuniga MD;  Location: [facility] OR;  Service: Neurosurgery;  Laterality: N/A;    CARDIAC CATHETERIZATION      COLONOSCOPY  2008    diverticulosis per patient report    CORONARY ANGIOGRAPHY  10/26/2023    Procedure: Coronary angiogram study;  Surgeon: Sal Salazar MD;  Location: [facility] CATH;  Service: Cardiology;;    ESOPHAGOGASTRODUODENOSCOPY N/A 12/18/2023    Procedure: EGD (ESOPHAGOGASTRODUODENOSCOPY);  Surgeon: Aram Bermudez MD;  Location: [facility] ENDO;  Service: Gastroenterology;  Laterality: N/A;    kidney stent      KIDNEY SURGERY  1996    right kidney obstruction requiring stent-open    MAGNETIC RESONANCE IMAGING N/A 1/26/2024    Procedure: MRI (MAGNETIC RESONANCE IMAGING);  Surgeon: Provider, [facility];  Location: [facility];  Service: General;  Laterality: N/A;    NOSE SURGERY      UPPER GASTROINTESTINAL ENDOSCOPY  2021    [facility], gastritis per patient report       Review of patient's allergies indicates:   Allergen Reactions    Lisinopril Swelling    Iodinated contrast media Other (See Comments)     Spasms, tremor, and high blood pressure    Bactrim  [sulfamethoxazole-trimethoprim]      Other reaction(s): Unknown    Diflucan [fluconazole]      Heart racing    Gabapentin Other (See Comments)     nightmares    Hydroxyzine hcl      Other reaction(s): elevated bp    Kenalog [triamcinolone acetonide] Other (See Comments)     Heart flutters all day    Carvedilol Other (See Comments)     Burning nose and bad taste in mouth    Phenergan [promethazine] Anxiety    Zofran [ondansetron hcl (pf)] Anxiety       Social History[1]    Medications Ordered Prior to Encounter[2]    Family History   Problem Relation Name Age of Onset    Liver disease Mother Stpehanie         d.    Cirrhosis Mother Stephanie     Hypertension Mother Stephanie     Hyperlipidemia Mother Stephanie     Colon cancer Father John 70    Cancer Father John     Diabetes Father John     Hypertension Father John      "Glaucoma Father John     Hyperlipidemia Father John     Prostate cancer Father John     Diabetes Brother Hayder     Hypertension Brother Hayder     Thyroid cancer Brother Hayder     Heart attack Maternal Uncle      Heart attack Maternal Uncle      Heart disease Neg Hx      Heart failure Neg Hx      Macular degeneration Neg Hx      Retinal detachment Neg Hx      Crohn's disease Neg Hx      Ulcerative colitis Neg Hx      Stomach cancer Neg Hx      Esophageal cancer Neg Hx         Review of Systems   Musculoskeletal:  Positive for neck pain and neck stiffness.       Objective:      BP (!) 138/90 (Patient Position: Sitting)   Pulse 79   Temp 97.7 °F (36.5 °C) (Oral)   Ht 5' 10" (1.778 m)   Wt 134.8 kg (297 lb 2.9 oz)   SpO2 99%   BMI 42.64 kg/m²   Physical Exam  Vitals and nursing note reviewed.   Constitutional:       General: He is not in acute distress.     Appearance: Normal appearance. He is well-groomed.   HENT:      Head: Normocephalic.      Right Ear: Tympanic membrane, ear canal and external ear normal.      Left Ear: Tympanic membrane, ear canal and external ear normal.      Nose: Nose normal.      Mouth/Throat:      Lips: Pink.      Mouth: Mucous membranes are moist.      Pharynx: Oropharynx is clear.   Eyes:      Extraocular Movements: Extraocular movements intact.      Conjunctiva/sclera: Conjunctivae normal.      Pupils: Pupils are equal, round, and reactive to light.   Neck:      Comments: Limited cervical rotation and flexion due to pain  Cardiovascular:      Rate and Rhythm: Normal rate and regular rhythm.      Heart sounds: Normal heart sounds. No murmur heard.  Pulmonary:      Effort: Pulmonary effort is normal.      Breath sounds: Normal breath sounds.   Chest:      Chest wall: No tenderness.   Abdominal:      General: Bowel sounds are normal.      Palpations: Abdomen is soft.      Tenderness: There is no abdominal tenderness.   Musculoskeletal:         General: No swelling. Normal range of " motion.      Cervical back: Normal range of motion. Rigidity and tenderness (posterior neck and bilateral trapezius muscles) present.      Right lower leg: No edema.      Left lower leg: No edema.   Lymphadenopathy:      Cervical: No cervical adenopathy.   Skin:     General: Skin is warm and dry.   Neurological:      General: No focal deficit present.      Mental Status: He is alert and oriented to person, place, and time. Mental status is at baseline.      Gait: Gait is intact.   Psychiatric:         Mood and Affect: Mood normal.         Behavior: Behavior normal.         Assessment:       1. Cervical radiculopathy        Plan:       Cervical radiculopathy  -     methylPREDNISolone (MEDROL DOSEPACK) 4 mg tablet; use as directed  Dispense: 21 each; Refill: 0  -     X-Ray Cervical Spine Complete 5 view; Future; Expected date: 2025        Medrol dosepack. Continue baclofen as prescribed. Pt encouraged to follow up with PMR and have the MRI as ordered.         [1]   Social History  Socioeconomic History    Marital status:    Tobacco Use    Smoking status: Former     Current packs/day: 0.00     Types: Cigarettes     Quit date: 1990     Years since quittin.2    Smokeless tobacco: Never    Tobacco comments:     Patient stopped smoking years ago, back in the 90s   Substance and Sexual Activity    Alcohol use: No     Comment: stopped in     Drug use: No    Sexual activity: Yes     Partners: Female     Social Drivers of Health     Financial Resource Strain: Patient Declined (2024)    Overall Financial Resource Strain (CARDIA)     Difficulty of Paying Living Expenses: Patient declined   Food Insecurity: Unknown (2024)    Hunger Vital Sign     Ran Out of Food in the Last Year: Patient declined   Transportation Needs: Unknown (2024)    PRAPARE - Transportation     Lack of Transportation (Medical): No     Lack of Transportation (Non-Medical): Patient declined   Physical Activity: Unknown  (2/22/2024)    Exercise Vital Sign     Days of Exercise per Week: Patient declined   Recent Concern: Physical Activity - Inactive (1/24/2024)    Exercise Vital Sign     Days of Exercise per Week: 0 days     Minutes of Exercise per Session: 0 min   Stress: Patient Declined (2/22/2024)    [employer] - Occupational Stress Questionnaire     Feeling of Stress : Patient declined   Housing Stability: Patient Declined (2/22/2024)    Housing Stability Vital Sign     Unable to Pay for Housing in the Last Year: Patient declined     Number of Places Lived in the Last Year: 1     Unstable Housing in the Last Year: Patient declined   [2]   Current Outpatient Medications on File Prior to Visit   Medication Sig Dispense Refill    acetaminophen (TYLENOL) 500 MG tablet Take 1,000 mg by mouth every 6 (six) hours as needed for Pain.      amLODIPine (NORVASC) 5 MG tablet Take 1 tablet (5 mg total) by mouth once daily. 90 tablet 3    ascorbic acid (VITAMIN C ORAL) Take 2 tablets by mouth once daily.      aspirin (ECOTRIN) 81 MG EC tablet Take 1 tablet (81 mg total) by mouth once daily. 90 tablet 3    famotidine (PEPCID) 40 MG tablet Take 1 tablet (40 mg total) by mouth nightly. 30 tablet 11    Lactobacillus acidophilus (ACIDOPHILUS ORAL) Take 3 tablets by mouth once daily.      magnesium oxide (MAG-OX) 400 mg (241.3 mg magnesium) tablet Take 400 mg by mouth once daily.      metoprolol succinate (TOPROL-XL) 50 MG 24 hr tablet Take 1 tablet (50 mg total) by mouth once daily. 90 tablet 3    omeprazole (PRILOSEC) 40 MG capsule Take 1 capsule (40 mg total) by mouth once daily. 90 capsule 3    tirzepatide 7.5 mg/0.5 mL PnIj Inject 7.5 mg into the skin every 7 days. 4 Pen 3    baclofen (LIORESAL) 10 MG tablet Take 1 tablet (10 mg total) by mouth 3 (three) times daily. 90 tablet 11    cholecalciferol, vitamin D3, 125 mcg (5,000 unit) Tab Take 10,000 Units by mouth once daily.      fluticasone propionate (FLONASE) 50  mcg/actuation nasal spray 2 sprays (100 mcg total) by Each Nostril route once daily. (Patient not taking: Reported on 3/6/2025) 16 g 0    NIACIN ORAL Take 1 tablet by mouth once daily.      zinc gluconate 50 mg tablet Take 50 mg by mouth once daily.       No current facility-administered medications on file prior to visit.

## 2025-03-27 NOTE — PROGRESS NOTES
Head , normocephalic , atraumatic , Face , Face within normal limits , Ears , External ears within normal limits , Nose/Nasopharynx , External nose  normal appearance , Mouth and Throat , Oral cavity appearance normal Subjective:       Patient ID: García Tang is a 51 y.o. male.    Chief Complaint: Cystitis (2+ months , this is patient 3rd antibiotic ); Excessive Sweating (2+ days); Tachycardia (2+ days); Hot Flashes (2+ days); and Dizziness (2+ days)    Pt here states that he started feeling fatigued, tired Saturday sx worse on Sunday.  Attempted to go walking and after 1 lap he was SOB, tired and states that his heart rate was in the 160's.  Taking his Metoprolol as directed.  Pt states that he feels hot, diaphoretic.  BG last night 234 per pt.  Pt currently on Cipro for Prostatitis.          Excessive Sweating   Associated symptoms include diaphoresis and fatigue. Pertinent negatives include no congestion.   Dizziness:    Associated symptoms: diaphoresis and palpitations.    Past Medical History:   Diagnosis Date    CKD stage 2 due to type 2 diabetes mellitus 5/18/2017    Essential hypertension 7/18/2016    Fatty liver disease, nonalcoholic 4/7/2016    Us, 2006     HLD (hyperlipidemia)     Inflammatory bowel disease     diverticulitis    Kidney stone     requiring stent    Morbid obesity 5/29/2014    NAFLD (nonalcoholic fatty liver disease)     Near syncope 5/23/2017    Obesity hypoventilation syndrome 2/22/2016    Prostatitis     Rectal bleeding     diverticulitis    Type 2 diabetes mellitus with hyperglycemia, with long-term current use of insulin 5/18/2017    Uncontrolled type 2 diabetes mellitus with peripheral neuropathy 5/29/2014     Past Surgical History:   Procedure Laterality Date    COLONOSCOPY      kidney stent      KIDNEY SURGERY  1996    right kidney obstruction requiring stent    NOSE SURGERY       Social History     Social History Narrative    No narrative on file     Family History   Problem Relation Age of Onset    Cancer Father     Diabetes Father     Liver disease Mother      d.    Cirrhosis Mother      Vitals:    10/09/17 1508   BP: (!) 150/100   Pulse: 84   Temp: 98.1 °F (36.7  "°C)   SpO2: 96%   Weight: (!) 157.3 kg (346 lb 12.5 oz)   Height: 5' 10" (1.778 m)   PainSc: 0-No pain     Outpatient Encounter Prescriptions as of 10/9/2017   Medication Sig Dispense Refill    amlodipine (NORVASC) 5 MG tablet Take 1 tablet (5 mg total) by mouth once daily. 90 tablet 3    aspirin (ECOTRIN) 81 MG EC tablet Take 81 mg by mouth once daily.      ciprofloxacin HCl (CIPRO) 500 MG tablet Take 1 tablet (500 mg total) by mouth 2 (two) times daily. 60 tablet 0    clotrimazole (LOTRIMIN) 1 % cream Apply topically 2 (two) times daily. Apply bid to affected areas x 1 wk 30 g 0    DOCOSAHEXANOIC ACID/EPA (FISH OIL ORAL) Take 1 capsule by mouth once daily at 6am.       dulaglutide (TRULICITY) 0.75 mg/0.5 mL PnIj Inject 0.5 mLs (0.75 mg total) into the skin every 7 days. 4 Syringe 3    ERGOCALCIFEROL, VITAMIN D2, (VITAMIN D ORAL) Take 1 capsule by mouth once daily at 6am.       hydrochlorothiazide (MICROZIDE) 12.5 mg capsule 12.5 mg 2 (two) times daily.       insulin NPH-insulin regular, 70/30, (NOVOLIN 70/30) 100 unit/mL (70-30) injection Inject into the skin 2 (two) times daily. 27 in am and 24 in pm      metformin (GLUCOPHAGE-XR) 500 MG 24 hr tablet Take 1 tablet (500 mg total) by mouth 2 (two) times daily with meals. 60 tablet 3    metoprolol succinate (TOPROL XL) 50 MG 24 hr tablet Take 1 tablet (50 mg total) by mouth 2 (two) times daily. 60 tablet 6    multivitamin capsule Take by mouth. 1 Capsule Oral Every day      tamsulosin (FLOMAX) 0.4 mg Cp24 Take 1 capsule (0.4 mg total) by mouth once daily. 90 capsule 3    [DISCONTINUED] amlodipine (NORVASC) 5 MG tablet Take 1 tablet (5 mg total) by mouth once daily. 90 tablet 3    [DISCONTINUED] metoprolol succinate (TOPROL XL) 50 MG 24 hr tablet Take 1 tablet (50 mg total) by mouth 2 (two) times daily. 60 tablet 6     No facility-administered encounter medications on file as of 10/9/2017.      Wt Readings from Last 3 Encounters:   10/09/17 (!) 157.3 " "kg (346 lb 12.5 oz)   09/21/17 (!) 156 kg (343 lb 14.7 oz)   09/19/17 (!) 157.7 kg (347 lb 10.7 oz)     Last 3 sets of Vitals    Vitals - 1 value per visit 9/19/2017 9/21/2017 10/9/2017   SYSTOLIC 148 154 150   DIASTOLIC 98 88 100   PULSE 60 77 84   TEMPERATURE 97.9 - 98.1   RESPIRATIONS - - -   SPO2 97 - 96   Weight (lb) 347.67 343.92 346.78   Weight (kg) 157.7 156 157.3   HEIGHT 5' 10" - 5' 10"   BODY MASS INDEX 49.88 49.35 49.76   VISIT REPORT - - -   Pain Score  10 0 0   Some recent data might be hidden     No results found for: CBC  Review of Systems   Constitutional: Positive for activity change, diaphoresis and fatigue.   HENT: Negative for congestion.    Respiratory: Negative for chest tightness.    Cardiovascular: Positive for palpitations.        Tachycardic     Neurological: Positive for dizziness.   Hematological: Negative for adenopathy.       Objective:      Physical Exam   Constitutional: He is oriented to person, place, and time. He appears well-developed and well-nourished. He appears distressed.   Pt appears ill     HENT:   Head: Normocephalic and atraumatic.   Right Ear: External ear normal.   Left Ear: External ear normal.   Nose: Nose normal.   Mouth/Throat: Oropharynx is clear and moist. No oropharyngeal exudate.   Eyes: Conjunctivae are normal. Pupils are equal, round, and reactive to light.   Neck: Normal range of motion. No JVD present.   Cardiovascular: Normal rate.    No murmur heard.  Pulmonary/Chest: Effort normal. No stridor. He has decreased breath sounds in the right upper field, the right middle field, the right lower field, the left middle field and the left lower field. He has no wheezes.   Abdominal: Soft.   Neurological: He is alert and oriented to person, place, and time.   Skin: Skin is warm and dry. Capillary refill takes less than 2 seconds. No rash noted. He is not diaphoretic. No erythema. No pallor.   Psychiatric: He has a normal mood and affect. His behavior is normal. " Judgment and thought content normal.   Nursing note and vitals reviewed.      Assessment:       1. Fatigue, unspecified type    2. Diaphoresis    3. Medication refill    4. Tachycardia        Plan:       Spoke with pt recommended eval in ER for complaints.   EKG I cannot see any changes from prev  Pt needs Labs, cardiac enzymes , TSH and CXR.  Pt to follow up with PCP of choice for physical/ NOELLE  Pt verb understanding and intent to comply  García was seen today for cystitis, excessive sweating, tachycardia, hot flashes and dizziness.    Diagnoses and all orders for this visit:    Fatigue, unspecified type  -     POCT urine dipstick without microscope  -     EKG 12-lead  -     TSH; Future  -     Refer to Emergency Dept.    Diaphoresis  -     POCT urine dipstick without microscope  -     EKG 12-lead  -     TSH; Future  -     Refer to Emergency Dept.    Medication refill  -     amlodipine (NORVASC) 5 MG tablet; Take 1 tablet (5 mg total) by mouth once daily.  -     metoprolol succinate (TOPROL XL) 50 MG 24 hr tablet; Take 1 tablet (50 mg total) by mouth 2 (two) times daily.    Tachycardia  -     Refer to Emergency Dept.      Patient Instructions   To ER now for further evaluation- labs, chest xray

## 2025-03-27 NOTE — TELEPHONE ENCOUNTER
Went to call pt but saw that he had a same day appt with Sommer Lopez NP for the same issues that he wanted to see Dr. Peres for. No further assistance is needed at this time.     ----- Message from Britany sent at 3/27/2025  8:30 AM CDT -----  Type: Patient callWho called: Patient Does the patient know what this is regarding? Requesting a call back in regards to having some intense pain in neck , limited range in motion ; in severe pain now ; pain level is 9 ; wants to know can you help with concerns or should he reach out to surgeon; please advise Would the patient rather a call back or response via [facility]? [facility]: [phone] information:

## 2025-04-01 ENCOUNTER — TELEPHONE (OUTPATIENT)
Facility: CLINIC | Age: 60
End: 2025-04-01
Payer: COMMERCIAL

## 2025-04-01 NOTE — TELEPHONE ENCOUNTER
Spoke to Pt about scheduling an appt for his concussion. Pt was informed that he would have to come in person since virtual is not an option since provider will be doing a physical. Pt is scheduled for May 6. Pt was advised to arrive 30 min early and informed about the 15 min liu period.     ----- Message from Mirian sent at 4/1/2025 12:39 PM CDT -----  Regarding: Appt  Contact: [phone]  Pt called in to schedule an appt; no available appts in Epic. Pt is asking for a call back soon to schedule. Thanks. Reason appt not schedule: no appt avail ( VV )  pt asked for a VV due to living on [facility] Patient's DX:  S06.0X0D (ICD-10-CM) - Concussion without loss of consciousness, subsequent encounter Patient requesting call back or [facility] msg:  Call back

## 2025-04-02 ENCOUNTER — OFFICE VISIT (OUTPATIENT)
Dept: FAMILY MEDICINE | Facility: CLINIC | Age: 60
End: 2025-04-02
Payer: COMMERCIAL

## 2025-04-02 ENCOUNTER — PATIENT MESSAGE (OUTPATIENT)
Dept: NEUROSURGERY | Facility: CLINIC | Age: 60
End: 2025-04-02
Payer: COMMERCIAL

## 2025-04-02 VITALS
WEIGHT: 292 LBS | HEART RATE: 82 BPM | BODY MASS INDEX: 41.8 KG/M2 | HEIGHT: 70 IN | SYSTOLIC BLOOD PRESSURE: 138 MMHG | OXYGEN SATURATION: 99 % | DIASTOLIC BLOOD PRESSURE: 70 MMHG

## 2025-04-02 DIAGNOSIS — R42 LIGHTHEADED: ICD-10-CM

## 2025-04-02 DIAGNOSIS — R29.818 TRANSIENT NEUROLOGICAL SYMPTOMS: Primary | ICD-10-CM

## 2025-04-02 DIAGNOSIS — R41.3 OTHER AMNESIA: ICD-10-CM

## 2025-04-02 DIAGNOSIS — G89.4 CHRONIC PAIN SYNDROME: ICD-10-CM

## 2025-04-02 DIAGNOSIS — F40.240 CLAUSTROPHOBIA: ICD-10-CM

## 2025-04-02 DIAGNOSIS — R41.3 MEMORY IMPAIRMENT: ICD-10-CM

## 2025-04-02 PROCEDURE — 99999 PR PBB SHADOW E&M-EST. PATIENT-LVL V: CPT | Mod: PBBFAC,,, | Performed by: NURSE PRACTITIONER

## 2025-04-02 PROCEDURE — 99214 OFFICE O/P EST MOD 30 MIN: CPT | Mod: S$GLB,,, | Performed by: NURSE PRACTITIONER

## 2025-04-02 NOTE — TELEPHONE ENCOUNTER
Aria, is there anything else you can do for him or does he need to proceed with images and schedule a follow up?

## 2025-04-02 NOTE — PROGRESS NOTES
"Subjective     Patient ID: García Tang is a 59 y.o. male.    Chief Complaint: Dizziness      HPI      Patient is known to me. PCP is Dr. Domingo.     59-year-old male with CKD stage II, GERD, hyperlipidemia, peripheral arterial disease, hypertension, obesity hypoventilation syndrome, nonalcoholic fatty liver disease, cervical spondylosis with myelopathy presents to clinic for followup. He saw Ms. Lopez on 3/27 for neck pain, stiffness, numbness and tingling and dizziness- he was given a medrol dose hari which cleared all his symptoms but finished the steroids yesterday and now symptoms have returned. He gets woozy feeling when he turns his head to the left, he reports this started after he hit his head on 3/6 so unsure if this is concussive symptoms. He reports lately having difficulty with mentation. He is forgetting things, today in the middle of working he had to stop and think about what he was doing. He lost track of what was going on. He has difficulty with his words at times. His symptoms come and go, they do not persist.     Review of Systems   Neurological:  Positive for dizziness.          Objective   Vitals:    04/02/25 1338   BP: 138/70   Pulse: 82   SpO2: 99%   Weight: 132.5 kg (292 lb)   Height: 5' 10" (1.778 m)      Physical Exam  Constitutional:       General: He is not in acute distress.     Appearance: Normal appearance. He is obese. He is not ill-appearing, toxic-appearing or diaphoretic.   HENT:      Head: Normocephalic and atraumatic.      Right Ear: Tympanic membrane normal.      Left Ear: There is impacted cerumen.      Nose: No congestion.      Mouth/Throat:      Mouth: Mucous membranes are moist.      Pharynx: Oropharynx is clear.   Eyes:      Extraocular Movements: Extraocular movements intact.      Pupils: Pupils are equal, round, and reactive to light.   Cardiovascular:      Rate and Rhythm: Normal rate and regular rhythm.      Heart sounds: Normal heart sounds. No murmur heard.     " No friction rub. No gallop.   Pulmonary:      Effort: No respiratory distress.      Breath sounds: Normal breath sounds. No stridor. No wheezing, rhonchi or rales.   Chest:      Chest wall: No tenderness.   Musculoskeletal:      Right lower leg: No edema.      Left lower leg: No edema.   Skin:     General: Skin is warm and dry.   Neurological:      General: No focal deficit present.      Mental Status: He is alert and oriented to person, place, and time. Mental status is at baseline.      Cranial Nerves: No cranial nerve deficit.      Motor: Weakness (left upper arm slightly weaker than the right) present.   Psychiatric:         Mood and Affect: Mood normal.         Behavior: Behavior normal.         Thought Content: Thought content normal.         Judgment: Judgment normal.         1. Transient neurological symptoms  - Ambulatory referral/consult to Neurology; Future    2. Memory impairment    3. Chronic pain syndrome    4. Other amnesia  - MRI Brain Without Contrast; Future    5. Claustrophobia    6. Lightheaded     Discussed with pt that his symptoms could be associated with his neck issues. MRI was ordered by dr. Zuniga and he has not scheduled it yet. This will need to be done under anesthesia as pt is severely claustrophobic. Will attempt to get cspine and head MRI done at the same time.    RTC/ER precautions given. F/U after MRI completed.    Counseled on regular exercise, maintenance of a healthy weight, balanced diet rich in fruits/vegetables and lean protein, and avoidance of unhealthy habits like smoking and excessive alcohol intake.    KUSUM Oh

## 2025-04-04 ENCOUNTER — TELEPHONE (OUTPATIENT)
Dept: FAMILY MEDICINE | Facility: CLINIC | Age: 60
End: 2025-04-04

## 2025-04-04 ENCOUNTER — TELEPHONE (OUTPATIENT)
Dept: NEUROSURGERY | Facility: CLINIC | Age: 60
End: 2025-04-04
Payer: COMMERCIAL

## 2025-04-04 NOTE — TELEPHONE ENCOUNTER
Spoke to [facility] MRI department, they no longer do the sedation for MRI. The only way they would do it is if the ordering provider is a staff member of [facility]. Please advise.

## 2025-04-04 NOTE — TELEPHONE ENCOUNTER
----- Message from Earnest sent at 4/4/2025  8:04 AM CDT -----  Type:  Patient Returning CallWho Called:  ptWho Left Message for Patient:  unknownDoes the patient know what this is regarding?:  YesBest Call Back Number:  [phone] Information:  Please leave a msg or msg in the Portal. Please call back to advise Thanks!

## 2025-04-10 ENCOUNTER — TELEPHONE (OUTPATIENT)
Dept: NEUROSURGERY | Facility: CLINIC | Age: 60
End: 2025-04-10
Payer: COMMERCIAL

## 2025-04-10 ENCOUNTER — OFFICE VISIT (OUTPATIENT)
Dept: FAMILY MEDICINE | Facility: CLINIC | Age: 60
End: 2025-04-10
Payer: COMMERCIAL

## 2025-04-10 VITALS
HEIGHT: 70 IN | WEIGHT: 291 LBS | SYSTOLIC BLOOD PRESSURE: 126 MMHG | TEMPERATURE: 98 F | OXYGEN SATURATION: 98 % | DIASTOLIC BLOOD PRESSURE: 92 MMHG | HEART RATE: 77 BPM | BODY MASS INDEX: 41.66 KG/M2

## 2025-04-10 DIAGNOSIS — R29.898 WEAKNESS OF BOTH LOWER EXTREMITIES: ICD-10-CM

## 2025-04-10 DIAGNOSIS — S06.0X0D CONCUSSION WITHOUT LOSS OF CONSCIOUSNESS, SUBSEQUENT ENCOUNTER: Primary | ICD-10-CM

## 2025-04-10 DIAGNOSIS — M54.2 CERVICALGIA: Primary | ICD-10-CM

## 2025-04-10 DIAGNOSIS — R41.3 MEMORY LOSS: Primary | ICD-10-CM

## 2025-04-10 DIAGNOSIS — M54.12 CERVICAL RADICULOPATHY: ICD-10-CM

## 2025-04-10 DIAGNOSIS — M54.16 LUMBAR RADICULOPATHY, CHRONIC: ICD-10-CM

## 2025-04-10 DIAGNOSIS — Z98.1 S/P CERVICAL SPINAL FUSION: ICD-10-CM

## 2025-04-10 DIAGNOSIS — S06.0XAD CONCUSSION WITH UNKNOWN LOSS OF CONSCIOUSNESS STATUS, SUBSEQUENT ENCOUNTER: ICD-10-CM

## 2025-04-10 DIAGNOSIS — M47.12 CERVICAL SPONDYLOSIS WITH MYELOPATHY: ICD-10-CM

## 2025-04-10 DIAGNOSIS — R41.3 MEMORY IMPAIRMENT: ICD-10-CM

## 2025-04-10 DIAGNOSIS — R41.89 COGNITIVE CHANGES: ICD-10-CM

## 2025-04-10 PROCEDURE — 99214 OFFICE O/P EST MOD 30 MIN: CPT | Mod: S$GLB,,, | Performed by: NURSE PRACTITIONER

## 2025-04-10 PROCEDURE — 99999 PR PBB SHADOW E&M-EST. PATIENT-LVL IV: CPT | Mod: PBBFAC,,, | Performed by: NURSE PRACTITIONER

## 2025-04-10 RX ORDER — BACLOFEN 20 MG/1
20 TABLET ORAL 3 TIMES DAILY PRN
Qty: 90 TABLET | Refills: 0 | Status: SHIPPED | OUTPATIENT
Start: 2025-04-10 | End: 2026-04-10

## 2025-04-10 NOTE — TELEPHONE ENCOUNTER
Reviewd call from patient.  Primary care also reached out about scheduling a brain MRI.  I previously ordered an MRI of the cervical and lumbar spine.  He has not had it yet because he would like heavier sedation than valium.      New orders placed for MRI cervical and lumbar spine with sedation.  Sommer Rivera with primary care stated her nurse was working on scheduleing all three MRIs with sedation at the same time.

## 2025-04-10 NOTE — TELEPHONE ENCOUNTER
----- Message from Earnest sent at 4/8/2025  1:47 PM CDT -----  Type: Needs Medical AdviceWho Called:  Dinesh Call Back Number: [phone] Information: pt is calling the office to speak with the nurse in regards to getting another MRI scheduled by the office, as he needs sedation. He is asking for a msg to be left via VM or in his Portal if he cannot be reached. Please call back to advise. Thanks!

## 2025-04-10 NOTE — PROGRESS NOTES
Subjective:       Patient ID: García Tang is a 59 y.o. male.    Chief Complaint: Follow-up (Concussion ), Dizziness, Back Pain, Migraine, and Neck Pain    HPI follow up for post concussion symptoms and chronic neck and back pain. Saw neurosurgery in 12/2024 and MRI C and L spine was ordered; pt did not have done due to claustrophobia and needing IV sedation.  Still having dizziness, balance issues and neck pain is excruciating. Would like to increase baclofen. Had concussion on 3/6/25 and symptoms have been worse since.  Pt called pain management to discuss having MRI Cspine and L spine with sedation and was told to see his PCP. Pt reports he gets temporary relief with steroid but otherwise is unable to work or function due to pain.  Also having trouble sleeping since concussion.  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    Diverticulitis     diverticulitis    Diverticulosis     Essential hypertension 07/18/2016    Fatty liver disease, nonalcoholic 04/07/2016    [place], 2006     GERD (gastroesophageal reflux disease)     Headache     HLD (hyperlipidemia)     Kidney stone     requiring stent    Meniere's disease 01/09/2018    Morbid obesity 05/29/2014    NAFLD (nonalcoholic fatty liver disease)     Near syncope 05/23/2017    Obesity hypoventilation syndrome 02/22/2016    PAD (peripheral artery disease) 09/17/2019    Mild 9/2018    Prostatitis     Rectal bleeding     diverticulitis    Sleep disorder breathing 2/12/2024    Thyroid nodule 07/14/2022       Past Surgical History:   Procedure Laterality Date    ANGIOGRAM, CORONARY, WITH LEFT HEART CATHETERIZATION  10/26/2023    Procedure: Left Heart Cath;  Surgeon: Sal Salazar MD;  Location: [facility] CATH;  Service: Cardiology;;    ANTERIOR CERVICAL DISCECTOMY W/ FUSION N/A 2/12/2024    Procedure: DISCECTOMY, SPINE,  CERVICAL, ANTERIOR APPROACH, WITH FUSION C5-6;  Surgeon: Wali Zuniga MD;  Location: [facility] OR;  Service: Neurosurgery;  Laterality: N/A;    CARDIAC CATHETERIZATION      COLONOSCOPY  2008    diverticulosis per patient report    CORONARY ANGIOGRAPHY  10/26/2023    Procedure: Coronary angiogram study;  Surgeon: Sal Salazar MD;  Location: [facility] CATH;  Service: Cardiology;;    ESOPHAGOGASTRODUODENOSCOPY N/A 12/18/2023    Procedure: EGD (ESOPHAGOGASTRODUODENOSCOPY);  Surgeon: Aram Bermudez MD;  Location: [facility] ENDO;  Service: Gastroenterology;  Laterality: N/A;    kidney stent      KIDNEY SURGERY  1996    right kidney obstruction requiring stent-open    MAGNETIC RESONANCE IMAGING N/A 1/26/2024    Procedure: MRI (MAGNETIC RESONANCE IMAGING);  Surgeon: Su Galvan Diagnostic;  Location: [facility];  Service: General;  Laterality: N/A;    NOSE SURGERY      UPPER GASTROINTESTINAL ENDOSCOPY  2021    [facility], gastritis per patient report       Review of patient's allergies indicates:   Allergen Reactions    Lisinopril Swelling    Iodinated contrast media Other (See Comments)     Spasms, tremor, and high blood pressure    Bactrim  [sulfamethoxazole-trimethoprim]      Other reaction(s): Unknown    Diflucan [fluconazole]      Heart racing    Gabapentin Other (See Comments)     nightmares    Hydroxyzine hcl      Other reaction(s): elevated bp    Kenalog [triamcinolone acetonide] Other (See Comments)     Heart flutters all day    Carvedilol Other (See Comments)     Burning nose and bad taste in mouth    Phenergan [promethazine] Anxiety    Zofran [ondansetron hcl (pf)] Anxiety       Social History[1]    Medications Ordered Prior to Encounter[2]    Family History   Problem Relation Name Age of Onset    Liver disease Mother Stephanie         d.    Cirrhosis Mother Stephanie     Hypertension Mother Stephanie     Hyperlipidemia Mother Stephanie     Colon cancer Father [place] 70    Cancer Father [place]     Diabetes  "Father John     Hypertension Father John     Glaucoma Father John     Hyperlipidemia Father [place]     Prostate cancer Father John     Diabetes Brother Hayder     Hypertension Brother Hayder     Thyroid cancer Brother Hayder     Heart attack Maternal Uncle      Heart attack Maternal Uncle      Heart disease Neg Hx      Heart failure Neg Hx      Macular degeneration Neg Hx      Retinal detachment Neg Hx      Crohn's disease Neg Hx      Ulcerative colitis Neg Hx      Stomach cancer Neg Hx      Esophageal cancer Neg Hx         Review of Systems   Musculoskeletal:  Positive for back pain, neck pain and neck stiffness.   Neurological:  Positive for dizziness and headaches.       Objective:      BP (!) 126/92 (Patient Position: Sitting)   Pulse 77   Temp 97.8 °F (36.6 °C) (Oral)   Ht 5' 10" (1.778 m)   Wt 132 kg (291 lb 0.1 oz)   SpO2 98%   BMI 41.76 kg/m²   Physical Exam  Vitals and nursing note reviewed.   Constitutional:       General: He is not in acute distress.     Appearance: Normal appearance. He is well-groomed.   HENT:      Head: Normocephalic.      Right Ear: Tympanic membrane, ear canal and external ear normal.      Left Ear: Tympanic membrane, ear canal and external ear normal.      Nose: Nose normal.      Mouth/Throat:      Lips: Pink.      Mouth: Mucous membranes are moist.      Pharynx: Oropharynx is clear.   Eyes:      Extraocular Movements: Extraocular movements intact.      Conjunctiva/sclera: Conjunctivae normal.      Pupils: Pupils are equal, round, and reactive to light.   Neck:      Comments: Limited cervical flexion, extension and rotation  Cardiovascular:      Rate and Rhythm: Normal rate and regular rhythm.      Heart sounds: Normal heart sounds.   Pulmonary:      Effort: Pulmonary effort is normal.      Breath sounds: Normal breath sounds.   Chest:      Chest wall: No tenderness.   Abdominal:      General: Bowel sounds are normal.      Palpations: Abdomen is soft.      Tenderness: " There is no abdominal tenderness.   Musculoskeletal:         General: No swelling. Normal range of motion.      Cervical back: Normal range of motion. Tenderness present.      Right lower leg: No edema.      Left lower leg: No edema.   Skin:     General: Skin is warm and dry.   Neurological:      General: No focal deficit present.      Mental Status: He is alert and oriented to person, place, and time. Mental status is at baseline.      Cranial Nerves: No facial asymmetry.      Sensory: Sensation is intact.      Motor: No weakness or tremor.      Coordination: Coordination normal. Finger-Nose-Finger Test normal.      Gait: Gait is intact.   Psychiatric:         Mood and Affect: Mood normal.         Behavior: Behavior normal.         Assessment:       1. Concussion without loss of consciousness, subsequent encounter    2. Cervical radiculopathy    3. Memory impairment    4. Cervical spondylosis with myelopathy    5. Cognitive changes        Plan:       Concussion without loss of consciousness, subsequent encounter    Cervical radiculopathy  -     baclofen (LIORESAL) 20 MG tablet; Take 1 tablet (20 mg total) by mouth 3 (three) times daily as needed (muscle tightness).  Dispense: 90 tablet; Refill: 0    Memory impairment    Cervical spondylosis with myelopathy    Cognitive changes        Spoke with Lise Melgar, neurosurgery and MRI of C and L spine will be reordered with IV sedation.    Will attempt to coordinate scheduling of MRI Brain, C spine and L spine with sedation.    Baclofen increased per pt request. Will also consider adding amitriptyline if no improvement.         [1]   Social History  Socioeconomic History    Marital status:    Tobacco Use    Smoking status: Former     Current packs/day: 0.00     Types: Cigarettes     Quit date: 1990     Years since quittin.2    Smokeless tobacco: Never    Tobacco comments:     Patient stopped smoking years ago, back in the 90s   Substance and Sexual  Activity    Alcohol use: No     Comment: stopped in 1992    Drug use: No    Sexual activity: Yes     Partners: Female     Social Drivers of Health     Financial Resource Strain: Patient Declined (2/22/2024)    Overall Financial Resource Strain (CARDIA)     Difficulty of Paying Living Expenses: Patient declined   Food Insecurity: Unknown (2/22/2024)    Hunger Vital Sign     Ran Out of Food in the Last Year: Patient declined   Transportation Needs: Unknown (2/22/2024)    PRAPARE - Transportation     Lack of Transportation (Medical): No     Lack of Transportation (Non-Medical): Patient declined   Physical Activity: Unknown (2/22/2024)    Exercise Vital Sign     Days of Exercise per Week: Patient declined   Recent Concern: Physical Activity - Inactive (1/24/2024)    Exercise Vital Sign     Days of Exercise per Week: 0 days     Minutes of Exercise per Session: 0 min   Stress: Patient Declined (2/22/2024)    [employer] [place] [employer] - Occupational Stress Questionnaire     Feeling of Stress : Patient declined   Housing Stability: Patient Declined (2/22/2024)    Housing Stability Vital Sign     Unable to Pay for Housing in the Last Year: Patient declined     Number of Places Lived in the Last Year: 1     Unstable Housing in the Last Year: Patient declined   [2]   Current Outpatient Medications on File Prior to Visit   Medication Sig Dispense Refill    acetaminophen (TYLENOL) 500 MG tablet Take 1,000 mg by mouth every 6 (six) hours as needed for Pain.      amLODIPine (NORVASC) 5 MG tablet Take 1 tablet (5 mg total) by mouth once daily. 90 tablet 3    ascorbic acid (VITAMIN C ORAL) Take 2 tablets by mouth once daily.      aspirin (ECOTRIN) 81 MG EC tablet Take 1 tablet (81 mg total) by mouth once daily. 90 tablet 3    cholecalciferol, vitamin D3, 125 mcg (5,000 unit) Tab Take 10,000 Units by mouth once daily.      famotidine (PEPCID) 40 MG tablet Take 1 tablet (40 mg total) by mouth nightly. 30 tablet 11     fluticasone propionate (FLONASE) 50 mcg/actuation nasal spray 2 sprays (100 mcg total) by Each Nostril route once daily. 16 g 0    Lactobacillus acidophilus (ACIDOPHILUS ORAL) Take 3 tablets by mouth once daily.      magnesium oxide (MAG-OX) 400 mg (241.3 mg magnesium) tablet Take 400 mg by mouth once daily.      metoprolol succinate (TOPROL-XL) 50 MG 24 hr tablet Take 1 tablet (50 mg total) by mouth once daily. 90 tablet 3    NIACIN ORAL Take 1 tablet by mouth once daily.      omeprazole (PRILOSEC) 40 MG capsule Take 1 capsule (40 mg total) by mouth once daily. 90 capsule 3    tirzepatide 7.5 mg/0.5 mL PnIj Inject 7.5 mg into the skin every 7 days. 4 Pen 3    zinc gluconate 50 mg tablet Take 50 mg by mouth once daily.      [DISCONTINUED] baclofen (LIORESAL) 10 MG tablet Take 1 tablet (10 mg total) by mouth 3 (three) times daily. 90 tablet 11    [DISCONTINUED] methylPREDNISolone (MEDROL DOSEPACK) 4 mg tablet use as directed 21 each 0     No current facility-administered medications on file prior to visit.

## 2025-04-11 ENCOUNTER — PATIENT MESSAGE (OUTPATIENT)
Dept: FAMILY MEDICINE | Facility: CLINIC | Age: 60
End: 2025-04-11
Payer: COMMERCIAL

## 2025-04-11 ENCOUNTER — TELEPHONE (OUTPATIENT)
Dept: FAMILY MEDICINE | Facility: CLINIC | Age: 60
End: 2025-04-11
Payer: COMMERCIAL

## 2025-04-11 NOTE — TELEPHONE ENCOUNTER
Call placed to patient, no answer, left  stating that his [facility] paperwork has been faxed and if he has questions he can give us a call back.

## 2025-04-16 ENCOUNTER — TELEPHONE (OUTPATIENT)
Dept: PAIN MEDICINE | Facility: CLINIC | Age: 60
End: 2025-04-16
Payer: COMMERCIAL

## 2025-04-16 DIAGNOSIS — M54.16 LUMBAR RADICULOPATHY: Primary | ICD-10-CM

## 2025-04-16 RX ORDER — SODIUM CHLORIDE, SODIUM LACTATE, POTASSIUM CHLORIDE, CALCIUM CHLORIDE 600; 310; 30; 20 MG/100ML; MG/100ML; MG/100ML; MG/100ML
INJECTION, SOLUTION INTRAVENOUS CONTINUOUS
OUTPATIENT
Start: 2025-04-16

## 2025-04-16 NOTE — TELEPHONE ENCOUNTER
Attempted to get patient set up with MRI w sedation, but main campus is booked out to September. Spoke with patient and sent orders to stand up MRI in [facility]

## 2025-04-21 ENCOUNTER — PATIENT MESSAGE (OUTPATIENT)
Dept: PAIN MEDICINE | Facility: CLINIC | Age: 60
End: 2025-04-21
Payer: COMMERCIAL

## 2025-04-28 ENCOUNTER — TELEPHONE (OUTPATIENT)
Dept: GASTROENTEROLOGY | Facility: CLINIC | Age: 60
End: 2025-04-28
Payer: COMMERCIAL

## 2025-04-28 NOTE — TELEPHONE ENCOUNTER
Spoke to pt in regards to scheduling scope. He declines scheduling at this time stating he will call clinic when ready. Call back # provided. Case request canceled at this time

## 2025-04-29 ENCOUNTER — PATIENT MESSAGE (OUTPATIENT)
Dept: FAMILY MEDICINE | Facility: CLINIC | Age: 60
End: 2025-04-29
Payer: COMMERCIAL

## 2025-04-30 ENCOUNTER — PATIENT MESSAGE (OUTPATIENT)
Dept: FAMILY MEDICINE | Facility: CLINIC | Age: 60
End: 2025-04-30
Payer: COMMERCIAL

## 2025-05-01 ENCOUNTER — PATIENT MESSAGE (OUTPATIENT)
Dept: NEUROLOGY | Facility: CLINIC | Age: 60
End: 2025-05-01
Payer: COMMERCIAL

## 2025-05-07 ENCOUNTER — PATIENT MESSAGE (OUTPATIENT)
Dept: ADMINISTRATIVE | Facility: HOSPITAL | Age: 60
End: 2025-05-07
Payer: COMMERCIAL

## 2025-05-12 ENCOUNTER — PATIENT MESSAGE (OUTPATIENT)
Dept: NEUROSURGERY | Facility: CLINIC | Age: 60
End: 2025-05-12
Payer: COMMERCIAL

## 2025-05-13 ENCOUNTER — PATIENT MESSAGE (OUTPATIENT)
Dept: PAIN MEDICINE | Facility: CLINIC | Age: 60
End: 2025-05-13
Payer: COMMERCIAL

## 2025-05-14 ENCOUNTER — HOSPITAL ENCOUNTER (OUTPATIENT)
Dept: RADIOLOGY | Facility: HOSPITAL | Age: 60
Discharge: HOME | End: 2025-05-14
Attending: STUDENT IN AN ORGANIZED HEALTH CARE EDUCATION/TRAINING PROGRAM
Payer: COMMERCIAL

## 2025-05-14 ENCOUNTER — OFFICE VISIT (OUTPATIENT)
Dept: NEUROSURGERY | Facility: CLINIC | Age: 60
End: 2025-05-14
Payer: COMMERCIAL

## 2025-05-14 VITALS
HEART RATE: 83 BPM | HEIGHT: 70 IN | BODY MASS INDEX: 40.52 KG/M2 | SYSTOLIC BLOOD PRESSURE: 119 MMHG | WEIGHT: 283 LBS | DIASTOLIC BLOOD PRESSURE: 85 MMHG

## 2025-05-14 DIAGNOSIS — M54.16 LUMBAR RADICULOPATHY, CHRONIC: ICD-10-CM

## 2025-05-14 DIAGNOSIS — M54.16 LUMBAR RADICULOPATHY, CHRONIC: Primary | ICD-10-CM

## 2025-05-14 PROCEDURE — 99215 OFFICE O/P EST HI 40 MIN: CPT | Mod: 25,S$GLB,, | Performed by: STUDENT IN AN ORGANIZED HEALTH CARE EDUCATION/TRAINING PROGRAM

## 2025-05-14 PROCEDURE — 96372 THER/PROPH/DIAG INJ SC/IM: CPT | Mod: S$GLB,,, | Performed by: STUDENT IN AN ORGANIZED HEALTH CARE EDUCATION/TRAINING PROGRAM

## 2025-05-14 PROCEDURE — 72110 X-RAY EXAM L-2 SPINE 4/>VWS: CPT | Mod: TC,FY,PO

## 2025-05-14 PROCEDURE — 72110 X-RAY EXAM L-2 SPINE 4/>VWS: CPT | Mod: 26,,, | Performed by: RADIOLOGY

## 2025-05-14 RX ORDER — KETOROLAC TROMETHAMINE 30 MG/ML
30 INJECTION, SOLUTION INTRAMUSCULAR; INTRAVENOUS
Status: COMPLETED | OUTPATIENT
Start: 2025-05-14 | End: 2025-05-14

## 2025-05-14 RX ADMIN — KETOROLAC TROMETHAMINE 30 MG: 30 INJECTION, SOLUTION INTRAMUSCULAR; INTRAVENOUS at 09:05

## 2025-05-14 NOTE — PROGRESS NOTES
[facility] Neurosurgery [facility]  Clinic Consult     Consult Requested By: LORETTA Domingo MD, No ref. provider found        SUBJECTIVE:     Chief Complaint:   Chief Complaint   Patient presents with    neck and head pain     Discuss imaging       History of Present Illness:  García Tang is a 59 y.o. male       History of Present Illness    CHIEF COMPLAINT:  Mr. Tang presents today for follow-up after ACDF surgery    HISTORY OF PRESENT ILLNESS:  He experienced worsening of neck and back pain after sustaining a fall in March that resulted in a concussion. He reports neck and back pain with intermittent radiculopathy radiating down both legs. He denies any new neurological deficits.    MEDICAL HISTORY:  He has a history of myelopathy with myelomalacia and persistent right foot drop for multiple years without improvement. He underwent ACDF surgery with positive post-operative recovery. He has been evaluated by neurology and managed through physical therapy in the outpatient setting.      ROS:  Musculoskeletal: +neck pain, +back pain  Neurological: +abnormal gait          Pertinent and Recent history, provider evaluations, imaging and data reviewed in EPIC         Other pertinent ROS negative      Diagnostic Results:  I have independently reviewed the following imaging:          Review of patient's allergies indicates:   Allergen Reactions    Lisinopril Swelling    Iodinated contrast media Other (See Comments)     Spasms, tremor, and high blood pressure    Bactrim  [sulfamethoxazole-trimethoprim]      Other reaction(s): Unknown    Diflucan [fluconazole]      Heart racing    Gabapentin Other (See Comments)     nightmares    Hydroxyzine hcl      Other reaction(s): elevated bp    Kenalog [triamcinolone acetonide] Other (See Comments)     Heart flutters all day    Carvedilol Other (See Comments)     Burning nose and bad taste in mouth    Phenergan [promethazine] Anxiety    Zofran [ondansetron  hcl (pf)] Anxiety       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    Diverticulitis     diverticulitis    Diverticulosis     Essential hypertension 07/18/2016    Fatty liver disease, nonalcoholic 04/07/2016    [place], 2006     GERD (gastroesophageal reflux disease)     Headache     HLD (hyperlipidemia)     Kidney stone     requiring stent    Meniere's disease 01/09/2018    Morbid obesity 05/29/2014    NAFLD (nonalcoholic fatty liver disease)     Near syncope 05/23/2017    Obesity hypoventilation syndrome 02/22/2016    PAD (peripheral artery disease) 09/17/2019    Mild 9/2018    Prostatitis     Rectal bleeding     diverticulitis    Sleep disorder breathing 2/12/2024    Thyroid nodule 07/14/2022     Past Surgical History:   Procedure Laterality Date    ANGIOGRAM, CORONARY, WITH LEFT HEART CATHETERIZATION  10/26/2023    Procedure: Left Heart Cath;  Surgeon: Sal Salazar MD;  Location: [facility] CATH;  Service: Cardiology;;    ANTERIOR CERVICAL DISCECTOMY W/ FUSION N/A 2/12/2024    Procedure: DISCECTOMY, SPINE, CERVICAL, ANTERIOR APPROACH, WITH FUSION C5-6;  Surgeon: Wali Zuniga MD;  Location: [facility] OR;  Service: Neurosurgery;  Laterality: N/A;    CARDIAC CATHETERIZATION      COLONOSCOPY  2008    diverticulosis per patient report    CORONARY ANGIOGRAPHY  10/26/2023    Procedure: Coronary angiogram study;  Surgeon: Sal Salazar MD;  Location: [facility] CATH;  Service: Cardiology;;    ESOPHAGOGASTRODUODENOSCOPY N/A 12/18/2023    Procedure: EGD (ESOPHAGOGASTRODUODENOSCOPY);  Surgeon: Aram Bermudez MD;  Location: [facility] ENDO;  Service: Gastroenterology;  Laterality: N/A;    kidney stent      KIDNEY SURGERY  1996    right kidney obstruction requiring stent-open    MAGNETIC RESONANCE IMAGING N/A 1/26/2024    Procedure: MRI (MAGNETIC RESONANCE IMAGING);  Surgeon: Cole  Dosc Diagnostic;  Location: [facility];  Service: General;  Laterality: N/A;    MAGNETIC RESONANCE IMAGING N/A 5/9/2025    Procedure: MRI (Magnetic Resonance Imagine);  Surgeon: April Dupont;  Location: [facility];  Service: Anesthesiology;  Laterality: N/A;    NOSE SURGERY      UPPER GASTROINTESTINAL ENDOSCOPY  2021    [facility], gastritis per patient report     Family History   Problem Relation Name Age of Onset    Liver disease Mother Stephanie         d.    Cirrhosis Mother Stephanie     Hypertension Mother Stephanie     Hyperlipidemia Mother Stephanie     Colon cancer Father [place] 70    Cancer Father John     Diabetes Father John     Hypertension Father [place]     Glaucoma Father John     Hyperlipidemia Father [place]     Prostate cancer Father John     Diabetes Brother [place]der     Hypertension Brother [place]     Thyroid cancer Brother Hayder     Heart attack Maternal Uncle      Heart attack Maternal Uncle      Heart disease Neg Hx      Heart failure Neg Hx      Macular degeneration Neg Hx      Retinal detachment Neg Hx      Crohn's disease Neg Hx      Ulcerative colitis Neg Hx      Stomach cancer Neg Hx      Esophageal cancer Neg Hx       Social History[1]     Review of Systems:      Constitutional: no fever, chills or night sweats. No abrupt changes in weight   Eyes: no diplopia, lid drooping, loss of vision   ENT: no nasal congestion, sore throat, discharge  Respiratory: no cough, shortness of breath, or pain  Cardiovascular: no chest pain or palpitations   Gastrointestinal: no nausea or vomiting      OBJECTIVE:     Vital Signs (Most Recent):  Pulse: 83 (05/14/25 0847)  BP: 119/85 (05/14/25 0847)    Physical Exam               Physical Exam:      General: well developed, well nourished, no distress. .  Mental Status: Awake, Alert, Oriented x 4  Language: No aphasia  Speech: No dysarthria  Head: normocephalic, atraumatic.  Neck: trachea midline, no JVD   Cardiovascular: no LE edema  Pulmonary: normal  respirations, no signs of respiratory distress      Motor Strength:  No abnormal movements seen.     Strength  Deltoids Triceps Biceps Wrist Extension Wrist Flexion Hand  Interossei     Upper: R 5/5 5/5 5/5 5/5 5/5 5/5 5/5      L 5/5 5/5 5/5 5/5 5/5 5/5 5/5       Iliopsoas Quadriceps  Tibialis  anterior Gastro- cnemius EHL      Lower: R 5/5 5/5  3/5 5/5 2-3/5       L 5/5 5/5  5/5 5/5 5/5              ASSESSMENT/PLAN:     Lumbar radiculopathy, chronic  -     ketorolac injection 30 mg  -     X-Ray Lumbar Spine Ap Lateral w/Flex Ext; Future; Expected date: 05/14/2025  -     Ambulatory referral/consult to Pain Clinic; Future; Expected date: 05/21/2025  -     Ambulatory referral/consult to [facility]; Future; Expected date: 05/21/2025          Assessment & Plan    M54.16 Lumbar radiculopathy, chronic    IMPRESSION:  - Positive recovery post-ACDF for myelopathy with myelomalacia.  - Improvement in strength and ambulation noted, but recurrent pain after fall in March leading to concussion and flare-up of neck and back pain.  - Imaging review shows:  - Multilevel cervical spondylosis.  - Wide central decompression at C5-6.  - Moderate adjacent segment degeneration at C3-4 and C4-5 with no cord compression.  - Bilateral foraminal stenosis at C3-4, C4-5, C6-7, and below.  - Lumbar spondylosis from L2 to S1 with facet arthropathy, more significant at L4-5 and L5-S1.  - Persistent right foot drop ongoing for multiple years, no new neurological deficit.  - Neck and back pain with intermittent radiculopathy down both legs.  - Reviewed treatment strategies, risk tolerance, and goals with patient.    LUMBAR RADICULOPATHY, CHRONIC:  - Referred to physical therapy and interventional pain management for symptomatic treatment strategies.    PLAN SUMMARY:  - Referred to physical therapy for symptomatic treatment  - Referred to interventional pain management for symptomatic treatment                Wali Zuniga  MD  Neurosurgery     This note was generated with the assistance of [employer] used only with dictation feature. I attest to having reviewed and edited the generated note for accuracy, though some syntax or spelling errors may persist. Please contact the author of this note for any clarification.              [1]   Social History  Tobacco Use    Smoking status: Former     Current packs/day: 0.00     Types: Cigarettes     Quit date: 1990     Years since quittin.3    Smokeless tobacco: Never    Tobacco comments:     Patient stopped smoking years ago, back in the s   Substance Use Topics    Alcohol use: No     Comment: stopped in     Drug use: No

## 2025-05-19 ENCOUNTER — OFFICE VISIT (OUTPATIENT)
Dept: FAMILY MEDICINE | Facility: CLINIC | Age: 60
End: 2025-05-19
Payer: COMMERCIAL

## 2025-05-19 ENCOUNTER — RESULTS FOLLOW-UP (OUTPATIENT)
Dept: FAMILY MEDICINE | Facility: CLINIC | Age: 60
End: 2025-05-19
Payer: COMMERCIAL

## 2025-05-19 ENCOUNTER — TELEPHONE (OUTPATIENT)
Dept: FAMILY MEDICINE | Facility: CLINIC | Age: 60
End: 2025-05-19
Payer: COMMERCIAL

## 2025-05-19 VITALS
HEIGHT: 70 IN | DIASTOLIC BLOOD PRESSURE: 86 MMHG | WEIGHT: 286.63 LBS | OXYGEN SATURATION: 98 % | HEART RATE: 78 BPM | SYSTOLIC BLOOD PRESSURE: 124 MMHG | BODY MASS INDEX: 41.03 KG/M2

## 2025-05-19 DIAGNOSIS — N41.0 PROSTATITIS, ACUTE: ICD-10-CM

## 2025-05-19 DIAGNOSIS — R82.998 DARK URINE: Primary | ICD-10-CM

## 2025-05-19 DIAGNOSIS — R82.998 DARK URINE: ICD-10-CM

## 2025-05-19 DIAGNOSIS — N41.0 PROSTATITIS, ACUTE: Primary | ICD-10-CM

## 2025-05-19 LAB
BILIRUB UR QL STRIP.AUTO: NEGATIVE
CLARITY UR: CLEAR
COLOR UR AUTO: YELLOW
GLUCOSE UR QL STRIP: NEGATIVE
HGB UR QL STRIP: NEGATIVE
HOLD SPECIMEN: NORMAL
KETONES UR QL STRIP: NEGATIVE
LEUKOCYTE ESTERASE UR QL STRIP: NEGATIVE
NITRITE UR QL STRIP: NEGATIVE
PH UR STRIP: 6 [PH]
PROT UR QL STRIP: NEGATIVE
SP GR UR STRIP: 1.02

## 2025-05-19 PROCEDURE — 99999 PR PBB SHADOW E&M-EST. PATIENT-LVL V: CPT | Mod: PBBFAC,,, | Performed by: PHYSICIAN ASSISTANT

## 2025-05-19 PROCEDURE — 99213 OFFICE O/P EST LOW 20 MIN: CPT | Mod: S$GLB,,, | Performed by: PHYSICIAN ASSISTANT

## 2025-05-19 PROCEDURE — 81003 URINALYSIS AUTO W/O SCOPE: CPT | Mod: PO | Performed by: PHYSICIAN ASSISTANT

## 2025-05-19 RX ORDER — CIPROFLOXACIN 500 MG/1
500 TABLET, FILM COATED ORAL 2 TIMES DAILY
Qty: 14 TABLET | Refills: 0 | Status: SHIPPED | OUTPATIENT
Start: 2025-05-19 | End: 2025-05-26

## 2025-05-19 NOTE — PROGRESS NOTES
"Subjective:      Patient ID: García Tang is a 59 y.o. male.    Chief Complaint: Groin Pain and Urinary Frequency (Lasting for about a week )      HPI  Patient has PMH of cerebral aneurysm with cognitive communication deficit, anxiety, dysthymia, meniere's disease, HTN, dyslipidemia, arteriosclerotic cardiovascular disease, Type 2 DM, and GERD.  Had spinal cord compression.  Vaccinated against covid, not flu.     Patient reports urinary frequency, suprapubic pain, burns after urination, and dark urine for a week.  Denies fever, hematuria.  History of UTIs, no kidney stones.    Review of Systems   Constitutional:  Positive for fatigue. Negative for appetite change, chills and fever.   Respiratory:  Negative for shortness of breath.    Cardiovascular:  Negative for chest pain.   Gastrointestinal:  Positive for abdominal pain and nausea. Negative for blood in stool, constipation, diarrhea and vomiting.   Genitourinary:  Positive for frequency. Negative for flank pain and hematuria.        Urinary incontinence last night.   Neurological:  Positive for dizziness.       Objective:   /86   Pulse 78   Ht 5' 10" (1.778 m)   Wt 130 kg (286 lb 9.6 oz)   SpO2 98%   BMI 41.12 kg/m²     Physical Exam          Assessment:      1. Dark urine       Plan:   There are no diagnoses linked to this encounter.    Follow up   Patient agreed with plan and expressed understanding.    Thank you for allowing me to serve you,       " tenderness.   Musculoskeletal:         General: Normal range of motion.   Skin:     General: Skin is warm and dry.      Findings: No rash.   Neurological:      General: No focal deficit present.      Mental Status: He is alert and oriented to person, place, and time.   Psychiatric:         Mood and Affect: Mood normal.         Behavior: Behavior normal.         Judgment: Judgment normal.       Assessment:      1. Prostatitis, acute    2. Dark urine       Plan:   1. Prostatitis, acute (Primary)  Will treat due to past history of prostatitis.  - ciprofloxacin HCl (CIPRO) 500 MG tablet; Take 1 tablet (500 mg total) by mouth 2 (two) times daily. for 7 days  Dispense: 14 tablet; Refill: 0  - Ambulatory referral/consult to Urology; Future    2. Dark urine  - Urinalysis, Reflex to Urine Culture Urine, Clean Catch  - GREY TOP URINE HOLD    Follow up as needed.  Patient agreed with plan and expressed understanding.    Thank you for allowing me to serve you,

## 2025-05-19 NOTE — TELEPHONE ENCOUNTER
Called and spoke with patient to inform Him that his medication for Cipro has been sent to the pharmacy and to let him know He has an appointment with Urology on 5/28.

## 2025-05-28 ENCOUNTER — PATIENT MESSAGE (OUTPATIENT)
Dept: NEUROSURGERY | Facility: CLINIC | Age: 60
End: 2025-05-28
Payer: COMMERCIAL

## 2025-05-29 ENCOUNTER — NURSE TRIAGE (OUTPATIENT)
Dept: ADMINISTRATIVE | Facility: CLINIC | Age: 60
End: 2025-05-29
Payer: COMMERCIAL

## 2025-05-29 NOTE — TELEPHONE ENCOUNTER
"Patient states c/o onset of a severe-moderate headache on today, 5/29/25 and left forehead swelling. Patient rates pain at a level of 6/10, pain in his left eye and states he has a "hairy" feeling in the roof of his mouth.     Patient advised to Go to [facility] and to have another adult drive him to the ED facility. Patient also advised to contact the [facility] for any worsening symptoms. Patient states understanding of care advice.     Reason for Disposition   Severe pain in one eye    Additional Information   Negative: Difficult to awaken or acting confused (e.g., disoriented, slurred speech)   Negative: [1] Weakness of the face, arm or leg on one side of the body AND [2] new-onset   Negative: [1] Numbness of the face, arm or leg on one side of the body AND [2] new-onset   Negative: [1] Loss of speech or garbled speech AND [2] new-onset   Negative: Passed out (e.g., fainted, lost consciousness, blacked out and was not responding)   Negative: Sounds like a life-threatening emergency to the triager   Negative: Followed a head injury   Negative: Traumatic Brain Injury (TBI) is suspected   Negative: Influenza suspected (i.e., cough, fever, other respiratory symptoms; probable influenza exposure)   Negative: Sinus pain or congestion is main symptom(s)   Negative: Pregnant   Negative: Postpartum (from 0 to 6 weeks after delivery)   Negative: Spinal cord injury   Negative: Unable to walk, or can only walk with assistance (e.g., requires support)   Negative: Stiff neck (can't touch chin to chest)    Protocols used: Headache-A-AH    "

## 2025-06-16 ENCOUNTER — PATIENT MESSAGE (OUTPATIENT)
Dept: FAMILY MEDICINE | Facility: CLINIC | Age: 60
End: 2025-06-16
Payer: COMMERCIAL

## 2025-06-20 DIAGNOSIS — M54.12 CERVICAL RADICULOPATHY: ICD-10-CM

## 2025-06-22 RX ORDER — BACLOFEN 20 MG/1
20 TABLET ORAL 3 TIMES DAILY PRN
Qty: 90 TABLET | Refills: 0 | Status: SHIPPED | OUTPATIENT
Start: 2025-06-22 | End: 2026-06-22

## 2025-06-30 RX ORDER — OMEPRAZOLE 40 MG/1
CAPSULE, DELAYED RELEASE ORAL
Qty: 90 CAPSULE | Refills: 3 | Status: SHIPPED | OUTPATIENT
Start: 2025-06-30

## 2025-07-02 DIAGNOSIS — E11.9 TYPE 2 DIABETES MELLITUS WITHOUT COMPLICATION: ICD-10-CM

## 2025-07-09 ENCOUNTER — LAB VISIT (OUTPATIENT)
Dept: LAB | Facility: HOSPITAL | Age: 60
End: 2025-07-09
Attending: PHYSICIAN ASSISTANT
Payer: COMMERCIAL

## 2025-07-09 ENCOUNTER — OFFICE VISIT (OUTPATIENT)
Dept: FAMILY MEDICINE | Facility: CLINIC | Age: 60
End: 2025-07-09
Payer: COMMERCIAL

## 2025-07-09 VITALS
HEIGHT: 70 IN | HEART RATE: 66 BPM | BODY MASS INDEX: 40.36 KG/M2 | OXYGEN SATURATION: 97 % | WEIGHT: 281.94 LBS | SYSTOLIC BLOOD PRESSURE: 102 MMHG | DIASTOLIC BLOOD PRESSURE: 72 MMHG

## 2025-07-09 DIAGNOSIS — Z00.00 PREVENTATIVE HEALTH CARE: ICD-10-CM

## 2025-07-09 DIAGNOSIS — Z00.00 PREVENTATIVE HEALTH CARE: Primary | ICD-10-CM

## 2025-07-09 DIAGNOSIS — E11.42 TYPE 2 DIABETES MELLITUS WITH DIABETIC POLYNEUROPATHY, WITHOUT LONG-TERM CURRENT USE OF INSULIN: ICD-10-CM

## 2025-07-09 DIAGNOSIS — E11.9 TYPE 2 DIABETES MELLITUS WITHOUT COMPLICATION: ICD-10-CM

## 2025-07-09 LAB
CHOLEST SERPL-MCNC: 176 MG/DL (ref 120–199)
CHOLEST/HDLC SERPL: 5.3 {RATIO} (ref 2–5)
EAG (OHS): 114 MG/DL (ref 68–131)
HBA1C MFR BLD: 5.6 % (ref 4–5.6)
HDLC SERPL-MCNC: 33 MG/DL (ref 40–75)
HDLC SERPL: 18.8 % (ref 20–50)
LDLC SERPL CALC-MCNC: 128.8 MG/DL (ref 63–159)
NONHDLC SERPL-MCNC: 143 MG/DL
TRIGL SERPL-MCNC: 71 MG/DL (ref 30–150)
TSH SERPL-ACNC: 1.52 UIU/ML (ref 0.4–4)

## 2025-07-09 PROCEDURE — 36415 COLL VENOUS BLD VENIPUNCTURE: CPT | Mod: PO

## 2025-07-09 PROCEDURE — 83036 HEMOGLOBIN GLYCOSYLATED A1C: CPT

## 2025-07-09 PROCEDURE — 84443 ASSAY THYROID STIM HORMONE: CPT

## 2025-07-09 PROCEDURE — 99999 PR PBB SHADOW E&M-EST. PATIENT-LVL III: CPT | Mod: PBBFAC,,, | Performed by: PHYSICIAN ASSISTANT

## 2025-07-09 PROCEDURE — 99396 PREV VISIT EST AGE 40-64: CPT | Mod: S$GLB,,, | Performed by: PHYSICIAN ASSISTANT

## 2025-07-09 PROCEDURE — 80061 LIPID PANEL: CPT

## 2025-07-09 NOTE — PROGRESS NOTES
"Subjective:      Patient ID: García Tang is a 59 y.o. male.    Chief Complaint: Annual Exam (Patient wants to have his blood work done today if possible. Also wants to check his thyroid. )      HPI  Patient has PMH of cerebral aneurysm with cognitive communication deficit, anxiety, dysthymia, meniere's disease, HTN, dyslipidemia, arteriosclerotic cardiovascular disease, Type 2 DM, and GERD.  Had spinal cord compression.  Vaccinated against covid, not flu.     Brother was diagnosed with thyroid cancer recently.    Patient here for an annual exam.  No new health complaints today.    Losing weight on Mounjaro now.  Exercising more.  Lab Results   Component Value Date    HGBA1C 7.5 (H) 01/07/2025        BP Readings from Last 3 Encounters:   07/09/25 102/72   05/29/25 (!) 140/75   05/19/25 124/86      Review of Systems   Constitutional:  Negative for appetite change, chills and fever.   Respiratory:  Negative for shortness of breath.    Cardiovascular:  Negative for chest pain.   Gastrointestinal:  Negative for abdominal pain, constipation, diarrhea, nausea and vomiting.   Musculoskeletal:  Positive for neck pain.   Neurological:  Negative for dizziness, light-headedness and headaches.       Objective:   /72   Pulse 66   Ht 5' 10" (1.778 m)   Wt 127.9 kg (281 lb 15.5 oz)   SpO2 97%   BMI 40.46 kg/m²     Physical Exam  Vitals reviewed.   Constitutional:       Appearance: Normal appearance. He is well-developed.   HENT:      Head: Normocephalic and atraumatic.      Right Ear: External ear normal.      Left Ear: External ear normal.   Eyes:      Conjunctiva/sclera: Conjunctivae normal.   Cardiovascular:      Rate and Rhythm: Normal rate and regular rhythm.      Heart sounds: Normal heart sounds. No murmur heard.     No friction rub. No gallop.   Pulmonary:      Effort: Pulmonary effort is normal. No respiratory distress.      Breath sounds: Normal breath sounds. No wheezing, rhonchi or rales.   Abdominal: "      Palpations: Abdomen is soft.      Tenderness: There is no abdominal tenderness.   Musculoskeletal:         General: Normal range of motion.      Right lower leg: No edema.      Left lower leg: No edema.   Skin:     General: Skin is warm and dry.      Findings: No rash.   Neurological:      General: No focal deficit present.      Mental Status: He is alert and oriented to person, place, and time.   Psychiatric:         Mood and Affect: Mood normal.         Behavior: Behavior normal.         Judgment: Judgment normal.       Assessment:      1. Preventative health care    2. Type 2 diabetes mellitus with diabetic polyneuropathy, without long-term current use of insulin       Plan:   1. Preventative health care (Primary)  Will get colonoscopy in the future.  - TSH; Future    2. Type 2 diabetes mellitus with diabetic polyneuropathy, without long-term current use of insulin  - Hemoglobin A1C; Future  - tirzepatide 10 mg/0.5 mL PnIj; Inject 10 mg into the skin every 7 days.  Dispense: 2 mL; Refill: 5    Follow up in 6 months with Dr. Domingo.  Patient agreed with plan and expressed understanding.    Thank you for allowing me to serve you,

## 2025-07-14 ENCOUNTER — OFFICE VISIT (OUTPATIENT)
Dept: OTOLARYNGOLOGY | Facility: CLINIC | Age: 60
End: 2025-07-14
Payer: COMMERCIAL

## 2025-07-14 VITALS — WEIGHT: 278 LBS | BODY MASS INDEX: 39.89 KG/M2

## 2025-07-14 DIAGNOSIS — R09.89 CHOKING SENSATION: ICD-10-CM

## 2025-07-14 DIAGNOSIS — R13.10 DYSPHAGIA, UNSPECIFIED TYPE: Primary | ICD-10-CM

## 2025-07-14 PROCEDURE — 99999 PR PBB SHADOW E&M-EST. PATIENT-LVL IV: CPT | Mod: PBBFAC,,, | Performed by: NURSE PRACTITIONER

## 2025-07-14 PROCEDURE — 31575 DIAGNOSTIC LARYNGOSCOPY: CPT | Mod: S$GLB,,, | Performed by: NURSE PRACTITIONER

## 2025-07-14 PROCEDURE — 99214 OFFICE O/P EST MOD 30 MIN: CPT | Mod: 25,S$GLB,, | Performed by: NURSE PRACTITIONER

## 2025-07-14 NOTE — PATIENT INSTRUCTIONS
"Trouble swallowing is called "dysphagia." Dysphagia should be seen by a gastroenterologist to rule out intrinsic disease in the lower esophagus, stomach, or proximal duodenum.      Mucous (Post nasal drip) Management  A fullness sensation in the back of the throat is called "globus."   Many people attribute this fullness to a sensation of increased mucous, because they can feel a sticky material in the throat that they will occasionally cough up.     Nasal  / Throat Mucous  Our body NORMALLY makes 1 liter or more of saliva (spit) and nasal mucous every day. These body fluids are important for breaking down the food we eat, protecting our teeth from cavities, and clearing out the pollen and irritants that we breathe in our nose. As our body makes these fluids, we swallow them throughout the day, where they are recycled back through the body. This process is happening all our life without us thinking about it. When an adjustment to this process causes the mucous to be increased or thicker, is when we notice it.      Some problems cause an INCREASE in these body fluids, which we perceive as irritating, excess phlegm in the throat.   However, it is not always an increase. Many times there is a change in CONSISTENCY of the mucous to make it thicker. This will cause the mucous to be held up in the throat instead of being swallowed easily.     Several factors can cause these problems:  Dryness of throat and nose which increases the mucous sticking - Causes include inadequate hydration, excess caffeine / soda / sugary drinks, medication side effects.  Acid reflux  Increased mucous production from allergies or chronic sinus drainage.      We will evaluate the cause(s) of increased or thickened mucous and consider different treatment strategies:     MANY COMMON medications cause dryness of the throat, including medications for allergy, depression/anxiety, heart, and urination issues.   There is some evidence that added sugars " or processed sugars in the diet (not the kind that occur naturally in honey or ripe fruit) can increase mucus, as well as too much dairy. To avoid these refined carbohydrates, on food labels, watch out for wheat flour (also called white, refined or enriched flour) on the ingredients list.      Recommendations for Increased Mucous Sensation     Water, water, water - this cannot be understated. Most people are not drinking enough water regularly to keep up with body's demand. Recommend AT LEAST 64 oz of water per day, and more for men (up to 100 oz.) unless a medical issue prevents this.  Reduce intake of caffeine / tea / sugary drinks or soda, which all will cause increased mucous.  If your nose is the source of mucus (if you must repeatedly blow mucus from your nose in order to breathe effectively through your nose daily), then nasal medications as well as nasal saline can be effective to wash away the mucus.  However if your nose is essentially open and clear (no mucus), then prevention of acid reflux is advised by avoiding late-night eating (nothing 3 hours before laying down at night), greasy and spicy foods, alcohol, and acidic foods.   Humidifier in the bedroom if you find dryness increases at night.  Smoking cessation if you use tobacco  Examination your medication list with your doctor to determine medications that may be contributing     There are NUMEROUS over the counter mucous thinning agents. Here are some suggestions that can be purchased online:  Merritt's Breezer's (Sugar Free), Chuckie's black currant pastilles, and Entertainer's Secret Throat Relief can all help dry mouth and thickened mucous.   Biotene spray and mouthwash can help lubricate a dry mouth  Mucinex can be helpful in some cases, but stop taking if you don't notice improvement after a few weeks.

## 2025-07-14 NOTE — PROGRESS NOTES
"Subjective     Patient ID: García Tang is a 59 y.o. male.    Chief Complaint: Dysphagia and Follow-up    HPI  Patient saw Dr. Deluca 05/30/2025 for dysphagia, neck tightness, and hoarseness: "Cannot rule out hardware issue or fluid. Will get US, has contrast allergy. If any concerns, will pre med and do CT. Suspect silent reflux is issue as well with scope and hoarseness, off protonix lately. Will start him on omeprazole for at least next month and see if this helps, may need to continue. Return in 6 weeks."      Neck IMAGING reviewed:  Ultrasound done 05/30/2024: "Two left thyroid lobe solid nodules. One of the nodule is considered TI-RADS category 4, moderately suspicious. Recommend follow-up sonogram at 1, 2, 3, and 5 years."    CT STN done 06/03/2024: " No evidence of acute abnormality.  No discrete soft tissue mass or pathologically enlarged cervical lymph nodes. Routine appearance of postoperative changes of ACDF at C5-6.  No evidence of acute hardware complication."  CT STN done 07/16/2024: "No evidence of an acute abnormality or significant interval change as compared to the prior exam on 06/03/2024. Stable retained postoperative appearance of ACDF at C5-6.  No evidence of acute hardware complication.  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 Pressure/tightness in his throat wakes him up at night. Never any pain or scratchiness. Just pressure. Started after his spinal surgery earlier this year. EGD done 1.5 years ago by Dr. Bermudez was negative. Given famotidine to take QHS (in addition to PPI QAM), and encouraged to raise HOB, avoid eating late, avoidance of trigger foods/beverages, etc.     Other IMAGING reviewed:   CT head 05/29/2025: Minimal mucosal thickening is identified in the " maxillary sinuses. No air-fluid levels are noted. The remainder of the paranasal sinuses and mastoid air cells appear unremarkable.   MRI brain 05/09/2025: The paranasal sinuses and mastoid air cells are normally developed and free of disease.   MRI C-spine 05/09/2025: C5-6 ACDF. Unchanged myelomalacia in the cord at the C5-6 level. Multilevel spondylosis, greatest at C4-5 where there is mild cord flattening with preserved CSF surrounding the cord and no cord signal abnormality.  Multilevel foraminal stenosis, greatest on the right at C5-6 and bilaterally at C6-7 where it is moderate-severe.  Marked improvement in previously moderate-severe foraminal stenosis on the left at C5-6.    Patient returns today for dysphagia, dysphonia, gets choked easily, and globus sensation. Started 17 months ago after neck surgery. Endorses throat tightness, choking sensation, dysphagia. Takes omeprazole daily, famotidine daily -- helped improved throat clearing, but not choking sensation with eating and tightness in neck/throat area. Patient states he must eat slowly and swallow deliberately to avoid feeling choked. He has been doing neck exercises to help with the tightness. He denies any throat pain/odynophagia.         Review of Systems   Constitutional: Negative.  Negative for fever.   HENT:  Positive for trouble swallowing and voice change. Negative for nasal congestion, dental problem, facial swelling, postnasal drip, rhinorrhea, sinus pressure/congestion, sneezing and sore throat.         Globus sensation (sensation of lump or swelling in throat)   Eyes: Negative.    Respiratory:  Positive for choking. Negative for cough.    Cardiovascular: Negative.    Gastrointestinal: Negative.    Musculoskeletal: Negative.    Integumentary:  Negative.   Neurological: Negative.  Negative for headaches.   Hematological: Negative.    Psychiatric/Behavioral: Negative.            Objective     Physical Exam  Vitals and nursing note reviewed.    Constitutional:       General: He is not in acute distress.     Appearance: He is well-developed. He is not ill-appearing.   HENT:      Head: Normocephalic and atraumatic.      Right Ear: Hearing, tympanic membrane, ear canal and external ear normal. No middle ear effusion. Tympanic membrane is not erythematous.      Left Ear: Hearing, tympanic membrane, ear canal and external ear normal.  No middle ear effusion. Tympanic membrane is not erythematous.      Nose: Nose normal. No congestion or rhinorrhea.      Right Sinus: No maxillary sinus tenderness or frontal sinus tenderness.      Left Sinus: No maxillary sinus tenderness or frontal sinus tenderness.      Mouth/Throat:      Mouth: Mucous membranes are moist. No oral lesions.      Tongue: No lesions.      Palate: No lesions.      Pharynx: Oropharynx is clear. Uvula midline. No pharyngeal swelling, oropharyngeal exudate, posterior oropharyngeal erythema or uvula swelling.   Eyes:      General: Lids are normal. No scleral icterus.        Right eye: No discharge.         Left eye: No discharge.   Neck:      Trachea: Trachea normal. No tracheal deviation.   Cardiovascular:      Rate and Rhythm: Normal rate.   Pulmonary:      Effort: Pulmonary effort is normal. No respiratory distress.      Breath sounds: No stridor. No wheezing.   Musculoskeletal:         General: Normal range of motion.      Cervical back: Normal range of motion.   Lymphadenopathy:      Cervical: No cervical adenopathy.   Skin:     General: Skin is warm and dry.   Neurological:      Mental Status: He is alert and oriented to person, place, and time.      Coordination: Coordination is intact.      Gait: Gait normal.   Psychiatric:         Attention and Perception: Attention normal.         Mood and Affect: Mood normal.         Speech: Speech normal.         Behavior: Behavior normal. Behavior is cooperative.       Procedure: Flexible laryngoscopy    In order to fully examine the upper aerodigestive  tract, including the larynx, in a patient with a hyperactive gag reflex, and suboptimal visualization with indirect mirror exam,  flexible endoscopy is required.   After explaining the procedure and obtaining verbal consent, a timeout was performed with the patient's participation according to the universal protocol. Both nasal cavities were anesthetized with 4% Xylocaine spray mixed with Ezio-Synephrine. The flexible laryngoscope  was inserted into the nasal cavity and advanced to visualize the nasal cavity, nasopharynx, the posterior oropharynx, hypopharynx, and the endolarynx with the  findings noted. The scope was removed and the procedure terminated. The patient tolerated this procedure well without apparent complication.     OVERALL FINDINGS  Nasopharynx - the torus is clear. There are no lesions of the posterior wall.   Oropharynx - no lesions of the tongue base. There is no obvious fullness or asymmetry.  Hypopharynx - there are no lesions of the pyriform sinuses or postcricoid region   Larynx - there are no lesions of the supraglottic or glottic larynx.  Vocal fold mobility is normal.     SPECIFIC FINDINGS  Adenoid tissue - normal   Nasopharynx & eustachian tube orifices - normal   Posterior pharyngeal wall - normal   Base of tongue - normal   Epiglottis - normal   Valleculae - normal   Pyriform sinuses - normal   False vocal cords - normal   True vocal cords - normal  Arytenoids - normal   Interarytenoid space - edema                 Assessment and Plan     1. Dysphagia, unspecified type  -     Fl Modified Barium Swallow Speech; Future; Expected date: 07/14/2025    2. Choking sensation      MBSS through SLP. Reviewed NPL scope video with patient -- no obvious etiology for patient's symptoms seen during scope. Plan of care was discussed in detail with the patient, who agreed with the plan as above. All questions were answered in detail. Patient encouraged to return to clinic if symptoms worsen/persist and as  needed for further ENT symptoms or concerns.          No follow-ups on file.

## 2025-08-08 ENCOUNTER — OFFICE VISIT (OUTPATIENT)
Dept: FAMILY MEDICINE | Facility: CLINIC | Age: 60
End: 2025-08-08
Payer: COMMERCIAL

## 2025-08-08 VITALS
BODY MASS INDEX: 39.74 KG/M2 | HEART RATE: 74 BPM | DIASTOLIC BLOOD PRESSURE: 74 MMHG | WEIGHT: 277.56 LBS | OXYGEN SATURATION: 97 % | SYSTOLIC BLOOD PRESSURE: 100 MMHG | TEMPERATURE: 98 F | HEIGHT: 70 IN

## 2025-08-08 DIAGNOSIS — D17.9 MULTIPLE LIPOMAS: ICD-10-CM

## 2025-08-08 DIAGNOSIS — M19.071 PRIMARY OSTEOARTHRITIS OF RIGHT ANKLE: Primary | ICD-10-CM

## 2025-08-08 PROCEDURE — 99999 PR PBB SHADOW E&M-EST. PATIENT-LVL IV: CPT | Mod: PBBFAC,,, | Performed by: NURSE PRACTITIONER

## 2025-08-08 RX ORDER — MELOXICAM 15 MG/1
15 TABLET ORAL DAILY
Qty: 30 TABLET | Refills: 5 | Status: SHIPPED | OUTPATIENT
Start: 2025-08-08

## 2025-08-08 NOTE — PROGRESS NOTES
Subjective:       Patient ID: García Tang is a 59 y.o. male.    Chief Complaint: Joint Swelling (Patient stated he's had swelling in his right ankle for about 2 weeks and he stated he feels a hard knot on the outer side of his ankle. )    HPI  Right ankle pain and swelling X 2 weeks. Has been working out at the gym and not sure if he reinjured. History of right ankle injury a few years ago. Has been wearing an ankle brace with improvement. Also having some relief with ibuprofen. Ankle pain and swelling are improved today but Pt states he feels 2 hard knots in the ankle that are better today. Has some ankle pain with walking. Pain slightly improves with walking. Pain has improved since wearing the ankle brace. Has noticed itching to the right ankle where ankle brace touches.  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    Diverticulitis     diverticulitis    Diverticulosis     Essential hypertension 07/18/2016    Fatty liver disease, nonalcoholic 04/07/2016    [place], 2006     GERD (gastroesophageal reflux disease)     Headache     HLD (hyperlipidemia)     Kidney stone     requiring stent    Meniere's disease 01/09/2018    Morbid obesity 05/29/2014    NAFLD (nonalcoholic fatty liver disease)     Near syncope 05/23/2017    Obesity hypoventilation syndrome 02/22/2016    PAD (peripheral artery disease) 09/17/2019    Mild 9/2018    Prostatitis     Rectal bleeding     diverticulitis    Sleep disorder breathing 2/12/2024    Thyroid nodule 07/14/2022       Past Surgical History:   Procedure Laterality Date    ANGIOGRAM, CORONARY, WITH LEFT HEART CATHETERIZATION  10/26/2023    Procedure: Left Heart Cath;  Surgeon: Sal Salazar MD;  Location: [facility] CATH;  Service: Cardiology;;    ANTERIOR CERVICAL DISCECTOMY W/ FUSION N/A 2/12/2024    Procedure: DISCECTOMY, SPINE, CERVICAL,  ANTERIOR APPROACH, WITH FUSION C5-6;  Surgeon: Wali Zuniga MD;  Location: [facility] OR;  Service: Neurosurgery;  Laterality: N/A;    CARDIAC CATHETERIZATION      COLONOSCOPY  2008    diverticulosis per patient report    CORONARY ANGIOGRAPHY  10/26/2023    Procedure: Coronary angiogram study;  Surgeon: Sal Salazar MD;  Location: [facility] CATH;  Service: Cardiology;;    ESOPHAGOGASTRODUODENOSCOPY N/A 12/18/2023    Procedure: EGD (ESOPHAGOGASTRODUODENOSCOPY);  Surgeon: Aram Bermudez MD;  Location: [facility] ENDO;  Service: Gastroenterology;  Laterality: N/A;    kidney stent      KIDNEY SURGERY  1996    right kidney obstruction requiring stent-open    MAGNETIC RESONANCE IMAGING N/A 1/26/2024    Procedure: MRI (MAGNETIC RESONANCE IMAGING);  Surgeon: Su Galvan Diagnostic;  Location: [facility];  Service: General;  Laterality: N/A;    MAGNETIC RESONANCE IMAGING N/A 5/9/2025    Procedure: MRI (Magnetic Resonance Imagine);  Surgeon: April Dupont;  Location: [facility] CATH;  Service: Anesthesiology;  Laterality: N/A;    NOSE SURGERY      UPPER GASTROINTESTINAL ENDOSCOPY  2021    [facility], gastritis per patient report       Review of patient's allergies indicates:   Allergen Reactions    Lisinopril Swelling    Iodinated contrast media Other (See Comments)     Spasms, tremor, and high blood pressure    Bactrim  [sulfamethoxazole-trimethoprim]      Other reaction(s): Unknown    Diflucan [fluconazole]      Heart racing    Gabapentin Other (See Comments)     nightmares    Hydroxyzine hcl      Other reaction(s): elevated bp    Kenalog [triamcinolone acetonide] Other (See Comments)     Heart flutters all day    Carvedilol Other (See Comments)     Burning nose and bad taste in mouth    Phenergan [promethazine] Anxiety    Zofran [ondansetron hcl (pf)] Anxiety       Social History[1]    Medications Ordered Prior to Encounter[2]    Family History   Problem Relation Name Age of Onset    Liver disease Mother Stephanie baldwin  "   Cirrhosis Mother Stephanie     Hypertension Mother Stephanie     Hyperlipidemia Mother Stephanie     Colon cancer Father John 70    Cancer Father [place]     Diabetes Father John     Hypertension Father John     Glaucoma Father [place]     Hyperlipidemia Father [place]     Prostate cancer Father [place]     Diabetes Brother Hayder     Hypertension Brother Hayder     Thyroid cancer Brother Hayder     Heart attack Maternal Uncle      Heart attack Maternal Uncle      Heart disease Neg Hx      Heart failure Neg Hx      Macular degeneration Neg Hx      Retinal detachment Neg Hx      Crohn's disease Neg Hx      Ulcerative colitis Neg Hx      Stomach cancer Neg Hx      Esophageal cancer Neg Hx         Review of Systems   Musculoskeletal:  Positive for arthralgias.       Objective:      /74 (Patient Position: Sitting)   Pulse 74   Temp 98.2 °F (36.8 °C) (Oral)   Ht 5' 10" (1.778 m)   Wt 125.9 kg (277 lb 9 oz)   SpO2 97%   BMI 39.83 kg/m²   Physical Exam  Vitals and nursing note reviewed.   Constitutional:       General: He is not in acute distress.     Appearance: Normal appearance. He is well-groomed.   HENT:      Head: Normocephalic.      Right Ear: External ear normal.      Left Ear: External ear normal.      Nose: Nose normal.      Mouth/Throat:      Lips: Pink.      Mouth: Mucous membranes are moist.      Pharynx: Oropharynx is clear.   Eyes:      Extraocular Movements: Extraocular movements intact.      Conjunctiva/sclera: Conjunctivae normal.      Pupils: Pupils are equal, round, and reactive to light.   Cardiovascular:      Rate and Rhythm: Normal rate and regular rhythm.      Heart sounds: Normal heart sounds.   Pulmonary:      Effort: Pulmonary effort is normal.      Breath sounds: Normal breath sounds.   Chest:      Chest wall: No tenderness.   Abdominal:      General: Bowel sounds are normal.      Palpations: Abdomen is soft.      Tenderness: There is no abdominal tenderness.   Musculoskeletal:        "  General: Tenderness (right ankle) present. No swelling. Normal range of motion.      Cervical back: Normal range of motion and neck supple.      Right lower leg: No edema.      Left lower leg: No edema.      Comments: Multiple small subcutaneous nodules (1-2 cm), consistent with lipomas, to bilateral lower legs   Skin:     General: Skin is warm and dry.      Findings: Rash (excoration right lower leg) present.   Neurological:      General: No focal deficit present.      Mental Status: He is alert and oriented to person, place, and time. Mental status is at baseline.      Gait: Gait is intact.   Psychiatric:         Mood and Affect: Mood normal.         Behavior: Behavior normal.         Assessment:       1. Primary osteoarthritis of right ankle    2. Multiple lipomas        Plan:       Primary osteoarthritis of right ankle  -     meloxicam (MOBIC) 15 MG tablet; Take 1 tablet (15 mg total) by mouth once daily.  Dispense: 30 tablet; Refill: 5    Multiple lipomas        Meloxicam for chronic ankle pain. Stop ibuprofen. Continue to wear supportive ankle brace while exercising. Pt advised to wear a high sock when wearing the brace to prevent contact with the skin. RTC if no improvement or any worsening symptoms. RTC for any increase in size or pain associated with lipomas.    Counseled on regular exercise, maintenance of a healthy weight, balanced diet rich in fruits/vegetables and lean protein, and avoidance of unhealthy habits like smoking and excessive alcohol intake.         [1]   Social History  Socioeconomic History    Marital status:    Tobacco Use    Smoking status: Former     Current packs/day: 0.00     Types: Cigarettes     Quit date: 1990     Years since quittin.6    Smokeless tobacco: Never    Tobacco comments:     Patient stopped smoking years ago, back in the 90s   Substance and Sexual Activity    Alcohol use: No     Comment: stopped in     Drug use: No    Sexual activity: Yes      Partners: Female     Social Drivers of Health     Financial Resource Strain: Patient Declined (2/22/2024)    Overall Financial Resource Strain (CARDIA)     Difficulty of Paying Living Expenses: Patient declined   Food Insecurity: Unknown (2/22/2024)    Hunger Vital Sign     Ran Out of Food in the Last Year: Patient declined   Transportation Needs: Unknown (2/22/2024)    PRAPARE - Transportation     Lack of Transportation (Medical): No     Lack of Transportation (Non-Medical): Patient declined   Physical Activity: Unknown (2/22/2024)    Exercise Vital Sign     Days of Exercise per Week: Patient declined   Recent Concern: Physical Activity - Inactive (1/24/2024)    Exercise Vital Sign     Days of Exercise per Week: 0 days     Minutes of Exercise per Session: 0 min   Stress: Patient Declined (2/22/2024)    [employer] - Occupational Stress Questionnaire     Feeling of Stress : Patient declined   Housing Stability: Patient Declined (2/22/2024)    Housing Stability Vital Sign     Unable to Pay for Housing in the Last Year: Patient declined     Number of Places Lived in the Last Year: 1     Unstable Housing in the Last Year: Patient declined   [2]   Current Outpatient Medications on File Prior to Visit   Medication Sig Dispense Refill    acetaminophen (TYLENOL) 500 MG tablet Take 1,000 mg by mouth every 6 (six) hours as needed for Pain.      ascorbic acid (VITAMIN C ORAL) Take 2 tablets by mouth once daily.      aspirin (ECOTRIN) 81 MG EC tablet Take 1 tablet (81 mg total) by mouth once daily. 90 tablet 3    baclofen (LIORESAL) 20 MG tablet Take 1 tablet (20 mg total) by mouth 3 (three) times daily as needed (muscle tightness). 90 tablet 0    cholecalciferol, vitamin D3, 125 mcg (5,000 unit) Tab Take 10,000 Units by mouth once daily.      famotidine (PEPCID) 40 MG tablet Take 1 tablet (40 mg total) by mouth nightly. 30 tablet 11    Lactobacillus acidophilus (ACIDOPHILUS ORAL) Take 3 tablets by  mouth once daily.      magnesium oxide (MAG-OX) 400 mg (241.3 mg magnesium) tablet Take 400 mg by mouth once daily.      metoprolol succinate (TOPROL-XL) 50 MG 24 hr tablet Take 1 tablet (50 mg total) by mouth once daily. 90 tablet 3    NIACIN ORAL Take 1 tablet by mouth once daily.      omeprazole (PRILOSEC) 40 MG capsule TAKE 1 CAPSULE(40 MG) BY MOUTH DAILY 90 capsule 3    sodium chloride (SALINE NASAL) 0.65 % nasal spray 1 spray by Nasal route as needed. 15 mL 12    tirzepatide 10 mg/0.5 mL PnIj Inject 10 mg into the skin every 7 days. 2 mL 5    zinc gluconate 50 mg tablet Take 50 mg by mouth once daily.       No current facility-administered medications on file prior to visit.

## 2025-08-12 ENCOUNTER — OFFICE VISIT (OUTPATIENT)
Dept: PHYSICAL MEDICINE AND REHAB | Facility: CLINIC | Age: 60
End: 2025-08-12
Payer: COMMERCIAL

## 2025-08-12 VITALS — HEART RATE: 60 BPM | SYSTOLIC BLOOD PRESSURE: 141 MMHG | DIASTOLIC BLOOD PRESSURE: 76 MMHG

## 2025-08-12 DIAGNOSIS — G82.50 SPASTIC QUADRIPARESIS: ICD-10-CM

## 2025-08-12 DIAGNOSIS — M54.12 CERVICAL RADICULOPATHY: ICD-10-CM

## 2025-08-12 DIAGNOSIS — S14.105D C5-C7 LEVEL SPINAL CORD INJURY, SUBSEQUENT ENCOUNTER: Primary | ICD-10-CM

## 2025-08-12 PROCEDURE — 99214 OFFICE O/P EST MOD 30 MIN: CPT | Mod: S$GLB,,, | Performed by: PHYSICAL MEDICINE & REHABILITATION

## 2025-08-12 PROCEDURE — 99999 PR PBB SHADOW E&M-EST. PATIENT-LVL III: CPT | Mod: PBBFAC,,, | Performed by: PHYSICAL MEDICINE & REHABILITATION

## 2025-08-12 PROCEDURE — G2211 COMPLEX E/M VISIT ADD ON: HCPCS | Mod: S$GLB,,, | Performed by: PHYSICAL MEDICINE & REHABILITATION

## 2025-08-12 RX ORDER — BACLOFEN 20 MG/1
20 TABLET ORAL 3 TIMES DAILY
Qty: 270 TABLET | Refills: 3 | Status: SHIPPED | OUTPATIENT
Start: 2025-08-12 | End: 2026-08-12

## 2025-09-02 ENCOUNTER — OFFICE VISIT (OUTPATIENT)
Dept: FAMILY MEDICINE | Facility: CLINIC | Age: 60
End: 2025-09-02
Payer: COMMERCIAL

## 2025-09-02 ENCOUNTER — LAB VISIT (OUTPATIENT)
Dept: LAB | Facility: HOSPITAL | Age: 60
End: 2025-09-02
Attending: PHYSICIAN ASSISTANT
Payer: COMMERCIAL

## 2025-09-02 VITALS
DIASTOLIC BLOOD PRESSURE: 82 MMHG | HEART RATE: 60 BPM | BODY MASS INDEX: 39.2 KG/M2 | SYSTOLIC BLOOD PRESSURE: 130 MMHG | OXYGEN SATURATION: 97 % | WEIGHT: 273.81 LBS | HEIGHT: 70 IN

## 2025-09-02 DIAGNOSIS — R20.2 NUMBNESS AND TINGLING OF BOTH FEET: ICD-10-CM

## 2025-09-02 DIAGNOSIS — R20.2 NUMBNESS AND TINGLING OF BOTH FEET: Primary | ICD-10-CM

## 2025-09-02 DIAGNOSIS — E11.42 TYPE 2 DIABETES MELLITUS WITH DIABETIC POLYNEUROPATHY, WITHOUT LONG-TERM CURRENT USE OF INSULIN: ICD-10-CM

## 2025-09-02 DIAGNOSIS — R20.0 NUMBNESS AND TINGLING OF BOTH FEET: ICD-10-CM

## 2025-09-02 DIAGNOSIS — R20.0 NUMBNESS AND TINGLING OF BOTH FEET: Primary | ICD-10-CM

## 2025-09-02 LAB
ALBUMIN SERPL BCP-MCNC: 4.1 G/DL (ref 3.5–5.2)
ALP SERPL-CCNC: 65 UNIT/L (ref 40–150)
ALT SERPL W/O P-5'-P-CCNC: 19 UNIT/L (ref 10–44)
ANION GAP (OHS): 12 MMOL/L (ref 8–16)
AST SERPL-CCNC: 25 UNIT/L (ref 11–45)
BILIRUB SERPL-MCNC: 0.5 MG/DL (ref 0.1–1)
BUN SERPL-MCNC: 26 MG/DL (ref 6–20)
CALCIUM SERPL-MCNC: 9.5 MG/DL (ref 8.7–10.5)
CHLORIDE SERPL-SCNC: 102 MMOL/L (ref 95–110)
CO2 SERPL-SCNC: 25 MMOL/L (ref 23–29)
CREAT SERPL-MCNC: 0.8 MG/DL (ref 0.5–1.4)
EAG (OHS): 120 MG/DL (ref 68–131)
GFR SERPLBLD CREATININE-BSD FMLA CKD-EPI: >60 ML/MIN/1.73/M2
GLUCOSE SERPL-MCNC: 106 MG/DL (ref 70–110)
HBA1C MFR BLD: 5.8 % (ref 4–5.6)
POTASSIUM SERPL-SCNC: 4.4 MMOL/L (ref 3.5–5.1)
PROT SERPL-MCNC: 7.3 GM/DL (ref 6–8.4)
SODIUM SERPL-SCNC: 139 MMOL/L (ref 136–145)

## 2025-09-02 PROCEDURE — 99999 PR PBB SHADOW E&M-EST. PATIENT-LVL IV: CPT | Mod: PBBFAC,,, | Performed by: PHYSICIAN ASSISTANT

## 2025-09-02 PROCEDURE — 36415 COLL VENOUS BLD VENIPUNCTURE: CPT | Mod: PO

## 2025-09-02 PROCEDURE — 83036 HEMOGLOBIN GLYCOSYLATED A1C: CPT

## 2025-09-02 PROCEDURE — 84075 ASSAY ALKALINE PHOSPHATASE: CPT | Mod: PO

## 2025-09-02 PROCEDURE — 99214 OFFICE O/P EST MOD 30 MIN: CPT | Mod: S$GLB,,, | Performed by: PHYSICIAN ASSISTANT

## 2025-09-03 ENCOUNTER — PATIENT MESSAGE (OUTPATIENT)
Dept: FAMILY MEDICINE | Facility: CLINIC | Age: 60
End: 2025-09-03
Payer: COMMERCIAL

## 2025-09-03 DIAGNOSIS — Z00.00 PREVENTATIVE HEALTH CARE: Primary | ICD-10-CM
